# Patient Record
Sex: MALE | Race: BLACK OR AFRICAN AMERICAN | NOT HISPANIC OR LATINO | Employment: OTHER | URBAN - METROPOLITAN AREA
[De-identification: names, ages, dates, MRNs, and addresses within clinical notes are randomized per-mention and may not be internally consistent; named-entity substitution may affect disease eponyms.]

---

## 2017-01-11 ENCOUNTER — OFFICE VISIT (OUTPATIENT)
Dept: INTERNAL MEDICINE | Facility: CLINIC | Age: 62
End: 2017-01-11
Payer: MEDICARE

## 2017-01-11 VITALS
WEIGHT: 142.88 LBS | HEIGHT: 70 IN | BODY MASS INDEX: 20.46 KG/M2 | DIASTOLIC BLOOD PRESSURE: 62 MMHG | TEMPERATURE: 96 F | SYSTOLIC BLOOD PRESSURE: 104 MMHG

## 2017-01-11 DIAGNOSIS — R97.20 ELEVATED PSA, GREATER THAN OR EQUAL TO 20 NG/ML: ICD-10-CM

## 2017-01-11 DIAGNOSIS — I25.84 CORONARY ARTERY DISEASE DUE TO CALCIFIED CORONARY LESION: Chronic | ICD-10-CM

## 2017-01-11 DIAGNOSIS — R05.3 CHRONIC COUGH: Primary | ICD-10-CM

## 2017-01-11 DIAGNOSIS — K21.9 GASTROESOPHAGEAL REFLUX DISEASE, ESOPHAGITIS PRESENCE NOT SPECIFIED: Chronic | ICD-10-CM

## 2017-01-11 DIAGNOSIS — Z98.61 HISTORY OF PTCA: ICD-10-CM

## 2017-01-11 DIAGNOSIS — I25.10 CORONARY ARTERY DISEASE DUE TO CALCIFIED CORONARY LESION: Chronic | ICD-10-CM

## 2017-01-11 DIAGNOSIS — J44.9 CHRONIC OBSTRUCTIVE PULMONARY DISEASE, UNSPECIFIED COPD TYPE: Chronic | ICD-10-CM

## 2017-01-11 DIAGNOSIS — E78.2 MIXED HYPERLIPIDEMIA: Chronic | ICD-10-CM

## 2017-01-11 PROCEDURE — 99213 OFFICE O/P EST LOW 20 MIN: CPT | Mod: PBBFAC,PO | Performed by: FAMILY MEDICINE

## 2017-01-11 PROCEDURE — 99214 OFFICE O/P EST MOD 30 MIN: CPT | Mod: S$PBB,,, | Performed by: FAMILY MEDICINE

## 2017-01-11 PROCEDURE — 99999 PR PBB SHADOW E&M-EST. PATIENT-LVL III: CPT | Mod: PBBFAC,,, | Performed by: FAMILY MEDICINE

## 2017-01-11 RX ORDER — BENZONATATE 200 MG/1
200 CAPSULE ORAL 3 TIMES DAILY PRN
Qty: 30 CAPSULE | Refills: 0 | Status: SHIPPED | OUTPATIENT
Start: 2017-01-11 | End: 2017-01-21

## 2017-01-11 NOTE — PROGRESS NOTES
Subjective:       Patient ID: Joey Jacobo is a 61 y.o. male.    Chief Complaint: not sleeping and Cough    HPI Comments: 61-year-old -American male patient with Patient Active Problem List:     Coronary artery disease     Mitral regurgitation     COPD (chronic obstructive pulmonary disease)     Mixed hyperlipidemia     GERD (gastroesophageal reflux disease)     Glaucoma (increased eye pressure)     History of PTCA     Osteoarthritis of spine with radiculopathy, lumbar region     Non-productive cough  Here with complaint of chronic cough for the past 6 months, has tried over-the-counter Nexium and ALLERGY medication but continues to have productive cough was at nighttime.  Patient has stopped smoking several years ago, with history of COPD has been using his inhalers regularly, denies of any significant wheezing or recent hospitalizations, has not seen pulmonologist for more than a year.  Patient with history of CAD status post PTCA has been taking metoprolol regularly and aspirin,  does not have any follow-up appointments with cardiology.  Patient denies of any chest pain or shortness of breath, abdominal discomfort nausea vomiting          Cough   Pertinent negatives include no chest pain, headaches, myalgias, postnasal drip, rash or shortness of breath.     Review of Systems   Constitutional: Negative for appetite change, fatigue and unexpected weight change.   HENT: Negative for postnasal drip.    Eyes: Negative for visual disturbance.   Respiratory: Positive for cough. Negative for shortness of breath.    Cardiovascular: Negative for chest pain, palpitations and leg swelling.   Gastrointestinal: Negative for abdominal pain, nausea and vomiting.   Genitourinary: Negative for dysuria, frequency, hematuria and urgency.   Musculoskeletal: Negative for myalgias.   Skin: Negative for rash.   Neurological: Negative for headaches.   Psychiatric/Behavioral: Negative for sleep disturbance.         Visit  "Vitals    /62    Temp 96 °F (35.6 °C) (Tympanic)    Ht 5' 10" (1.778 m)    Wt 64.8 kg (142 lb 13.7 oz)    BMI 20.5 kg/m2     Objective:      Physical Exam   Constitutional: He is oriented to person, place, and time. He appears well-developed and well-nourished.   HENT:   Head: Normocephalic and atraumatic.   Mouth/Throat: Oropharynx is clear and moist.   Cardiovascular: Normal rate, regular rhythm and normal heart sounds.    No murmur heard.  Pulmonary/Chest: Effort normal and breath sounds normal. No respiratory distress. He has no wheezes.   Abdominal: Soft. Bowel sounds are normal. There is no tenderness.   Musculoskeletal: He exhibits no edema.   Neurological: He is alert and oriented to person, place, and time.   Skin: Skin is warm and dry.   Psychiatric: He has a normal mood and affect.         Assessment:       1. Chronic cough    2. Chronic obstructive pulmonary disease, unspecified COPD type    3. Coronary artery disease due to calcified coronary lesion    4. History of PTCA    5. Mixed hyperlipidemia    6. Gastroesophageal reflux disease, esophagitis presence not specified    7. Elevated PSA, greater than or equal to 20 ng/ml        Plan:   Chronic cough  -     Ambulatory referral to Pulmonology  -     CBC auto differential; Future; Expected date: 1/11/17  -     X-Ray Chest PA And Lateral; Future; Expected date: 1/11/17  -     benzonatate (TESSALON) 200 MG capsule; Take 1 capsule (200 mg total) by mouth 3 (three) times daily as needed.  Dispense: 30 capsule; Refill: 0  Will get chest x-ray and further labs, secondary to chronicity but will refer to pulmonology for further evaluation with history of COPD, Tessalon Perles prescribed today for symptomatic relief  Encouraged to drink adequate fluids    Chronic obstructive pulmonary disease, unspecified COPD type  -     Ambulatory referral to Pulmonology  -     CBC auto differential; Future; Expected date: 1/11/17  Stable currently on " Pulmicort    Coronary artery disease due to calcified coronary lesion  -     Ambulatory referral to Cardiology  -     Comprehensive metabolic panel; Future; Expected date: 1/11/17  -     Lipid panel; Future; Expected date: 1/11/17    History of PTCA  -     Ambulatory referral to Cardiology  Stable and asymptomatic currently on aspirin 81 mg and metoprolol 50 mg daily, will refer to cardiology for monitoring    Mixed hyperlipidemia  -     Lipid panel; Future; Expected date: 1/11/17  -     TSH; Future; Expected date: 1/11/17  Currently taking Lipitor 40 mg daily, will check fasting labs tomorrow    Gastroesophageal reflux disease, esophagitis presence not specified-stable on Nexium as needed  Encouraged to eat small frequent meals and avoid spicy diet    Elevated PSA, greater than or equal to 20 ng/ml  -     PSA, TOTAL AND FREE; Future; Expected date: 1/11/17  Previous labs showed elevated PSA above 20, will plan to repeat prostate levels    Patient would like to get flu shot tomorrow when he is here for workup

## 2017-01-11 NOTE — MR AVS SNAPSHOT
[facility]  [address] [place] [address]  Phone: [phone]  Fax: [phone]                  Joey Jacobo   2017 2:40 PM   Office Visit    Description:  Male : 1955   Provider:  Cassandra Brand MD   Department:  [facility] - Internal Medicine           Reason for Visit     not sleeping     Cough           Diagnoses this Visit        Comments    Chronic cough    -  Primary     Chronic obstructive pulmonary disease, unspecified COPD type         Coronary artery disease due to calcified coronary lesion         History of PTCA         Mixed hyperlipidemia         Gastroesophageal reflux disease, esophagitis presence not specified         Elevated PSA, greater than or equal to 20 ng/ml                To Do List           Future Appointments        Provider Department Dept Phone    2017 8:45 AM LABORATORY, [facility] [phone]    2017 9:00 AM [facility] XR2 [facility] [phone]      Goals (5 Years of Data)     None      Follow-Up and Disposition     Return in about 4 months (around 2017), or if symptoms worsen or fail to improve.       These Medications        Disp Refills Start End    benzonatate (TESSALON) 200 MG capsule 30 capsule 0 2017    Take 1 capsule (200 mg total) by mouth 3 (three) times daily as needed. - Oral    Pharmacy: [id number] [place] [address] Ph #: [phone]         [facility] [place] [facility]     [facility] Nurse Care Line -  Assistance  Registered nurses in the [facility] provide clinical advisement, health education, appointment booking, and other advisory services.  Call for this free service at [phone].             Medications           Message regarding Medications     Verify the changes and/or additions to your medication regime listed below are the same as discussed with your clinician today.  If any of these changes or  additions are incorrect, please notify your healthcare provider.        START taking these NEW medications        Refills    benzonatate (TESSALON) 200 MG capsule 0    Sig: Take 1 capsule (200 mg total) by mouth 3 (three) times daily as needed.    Class: Normal    Route: Oral      STOP taking these medications     sulfamethoxazole-trimethoprim 800-160mg (BACTRIM DS) 800-160 mg Tab Take 1 tablet by mouth 2 (two) times daily.    SUPREP BOWEL PREP KIT 17.5-3.13-1.6 gram SolR Use as directed    pyridoxine (VITAMIN B-6) 25 MG Tab Take by mouth. 1 Tablet Oral Every day           Verify that the below list of medications is an accurate representation of the medications you are currently taking.  If none reported, the list may be blank. If incorrect, please contact your healthcare provider. Carry this list with you in case of emergency.           Current Medications     aspirin (ECOTRIN) 81 MG EC tablet Take by mouth. 1 Tablet, Delayed Release (E.C.) Oral Every day    atorvastatin (LIPITOR) 40 MG tablet Take one tablet by mouth in the evening    budesonide-formoterol 160-4.5 mcg (SYMBICORT) 160-4.5 mcg/actuation HFAA Inhale 2 puffs into the lungs every 12 (twelve) hours.    calcium-vitamin D (CALCIUM 600 + D,3,) 600 mg(1,500mg) -400 unit Tab Take by mouth. 1 Tablet Oral Twice a day    gabapentin (NEURONTIN) 300 MG capsule Take 300 mg by mouth once daily.     metoprolol succinate (TOPROL-XL) 50 MG 24 hr tablet Take 1 tablet (50 mg total) by mouth once daily.    nitroGLYCERIN (NITROSTAT) 0.4 MG SL tablet Place 1 tablet (0.4 mg total) under the tongue every 5 (five) minutes as needed for Chest pain.    travoprost (TRAVATAN Z) 0.004 % Drop Place 1 drop into both eyes every evening. 1 Drops Ophthalmic At bedtime    benzonatate (TESSALON) 200 MG capsule Take 1 capsule (200 mg total) by mouth 3 (three) times daily as needed.    fluticasone (FLONASE) 50 mcg/actuation nasal spray 1 spray by Each Nare route once daily.          "  Clinical Reference Information           Vital Signs - Last Recorded  Most recent update: 1/11/2017  2:50 PM by Ana Barton LPN    BP Temp Ht Wt BMI    104/62 96 °F (35.6 °C) (Tympanic) 5' 10" (1.778 m) 64.8 kg (142 lb 13.7 oz) 20.5 kg/m2      Blood Pressure          Most Recent Value    BP  104/62      Allergies as of 1/11/2017     Avelox  [Moxifloxacin]      Immunizations Administered on Date of Encounter - 1/11/2017     None      Orders Placed During Today's Visit      Normal Orders This Visit    Ambulatory referral to Cardiology     Ambulatory referral to Pulmonology     Future Labs/Procedures Expected by Expires    CBC auto differential  1/11/2017 3/12/2018    Comprehensive metabolic panel  1/11/2017 3/12/2018    Lipid panel  1/11/2017 3/12/2018    PSA, TOTAL AND FREE  1/11/2017 3/12/2018    TSH  1/11/2017 3/12/2018    X-Ray Chest PA And Lateral  1/11/2017 1/11/2018      [facility] Sign-Up     Activating your [facility] account is as easy as 1-2-3!     1) Visit [email or web address], select Sign Up Now, enter this activation code and your date of birth, then select Next.  [email or web address]  Expires: 2/25/2017  3:07 PM      2) Create a username and password to use when you visit [facility] in the future and select a security question in case you lose your password and select Next.    3) Enter your e-mail address and click Sign Up!    Additional Information  If you have questions, please e-mail [email or web address] or call [phone] to talk to our [facility] staff. Remember, [facility] is NOT to be used for urgent needs. For medical emergencies, dial 911.         "

## 2017-01-20 ENCOUNTER — TELEPHONE (OUTPATIENT)
Dept: PULMONOLOGY | Facility: CLINIC | Age: 62
End: 2017-01-20

## 2017-02-22 ENCOUNTER — HOSPITAL ENCOUNTER (OUTPATIENT)
Dept: RADIOLOGY | Facility: HOSPITAL | Age: 62
Discharge: HOME | End: 2017-02-22
Attending: FAMILY MEDICINE
Payer: MEDICARE

## 2017-02-22 ENCOUNTER — TELEPHONE (OUTPATIENT)
Dept: INTERNAL MEDICINE | Facility: CLINIC | Age: 62
End: 2017-02-22

## 2017-02-22 ENCOUNTER — HOSPITAL ENCOUNTER (OUTPATIENT)
Dept: RADIOLOGY | Facility: HOSPITAL | Age: 62
Discharge: HOME | End: 2017-02-22
Attending: FAMILY MEDICINE | Admitting: FAMILY MEDICINE
Payer: MEDICARE

## 2017-02-22 ENCOUNTER — OFFICE VISIT (OUTPATIENT)
Dept: INTERNAL MEDICINE | Facility: CLINIC | Age: 62
End: 2017-02-22
Payer: MEDICARE

## 2017-02-22 VITALS
HEART RATE: 65 BPM | BODY MASS INDEX: 21.05 KG/M2 | HEIGHT: 70 IN | TEMPERATURE: 96 F | DIASTOLIC BLOOD PRESSURE: 70 MMHG | OXYGEN SATURATION: 99 % | WEIGHT: 147.06 LBS | SYSTOLIC BLOOD PRESSURE: 124 MMHG

## 2017-02-22 DIAGNOSIS — I25.84 CORONARY ARTERY DISEASE DUE TO CALCIFIED CORONARY LESION: Chronic | ICD-10-CM

## 2017-02-22 DIAGNOSIS — M47.26 OSTEOARTHRITIS OF SPINE WITH RADICULOPATHY, LUMBAR REGION: ICD-10-CM

## 2017-02-22 DIAGNOSIS — I25.10 CORONARY ARTERY DISEASE DUE TO CALCIFIED CORONARY LESION: Chronic | ICD-10-CM

## 2017-02-22 DIAGNOSIS — R05.3 CHRONIC COUGH: ICD-10-CM

## 2017-02-22 DIAGNOSIS — R97.20 ELEVATED PSA, GREATER THAN OR EQUAL TO 20 NG/ML: Primary | ICD-10-CM

## 2017-02-22 DIAGNOSIS — K21.9 GASTROESOPHAGEAL REFLUX DISEASE, ESOPHAGITIS PRESENCE NOT SPECIFIED: Chronic | ICD-10-CM

## 2017-02-22 DIAGNOSIS — J44.9 CHRONIC OBSTRUCTIVE PULMONARY DISEASE, UNSPECIFIED COPD TYPE: Chronic | ICD-10-CM

## 2017-02-22 DIAGNOSIS — R10.9 BILATERAL FLANK PAIN: Primary | ICD-10-CM

## 2017-02-22 DIAGNOSIS — R10.9 BILATERAL FLANK PAIN: ICD-10-CM

## 2017-02-22 DIAGNOSIS — R05.8 NON-PRODUCTIVE COUGH: ICD-10-CM

## 2017-02-22 PROCEDURE — 99999 PR PBB SHADOW E&M-EST. PATIENT-LVL III: CPT | Mod: PBBFAC,,, | Performed by: FAMILY MEDICINE

## 2017-02-22 PROCEDURE — 72110 X-RAY EXAM L-2 SPINE 4/>VWS: CPT | Mod: 26,,, | Performed by: RADIOLOGY

## 2017-02-22 PROCEDURE — 71020 XR CHEST PA AND LATERAL: CPT | Mod: 26,,, | Performed by: RADIOLOGY

## 2017-02-22 PROCEDURE — 99213 OFFICE O/P EST LOW 20 MIN: CPT | Mod: PBBFAC,PO | Performed by: FAMILY MEDICINE

## 2017-02-22 PROCEDURE — 74020 XR ABDOMEN FLAT AND ERECT: CPT | Mod: 26,,, | Performed by: RADIOLOGY

## 2017-02-22 PROCEDURE — 99214 OFFICE O/P EST MOD 30 MIN: CPT | Mod: S$PBB,,, | Performed by: FAMILY MEDICINE

## 2017-02-22 RX ORDER — ZOLPIDEM TARTRATE 5 MG/1
5 TABLET ORAL
COMMUNITY
End: 2017-05-24

## 2017-02-22 RX ORDER — TRAMADOL HYDROCHLORIDE 50 MG/1
50 TABLET ORAL EVERY 12 HOURS PRN
Qty: 20 TABLET | Refills: 0 | Status: SHIPPED | OUTPATIENT
Start: 2017-02-22 | End: 2017-03-04

## 2017-02-22 RX ORDER — OMEPRAZOLE 40 MG/1
40 CAPSULE, DELAYED RELEASE ORAL DAILY
Qty: 30 CAPSULE | Refills: 1 | Status: SHIPPED | OUTPATIENT
Start: 2017-02-22 | End: 2017-10-03 | Stop reason: ALTCHOICE

## 2017-02-22 NOTE — PROGRESS NOTES
"Subjective:       Patient ID: Joey Jacobo is a 61 y.o. male.    Chief Complaint: No chief complaint on file.    HPI Comments: 61-year-old -American male patient with Patient Active Problem List:     Coronary artery disease     Mitral regurgitation     COPD (chronic obstructive pulmonary disease)     Mixed hyperlipidemia     GERD (gastroesophageal reflux disease)     Glaucoma (increased eye pressure)     History of PTCA     Osteoarthritis of spine with radiculopathy, lumbar region     Non-productive cough  Here with complaint of bilateral flank pain, started yesterday, reports pain as 9/10.  Patient denies of lifting any heavy stuff  Or any kind of injury to cause this pain.  Continues to have chronic nonproductive cough , patient did not see pulmonology yet , reviewed chest x-ray which has been done today showing COPD changes .  Patient did labs this morning   Denies of any fever , trouble with bowel movements or urine .   Reports pain  in the abdomen and lower back , as a band  around bilateral flanks .  Reports having a bowel movement yesterday    denies of any chest pain or shortness of breath          Review of Systems   Constitutional: Negative for appetite change, fatigue and fever.   Eyes: Negative for visual disturbance.   Respiratory: Positive for cough. Negative for shortness of breath and wheezing.    Cardiovascular: Negative for chest pain.   Gastrointestinal: Positive for abdominal pain. Negative for constipation, nausea and vomiting.   Genitourinary: Positive for flank pain. Negative for frequency, hematuria and urgency.   Musculoskeletal: Negative for myalgias.   Skin: Negative for rash.   Neurological: Negative for weakness, numbness and headaches.   Psychiatric/Behavioral: Negative for sleep disturbance.         Visit Vitals    /70    Pulse 65    Temp 96.1 °F (35.6 °C) (Tympanic)    Ht 5' 10" (1.778 m)    Wt 66.7 kg (147 lb 0.8 oz)    SpO2 99%    BMI 21.1 kg/m2 "     Objective:      Physical Exam   Constitutional: He is oriented to person, place, and time. He appears well-developed and well-nourished.   HENT:   Head: Normocephalic and atraumatic.   Mouth/Throat: Oropharynx is clear and moist.   Cardiovascular: Normal rate, regular rhythm and normal heart sounds.    No murmur heard.  Pulmonary/Chest: Effort normal and breath sounds normal. No respiratory distress. He has no wheezes.   Abdominal: Soft. Bowel sounds are normal. There is tenderness.   Musculoskeletal: He exhibits tenderness. He exhibits no edema.   Positive for paraspinal lumbar muscle tenderness bilaterally and bilateral flank tenderness noted on exam today.   Minimal tenderness noted in the abdomen generalized superficially.    Neurological: He is alert and oriented to person, place, and time.   Skin: Skin is warm and dry. No rash noted. No erythema.   Psychiatric: He has a normal mood and affect.         Assessment:       1. Bilateral flank pain    2. Chronic obstructive pulmonary disease, unspecified COPD type    3. Gastroesophageal reflux disease, esophagitis presence not specified    4. Osteoarthritis of spine with radiculopathy, lumbar region    5. Non-productive cough    6. Coronary artery disease due to calcified coronary lesion        Plan:   Bilateral flank pain  -     X-Ray Abdomen Flat And Erect; Future; Expected date: 2/22/17  -     Urinalysis; Future; Expected date: 2/22/17  -     X-Ray Lumbar Spine Complete 5 View; Future; Expected date: 2/22/17  -     Amylase; Future; Expected date: 2/22/17  -     Lipase; Future; Expected date: 2/22/17  -     tramadol (ULTRAM) 50 mg tablet; Take 1 tablet (50 mg total) by mouth every 12 (twelve) hours as needed for Pain.  Dispense: 20 tablet; Refill: 0  Tramadol prescribed for bilateral flank pain, patient did labs this morning, will add amylase lipase and imaging including urine test to identify further etiology for  flank pain.    Encouraged to drink adequate  fluids and eat fiber rich diet  Warm compresses recommended for symptomatic relief to the lower back    Chronic obstructive pulmonary disease, unspecified COPD type- currently using Symbicort, clinically stable.  Reviewed chest x-ray showing COPD changes  Will make an appointment with pulmonology to discuss further       Gastroesophageal reflux disease, esophagitis presence not specified  -     omeprazole (PRILOSEC) 40 MG capsule; Take 1 capsule (40 mg total) by mouth once daily.  Dispense: 30 capsule; Refill: 1   secondary to chronic nonproductive cough, could be from acid reflex, will do a trial of omeprazole 40 mg daily until further evaluated by pulmonology  Encouraged to eat small frequent meals and avoid spicy diet    Osteoarthritis of spine with radiculopathy, lumbar region- reviewed previous x-rays showing mild degenerative changes , will plan to repeat x-rays secondary to bilateral flank pain which started yesterday     Non-productive cough  -     omeprazole (PRILOSEC) 40 MG capsule; Take 1 capsule (40 mg total) by mouth once daily.  Dispense: 30 capsule; Refill: 1   advised to try over-the-counter Mucinex as well    Coronary artery disease due to calcified coronary lesion-status post PTCA clinically stable and asymptomatic

## 2017-02-22 NOTE — MR AVS SNAPSHOT
[facility]  [address] [place] [address]  Phone: [phone]  Fax: [phone]                  Joey Jacobo   2017 10:40 AM   Office Visit    Description:  Male : 1955   Provider:  Cassandra Brand MD   Department:  [facility] - Internal Medicine           Diagnoses this Visit        Comments    Bilateral flank pain    -  Primary     Chronic obstructive pulmonary disease, unspecified COPD type         Gastroesophageal reflux disease, esophagitis presence not specified         Osteoarthritis of spine with radiculopathy, lumbar region         Non-productive cough         Coronary artery disease due to calcified coronary lesion                To Do List           Future Appointments        Provider Department Dept Phone    2017 9:00 AM [facility]2 [facility] [phone]    2017 9:40 AM SPECIMEN, [facility] [phone]    2017 10:40 AM Cassandra Brand MD [facility] Internal [facility] [phone]    2017 1:40 PM Cassandra Brand MD [facility] [phone]      Goals (5 Years of Data)     None       These Medications        Disp Refills Start End    omeprazole (PRILOSEC) 40 MG capsule 30 capsule 1 2017    Take 1 capsule (40 mg total) by mouth once daily. - Oral    Pharmacy: [id number] - [place] - [address] [place] [address] Ph #: [phone]       tramadol (ULTRAM) 50 mg tablet 20 tablet 0 2017 3/4/2017    Take 1 tablet (50 mg total) by mouth every 12 (twelve) hours as needed for Pain. - Oral    Pharmacy: [id number] - [place] - [address] [place] [address] Ph #: [phone]         [facility] On [facility]     [facility] On [facility] Nurse Care Line -  Assistance  Registered nurses in the [facility] provide clinical advisement, health education, appointment booking, and other advisory services.  Call for this free service at [phone].             Medications            Message regarding Medications     Verify the changes and/or additions to your medication regime listed below are the same as discussed with your clinician today.  If any of these changes or additions are incorrect, please notify your healthcare provider.        START taking these NEW medications        Refills    omeprazole (PRILOSEC) 40 MG capsule 1    Sig: Take 1 capsule (40 mg total) by mouth once daily.    Class: Normal    Route: Oral    tramadol (ULTRAM) 50 mg tablet 0    Sig: Take 1 tablet (50 mg total) by mouth every 12 (twelve) hours as needed for Pain.    Class: Normal    Route: Oral           Verify that the below list of medications is an accurate representation of the medications you are currently taking.  If none reported, the list may be blank. If incorrect, please contact your healthcare provider. Carry this list with you in case of emergency.           Current Medications     aspirin (ECOTRIN) 81 MG EC tablet Take by mouth. 1 Tablet, Delayed Release (E.C.) Oral Every day    atorvastatin (LIPITOR) 40 MG tablet Take one tablet by mouth in the evening    budesonide-formoterol 160-4.5 mcg (SYMBICORT) 160-4.5 mcg/actuation HFAA Inhale 2 puffs into the lungs every 12 (twelve) hours.    calcium-vitamin D (CALCIUM 600 + D,3,) 600 mg(1,500mg) -400 unit Tab Take by mouth. 1 Tablet Oral Twice a day    fluticasone (FLONASE) 50 mcg/actuation nasal spray 1 spray by Each Nare route once daily.    gabapentin (NEURONTIN) 300 MG capsule Take 300 mg by mouth once daily.     metoprolol succinate (TOPROL-XL) 50 MG 24 hr tablet Take 1 tablet (50 mg total) by mouth once daily.    nitroGLYCERIN (NITROSTAT) 0.4 MG SL tablet Place 1 tablet (0.4 mg total) under the tongue every 5 (five) minutes as needed for Chest pain.    omeprazole (PRILOSEC) 40 MG capsule Take 1 capsule (40 mg total) by mouth once daily.    tramadol (ULTRAM) 50 mg tablet Take 1 tablet (50 mg total) by mouth every 12 (twelve) hours as needed for Pain.     "travoprost (TRAVATAN Z) 0.004 % Drop Place 1 drop into both eyes every evening. 1 Drops Ophthalmic At bedtime    zolpidem (AMBIEN) 5 MG Tab Take 5 mg by mouth.           Clinical Reference Information           Your Vitals Were     BP Pulse Temp Height Weight SpO2    124/70 65 96.1 °F (35.6 °C) (Tympanic) 5' 10" (1.778 m) 66.7 kg (147 lb 0.8 oz) 99%    BMI                21.1 kg/m2          Blood Pressure          Most Recent Value    BP  124/70      Allergies as of 2/22/2017     Avelox  [Moxifloxacin]      Immunizations Administered on Date of Encounter - 2/22/2017     None      Orders Placed During Today's Visit     Future Labs/Procedures Expected by Expires    Amylase  2/22/2017 4/23/2018    Lipase  2/22/2017 4/23/2018    Urinalysis  2/22/2017 4/23/2018    X-Ray Abdomen Flat And Erect  2/22/2017 2/22/2018    X-Ray Lumbar Spine Complete 5 View  2/22/2017 2/22/2018      [facility] Sign-Up     Activating your [facility] account is as easy as 1-2-3!     1) Visit [email or web address], select Sign Up Now, enter this activation code and your date of birth, then select Next.  [email or web address]  Expires: 2/25/2017  3:07 PM      2) Create a username and password to use when you visit [facility] in the future and select a security question in case you lose your password and select Next.    3) Enter your e-mail address and click Sign Up!    Additional Information  If you have questions, please e-mail [email or web address] or call [phone] to talk to our [facility] staff. Remember, [facility] is NOT to be used for urgent needs. For medical emergencies, dial 911.         Language Assistance Services     ATTENTION: Language assistance services are available, free of charge. Please call [phone].      ATENCIÓN: Si habla español, tiene a garcia disposición servicios gratuitos de asistencia lingüística. Llame al [phone].     CHÚ Ý: N?u b?n nói Ti?ng Vi?t, có các d?ch v? h? tr? ngôn ng? mi?n phí dành cho b?n. G?i s? " [phone].         [facility] complies with applicable Federal civil rights laws and does not discriminate on the basis of race, color, national origin, age, disability, or sex.

## 2017-02-22 NOTE — TELEPHONE ENCOUNTER
Will refer to urology for elevated PSA.   Mild straining kidney functions, drink adequate fluids, otherwise stable labs

## 2017-02-27 ENCOUNTER — OFFICE VISIT (OUTPATIENT)
Dept: INTERNAL MEDICINE | Facility: CLINIC | Age: 62
End: 2017-02-27
Payer: MEDICARE

## 2017-02-27 VITALS
SYSTOLIC BLOOD PRESSURE: 106 MMHG | OXYGEN SATURATION: 99 % | HEIGHT: 70 IN | HEART RATE: 73 BPM | DIASTOLIC BLOOD PRESSURE: 78 MMHG | BODY MASS INDEX: 21.21 KG/M2 | WEIGHT: 148.13 LBS

## 2017-02-27 DIAGNOSIS — R97.20 ELEVATED PSA, GREATER THAN OR EQUAL TO 20 NG/ML: ICD-10-CM

## 2017-02-27 DIAGNOSIS — R10.9 ABDOMINAL PAIN, ACUTE: Primary | ICD-10-CM

## 2017-02-27 DIAGNOSIS — R74.8 ELEVATED LIPASE: ICD-10-CM

## 2017-02-27 PROCEDURE — 99213 OFFICE O/P EST LOW 20 MIN: CPT | Mod: S$PBB,,, | Performed by: FAMILY MEDICINE

## 2017-02-27 PROCEDURE — 99999 PR PBB SHADOW E&M-EST. PATIENT-LVL III: CPT | Mod: PBBFAC,,, | Performed by: FAMILY MEDICINE

## 2017-02-27 PROCEDURE — 99213 OFFICE O/P EST LOW 20 MIN: CPT | Mod: PBBFAC,PO | Performed by: FAMILY MEDICINE

## 2017-02-27 RX ORDER — HYDROCODONE BITARTRATE AND ACETAMINOPHEN 5; 325 MG/1; MG/1
1 TABLET ORAL EVERY 12 HOURS PRN
Qty: 15 TABLET | Refills: 0 | Status: SHIPPED | OUTPATIENT
Start: 2017-02-27 | End: 2017-04-04 | Stop reason: SDUPTHER

## 2017-02-27 NOTE — PROGRESS NOTES
"Subjective:       Patient ID: Joey Jacobo is a 61 y.o. male.    Chief Complaint: Abdominal Pain    HPI Comments: 61-year-old Afro-American female patient with Patient Active Problem List:     Coronary artery disease     Mitral regurgitation     COPD (chronic obstructive pulmonary disease)     Mixed hyperlipidemia     GERD (gastroesophageal reflux disease)     Glaucoma (increased eye pressure)     History of PTCA     Osteoarthritis of spine with radiculopathy, lumbar region     Non-productive cough  Here with complaint of abdominal pain which is been persistent for the past 1 month off and on, occasionally can get up to 6-7/10, reports pain in the mid umbilical area, and occasionally in the left upper quadrant radiating to the abdomen, denies of any nausea vomiting or changes in bowel movements.  Cough has subsided since started on omeprazole.   Patient has been taking tramadol but no significant relief noted    Abdominal Pain   Associated symptoms include myalgias. Pertinent negatives include no constipation, dysuria, headaches, nausea or vomiting.     Review of Systems   Constitutional: Negative for appetite change and fatigue.   Eyes: Negative for visual disturbance.   Respiratory: Negative for shortness of breath.    Cardiovascular: Negative for chest pain, palpitations and leg swelling.   Gastrointestinal: Positive for abdominal pain. Negative for constipation, nausea and vomiting.   Genitourinary: Positive for flank pain. Negative for dysuria.   Musculoskeletal: Positive for myalgias.   Skin: Negative for rash.   Neurological: Negative for headaches.   Psychiatric/Behavioral: Negative for sleep disturbance.         /78  Pulse 73  Ht 5' 10" (1.778 m)  Wt 67.2 kg (148 lb 2.4 oz)  SpO2 99%  BMI 21.26 kg/m2  Objective:      Physical Exam   Constitutional: He is oriented to person, place, and time. He appears well-developed and well-nourished.   HENT:   Head: Normocephalic and atraumatic. "   Mouth/Throat: Oropharynx is clear and moist.   Cardiovascular: Normal rate, regular rhythm and normal heart sounds.    Pulmonary/Chest: Effort normal and breath sounds normal.   Abdominal: Soft. Bowel sounds are normal. He exhibits no mass. There is tenderness. There is no rebound and no guarding.   Neurological: He is alert and oriented to person, place, and time.   Skin: Skin is warm and dry. No rash noted. No erythema.   Psychiatric: He has a normal mood and affect.       Lab Visit on 02/22/2017   Component Date Value Ref Range Status    Specimen UA 02/22/2017 Urine, Clean Catch   Final    Color, UA 02/22/2017 Yellow  Yellow, Straw, Yolande Final    Appearance, UA 02/22/2017 Clear  Clear Final    pH, UA 02/22/2017 7.0  5.0 - 8.0 Final    Specific Gravity, UA 02/22/2017 <=1.005* 1.005 - 1.030 Final    Protein, UA 02/22/2017 Negative  Negative Final    Comment: Recommend a 24 hour urine protein or a urine   protein/creatinine ratio if globulin induced proteinuria is  clinically suspected.      Glucose, UA 02/22/2017 Negative  Negative Final    Ketones, UA 02/22/2017 Negative  Negative Final    Bilirubin (UA) 02/22/2017 Negative  Negative Final    Occult Blood UA 02/22/2017 Negative  Negative Final    Nitrite, UA 02/22/2017 Negative  Negative Final    Leukocytes, UA 02/22/2017 Negative  Negative Final   Lab Visit on 02/22/2017   Component Date Value Ref Range Status    WBC 02/22/2017 7.05  3.90 - 12.70 K/uL Final    RBC 02/22/2017 4.87  4.60 - 6.20 M/uL Final    Hemoglobin 02/22/2017 14.7  14.0 - 18.0 g/dL Final    Hematocrit 02/22/2017 45.1  40.0 - 54.0 % Final    MCV 02/22/2017 93  82 - 98 fL Final    MCH 02/22/2017 30.2  27.0 - 31.0 pg Final    MCHC 02/22/2017 32.6  32.0 - 36.0 % Final    RDW 02/22/2017 14.2  11.5 - 14.5 % Final    Platelets 02/22/2017 249  150 - 350 K/uL Final    MPV 02/22/2017 11.2  9.2 - 12.9 fL Final    Gran # 02/22/2017 3.2  1.8 - 7.7 K/uL Final    Lymph # 02/22/2017  2.7  1.0 - 4.8 K/uL Final    Mono # 02/22/2017 0.8  0.3 - 1.0 K/uL Final    Eos # 02/22/2017 0.3  0.0 - 0.5 K/uL Final    Baso # 02/22/2017 0.04  0.00 - 0.20 K/uL Final    Gran% 02/22/2017 45.4  38.0 - 73.0 % Final    Lymph% 02/22/2017 38.0  18.0 - 48.0 % Final    Mono% 02/22/2017 11.6  4.0 - 15.0 % Final    Eosinophil% 02/22/2017 4.0  0.0 - 8.0 % Final    Basophil% 02/22/2017 0.6  0.0 - 1.9 % Final    Differential Method 02/22/2017 Automated   Final    Sodium 02/22/2017 140  136 - 145 mmol/L Final    Potassium 02/22/2017 4.4  3.5 - 5.1 mmol/L Final    Chloride 02/22/2017 105  95 - 110 mmol/L Final    CO2 02/22/2017 26  23 - 29 mmol/L Final    Glucose 02/22/2017 95  70 - 110 mg/dL Final    BUN, Bld 02/22/2017 13  6 - 20 mg/dL Final    Creatinine 02/22/2017 1.5* 0.5 - 1.4 mg/dL Final    Calcium 02/22/2017 9.5  8.7 - 10.5 mg/dL Final    Total Protein 02/22/2017 8.5* 6.0 - 8.4 g/dL Final    Albumin 02/22/2017 3.5  3.5 - 5.2 g/dL Final    Total Bilirubin 02/22/2017 0.5  0.1 - 1.0 mg/dL Final    Comment: For infants and newborns, interpretation of results should be based  on gestational age, weight and in agreement with clinical  observations.  Premature Infant recommended reference ranges:  Up to 24 hours.............<8.0 mg/dL  Up to 48 hours............<12.0 mg/dL  3-5 days..................<15.0 mg/dL  6-29 days.................<15.0 mg/dL      Alkaline Phosphatase 02/22/2017 72  55 - 135 U/L Final    AST 02/22/2017 21  10 - 40 U/L Final    ALT 02/22/2017 14  10 - 44 U/L Final    Anion Gap 02/22/2017 9  8 - 16 mmol/L Final    eGFR if  02/22/2017 58.1* >60 mL/min/1.73 m^2 Final    eGFR if non African American 02/22/2017 50.2* >60 mL/min/1.73 m^2 Final    Comment: Calculation used to obtain the estimated glomerular filtration  rate (eGFR) is the CKD-EPI equation. Since race is unknown   in our information system, the eGFR values for   -American and Non--American  patients are given   for each creatinine result.      Cholesterol 02/22/2017 178  120 - 199 mg/dL Final    Comment: The National Cholesterol Education Program (NCEP) has set the  following guidelines (reference ranges) for Cholesterol:  Optimal.....................<200 mg/dL  Borderline High.............200-239 mg/dL  High........................> or = 240 mg/dL      Triglycerides 02/22/2017 103  30 - 150 mg/dL Final    Comment: The National Cholesterol Education Program (NCEP) has set the  following guidelines (reference values) for triglycerides:  Normal......................<150 mg/dL  Borderline High.............150-199 mg/dL  High........................200-499 mg/dL      HDL 02/22/2017 49  40 - 75 mg/dL Final    Comment: The National Cholesterol Education Program (NCEP) has set the  following guidelines (reference values) for HDL Cholesterol:  Low...............<40 mg/dL  Optimal...........>60 mg/dL      LDL Cholesterol 02/22/2017 108.4  63.0 - 159.0 mg/dL Final    Comment: The National Cholesterol Education Program (NCEP) has set the  following guidelines (reference values) for LDL Cholesterol:  Optimal.......................<130 mg/dL  Borderline High...............130-159 mg/dL  High..........................160-189 mg/dL  Very High.....................>190 mg/dL      HDL/Chol Ratio 02/22/2017 27.5  20.0 - 50.0 % Final    Total Cholesterol/HDL Ratio 02/22/2017 3.6  2.0 - 5.0 Final    Non-HDL Cholesterol 02/22/2017 129  mg/dL Final    Comment: Risk category and Non-HDL cholesterol goals:  Coronary heart disease (CHD)or equivalent (10-year risk of CHD >20%):  Non-HDL cholesterol goal     <130 mg/dL  Two or more CHD risk factors and 10-year risk of CHD <= 20%:  Non-HDL cholesterol goal     <160 mg/dL  0 to 1 CHD risk factor:  Non-HDL cholesterol goal     <190 mg/dL      TSH 02/22/2017 2.366  0.400 - 4.000 uIU/mL Final    PSA Total 02/22/2017 22.6* 0.00 - 4.00 ng/mL Final    Comment: PSA Expected  levels:  Hormonal Therapy: <0.05 ng/ml  Prostatectomy: <0.01 ng/ml  Radiation Therapy: <1.00 ng/ml      PSA, Free 02/22/2017 1.19  0.01 - 1.50 ng/mL Final    PSA, Free Pct 02/22/2017 5.27  Not established % Final    Amylase 02/22/2017 107  20 - 110 U/L Final    Lipase 02/22/2017 81* 4 - 60 U/L Final       Assessment:       1. Abdominal pain, acute    2. Elevated lipase    3. Elevated PSA, greater than or equal to 20 ng/ml        Plan:   Abdominal pain, acute  -     CT Abdomen Pelvis With Contrast; Future; Expected date: 2/27/17  -     hydrocodone-acetaminophen 5-325mg (NORCO) 5-325 mg per tablet; Take 1 tablet by mouth every 12 (twelve) hours as needed for Pain.  Dispense: 15 tablet; Refill: 0    Elevated lipase  -     CT Abdomen Pelvis With Contrast; Future; Expected date: 2/27/17    Elevated PSA, greater than or equal to 20 ng/ml  -     CT Abdomen Pelvis With Contrast; Future; Expected date: 2/27/17    Reviewed labs available today showing elevated lipase and PSA levels.   Will get CT scan of the abdomen with pelvis with contrast for further evaluation, Norco prescribed today for severe pain and tramadol was advised to take for mild to moderate pain.  Drink adequate fluids  Advised to follow-up with urology for elevated PSA for which referral has already been placed and will make an appointment today with urology.   Will follow up in 1 month

## 2017-02-27 NOTE — MR AVS SNAPSHOT
[facility]  [address] [place] [address]  Phone: [phone]  Fax: [phone]                  Joey Jacobo   2017 1:40 PM   Office Visit    Description:  Male : 1955   Provider:  Cassandra Brand MD   Department:  [facility]           Reason for Visit     Abdominal Pain           Diagnoses this Visit        Comments    Abdominal pain, acute    -  Primary     Elevated lipase         Elevated PSA, greater than or equal to 20 ng/ml                To Do List           Future Appointments        Provider Department Dept Phone    2017 1:40 PM Cassandra Brand MD [facility] [phone]    3/1/2017 10:50 AM [facility] CT1 LIMIT 500 LBS [facility] [phone]    3/27/2017 10:40 AM Cassandra Brand MD [facility] [phone]    4/10/2017 9:20 AM Roland Dawn IV, MD [facility] - Urology [phone]      Goals (5 Years of Data)     None      Follow-Up and Disposition     Return in about 4 weeks (around 3/27/2017), or if symptoms worsen or fail to improve.       These Medications        Disp Refills Start End    hydrocodone-acetaminophen 5-325mg (NORCO) 5-325 mg per tablet 15 tablet 0 2017     Take 1 tablet by mouth every 12 (twelve) hours as needed for Pain. - Oral    Pharmacy: [id number] - [place] [address] #: [phone]         [facility]     [facility] Nurse Care Line -  Assistance  Registered nurses in the [facility] Center provide clinical advisement, health education, appointment booking, and other advisory services.  Call for this free service at [phone].             Medications           Message regarding Medications     Verify the changes and/or additions to your medication regime listed below are the same as discussed with your clinician today.  If any of these changes or additions are incorrect, please notify your healthcare provider.         START taking these NEW medications        Refills    hydrocodone-acetaminophen 5-325mg (NORCO) 5-325 mg per tablet 0    Sig: Take 1 tablet by mouth every 12 (twelve) hours as needed for Pain.    Class: Normal    Route: Oral           Verify that the below list of medications is an accurate representation of the medications you are currently taking.  If none reported, the list may be blank. If incorrect, please contact your healthcare provider. Carry this list with you in case of emergency.           Current Medications     aspirin (ECOTRIN) 81 MG EC tablet Take by mouth. 1 Tablet, Delayed Release (E.C.) Oral Every day    atorvastatin (LIPITOR) 40 MG tablet Take one tablet by mouth in the evening    calcium-vitamin D (CALCIUM 600 + D,3,) 600 mg(1,500mg) -400 unit Tab Take by mouth. 1 Tablet Oral Twice a day    fluticasone (FLONASE) 50 mcg/actuation nasal spray 1 spray by Each Nare route once daily.    gabapentin (NEURONTIN) 300 MG capsule Take 300 mg by mouth once daily.     metoprolol succinate (TOPROL-XL) 50 MG 24 hr tablet Take 1 tablet (50 mg total) by mouth once daily.    nitroGLYCERIN (NITROSTAT) 0.4 MG SL tablet Place 1 tablet (0.4 mg total) under the tongue every 5 (five) minutes as needed for Chest pain.    omeprazole (PRILOSEC) 40 MG capsule Take 1 capsule (40 mg total) by mouth once daily.    tramadol (ULTRAM) 50 mg tablet Take 1 tablet (50 mg total) by mouth every 12 (twelve) hours as needed for Pain.    travoprost (TRAVATAN Z) 0.004 % Drop Place 1 drop into both eyes every evening. 1 Drops Ophthalmic At bedtime    zolpidem (AMBIEN) 5 MG Tab Take 5 mg by mouth.    budesonide-formoterol 160-4.5 mcg (SYMBICORT) 160-4.5 mcg/actuation HFAA Inhale 2 puffs into the lungs every 12 (twelve) hours.    hydrocodone-acetaminophen 5-325mg (NORCO) 5-325 mg per tablet Take 1 tablet by mouth every 12 (twelve) hours as needed for Pain.           Clinical Reference Information           Your Vitals Were     BP                    106/78           Blood Pressure          Most Recent Value    BP  106/78      Allergies as of 2/27/2017     Avelox  [Moxifloxacin]      Immunizations Administered on Date of Encounter - 2/27/2017     None      Orders Placed During Today's Visit     Future Labs/Procedures Expected by Expires    CT Abdomen Pelvis With Contrast  2/27/2017 2/27/2018      [facility] Sign-Up     Activating your [facility] account is as easy as 1-2-3!     1) Visit [email or web address], select Sign Up Now, enter this activation code and your date of birth, then select Next.  [email or web address]  Expires: 4/13/2017  1:39 PM      2) Create a username and password to use when you visit [facility] in the future and select a security question in case you lose your password and select Next.    3) Enter your e-mail address and click Sign Up!    Additional Information  If you have questions, please e-mail [email or web address] or call [phone] to talk to our [facility] staff. Remember, [facility] is NOT to be used for urgent needs. For medical emergencies, dial 911.         Language Assistance Services     ATTENTION: Language assistance services are available, free of charge. Please call [phone].      ATENCIÓN: Si habla español, tiene a garcia disposición servicios gratuitos de asistencia lingüística. Llame al [phone].     [facility] Ý: N?u b?n nói Ti?ng Vi?t, có các d?ch v? h? tr? ngôn ng? mi?n phí dành cho b?n. G?i s? [phone].         [facility] complies with applicable Federal civil rights laws and does not discriminate on the basis of race, color, national origin, age, disability, or sex.

## 2017-02-28 ENCOUNTER — TELEPHONE (OUTPATIENT)
Dept: RADIOLOGY | Facility: HOSPITAL | Age: 62
End: 2017-02-28

## 2017-03-08 ENCOUNTER — TELEPHONE (OUTPATIENT)
Dept: RADIOLOGY | Facility: HOSPITAL | Age: 62
End: 2017-03-08

## 2017-03-10 ENCOUNTER — TELEPHONE (OUTPATIENT)
Dept: RADIOLOGY | Facility: HOSPITAL | Age: 62
End: 2017-03-10

## 2017-03-13 ENCOUNTER — HOSPITAL ENCOUNTER (OUTPATIENT)
Dept: RADIOLOGY | Facility: HOSPITAL | Age: 62
Discharge: HOME | End: 2017-03-13
Attending: FAMILY MEDICINE
Payer: MEDICARE

## 2017-03-13 DIAGNOSIS — R74.8 ELEVATED LIPASE: ICD-10-CM

## 2017-03-13 DIAGNOSIS — R10.9 ABDOMINAL PAIN, ACUTE: ICD-10-CM

## 2017-03-13 DIAGNOSIS — R97.20 ELEVATED PSA, GREATER THAN OR EQUAL TO 20 NG/ML: ICD-10-CM

## 2017-03-13 PROCEDURE — 74177 CT ABD & PELVIS W/CONTRAST: CPT | Mod: 26,,, | Performed by: RADIOLOGY

## 2017-03-13 PROCEDURE — 25500020 PHARM REV CODE 255: Mod: PO | Performed by: FAMILY MEDICINE

## 2017-03-13 PROCEDURE — 74177 CT ABD & PELVIS W/CONTRAST: CPT | Mod: TC,PO

## 2017-03-13 RX ADMIN — IOHEXOL 75 ML: 350 INJECTION, SOLUTION INTRAVENOUS at 12:03

## 2017-03-13 RX ADMIN — IOHEXOL 30 ML: 350 INJECTION, SOLUTION INTRAVENOUS at 11:03

## 2017-03-14 ENCOUNTER — TELEPHONE (OUTPATIENT)
Dept: INTERNAL MEDICINE | Facility: CLINIC | Age: 62
End: 2017-03-14

## 2017-03-14 NOTE — TELEPHONE ENCOUNTER
----- Message from Cassandra Brand MD sent at 3/13/2017  2:05 PM CDT -----  No acute findings noted on the CT scan of the abdomen and pelvis

## 2017-03-25 DIAGNOSIS — I25.10 CORONARY ARTERY DISEASE INVOLVING NATIVE CORONARY ARTERY WITHOUT ANGINA PECTORIS, UNSPECIFIED WHETHER NATIVE OR TRANSPLANTED HEART: ICD-10-CM

## 2017-03-25 RX ORDER — METOPROLOL SUCCINATE 50 MG/1
TABLET, EXTENDED RELEASE ORAL
Qty: 30 TABLET | Refills: 9 | Status: SHIPPED | OUTPATIENT
Start: 2017-03-25 | End: 2017-11-07 | Stop reason: SDUPTHER

## 2017-04-04 ENCOUNTER — OFFICE VISIT (OUTPATIENT)
Dept: INTERNAL MEDICINE | Facility: CLINIC | Age: 62
End: 2017-04-04
Payer: MEDICARE

## 2017-04-04 VITALS
BODY MASS INDEX: 20.27 KG/M2 | HEIGHT: 70 IN | WEIGHT: 141.56 LBS | SYSTOLIC BLOOD PRESSURE: 124 MMHG | HEART RATE: 101 BPM | DIASTOLIC BLOOD PRESSURE: 82 MMHG | TEMPERATURE: 96 F | OXYGEN SATURATION: 99 %

## 2017-04-04 DIAGNOSIS — R10.9 ABDOMINAL PAIN, ACUTE: ICD-10-CM

## 2017-04-04 DIAGNOSIS — M47.816 SPONDYLOSIS OF LUMBAR REGION WITHOUT MYELOPATHY OR RADICULOPATHY: Primary | ICD-10-CM

## 2017-04-04 DIAGNOSIS — R97.20 ELEVATED PSA, GREATER THAN OR EQUAL TO 20 NG/ML: ICD-10-CM

## 2017-04-04 DIAGNOSIS — M47.812 SPONDYLOSIS OF CERVICAL REGION WITHOUT MYELOPATHY OR RADICULOPATHY: ICD-10-CM

## 2017-04-04 DIAGNOSIS — J44.9 CHRONIC OBSTRUCTIVE PULMONARY DISEASE, UNSPECIFIED COPD TYPE: Chronic | ICD-10-CM

## 2017-04-04 PROCEDURE — 99999 PR PBB SHADOW E&M-EST. PATIENT-LVL III: CPT | Mod: PBBFAC,,, | Performed by: FAMILY MEDICINE

## 2017-04-04 PROCEDURE — 99214 OFFICE O/P EST MOD 30 MIN: CPT | Mod: S$PBB,,, | Performed by: FAMILY MEDICINE

## 2017-04-04 PROCEDURE — 99213 OFFICE O/P EST LOW 20 MIN: CPT | Mod: PBBFAC,PO | Performed by: FAMILY MEDICINE

## 2017-04-04 RX ORDER — HYDROCODONE BITARTRATE AND ACETAMINOPHEN 5; 325 MG/1; MG/1
1 TABLET ORAL EVERY 12 HOURS PRN
Qty: 25 TABLET | Refills: 0 | Status: SHIPPED | OUTPATIENT
Start: 2017-04-04 | End: 2017-05-24

## 2017-04-04 NOTE — PROGRESS NOTES
"Subjective:       Patient ID: Joey Jacobo is a 62 y.o. male.    Chief Complaint: Follow-up    HPI Comments: 62-year-old -American male patient with Patient Active Problem List:     Coronary artery disease     Mitral regurgitation     COPD (chronic obstructive pulmonary disease)     Mixed hyperlipidemia     GERD (gastroesophageal reflux disease)     Glaucoma (increased eye pressure)     History of PTCA     Osteoarthritis of spine with radiculopathy, lumbar region     Non-productive cough  Here with complaint of chronic low back pain, occasionally has been having neck pain and reported that he had neck   Surgery in the past.   Patient denies of any radiation of pain to bilateral lower extremities, occasionally has radiation of pain from the neck to bilateral shoulder muscles.   Patient denies of any urinary symptoms and abdominal pain has subsided.  Patient has appointment with urology for elevated PSA next month week  Reports low back pain as 9/10 and requesting refill on Norco today.   Denies of any chest pain or shortness of breath, has been having chronic dry productive cough, wheezing albuterol inhaler and nebulizer with history of COPD.  Denies of any weight loss wheezing chest pain or shortness of breath        Review of Systems   Constitutional: Negative for appetite change, fatigue and fever.   Eyes: Negative for visual disturbance.   Respiratory: Positive for cough. Negative for shortness of breath.    Cardiovascular: Negative for chest pain.   Gastrointestinal: Negative for abdominal pain, nausea and vomiting.   Musculoskeletal: Positive for back pain, myalgias and neck pain.   Skin: Negative for rash.   Neurological: Negative for weakness, light-headedness, numbness and headaches.   Psychiatric/Behavioral: Negative for sleep disturbance.         /82  Pulse 101  Temp 96 °F (35.6 °C) (Tympanic)   Ht 5' 10" (1.778 m)  Wt 64.2 kg (141 lb 8.6 oz)  SpO2 99%  BMI 20.31 kg/m2  Objective: "      Physical Exam   Constitutional: He is oriented to person, place, and time. He appears well-developed and well-nourished.   HENT:   Head: Normocephalic and atraumatic.   Mouth/Throat: Oropharynx is clear and moist.   Cardiovascular: Normal rate, regular rhythm and normal heart sounds.    No murmur heard.  Pulmonary/Chest: Effort normal and breath sounds normal. No respiratory distress. He has no wheezes.   Abdominal: Soft. Bowel sounds are normal. There is no tenderness.   Musculoskeletal: He exhibits tenderness. He exhibits no edema.   Positive for paraspinal lumbar muscle tenderness bilaterally and paraspinal cervical muscle tenderness bilaterally  No restricted range of motion with C-spine and lumbar spine.  Straight leg raise test negative bilaterally   Neurological: He is alert and oriented to person, place, and time.   Skin: Skin is warm and dry. No rash noted. No erythema.   Psychiatric: He has a normal mood and affect.         Assessment:       1. Spondylosis of lumbar region without myelopathy or radiculopathy    2. Spondylosis of cervical region without myelopathy or radiculopathy    3. Elevated PSA, greater than or equal to 20 ng/ml    4. Abdominal pain, acute    5. Chronic obstructive pulmonary disease, unspecified COPD type        Plan:   Spondylosis of lumbar region without myelopathy or radiculopathy  -     Ambulatory Referral to Physical/Occupational Therapy  -     Ambulatory referral to Pain Clinic  -     hydrocodone-acetaminophen 5-325mg (NORCO) 5-325 mg per tablet; Take 1 tablet by mouth every 12 (twelve) hours as needed for Pain.  Dispense: 25 tablet; Refill: 0    Spondylosis of cervical region without myelopathy or radiculopathy  -     Ambulatory Referral to Physical/Occupational Therapy  -     Ambulatory referral to Pain Clinic  -     hydrocodone-acetaminophen 5-325mg (NORCO) 5-325 mg per tablet; Take 1 tablet by mouth every 12 (twelve) hours as needed for Pain.  Dispense: 25 tablet;  Refill: 0  Reviewed previous cervical spine and lumbar spine showing arthritis changes status post cervical spine surgery.   Refill given on Norco today and will refer to physical therapy and Dr. Mcintosh for further evaluation    Elevated PSA, greater than or equal to 20 ng/ml- encouraged to follow-up with Dr. Dawn secondary to elevated PSA about 20    Abdominal pain, acute-resolved, normal CT scan noted    Chronic obstructive pulmonary disease, unspecified COPD type- reviewed chest x-ray showing COPD changes likely causing chronic cough, encouraged to use inhalers as needed

## 2017-04-04 NOTE — MR AVS SNAPSHOT
[facility]  [address] [facility] [place] [address]  Phone: [phone]  Fax: [phone]                  Joey Jacobo   2017 1:20 PM   Office Visit    Description:  Male : 1955   Provider:  Cassandra Brand MD   Department:  [facility] - Internal Medicine           Reason for Visit     Follow-up           Diagnoses this Visit        Comments    Spondylosis of lumbar region without myelopathy or radiculopathy    -  Primary     Spondylosis of cervical region without myelopathy or radiculopathy         Elevated PSA, greater than or equal to 20 ng/ml         Abdominal pain, acute         Chronic obstructive pulmonary disease, unspecified COPD type                To Do List           Future Appointments        Provider Department Dept Phone    4/10/2017 8:00 AM Juanjose Mcintosh MD [facility] [phone]    4/10/2017 9:20 AM Roland Dawn IV, MD [facility] [phone]    10/4/2017 10:40 AM Cassandra Brand MD [facility] [phone]      Goals (5 Years of Data)     None      Follow-Up and Disposition     Return in about 6 months (around 10/4/2017), or if symptoms worsen or fail to improve.       These Medications        Disp Refills Start End    hydrocodone-acetaminophen 5-325mg (NORCO) 5-325 mg per tablet 25 tablet 0 2017     Take 1 tablet by mouth every 12 (twelve) hours as needed for Pain. - Oral    Pharmacy: [id number]  [place] [address] #: [phone]         [facility]     [facility] Nurse Care Line -  Assistance  Unless otherwise directed by your provider, please contact [facility], our nurse care line that is available for  assistance.     Registered nurses in the [facility] provide: appointment scheduling, clinical advisement, health education, and other advisory services.  Call: [phone] (toll free)               Medications           Message regarding  "Medications     Verify the changes and/or additions to your medication regime listed below are the same as discussed with your clinician today.  If any of these changes or additions are incorrect, please notify your healthcare provider.             Verify that the below list of medications is an accurate representation of the medications you are currently taking.  If none reported, the list may be blank. If incorrect, please contact your healthcare provider. Carry this list with you in case of emergency.           Current Medications     aspirin (ECOTRIN) 81 MG EC tablet Take by mouth. 1 Tablet, Delayed Release (E.C.) Oral Every day    atorvastatin (LIPITOR) 40 MG tablet Take one tablet by mouth in the evening    calcium-vitamin D (CALCIUM 600 + D,3,) 600 mg(1,500mg) -400 unit Tab Take by mouth. 1 Tablet Oral Twice a day    fluticasone (FLONASE) 50 mcg/actuation nasal spray 1 spray by Each Nare route once daily.    gabapentin (NEURONTIN) 300 MG capsule Take 300 mg by mouth once daily.     hydrocodone-acetaminophen 5-325mg (NORCO) 5-325 mg per tablet Take 1 tablet by mouth every 12 (twelve) hours as needed for Pain.    metoprolol succinate (TOPROL-XL) 50 MG 24 hr tablet Take one tablet by mouth one time daily    nitroGLYCERIN (NITROSTAT) 0.4 MG SL tablet Place 1 tablet (0.4 mg total) under the tongue every 5 (five) minutes as needed for Chest pain.    omeprazole (PRILOSEC) 40 MG capsule Take 1 capsule (40 mg total) by mouth once daily.    travoprost (TRAVATAN Z) 0.004 % Drop Place 1 drop into both eyes every evening. 1 Drops Ophthalmic At bedtime    zolpidem (AMBIEN) 5 MG Tab Take 5 mg by mouth.    budesonide-formoterol 160-4.5 mcg (SYMBICORT) 160-4.5 mcg/actuation HFAA Inhale 2 puffs into the lungs every 12 (twelve) hours.           Clinical Reference Information           Your Vitals Were     BP Pulse Temp Height Weight SpO2    124/82 101 96 °F (35.6 °C) (Tympanic) 5' 10" (1.778 m) 64.2 kg (141 lb 8.6 oz) 99%    " BMI                20.31 kg/m2          Blood Pressure          Most Recent Value    BP  124/82      Allergies as of 4/4/2017     Avelox  [Moxifloxacin]      Immunizations Administered on Date of Encounter - 4/4/2017     None      Orders Placed During Today's Visit      Normal Orders This Visit    Ambulatory referral to Pain Clinic     Ambulatory Referral to Physical/Occupational Therapy       [facility] Sign-Up     Activating your [facility] account is as easy as 1-2-3!     1) Visit [email or web address], select Sign Up Now, enter this activation code and your date of birth, then select Next.  [email or web address]  Expires: 4/13/2017  2:39 PM      2) Create a username and password to use when you visit [facility] in the future and select a security question in case you lose your password and select Next.    3) Enter your e-mail address and click Sign Up!    Additional Information  If you have questions, please e-mail [email or web address] or call [phone] to talk to our [facility] staff. Remember, [facility] is NOT to be used for urgent needs. For medical emergencies, dial 911.         Language Assistance Services     ATTENTION: Language assistance services are available, free of charge. Please call [phone].      ATENCIÓN: Si habla willard, tiene a garcia disposición servicios gratuitos de asistencia lingüística. Llame al [phone].     [facility] Ý: N?u b?n nói Ti?ng Vi?t, có các d?ch v? h? tr? ngôn ng? mi?n phí dành cho b?n. G?i s? [phone].         [facility] - [facility] Medicine complies with applicable Federal civil rights laws and does not discriminate on the basis of race, color, national origin, age, disability, or sex.

## 2017-05-24 ENCOUNTER — OFFICE VISIT (OUTPATIENT)
Dept: UROLOGY | Facility: CLINIC | Age: 62
End: 2017-05-24
Payer: MEDICARE

## 2017-05-24 VITALS
BODY MASS INDEX: 21.11 KG/M2 | DIASTOLIC BLOOD PRESSURE: 74 MMHG | WEIGHT: 147.19 LBS | HEART RATE: 76 BPM | SYSTOLIC BLOOD PRESSURE: 131 MMHG

## 2017-05-24 DIAGNOSIS — R97.20 ELEVATED PSA: Primary | ICD-10-CM

## 2017-05-24 LAB
BILIRUB SERPL-MCNC: NORMAL MG/DL
BLOOD URINE, POC: NORMAL
COLOR, POC UA: NORMAL
GLUCOSE UR QL STRIP: NORMAL
KETONES UR QL STRIP: NORMAL
LEUKOCYTE ESTERASE URINE, POC: NORMAL
NITRITE, POC UA: NORMAL
PH, POC UA: 6
PROTEIN, POC: NORMAL
SPECIFIC GRAVITY, POC UA: 1.01
UROBILINOGEN, POC UA: NORMAL

## 2017-05-24 PROCEDURE — 99212 OFFICE O/P EST SF 10 MIN: CPT | Mod: PBBFAC | Performed by: UROLOGY

## 2017-05-24 PROCEDURE — 99204 OFFICE O/P NEW MOD 45 MIN: CPT | Mod: S$PBB,,, | Performed by: UROLOGY

## 2017-05-24 PROCEDURE — 99999 PR PBB SHADOW E&M-EST. PATIENT-LVL II: CPT | Mod: PBBFAC,,, | Performed by: UROLOGY

## 2017-05-24 PROCEDURE — 81002 URINALYSIS NONAUTO W/O SCOPE: CPT | Mod: PBBFAC | Performed by: UROLOGY

## 2017-05-24 RX ORDER — CIPROFLOXACIN 500 MG/1
500 TABLET ORAL EVERY 12 HOURS
Qty: 3 TABLET | Refills: 0 | Status: SHIPPED | OUTPATIENT
Start: 2017-05-24 | End: 2017-06-14

## 2017-05-24 NOTE — LETTER
May 24, 2017      Cassandra Brand MD  [address] [facility] [address]           [facility]  [address] [place] [address]  Phone: [phone]  Fax: [phone]          Patient: Joey Jacobo   MR Number: [id number]   YOB: 1955   Date of Visit: 5/24/2017       Dear Dr. Cassandra Brand:    Thank you for referring Joey Jacobo to me for evaluation. Attached you will find relevant portions of my assessment and plan of care.    If you have questions, please do not hesitate to call me. I look forward to following Joey Jacobo along with you.    Sincerely,    Roland Dawn IV, MD    Enclosure  CC:  No Recipients    If you would like to receive this communication electronically, please contact [email or web address] or [phone] to request more information on [facility] access.    For providers and/or their staff who would like to refer a patient to [facility], please contact us through our one-stop-shop provider referral line, [facility], at [phone].    If you feel you have received this communication in error or would no longer like to receive these types of communications, please e-mail [email or web address]

## 2017-05-24 NOTE — PROGRESS NOTES
Chief Complaint: Perineal pain    HPI:   5/24/17: 63 yo man has problems with perineal/scrotal pain once or twice a month; lasts for an hour or two, some nocturia.  PSA recently elevated.  No abd/pelvic pain and no exac/rel factors.  No hematuria.  No urolithiasis.  No urinary bother.  No  history. CT with contrast earlier this year essentially normal.    Allergies:  Avelox  [moxifloxacin]    Medications:  has a current medication list which includes the following prescription(s): aspirin, atorvastatin, calcium-vitamin d, fluticasone, gabapentin, metoprolol succinate, nitroglycerin, omeprazole, travoprost, and budesonide-formoterol 160-4.5 mcg.    Review of Systems:  General: No fever, chills, fatigability, or weight loss.  Skin: No rashes, itching, or changes in color or texture of skin.  Chest: Denies TORRES, cyanosis, wheezing, cough, and sputum production.  Abdomen: Appetite fine. No weight loss. Denies diarrhea, abdominal pain, hematemesis, or blood in stool.  Musculoskeletal: No joint stiffness or swelling. Denies back pain.  : As above.  All other review of systems negative.    PMH:   has a past medical history of COPD (chronic obstructive pulmonary disease) (7/30/2013); Coronary artery disease (7/30/2013); Emphysema of lung; Glaucoma (increased eye pressure) (8/27/2013); Headache; Mitral regurgitation (7/30/2013); Mixed hyperlipidemia (7/30/2013); Neuropathy; Osteoarthritis of spine with radiculopathy, lumbar region (7/21/2015); and Other and unspecified hyperlipidemia.    PSH:   has a past surgical history that includes Coronary stent placement; neck fusion; and Colonoscopy (N/A, 3/21/2016).    FamHx: family history includes Blindness in his maternal grandmother; Cancer in his father; Cataracts in his mother; Diabetes in his sister, sister, and sister; Hypertension in his sister, sister, sister, sister, and sister; Kidney disease in his mother.    SocHx:  reports that he has quit smoking. His smoking use  included Cigarettes. He has a 6.25 pack-year smoking history. He does not have any smokeless tobacco history on file. He reports that he drinks alcohol. He reports that he does not use drugs.      Physical Exam:  Vitals:    05/24/17 1427   BP: 131/74   Pulse: 76     General: A&Ox3, no apparent distress, no deformities  Neck: No masses, normal thyroid  Lungs: normal inspiration, no use of accessory muscles  Heart: normal pulse, no arrhythmias  Abdomen: Soft, NT, ND, no masses, no hernias, no hepatosplenomegaly  Lymphatic: Neck and groin nodes negative  Skin: The skin is warm and dry. No jaundice.  Ext: No c/c/e.  : Test desc lucas, no abnormalities of epididymus. Penis normal, with normal penile and scrotal skin. Meatus normal. Normal rectal tone, no hemorrhoids. Prost 50 gm no nodules or masses appreciated. SV not palpable. Perineum and anus normal.    Labs/Studies:   PSA    2/16: 22.1    2/17: 22.9    Impression/Plan:   1. [facility] prostate biopsy.  Risks/benefits reviewed.  Cipro rx provided.

## 2017-06-14 ENCOUNTER — TELEPHONE (OUTPATIENT)
Dept: INTERNAL MEDICINE | Facility: CLINIC | Age: 62
End: 2017-06-14

## 2017-06-14 ENCOUNTER — OFFICE VISIT (OUTPATIENT)
Dept: INTERNAL MEDICINE | Facility: CLINIC | Age: 62
End: 2017-06-14
Payer: MEDICARE

## 2017-06-14 VITALS
DIASTOLIC BLOOD PRESSURE: 76 MMHG | HEART RATE: 73 BPM | BODY MASS INDEX: 20.8 KG/M2 | SYSTOLIC BLOOD PRESSURE: 137 MMHG | WEIGHT: 145.31 LBS | HEIGHT: 70 IN | OXYGEN SATURATION: 98 %

## 2017-06-14 DIAGNOSIS — Z00.00 ENCOUNTER FOR PREVENTIVE HEALTH EXAMINATION: Primary | ICD-10-CM

## 2017-06-14 PROCEDURE — G0438 PPPS, INITIAL VISIT: HCPCS | Mod: ,,, | Performed by: PHYSICIAN ASSISTANT

## 2017-06-14 PROCEDURE — 99214 OFFICE O/P EST MOD 30 MIN: CPT | Mod: PBBFAC,PO | Performed by: PHYSICIAN ASSISTANT

## 2017-06-14 PROCEDURE — 99999 PR PBB SHADOW E&M-EST. PATIENT-LVL IV: CPT | Mod: PBBFAC,,, | Performed by: PHYSICIAN ASSISTANT

## 2017-06-14 NOTE — PATIENT INSTRUCTIONS
Counseling and Referral of Other Preventative  (Italic type indicates deductible and co-insurance are waived)    Patient Name: Joey Jacobo  Today's Date: 6/14/2017      SERVICE LIMITATIONS RECOMMENDATION    Vaccines    · Pneumococcal (once after 65)    · Influenza (annually)    · Hepatitis B (if medium/high risk)    · Prevnar 13      Hepatitis B medium/high risk factors:       - End-stage renal disease       - Hemophiliacs who received Factor VII or         IX concentrates       - Clients of institutions for the mentally             retarded       - Persons who live in the same house as          a HepB carrier       - Homosexual men       - Illicit injectable drug abusers     Pneumococcal:done 3/2001     Influenza: Pt will wait until Fall     Hepatitis B: N/A     Prevnar 13: N/A    Prostate cancer screening (annually to age 75)     Prostate specific antigen (PSA) Shared decision making with Provider. Sometimes a co-pay may be required if the patient decides to have this test. The USPSTF no longer recommends prostate cancer screening routinely in medicine: 2/22/17 Continue follow Dr. Dawn    Colorectal cancer screening (to age 75)    · Fecal occult blood test (annual)  · Flexible sigmoidoscopy (5y)  · Screening colonoscopy (10y)  · Barium enema   Last done 3/2016, recommend to repeat every 5  years    Diabetes self-management training (no USPSTF recommendations)  Requires referral by treating physician for patient with diabetes or renal disease. 10 hours of initial DSMT sessions of no less than 30 minutes each in a continuous 12-month period. 2 hours of follow-up DSMT in subsequent years.  N/A    Glaucoma screening (no USPSTF recommendation)  Diabetes mellitus, family history   , age 50 or over    American, age 65 or over  continue follow Dr. Jansen    Medical nutrition therapy for diabetes or renal disease (no recommended schedule)  Requires referral by treating physician for patient with  diabetes or renal disease or kidney transplant within the past 3 years.  Can be provided in same year as diabetes self-management training (DSMT), and CMS recommends medical nutrition therapy take place after DSMT. Up to 3 hours for initial year and 2 hours in subsequent years.  N/A    Cardiovascular screening blood tests (every 5 years)  · Fasting lipid panel  Order as a panel if possible  Done this year, repeat every year    Diabetes screening tests (at least every 3 years, Medicare covers annually or at 6-month intervals for prediabetic patients)  · Fasting blood sugar (FBS) or glucose tolerance test (GTT)  Patient must be diagnosed with one of the following:       - Hypertension       - Dyslipidemia       - Obesity (BMI 30kg/m2)       - Previous elevated impaired FBS or GTT       ... or any two of the following:       - Overweight (BMI 25 but <30)       - Family history of diabetes       - Age 65 or older       - History of gestational diabetes or birth of baby weighing more than 9 pounds  follow PCP    Abdominal aortic aneurysm screening (once)  · Sonogram   Limited to patients who meet one of the following criteria:       - Men who are 65-75 years old and have smoked more than 100 cigarette in their lifetime       - Anyone with a family history of abdominal aortic aneurysm       - Anyone recommended for screening by the USPSTF  Follow PCP    HIV screening (annually for increased risk patients)  · HIV-1 and HIV-2 by EIA, or MEHDI, rapid antibody test or oral mucosa transudate  Patients must be at increased risk for HIV infection per USPSTF guidelines or pregnant. Tests covered annually for patient at increased risk or as requested by the patient. Pregnant patients may receive up to 3 tests during pregnancy.  Risks discussed, screening is not recommended    Smoking cessation counseling (up to 8 sessions per year)  Patients must be asymptomatic of tobacco-related conditions to receive as a preventative service.   Non-smoker    Subsequent annual wellness visit  At least 12 months since last AWV  Return in one year     The following information is provided to all patients.  This information is to help you find resources for any of the problems found today that may be affecting your health:                Living healthy guide: www.[facility].[place].[facility]      Understanding Diabetes: www.diabetes.org      Eating healthy: www.cdc.gov/healthyweight      CDC home safety checklist: www.cdc.gov/steadi/patient.html      Agency on Aging: www.goea.[place].[facility]      Alcoholics anonymous (AA): www.aa.org      Physical Activity: www.lexi.nih.gov/ms4cbjm      Tobacco use: www.quitwithusla.org

## 2017-06-14 NOTE — PROGRESS NOTES
"Joey Jacobo presented for an initial Medicare AWV today. The following components were reviewed and updated:    · Medical history  · Family History  · Social history  · Allergies and Current Medications  · Health Risk Assessment  · Health Maintenance  · Care Team    **See Completed Assessments for Annual Wellness visit with in the encounter summary    The following assessments were completed:  · Depression Screening  · Cognitive function Screening  · Timed Get Up Test  · Whisper Test    Vitals:    06/14/17 1328   BP: 137/76   BP Location: Left arm   Patient Position: Sitting   BP Method: Manual   Pulse: 73   SpO2: 98%   Weight: 65.9 kg (145 lb 4.5 oz)   Height: 5' 10" (1.778 m)     Body mass index is 20.85 kg/m².  Waist Measurements: 33 in]        Diagnoses and health risks identified today and associated recommendations/orders:    1. Encounter for preventive health examination  Completed today    Provided Joey with a 5-10 year written screening schedule and personal prevention plan. Recommendations were developed using the USPSTF age appropriate recommendations.   Education, counseling, and referrals were provided as needed.  After Visit Summary printed and given to patient which includes a list of additional screenings\tests needed.  Continue to follow with your PCP as scheduled 10/4/17 or sooner if necessary.      Michael Riley PA-C  "

## 2017-06-14 NOTE — TELEPHONE ENCOUNTER
----- Message from Lucy Arteaga sent at 6/14/2017 10:02 AM CDT -----  pain meds for back needed..[phone] (home)   ..  [id number] - [place] - [address]  [address] [address]  [place] [address]  Phone: [phone] Fax: [phone]

## 2017-06-26 ENCOUNTER — TELEPHONE (OUTPATIENT)
Dept: UROLOGY | Facility: CLINIC | Age: 62
End: 2017-06-26

## 2017-06-26 NOTE — TELEPHONE ENCOUNTER
----- Message from Jordyn Swanson sent at 6/26/2017 11:03 AM CDT -----  Contact: Pt   Pt called and stated he needed to speak to the nurse. He stated he has questions about his biopsy procedure. He can be reached at [phone].    Thanks,  TF

## 2017-07-03 ENCOUNTER — OFFICE VISIT (OUTPATIENT)
Dept: UROLOGY | Facility: CLINIC | Age: 62
End: 2017-07-03
Payer: MEDICARE

## 2017-07-03 DIAGNOSIS — R97.20 ELEVATED PSA: Primary | ICD-10-CM

## 2017-07-03 LAB
BILIRUB SERPL-MCNC: NORMAL MG/DL
BLOOD URINE, POC: NORMAL
COLOR, POC UA: NORMAL
GLUCOSE UR QL STRIP: NORMAL
KETONES UR QL STRIP: NORMAL
LEUKOCYTE ESTERASE URINE, POC: NORMAL
NITRITE, POC UA: NORMAL
PH, POC UA: 7
PROTEIN, POC: NORMAL
SPECIFIC GRAVITY, POC UA: 1.01
UROBILINOGEN, POC UA: NORMAL

## 2017-07-03 PROCEDURE — 88305 TISSUE EXAM BY PATHOLOGIST: CPT | Performed by: PATHOLOGY

## 2017-07-03 PROCEDURE — 76872 US TRANSRECTAL: CPT | Mod: PBBFAC | Performed by: UROLOGY

## 2017-07-03 PROCEDURE — 55700 PR BIOPSY OF PROSTATE,NEEDLE/PUNCH: CPT | Mod: S$PBB,,, | Performed by: UROLOGY

## 2017-07-03 PROCEDURE — 76942 ECHO GUIDE FOR BIOPSY: CPT | Mod: 26,59,S$PBB, | Performed by: UROLOGY

## 2017-07-03 PROCEDURE — 81002 URINALYSIS NONAUTO W/O SCOPE: CPT | Mod: PBBFAC | Performed by: UROLOGY

## 2017-07-03 PROCEDURE — 76942 ECHO GUIDE FOR BIOPSY: CPT | Mod: PBBFAC | Performed by: UROLOGY

## 2017-07-03 PROCEDURE — 55700 PR BIOPSY OF PROSTATE,NEEDLE/PUNCH: CPT | Mod: PBBFAC | Performed by: UROLOGY

## 2017-07-03 PROCEDURE — 76872 US TRANSRECTAL: CPT | Mod: 26,S$PBB,, | Performed by: UROLOGY

## 2017-07-03 PROCEDURE — 88305 TISSUE EXAM BY PATHOLOGIST: CPT | Mod: 26,,, | Performed by: PATHOLOGY

## 2017-07-03 PROCEDURE — 99499 UNLISTED E&M SERVICE: CPT | Mod: S$PBB,,, | Performed by: UROLOGY

## 2017-07-03 NOTE — PROGRESS NOTES
Chief Complaint: Perineal pain    HPI:   7/3/17: TRUS/Bx today 27 gm.  5/24/17: 61 yo man has problems with perineal/scrotal pain once or twice a month; lasts for an hour or two, some nocturia.  PSA recently elevated.  No abd/pelvic pain and no exac/rel factors.  No hematuria.  No urolithiasis.  No urinary bother.  No  history. CT with contrast earlier this year essentially normal.    Allergies:  Avelox  [moxifloxacin]    Medications:  has a current medication list which includes the following prescription(s): aspirin, atorvastatin, budesonide-formoterol 160-4.5 mcg, calcium-vitamin d, fluticasone, gabapentin, metoprolol succinate, nitroglycerin, omeprazole, and travoprost.    Review of Systems:  General: No fever, chills, fatigability, or weight loss.  Skin: No rashes, itching, or changes in color or texture of skin.  Chest: Denies TORRES, cyanosis, wheezing, cough, and sputum production.  Abdomen: Appetite fine. No weight loss. Denies diarrhea, abdominal pain, hematemesis, or blood in stool.  Musculoskeletal: No joint stiffness or swelling. Denies back pain.  : As above.  All other review of systems negative.    PMH:   has a past medical history of COPD (chronic obstructive pulmonary disease) (7/30/2013); Coronary artery disease (7/30/2013); Emphysema of lung; Glaucoma (increased eye pressure) (8/27/2013); Headache; Mitral regurgitation (7/30/2013); Mixed hyperlipidemia (7/30/2013); Neuropathy; Osteoarthritis of spine with radiculopathy, lumbar region (7/21/2015); and Other and unspecified hyperlipidemia.    PSH:   has a past surgical history that includes Coronary stent placement; Colonoscopy (N/A, 3/21/2016); Shoulder arthroscopy (Left); and Spine surgery.    FamHx: family history includes Blindness in his maternal grandmother; Cancer in his father; Cataracts in his mother; Diabetes in his sister, sister, and sister; Hypertension in his sister, sister, sister, sister, and sister; Kidney disease in his  mother.    SocHx:  reports that he has quit smoking. His smoking use included Cigarettes. He has a 6.25 pack-year smoking history. He does not have any smokeless tobacco history on file. He reports that he drinks alcohol. He reports that he does not use drugs.      Physical Exam:  There were no vitals filed for this visit.  General: A&Ox3, no apparent distress, no deformities  Neck: No masses, normal thyroid  Lungs: normal inspiration, no use of accessory muscles  Heart: normal pulse, no arrhythmias  Abdomen: Soft, NT, ND  Skin: The skin is warm and dry. No jaundice.  Ext: No c/c/e.  :     7/3/17: Test desc lucas, no abnormalities of epididymus. Penis normal, with normal penile and scrotal skin. Meatus normal. Normal rectal tone, no hemorrhoids. Prost 40 gm no nodules or masses appreciated. SV not palpable. Perineum and anus normal.    Labs/Studies:   PSA    2/16: 22.1    2/17: 22.9    Procedure: (1) Transrectal Prostate Biopsy                      (2) Transrectal ultrasound of prostate                     (3) Ultrasound Guidance of Prostate Biopsy needles    Detail: After proper consents were obtained, the patient was prepped and draped in normal fashion in the left lateral decubitus position for TRUS/Bx.  5 ml of lidocaine jelly was instilled in the rectum.  The U/S with rectal probe was used to size the prostate.  A spinal needle was used and 5ml of 1% lidocaine was instilled on the side of the prostate at the base of the seminal vesicles.  Biopsy was then performed using an 18Ga biopsy needle directed at the base, mid and apex bilaterally for a total of 12 cores. Antibiotic prophylaxis was provided using oral ciprofloxacin.    Findings: The prostate is 44W, 31H, and 39L for volume of 27 grams, with no abnl apprec.    Impression/Plan:   1.Biopsy completed, RTC 7-10d.

## 2017-07-18 ENCOUNTER — OFFICE VISIT (OUTPATIENT)
Dept: UROLOGY | Facility: CLINIC | Age: 62
End: 2017-07-18
Payer: MEDICARE

## 2017-07-18 VITALS
BODY MASS INDEX: 20.61 KG/M2 | SYSTOLIC BLOOD PRESSURE: 112 MMHG | HEART RATE: 85 BPM | WEIGHT: 143.63 LBS | DIASTOLIC BLOOD PRESSURE: 70 MMHG

## 2017-07-18 DIAGNOSIS — C61 PROSTATE CANCER: Primary | ICD-10-CM

## 2017-07-18 PROCEDURE — 99999 PR PBB SHADOW E&M-EST. PATIENT-LVL III: CPT | Mod: PBBFAC,,, | Performed by: UROLOGY

## 2017-07-18 PROCEDURE — 99214 OFFICE O/P EST MOD 30 MIN: CPT | Mod: S$PBB,,, | Performed by: UROLOGY

## 2017-07-18 PROCEDURE — 99213 OFFICE O/P EST LOW 20 MIN: CPT | Mod: PBBFAC | Performed by: UROLOGY

## 2017-07-18 NOTE — PROGRESS NOTES
Chief Complaint: Perineal pain    HPI:   7/18/17:  No problems from biopsy.  Gl 4+5 in the left base (30%) and a sig amount of 4+4 in other parts of the gland.  Reviewed treatment options, and imaging needs.  7/3/17: TRUS/Bx today 27 gm.  5/24/17: 61 yo man has problems with perineal/scrotal pain once or twice a month; lasts for an hour or two, some nocturia.  PSA recently elevated.  No abd/pelvic pain and no exac/rel factors.  No hematuria.  No urolithiasis.  No urinary bother.  No  history. CT with contrast earlier this year essentially normal.    Allergies:  Avelox  [moxifloxacin]    Medications:  has a current medication list which includes the following prescription(s): aspirin, atorvastatin, calcium-vitamin d, fluticasone, gabapentin, metoprolol succinate, nitroglycerin, omeprazole, travoprost, and budesonide-formoterol 160-4.5 mcg.    Review of Systems:  General: No fever, chills, fatigability, or weight loss.  Skin: No rashes, itching, or changes in color or texture of skin.  Chest: Denies TORRES, cyanosis, wheezing, cough, and sputum production.  Abdomen: Appetite fine. No weight loss. Denies diarrhea, abdominal pain, hematemesis, or blood in stool.  Musculoskeletal: No joint stiffness or swelling. Denies back pain.  : As above.  All other review of systems negative.    PMH:   has a past medical history of COPD (chronic obstructive pulmonary disease) (7/30/2013); Coronary artery disease (7/30/2013); Emphysema of lung; Glaucoma (increased eye pressure) (8/27/2013); Headache(784.0); Mitral regurgitation (7/30/2013); Mixed hyperlipidemia (7/30/2013); Neuropathy; Osteoarthritis of spine with radiculopathy, lumbar region (7/21/2015); and Other and unspecified hyperlipidemia.    PSH:   has a past surgical history that includes Coronary stent placement; Colonoscopy (N/A, 3/21/2016); Shoulder arthroscopy (Left); and Spine surgery.    FamHx: family history includes Blindness in his maternal grandmother; Cancer in  his father; Cataracts in his mother; Diabetes in his sister, sister, and sister; Hypertension in his sister, sister, sister, sister, and sister; Kidney disease in his mother.    SocHx:  reports that he has quit smoking. His smoking use included Cigarettes. He has a 6.25 pack-year smoking history. He does not have any smokeless tobacco history on file. He reports that he drinks alcohol. He reports that he does not use drugs.      Physical Exam:  Vitals:    07/18/17 1633   BP: 112/70   Pulse: 85     General: A&Ox3, no apparent distress, no deformities  Neck: No masses, normal thyroid  Lungs: normal inspiration, no use of accessory muscles  Heart: normal pulse, no arrhythmias  Abdomen: Soft, NT, ND  Skin: The skin is warm and dry. No jaundice.  Ext: No c/c/e.  :     7/3/17: Test desc lucas, no abnormalities of epididymus. Penis normal, with normal penile and scrotal skin. Meatus normal. Normal rectal tone, no hemorrhoids. Prost 40 gm no nodules or masses appreciated. SV not palpable. Perineum and anus normal.    Labs/Studies:   PSA    2/16: 22.1    2/17: 22.9    Impression/Plan:   1.  CT/Bone scan soon, MRI/RTC 6 weeks.  Likely XRT referral unless mets found, in which case MRI canx and start casodex/lupron.

## 2017-07-21 ENCOUNTER — TELEPHONE (OUTPATIENT)
Dept: RADIOLOGY | Facility: HOSPITAL | Age: 62
End: 2017-07-21

## 2017-07-24 ENCOUNTER — HOSPITAL ENCOUNTER (OUTPATIENT)
Dept: RADIOLOGY | Facility: HOSPITAL | Age: 62
Discharge: HOME | End: 2017-07-24
Attending: UROLOGY
Payer: MEDICARE

## 2017-07-24 ENCOUNTER — TELEPHONE (OUTPATIENT)
Dept: UROLOGY | Facility: CLINIC | Age: 62
End: 2017-07-24

## 2017-07-24 DIAGNOSIS — C61 PROSTATE CANCER: ICD-10-CM

## 2017-07-24 DIAGNOSIS — C61 PROSTATE CANCER: Primary | ICD-10-CM

## 2017-07-24 PROCEDURE — 78306 BONE IMAGING WHOLE BODY: CPT | Mod: 26,,, | Performed by: RADIOLOGY

## 2017-07-24 PROCEDURE — 74178 CT ABD&PLV WO CNTR FLWD CNTR: CPT | Mod: 26,,, | Performed by: RADIOLOGY

## 2017-07-24 PROCEDURE — A9503 TC99M MEDRONATE: HCPCS | Mod: PO

## 2017-07-24 PROCEDURE — 74178 CT ABD&PLV WO CNTR FLWD CNTR: CPT | Mod: TC,PO

## 2017-07-24 PROCEDURE — 25500020 PHARM REV CODE 255: Mod: PO | Performed by: UROLOGY

## 2017-07-24 RX ADMIN — IOHEXOL 75 ML: 350 INJECTION, SOLUTION INTRAVENOUS at 02:07

## 2017-07-24 RX ADMIN — IOHEXOL 30 ML: 350 INJECTION, SOLUTION INTRAVENOUS at 11:07

## 2017-07-24 NOTE — TELEPHONE ENCOUNTER
----- Message from RT Cecelia sent at 7/21/2017  1:51 PM CDT -----  Regarding: pt needs STAT creatinine   Mr Jacobo needs STAT creatinine ordered before his CT Monday at 3:50pm. Patient has a creatinine order in but the priority is ROUTINE.  We will not get routine labs back in time for his CT appointment. Please order creatinine serum STAT. Please have patient arrive 1 hour prior to appointment to get labs done or if patient wants to come in today or the weekend.    Please contact patient to make them aware of the changes. We can not do patient until lab results are received.          Thanks Reyna Mckinney   Please call with any question [phone]

## 2017-08-14 DIAGNOSIS — I25.10 CORONARY ARTERY DISEASE, ANGINA PRESENCE UNSPECIFIED, UNSPECIFIED VESSEL OR LESION TYPE, UNSPECIFIED WHETHER NATIVE OR TRANSPLANTED HEART: Primary | ICD-10-CM

## 2017-08-16 ENCOUNTER — TELEPHONE (OUTPATIENT)
Dept: INTERNAL MEDICINE | Facility: CLINIC | Age: 62
End: 2017-08-16

## 2017-08-16 NOTE — TELEPHONE ENCOUNTER
----- Message from Cassandra Brand MD sent at 8/16/2017 11:26 AM CDT -----  Contact: patient  If patient is having any prostate pain advised to discuss further with urologist, with his recent diagnosis of prostate cancer.  But for non genital  pains, encourage to come into urgent care for further evaluation.   Patient can take over-the-counter Tylenol as needed for pain for some relief until further evaluation    Dr. Brand   ----- Message -----  From: Ana Barton LPN  Sent: 8/16/2017  10:26 AM  To: Cassandra Brand MD        ----- Message -----  From: Kianna Hammond  Sent: 8/16/2017  10:09 AM  To: [facility] Staff    Calling concerning recently diagnosed having prostate cancer and now experiencing pain and would like something called in for pain. Please call patient ASAP today @ [phone]. Thanks, richie.      [id number] - [place] - [address]  [address]  [place] [address]  Phone: [phone] Fax: [phone]

## 2017-08-22 ENCOUNTER — OFFICE VISIT (OUTPATIENT)
Dept: INTERNAL MEDICINE | Facility: CLINIC | Age: 62
End: 2017-08-22
Payer: MEDICARE

## 2017-08-22 VITALS
BODY MASS INDEX: 20.67 KG/M2 | OXYGEN SATURATION: 100 % | HEIGHT: 70 IN | HEART RATE: 56 BPM | WEIGHT: 144.38 LBS | TEMPERATURE: 96 F | DIASTOLIC BLOOD PRESSURE: 80 MMHG | SYSTOLIC BLOOD PRESSURE: 118 MMHG

## 2017-08-22 DIAGNOSIS — M62.838 MUSCLE SPASM: Primary | ICD-10-CM

## 2017-08-22 PROCEDURE — 3074F SYST BP LT 130 MM HG: CPT | Mod: ,,, | Performed by: PHYSICIAN ASSISTANT

## 2017-08-22 PROCEDURE — 99213 OFFICE O/P EST LOW 20 MIN: CPT | Mod: S$PBB,,, | Performed by: PHYSICIAN ASSISTANT

## 2017-08-22 PROCEDURE — 3079F DIAST BP 80-89 MM HG: CPT | Mod: ,,, | Performed by: PHYSICIAN ASSISTANT

## 2017-08-22 PROCEDURE — 99999 PR PBB SHADOW E&M-EST. PATIENT-LVL V: CPT | Mod: PBBFAC,,, | Performed by: PHYSICIAN ASSISTANT

## 2017-08-22 PROCEDURE — 99215 OFFICE O/P EST HI 40 MIN: CPT | Mod: PBBFAC,PO | Performed by: PHYSICIAN ASSISTANT

## 2017-08-22 RX ORDER — METHOCARBAMOL 750 MG/1
TABLET, FILM COATED ORAL
Qty: 32 TABLET | Refills: 0 | Status: SHIPPED | OUTPATIENT
Start: 2017-08-22 | End: 2017-11-27 | Stop reason: ALTCHOICE

## 2017-08-22 RX ORDER — TRAMADOL HYDROCHLORIDE 50 MG/1
50 TABLET ORAL EVERY 8 HOURS PRN
Qty: 21 TABLET | Refills: 0 | Status: SHIPPED | OUTPATIENT
Start: 2017-08-22 | End: 2017-08-29

## 2017-08-22 NOTE — PATIENT INSTRUCTIONS
-no more ibuprofen, motrin, or Aleve, bad for kidneys    Back Spasm (No Trauma)    Spasm of the back muscles can occur after a sudden forceful twisting or bending force (such as in a car accident), after a simple awkward movement, or after lifting something heavy with poor body positioning. In any case, muscle spasm adds to the pain. Sleeping in an awkward position or on a poor quality mattress can also cause this. Some people respond to emotional stress by tensing the muscles of their back.  Pain that continues may need further evaluation or other types of treatment such as physical therapy.  You don't always need X-rays for the initial evaluation of back pain, unless you had a physical injury such as from a car accident or fall. If your pain continues and doesn't respond to medical treatment, X-rays and other tests may then be done.   Home care  · As soon as possible, start sitting or walking again to avoid problems from prolonged bed rest (muscle weakness, worsening back stiffness and pain, blood clots in the legs).  · When in bed, try to find a position of comfort. A firm mattress is best. Try lying flat on your back with pillows under your knees. You can also try lying on your side with your knees bent up toward your chest and a pillow between your knees.  · Avoid prolonged sitting, long car rides, or travel. This puts more stress on the lower back than standing or walking.   · During the first 24 to 72 hours after an injury or flare-up, apply an ice pack to the painful area for 20 minutes, then remove it for 20 minutes. Do this over a period of 60 to 90 minutes or several times a day. This will reduce swelling and pain. Always wrap ice packs in a thin towel.  · You can start with ice, then switch to heat. Heat (hot shower, hot bath, or heating pad) reduces pain, and works well for muscle spasms. Apply heat to the painful area for 20 minutes, then remove it for 20 minutes. Do this over a period of 60 to 90  minutes or several times a day. Do not sleep on a heating pad as it can burn or damage skin.  · Alternate ice and heat therapies.  · Be aware of safe lifting methods and do not lift anything over 15 pounds until all the pain is gone.  Gentle stretching will help your back heal faster. Do this simple routine 2 to 3 times a day until your back is feeling better.  · Lie on your back with your knees bent and both feet on the ground  · Slowly raise your left knee to your chest as you flatten your lower back against the floor. Hold for 20 to 30 seconds.  · Relax and repeat the exercise with your right knee.  · Do 2 to 3 of these exercises for each leg.  · Repeat, hugging both knees to your chest at the same time.  · Do not bounce, but use a gentle pull.  Medicines  Talk to your doctor before using medicine, especially if you have other medical problems or are taking other medicines.  You may use acetaminophen or ibuprofen to control pain, unless your healthcare provider prescribed another pain medicine. If you have a chronic condition such as diabetes, liver or kidney disease, stomach ulcer, or gastrointestinal bleeding, or are taking blood thinners, talk with your healthcare provider before taking any medicines.  Be careful if you are given prescription pain medicine, narcotics, or medicine for muscle spasm. They can cause drowsiness, affect your coordination, reflexes, or judgment. Do not drive or operate heavy machinery when taking these medicines. Take pain medicine only as prescribed by your healthcare provider.  Follow-up care  Follow up with your doctor, or as advised. Physical therapy or further tests may be needed.  If X-rays were taken, they may be reviewed by a radiologist. You will be notified of any new findings that may affect your care.  Call 911  Seek emergency medical care if any of these occur:  · Trouble breathing  · Confusion  · Drowsiness or trouble awakening  · Fainting or loss of  consciousness  · Rapid or very slow heart rate  · Loss of bowel or bladder control  When to seek medical advice  Call your healthcare provider right away if any of these occur:  · Pain becomes worse or spreads to your legs  · Weakness or numbness in one or both legs  · Numbness in the groin or genital area  · Unexplained fever over 100.4ºF (38.0ºC)  · Burning or pain when passing urine  Date Last Reviewed: 6/1/2016  © 8021-9938 [email or web address]. [address] [place] [address]. All rights reserved. This information is not intended as a substitute for professional medical care. Always follow your healthcare professional's instructions.

## 2017-08-22 NOTE — PROGRESS NOTES
Subjective:      Patient ID: Joey Jacobo is a 62 y.o. male.    Chief Complaint: Chest Pain and Back Pain (upper back)    Pt states that helped someone lift a heavy object on Thursday. He has had this pain before from lifting things in the past. Pain is around left shoulder and left neck/upper back.       Back Pain   This is a new problem. Episode onset: 4 days. The problem occurs constantly. The problem has been gradually worsening since onset. Pain location: left upper back. The quality of the pain is described as aching and burning. Radiates to: back to shoulders. The pain is severe. The pain is worse during the night. The symptoms are aggravated by lying down. Pertinent negatives include no abdominal pain, bladder incontinence, bowel incontinence, chest pain, dysuria, fever, headaches, leg pain, numbness, paresis, paresthesias, pelvic pain, perianal numbness, tingling, weakness or weight loss. He has tried NSAIDs and muscle relaxant (tizanidine) for the symptoms.       Review of Systems   Constitutional: Negative for activity change, appetite change, chills, diaphoresis, fatigue, fever, unexpected weight change and weight loss.   HENT: Negative.  Negative for congestion, hearing loss, postnasal drip, rhinorrhea, sore throat, trouble swallowing and voice change.    Eyes: Negative.  Negative for visual disturbance.   Respiratory: Negative.  Negative for cough, choking, chest tightness and shortness of breath.    Cardiovascular: Negative for chest pain, palpitations and leg swelling.   Gastrointestinal: Negative for abdominal distention, abdominal pain, blood in stool, bowel incontinence, constipation, diarrhea, nausea and vomiting.   Endocrine: Negative for cold intolerance, heat intolerance, polydipsia and polyuria.   Genitourinary: Negative.  Negative for bladder incontinence, difficulty urinating, dysuria, frequency and pelvic pain.   Musculoskeletal: Positive for arthralgias, back pain and myalgias.  "Negative for gait problem and joint swelling.   Skin: Negative for color change, pallor, rash and wound.   Neurological: Negative for dizziness, tingling, tremors, weakness, light-headedness, numbness, headaches and paresthesias.   Hematological: Negative for adenopathy.   Psychiatric/Behavioral: Negative for behavioral problems, confusion, self-injury, sleep disturbance and suicidal ideas. The patient is not nervous/anxious.      Objective:   /80   Pulse (!) 56   Temp 96.1 °F (35.6 °C) (Tympanic)   Ht 5' 10" (1.778 m)   Wt 65.5 kg (144 lb 6.4 oz)   SpO2 100%   BMI 20.72 kg/m²     Physical Exam   Constitutional: He is oriented to person, place, and time. He appears well-developed and well-nourished. No distress.   HENT:   Head: Normocephalic and atraumatic.   Cardiovascular: Normal rate and regular rhythm.  Exam reveals no gallop and no friction rub.    No murmur heard.  Pulmonary/Chest: Effort normal and breath sounds normal. No respiratory distress. He has no wheezes. He has no rales. He exhibits no tenderness.   Abdominal: Soft. Bowel sounds are normal.   Musculoskeletal: Normal range of motion.        Left shoulder: He exhibits spasm. He exhibits normal range of motion, no tenderness, no bony tenderness, no swelling, no effusion, no crepitus, no deformity, no laceration, no pain, normal pulse and normal strength.        Cervical back: He exhibits tenderness, pain and spasm. He exhibits no bony tenderness, no swelling, no edema, no deformity, no laceration and normal pulse.        Arms:  Lymphadenopathy:     He has no cervical adenopathy.   Neurological: He is alert and oriented to person, place, and time.   Skin: Skin is warm. Capillary refill takes less than 2 seconds. No rash noted. He is not diaphoretic. No erythema.   Nursing note and vitals reviewed.      Assessment:     1. Muscle spasm      Plan:   Muscle spasm  -     methocarbamol (ROBAXIN) 750 MG Tab; Take 1,500 mg (two tablets) four times " daily for first 2 days followed by 750mg (one tablet) four times/day.  Dispense: 32 tablet; Refill: 0  -     tramadol (ULTRAM) 50 mg tablet; Take 1 tablet (50 mg total) by mouth every 8 (eight) hours as needed for Pain.  Dispense: 21 tablet; Refill: 0    -discontinue ibuprofen. Discussed danger of NSAIDS with renal insufficiency  -tylenol as needed  -Alternate ice and heat 20 minutes at a time.     Return if symptoms worsen or fail to improve.

## 2017-09-18 ENCOUNTER — OFFICE VISIT (OUTPATIENT)
Dept: UROLOGY | Facility: CLINIC | Age: 62
End: 2017-09-18
Payer: MEDICARE

## 2017-09-18 VITALS — SYSTOLIC BLOOD PRESSURE: 133 MMHG | WEIGHT: 144 LBS | BODY MASS INDEX: 20.66 KG/M2 | DIASTOLIC BLOOD PRESSURE: 78 MMHG

## 2017-09-18 DIAGNOSIS — C61 PROSTATE CANCER: Primary | ICD-10-CM

## 2017-09-18 PROCEDURE — 3078F DIAST BP <80 MM HG: CPT | Mod: ,,, | Performed by: UROLOGY

## 2017-09-18 PROCEDURE — 99999 PR PBB SHADOW E&M-EST. PATIENT-LVL II: CPT | Mod: PBBFAC,,, | Performed by: UROLOGY

## 2017-09-18 PROCEDURE — 99212 OFFICE O/P EST SF 10 MIN: CPT | Mod: PBBFAC | Performed by: UROLOGY

## 2017-09-18 PROCEDURE — 3075F SYST BP GE 130 - 139MM HG: CPT | Mod: ,,, | Performed by: UROLOGY

## 2017-09-18 PROCEDURE — 99214 OFFICE O/P EST MOD 30 MIN: CPT | Mod: S$PBB,,, | Performed by: UROLOGY

## 2017-09-18 RX ORDER — BICALUTAMIDE 50 MG/1
50 TABLET, FILM COATED ORAL DAILY
Qty: 30 TABLET | Refills: 0 | Status: SHIPPED | OUTPATIENT
Start: 2017-09-18 | End: 2017-10-18

## 2017-09-18 NOTE — PROGRESS NOTES
"Chief Complaint: Perineal pain    HPI:   9/18/17: Bone scan reports "RIGHT supraorbital location and a smaller similarly extremely intense focus of increased uptake posteriorly on the RIGHT at the T9 level.  Etiology is uncertain."  CT scan shows "A few scattered shotty mesenteric and retroperitoneal nodes identified.  Similar nodes identified within the pelvis bilaterally."  MRI shows left 2.8 cm prostate mass PIRADS 5, with metastatic pelvic lymphadenopathy (right pelvic sidewall node and left internal iliac chain LN).  No bony mets in pelvis.  7/18/17:  No problems from biopsy.  Gl 4+5 in the left base (30%) and a sig amount of 4+4 in other parts of the gland.  Reviewed treatment options, and imaging needs.  7/3/17: TRUS/Bx today 27 gm.  5/24/17: 61 yo man has problems with perineal/scrotal pain once or twice a month; lasts for an hour or two, some nocturia.  PSA recently elevated.  No abd/pelvic pain and no exac/rel factors.  No hematuria.  No urolithiasis.  No urinary bother.  No  history. CT with contrast earlier this year essentially normal.    Allergies:  Avelox  [moxifloxacin]    Medications:  has a current medication list which includes the following prescription(s): aspirin, atorvastatin, budesonide-formoterol 160-4.5 mcg, calcium-vitamin d, fluticasone, gabapentin, methocarbamol, metoprolol succinate, nitroglycerin, omeprazole, and travoprost.    Review of Systems:  General: No fever, chills, fatigability, or weight loss.  Skin: No rashes, itching, or changes in color or texture of skin.  Chest: Denies TORRES, cyanosis, wheezing, cough, and sputum production.  Abdomen: Appetite fine. No weight loss. Denies diarrhea, abdominal pain, hematemesis, or blood in stool.  Musculoskeletal: No joint stiffness or swelling. Denies back pain.  : As above.  All other review of systems negative.    PMH:   has a past medical history of COPD (chronic obstructive pulmonary disease) (7/30/2013); Coronary artery disease " (7/30/2013); Emphysema of lung; Glaucoma (increased eye pressure) (8/27/2013); Headache(784.0); Mitral regurgitation (7/30/2013); Mixed hyperlipidemia (7/30/2013); Neuropathy; Osteoarthritis of spine with radiculopathy, lumbar region (7/21/2015); and Other and unspecified hyperlipidemia.    PSH:   has a past surgical history that includes Coronary stent placement; Colonoscopy (N/A, 3/21/2016); Shoulder arthroscopy (Left); and Spine surgery.    FamHx: family history includes Blindness in his maternal grandmother; Cancer in his father; Cataracts in his mother; Diabetes in his sister, sister, and sister; Hypertension in his sister, sister, sister, sister, and sister; Kidney disease in his mother.    SocHx:  reports that he has quit smoking. His smoking use included Cigarettes. He has a 6.25 pack-year smoking history. He does not have any smokeless tobacco history on file. He reports that he drinks alcohol. He reports that he does not use drugs.      Physical Exam:  Vitals:    09/18/17 1530   BP: 133/78     General: A&Ox3, no apparent distress, no deformities  Neck: No masses, normal thyroid  Lungs: normal inspiration, no use of accessory muscles  Heart: normal pulse, no arrhythmias  Abdomen: Soft, NT, ND  Skin: The skin is warm and dry. No jaundice.  Ext: No c/c/e.  :     7/3/17: Test desc lucas, no abnormalities of epididymus. Penis normal, with normal penile and scrotal skin. Meatus normal. Normal rectal tone, no hemorrhoids. Prost 40 gm no nodules or masses appreciated. SV not palpable. Perineum and anus normal.    Labs/Studies:   PSA    2/16: 22.1    2/17: 22.9    Impression/Plan:   1.  XRT may be helpful as willem disease in pelvis may possibly be in zone of XRT therapy.  Will start casodex today and RTC 1 mo for Lupron.  Consultation with Dr. Moscoso in the meantime.

## 2017-09-22 ENCOUNTER — INITIAL CONSULT (OUTPATIENT)
Dept: RADIATION ONCOLOGY | Facility: CLINIC | Age: 62
End: 2017-09-22
Payer: MEDICARE

## 2017-09-22 ENCOUNTER — HOSPITAL ENCOUNTER (OUTPATIENT)
Dept: RADIOLOGY | Facility: HOSPITAL | Age: 62
Discharge: HOME | End: 2017-09-22
Attending: RADIOLOGY
Payer: MEDICARE

## 2017-09-22 VITALS
RESPIRATION RATE: 18 BRPM | BODY MASS INDEX: 20.29 KG/M2 | HEART RATE: 77 BPM | WEIGHT: 141.75 LBS | DIASTOLIC BLOOD PRESSURE: 76 MMHG | TEMPERATURE: 97 F | HEIGHT: 70 IN | SYSTOLIC BLOOD PRESSURE: 126 MMHG

## 2017-09-22 DIAGNOSIS — C61 CANCER OF PROSTATE WITH HIGH RECURRENCE RISK (STAGE T3A OR GLEASON 8-10 OR PSA > 20): ICD-10-CM

## 2017-09-22 DIAGNOSIS — C61 CANCER OF PROSTATE WITH HIGH RECURRENCE RISK (STAGE T3A OR GLEASON 8-10 OR PSA > 20): Primary | ICD-10-CM

## 2017-09-22 PROCEDURE — 3078F DIAST BP <80 MM HG: CPT | Mod: ,,, | Performed by: RADIOLOGY

## 2017-09-22 PROCEDURE — 99999 PR PBB SHADOW E&M-EST. PATIENT-LVL III: CPT | Mod: PBBFAC,,, | Performed by: RADIOLOGY

## 2017-09-22 PROCEDURE — 99213 OFFICE O/P EST LOW 20 MIN: CPT | Mod: PBBFAC,25 | Performed by: RADIOLOGY

## 2017-09-22 PROCEDURE — 70260 X-RAY EXAM OF SKULL: CPT | Mod: TC

## 2017-09-22 PROCEDURE — 3074F SYST BP LT 130 MM HG: CPT | Mod: ,,, | Performed by: RADIOLOGY

## 2017-09-22 PROCEDURE — 70260 X-RAY EXAM OF SKULL: CPT | Mod: 26,,, | Performed by: RADIOLOGY

## 2017-09-22 PROCEDURE — 99203 OFFICE O/P NEW LOW 30 MIN: CPT | Mod: S$PBB,,, | Performed by: RADIOLOGY

## 2017-09-22 NOTE — LETTER
September 22, 2017      Roland Dawn IV, MD  [address]  [facility] [address]           [place] [facility]  [address] [place] [address]  Phone: [phone]  Fax: [phone]          Patient: Joey Jacobo   MR Number: [id number]   YOB: 1955   Date of Visit: 9/22/2017       Dear Dr. Roland Dawn IV:    Thank you for referring Joey Jacobo to me for evaluation. Attached you will find relevant portions of my assessment and plan of care.    If you have questions, please do not hesitate to call me. I look forward to following Joey Jacobo along with you.    Sincerely,    Cecilio Moscoso Jr., MD    Enclosure  CC:  No Recipients    If you would like to receive this communication electronically, please contact [email or web address] or [phone] to request more information on [facility] Link access.    For providers and/or their staff who would like to refer a patient to [facility], please contact us through our one-stop-shop provider referral line, [facility] , at [phone].    If you feel you have received this communication in error or would no longer like to receive these types of communications, please e-mail [email or web address]

## 2017-09-29 ENCOUNTER — HOSPITAL ENCOUNTER (OUTPATIENT)
Dept: RADIOLOGY | Facility: HOSPITAL | Age: 62
Discharge: HOME | End: 2017-09-29
Attending: RADIOLOGY
Payer: MEDICARE

## 2017-09-29 DIAGNOSIS — C61 CANCER OF PROSTATE WITH HIGH RECURRENCE RISK (STAGE T3A OR GLEASON 8-10 OR PSA > 20): ICD-10-CM

## 2017-09-29 PROCEDURE — 72157 MRI CHEST SPINE W/O & W/DYE: CPT | Mod: TC

## 2017-09-29 PROCEDURE — 25500020 PHARM REV CODE 255: Performed by: RADIOLOGY

## 2017-09-29 PROCEDURE — A9585 GADOBUTROL INJECTION: HCPCS | Performed by: RADIOLOGY

## 2017-09-29 RX ORDER — GADOBUTROL 604.72 MG/ML
6 INJECTION INTRAVENOUS
Status: COMPLETED | OUTPATIENT
Start: 2017-09-29 | End: 2017-09-29

## 2017-09-29 RX ADMIN — GADOBUTROL 6 ML: 604.72 INJECTION INTRAVENOUS at 03:09

## 2017-10-02 ENCOUNTER — OFFICE VISIT (OUTPATIENT)
Dept: RADIATION ONCOLOGY | Facility: CLINIC | Age: 62
End: 2017-10-02
Payer: MEDICARE

## 2017-10-02 VITALS
SYSTOLIC BLOOD PRESSURE: 126 MMHG | HEIGHT: 70 IN | HEART RATE: 80 BPM | RESPIRATION RATE: 18 BRPM | DIASTOLIC BLOOD PRESSURE: 84 MMHG | WEIGHT: 141 LBS | TEMPERATURE: 97 F | BODY MASS INDEX: 20.19 KG/M2

## 2017-10-02 DIAGNOSIS — C79.51 SECONDARY ADENOCARCINOMA OF SKELETAL BONE: ICD-10-CM

## 2017-10-02 DIAGNOSIS — C61 CANCER OF PROSTATE WITH HIGH RECURRENCE RISK (STAGE T3A OR GLEASON 8-10 OR PSA > 20): Primary | ICD-10-CM

## 2017-10-02 PROCEDURE — 99499 UNLISTED E&M SERVICE: CPT | Mod: S$GLB,,, | Performed by: RADIOLOGY

## 2017-10-02 PROCEDURE — 99999 PR PBB SHADOW E&M-EST. PATIENT-LVL III: CPT | Mod: PBBFAC,,, | Performed by: RADIOLOGY

## 2017-10-02 NOTE — PROGRESS NOTES
Subjective:       Patient ID: Joey Jacobo is a 62 y.o. male.    Chief Complaint: Prostate Cancer (Review MRI/RT plan)    This patient presents for discussion of recent test results.     Mr. Jacboo was recently diagnosed with Hay 9 (4+5) prostate cancer.  He initially presented to Dr. Dawn in May of this year for evaluation of intermittent perineal and scrotal pain.  MARILYN revealed a non tender 50 gm prostate without palpable nodules.  PSA drawn in February was elevated at 22.6 ng/ml.  The patient subsequently underwent TRUS and biopsy of his prostate on 7/3/17.  The prostate measured 27 gm.  There were no hypoechoic areas noted.  Biopsies from the Lt. base revealed Hay 9 (4+5) adenocarcinoma involving 30% of the specimen.  Biopsies from the Rt. base, Lt. apex and Lt. mid gland revealed Dreic 8 (4+4) adenocarcinoma involving 50% of the specimens.  The Rt. apex revealed Hay 6 (3+3) disease involving < 5% of the specimen.  The patient underwent further work up with CT of the abdomen and pelvis and bone scan on 7/24/17.  CT was essentially negative.  Bone scan revealed focal intense uptake in the Rt. supraorbital region along with a smaller area in the Rt. posterior aspect of T9.  An MRI of the prostate was obtained on 9/8/17.  This revealed a large Lt. sided prostate cancer (PIRADS 5) lesion with extraprostatic extension along the posterior lateral margin and invasion of the Lt. neurovascular bundle.  The was an enlarged Rt. pelvic sidewall node at 1.3 cm and a Lt. internal iliac node at 1.2 x 1.1 cm.  The patient was referred to our department to discuss possible radiotherapy.  We elected to continue his work up with MRI of the thoracic spine.  He presents today for discussion of those results.  Today, the patient states he feels well.  Over the weekend he has noted intermittent pain in the suprapubic area.  Pain is unassociated with bowel or urinary functions.  No diarrhea or dysuria.  No fever  or chills.       Review of Systems   Constitutional: Negative for activity change, appetite change, chills, fatigue and fever.   Respiratory: Negative for cough and shortness of breath.    Cardiovascular: Negative for chest pain and palpitations.   Gastrointestinal: Negative for abdominal pain, constipation and diarrhea.   Genitourinary: Negative for difficulty urinating, dysuria, flank pain and frequency.       Objective:      Physical Exam   Constitutional: He appears well-developed and well-nourished. No distress.       MRI on 9/29/17 confirmed bony metastatic lesion in the right aspect of the T8 vertebral body without fracture.  2.  No evidence for intrathecal metastasis.  Assessment:       1. Cancer of prostate with high recurrence risk (stage T3a or Deric 8-10 or PSA > 20)    2. Secondary adenocarcinoma of skeletal bone        Plan:       Discussed the results with the patient and his wife.  Explained that based on the findings, he is considered to have Stage IV (T3a, N1, M1) prostate cancer.  Discussed the implications of his diagnosis.  Explained he is considered to have treatable but not curable disease.  Discussed his current hormonal therapy.  He is scheduled for follow up with Dr. Dawn to begin LHRH agonist.  Explained that given he has evidence of bone metastases, I would not recommend any local irradiation at this time.  I think the patient could be considered for possible chemotherapy given the recent data suggesting a benefit with early chemotherapy in metastatic patients and the fact that he has Deric 9 disease.  Plan follow up with us PRN.

## 2017-10-03 ENCOUNTER — OFFICE VISIT (OUTPATIENT)
Dept: INTERNAL MEDICINE | Facility: CLINIC | Age: 62
End: 2017-10-03
Payer: MEDICARE

## 2017-10-03 ENCOUNTER — CLINICAL SUPPORT (OUTPATIENT)
Dept: CARDIOLOGY | Facility: CLINIC | Age: 62
End: 2017-10-03
Payer: MEDICARE

## 2017-10-03 ENCOUNTER — OFFICE VISIT (OUTPATIENT)
Dept: CARDIOLOGY | Facility: CLINIC | Age: 62
End: 2017-10-03
Payer: MEDICARE

## 2017-10-03 VITALS
HEIGHT: 70 IN | HEART RATE: 81 BPM | WEIGHT: 142.19 LBS | TEMPERATURE: 97 F | DIASTOLIC BLOOD PRESSURE: 66 MMHG | SYSTOLIC BLOOD PRESSURE: 128 MMHG | BODY MASS INDEX: 20.36 KG/M2

## 2017-10-03 VITALS
DIASTOLIC BLOOD PRESSURE: 60 MMHG | HEIGHT: 70 IN | WEIGHT: 142.19 LBS | HEART RATE: 80 BPM | SYSTOLIC BLOOD PRESSURE: 132 MMHG | BODY MASS INDEX: 20.36 KG/M2 | RESPIRATION RATE: 16 BRPM

## 2017-10-03 DIAGNOSIS — E78.2 MIXED HYPERLIPIDEMIA: Chronic | ICD-10-CM

## 2017-10-03 DIAGNOSIS — J44.9 CHRONIC OBSTRUCTIVE PULMONARY DISEASE, UNSPECIFIED COPD TYPE: Primary | Chronic | ICD-10-CM

## 2017-10-03 DIAGNOSIS — I25.10 CORONARY ARTERY DISEASE, ANGINA PRESENCE UNSPECIFIED, UNSPECIFIED VESSEL OR LESION TYPE, UNSPECIFIED WHETHER NATIVE OR TRANSPLANTED HEART: ICD-10-CM

## 2017-10-03 DIAGNOSIS — C79.51 SECONDARY ADENOCARCINOMA OF SKELETAL BONE: ICD-10-CM

## 2017-10-03 DIAGNOSIS — M54.50 ACUTE MIDLINE LOW BACK PAIN WITHOUT SCIATICA: ICD-10-CM

## 2017-10-03 DIAGNOSIS — J84.10 PULMONARY FIBROSIS: ICD-10-CM

## 2017-10-03 DIAGNOSIS — Z98.61 HISTORY OF PTCA: ICD-10-CM

## 2017-10-03 DIAGNOSIS — K21.9 GASTROESOPHAGEAL REFLUX DISEASE WITHOUT ESOPHAGITIS: Chronic | ICD-10-CM

## 2017-10-03 DIAGNOSIS — I25.10 CORONARY ARTERY DISEASE DUE TO CALCIFIED CORONARY LESION: Primary | Chronic | ICD-10-CM

## 2017-10-03 DIAGNOSIS — I25.84 CORONARY ARTERY DISEASE DUE TO CALCIFIED CORONARY LESION: Primary | Chronic | ICD-10-CM

## 2017-10-03 DIAGNOSIS — I34.0 MITRAL VALVE INSUFFICIENCY, UNSPECIFIED ETIOLOGY: Chronic | ICD-10-CM

## 2017-10-03 PROBLEM — M46.1 SACROILIITIS: Status: ACTIVE | Noted: 2017-10-03

## 2017-10-03 PROBLEM — M46.1 SACROILIITIS: Status: RESOLVED | Noted: 2017-10-03 | Resolved: 2017-10-03

## 2017-10-03 PROCEDURE — 93000 ELECTROCARDIOGRAM COMPLETE: CPT | Mod: S$GLB,,, | Performed by: INTERNAL MEDICINE

## 2017-10-03 PROCEDURE — 99213 OFFICE O/P EST LOW 20 MIN: CPT | Mod: S$GLB,,, | Performed by: INTERNAL MEDICINE

## 2017-10-03 PROCEDURE — 99999 PR PBB SHADOW E&M-EST. PATIENT-LVL III: CPT | Mod: PBBFAC,,, | Performed by: FAMILY MEDICINE

## 2017-10-03 PROCEDURE — 99214 OFFICE O/P EST MOD 30 MIN: CPT | Mod: S$GLB,,, | Performed by: FAMILY MEDICINE

## 2017-10-03 PROCEDURE — 99999 PR PBB SHADOW E&M-EST. PATIENT-LVL III: CPT | Mod: PBBFAC,,, | Performed by: INTERNAL MEDICINE

## 2017-10-03 RX ORDER — CALCITONIN SALMON 200 [IU]/.09ML
1 SPRAY, METERED NASAL DAILY
Qty: 3.7 ML | Refills: 11 | Status: SHIPPED | OUTPATIENT
Start: 2017-10-03 | End: 2018-06-15

## 2017-10-03 RX ORDER — PANTOPRAZOLE SODIUM 40 MG/1
40 TABLET, DELAYED RELEASE ORAL DAILY
Qty: 90 TABLET | Refills: 3 | Status: SHIPPED | OUTPATIENT
Start: 2017-10-03 | End: 2017-11-07 | Stop reason: SDUPTHER

## 2017-10-03 RX ORDER — HYDROCODONE BITARTRATE AND ACETAMINOPHEN 5; 325 MG/1; MG/1
1 TABLET ORAL 3 TIMES DAILY PRN
Qty: 90 TABLET | Refills: 0 | Status: SHIPPED | OUTPATIENT
Start: 2017-10-03 | End: 2017-11-27

## 2017-10-03 NOTE — PROGRESS NOTES
CHIEF COMPLAINT  Back Pain      HISTORY OF PRESENT ILLNESS     PROBLEM/CONDITION: He comes in complaining of back pain. LOCATION is lower lumbar region midline. ONSET was subacute over the last couple of weeks. SEVERITY described as MODERATELY SEVERE. Symptoms occur in the CONTEXT of NEWLY diagnosed stage for prostate cancer with metastasis to lumbar spine. This appears to be the cause of his low back pain. He reports no urinary or fecal incontinence, no saddle anesthesia, and no loss of lower extremity strength. He is intolerant of NSAIDs due to his gastroesophageal reflux disease. We discussed risks and benefits of treatment options.     PROBLEM/CONDITION: He has gastroesophageal reflux disease for which he takes omeprazole 40 mg daily, with fair symptom relief, but occasional breakthrough dyspepsia, typically EXACERBATED by foods containing tomatoes. He ate barbecue over the weekend and the sauce was tomato-based, and he has had a MILD worsening of his symptoms after that, but it is getting better. He reports no emesis.     PROBLEM/CONDITION: He has pulmonary fibrosis and COPD, and these conditions appear to be well compensated, but he is overdue for surveillance by his pulmonologist.     PROBLEM/CONDITION: His coronary artery disease appears compensated without angina or angina equivalent. He says he has follow-up appointment with his cardiologist later this week.    No other complaints or concerns reported.    Problem List Items Addressed This Visit     Secondary adenocarcinoma of skeletal bone    Overview     MRI 10/02/2017: Bony metastatic lesion in the right aspect of the T8 vertebral body without fracture.         Relevant Medications    calcitonin, salmon, (FORTICAL) 200 unit/actuation nasal spray    hydrocodone-acetaminophen 5-325mg (NORCO) 5-325 mg per tablet    Other Relevant Orders    Ambulatory referral to Pain Clinic    Pulmonary fibrosis    Relevant Orders    Ambulatory referral to Pulmonology  "   Acute midline low back pain without sciatica    Overview     MRI 10/02/2017: Bony metastatic lesion in the right aspect of the T8 vertebral body without fracture.         Relevant Medications    calcitonin, salmon, (FORTICAL) 200 unit/actuation nasal spray    hydrocodone-acetaminophen 5-325mg (NORCO) 5-325 mg per tablet    Other Relevant Orders    Ambulatory referral to Pain Clinic    COPD (chronic obstructive pulmonary disease) - Primary (Chronic)    Relevant Orders    Ambulatory referral to Pulmonology    Gastroesophageal reflux disease without esophagitis (Chronic)    Relevant Medications    pantoprazole (PROTONIX) 40 MG tablet      Other Visit Diagnoses    None.         REVIEW OF SYSTEMS  CONSTITUTIONAL: No fever reported.  CARDIOVASCULAR: No chest pain reported.  PULMONARY: No trouble breathing reported.     PHYSICAL EXAM  Vitals:    10/03/17 1256   BP: 128/66   Pulse: 81   Temp: 96.7 °F (35.9 °C)   TempSrc: Tympanic   Weight: 64.5 kg (142 lb 3.2 oz)   Height: 5' 10" (1.778 m)     CONSTITUTIONAL: Vital signs (BP, P, T, RR, et al) noted. No apparent distress. Does not appear acutely ill or septic. Appears adequately hydrated.  HEENT: External ENT grossly unremarkable. Hearing grossly intact. Oropharynx moist.  PULM: Lungs clear. Breathing unlabored.  HEART: Auscultation reveals regular rate and rhythm without murmur, gallop or rub.  DERM: Skin warm and moist with normal turgor.  NEURO: There are no gross focal motor deficits or gross deficits of cranial nerves III-XII.  PSYCHIATRIC: Alert and oriented x 3. Mood is grossly neutral. Affect appropriate. Judgment and insight not grossly compromised.  MUSCULOSKELETAL: Grossly normal stance and gait. Spine palpates normally and reasonably symmetric. Moderate bilateral lumbar paraspinal muscle spasm noted. LE strength, gross sensation and DTRs symmetric and normal.       PAST MEDICAL HISTORY, FAMILY HISTORY, SOCIAL HISTORY, CURRENT MEDICATION LIST, and " ALLERGY LIST reviewed by me (HORACE Horton MD) and are updated consistent with the patient's report.    ASSESSMENT and PLAN  Chronic obstructive pulmonary disease, unspecified COPD type  -     Ambulatory referral to Pulmonology    Pulmonary fibrosis  -     Ambulatory referral to Pulmonology    Acute midline low back pain without sciatica  -     Ambulatory referral to Pain Clinic  -     calcitonin, salmon, (FORTICAL) 200 unit/actuation nasal spray; 1 spray by Nasal route once daily. - alternate nostrils every other day  Dispense: 3.7 mL; Refill: 11  -     hydrocodone-acetaminophen 5-325mg (NORCO) 5-325 mg per tablet; Take 1 tablet by mouth 3 (three) times daily as needed for Pain.  Dispense: 90 tablet; Refill: 0    Secondary adenocarcinoma of skeletal bone  -     Ambulatory referral to Pain Clinic  -     calcitonin, salmon, (FORTICAL) 200 unit/actuation nasal spray; 1 spray by Nasal route once daily. - alternate nostrils every other day  Dispense: 3.7 mL; Refill: 11  -     hydrocodone-acetaminophen 5-325mg (NORCO) 5-325 mg per tablet; Take 1 tablet by mouth 3 (three) times daily as needed for Pain.  Dispense: 90 tablet; Refill: 0    Gastroesophageal reflux disease without esophagitis  -     pantoprazole (PROTONIX) 40 MG tablet; Take 1 tablet (40 mg total) by mouth once daily.  Dispense: 90 tablet; Refill: 3        Medication List with Changes/Refills   New Medications    CALCITONIN, SALMON, (FORTICAL) 200 UNIT/ACTUATION NASAL SPRAY    1 spray by Nasal route once daily. - alternate nostrils every other day    HYDROCODONE-ACETAMINOPHEN 5-325MG (NORCO) 5-325 MG PER TABLET    Take 1 tablet by mouth 3 (three) times daily as needed for Pain.    PANTOPRAZOLE (PROTONIX) 40 MG TABLET    Take 1 tablet (40 mg total) by mouth once daily.   Current Medications    ASPIRIN (ECOTRIN) 81 MG EC TABLET    Take by mouth. 1 Tablet, Delayed Release (E.C.) Oral Every day    ATORVASTATIN (LIPITOR) 40 MG TABLET    Take one tablet by  mouth in the evening    BICALUTAMIDE (CASODEX) 50 MG TAB    Take 1 tablet (50 mg total) by mouth once daily.    BUDESONIDE-FORMOTEROL 160-4.5 MCG (SYMBICORT) 160-4.5 MCG/ACTUATION HFAA    Inhale 2 puffs into the lungs every 12 (twelve) hours.    CALCIUM-VITAMIN D (CALCIUM 600 + D,3,) 600 MG(1,500MG) -400 UNIT TAB    Take by mouth. 1 Tablet Oral Twice a day    FLUTICASONE (FLONASE) 50 MCG/ACTUATION NASAL SPRAY    1 spray by Each Nare route once daily.    GABAPENTIN (NEURONTIN) 300 MG CAPSULE    Take 300 mg by mouth once daily.     METHOCARBAMOL (ROBAXIN) 750 MG TAB    Take 1,500 mg (two tablets) four times daily for first 2 days followed by 750mg (one tablet) four times/day.    METOPROLOL SUCCINATE (TOPROL-XL) 50 MG 24 HR TABLET    Take one tablet by mouth one time daily    NITROGLYCERIN (NITROSTAT) 0.4 MG SL TABLET    Place 1 tablet (0.4 mg total) under the tongue every 5 (five) minutes as needed for Chest pain.    TRAVOPROST (TRAVATAN Z) 0.004 % DROP    Place 1 drop into both eyes every evening. 1 Drops Ophthalmic At bedtime   Discontinued Medications    OMEPRAZOLE (PRILOSEC) 40 MG CAPSULE    Take 1 capsule (40 mg total) by mouth once daily.       Return in about 2 months (around 12/3/2017) for periodic management of chronic medical conditions.    ABOUT THIS DOCUMENTATION:  · The order of the conditions listed in the HPI is one of convenience and does not necessarily reflect the chronology of the appointment, nor the relative importance of a condition. It is possible that additional description or status details about condition(s) may be found elsewhere in the documentation for today's encounter.  · Documentation entered by me for this encounter was done in part using speech-recognition technology. Although I have made an effort to ensure accuracy, malapropisms may exist and should be interpreted in context.                        -HORACE Horton MD    There are no Patient Instructions on file for this visit.

## 2017-10-03 NOTE — PROGRESS NOTES
"Subjective:    Patient ID:  Joey Jacobo is a 62 y.o. male who presents for evaluation of Coronary Artery Disease; Hypertension; and Valvular Heart Disease      HPI Mr. Jacobo returns for fu.  His current medical conditions include CAD (h/o LCX PCI 2007), MVP/MR, dyslipidemia, COPD.   S/p LHC 2010, nonobstructive LCX disease. JOSÉ 2010 showed MVP/severe MR. He had negative stress echo 10/11 and MR was moderate.   Echo 2/15 showed mod MR, normal LV function.    Pt here for f/u.  Last seen 2015.  Now dx with metastatic prostate cancer, incurable per chart.  Plans for hormonal tx and possible chemo.  He states he feels ok.  Denies cp sxs or dyspnea.  ecg today shows NSR, early repolarization, no acute changes.  Still smoking!     Review of Systems   Constitution: Negative.   HENT: Negative.    Eyes: Negative.    Cardiovascular: Negative.    Respiratory: Negative.    Endocrine: Negative.    Hematologic/Lymphatic: Negative.    Skin: Negative.    Musculoskeletal: Positive for back pain.   Gastrointestinal: Negative.    Genitourinary: Negative.    Neurological: Negative.    Psychiatric/Behavioral: Negative.    Allergic/Immunologic: Negative.        /60 (BP Location: Left arm, Patient Position: Sitting, BP Method: Medium (Manual))   Pulse 80   Resp 16   Ht 5' 10" (1.778 m)   Wt 64.5 kg (142 lb 3.2 oz)   BMI 20.40 kg/m²   Wt Readings from Last 3 Encounters:   10/03/17 64.5 kg (142 lb 3.2 oz)   10/03/17 64.5 kg (142 lb 3.2 oz)   10/02/17 64 kg (141 lb 0.3 oz)     Temp Readings from Last 3 Encounters:   10/03/17 96.7 °F (35.9 °C) (Tympanic)   10/02/17 97 °F (36.1 °C)   09/22/17 97 °F (36.1 °C)     BP Readings from Last 3 Encounters:   10/03/17 132/60   10/03/17 128/66   10/02/17 126/84     Pulse Readings from Last 3 Encounters:   10/03/17 80   10/03/17 81   10/02/17 80          Objective:    Physical Exam   Constitutional: He is oriented to person, place, and time. He appears well-developed and well-nourished. "   HENT:   Head: Normocephalic.   Neck: Normal range of motion. Neck supple. No JVD present. Carotid bruit is not present. No thyromegaly present.   Cardiovascular: Normal rate, regular rhythm, S1 normal and S2 normal.  PMI is not displaced.  Exam reveals no S3, no S4, no distant heart sounds, no friction rub, no midsystolic click and no opening snap.    No murmur heard.  Pulses:       Radial pulses are 2+ on the right side, and 2+ on the left side.   Pulmonary/Chest: Effort normal and breath sounds normal. He has no wheezes. He has no rales.   Abdominal: Soft. Bowel sounds are normal. He exhibits no distension and no ascites. There is no tenderness.   Musculoskeletal: He exhibits no edema.   Neurological: He is alert and oriented to person, place, and time.   Skin: Skin is warm.   Psychiatric: He has a normal mood and affect. His behavior is normal.   Nursing note and vitals reviewed.      I have reviewed all pertinent labs and cardiac studies.      Chemistry        Component Value Date/Time     02/22/2017 0808    K 4.4 02/22/2017 0808     02/22/2017 0808    CO2 26 02/22/2017 0808    BUN 13 02/22/2017 0808    CREATININE 1.2 09/22/2017 1131    GLU 95 02/22/2017 0808        Component Value Date/Time    CALCIUM 9.5 02/22/2017 0808    ALKPHOS 72 02/22/2017 0808    AST 21 02/22/2017 0808    ALT 14 02/22/2017 0808    BILITOT 0.5 02/22/2017 0808    ESTGFRAFRICA >60.0 09/22/2017 1131    EGFRNONAA >60.0 09/22/2017 1131        Lab Results   Component Value Date    WBC 7.05 02/22/2017    HGB 14.7 02/22/2017    HCT 45.1 02/22/2017    MCV 93 02/22/2017     02/22/2017       Lab Results   Component Value Date    CHOL 178 02/22/2017    CHOL 207 (H) 02/23/2016    CHOL 164 02/05/2015     Lab Results   Component Value Date    HDL 49 02/22/2017    HDL 51 02/23/2016    HDL 53 02/05/2015     Lab Results   Component Value Date    LDLCALC 108.4 02/22/2017    LDLCALC 134.4 02/23/2016    LDLCALC 96.4 02/05/2015     Lab  Results   Component Value Date    TRIG 103 02/22/2017    TRIG 108 02/23/2016    TRIG 73 02/05/2015     Lab Results   Component Value Date    CHOLHDL 27.5 02/22/2017    CHOLHDL 24.6 02/23/2016    CHOLHDL 32.3 02/05/2015           Assessment:       1. Coronary artery disease due to calcified coronary lesion    2. Mitral valve insufficiency, unspecified etiology    3. Mixed hyperlipidemia    4. History of PTCA         Plan:             - stable CV conditions.  - continue current medical tx.  - tobacco cessation advised.  - exercise.    F/u 6 months with lipids.

## 2017-10-11 ENCOUNTER — TELEPHONE (OUTPATIENT)
Dept: INTERNAL MEDICINE | Facility: CLINIC | Age: 62
End: 2017-10-11

## 2017-10-11 NOTE — TELEPHONE ENCOUNTER
----- Message from Anayeli Kern sent at 10/11/2017  2:22 PM CDT -----  Contact: pt  States he needs something called in for a cold and cough. Pt uses     [id number] - [place] - [address]  [address]  [place] [address]  Phone: [phone] Fax: [phone]    Please call pt at [phone]. Thank you

## 2017-10-13 ENCOUNTER — OFFICE VISIT (OUTPATIENT)
Dept: INTERNAL MEDICINE | Facility: CLINIC | Age: 62
End: 2017-10-13
Payer: MEDICARE

## 2017-10-13 VITALS
HEART RATE: 78 BPM | HEIGHT: 70 IN | WEIGHT: 136.69 LBS | DIASTOLIC BLOOD PRESSURE: 70 MMHG | SYSTOLIC BLOOD PRESSURE: 120 MMHG | BODY MASS INDEX: 19.57 KG/M2 | OXYGEN SATURATION: 99 %

## 2017-10-13 DIAGNOSIS — C79.51 SECONDARY ADENOCARCINOMA OF SKELETAL BONE: ICD-10-CM

## 2017-10-13 DIAGNOSIS — I34.0 MITRAL VALVE INSUFFICIENCY, UNSPECIFIED ETIOLOGY: Chronic | ICD-10-CM

## 2017-10-13 DIAGNOSIS — Z72.0 TOBACCO USE: ICD-10-CM

## 2017-10-13 DIAGNOSIS — C61 CANCER OF PROSTATE WITH HIGH RECURRENCE RISK (STAGE T3A OR GLEASON 8-10 OR PSA > 20): ICD-10-CM

## 2017-10-13 DIAGNOSIS — R63.4 UNINTENTIONAL WEIGHT LOSS: ICD-10-CM

## 2017-10-13 DIAGNOSIS — H40.9 GLAUCOMA, UNSPECIFIED GLAUCOMA TYPE, UNSPECIFIED LATERALITY: ICD-10-CM

## 2017-10-13 DIAGNOSIS — Z00.00 ENCOUNTER FOR PREVENTIVE HEALTH EXAMINATION: Primary | ICD-10-CM

## 2017-10-13 DIAGNOSIS — Z98.61 HISTORY OF PTCA: ICD-10-CM

## 2017-10-13 DIAGNOSIS — E78.2 MIXED HYPERLIPIDEMIA: Chronic | ICD-10-CM

## 2017-10-13 DIAGNOSIS — K21.9 GASTROESOPHAGEAL REFLUX DISEASE WITHOUT ESOPHAGITIS: Chronic | ICD-10-CM

## 2017-10-13 DIAGNOSIS — I25.84 CORONARY ARTERY DISEASE DUE TO CALCIFIED CORONARY LESION: Chronic | ICD-10-CM

## 2017-10-13 DIAGNOSIS — J44.9 CHRONIC OBSTRUCTIVE PULMONARY DISEASE, UNSPECIFIED COPD TYPE: Chronic | ICD-10-CM

## 2017-10-13 DIAGNOSIS — I25.10 CORONARY ARTERY DISEASE DUE TO CALCIFIED CORONARY LESION: Chronic | ICD-10-CM

## 2017-10-13 DIAGNOSIS — J84.10 PULMONARY FIBROSIS: ICD-10-CM

## 2017-10-13 PROCEDURE — 99999 PR PBB SHADOW E&M-EST. PATIENT-LVL IV: CPT | Mod: PBBFAC,,, | Performed by: NURSE PRACTITIONER

## 2017-10-13 PROCEDURE — G0439 PPPS, SUBSEQ VISIT: HCPCS | Mod: S$GLB,,, | Performed by: NURSE PRACTITIONER

## 2017-10-13 NOTE — Clinical Note
Your patient was seen today for a HRA visit. Abnormalities have been identified during this visit that may require additional testing and follow up. I have included a copy of my visit note, please review the note and feel free to contact me with any questions.  Thank you for allowing me to participate in the care of your patients.  Joy Shea NP

## 2017-10-13 NOTE — PROGRESS NOTES
"Joey Jacobo presented for a  Medicare AWV and comprehensive Health Risk Assessment today. The following components were reviewed and updated:    · Medical history  · Family History  · Social history  · Allergies and Current Medications  · Health Risk Assessment  · Health Maintenance  · Care Team     ** See Completed Assessments for Annual Wellness Visit within the encounter summary.**       The following assessments were completed:  · Living Situation  · CAGE  · Depression Screening  · Timed Get Up and Go  · Whisper Test  · Cognitive Function Screening  · Nutrition Screening  · ADL Screening  · PAQ Screening    Vitals:    10/13/17 1413   BP: 120/70   BP Location: Left arm   Patient Position: Sitting   Pulse: 78   SpO2: 99%   Weight: 62 kg (136 lb 11 oz)   Height: 5' 10" (1.778 m)     Body mass index is 19.61 kg/m².  Physical Exam   Constitutional: He appears well-developed. No distress.   HENT:   Head: Normocephalic and atraumatic.   Eyes: Pupils are equal, round, and reactive to light.   Neck: Carotid bruit is not present.   Cardiovascular: Normal rate, regular rhythm, normal heart sounds, intact distal pulses and normal pulses.  Exam reveals no gallop.    No murmur heard.  Pulmonary/Chest: Effort normal and breath sounds normal.   Abdominal: Soft. Bowel sounds are normal. He exhibits no distension. There is no tenderness.   Musculoskeletal: Normal range of motion. He exhibits no edema.   Neurological: He exhibits normal muscle tone.   Skin: Skin is warm, dry and intact.   Psychiatric: He has a normal mood and affect. His speech is normal and behavior is normal. Judgment and thought content normal. Cognition and memory are normal.   Nursing note and vitals reviewed.        Diagnoses and health risks identified today and associated recommendations/orders:    1. Encounter for preventive health examination    2. Unintentional weight loss  This is a new problem that has been identified during today's visit. Per chart 5 " lbs wt loss noted and overall 12 lbs in last year. Pt states decreased appetite over last week- recently diagnosis with prostate  Cancer. Please discuss with Dr. Dawn on next week visit    3. Cancer of prostate with high recurrence risk (stage T3a or Deric 8-10 or PSA > 20)   New and ongoing problem; 7/2017 Diagnosis TRUS and biopsy ; started on Casodex ; Referred for chemotherapy and radiation oncologist    4. Secondary adenocarcinoma of skeletal bone   New and ongoing problem     ; DX last month with  metastasis to lumbar spine.-    MRI 10/02/2017: Bony metastatic lesion in the right aspect of the T8 vertebral body without fracture.     calcitonin, salmon, (FORTICAL) 200 unit/actuation nasal spray      hydrocodone-acetaminophen 5-325mg (NORCO) 5-325 mg per tablet     Other Relevant Orders     Ambulatory referral to Pain Clinic   Followed by PCP and urologist. Referred to chemo radiation,    5. Coronary artery disease due to calcified coronary lesion  Stable and controlled.  S/P cardiac stent 2007  - pt unsure  #.  Continue current treatment plan as previously prescribed with your  Cardiologist     6. Chronic obstructive pulmonary disease, unspecified COPD type  2/22/2017 chest xray  showed Evidence of COPD with diffuse reticular interstitial opacities again noted in the lungs bilaterally.  Stable and controlled on Symbicort daily . Continue current treatment plan as previously prescribed with your pulmonologist - Due for appointment- referred back by PCP    7. Pulmonary fibrosis  Stable and controlled on Symbicort daily .Continue current treatment plan as previously prescribed with your pulmonologist . Due for appointment     8. Glaucoma, unspecified glaucoma type, unspecified laterality  Stable and controlled. Continue current treatment plan as previously prescribed with your opthololomogist    9. Gastroesophageal reflux disease without esophagitis  Stable and controlled on Protonix- recently started.  Continue current treatment plan as previously prescribed with your PCP.     10. Mitral valve insufficiency, unspecified etiology   2015 echo Normal left ventricular systolic function (EF 55-60%).     2 - Normal left ventricular diastolic function.     3 - Normal right ventricular systolic function .     4 - Moderate mitral regurgitation with sclerodegenerative change.    5 - Trivial tricuspid regurgitation.   Chronic and stable.  Denies chest  Pain or  Palpation. Advise to stop smoking per chart Followed by cardiologist     11. Mixed hyperlipidemia  Stable and controlled on Lipitor daily . Continue current treatment plan as previously prescribed with your PCP.      12 Tobacco use  This problem is currently not controlled.  . Pt states he still smokes but is down to  1- 3 cigarettes per day. Decline smoking cessation program. Discussed the long term effects of smoking   Please follow up with your PCP as planned to discuss    Provided Ray with a 5-10 year written screening schedule and personal prevention plan. Recommendations were developed using the USPSTF age appropriate recommendations. Education, counseling, and referrals were provided as needed. After Visit Summary printed and given to patient which includes a list of additional screenings\tests needed.    1 year follow up    Pt instructed to get Prevnar and Flu vaccine next - cold symptoms today   Joy Shea NP

## 2017-10-13 NOTE — PATIENT INSTRUCTIONS
Counseling and Referral of Other Preventative  (Italic type indicates deductible and co-insurance are waived)    Patient Name: Joey Jacobo  Today's Date: 10/13/2017      SERVICE LIMITATIONS RECOMMENDATION    Vaccines    · Pneumococcal (once after 65)    · Influenza (annually)    · Hepatitis B (if medium/high risk)    · Prevnar 13      Hepatitis B medium/high risk factors:       - End-stage renal disease       - Hemophiliacs who received Factor VII or         IX concentrates       - Clients of institutions for the mentally             retarded       - Persons who live in the same house as          a HepB carrier       - Homosexual men       - Illicit injectable drug abusers     Pneumococcal: f/u in  1 week      Influenza: f/u  1week     Hepatitis B: N/A     Prevnar 13: 1 week follow up     Prostate cancer screening (annually to age 75)     Prostate specific antigen (PSA) Shared decision making with Provider. Sometimes a co-pay may be required if the patient decides to have this test. The USPSTF no longer recommends prostate cancer screening routinely in medicine: every 1 year    Colorectal cancer screening (to age 75)    · Fecal occult blood test (annual)  · Flexible sigmoidoscopy (5y)  · Screening colonoscopy (10y)  · Barium enema  3/ 2016 due again 2021    Diabetes self-management training (no USPSTF recommendations)  Requires referral by treating physician for patient with diabetes or renal disease. 10 hours of initial DSMT sessions of no less than 30 minutes each in a continuous 12-month period. 2 hours of follow-up DSMT in subsequent years.  N/A    Glaucoma screening (no USPSTF recommendation)  Diabetes mellitus, family history   , age 50 or over    American, age 65 or over appt this month     Medical nutrition therapy for diabetes or renal disease (no recommended schedule)  Requires referral by treating physician for patient with diabetes or renal disease or kidney transplant within the  past 3 years.  Can be provided in same year as diabetes self-management training (DSMT), and CMS recommends medical nutrition therapy take place after DSMT. Up to 3 hours for initial year and 2 hours in subsequent years.  N/A    Cardiovascular screening blood tests (every 5 years)  · Fasting lipid panel  Order as a panel if possible  Done this year, repeat every year    Diabetes screening tests (at least every 3 years, Medicare covers annually or at 6-month intervals for prediabetic patients)  · Fasting blood sugar (FBS) or glucose tolerance test (GTT)  Patient must be diagnosed with one of the following:       - Hypertension       - Dyslipidemia       - Obesity (BMI 30kg/m2)       - Previous elevated impaired FBS or GTT       ... or any two of the following:       - Overweight (BMI 25 but <30)       - Family history of diabetes       - Age 65 or older       - History of gestational diabetes or birth of baby weighing more than 9 pounds  Done this year, repeat every year    Abdominal aortic aneurysm screening (once)  · Sonogram   Limited to patients who meet one of the following criteria:       - Men who are 65-75 years old and have smoked more than 100 cigarette in their lifetime       - Anyone with a family history of abdominal aortic aneurysm       - Anyone recommended for screening by the USPSTF  Scheduled, see appointments    HIV screening (annually for increased risk patients)  · HIV-1 and HIV-2 by EIA, or MEHDI, rapid antibody test or oral mucosa transudate  Patients must be at increased risk for HIV infection per USPSTF guidelines or pregnant. Tests covered annually for patient at increased risk or as requested by the patient. Pregnant patients may receive up to 3 tests during pregnancy.  Risks discussed, screening is not recommended    Smoking cessation counseling (up to 8 sessions per year)  Patients must be asymptomatic of tobacco-related conditions to receive as a preventative service.  Non-smoker     Subsequent annual wellness visit  At least 12 months since last AWV  Return in one year     The following information is provided to all patients.  This information is to help you find resources for any of the problems found today that may be affecting your health:                Living healthy guide: www.[facility].[place].[facility]      Understanding Diabetes: www.diabetes.org      Eating healthy: www.cdc.gov/healthyweight      CDC home safety checklist: www.cdc.gov/steadi/patient.html      Agency on Aging: www.goea.[place].[facility]      Alcoholics anonymous (AA): www.aa.org      Physical Activity: www.lexi.nih.gov/fg3meni      Tobacco use: www.quitwithusla.org

## 2017-10-18 ENCOUNTER — OFFICE VISIT (OUTPATIENT)
Dept: UROLOGY | Facility: CLINIC | Age: 62
End: 2017-10-18
Payer: MEDICARE

## 2017-10-18 VITALS
SYSTOLIC BLOOD PRESSURE: 122 MMHG | DIASTOLIC BLOOD PRESSURE: 71 MMHG | WEIGHT: 142.88 LBS | BODY MASS INDEX: 20.5 KG/M2 | HEART RATE: 70 BPM

## 2017-10-18 DIAGNOSIS — C61 PROSTATE CANCER: Primary | ICD-10-CM

## 2017-10-18 LAB
BILIRUB SERPL-MCNC: NORMAL MG/DL
BLOOD URINE, POC: NORMAL
COLOR, POC UA: YELLOW
GLUCOSE UR QL STRIP: NORMAL
KETONES UR QL STRIP: NORMAL
LEUKOCYTE ESTERASE URINE, POC: NORMAL
NITRITE, POC UA: NORMAL
PH, POC UA: 6
PROTEIN, POC: NORMAL
SPECIFIC GRAVITY, POC UA: 1.01
UROBILINOGEN, POC UA: NORMAL

## 2017-10-18 PROCEDURE — 99999 PR PBB SHADOW E&M-EST. PATIENT-LVL II: CPT | Mod: PBBFAC,,, | Performed by: UROLOGY

## 2017-10-18 PROCEDURE — 81002 URINALYSIS NONAUTO W/O SCOPE: CPT | Mod: S$GLB,,, | Performed by: UROLOGY

## 2017-10-18 PROCEDURE — 96402 CHEMO HORMON ANTINEOPL SQ/IM: CPT | Mod: S$GLB,,, | Performed by: UROLOGY

## 2017-10-18 PROCEDURE — 99214 OFFICE O/P EST MOD 30 MIN: CPT | Mod: 25,S$GLB,, | Performed by: UROLOGY

## 2017-10-18 NOTE — PROGRESS NOTES
"Chief Complaint: Perineal pain    HPI:   10/18/17: Been on casodex a month back to review and start Lupron.  Dr. Moscoso felt XRT was not an option but perhaps chemotherapy could be beneficial.  9/18/17: Bone scan reports "RIGHT supraorbital location and a smaller similarly extremely intense focus of increased uptake posteriorly on the RIGHT at the T9 level.  Etiology is uncertain."  CT scan shows "A few scattered shotty mesenteric and retroperitoneal nodes identified.  Similar nodes identified within the pelvis bilaterally."  MRI shows left 2.8 cm prostate mass PIRADS 5, with metastatic pelvic lymphadenopathy (right pelvic sidewall node and left internal iliac chain LN).  No bony mets in pelvis.  7/18/17:  No problems from biopsy.  Gl 4+5 in the left base (30%) and a sig amount of 4+4 in other parts of the gland.  Reviewed treatment options, and imaging needs.  7/3/17: TRUS/Bx today 27 gm.  5/24/17: 61 yo man has problems with perineal/scrotal pain once or twice a month; lasts for an hour or two, some nocturia.  PSA recently elevated.  No abd/pelvic pain and no exac/rel factors.  No hematuria.  No urolithiasis.  No urinary bother.  No  history. CT with contrast earlier this year essentially normal.    Allergies:  Nsaids (non-steroidal anti-inflammatory drug) and Avelox  [moxifloxacin]    Medications:  has a current medication list which includes the following prescription(s): aspirin, atorvastatin, bicalutamide, calcitonin (salmon), calcium-vitamin d, fluticasone, gabapentin, hydrocodone-acetaminophen 5-325mg, methocarbamol, metoprolol succinate, nitroglycerin, pantoprazole, travoprost, and budesonide-formoterol 160-4.5 mcg.    Review of Systems:  General: No fever, chills, fatigability, or weight loss.  Skin: No rashes, itching, or changes in color or texture of skin.  Chest: Denies TORRES, cyanosis, wheezing, cough, and sputum production.  Abdomen: Appetite fine. No weight loss. Denies diarrhea, abdominal pain, " hematemesis, or blood in stool.  Musculoskeletal: No joint stiffness or swelling. Denies back pain.  : As above.  All other review of systems negative.    PMH:   has a past medical history of COPD (chronic obstructive pulmonary disease) (7/30/2013); Coronary artery disease (7/30/2013); Emphysema of lung; Glaucoma (increased eye pressure) (8/27/2013); Headache(784.0); Mitral regurgitation (7/30/2013); Mixed hyperlipidemia (7/30/2013); Neuropathy; Osteoarthritis of spine with radiculopathy, lumbar region (7/21/2015); Other and unspecified hyperlipidemia; and Pulmonary fibrosis (10/3/2017).    PSH:   has a past surgical history that includes Coronary stent placement; Colonoscopy (N/A, 3/21/2016); Shoulder arthroscopy (Left); and Spine surgery.    FamHx: family history includes Blindness in his maternal grandmother; Cancer in his father; Cataracts in his mother; Diabetes in his sister, sister, and sister; Hypertension in his sister, sister, sister, sister, and sister; Kidney disease in his mother.    SocHx:  reports that he has quit smoking. His smoking use included Cigarettes. He has a 6.25 pack-year smoking history. He has never used smokeless tobacco. He reports that he drinks alcohol. He reports that he does not use drugs.      Physical Exam:  Vitals:    10/18/17 1055   BP: 122/71   Pulse: 70     General: A&Ox3, no apparent distress, no deformities  Neck: No masses, normal thyroid  Lungs: normal inspiration, no use of accessory muscles  Heart: normal pulse, no arrhythmias  Abdomen: Soft, NT, ND  Skin: The skin is warm and dry. No jaundice.  Ext: No c/c/e.  :     7/3/17: Test desc lucas, no abnormalities of epididymus. Penis normal, with normal penile and scrotal skin. Meatus normal. Normal rectal tone, no hemorrhoids. Prost 40 gm no nodules or masses appreciated. SV not palpable. Perineum and anus normal.    Labs/Studies:   PSA    2/16: 22.1    2/17: 22.9    Impression/Plan:   1. Lupron today and q3mo.  PSA/RTC 3  mo.  2. To Dr. Roman to followup on consideration of metastatic prostate cancer and possible chemotherapy as suggested by Dr. Moscoso.

## 2017-10-18 NOTE — PROGRESS NOTES
..Using aseptic technique, Lupron 22.5 mg adm IM to right ventrogluteal as per written order of Dr. Dawn. Patient was asked to remain in room for 20 minutes to observe for adverse reaction; none noted.  Pt tolerated procedure well and left clinic ambulatory per self without distress.

## 2017-10-19 ENCOUNTER — TELEPHONE (OUTPATIENT)
Dept: UROLOGY | Facility: CLINIC | Age: 62
End: 2017-10-19

## 2017-10-19 NOTE — TELEPHONE ENCOUNTER
----- Message from Liberty Mandel sent at 10/19/2017  7:40 AM CDT -----  Contact: pt   Call pt regarding the injection he got on yesterday. Pt states that his right cheek is swollen and he could even lay down on his back last night.   ..[phone] (home)

## 2017-10-19 NOTE — TELEPHONE ENCOUNTER
Patient received first injection of Lupron 22.5mg yesterday. Patient states last night the injection site began to swell and was very painful and warm to the touch. Patient could not sleep on his back. Patient denies itching, rash, redness, or bruising. Swelling has gone down but site still slightly painful. Please advise.

## 2017-10-20 NOTE — TELEPHONE ENCOUNTER
Called to check on patient regarding his injection site swelling. States he is much better and moving around today.

## 2017-11-07 DIAGNOSIS — K21.9 GASTROESOPHAGEAL REFLUX DISEASE WITHOUT ESOPHAGITIS: Chronic | ICD-10-CM

## 2017-11-07 DIAGNOSIS — I25.10 CORONARY ARTERY DISEASE INVOLVING NATIVE CORONARY ARTERY WITHOUT ANGINA PECTORIS, UNSPECIFIED WHETHER NATIVE OR TRANSPLANTED HEART: ICD-10-CM

## 2017-11-07 RX ORDER — PANTOPRAZOLE SODIUM 40 MG/1
40 TABLET, DELAYED RELEASE ORAL DAILY
Qty: 90 TABLET | Refills: 3 | Status: SHIPPED | OUTPATIENT
Start: 2017-11-07 | End: 2019-02-27 | Stop reason: SDUPTHER

## 2017-11-07 RX ORDER — METOPROLOL SUCCINATE 50 MG/1
50 TABLET, EXTENDED RELEASE ORAL DAILY
Qty: 90 TABLET | Refills: 2 | Status: SHIPPED | OUTPATIENT
Start: 2017-11-07 | End: 2018-04-03 | Stop reason: SDUPTHER

## 2017-11-27 ENCOUNTER — OFFICE VISIT (OUTPATIENT)
Dept: HEMATOLOGY/ONCOLOGY | Facility: CLINIC | Age: 62
End: 2017-11-27
Payer: MEDICARE

## 2017-11-27 ENCOUNTER — TELEPHONE (OUTPATIENT)
Dept: PHARMACY | Facility: CLINIC | Age: 62
End: 2017-11-27

## 2017-11-27 ENCOUNTER — SOCIAL WORK (OUTPATIENT)
Dept: HEMATOLOGY/ONCOLOGY | Facility: CLINIC | Age: 62
End: 2017-11-27

## 2017-11-27 VITALS
HEIGHT: 70 IN | HEART RATE: 76 BPM | TEMPERATURE: 98 F | SYSTOLIC BLOOD PRESSURE: 122 MMHG | DIASTOLIC BLOOD PRESSURE: 78 MMHG | BODY MASS INDEX: 20.33 KG/M2 | WEIGHT: 142 LBS

## 2017-11-27 DIAGNOSIS — R53.82 CHRONIC FATIGUE: ICD-10-CM

## 2017-11-27 DIAGNOSIS — C79.51 SECONDARY ADENOCARCINOMA OF SKELETAL BONE: ICD-10-CM

## 2017-11-27 DIAGNOSIS — G89.3 NEOPLASM RELATED PAIN: ICD-10-CM

## 2017-11-27 DIAGNOSIS — R53.0 NEOPLASTIC MALIGNANT RELATED FATIGUE: ICD-10-CM

## 2017-11-27 DIAGNOSIS — C61 CANCER OF PROSTATE WITH HIGH RECURRENCE RISK (STAGE T3A OR GLEASON 8-10 OR PSA > 20): Primary | ICD-10-CM

## 2017-11-27 PROCEDURE — 99999 PR PBB SHADOW E&M-EST. PATIENT-LVL III: CPT | Mod: PBBFAC,,, | Performed by: INTERNAL MEDICINE

## 2017-11-27 PROCEDURE — 99204 OFFICE O/P NEW MOD 45 MIN: CPT | Mod: S$GLB,,, | Performed by: INTERNAL MEDICINE

## 2017-11-27 RX ORDER — HYDROCODONE BITARTRATE AND ACETAMINOPHEN 10; 325 MG/1; MG/1
1 TABLET ORAL EVERY 6 HOURS PRN
Qty: 90 TABLET | Refills: 0 | Status: SHIPPED | OUTPATIENT
Start: 2017-11-27 | End: 2017-12-28 | Stop reason: SDUPTHER

## 2017-11-27 RX ORDER — ONDANSETRON 8 MG/1
8 TABLET, ORALLY DISINTEGRATING ORAL EVERY 12 HOURS PRN
Qty: 60 TABLET | Refills: 0 | Status: SHIPPED | OUTPATIENT
Start: 2017-11-27 | End: 2018-11-27

## 2017-11-27 RX ORDER — PREDNISONE 5 MG/1
5 TABLET ORAL 2 TIMES DAILY
Qty: 60 TABLET | Refills: 3 | Status: SHIPPED | OUTPATIENT
Start: 2017-11-27 | End: 2018-01-08 | Stop reason: SDUPTHER

## 2017-11-27 RX ORDER — ABIRATERONE ACETATE 250 MG/1
TABLET ORAL
Qty: 120 TABLET | Refills: 3 | Status: SHIPPED | OUTPATIENT
Start: 2017-11-27 | End: 2018-03-15 | Stop reason: SDUPTHER

## 2017-11-27 NOTE — PROGRESS NOTES
Met with pt and wife at request of Dr. Roman, for referral to [facility], notified of new Zytiga start, as well as assessment of needs as a new patient.    Pt and wife very pleasant and friendly. They live in [facility]. Immediate needs include a dental referral for evaluation and treatment prior to start of Xgeva therapy. They had questions about Zytiga; discussed that [facility] would oversee acquiring as well as delivery of the medication. According to pt's wife, the pt has the Medicare LIS program which should mean cost should not be a factor in pt obtaining medication.     Aside from above, pt and wife reviewed  services checklist together thoroughly. At this time the following needs were reviewed:    1. Transportation--pt and wife have one car and pt's wife works. There may be times when pt could use help getting to appts. Reviewed typical available assistance, including transportation through insurance if available, [facility], as well as [facility]. Pt's next appt with Dr. Roman will likely be in 2-3 weeks, depending on when pt receives and starts Zytiga. At this time, SW will determine if transportation assistance is needed and help triage what is available.     2. Smoking cessation--pt says he had stopped but restarted smoking. Expresses interest in the [facility] Smoking Cessation program. Provided pt with handout, briefly reviewed how it can help pt, and encouraged him to call to set up an appointment.    3. In-home sitter services/PCAs--not needed at this time but interested in future if the time ever comes when this would be needed. Will defer at present time.    4. Referrals--made referral to both [employer] as well as [facility] so that pt can utilize these resources.     Plan: will f/u with pt as outlined above, and ongoing, as needs arise. Pt provided with  contact info and encouraged to call as needed.

## 2017-11-27 NOTE — LETTER
November 27, 2017      Roland Dawn IV, MD  [address] [place] [address]           [facility]  [address] [place] [address]  Phone: [phone]  Fax: [phone]          Patient: Joey Jacobo   MR Number: [id number]   YOB: 1955   Date of Visit: 11/27/2017       Dear Dr. Roland Dawn IV:    Thank you for referring Joey Jacobo to me for evaluation. Attached you will find relevant portions of my assessment and plan of care.    If you have questions, please do not hesitate to call me. I look forward to following Joey Jacobo along with you.    Sincerely,    Geronimo Roman MD    Enclosure  CC:  No Recipients    If you would like to receive this communication electronically, please contact [email or web address] or [phone] to request more information on [employer] access.    For providers and/or their staff who would like to refer a patient to [facility], please contact us through our one-stop-shop provider referral line, [facility] , at [phone].    If you feel you have received this communication in error or would no longer like to receive these types of communications, please e-mail [email or web address]

## 2017-11-27 NOTE — TELEPHONE ENCOUNTER
NANVM- Hello [facility] received a prescription for Zytiga and we will contact their insurance company to find out if the medication is covered. We will update patient of status as more information is received. feel free to give us a call with  any questions at [phone].

## 2017-11-27 NOTE — PROGRESS NOTES
Provider requesting consultation: Dr. Roland Dawn with urology  Reason for Consult: Metastatic prostate adenocarcinoma  Date of Diagnosis: September 2017    HPI:   The patient is a 62-year-old -American male who presents to the hematology oncology clinic today in consultation to discuss further evaluation and management recommendations for metastatic prostate adenocarcinoma.  The patient has been referred to Dr. Roland Dawn with urology.  I have reviewed all of the patient's relevant clinical history available in the medical record and have utilized this in my evaluation and management recommendations today.  Today the patient reports that he has chronic pain in his lumbar region due to degenerative disc disease.  He also reports pain in his lower thoracic region.  He was taking Norco 5 mg when necessary the past with mild to no relief.  He denies any fevers, chills or night sweats.  He reports chronic fatigue.  He denies any melena, hematochezia, hematemesis, hemoptysis or hematuria.  He denies any chest pain or shortness of breath.  He denies any bowel or urinary complaints.    PAST MEDICAL HISTORY:   1.  Coronary artery disease  2.  Daily  3.  Pulmonary fibrosis  4.  Glaucoma  5.  Sleep anemia  6.  Osteoarthritis/degenerative disc disease  7.  GERD    SURGICAL HISTORY:   1.  PCI with stent placement for coronary artery disease  2.  Neck fusion ×2    FAMILY HISTORY: The patient's father had gastric cancer in his 70s.  He reports that 2 sisters had breast cancer in their 50s/60s.  He denies any other immediate family members with cancers or bleeding/clotting disorders.    SOCIAL HISTORY: He reports a 40+-pack-year smoking history and continues to smoke one fourth pack of cigarettes a day.  He drinks on average a 12 pack of beer every week.  He reports a history of recreational drug use and last used cocaine in June 2017.  He reports that he has now quit this completely and does not intend to use any  recreational drugs in the future.  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  He is  and does not have any children.  He lives in [place], [place].      ALLERGIES: Reviewed on medication card.    MEDICATIONS: [Medcard has been reviewed and/or reconciled.]    REVIEW OF SYSTEMS:   GENERAL: [No fevers, chills or sweats. Reports chronic fatigue. Denies weight loss or loss of appetite.]  HEENT: [No blurred vision, tinnitus, nasal discharge, sorethroat or dysphagia.]  HEART: [No chest pain, palpitations or shortness of breath.]    LUNGS: [No cough, hemoptysis or breathing problems.]  ABDOMEN: [No abdominal pain, nausea, vomiting, diarrhea, constipation or melena.]  GENITOURINARY: [No dysuria, bleeding or malodorous discharge.]  NEURO: [No headache, dizziness or vertigo.]  HEMATOLOGY: [No easy bruising, spontaneous bleeding or blood clots in the past].  MUSCULOSKELETAL: [Reports chronic arthralgias. Denies myalgias. Reports bone pains.]  SKIN: [No rashes or skin lesions.]  PSYCHIATRY: [No depression or anxiety.]    PHYSICAL EXAMINATION:   VS: Reviewed on nurse's notes.  APPEARANCE: The patient is a well-developed, well-nourished and well-groomed  male who appears in no acute distress.  He was accompanied to this clinic visit by his wife.  HEENT: No scleral icterus. Both external auditory canals clear. No oral ulcers, lesions. Throat clear.  Poor dentition noted.  HEAD: No sinus tenderness.  NECK: Supple. No palpable lymphadenopathy. Thyroid non-tender, no palpable masses  CHEST: Breath sounds clear bilaterally. No rales. No rhonchi. Unlabored respirations.  CARDIOVASCULAR: Normal S1, S2. Normal rate. Regular rhythm.  ABDOMEN: Bowel sounds normal. No tenderness. No abdominal distention. No hepatomegaly. No splenomegaly.  LYMPHATIC: No palpable supraclavicular, axillary nodes  EXTREMITIES: No clubbing, cyanosis, edema  SKIN: No lesions. No petechiae. No ecchymoses.  No induration or nodules  NEUROLOGIC: No focal findings. Alert & Oriented x 3. Mood appropriate to affect    LABS:   Reviewed    IMAGING:  Reviewed    IMPRESSION:  1.  Metastatic prostate adenocarcinoma with bone involvement    PLAN:  1.  I had a detailed discussion with the patient today with regard to the diagnosis, prognosis and treatment for his metastatic prostate adenocarcinoma with bone involvement.  2.  I have discussed that at this time his malignancy is potentially treatable but not curable.  The patient and his wife expressed understanding of this information.  3.  The patient is up-to-date with Lupron injection.  I will check labs for baseline today.  4.  I have recommended addition of Zytiga plus prednisone to his treatment regimen.  Risks/benefit him and common side effects of this medication were discussed in detail and he expressed understanding and agreement with this.  Printed information about Zytiga was given to the patient today.  Prescription was sent to [facility] today.  5.   consultation to facilitate [facility] dent[facility] referral for evaluation and treatment with regard to poor dentition.  Patient was informed that we will have to get this addressed first before we can discuss initiation of xgeva for treatment of bone involvement by malignancy.  He will continue calcium plus vitamin D supplementation daily as advised.  6.  Appointment with Ms. Patrick will be scheduled for Zytiga teaching.  7.  Prescriptions for antiemetic medication, prednisone and Norco 10 mg when necessary pain was sent to the patient's pharmacy today.  Sedation/constipation precautions with pain medication was extensively discussed and he expressed understanding.    Follow-up will be determined after above.  He knows to call for any additional questions or new problems.

## 2017-11-27 NOTE — TELEPHONE ENCOUNTER
DOCUMENTATION ONLY   PA was submitted to the insurance    11/28/2017  PA approved until 11/28/2018  Copay $8.25

## 2017-11-28 ENCOUNTER — LAB VISIT (OUTPATIENT)
Dept: LAB | Facility: HOSPITAL | Age: 62
End: 2017-11-28
Attending: INTERNAL MEDICINE
Payer: MEDICARE

## 2017-11-28 ENCOUNTER — OFFICE VISIT (OUTPATIENT)
Dept: HEMATOLOGY/ONCOLOGY | Facility: CLINIC | Age: 62
End: 2017-11-28
Payer: MEDICARE

## 2017-11-28 DIAGNOSIS — Z79.899 HIGH RISK MEDICATION USE: ICD-10-CM

## 2017-11-28 DIAGNOSIS — C61 CANCER OF PROSTATE WITH HIGH RECURRENCE RISK (STAGE T3A OR GLEASON 8-10 OR PSA > 20): ICD-10-CM

## 2017-11-28 DIAGNOSIS — R53.0 NEOPLASTIC MALIGNANT RELATED FATIGUE: ICD-10-CM

## 2017-11-28 DIAGNOSIS — C61 CANCER OF PROSTATE WITH HIGH RECURRENCE RISK (STAGE T3A OR GLEASON 8-10 OR PSA > 20): Primary | ICD-10-CM

## 2017-11-28 DIAGNOSIS — R53.82 CHRONIC FATIGUE: ICD-10-CM

## 2017-11-28 DIAGNOSIS — C79.51 SECONDARY ADENOCARCINOMA OF SKELETAL BONE: ICD-10-CM

## 2017-11-28 DIAGNOSIS — C79.51 SECONDARY ADENOCARCINOMA OF SKELETAL BONE: Primary | ICD-10-CM

## 2017-11-28 DIAGNOSIS — Z55.9 EDUCATIONAL CIRCUMSTANCE: ICD-10-CM

## 2017-11-28 DIAGNOSIS — Z71.89 ENCOUNTER FOR MEDICATION COUNSELING: ICD-10-CM

## 2017-11-28 LAB
ALBUMIN SERPL BCP-MCNC: 3.7 G/DL
ALP SERPL-CCNC: 84 U/L
ALT SERPL W/O P-5'-P-CCNC: 24 U/L
ANION GAP SERPL CALC-SCNC: 11 MMOL/L
AST SERPL-CCNC: 28 U/L
BASOPHILS # BLD AUTO: 0.02 K/UL
BASOPHILS NFR BLD: 0.3 %
BILIRUB SERPL-MCNC: 0.3 MG/DL
BUN SERPL-MCNC: 11 MG/DL
CALCIUM SERPL-MCNC: 10.2 MG/DL
CHLORIDE SERPL-SCNC: 103 MMOL/L
CO2 SERPL-SCNC: 26 MMOL/L
COMPLEXED PSA SERPL-MCNC: 1.4 NG/ML
CREAT SERPL-MCNC: 1.1 MG/DL
DIFFERENTIAL METHOD: ABNORMAL
EOSINOPHIL # BLD AUTO: 0.2 K/UL
EOSINOPHIL NFR BLD: 3.8 %
ERYTHROCYTE [DISTWIDTH] IN BLOOD BY AUTOMATED COUNT: 14.4 %
EST. GFR  (AFRICAN AMERICAN): >60 ML/MIN/1.73 M^2
EST. GFR  (NON AFRICAN AMERICAN): >60 ML/MIN/1.73 M^2
GLUCOSE SERPL-MCNC: 104 MG/DL
HCT VFR BLD AUTO: 41 %
HGB BLD-MCNC: 13.6 G/DL
HIV1+2 IGG SERPL QL IA.RAPID: NEGATIVE
LYMPHOCYTES # BLD AUTO: 1.9 K/UL
LYMPHOCYTES NFR BLD: 31.1 %
MCH RBC QN AUTO: 30.5 PG
MCHC RBC AUTO-ENTMCNC: 33.2 G/DL
MCV RBC AUTO: 92 FL
MONOCYTES # BLD AUTO: 0.6 K/UL
MONOCYTES NFR BLD: 9.4 %
NEUTROPHILS # BLD AUTO: 3.4 K/UL
NEUTROPHILS NFR BLD: 55.4 %
PHOSPHATE SERPL-MCNC: 3.9 MG/DL
PLATELET # BLD AUTO: 200 K/UL
PMV BLD AUTO: 10 FL
POTASSIUM SERPL-SCNC: 4.7 MMOL/L
PROT SERPL-MCNC: 9.3 G/DL
RBC # BLD AUTO: 4.46 M/UL
SODIUM SERPL-SCNC: 140 MMOL/L
TESTOST SERPL-MCNC: 26 NG/DL
WBC # BLD AUTO: 6.08 K/UL

## 2017-11-28 PROCEDURE — 99999 PR PBB SHADOW E&M-EST. PATIENT-LVL II: CPT | Mod: PBBFAC,,, | Performed by: NURSE PRACTITIONER

## 2017-11-28 PROCEDURE — 86703 HIV-1/HIV-2 1 RESULT ANTBDY: CPT

## 2017-11-28 PROCEDURE — 80074 ACUTE HEPATITIS PANEL: CPT

## 2017-11-28 PROCEDURE — 84100 ASSAY OF PHOSPHORUS: CPT | Mod: PO

## 2017-11-28 PROCEDURE — 84153 ASSAY OF PSA TOTAL: CPT

## 2017-11-28 PROCEDURE — 84403 ASSAY OF TOTAL TESTOSTERONE: CPT

## 2017-11-28 PROCEDURE — 85025 COMPLETE CBC W/AUTO DIFF WBC: CPT | Mod: PO

## 2017-11-28 PROCEDURE — 36415 COLL VENOUS BLD VENIPUNCTURE: CPT | Mod: PO

## 2017-11-28 PROCEDURE — 80053 COMPREHEN METABOLIC PANEL: CPT | Mod: PO

## 2017-11-28 PROCEDURE — 86704 HEP B CORE ANTIBODY TOTAL: CPT

## 2017-11-28 PROCEDURE — 99215 OFFICE O/P EST HI 40 MIN: CPT | Mod: S$GLB,,, | Performed by: NURSE PRACTITIONER

## 2017-11-28 SDOH — SOCIAL DETERMINANTS OF HEALTH (SDOH): PROBLEMS RELATED TO EDUCATION AND LITERACY, UNSPECIFIED: Z55.9

## 2017-11-28 NOTE — PROGRESS NOTES
HEMATOLOGY/ONCOLOGY    ONCOLOGIST: NAE Roman MD    CC: Chemotherapy Teaching    DIAGNOSIS: Metastatic prostate cancer    CHEMOTHERAPY:  Zytiga 250 mg - 4 tabs daily with prednisone 5 mg twice a day    63 y/o male presents for chemotherapy teaching. Pt given the Navigation Notebook. Explained how to use notebook. Discussed with pt and family rationale for chemotherapy, how works, the process of treatment, potential side effects and symptoms to report.     Stressed importance of taking zytiga daily on empty stomach and take prednisone twice a day with food. Medication kit given to pt and reviewed.    Labs today- added phosphorus to labs today    Reviewed specific medications and gave them a handout describing the side effects of each medication.     Discussed potential side effects such as:  Nausea and vomiting  Myelosuppression  Fatigue  Anorexia  Alopecia  Stomatitis  Diarrhea  Cystitis  Gastritis  Fever > 100.0  Antiemetics instructions  Skin care  Constipation  Rash  Hyperpigmentation  Rash  Photosensitivity  Sunscreen  Small freq meals  Increased protein  Increased calories  Vitamin support   Taste alterations  Neuropathys  Hydration  Renal toxicity  [facility]  Community resources  Thrombocytopenia precautions  Hand and foot syndrome- symptoms and self care tips  Importance of monitoring blood sugars if diabetic and reporting elevations or lows    Zytiga:  ZYTIGA® (abiraterone acetate)   What is ZYTIGA®? ZYTIGA® (Zye-noni-ga) is a prescription medicine that is used along with prednisone. ZYTIGA® is used to treat men with castration-resistant prostate cancer (prostate cancer that is resistant to medical or surgical treatments that lower testosterone) that has spread to other parts of the body and who have previously received treatment with docetaxel.     ZYTIGA® is supplied as 250-mg tablets and the usual dose of ZYTIGA® is four tablets taken once daily. Take ZYTIGA® on an empty stomach. Do not take  ZYTIGA® with food. Taking ZYTIGA® with food may cause more of the medicine to be absorbed by the body than is needed and this may cause side effects. No food should be eaten 2 hours before and 1 hour after taking ZYTIGA®. Your healthcare provider will do blood tests to check for side effects.    ZYTIGA® may cause serious side effects including: High blood pressure (hypertension), low blood potassium levels (hypokalemia), and fluid retention (edema). Tell your healthcare provider if you get any of the following symptoms:   - dizziness   - fast heartbeats   - feel faint or lightheaded   - headache   - confusion   - muscle weakness   - pain in your legs   - swelling in your legs or feet   Adrenal problems may happen if you stop taking prednisone, get an infection, or are under stress.   Liver problems Your healthcare provider will do blood tests to check your liver before treatment with ZYTIGA® and during treatment with ZYTIGA®.   The most common side effects of ZYTIGA® include:   - joint swelling or pain   - muscle aches   - hot flushes   - diarrhea   - urinary tract infection   - cough   - irregular heartbeats   - urinating more often than normal   - need to get up at night to urinate   - heartburn   - cold-like symptoms   - bone fractures   - Tell your healthcare provider if you have any side effect that bothers you or that does not go away     Pt verbalized understanding of the information given. Pt instructed to call office when receive medication so follow-up can be booked.     Instructed to monitor blood pressure and to call if has more than 3 readings greater than 150/90.     Verbalizes understanding of contact information during and after clinic hours. Pt listened and asked appropriate questions.     Community and social resources brought to his attention. Time spent with pt and family was 60 minutes face to face with 100% of time spent educating and counseling. Please see further documentation in the pt  education flow sheet.     Keisha Patrick RN, MSN, NP-C

## 2017-11-29 ENCOUNTER — TELEPHONE (OUTPATIENT)
Dept: PHARMACY | Facility: CLINIC | Age: 62
End: 2017-11-29

## 2017-11-29 LAB
HAV IGM SERPL QL IA: NEGATIVE
HBV CORE AB SERPL QL IA: NEGATIVE
HBV CORE IGM SERPL QL IA: NEGATIVE
HBV SURFACE AG SERPL QL IA: NEGATIVE
HCV AB SERPL QL IA: NEGATIVE

## 2017-11-29 NOTE — TELEPHONE ENCOUNTER
Patient reached for initial consult of Zytiga and shipment. Mr. Jacobo declined consult as he just completed chemo school.  He was comfortable with the medication and side effects.  Patient needs to have dental work completed prior to beginning therapy.  Medication will ship 12/6/17 for delivery on 12/7/17.  Address confirmed, CC on file, 2 patient identifiers confirmed.    Patient understands to report any medication changes to [facility] and provider. All questions answered and addressed to patients satisfaction. I will f/u with him in 1 week from start, [facility] to contact patient in 3 weeks for refills.

## 2017-11-30 ENCOUNTER — TELEPHONE (OUTPATIENT)
Dept: HEMATOLOGY/ONCOLOGY | Facility: CLINIC | Age: 62
End: 2017-11-30

## 2017-11-30 NOTE — TELEPHONE ENCOUNTER
----- Message from Geronimo Roman MD sent at 11/29/2017 11:22 AM CST -----  Please let him know that all lab results look ok. thanks

## 2017-12-06 ENCOUNTER — TELEPHONE (OUTPATIENT)
Dept: HEMATOLOGY/ONCOLOGY | Facility: CLINIC | Age: 62
End: 2017-12-06

## 2017-12-06 ENCOUNTER — TELEPHONE (OUTPATIENT)
Dept: CARDIOLOGY | Facility: CLINIC | Age: 62
End: 2017-12-06

## 2017-12-06 NOTE — TELEPHONE ENCOUNTER
Received request for clearance from [facility] and [facility] to hold Aspirin, does patient need SBE, can he use Epinephrine in block, does he have any contraindications ?    Dr. Islas -please advise

## 2017-12-06 NOTE — TELEPHONE ENCOUNTER
Left voicemail to inform patient that its ok to start Zytiga per MD request even though dental work has not been completed.

## 2017-12-06 NOTE — TELEPHONE ENCOUNTER
Rec'd call from [facility] confirming they met with/treated pt yesterday.     SW will plan to f/u with pt at next appt.

## 2017-12-06 NOTE — TELEPHONE ENCOUNTER
Patient called [facility] and requested we cancel shipment of medication as he did not get his dental work done. Medication was due to ship today 12/6/17 for delivery 12/7/17.

## 2017-12-07 ENCOUNTER — TELEPHONE (OUTPATIENT)
Dept: CARDIOLOGY | Facility: CLINIC | Age: 62
End: 2017-12-07

## 2017-12-07 NOTE — TELEPHONE ENCOUNTER
----- Message from Mis Jimenez MA sent at 12/7/2017 11:46 AM CST -----  Contact: [facility]      ----- Message -----  From: Lucy Arteaga  Sent: 12/7/2017  11:17 AM  To: [facility] ENGLISH Staff    called rg status of medical clearance for 5 dental extractions, faxed yest. has appt this afternoon...[phone]

## 2017-12-07 NOTE — TELEPHONE ENCOUNTER
called back to [facility] to inform that a note for clearance has been faxed--no answer--no voice mail

## 2017-12-07 NOTE — TELEPHONE ENCOUNTER
Pt may have dental work and hold ASA.  No other special precautions advised, does not need antibiotics    Dr Islas

## 2017-12-13 ENCOUNTER — TELEPHONE (OUTPATIENT)
Dept: PHARMACY | Facility: CLINIC | Age: 62
End: 2017-12-13

## 2017-12-13 NOTE — TELEPHONE ENCOUNTER
Patient confirmed he started Zytiga 12/7/2017 and reports he has been tolerating medication well with no missed doses.  He denies any side effects at this time.

## 2017-12-28 ENCOUNTER — TELEPHONE (OUTPATIENT)
Dept: PHARMACY | Facility: CLINIC | Age: 62
End: 2017-12-28

## 2017-12-28 DIAGNOSIS — C79.51 SECONDARY ADENOCARCINOMA OF SKELETAL BONE: ICD-10-CM

## 2017-12-28 DIAGNOSIS — G89.3 NEOPLASM RELATED PAIN: ICD-10-CM

## 2017-12-28 DIAGNOSIS — C61 CANCER OF PROSTATE WITH HIGH RECURRENCE RISK (STAGE T3A OR GLEASON 8-10 OR PSA > 20): Primary | ICD-10-CM

## 2017-12-28 RX ORDER — HYDROCODONE BITARTRATE AND ACETAMINOPHEN 10; 325 MG/1; MG/1
1 TABLET ORAL EVERY 6 HOURS PRN
Qty: 90 TABLET | Refills: 0 | Status: SHIPPED | OUTPATIENT
Start: 2017-12-28 | End: 2018-01-24 | Stop reason: SDUPTHER

## 2017-12-29 ENCOUNTER — OFFICE VISIT (OUTPATIENT)
Dept: HEMATOLOGY/ONCOLOGY | Facility: CLINIC | Age: 62
End: 2017-12-29
Payer: MEDICARE

## 2017-12-29 ENCOUNTER — LAB VISIT (OUTPATIENT)
Dept: LAB | Facility: HOSPITAL | Age: 62
End: 2017-12-29
Attending: INTERNAL MEDICINE
Payer: MEDICARE

## 2017-12-29 ENCOUNTER — TELEPHONE (OUTPATIENT)
Dept: PHARMACY | Facility: HOSPITAL | Age: 62
End: 2017-12-29

## 2017-12-29 VITALS
WEIGHT: 145.75 LBS | OXYGEN SATURATION: 99 % | HEART RATE: 93 BPM | TEMPERATURE: 97 F | BODY MASS INDEX: 20.87 KG/M2 | HEIGHT: 70 IN | SYSTOLIC BLOOD PRESSURE: 154 MMHG | DIASTOLIC BLOOD PRESSURE: 89 MMHG

## 2017-12-29 DIAGNOSIS — C61 CANCER OF PROSTATE WITH HIGH RECURRENCE RISK (STAGE T3A OR GLEASON 8-10 OR PSA > 20): ICD-10-CM

## 2017-12-29 DIAGNOSIS — G89.3 NEOPLASM RELATED PAIN: ICD-10-CM

## 2017-12-29 DIAGNOSIS — C79.51 SECONDARY ADENOCARCINOMA OF SKELETAL BONE: ICD-10-CM

## 2017-12-29 DIAGNOSIS — C61 CANCER OF PROSTATE WITH HIGH RECURRENCE RISK (STAGE T3A OR GLEASON 8-10 OR PSA > 20): Primary | ICD-10-CM

## 2017-12-29 LAB
ALBUMIN SERPL BCP-MCNC: 3.4 G/DL
ALP SERPL-CCNC: 71 U/L
ALT SERPL W/O P-5'-P-CCNC: 23 U/L
ANION GAP SERPL CALC-SCNC: 10 MMOL/L
AST SERPL-CCNC: 21 U/L
BASOPHILS # BLD AUTO: 0.02 K/UL
BASOPHILS NFR BLD: 0.2 %
BILIRUB SERPL-MCNC: 0.4 MG/DL
BUN SERPL-MCNC: 20 MG/DL
CALCIUM SERPL-MCNC: 9.7 MG/DL
CHLORIDE SERPL-SCNC: 102 MMOL/L
CO2 SERPL-SCNC: 27 MMOL/L
CREAT SERPL-MCNC: 1.5 MG/DL
DIFFERENTIAL METHOD: ABNORMAL
EOSINOPHIL # BLD AUTO: 0.1 K/UL
EOSINOPHIL NFR BLD: 1 %
ERYTHROCYTE [DISTWIDTH] IN BLOOD BY AUTOMATED COUNT: 14.9 %
EST. GFR  (AFRICAN AMERICAN): 58 ML/MIN/1.73 M^2
EST. GFR  (NON AFRICAN AMERICAN): 50 ML/MIN/1.73 M^2
GLUCOSE SERPL-MCNC: 106 MG/DL
HCT VFR BLD AUTO: 39.5 %
HGB BLD-MCNC: 13.1 G/DL
LYMPHOCYTES # BLD AUTO: 2.6 K/UL
LYMPHOCYTES NFR BLD: 26.1 %
MCH RBC QN AUTO: 30.2 PG
MCHC RBC AUTO-ENTMCNC: 33.2 G/DL
MCV RBC AUTO: 91 FL
MONOCYTES # BLD AUTO: 0.9 K/UL
MONOCYTES NFR BLD: 8.8 %
NEUTROPHILS # BLD AUTO: 6.4 K/UL
NEUTROPHILS NFR BLD: 63.9 %
PLATELET # BLD AUTO: 194 K/UL
PMV BLD AUTO: 9.6 FL
POTASSIUM SERPL-SCNC: 3.9 MMOL/L
PROT SERPL-MCNC: 8.4 G/DL
RBC # BLD AUTO: 4.34 M/UL
SODIUM SERPL-SCNC: 139 MMOL/L
WBC # BLD AUTO: 9.95 K/UL

## 2017-12-29 PROCEDURE — 99214 OFFICE O/P EST MOD 30 MIN: CPT | Mod: S$GLB,,, | Performed by: INTERNAL MEDICINE

## 2017-12-29 PROCEDURE — 99999 PR PBB SHADOW E&M-EST. PATIENT-LVL III: CPT | Mod: PBBFAC,,, | Performed by: INTERNAL MEDICINE

## 2017-12-29 PROCEDURE — 80053 COMPREHEN METABOLIC PANEL: CPT

## 2017-12-29 PROCEDURE — 85025 COMPLETE CBC W/AUTO DIFF WBC: CPT

## 2017-12-29 PROCEDURE — 36415 COLL VENOUS BLD VENIPUNCTURE: CPT

## 2017-12-29 NOTE — TELEPHONE ENCOUNTER
Patient called back in response to voicemail and setup shipment for Zytiga. Patient verified he has not started any new medications. Patient has not developed any new drug allergies or medical conditions. Patient scheduled his ship date for 01-02-18 for a 01-03 -18 delivery. Patient verified his address on file is correct and would like the medication to go there. Patient verified no doses were missed in the past 4 weeks and has (4) doses remaining on hand. Patient verified understanding of the refill process and has no additional questions at the time. 12-29 -17.

## 2017-12-30 NOTE — PROGRESS NOTES
Provider requesting consultation: Dr. Roland Dawn with urology  Reason for Consult: Metastatic prostate adenocarcinoma  Date of Diagnosis: September 2017    HPI:   The patient is a 62-year-old -American male who presents to the hematology oncology clinic today in consultation to discuss further evaluation and management recommendations for metastatic prostate adenocarcinoma.  The patient has been referred to Dr. Roland Dawn with urology.  I have reviewed all of the patient's relevant clinical history available in the medical record and have utilized this in my evaluation and management recommendations today.  Today the patient reports that he has chronic pain in his lumbar region due to degenerative disc disease.  He also reports pain in his lower thoracic region.  He has been taking Norco when necessary pain.  He denies any fevers, chills or night sweats.  He reports chronic fatigue.  He denies any melena, hematochezia, hematemesis, hemoptysis or hematuria.  He denies any chest pain or shortness of breath.  He denies any bowel or urinary complaints.  He reports that he started his Zytiga/prednisone on December 7, 2017 and has been tolerating this medication well without any significant side effects.    PAST MEDICAL HISTORY:   1.  Coronary artery disease  2.  Daily  3.  Pulmonary fibrosis  4.  Glaucoma  5.  Sleep anemia  6.  Osteoarthritis/degenerative disc disease  7.  GERD    SURGICAL HISTORY:   1.  PCI with stent placement for coronary artery disease  2.  Neck fusion ×2    FAMILY HISTORY: The patient's father had gastric cancer in his 70s.  He reports that 2 sisters had breast cancer in their 50s/60s.  He denies any other immediate family members with cancers or bleeding/clotting disorders.    SOCIAL HISTORY: He reports a 40+-pack-year smoking history and continues to smoke one fourth pack of cigarettes a day.  He drinks on average a 12 pack of beer every week.  He reports a history of recreational  drug use and last used cocaine in June 2017.  He reports that he has now quit this completely and does not intend to use any recreational drugs in the future.  He is to work on [employer] and as a  and retired in 2007.  He is  and does not have any children.  He lives in [place], [place].      ALLERGIES: Reviewed on medication card.    MEDICATIONS: [Medcard has been reviewed and/or reconciled.]    REVIEW OF SYSTEMS:   GENERAL: [No fevers, chills or sweats. Reports chronic fatigue. Denies weight loss or loss of appetite.]  HEENT: [No blurred vision, tinnitus, nasal discharge, sorethroat or dysphagia.]  HEART: [No chest pain, palpitations or shortness of breath.]    LUNGS: [No cough, hemoptysis or breathing problems.]  ABDOMEN: [No abdominal pain, nausea, vomiting, diarrhea, constipation or melena.]  GENITOURINARY: [No dysuria, bleeding or malodorous discharge.]  NEURO: [No headache, dizziness or vertigo.]  HEMATOLOGY: [No easy bruising, spontaneous bleeding or blood clots in the past].  MUSCULOSKELETAL: [Reports chronic arthralgias. Denies myalgias. Reports bone pains.]  SKIN: [No rashes or skin lesions.]  PSYCHIATRY: [No depression or anxiety.]    PHYSICAL EXAMINATION:   VS: Reviewed on nurse's notes.  APPEARANCE: The patient is a well-developed, well-nourished and well-groomed  male who appears in no acute distress.  He was accompanied to this clinic visit by his wife.  HEENT: No scleral icterus. Both external auditory canals clear. No oral ulcers, lesions. Throat clear.  Poor dentition noted.  HEAD: No sinus tenderness.  NECK: Supple. No palpable lymphadenopathy. Thyroid non-tender, no palpable masses  CHEST: Breath sounds clear bilaterally. No rales. No rhonchi. Unlabored respirations.  CARDIOVASCULAR: Normal S1, S2. Normal rate. Regular rhythm.  ABDOMEN: Bowel sounds normal. No tenderness. No abdominal distention. No hepatomegaly. No splenomegaly.  LYMPHATIC: No  palpable supraclavicular, axillary nodes  EXTREMITIES: No clubbing, cyanosis, edema  SKIN: No lesions. No petechiae. No ecchymoses. No induration or nodules  NEUROLOGIC: No focal findings. Alert & Oriented x 3. Mood appropriate to affect    LABS:   Reviewed    IMAGING:  Reviewed    IMPRESSION:  1.  Metastatic prostate adenocarcinoma with bone involvement    PLAN:  1.  I had a detailed discussion with the patient today with regard to the diagnosis, prognosis and treatment for his metastatic prostate adenocarcinoma with bone involvement.  2.  I have discussed that at this time his malignancy is potentially treatable but not curable.  The patient and his wife expressed understanding of this information.  3.  The patient is up-to-date with Lupron injection.  This is being given via his urologist Dr. Dawn's office.    4.  Continue Zytiga plus prednisone as recommended/prescribed.  I will check lab work today to monitor for any evidence of toxicity.  5.  Follow up with [facility] dentistry referral for evaluation and treatment with regard to poor dentition.  Patient was informed that we will have to get this addressed first before we can discuss initiation of xgeva for treatment of bone involvement by malignancy.  He will continue calcium plus vitamin D supplementation daily as advised.  6. Refill for Norco 10 mg when necessary pain was sent to the patient's pharmacy yesterday.  Sedation/constipation precautions with pain medication was extensively discussed and he expressed understanding.    I will recheck labs in 2 weeks for close follow-up.  Follow-up will be determined after above.  He knows to call for any additional questions or new problems.    Geronimo Roman MD

## 2018-01-08 ENCOUNTER — TELEPHONE (OUTPATIENT)
Dept: HEMATOLOGY/ONCOLOGY | Facility: CLINIC | Age: 63
End: 2018-01-08

## 2018-01-08 DIAGNOSIS — G89.3 NEOPLASM RELATED PAIN: ICD-10-CM

## 2018-01-08 DIAGNOSIS — C79.51 SECONDARY ADENOCARCINOMA OF SKELETAL BONE: ICD-10-CM

## 2018-01-08 RX ORDER — PREDNISONE 5 MG/1
5 TABLET ORAL 2 TIMES DAILY
Qty: 60 TABLET | Refills: 3 | Status: SHIPPED | OUTPATIENT
Start: 2018-01-08 | End: 2018-02-06 | Stop reason: SDUPTHER

## 2018-01-08 NOTE — TELEPHONE ENCOUNTER
----- Message from Geronimo Roman MD sent at 1/8/2018  2:55 PM CST -----  Contact: self   I refilled prednisone. thx  ----- Message -----  From: Joanne Falk LPN  Sent: 1/8/2018   1:31 PM  To: Geronimo Roman MD    Patient would like a refill on medication that he takes along with chemo pills.  Patient is unsure of name of medication.  I called out the medications in card and he says he is not sure of the name, he said Dr. Roman knows what it is.    Thanks,   Joanne   ----- Message -----  From: Beatriz Rashid MA  Sent: 1/8/2018  12:59 PM  To: Joanne Falk LPN     Call him for me  ----- Message -----  From: Grace Tyson  Sent: 1/8/2018  11:31 AM  To: Abel Melton Staff    Would like to consult with nurse regarding medication. Please call back at [phone].      Thanks,  Grace Tyson

## 2018-01-12 ENCOUNTER — TELEPHONE (OUTPATIENT)
Dept: HEMATOLOGY/ONCOLOGY | Facility: CLINIC | Age: 63
End: 2018-01-12

## 2018-01-12 ENCOUNTER — LAB VISIT (OUTPATIENT)
Dept: LAB | Facility: HOSPITAL | Age: 63
End: 2018-01-12
Attending: INTERNAL MEDICINE
Payer: MEDICARE

## 2018-01-12 DIAGNOSIS — C79.51 SECONDARY ADENOCARCINOMA OF SKELETAL BONE: ICD-10-CM

## 2018-01-12 DIAGNOSIS — C61 CANCER OF PROSTATE WITH HIGH RECURRENCE RISK (STAGE T3A OR GLEASON 8-10 OR PSA > 20): ICD-10-CM

## 2018-01-12 DIAGNOSIS — G89.3 NEOPLASM RELATED PAIN: ICD-10-CM

## 2018-01-12 LAB
ALBUMIN SERPL BCP-MCNC: 3.5 G/DL
ALP SERPL-CCNC: 81 U/L
ALT SERPL W/O P-5'-P-CCNC: 34 U/L
ANION GAP SERPL CALC-SCNC: 7 MMOL/L
AST SERPL-CCNC: 38 U/L
BILIRUB SERPL-MCNC: 0.4 MG/DL
BUN SERPL-MCNC: 17 MG/DL
CALCIUM SERPL-MCNC: 9.9 MG/DL
CHLORIDE SERPL-SCNC: 105 MMOL/L
CO2 SERPL-SCNC: 26 MMOL/L
CREAT SERPL-MCNC: 1.3 MG/DL
EST. GFR  (AFRICAN AMERICAN): >60 ML/MIN/1.73 M^2
EST. GFR  (NON AFRICAN AMERICAN): 59 ML/MIN/1.73 M^2
GLUCOSE SERPL-MCNC: 109 MG/DL
POTASSIUM SERPL-SCNC: 4.5 MMOL/L
PROT SERPL-MCNC: 8.3 G/DL
SODIUM SERPL-SCNC: 138 MMOL/L

## 2018-01-12 PROCEDURE — 36415 COLL VENOUS BLD VENIPUNCTURE: CPT | Mod: PO

## 2018-01-12 PROCEDURE — 80053 COMPREHEN METABOLIC PANEL: CPT | Mod: PO

## 2018-01-12 NOTE — TELEPHONE ENCOUNTER
----- Message from Geronimo Roman MD sent at 1/12/2018  1:50 PM CST -----  Please let him know that lab results from today looks good with normal liver function.  Thank you.

## 2018-01-19 ENCOUNTER — LAB VISIT (OUTPATIENT)
Dept: LAB | Facility: HOSPITAL | Age: 63
End: 2018-01-19
Attending: UROLOGY
Payer: MEDICARE

## 2018-01-19 DIAGNOSIS — C61 PROSTATE CANCER: ICD-10-CM

## 2018-01-19 LAB — COMPLEXED PSA SERPL-MCNC: 0.24 NG/ML

## 2018-01-19 PROCEDURE — 36415 COLL VENOUS BLD VENIPUNCTURE: CPT | Mod: PO

## 2018-01-19 PROCEDURE — 84153 ASSAY OF PSA TOTAL: CPT

## 2018-01-22 ENCOUNTER — OFFICE VISIT (OUTPATIENT)
Dept: UROLOGY | Facility: CLINIC | Age: 63
End: 2018-01-22
Payer: MEDICARE

## 2018-01-22 VITALS
BODY MASS INDEX: 20.99 KG/M2 | HEIGHT: 70 IN | SYSTOLIC BLOOD PRESSURE: 130 MMHG | WEIGHT: 146.63 LBS | DIASTOLIC BLOOD PRESSURE: 82 MMHG | HEART RATE: 75 BPM

## 2018-01-22 DIAGNOSIS — Z12.5 ENCOUNTER FOR SCREENING FOR MALIGNANT NEOPLASM OF PROSTATE: ICD-10-CM

## 2018-01-22 DIAGNOSIS — C61 PROSTATE CANCER: Primary | ICD-10-CM

## 2018-01-22 LAB
BILIRUB SERPL-MCNC: NORMAL MG/DL
BLOOD URINE, POC: NORMAL
COLOR, POC UA: YELLOW
GLUCOSE UR QL STRIP: NORMAL
KETONES UR QL STRIP: NORMAL
LEUKOCYTE ESTERASE URINE, POC: NORMAL
NITRITE, POC UA: NORMAL
PH, POC UA: 7
PROTEIN, POC: NORMAL
SPECIFIC GRAVITY, POC UA: 1.01
UROBILINOGEN, POC UA: NORMAL

## 2018-01-22 PROCEDURE — 81002 URINALYSIS NONAUTO W/O SCOPE: CPT | Mod: S$GLB,,, | Performed by: UROLOGY

## 2018-01-22 PROCEDURE — 99999 PR PBB SHADOW E&M-EST. PATIENT-LVL III: CPT | Mod: PBBFAC,,, | Performed by: UROLOGY

## 2018-01-22 PROCEDURE — 99213 OFFICE O/P EST LOW 20 MIN: CPT | Mod: 25,S$GLB,, | Performed by: UROLOGY

## 2018-01-22 NOTE — PROGRESS NOTES
"Chief Complaint: Perineal pain    HPI:   1/22/18: PSA well down.  No adverse effects from Lupron.  Reviewed history in detail.  10/18/17: Been on casodex a month back to review and start Lupron.  Dr. Moscoso felt XRT was not an option but perhaps chemotherapy could be beneficial.  9/18/17: Bone scan reports "RIGHT supraorbital location and a smaller similarly extremely intense focus of increased uptake posteriorly on the RIGHT at the T9 level.  Etiology is uncertain."  CT scan shows "A few scattered shotty mesenteric and retroperitoneal nodes identified.  Similar nodes identified within the pelvis bilaterally."  MRI shows left 2.8 cm prostate mass PIRADS 5, with metastatic pelvic lymphadenopathy (right pelvic sidewall node and left internal iliac chain LN).  No bony mets in pelvis.  7/18/17:  No problems from biopsy.  Gl 4+5 in the left base (30%) and a sig amount of 4+4 in other parts of the gland.  Reviewed treatment options, and imaging needs.  7/3/17: TRUS/Bx today 27 gm.  5/24/17: 63 yo man has problems with perineal/scrotal pain once or twice a month; lasts for an hour or two, some nocturia.  PSA recently elevated.  No abd/pelvic pain and no exac/rel factors.  No hematuria.  No urolithiasis.  No urinary bother.  No  history. CT with contrast earlier this year essentially normal.    Allergies:  Nsaids (non-steroidal anti-inflammatory drug) and Avelox  [moxifloxacin]    Medications:  has a current medication list which includes the following prescription(s): abiraterone, aspirin, atorvastatin, budesonide-formoterol 160-4.5 mcg, calcitonin (salmon), calcium-vitamin d, fluticasone, gabapentin, hydrocodone-acetaminophen 10-325mg, metoprolol succinate, nitroglycerin, ondansetron, pantoprazole, prednisone, and travoprost.    Review of Systems:  General: No fever, chills, fatigability, or weight loss.  Skin: No rashes, itching, or changes in color or texture of skin.  Chest: Denies TORRES, cyanosis, wheezing, cough, " and sputum production.  Abdomen: Appetite fine. No weight loss. Denies diarrhea, abdominal pain, hematemesis, or blood in stool.  Musculoskeletal: No joint stiffness or swelling. Denies back pain.  : As above.  All other review of systems negative.    PMH:   has a past medical history of COPD (chronic obstructive pulmonary disease) (7/30/2013); Coronary artery disease (7/30/2013); Emphysema of lung; Glaucoma (increased eye pressure) (8/27/2013); Headache(784.0); Mitral regurgitation (7/30/2013); Mixed hyperlipidemia (7/30/2013); Neuropathy; Osteoarthritis of spine with radiculopathy, lumbar region (7/21/2015); Other and unspecified hyperlipidemia; and Pulmonary fibrosis (10/3/2017).    PSH:   has a past surgical history that includes Coronary stent placement; Colonoscopy (N/A, 3/21/2016); Shoulder arthroscopy (Left); and Spine surgery.    FamHx: family history includes Blindness in his maternal grandmother; Cancer in his father; Cataracts in his mother; Diabetes in his sister, sister, and sister; Hypertension in his sister, sister, sister, sister, and sister; Kidney disease in his mother.    SocHx:  reports that he has quit smoking. His smoking use included Cigarettes. He has a 6.25 pack-year smoking history. He has never used smokeless tobacco. He reports that he drinks alcohol. He reports that he does not use drugs.      Physical Exam:  There were no vitals filed for this visit.  General: A&Ox3, no apparent distress, no deformities  Neck: No masses, normal thyroid  Lungs: normal inspiration, no use of accessory muscles  Heart: normal pulse, no arrhythmias  Abdomen: Soft, NT, ND  Skin: The skin is warm and dry. No jaundice.  Ext: No c/c/e.  :     7/3/17: Test desc lucas, no abnormalities of epididymus. Penis normal, with normal penile and scrotal skin. Meatus normal. Normal rectal tone, no hemorrhoids. Prost 40 gm no nodules or masses appreciated. SV not palpable. Perineum and anus normal.    Labs/Studies:    PSA    2/16: 22.1    2/17: 22.9    1/18: 0.24    Impression/Plan:   1. Lupron today and q3mo.  PSA/RTC 3 mo.

## 2018-01-23 ENCOUNTER — TELEPHONE (OUTPATIENT)
Dept: PHARMACY | Facility: CLINIC | Age: 63
End: 2018-01-23

## 2018-01-24 DIAGNOSIS — G89.3 NEOPLASM RELATED PAIN: ICD-10-CM

## 2018-01-24 DIAGNOSIS — C79.51 SECONDARY ADENOCARCINOMA OF SKELETAL BONE: ICD-10-CM

## 2018-01-24 RX ORDER — HYDROCODONE BITARTRATE AND ACETAMINOPHEN 10; 325 MG/1; MG/1
1 TABLET ORAL EVERY 6 HOURS PRN
Qty: 90 TABLET | Refills: 0 | Status: SHIPPED | OUTPATIENT
Start: 2018-01-24 | End: 2018-02-21 | Stop reason: SDUPTHER

## 2018-02-02 ENCOUNTER — LAB VISIT (OUTPATIENT)
Dept: LAB | Facility: HOSPITAL | Age: 63
End: 2018-02-02
Attending: INTERNAL MEDICINE
Payer: MEDICARE

## 2018-02-02 ENCOUNTER — OFFICE VISIT (OUTPATIENT)
Dept: HEMATOLOGY/ONCOLOGY | Facility: CLINIC | Age: 63
End: 2018-02-02
Payer: MEDICARE

## 2018-02-02 VITALS
HEART RATE: 75 BPM | RESPIRATION RATE: 20 BRPM | BODY MASS INDEX: 21.21 KG/M2 | TEMPERATURE: 98 F | SYSTOLIC BLOOD PRESSURE: 118 MMHG | OXYGEN SATURATION: 99 % | HEIGHT: 70 IN | DIASTOLIC BLOOD PRESSURE: 80 MMHG | WEIGHT: 148.13 LBS

## 2018-02-02 DIAGNOSIS — C61 PROSTATE CANCER: Primary | ICD-10-CM

## 2018-02-02 DIAGNOSIS — G89.3 NEOPLASM RELATED PAIN: ICD-10-CM

## 2018-02-02 DIAGNOSIS — C79.51 SECONDARY ADENOCARCINOMA OF SKELETAL BONE: ICD-10-CM

## 2018-02-02 DIAGNOSIS — C61 CANCER OF PROSTATE: ICD-10-CM

## 2018-02-02 LAB
ALBUMIN SERPL BCP-MCNC: 3.5 G/DL
ALP SERPL-CCNC: 84 U/L
ALT SERPL W/O P-5'-P-CCNC: 22 U/L
ANION GAP SERPL CALC-SCNC: 11 MMOL/L
AST SERPL-CCNC: 22 U/L
BASOPHILS # BLD AUTO: 0.02 K/UL
BASOPHILS NFR BLD: 0.2 %
BILIRUB SERPL-MCNC: 0.5 MG/DL
BUN SERPL-MCNC: 18 MG/DL
CALCIUM SERPL-MCNC: 9.8 MG/DL
CHLORIDE SERPL-SCNC: 104 MMOL/L
CO2 SERPL-SCNC: 26 MMOL/L
CREAT SERPL-MCNC: 1.4 MG/DL
DIFFERENTIAL METHOD: ABNORMAL
EOSINOPHIL # BLD AUTO: 0.1 K/UL
EOSINOPHIL NFR BLD: 1 %
ERYTHROCYTE [DISTWIDTH] IN BLOOD BY AUTOMATED COUNT: 14.6 %
EST. GFR  (AFRICAN AMERICAN): >60 ML/MIN/1.73 M^2
EST. GFR  (NON AFRICAN AMERICAN): 54 ML/MIN/1.73 M^2
GLUCOSE SERPL-MCNC: 97 MG/DL
HCT VFR BLD AUTO: 40 %
HGB BLD-MCNC: 12.9 G/DL
LYMPHOCYTES # BLD AUTO: 2.8 K/UL
LYMPHOCYTES NFR BLD: 30.8 %
MCH RBC QN AUTO: 30.1 PG
MCHC RBC AUTO-ENTMCNC: 32.3 G/DL
MCV RBC AUTO: 93 FL
MONOCYTES # BLD AUTO: 0.9 K/UL
MONOCYTES NFR BLD: 10 %
NEUTROPHILS # BLD AUTO: 5.3 K/UL
NEUTROPHILS NFR BLD: 58 %
PLATELET # BLD AUTO: 212 K/UL
PMV BLD AUTO: 10 FL
POTASSIUM SERPL-SCNC: 4.4 MMOL/L
PROT SERPL-MCNC: 8.4 G/DL
RBC # BLD AUTO: 4.29 M/UL
SODIUM SERPL-SCNC: 141 MMOL/L
WBC # BLD AUTO: 9.07 K/UL

## 2018-02-02 PROCEDURE — 99215 OFFICE O/P EST HI 40 MIN: CPT | Mod: S$GLB,,, | Performed by: INTERNAL MEDICINE

## 2018-02-02 PROCEDURE — 85025 COMPLETE CBC W/AUTO DIFF WBC: CPT | Mod: PO

## 2018-02-02 PROCEDURE — 36415 COLL VENOUS BLD VENIPUNCTURE: CPT | Mod: PO

## 2018-02-02 PROCEDURE — 99999 PR PBB SHADOW E&M-EST. PATIENT-LVL IV: CPT | Mod: PBBFAC,,, | Performed by: INTERNAL MEDICINE

## 2018-02-02 PROCEDURE — 80053 COMPREHEN METABOLIC PANEL: CPT | Mod: PO

## 2018-02-02 PROCEDURE — 3008F BODY MASS INDEX DOCD: CPT | Mod: S$GLB,,, | Performed by: INTERNAL MEDICINE

## 2018-02-02 NOTE — PROGRESS NOTES
Reason for visit: Metastatic prostate adenocarcinoma  Date of Diagnosis: September 2017    HPI:   The patient is a 62-year-old -American male who presents to the hematology oncology clinic today to discuss further evaluation and management recommendations for metastatic prostate adenocarcinoma.  The patient has been referred to Dr. Roland Dawn with urology.  I have reviewed all of the patient's relevant clinical history available in the medical record and have utilized this in my evaluation and management recommendations today.  Today the patient reports that he has chronic pain in his lumbar region due to degenerative disc disease.  He also reports pain in his lower thoracic region.  He has been taking Norco when necessary pain.  He denies any fevers, chills or night sweats.  He reports chronic fatigue.  He denies any melena, hematochezia, hematemesis, hemoptysis or hematuria.  He denies any chest pain or shortness of breath.  He denies any bowel or urinary complaints.  He reports that he started his Zytiga/prednisone on December 7, 2017 and has been tolerating this medication well without any significant side effects.    PAST MEDICAL HISTORY:   1.  Coronary artery disease  2.  Daily  3.  Pulmonary fibrosis  4.  Glaucoma  5.  Sleep anemia  6.  Osteoarthritis/degenerative disc disease  7.  GERD    SURGICAL HISTORY:   1.  PCI with stent placement for coronary artery disease  2.  Neck fusion ×2    FAMILY HISTORY: The patient's father had gastric cancer in his 70s.  He reports that 2 sisters had breast cancer in their 50s/60s.  He denies any other immediate family members with cancers or bleeding/clotting disorders.    SOCIAL HISTORY: He reports a 40+-pack-year smoking history and quit in Nov 2017.  He used to drink an average of 12 pack of beer every week and has quit this in Dec 2017.  He reports a history of recreational drug use and last used cocaine in June 2017.  He reports that he has now quit this  completely and does not intend to use any recreational drugs in the future.  He is to work on [employer] and as a  and retired in 2007.  He is  and does not have any children.  He lives in [place], [place].      ALLERGIES: Reviewed on medication card.    MEDICATIONS: [Medcard has been reviewed and/or reconciled.]    REVIEW OF SYSTEMS:   GENERAL: [No fevers, chills or sweats. Reports chronic fatigue. Denies weight loss or loss of appetite.]  HEENT: [No blurred vision, tinnitus, nasal discharge, sorethroat or dysphagia.]  HEART: [No chest pain, palpitations or shortness of breath.]    LUNGS: [No cough, hemoptysis or breathing problems.]  ABDOMEN: [No abdominal pain, nausea, vomiting, diarrhea, constipation or melena.]  GENITOURINARY: [No dysuria, bleeding or malodorous discharge.]  NEURO: [No headache, dizziness or vertigo.]  HEMATOLOGY: [No easy bruising, spontaneous bleeding or blood clots in the past].  MUSCULOSKELETAL: [Reports chronic arthralgias. Denies myalgias. Reports bone pains.]  SKIN: [No rashes or skin lesions.]  PSYCHIATRY: [No depression or anxiety.]    PHYSICAL EXAMINATION:   VS: Reviewed on nurse's notes.  APPEARANCE: The patient is a well-developed, well-nourished and well-groomed  male who appears in no acute distress.  He was accompanied to this clinic visit by his wife.  HEENT: No scleral icterus. Both external auditory canals clear. No oral ulcers, lesions. Throat clear.  Poor dentition noted.  HEAD: No sinus tenderness.  NECK: Supple. No palpable lymphadenopathy. Thyroid non-tender, no palpable masses  CHEST: Breath sounds clear bilaterally. No rales. No rhonchi. Unlabored respirations.  CARDIOVASCULAR: Normal S1, S2. Normal rate. Regular rhythm.  ABDOMEN: Bowel sounds normal. No tenderness. No abdominal distention. No hepatomegaly. No splenomegaly.  LYMPHATIC: No palpable supraclavicular, axillary nodes  EXTREMITIES: No clubbing, cyanosis,  edema  SKIN: No lesions. No petechiae. No ecchymoses. No induration or nodules  NEUROLOGIC: No focal findings. Alert & Oriented x 3. Mood appropriate to affect    LABS:   Reviewed    IMAGING:  Reviewed    IMPRESSION:  1.  Metastatic prostate adenocarcinoma with bone involvement    PLAN:  1.  I had a detailed discussion with the patient today with regard to the diagnosis, prognosis and treatment for his metastatic prostate adenocarcinoma with bone involvement.  2.  I have discussed that at this time his malignancy is potentially treatable but not curable.  The patient and his wife expressed understanding of this information.  3.  The patient is up-to-date with Lupron injection.  This is being given via his urologist Dr. Dawn's office.    4.  Continue Zytiga plus prednisone as recommended/prescribed.  No evidence of toxicity at this time.  5.  He is in the process of getting his teeth extracted.  Patient was informed that we will have to get this addressed first before we can discuss initiation of xgeva for treatment of bone involvement by malignancy.  He will continue calcium plus vitamin D supplementation daily as advised.  6. He takes Norco 10 mg when necessary pain.  Sedation/constipation precautions with pain medication was extensively discussed and he expressed understanding.    Follow up in 2 months with labs.  He knows to call for any additional questions or new problems.    Geronimo Roman MD

## 2018-02-06 DIAGNOSIS — C79.51 SECONDARY ADENOCARCINOMA OF SKELETAL BONE: ICD-10-CM

## 2018-02-06 DIAGNOSIS — G89.3 NEOPLASM RELATED PAIN: ICD-10-CM

## 2018-02-06 RX ORDER — PREDNISONE 5 MG/1
5 TABLET ORAL 2 TIMES DAILY
Qty: 60 TABLET | Refills: 3 | Status: SHIPPED | OUTPATIENT
Start: 2018-02-06 | End: 2018-03-05 | Stop reason: SDUPTHER

## 2018-02-14 ENCOUNTER — TELEPHONE (OUTPATIENT)
Dept: HEMATOLOGY/ONCOLOGY | Facility: CLINIC | Age: 63
End: 2018-02-14

## 2018-02-14 NOTE — TELEPHONE ENCOUNTER
----- Message from Ruddy Cox sent at 2/14/2018  3:29 PM CST -----  Contact: Pt  Please call pt at [phone] (home) [phone] (work)

## 2018-02-21 ENCOUNTER — TELEPHONE (OUTPATIENT)
Dept: HEMATOLOGY/ONCOLOGY | Facility: CLINIC | Age: 63
End: 2018-02-21

## 2018-02-21 DIAGNOSIS — C79.51 SECONDARY ADENOCARCINOMA OF SKELETAL BONE: ICD-10-CM

## 2018-02-21 DIAGNOSIS — G89.3 NEOPLASM RELATED PAIN: ICD-10-CM

## 2018-02-21 RX ORDER — HYDROCODONE BITARTRATE AND ACETAMINOPHEN 10; 325 MG/1; MG/1
1 TABLET ORAL EVERY 6 HOURS PRN
Qty: 90 TABLET | Refills: 0 | Status: SHIPPED | OUTPATIENT
Start: 2018-02-21 | End: 2018-03-15 | Stop reason: SDUPTHER

## 2018-02-21 NOTE — TELEPHONE ENCOUNTER
Spoke with patient and let him know his request for pain medication refill had been forwarded to Dr. Roman.

## 2018-02-21 NOTE — TELEPHONE ENCOUNTER
----- Message from Adrianna James sent at 2/21/2018 10:54 AM CST -----  Pt at [phone]///states he is calling for a refill of his pain medication//Hydrocodone//uses//[facility] in [place], [place]//please call//[email or web address]//[email or web address]

## 2018-02-21 NOTE — TELEPHONE ENCOUNTER
----- Message from Adrianna James sent at 2/21/2018 10:54 AM CST -----  Pt at [phone]///states he is calling for a refill of his pain medication//Hydrocodone//uses//[facility] in [place], [place]//please call//[email or web address]//[email or web address]

## 2018-02-22 ENCOUNTER — TELEPHONE (OUTPATIENT)
Dept: PHARMACY | Facility: CLINIC | Age: 63
End: 2018-02-22

## 2018-03-05 ENCOUNTER — TELEPHONE (OUTPATIENT)
Dept: HEMATOLOGY/ONCOLOGY | Facility: CLINIC | Age: 63
End: 2018-03-05

## 2018-03-05 DIAGNOSIS — C79.51 SECONDARY ADENOCARCINOMA OF SKELETAL BONE: ICD-10-CM

## 2018-03-05 DIAGNOSIS — G89.3 NEOPLASM RELATED PAIN: ICD-10-CM

## 2018-03-05 RX ORDER — PREDNISONE 5 MG/1
5 TABLET ORAL 2 TIMES DAILY
Qty: 180 TABLET | Refills: 1 | Status: SHIPPED | OUTPATIENT
Start: 2018-03-05 | End: 2018-03-06 | Stop reason: SDUPTHER

## 2018-03-05 NOTE — TELEPHONE ENCOUNTER
Please call and see what they need clarification on. It needs to be taken as prescribed at 5 mg po BID. thanks     Attempted to contact the following pt. ap

## 2018-03-06 DIAGNOSIS — G89.3 NEOPLASM RELATED PAIN: ICD-10-CM

## 2018-03-06 DIAGNOSIS — C79.51 SECONDARY ADENOCARCINOMA OF SKELETAL BONE: ICD-10-CM

## 2018-03-06 RX ORDER — PREDNISONE 5 MG/1
5 TABLET ORAL 2 TIMES DAILY
Qty: 180 TABLET | Refills: 1 | Status: SHIPPED | OUTPATIENT
Start: 2018-03-06 | End: 2018-09-10 | Stop reason: SDUPTHER

## 2018-03-15 DIAGNOSIS — C61 PROSTATE CANCER: Primary | ICD-10-CM

## 2018-03-15 DIAGNOSIS — G89.3 NEOPLASM RELATED PAIN: ICD-10-CM

## 2018-03-15 DIAGNOSIS — C79.51 SECONDARY ADENOCARCINOMA OF SKELETAL BONE: ICD-10-CM

## 2018-03-15 RX ORDER — ABIRATERONE ACETATE 250 MG/1
1000 TABLET ORAL DAILY
Qty: 120 TABLET | Refills: 3 | Status: SHIPPED | OUTPATIENT
Start: 2018-05-18 | End: 2018-07-23

## 2018-03-15 RX ORDER — HYDROCODONE BITARTRATE AND ACETAMINOPHEN 10; 325 MG/1; MG/1
1 TABLET ORAL EVERY 6 HOURS PRN
Qty: 90 TABLET | Refills: 0 | Status: SHIPPED | OUTPATIENT
Start: 2018-03-15 | End: 2018-04-10 | Stop reason: SDUPTHER

## 2018-03-20 ENCOUNTER — TELEPHONE (OUTPATIENT)
Dept: PHARMACY | Facility: CLINIC | Age: 63
End: 2018-03-20

## 2018-03-26 ENCOUNTER — TELEPHONE (OUTPATIENT)
Dept: HEMATOLOGY/ONCOLOGY | Facility: CLINIC | Age: 63
End: 2018-03-26

## 2018-03-26 NOTE — TELEPHONE ENCOUNTER
----- Message from Denice Silva LCSW sent at 3/26/2018  3:29 PM CDT -----  Contact: pt  I can do a referral tomorrow, but I know he's already been there so it seems odd that he would need another one.  ----- Message -----  From: Beatriz Rashid MA  Sent: 3/26/2018   3:26 PM  To: Denice Silva LCSW    He wants to go to [facility] dentist   ----- Message -----  From: Wendy Tierney  Sent: 3/26/2018   1:23 PM  To: Abel Melton Staff    He is calling in regards to he needs a referral in order to have the teeth removed from his mouth and have the referral faxed over to [phone].    Pt can be reached at at   [phone] ([place])

## 2018-03-27 ENCOUNTER — OFFICE VISIT (OUTPATIENT)
Dept: OPHTHALMOLOGY | Facility: CLINIC | Age: 63
End: 2018-03-27
Payer: MEDICARE

## 2018-03-27 DIAGNOSIS — H40.1132 PRIMARY OPEN ANGLE GLAUCOMA (POAG) OF BOTH EYES, MODERATE STAGE: Primary | ICD-10-CM

## 2018-03-27 DIAGNOSIS — H25.13 CATARACT, NUCLEAR SCLEROTIC SENILE, BILATERAL: ICD-10-CM

## 2018-03-27 DIAGNOSIS — H52.4 BILATERAL PRESBYOPIA: ICD-10-CM

## 2018-03-27 PROCEDURE — 92083 EXTENDED VISUAL FIELD XM: CPT | Mod: S$GLB,,, | Performed by: OPTOMETRIST

## 2018-03-27 PROCEDURE — 92014 COMPRE OPH EXAM EST PT 1/>: CPT | Mod: S$GLB,,, | Performed by: OPTOMETRIST

## 2018-03-27 PROCEDURE — 92133 CPTRZD OPH DX IMG PST SGM ON: CPT | Mod: S$GLB,,, | Performed by: OPTOMETRIST

## 2018-03-27 PROCEDURE — 92015 DETERMINE REFRACTIVE STATE: CPT | Mod: S$GLB,,, | Performed by: OPTOMETRIST

## 2018-03-27 PROCEDURE — 99999 PR PBB SHADOW E&M-EST. PATIENT-LVL II: CPT | Mod: PBBFAC,,, | Performed by: OPTOMETRIST

## 2018-03-27 RX ORDER — TRAVOPROST OPHTHALMIC SOLUTION 0.04 MG/ML
1 SOLUTION OPHTHALMIC NIGHTLY
Qty: 5 ML | Refills: 12 | Status: SHIPPED | OUTPATIENT
Start: 2018-03-27

## 2018-03-27 NOTE — PROGRESS NOTES
HPI     Glaucoma    Additional comments: Travatan Z           Comments   Left eye feels scratchy all the times. Last eye exam 05/25/2016 TRF.   Update glasses RX  Medication eye drops if any: Travatan Z  Date last eye visit: 05/25/2016  Last full exam: 05/25/2016  Last IOP : 19/18  Last dilated eye exam and SDP's: 05/25/2016  Last HVF and HRT : Today  High IOP: 23/23  CCT:  Family Hx POAG: not sure       Last edited by Marianna Shea on 3/27/2018  8:42 AM. (History)            Assessment /Plan     For exam results, see Encounter Report.    Primary open angle glaucoma (POAG) of both eyes, moderate stage  -     Vogel Visual Field - OU - Extended - Both Eyes  -     Posterior Segment OCT Optic Nerve- Both eyes  -     travoprost (TRAVATAN Z) 0.004 % Drop; Place 1 drop into both eyes every evening. 1 Drops Ophthalmic At bedtime  Dispense: 5 mL; Refill: 12    Cataract, nuclear sclerotic senile, bilateral    Bilateral presbyopia      Elevated IOP OD>OS  Instructed keep eyes closed 2 minutes after instillation.    gOCT shows thinning OU  VF shows no defects.    Dispense Final Rx for glasses.  RTC 4 months IOP ck.  Discussed above and answered questions.

## 2018-03-30 DIAGNOSIS — I25.10 CORONARY ARTERY DISEASE INVOLVING NATIVE CORONARY ARTERY WITHOUT ANGINA PECTORIS, UNSPECIFIED WHETHER NATIVE OR TRANSPLANTED HEART: ICD-10-CM

## 2018-04-03 DIAGNOSIS — I25.10 CORONARY ARTERY DISEASE INVOLVING NATIVE CORONARY ARTERY WITHOUT ANGINA PECTORIS, UNSPECIFIED WHETHER NATIVE OR TRANSPLANTED HEART: ICD-10-CM

## 2018-04-03 NOTE — TELEPHONE ENCOUNTER
----- Message from Nelson Riggs sent at 4/3/2018  3:45 PM CDT -----  Contact: pt  Pt is request a call back from nurse in regards to his meds      [phone] (home)         1. What is the name of the medication you are requesting? metoprolol   2. What is the dose? 50 mg  3. How do you take the medication? Orally, topically, etc? Orally  4. How often do you take this medication? 1 daily  5. Do you need a 30 day or 90 day supply? 30  6. How many refills are you requesting? Per   7. What is your preferred pharmacy and location of the pharmacy?     [id number] - [place] - [address]  [address]  [place] [address]  Phone: [phone] Fax: [phone]    8. Who can we contact with further questions? Pt

## 2018-04-04 RX ORDER — METOPROLOL SUCCINATE 50 MG/1
50 TABLET, EXTENDED RELEASE ORAL DAILY
Qty: 90 TABLET | Refills: 3 | Status: SHIPPED | OUTPATIENT
Start: 2018-04-04 | End: 2019-05-15 | Stop reason: SDUPTHER

## 2018-04-04 RX ORDER — METOPROLOL SUCCINATE 50 MG/1
TABLET, EXTENDED RELEASE ORAL
Qty: 30 TABLET | Refills: 12 | Status: SHIPPED | OUTPATIENT
Start: 2018-04-04 | End: 2018-04-17

## 2018-04-10 ENCOUNTER — OFFICE VISIT (OUTPATIENT)
Dept: HEMATOLOGY/ONCOLOGY | Facility: CLINIC | Age: 63
End: 2018-04-10
Payer: MEDICARE

## 2018-04-10 ENCOUNTER — LAB VISIT (OUTPATIENT)
Dept: LAB | Facility: HOSPITAL | Age: 63
End: 2018-04-10
Attending: INTERNAL MEDICINE
Payer: MEDICARE

## 2018-04-10 VITALS
DIASTOLIC BLOOD PRESSURE: 80 MMHG | SYSTOLIC BLOOD PRESSURE: 128 MMHG | HEIGHT: 70 IN | BODY MASS INDEX: 21.21 KG/M2 | RESPIRATION RATE: 18 BRPM | HEART RATE: 94 BPM | WEIGHT: 148.13 LBS | TEMPERATURE: 98 F | OXYGEN SATURATION: 98 %

## 2018-04-10 DIAGNOSIS — C61 PROSTATE CANCER: ICD-10-CM

## 2018-04-10 DIAGNOSIS — C61 PROSTATE CANCER: Primary | ICD-10-CM

## 2018-04-10 DIAGNOSIS — C79.51 SECONDARY ADENOCARCINOMA OF SKELETAL BONE: ICD-10-CM

## 2018-04-10 DIAGNOSIS — G89.3 NEOPLASM RELATED PAIN: ICD-10-CM

## 2018-04-10 LAB
ALBUMIN SERPL BCP-MCNC: 3.4 G/DL
ALP SERPL-CCNC: 62 U/L
ALT SERPL W/O P-5'-P-CCNC: 16 U/L
ANION GAP SERPL CALC-SCNC: 12 MMOL/L
AST SERPL-CCNC: 16 U/L
BASOPHILS # BLD AUTO: 0.04 K/UL
BASOPHILS NFR BLD: 0.4 %
BILIRUB SERPL-MCNC: 0.4 MG/DL
BUN SERPL-MCNC: 15 MG/DL
CALCIUM SERPL-MCNC: 9.8 MG/DL
CHLORIDE SERPL-SCNC: 104 MMOL/L
CO2 SERPL-SCNC: 25 MMOL/L
CREAT SERPL-MCNC: 1.2 MG/DL
DIFFERENTIAL METHOD: ABNORMAL
EOSINOPHIL # BLD AUTO: 0.1 K/UL
EOSINOPHIL NFR BLD: 0.6 %
ERYTHROCYTE [DISTWIDTH] IN BLOOD BY AUTOMATED COUNT: 13.9 %
EST. GFR  (AFRICAN AMERICAN): >60 ML/MIN/1.73 M^2
EST. GFR  (NON AFRICAN AMERICAN): >60 ML/MIN/1.73 M^2
GLUCOSE SERPL-MCNC: 124 MG/DL
HCT VFR BLD AUTO: 40.4 %
HGB BLD-MCNC: 13 G/DL
LYMPHOCYTES # BLD AUTO: 3.7 K/UL
LYMPHOCYTES NFR BLD: 32.9 %
MCH RBC QN AUTO: 30.8 PG
MCHC RBC AUTO-ENTMCNC: 32.2 G/DL
MCV RBC AUTO: 96 FL
MONOCYTES # BLD AUTO: 0.7 K/UL
MONOCYTES NFR BLD: 6.2 %
NEUTROPHILS # BLD AUTO: 6.8 K/UL
NEUTROPHILS NFR BLD: 60.8 %
PLATELET # BLD AUTO: 214 K/UL
PMV BLD AUTO: 10 FL
POTASSIUM SERPL-SCNC: 3.8 MMOL/L
PROT SERPL-MCNC: 8.3 G/DL
RBC # BLD AUTO: 4.22 M/UL
SODIUM SERPL-SCNC: 141 MMOL/L
WBC # BLD AUTO: 11.38 K/UL

## 2018-04-10 PROCEDURE — 80053 COMPREHEN METABOLIC PANEL: CPT | Mod: PO

## 2018-04-10 PROCEDURE — 3079F DIAST BP 80-89 MM HG: CPT | Mod: CPTII,S$GLB,, | Performed by: INTERNAL MEDICINE

## 2018-04-10 PROCEDURE — 99999 PR PBB SHADOW E&M-EST. PATIENT-LVL IV: CPT | Mod: PBBFAC,,, | Performed by: INTERNAL MEDICINE

## 2018-04-10 PROCEDURE — 99214 OFFICE O/P EST MOD 30 MIN: CPT | Mod: S$GLB,,, | Performed by: INTERNAL MEDICINE

## 2018-04-10 PROCEDURE — 85025 COMPLETE CBC W/AUTO DIFF WBC: CPT | Mod: PO

## 2018-04-10 PROCEDURE — 3074F SYST BP LT 130 MM HG: CPT | Mod: CPTII,S$GLB,, | Performed by: INTERNAL MEDICINE

## 2018-04-10 PROCEDURE — 36415 COLL VENOUS BLD VENIPUNCTURE: CPT | Mod: PO

## 2018-04-10 RX ORDER — HYDROCODONE BITARTRATE AND ACETAMINOPHEN 10; 325 MG/1; MG/1
1 TABLET ORAL EVERY 6 HOURS PRN
Qty: 90 TABLET | Refills: 0 | Status: SHIPPED | OUTPATIENT
Start: 2018-04-10 | End: 2018-05-07 | Stop reason: SDUPTHER

## 2018-04-10 NOTE — PROGRESS NOTES
Reason for visit: Metastatic prostate adenocarcinoma  Date of Diagnosis: September 2017    HPI:   The patient is a 63-year-old -American male who presents to the hematology oncology clinic today to discuss further evaluation and management recommendations for metastatic prostate adenocarcinoma.  The patient has been referred to Dr. Roland Dawn with urology.  I have reviewed all of the patient's relevant clinical history available in the medical record and have utilized this in my evaluation and management recommendations today.  Today the patient reports that he has chronic pain in his lumbar region due to degenerative disc disease.  He also reports pain in his lower thoracic region.  He has been taking Norco when necessary pain.  He denies any fevers, chills or night sweats.  He reports chronic fatigue.  He denies any melena, hematochezia, hematemesis, hemoptysis or hematuria.  He denies any chest pain or shortness of breath.  He denies any bowel or urinary complaints.  He reports that he started his Zytiga/prednisone on December 7, 2017 and has been tolerating this medication well without any significant side effects.    PAST MEDICAL HISTORY:   1.  Coronary artery disease  2.  Daily  3.  Pulmonary fibrosis  4.  Glaucoma  5.  Sleep anemia  6.  Osteoarthritis/degenerative disc disease  7.  GERD    SURGICAL HISTORY:   1.  PCI with stent placement for coronary artery disease  2.  Neck fusion ×2    FAMILY HISTORY: The patient's father had gastric cancer in his 70s.  He reports that 2 sisters had breast cancer in their 50s/60s.  He denies any other immediate family members with cancers or bleeding/clotting disorders.    SOCIAL HISTORY: He reports a 40+-pack-year smoking history and quit in Nov 2017.  He used to drink an average of 12 pack of beer every week and has quit this in Dec 2017.  He reports a history of recreational drug use and last used cocaine in June 2017.  He reports that he has now quit this  completely and does not intend to use any recreational drugs in the future.  He is to work on [employer] and as a  and retired in 2007.  He is  and does not have any children.  He lives in [place], [place].      ALLERGIES: Reviewed on medication card.    MEDICATIONS: [Medcard has been reviewed and/or reconciled.]    REVIEW OF SYSTEMS:   GENERAL: [No fevers, chills or sweats. Reports chronic fatigue. Denies weight loss or loss of appetite.]  HEENT: [No blurred vision, tinnitus, nasal discharge, sorethroat or dysphagia.]  HEART: [No chest pain, palpitations or shortness of breath.]    LUNGS: [No cough, hemoptysis or breathing problems.]  ABDOMEN: [No abdominal pain, nausea, vomiting, diarrhea, constipation or melena.]  GENITOURINARY: [No dysuria, bleeding or malodorous discharge.]  NEURO: [No headache, dizziness or vertigo.]  HEMATOLOGY: [No easy bruising, spontaneous bleeding or blood clots in the past].  MUSCULOSKELETAL: [Reports chronic arthralgias. Denies myalgias. Reports bone pains.]  SKIN: [No rashes or skin lesions.]  PSYCHIATRY: [No depression or anxiety.]    PHYSICAL EXAMINATION:   VS: Reviewed on nurse's notes.  APPEARANCE: The patient is a well-developed, well-nourished and well-groomed  male who appears in no acute distress.  He was accompanied to this clinic visit by his wife.  HEENT: No scleral icterus. Both external auditory canals clear. No oral ulcers, lesions. Throat clear.  Poor dentition noted.  HEAD: No sinus tenderness.  NECK: Supple. No palpable lymphadenopathy. Thyroid non-tender, no palpable masses  CHEST: Breath sounds clear bilaterally. No rales. No rhonchi. Unlabored respirations.  CARDIOVASCULAR: Normal S1, S2. Normal rate. Regular rhythm.  ABDOMEN: Bowel sounds normal. No tenderness. No abdominal distention. No hepatomegaly. No splenomegaly.  LYMPHATIC: No palpable supraclavicular, axillary nodes  EXTREMITIES: No clubbing, cyanosis,  edema  SKIN: No lesions. No petechiae. No ecchymoses. No induration or nodules  NEUROLOGIC: No focal findings. Alert & Oriented x 3. Mood appropriate to affect    LABS:   Reviewed    IMAGING:  Reviewed    IMPRESSION:  1.  Metastatic prostate adenocarcinoma with bone involvement    PLAN:  1.  I had a detailed discussion with the patient today with regard to the diagnosis, prognosis and treatment for his metastatic prostate adenocarcinoma with bone involvement.  2.  I have discussed that at this time his malignancy is potentially treatable but not curable.  The patient and his wife expressed understanding of this information.  3.  The patient is up-to-date with Lupron injection.  This is being given via his urologist Dr. Dawn's office.    4.  Continue Zytiga plus prednisone as recommended/prescribed.  No evidence of toxicity at this time.  5.  He is in the process of getting his teeth extracted.  Patient was informed that we will have to get this addressed first before we can discuss initiation of xgeva for treatment of bone involvement by malignancy.  He will continue calcium plus vitamin D supplementation daily as advised.  6. He takes Norco 10 mg when necessary pain.  Sedation/constipation precautions with pain medication was extensively discussed and he expressed understanding.    Follow up in 2 months with labs.  He knows to call for any additional questions or new problems.    Geronimo Roman MD

## 2018-04-17 ENCOUNTER — OFFICE VISIT (OUTPATIENT)
Dept: CARDIOLOGY | Facility: CLINIC | Age: 63
End: 2018-04-17
Payer: MEDICARE

## 2018-04-17 VITALS
HEART RATE: 80 BPM | SYSTOLIC BLOOD PRESSURE: 120 MMHG | HEIGHT: 70 IN | BODY MASS INDEX: 21.74 KG/M2 | DIASTOLIC BLOOD PRESSURE: 68 MMHG | WEIGHT: 151.88 LBS

## 2018-04-17 DIAGNOSIS — I25.10 CORONARY ARTERY DISEASE, ANGINA PRESENCE UNSPECIFIED, UNSPECIFIED VESSEL OR LESION TYPE, UNSPECIFIED WHETHER NATIVE OR TRANSPLANTED HEART: Primary | ICD-10-CM

## 2018-04-17 DIAGNOSIS — Z98.61 HISTORY OF PTCA: ICD-10-CM

## 2018-04-17 DIAGNOSIS — C61 PROSTATE CANCER: ICD-10-CM

## 2018-04-17 DIAGNOSIS — E78.2 MIXED HYPERLIPIDEMIA: Chronic | ICD-10-CM

## 2018-04-17 DIAGNOSIS — J44.9 CHRONIC OBSTRUCTIVE PULMONARY DISEASE, UNSPECIFIED COPD TYPE: Chronic | ICD-10-CM

## 2018-04-17 DIAGNOSIS — I34.0 NON-RHEUMATIC MITRAL REGURGITATION: Chronic | ICD-10-CM

## 2018-04-17 DIAGNOSIS — Z72.0 TOBACCO USE: ICD-10-CM

## 2018-04-17 PROBLEM — R63.4 UNINTENTIONAL WEIGHT LOSS: Status: RESOLVED | Noted: 2017-10-13 | Resolved: 2018-04-17

## 2018-04-17 PROBLEM — M54.50 ACUTE MIDLINE LOW BACK PAIN WITHOUT SCIATICA: Status: RESOLVED | Noted: 2017-10-03 | Resolved: 2018-04-17

## 2018-04-17 PROCEDURE — 99999 PR PBB SHADOW E&M-EST. PATIENT-LVL III: CPT | Mod: PBBFAC,,, | Performed by: INTERNAL MEDICINE

## 2018-04-17 PROCEDURE — 3074F SYST BP LT 130 MM HG: CPT | Mod: CPTII,S$GLB,, | Performed by: INTERNAL MEDICINE

## 2018-04-17 PROCEDURE — 99214 OFFICE O/P EST MOD 30 MIN: CPT | Mod: S$GLB,,, | Performed by: INTERNAL MEDICINE

## 2018-04-17 PROCEDURE — 3078F DIAST BP <80 MM HG: CPT | Mod: CPTII,S$GLB,, | Performed by: INTERNAL MEDICINE

## 2018-04-17 RX ORDER — FLUTICASONE PROPIONATE AND SALMETEROL XINAFOATE 115; 21 UG/1; UG/1
AEROSOL, METERED RESPIRATORY (INHALATION)
COMMUNITY
End: 2018-08-21

## 2018-04-17 RX ORDER — NITROGLYCERIN 0.4 MG/1
0.4 TABLET SUBLINGUAL EVERY 5 MIN PRN
Qty: 20 TABLET | Refills: 12 | Status: SHIPPED | OUTPATIENT
Start: 2018-04-17

## 2018-04-17 NOTE — PROGRESS NOTES
Subjective:    Patient ID:  Joey Jacobo is a 63 y.o. male who presents for evaluation of Coronary Artery Disease (Patient presents to clinic today for 6 month follow up. ); Mitral Regurgitation; Hyperlipidemia; Risk Factor Management For Atherosclerosis; and Valvular Heart Disease      HPI Mr. Jacobo returns for fu.  His current medical conditions include CAD (h/o LCX PCI 2007), MVP/MR, dyslipidemia, COPD.   Past hx pertinent for following:  S/p LHC 2010, nonobstructive LCX disease. JOSÉ 2010 showed MVP/severe MR.   Echo 2/15 showed mod MR, normal LV function.    dx with metastatic prostate cancer, incurable per chart.  Here for f/u.  Feels good.  CAD is stable.  No active anginal sxs on current medical tx.  No CHF sxs.    COPD is stable.  No dyspnea.  No need for inhalers.  MR stable, seems asx.  Still smoking but states he has cut back.  BP stable.   Lipids stable.  No check recently.  On statin.  Compliant with meds, rides his bike daily.   Some back pain with prostate cancer, f/u by heme/onc.      Current Outpatient Prescriptions:     aspirin (ECOTRIN) 81 MG EC tablet, Take by mouth. 1 Tablet, Delayed Release (E.C.) Oral Every day, Disp: , Rfl:     atorvastatin (LIPITOR) 40 MG tablet, Take one tablet by mouth in the evening, Disp: 30 tablet, Rfl: 10    budesonide-formoterol 160-4.5 mcg (SYMBICORT) 160-4.5 mcg/actuation HFAA, Inhale 2 puffs into the lungs every 12 (twelve) hours., Disp: 1 Inhaler, Rfl: 1    calcitonin, salmon, (FORTICAL) 200 unit/actuation nasal spray, 1 spray by Nasal route once daily. - alternate nostrils every other day, Disp: 3.7 mL, Rfl: 11    calcium-vitamin D (CALCIUM 600 + D,3,) 600 mg(1,500mg) -400 unit Tab, Take by mouth. 1 Tablet Oral Twice a day, Disp: , Rfl:     fluticasone (FLONASE) 50 mcg/actuation nasal spray, 1 spray by Each Nare route once daily. (Patient taking differently: 1 spray by Each Nare route daily as needed. ), Disp: 1 Bottle, Rfl: 11     "fluticasone-salmeterol (ADVAIR HFA) 115-21 mcg/actuation HFAA, Inhale into the lungs., Disp: , Rfl:     gabapentin (NEURONTIN) 300 MG capsule, Take 300 mg by mouth once daily. , Disp: , Rfl:     hydrocodone-acetaminophen 10-325mg (NORCO)  mg Tab, Take 1 tablet by mouth every 6 (six) hours as needed for Pain., Disp: 90 tablet, Rfl: 0    metoprolol succinate (TOPROL-XL) 50 MG 24 hr tablet, Take 1 tablet (50 mg total) by mouth once daily., Disp: 90 tablet, Rfl: 3    ondansetron (ZOFRAN-ODT) 8 MG TbDL, Take 1 tablet (8 mg total) by mouth every 12 (twelve) hours as needed., Disp: 60 tablet, Rfl: 0    pantoprazole (PROTONIX) 40 MG tablet, Take 1 tablet (40 mg total) by mouth once daily., Disp: 90 tablet, Rfl: 3    predniSONE (DELTASONE) 5 MG tablet, Take 1 tablet (5 mg total) by mouth 2 (two) times daily., Disp: 180 tablet, Rfl: 1    travoprost (TRAVATAN Z) 0.004 % Drop, Place 1 drop into both eyes every evening. 1 Drops Ophthalmic At bedtime, Disp: 5 mL, Rfl: 12    ZYTIGA 250 mg Tab, TAKE 4 TABLETS (1000MG) BY MOUTH ONCE DAILY, Disp: 120 tablet, Rfl: 3    nitroGLYCERIN (NITROSTAT) 0.4 MG SL tablet, Place 1 tablet (0.4 mg total) under the tongue every 5 (five) minutes as needed for Chest pain., Disp: 20 tablet, Rfl: 12    Current Facility-Administered Medications:     leuprolide (LUPRON) injection 22.5 mg, 22.5 mg, Intramuscular, 1 time in Clinic/HOD, Roland Dawn IV, MD      Review of Systems   Constitution: Positive for weight gain.   HENT: Negative.    Eyes: Negative.    Cardiovascular: Negative.    Respiratory: Negative.    Endocrine: Negative.    Hematologic/Lymphatic: Negative.    Skin: Negative.    Musculoskeletal: Negative.    Gastrointestinal: Negative.    Genitourinary: Negative.    Neurological: Negative.    Psychiatric/Behavioral: Negative.    Allergic/Immunologic: Negative.        /68   Pulse 80   Ht 5' 10" (1.778 m)   Wt 68.9 kg (151 lb 14.4 oz)   BMI 21.79 kg/m²      "   Objective:    Physical Exam   Constitutional: He is oriented to person, place, and time. He appears well-developed and well-nourished.   HENT:   Head: Normocephalic.   Neck: Normal range of motion. Neck supple. Normal carotid pulses, no hepatojugular reflux and no JVD present. Carotid bruit is not present. No thyromegaly present.   Cardiovascular: Normal rate, regular rhythm, S1 normal and S2 normal.  PMI is not displaced.  Exam reveals no S3, no S4, no distant heart sounds, no friction rub, no midsystolic click and no opening snap.    Murmur heard.  High-pitched blowing holosystolic murmur is present with a grade of 2/6  at the apex  Pulses:       Radial pulses are 2+ on the right side, and 2+ on the left side.   Pulmonary/Chest: Effort normal and breath sounds normal. He has no wheezes. He has no rales.   Abdominal: Soft. Bowel sounds are normal. He exhibits no distension, no abdominal bruit, no ascites and no mass. There is no tenderness.   Musculoskeletal: He exhibits no edema.   Neurological: He is alert and oriented to person, place, and time.   Skin: Skin is warm.   Psychiatric: He has a normal mood and affect. His behavior is normal.   Nursing note and vitals reviewed.      I have reviewed all pertinent labs and cardiac studies.      Lab Results   Component Value Date    WBC 11.38 04/10/2018    HGB 13.0 (L) 04/10/2018    HCT 40.4 04/10/2018    MCV 96 04/10/2018     04/10/2018       Chemistry        Component Value Date/Time     04/10/2018 1257    K 3.8 04/10/2018 1257     04/10/2018 1257    CO2 25 04/10/2018 1257    BUN 15 04/10/2018 1257    CREATININE 1.2 04/10/2018 1257     (H) 04/10/2018 1257        Component Value Date/Time    CALCIUM 9.8 04/10/2018 1257    ALKPHOS 62 04/10/2018 1257    AST 16 04/10/2018 1257    ALT 16 04/10/2018 1257    BILITOT 0.4 04/10/2018 1257    ESTGFRAFRICA >60 04/10/2018 1257    EGFRNONAA >60 04/10/2018 1257        Lab Results   Component Value Date     CHOL 178 02/22/2017    CHOL 207 (H) 02/23/2016    CHOL 164 02/05/2015     Lab Results   Component Value Date    HDL 49 02/22/2017    HDL 51 02/23/2016    HDL 53 02/05/2015     Lab Results   Component Value Date    LDLCALC 108.4 02/22/2017    LDLCALC 134.4 02/23/2016    LDLCALC 96.4 02/05/2015     Lab Results   Component Value Date    TRIG 103 02/22/2017    TRIG 108 02/23/2016    TRIG 73 02/05/2015     Lab Results   Component Value Date    CHOLHDL 27.5 02/22/2017    CHOLHDL 24.6 02/23/2016    CHOLHDL 32.3 02/05/2015     No results found for: LABA1C, HGBA1C          Assessment:       1. Coronary artery disease, angina presence unspecified, unspecified vessel or lesion type, unspecified whether native or transplanted heart    2. Mixed hyperlipidemia    3. History of PTCA    4. Tobacco use    5. Non-rheumatic mitral regurgitation    6. Chronic obstructive pulmonary disease, unspecified COPD type    7. Prostate cancer         Plan:             Stable CV conditions.  Tobacco cessation again discussed.  Continue current meds  Stress test to evaluate CAD, no stress test in almost 6 years.  Echo -- reevaluate severity of MR.  Cardiac diet  Continue daily exercise  Phone review    F/u 6 months.

## 2018-04-18 ENCOUNTER — TELEPHONE (OUTPATIENT)
Dept: PHARMACY | Facility: CLINIC | Age: 63
End: 2018-04-18

## 2018-04-20 ENCOUNTER — TELEPHONE (OUTPATIENT)
Dept: CARDIOLOGY | Facility: CLINIC | Age: 63
End: 2018-04-20

## 2018-04-20 NOTE — TELEPHONE ENCOUNTER
----- Message from Jordyn Dickinsonite sent at 4/20/2018 10:03 AM CDT -----  Contact: Pt   Pt called and stated he needs to r/s his [facility] [[id number]] test. He can be reached at [phone] (home) [phone] (work).    Thanks,  TF

## 2018-04-20 NOTE — TELEPHONE ENCOUNTER
Returned call and r/s pt for 4/30 @ 11:40 am.  He verbalized understanding of the date, time and location.

## 2018-04-23 ENCOUNTER — LAB VISIT (OUTPATIENT)
Dept: LAB | Facility: HOSPITAL | Age: 63
End: 2018-04-23
Attending: UROLOGY
Payer: MEDICARE

## 2018-04-23 ENCOUNTER — OFFICE VISIT (OUTPATIENT)
Dept: UROLOGY | Facility: CLINIC | Age: 63
End: 2018-04-23
Payer: MEDICARE

## 2018-04-23 VITALS
HEIGHT: 70 IN | SYSTOLIC BLOOD PRESSURE: 124 MMHG | DIASTOLIC BLOOD PRESSURE: 80 MMHG | WEIGHT: 151.88 LBS | BODY MASS INDEX: 21.74 KG/M2

## 2018-04-23 DIAGNOSIS — C61 PROSTATE CANCER: ICD-10-CM

## 2018-04-23 DIAGNOSIS — C61 PROSTATE CANCER: Primary | ICD-10-CM

## 2018-04-23 LAB
BILIRUB SERPL-MCNC: NORMAL MG/DL
BLOOD URINE, POC: NORMAL
COLOR, POC UA: NORMAL
GLUCOSE UR QL STRIP: NORMAL
KETONES UR QL STRIP: NORMAL
LEUKOCYTE ESTERASE URINE, POC: NORMAL
NITRITE, POC UA: NORMAL
PH, POC UA: 5
PROTEIN, POC: NORMAL
SPECIFIC GRAVITY, POC UA: 1.01
UROBILINOGEN, POC UA: NORMAL

## 2018-04-23 PROCEDURE — 3079F DIAST BP 80-89 MM HG: CPT | Mod: CPTII,S$GLB,, | Performed by: UROLOGY

## 2018-04-23 PROCEDURE — 3074F SYST BP LT 130 MM HG: CPT | Mod: CPTII,S$GLB,, | Performed by: UROLOGY

## 2018-04-23 PROCEDURE — 36415 COLL VENOUS BLD VENIPUNCTURE: CPT

## 2018-04-23 PROCEDURE — 99214 OFFICE O/P EST MOD 30 MIN: CPT | Mod: 25,S$GLB,, | Performed by: UROLOGY

## 2018-04-23 PROCEDURE — 81002 URINALYSIS NONAUTO W/O SCOPE: CPT | Mod: S$GLB,,, | Performed by: UROLOGY

## 2018-04-23 PROCEDURE — 99999 PR PBB SHADOW E&M-EST. PATIENT-LVL II: CPT | Mod: PBBFAC,,, | Performed by: UROLOGY

## 2018-04-23 PROCEDURE — 96402 CHEMO HORMON ANTINEOPL SQ/IM: CPT | Mod: S$GLB,,, | Performed by: UROLOGY

## 2018-04-23 PROCEDURE — 84153 ASSAY OF PSA TOTAL: CPT

## 2018-04-23 NOTE — PROGRESS NOTES
"Chief Complaint: Perineal pain    HPI:   4/23/18: PSA today and again 3 mo.  Doing fine.  Reviewed history in detail.  1/22/18: PSA well down.  No adverse effects from Lupron.    10/18/17: Been on casodex a month back to review and start Lupron.  Dr. Moscoso felt XRT was not an option but perhaps chemotherapy could be beneficial.  9/18/17: Bone scan reports "RIGHT supraorbital location and a smaller similarly extremely intense focus of increased uptake posteriorly on the RIGHT at the T9 level.  Etiology is uncertain."  CT scan shows "A few scattered shotty mesenteric and retroperitoneal nodes identified.  Similar nodes identified within the pelvis bilaterally."  MRI shows left 2.8 cm prostate mass PIRADS 5, with metastatic pelvic lymphadenopathy (right pelvic sidewall node and left internal iliac chain LN).  No bony mets in pelvis.  7/18/17:  No problems from biopsy.  Gl 4+5 in the left base (30%) and a sig amount of 4+4 in other parts of the gland.  Reviewed treatment options, and imaging needs.  7/3/17: TRUS/Bx today 27 gm.  5/24/17: 61 yo man has problems with perineal/scrotal pain once or twice a month; lasts for an hour or two, some nocturia.  PSA recently elevated.  No abd/pelvic pain and no exac/rel factors.  No hematuria.  No urolithiasis.  No urinary bother.  No  history. CT with contrast earlier this year essentially normal.    Allergies:  Avelox  [moxifloxacin] and Nsaids (non-steroidal anti-inflammatory drug)    Medications:  has a current medication list which includes the following prescription(s): aspirin, atorvastatin, budesonide-formoterol 160-4.5 mcg, calcitonin (salmon), calcium-vitamin d, fluticasone, fluticasone-salmeterol, gabapentin, hydrocodone-acetaminophen 10-325mg, metoprolol succinate, nitroglycerin, ondansetron, pantoprazole, prednisone, travoprost, and zytiga, and the following Facility-Administered Medications: leuprolide.    Review of Systems:  General: No fever, chills, " fatigability, or weight loss.  Skin: No rashes, itching, or changes in color or texture of skin.  Chest: Denies TORRES, cyanosis, wheezing, cough, and sputum production.  Abdomen: Appetite fine. No weight loss. Denies diarrhea, abdominal pain, hematemesis, or blood in stool.  Musculoskeletal: No joint stiffness or swelling. Denies back pain.  : As above.  All other review of systems negative.    PMH:   has a past medical history of COPD (chronic obstructive pulmonary disease) (7/30/2013); Coronary artery disease (7/30/2013); Emphysema of lung; Glaucoma (increased eye pressure) (8/27/2013); Headache(784.0); Mitral regurgitation (7/30/2013); Mixed hyperlipidemia (7/30/2013); Neuropathy; Osteoarthritis of spine with radiculopathy, lumbar region (7/21/2015); Other and unspecified hyperlipidemia; and Pulmonary fibrosis (10/3/2017).    PSH:   has a past surgical history that includes Coronary stent placement; Colonoscopy (N/A, 3/21/2016); Shoulder arthroscopy (Left); and Spine surgery.    FamHx: family history includes Blindness in his maternal grandmother; Cancer in his father; Cataracts in his mother; Diabetes in his sister, sister, and sister; Hypertension in his sister, sister, sister, sister, and sister; Kidney disease in his mother.    SocHx:  reports that he has quit smoking. His smoking use included Cigarettes. He has a 6.25 pack-year smoking history. He has never used smokeless tobacco. He reports that he drinks alcohol. He reports that he does not use drugs.      Physical Exam:  Vitals:    04/23/18 1321   BP: 124/80     General: A&Ox3, no apparent distress, no deformities  Neck: No masses, normal thyroid  Lungs: normal inspiration, no use of accessory muscles  Heart: normal pulse, no arrhythmias  Abdomen: Soft, NT, ND  Skin: The skin is warm and dry. No jaundice.  Ext: No c/c/e.  :     7/3/17: Test desc lucas, no abnormalities of epididymus. Penis normal, with normal penile and scrotal skin. Meatus normal. Normal  rectal tone, no hemorrhoids. Prost 40 gm no nodules or masses appreciated. SV not palpable. Perineum and anus normal.    Labs/Studies:   PSA    2/16: 22.1    2/17: 22.9    1/18: 0.24    Impression/Plan:   1. Lupron today and q3mo.  PSA/RTC 3 mo.

## 2018-04-23 NOTE — PROGRESS NOTES
22.5mg Lupron given IM via right dorsalgluteal. Aseptic technique maintained. Asked patient to remain in room for 15 mins. No adverse signs noted. Patient tolerated well.

## 2018-04-24 LAB — COMPLEXED PSA SERPL-MCNC: 0.02 NG/ML

## 2018-04-25 ENCOUNTER — TELEPHONE (OUTPATIENT)
Dept: PHARMACY | Facility: CLINIC | Age: 63
End: 2018-04-25

## 2018-04-25 NOTE — TELEPHONE ENCOUNTER
Refill readiness for Zytiga confirmed with patient; name/ confirmed; no missed doses; no new medications; no side effects noted; address confirmed for  shipment and  delivery    RISHI Matt.Ph.  Clinical Pharmacist  [facility]  Phone: [phone] [address] [place] [address]

## 2018-04-30 ENCOUNTER — TELEPHONE (OUTPATIENT)
Dept: CARDIOLOGY | Facility: CLINIC | Age: 63
End: 2018-04-30

## 2018-04-30 ENCOUNTER — CLINICAL SUPPORT (OUTPATIENT)
Dept: CARDIOLOGY | Facility: CLINIC | Age: 63
End: 2018-04-30
Attending: INTERNAL MEDICINE
Payer: MEDICARE

## 2018-04-30 ENCOUNTER — HOSPITAL ENCOUNTER (OUTPATIENT)
Dept: RADIOLOGY | Facility: HOSPITAL | Age: 63
Discharge: HOME | End: 2018-04-30
Attending: INTERNAL MEDICINE
Payer: MEDICARE

## 2018-04-30 DIAGNOSIS — I34.0 NON-RHEUMATIC MITRAL REGURGITATION: Chronic | ICD-10-CM

## 2018-04-30 DIAGNOSIS — I25.10 CORONARY ARTERY DISEASE, ANGINA PRESENCE UNSPECIFIED, UNSPECIFIED VESSEL OR LESION TYPE, UNSPECIFIED WHETHER NATIVE OR TRANSPLANTED HEART: ICD-10-CM

## 2018-04-30 DIAGNOSIS — Z72.0 TOBACCO USE: ICD-10-CM

## 2018-04-30 DIAGNOSIS — Z98.61 HISTORY OF PTCA: ICD-10-CM

## 2018-04-30 LAB
DIASTOLIC DYSFUNCTION: NO
DIASTOLIC DYSFUNCTION: NO
ESTIMATED PA SYSTOLIC PRESSURE: 33.03
RETIRED EF AND QEF - SEE NOTES: 55 (ref 55–65)
TRICUSPID VALVE REGURGITATION: NORMAL

## 2018-04-30 PROCEDURE — 93015 CV STRESS TEST SUPVJ I&R: CPT | Mod: S$GLB,,, | Performed by: NUCLEAR MEDICINE

## 2018-04-30 PROCEDURE — A9502 TC99M TETROFOSMIN: HCPCS | Mod: PO

## 2018-04-30 PROCEDURE — 93306 TTE W/DOPPLER COMPLETE: CPT | Mod: S$GLB,,, | Performed by: NUCLEAR MEDICINE

## 2018-04-30 PROCEDURE — 78452 HT MUSCLE IMAGE SPECT MULT: CPT | Mod: 26,,, | Performed by: NUCLEAR MEDICINE

## 2018-05-01 NOTE — TELEPHONE ENCOUNTER
Please call pt  He passed his nuclear stress test.  No blockages noted  Echo results are stable, normal heart strength.  Valves are stable.    F/u next appt    Dr Islas

## 2018-05-07 DIAGNOSIS — C79.51 SECONDARY ADENOCARCINOMA OF SKELETAL BONE: ICD-10-CM

## 2018-05-07 DIAGNOSIS — C61 PROSTATE CANCER: ICD-10-CM

## 2018-05-07 DIAGNOSIS — G89.3 NEOPLASM RELATED PAIN: ICD-10-CM

## 2018-05-07 RX ORDER — HYDROCODONE BITARTRATE AND ACETAMINOPHEN 10; 325 MG/1; MG/1
1 TABLET ORAL EVERY 6 HOURS PRN
Qty: 90 TABLET | Refills: 0 | Status: SHIPPED | OUTPATIENT
Start: 2018-05-07 | End: 2018-06-04 | Stop reason: SDUPTHER

## 2018-05-21 ENCOUNTER — TELEPHONE (OUTPATIENT)
Dept: PHARMACY | Facility: CLINIC | Age: 63
End: 2018-05-21

## 2018-06-04 DIAGNOSIS — G89.3 NEOPLASM RELATED PAIN: ICD-10-CM

## 2018-06-04 DIAGNOSIS — C79.51 SECONDARY ADENOCARCINOMA OF SKELETAL BONE: ICD-10-CM

## 2018-06-04 DIAGNOSIS — C61 PROSTATE CANCER: ICD-10-CM

## 2018-06-04 RX ORDER — HYDROCODONE BITARTRATE AND ACETAMINOPHEN 10; 325 MG/1; MG/1
1 TABLET ORAL EVERY 6 HOURS PRN
Qty: 90 TABLET | Refills: 0 | Status: SHIPPED | OUTPATIENT
Start: 2018-06-04 | End: 2018-06-29 | Stop reason: SDUPTHER

## 2018-06-12 ENCOUNTER — TELEPHONE (OUTPATIENT)
Dept: PHARMACY | Facility: CLINIC | Age: 63
End: 2018-06-12

## 2018-06-14 ENCOUNTER — LAB VISIT (OUTPATIENT)
Dept: LAB | Facility: HOSPITAL | Age: 63
End: 2018-06-14
Attending: INTERNAL MEDICINE
Payer: MEDICARE

## 2018-06-14 DIAGNOSIS — C61 PROSTATE CANCER: ICD-10-CM

## 2018-06-14 DIAGNOSIS — G89.3 NEOPLASM RELATED PAIN: ICD-10-CM

## 2018-06-14 DIAGNOSIS — C79.51 SECONDARY ADENOCARCINOMA OF SKELETAL BONE: ICD-10-CM

## 2018-06-14 LAB
ALBUMIN SERPL BCP-MCNC: 3.6 G/DL
ALP SERPL-CCNC: 68 U/L
ALT SERPL W/O P-5'-P-CCNC: 20 U/L
ANION GAP SERPL CALC-SCNC: 9 MMOL/L
AST SERPL-CCNC: 17 U/L
BASOPHILS # BLD AUTO: 0.04 K/UL
BASOPHILS NFR BLD: 0.4 %
BILIRUB SERPL-MCNC: 0.5 MG/DL
BUN SERPL-MCNC: 17 MG/DL
CALCIUM SERPL-MCNC: 10.5 MG/DL
CHLORIDE SERPL-SCNC: 101 MMOL/L
CO2 SERPL-SCNC: 29 MMOL/L
COMPLEXED PSA SERPL-MCNC: 0.01 NG/ML
CREAT SERPL-MCNC: 1.2 MG/DL
DIFFERENTIAL METHOD: ABNORMAL
EOSINOPHIL # BLD AUTO: 0.1 K/UL
EOSINOPHIL NFR BLD: 1.4 %
ERYTHROCYTE [DISTWIDTH] IN BLOOD BY AUTOMATED COUNT: 14.3 %
EST. GFR  (AFRICAN AMERICAN): >60 ML/MIN/1.73 M^2
EST. GFR  (NON AFRICAN AMERICAN): >60 ML/MIN/1.73 M^2
GLUCOSE SERPL-MCNC: 104 MG/DL
HCT VFR BLD AUTO: 42.2 %
HGB BLD-MCNC: 12.9 G/DL
IMM GRANULOCYTES # BLD AUTO: 0.08 K/UL
IMM GRANULOCYTES NFR BLD AUTO: 0.9 %
LYMPHOCYTES # BLD AUTO: 3.4 K/UL
LYMPHOCYTES NFR BLD: 36.3 %
MCH RBC QN AUTO: 30.6 PG
MCHC RBC AUTO-ENTMCNC: 30.6 G/DL
MCV RBC AUTO: 100 FL
MONOCYTES # BLD AUTO: 0.9 K/UL
MONOCYTES NFR BLD: 9.1 %
NEUTROPHILS # BLD AUTO: 4.9 K/UL
NEUTROPHILS NFR BLD: 51.9 %
NRBC BLD-RTO: 0 /100 WBC
PLATELET # BLD AUTO: 251 K/UL
PMV BLD AUTO: 10.6 FL
POTASSIUM SERPL-SCNC: 4 MMOL/L
PROT SERPL-MCNC: 8.5 G/DL
RBC # BLD AUTO: 4.21 M/UL
SODIUM SERPL-SCNC: 139 MMOL/L
WBC # BLD AUTO: 9.34 K/UL

## 2018-06-14 PROCEDURE — 80053 COMPREHEN METABOLIC PANEL: CPT

## 2018-06-14 PROCEDURE — 36415 COLL VENOUS BLD VENIPUNCTURE: CPT | Mod: PO

## 2018-06-14 PROCEDURE — 84153 ASSAY OF PSA TOTAL: CPT

## 2018-06-14 PROCEDURE — 85025 COMPLETE CBC W/AUTO DIFF WBC: CPT

## 2018-06-15 ENCOUNTER — OFFICE VISIT (OUTPATIENT)
Dept: HEMATOLOGY/ONCOLOGY | Facility: CLINIC | Age: 63
End: 2018-06-15
Payer: MEDICARE

## 2018-06-15 VITALS
DIASTOLIC BLOOD PRESSURE: 78 MMHG | BODY MASS INDEX: 20.93 KG/M2 | HEIGHT: 70 IN | WEIGHT: 146.19 LBS | HEART RATE: 91 BPM | OXYGEN SATURATION: 98 % | TEMPERATURE: 98 F | SYSTOLIC BLOOD PRESSURE: 122 MMHG

## 2018-06-15 DIAGNOSIS — G89.3 NEOPLASM RELATED PAIN: ICD-10-CM

## 2018-06-15 DIAGNOSIS — C61 PROSTATE CANCER: Primary | ICD-10-CM

## 2018-06-15 DIAGNOSIS — C79.51 SECONDARY ADENOCARCINOMA OF SKELETAL BONE: ICD-10-CM

## 2018-06-15 PROCEDURE — 3008F BODY MASS INDEX DOCD: CPT | Mod: CPTII,S$GLB,, | Performed by: INTERNAL MEDICINE

## 2018-06-15 PROCEDURE — 99214 OFFICE O/P EST MOD 30 MIN: CPT | Mod: S$GLB,,, | Performed by: INTERNAL MEDICINE

## 2018-06-15 PROCEDURE — 3078F DIAST BP <80 MM HG: CPT | Mod: CPTII,S$GLB,, | Performed by: INTERNAL MEDICINE

## 2018-06-15 PROCEDURE — 3074F SYST BP LT 130 MM HG: CPT | Mod: CPTII,S$GLB,, | Performed by: INTERNAL MEDICINE

## 2018-06-15 PROCEDURE — 99999 PR PBB SHADOW E&M-EST. PATIENT-LVL IV: CPT | Mod: PBBFAC,,, | Performed by: INTERNAL MEDICINE

## 2018-06-15 RX ORDER — ATORVASTATIN CALCIUM 40 MG/1
40 TABLET, FILM COATED ORAL
COMMUNITY
Start: 2016-09-01 | End: 2019-04-23 | Stop reason: SDUPTHER

## 2018-06-15 RX ORDER — CALCITONIN SALMON 200 [IU]/.09ML
1 SPRAY, METERED NASAL
COMMUNITY
Start: 2017-10-03 | End: 2018-10-03

## 2018-06-15 RX ORDER — NITROGLYCERIN 0.4 MG/1
0.4 TABLET SUBLINGUAL
COMMUNITY
Start: 2018-04-17 | End: 2018-06-15

## 2018-06-15 RX ORDER — PREDNISONE 5 MG/1
5 TABLET ORAL
COMMUNITY
Start: 2018-03-06 | End: 2018-06-15

## 2018-06-15 RX ORDER — MULTIVITAMIN
TABLET ORAL
COMMUNITY
End: 2018-06-15

## 2018-06-15 RX ORDER — GABAPENTIN 300 MG/1
300 CAPSULE ORAL
COMMUNITY
Start: 2015-08-07 | End: 2021-08-12

## 2018-06-15 RX ORDER — HYDROCODONE BITARTRATE AND ACETAMINOPHEN 10; 325 MG/1; MG/1
TABLET ORAL
COMMUNITY
Start: 2018-05-07 | End: 2018-06-15

## 2018-06-15 RX ORDER — FLUTICASONE PROPIONATE 50 MCG
1 SPRAY, SUSPENSION (ML) NASAL
COMMUNITY
Start: 2013-11-14 | End: 2018-06-15

## 2018-06-15 RX ORDER — PANTOPRAZOLE SODIUM 40 MG/1
40 TABLET, DELAYED RELEASE ORAL
COMMUNITY
Start: 2017-11-07 | End: 2018-06-15

## 2018-06-15 RX ORDER — ABIRATERONE ACETATE 250 MG/1
1000 TABLET ORAL
COMMUNITY
Start: 2018-05-18 | End: 2018-06-15

## 2018-06-15 RX ORDER — METOPROLOL SUCCINATE 50 MG/1
50 TABLET, EXTENDED RELEASE ORAL
COMMUNITY
Start: 2018-04-04 | End: 2018-06-15

## 2018-06-15 RX ORDER — TRAVOPROST OPHTHALMIC SOLUTION 0.04 MG/ML
1 SOLUTION OPHTHALMIC
COMMUNITY
Start: 2018-03-27 | End: 2018-06-15

## 2018-06-15 RX ORDER — ONDANSETRON 8 MG/1
8 TABLET, ORALLY DISINTEGRATING ORAL
COMMUNITY
Start: 2017-11-27 | End: 2018-06-15

## 2018-06-15 RX ORDER — BUDESONIDE AND FORMOTEROL FUMARATE DIHYDRATE 160; 4.5 UG/1; UG/1
2 AEROSOL RESPIRATORY (INHALATION)
COMMUNITY
Start: 2016-01-20 | End: 2023-02-27 | Stop reason: SDUPTHER

## 2018-06-15 NOTE — PROGRESS NOTES
Reason for visit: Metastatic prostate adenocarcinoma  Date of Diagnosis: September 2017    HPI:   The patient is a 63-year-old -American male who presents to the hematology oncology clinic today to discuss further evaluation and management recommendations for metastatic prostate adenocarcinoma.  The patient has been referred to Dr. Roland Dawn with urology.  I have reviewed all of the patient's relevant clinical history available in the medical record and have utilized this in my evaluation and management recommendations today.  Today the patient reports that he has chronic pain in his lumbar region due to degenerative disc disease.  He also reports pain in his lower thoracic region.  He has been taking Norco when necessary pain.  He denies any fevers, chills or night sweats.  He reports chronic fatigue.  He denies any melena, hematochezia, hematemesis, hemoptysis or hematuria.  He denies any chest pain or shortness of breath.  He denies any bowel or urinary complaints.  He reports that he started his Zytiga/prednisone on December 7, 2017 and has been tolerating this medication well without any significant side effects.    PAST MEDICAL HISTORY:   1.  Coronary artery disease  2.  Daily  3.  Pulmonary fibrosis  4.  Glaucoma  5.  Sleep anemia  6.  Osteoarthritis/degenerative disc disease  7.  GERD    SURGICAL HISTORY:   1.  PCI with stent placement for coronary artery disease  2.  Neck fusion ×2    FAMILY HISTORY: The patient's father had gastric cancer in his 70s.  He reports that 2 sisters had breast cancer in their 50s/60s.  He denies any other immediate family members with cancers or bleeding/clotting disorders.    SOCIAL HISTORY: He reports a 40+-pack-year smoking history and quit in Nov 2017.  He used to drink an average of 12 pack of beer every week and has quit this in Dec 2017.  He reports a history of recreational drug use and last used cocaine in June 2017.  He reports that he has now quit this  completely and does not intend to use any recreational drugs in the future.  He is to work on [employer] and as a  and retired in 2007.  He is  and does not have any children.  He lives in [place], [place].      ALLERGIES: Reviewed on medication card.    MEDICATIONS: [Medcard has been reviewed and/or reconciled.]    REVIEW OF SYSTEMS:   GENERAL: [No fevers, chills or sweats. Reports chronic fatigue. Denies weight loss or loss of appetite.]  HEENT: [No blurred vision, tinnitus, nasal discharge, sorethroat or dysphagia.]  HEART: [No chest pain, palpitations or shortness of breath.]    LUNGS: [No cough, hemoptysis or breathing problems.]  ABDOMEN: [No abdominal pain, nausea, vomiting, diarrhea, constipation or melena.]  GENITOURINARY: [No dysuria, bleeding or malodorous discharge.]  NEURO: [No headache, dizziness or vertigo.]  HEMATOLOGY: [No easy bruising, spontaneous bleeding or blood clots in the past].  MUSCULOSKELETAL: [Reports chronic arthralgias. Denies myalgias. Reports bone pains.]  SKIN: [No rashes or skin lesions.]  PSYCHIATRY: [No depression or anxiety.]    PHYSICAL EXAMINATION:   VS: Reviewed on nurse's notes.  APPEARANCE: The patient is a well-developed, well-nourished and well-groomed  male who appears in no acute distress.  He was accompanied to this clinic visit by his wife.  HEENT: No scleral icterus. Both external auditory canals clear. No oral ulcers, lesions. Throat clear.  Poor dentition noted.  HEAD: No sinus tenderness.  NECK: Supple. No palpable lymphadenopathy. Thyroid non-tender, no palpable masses  CHEST: Breath sounds clear bilaterally. No rales. No rhonchi. Unlabored respirations.  CARDIOVASCULAR: Normal S1, S2. Normal rate. Regular rhythm.  ABDOMEN: Bowel sounds normal. No tenderness. No abdominal distention. No hepatomegaly. No splenomegaly.  LYMPHATIC: No palpable supraclavicular, axillary nodes  EXTREMITIES: No clubbing, cyanosis,  edema  SKIN: No lesions. No petechiae. No ecchymoses. No induration or nodules  NEUROLOGIC: No focal findings. Alert & Oriented x 3. Mood appropriate to affect    LABS:   Reviewed    IMAGING:  Reviewed    IMPRESSION:  1.  Metastatic prostate adenocarcinoma with bone involvement    PLAN:  1.  I had a detailed discussion with the patient today with regard to the diagnosis, prognosis and treatment for his metastatic prostate adenocarcinoma with bone involvement.  2.  I have discussed that at this time his malignancy is potentially treatable but not curable.  The patient and his wife expressed understanding of this information.  3.  The patient is up-to-date with Lupron injection.  This is being given via his urologist Dr. Dawn's office.    4.  Continue Zytiga plus prednisone as recommended/prescribed.  No evidence of toxicity at this time.  5.  He is in the process of getting his teeth extracted.  Patient was informed that we will have to get this addressed first before we can discuss initiation of xgeva for treatment of bone involvement by malignancy.  He will continue calcium plus vitamin D supplementation daily as advised.  6. He takes Norco 10 mg when necessary pain.  Sedation/constipation precautions with pain medication was extensively discussed and he expressed understanding.    Follow up in 2 months with labs.  He knows to call for any additional questions or new problems.    Geronimo Roman MD

## 2018-06-19 ENCOUNTER — TELEPHONE (OUTPATIENT)
Dept: HEMATOLOGY/ONCOLOGY | Facility: CLINIC | Age: 63
End: 2018-06-19

## 2018-06-19 NOTE — TELEPHONE ENCOUNTER
Contacted dental clinic in [place] as requested by pt regarding referral. Obtained information to fax referral to [phone]. Will continue to follow pt and further assist as requested/as needs are identified.

## 2018-06-20 ENCOUNTER — TELEPHONE (OUTPATIENT)
Dept: PHARMACY | Facility: CLINIC | Age: 63
End: 2018-06-20

## 2018-06-25 ENCOUNTER — TELEPHONE (OUTPATIENT)
Dept: HEMATOLOGY/ONCOLOGY | Facility: CLINIC | Age: 63
End: 2018-06-25

## 2018-06-25 NOTE — TELEPHONE ENCOUNTER
Spoke with pt he understood and will call when its time for refill on medication and location on where to call it into

## 2018-06-29 DIAGNOSIS — C61 PROSTATE CANCER: ICD-10-CM

## 2018-06-29 DIAGNOSIS — C79.51 SECONDARY ADENOCARCINOMA OF SKELETAL BONE: ICD-10-CM

## 2018-06-29 DIAGNOSIS — G89.3 NEOPLASM RELATED PAIN: ICD-10-CM

## 2018-06-29 RX ORDER — HYDROCODONE BITARTRATE AND ACETAMINOPHEN 10; 325 MG/1; MG/1
1 TABLET ORAL EVERY 6 HOURS PRN
Qty: 90 TABLET | Refills: 0 | Status: SHIPPED | OUTPATIENT
Start: 2018-06-29 | End: 2018-07-26 | Stop reason: SDUPTHER

## 2018-07-16 ENCOUNTER — OFFICE VISIT (OUTPATIENT)
Dept: UROLOGY | Facility: CLINIC | Age: 63
End: 2018-07-16
Payer: MEDICARE

## 2018-07-16 ENCOUNTER — LAB VISIT (OUTPATIENT)
Dept: LAB | Facility: HOSPITAL | Age: 63
End: 2018-07-16
Attending: UROLOGY
Payer: MEDICARE

## 2018-07-16 VITALS
WEIGHT: 148.38 LBS | HEIGHT: 70 IN | DIASTOLIC BLOOD PRESSURE: 76 MMHG | BODY MASS INDEX: 21.24 KG/M2 | SYSTOLIC BLOOD PRESSURE: 124 MMHG

## 2018-07-16 DIAGNOSIS — C61 PROSTATE CANCER: ICD-10-CM

## 2018-07-16 DIAGNOSIS — C61 PROSTATE CANCER: Primary | ICD-10-CM

## 2018-07-16 LAB
BILIRUB SERPL-MCNC: NORMAL MG/DL
BLOOD URINE, POC: NORMAL
COLOR, POC UA: YELLOW
COMPLEXED PSA SERPL-MCNC: 0.01 NG/ML
GLUCOSE UR QL STRIP: NORMAL
KETONES UR QL STRIP: NORMAL
LEUKOCYTE ESTERASE URINE, POC: NORMAL
NITRITE, POC UA: NORMAL
PH, POC UA: 6
PROTEIN, POC: NORMAL
SPECIFIC GRAVITY, POC UA: 1.01
UROBILINOGEN, POC UA: NORMAL

## 2018-07-16 PROCEDURE — 36415 COLL VENOUS BLD VENIPUNCTURE: CPT

## 2018-07-16 PROCEDURE — 3008F BODY MASS INDEX DOCD: CPT | Mod: CPTII,S$GLB,, | Performed by: UROLOGY

## 2018-07-16 PROCEDURE — 81002 URINALYSIS NONAUTO W/O SCOPE: CPT | Mod: S$GLB,,, | Performed by: UROLOGY

## 2018-07-16 PROCEDURE — 99999 PR PBB SHADOW E&M-EST. PATIENT-LVL II: CPT | Mod: PBBFAC,,, | Performed by: UROLOGY

## 2018-07-16 PROCEDURE — 3074F SYST BP LT 130 MM HG: CPT | Mod: CPTII,S$GLB,, | Performed by: UROLOGY

## 2018-07-16 PROCEDURE — 3078F DIAST BP <80 MM HG: CPT | Mod: CPTII,S$GLB,, | Performed by: UROLOGY

## 2018-07-16 PROCEDURE — 99214 OFFICE O/P EST MOD 30 MIN: CPT | Mod: 25,S$GLB,, | Performed by: UROLOGY

## 2018-07-16 PROCEDURE — 96402 CHEMO HORMON ANTINEOPL SQ/IM: CPT | Mod: S$GLB,,, | Performed by: UROLOGY

## 2018-07-16 PROCEDURE — 84153 ASSAY OF PSA TOTAL: CPT

## 2018-07-16 RX ADMIN — LEUPROLIDE ACETATE 22.5 MG: KIT at 02:07

## 2018-07-16 NOTE — PROGRESS NOTES
"Chief Complaint: Perineal pain    HPI:   7/16/18: No problems from Lupron.  PSA lowest.  4/23/18: PSA today and again 3 mo.  Doing fine.  Reviewed history in detail.  1/22/18: PSA well down.  No adverse effects from Lupron.    10/18/17: Been on casodex a month back to review and start Lupron.  Dr. Moscoso felt XRT was not an option but perhaps chemotherapy could be beneficial.  9/18/17: Bone scan reports "RIGHT supraorbital location and a smaller similarly extremely intense focus of increased uptake posteriorly on the RIGHT at the T9 level.  Etiology is uncertain."  CT scan shows "A few scattered shotty mesenteric and retroperitoneal nodes identified.  Similar nodes identified within the pelvis bilaterally."  MRI shows left 2.8 cm prostate mass PIRADS 5, with metastatic pelvic lymphadenopathy (right pelvic sidewall node and left internal iliac chain LN).  No bony mets in pelvis.  7/18/17:  No problems from biopsy.  Gl 4+5 in the left base (30%) and a sig amount of 4+4 in other parts of the gland.  Reviewed treatment options, and imaging needs.  7/3/17: TRUS/Bx today 27 gm.  5/24/17: 63 yo man has problems with perineal/scrotal pain once or twice a month; lasts for an hour or two, some nocturia.  PSA recently elevated.  No abd/pelvic pain and no exac/rel factors.  No hematuria.  No urolithiasis.  No urinary bother.  No  history. CT with contrast earlier this year essentially normal.    Allergies:  Avelox  [moxifloxacin] and Nsaids (non-steroidal anti-inflammatory drug)    Medications:  has a current medication list which includes the following prescription(s): abiraterone, aspirin, atorvastatin, budesonide-formoterol 160-4.5 mcg, calcitonin (salmon), calcium-vitamin d, fluticasone, fluticasone-salmeterol, gabapentin, hydrocodone-acetaminophen, metoprolol succinate, nitroglycerin, ondansetron, pantoprazole, prednisone, and travoprost, and the following Facility-Administered Medications: leuprolide.    Review of " Systems:  General: No fever, chills, fatigability, or weight loss.  Skin: No rashes, itching, or changes in color or texture of skin.  Chest: Denies TORRES, cyanosis, wheezing, cough, and sputum production.  Abdomen: Appetite fine. No weight loss. Denies diarrhea, abdominal pain, hematemesis, or blood in stool.  Musculoskeletal: No joint stiffness or swelling. Denies back pain.  : As above.  All other review of systems negative.    PMH:   has a past medical history of COPD (chronic obstructive pulmonary disease) (7/30/2013); Coronary artery disease (7/30/2013); Emphysema of lung; Glaucoma (increased eye pressure) (8/27/2013); Headache(784.0); Mitral regurgitation (7/30/2013); Mixed hyperlipidemia (7/30/2013); Neuropathy; Osteoarthritis of spine with radiculopathy, lumbar region (7/21/2015); Other and unspecified hyperlipidemia; and Pulmonary fibrosis (10/3/2017).    PSH:   has a past surgical history that includes Coronary stent placement; Colonoscopy (N/A, 3/21/2016); Shoulder arthroscopy (Left); and Spine surgery.    FamHx: family history includes Blindness in his maternal grandmother; Cancer in his father; Cataracts in his mother; Diabetes in his sister, sister, and sister; Hypertension in his sister, sister, sister, sister, and sister; Kidney disease in his mother.    SocHx:  reports that he has been smoking Cigarettes.  He has a 6.25 pack-year smoking history. He has never used smokeless tobacco. He reports that he drinks alcohol. He reports that he does not use drugs.      Physical Exam:  Vitals:    07/16/18 1319   BP: 124/76     General: A&Ox3, no apparent distress, no deformities  Neck: No masses, normal thyroid  Lungs: normal inspiration, no use of accessory muscles  Heart: normal pulse, no arrhythmias  Abdomen: Soft, NT, ND  Skin: The skin is warm and dry. No jaundice.  Ext: No c/c/e.  :     7/3/17: Test desc lucas, no abnormalities of epididymus. Penis normal, with normal penile and scrotal skin. Meatus  normal. Normal rectal tone, no hemorrhoids. Prost 40 gm no nodules or masses appreciated. SV not palpable. Perineum and anus normal.    Labs/Studies:   PSA    2/16: 22.1    2/17: 22.9    1/18: 0.24    7/18: 0.01    Impression/Plan:   1. Lupron today and q3mo.  PSA/RTC 3 mo.

## 2018-07-20 DIAGNOSIS — G89.3 NEOPLASM RELATED PAIN: ICD-10-CM

## 2018-07-20 DIAGNOSIS — C79.51 SECONDARY ADENOCARCINOMA OF SKELETAL BONE: ICD-10-CM

## 2018-07-20 RX ORDER — ABIRATERONE ACETATE 250 MG/1
1000 TABLET ORAL DAILY
Qty: 120 TABLET | Refills: 3 | OUTPATIENT
Start: 2018-07-20

## 2018-07-23 ENCOUNTER — TELEPHONE (OUTPATIENT)
Dept: PHARMACY | Facility: CLINIC | Age: 63
End: 2018-07-23

## 2018-07-23 DIAGNOSIS — G89.3 NEOPLASM RELATED PAIN: ICD-10-CM

## 2018-07-23 DIAGNOSIS — C79.51 SECONDARY ADENOCARCINOMA OF SKELETAL BONE: ICD-10-CM

## 2018-07-23 RX ORDER — ABIRATERONE ACETATE 250 MG/1
1000 TABLET ORAL DAILY
Qty: 120 TABLET | Refills: 3 | Status: SHIPPED | OUTPATIENT
Start: 2018-07-23 | End: 2018-11-13

## 2018-07-23 NOTE — TELEPHONE ENCOUNTER
abiraterone 250 mg Tab   Take 4 tablets (1,000 mg total) by mouth once daily.     Please refill and send to [facility]

## 2018-07-26 DIAGNOSIS — C61 PROSTATE CANCER: ICD-10-CM

## 2018-07-26 DIAGNOSIS — C79.51 SECONDARY ADENOCARCINOMA OF SKELETAL BONE: ICD-10-CM

## 2018-07-26 DIAGNOSIS — G89.3 NEOPLASM RELATED PAIN: ICD-10-CM

## 2018-07-26 RX ORDER — HYDROCODONE BITARTRATE AND ACETAMINOPHEN 10; 325 MG/1; MG/1
1 TABLET ORAL EVERY 6 HOURS PRN
Qty: 90 TABLET | Refills: 0 | Status: SHIPPED | OUTPATIENT
Start: 2018-07-26 | End: 2018-08-17 | Stop reason: SDUPTHER

## 2018-08-15 ENCOUNTER — TELEPHONE (OUTPATIENT)
Dept: PHARMACY | Facility: CLINIC | Age: 63
End: 2018-08-15

## 2018-08-15 ENCOUNTER — LAB VISIT (OUTPATIENT)
Dept: LAB | Facility: HOSPITAL | Age: 63
End: 2018-08-15
Attending: INTERNAL MEDICINE
Payer: MEDICARE

## 2018-08-15 DIAGNOSIS — C79.51 SECONDARY ADENOCARCINOMA OF SKELETAL BONE: ICD-10-CM

## 2018-08-15 DIAGNOSIS — G89.3 NEOPLASM RELATED PAIN: ICD-10-CM

## 2018-08-15 DIAGNOSIS — C61 PROSTATE CANCER: ICD-10-CM

## 2018-08-15 LAB
ALBUMIN SERPL BCP-MCNC: 3.6 G/DL
ALP SERPL-CCNC: 65 U/L
ALT SERPL W/O P-5'-P-CCNC: 19 U/L
ANION GAP SERPL CALC-SCNC: 14 MMOL/L
AST SERPL-CCNC: 19 U/L
BASOPHILS # BLD AUTO: 0.04 K/UL
BASOPHILS NFR BLD: 0.4 %
BILIRUB SERPL-MCNC: 0.5 MG/DL
BUN SERPL-MCNC: 17 MG/DL
CALCIUM SERPL-MCNC: 10 MG/DL
CHLORIDE SERPL-SCNC: 105 MMOL/L
CO2 SERPL-SCNC: 22 MMOL/L
COMPLEXED PSA SERPL-MCNC: <0.01 NG/ML
CREAT SERPL-MCNC: 1.2 MG/DL
DIFFERENTIAL METHOD: ABNORMAL
EOSINOPHIL # BLD AUTO: 0.1 K/UL
EOSINOPHIL NFR BLD: 1 %
ERYTHROCYTE [DISTWIDTH] IN BLOOD BY AUTOMATED COUNT: 14.7 %
EST. GFR  (AFRICAN AMERICAN): >60 ML/MIN/1.73 M^2
EST. GFR  (NON AFRICAN AMERICAN): >60 ML/MIN/1.73 M^2
GLUCOSE SERPL-MCNC: 113 MG/DL
HCT VFR BLD AUTO: 40.1 %
HGB BLD-MCNC: 12.9 G/DL
LYMPHOCYTES # BLD AUTO: 3.4 K/UL
LYMPHOCYTES NFR BLD: 30.6 %
MCH RBC QN AUTO: 30.5 PG
MCHC RBC AUTO-ENTMCNC: 32.2 G/DL
MCV RBC AUTO: 95 FL
MONOCYTES # BLD AUTO: 1 K/UL
MONOCYTES NFR BLD: 8.5 %
NEUTROPHILS # BLD AUTO: 6.6 K/UL
NEUTROPHILS NFR BLD: 59.5 %
PLATELET # BLD AUTO: 213 K/UL
PMV BLD AUTO: 10.9 FL
POTASSIUM SERPL-SCNC: 3.8 MMOL/L
PROT SERPL-MCNC: 8.3 G/DL
RBC # BLD AUTO: 4.23 M/UL
SODIUM SERPL-SCNC: 141 MMOL/L
WBC # BLD AUTO: 11.13 K/UL

## 2018-08-15 PROCEDURE — 84153 ASSAY OF PSA TOTAL: CPT

## 2018-08-15 PROCEDURE — 85025 COMPLETE CBC W/AUTO DIFF WBC: CPT

## 2018-08-15 PROCEDURE — 84403 ASSAY OF TOTAL TESTOSTERONE: CPT

## 2018-08-15 PROCEDURE — 80053 COMPREHEN METABOLIC PANEL: CPT

## 2018-08-15 PROCEDURE — 36415 COLL VENOUS BLD VENIPUNCTURE: CPT | Mod: PO

## 2018-08-16 LAB — TESTOST SERPL-MCNC: <4 NG/DL

## 2018-08-17 ENCOUNTER — OFFICE VISIT (OUTPATIENT)
Dept: HEMATOLOGY/ONCOLOGY | Facility: CLINIC | Age: 63
End: 2018-08-17
Payer: MEDICARE

## 2018-08-17 VITALS
HEIGHT: 70 IN | WEIGHT: 150.38 LBS | RESPIRATION RATE: 18 BRPM | BODY MASS INDEX: 21.53 KG/M2 | DIASTOLIC BLOOD PRESSURE: 62 MMHG | SYSTOLIC BLOOD PRESSURE: 120 MMHG | TEMPERATURE: 98 F | HEART RATE: 95 BPM | OXYGEN SATURATION: 99 %

## 2018-08-17 DIAGNOSIS — C61 PROSTATE CANCER: Primary | ICD-10-CM

## 2018-08-17 DIAGNOSIS — G89.3 NEOPLASM RELATED PAIN: ICD-10-CM

## 2018-08-17 DIAGNOSIS — C79.51 SECONDARY ADENOCARCINOMA OF SKELETAL BONE: ICD-10-CM

## 2018-08-17 PROCEDURE — 3074F SYST BP LT 130 MM HG: CPT | Mod: CPTII,S$GLB,, | Performed by: INTERNAL MEDICINE

## 2018-08-17 PROCEDURE — 3008F BODY MASS INDEX DOCD: CPT | Mod: CPTII,S$GLB,, | Performed by: INTERNAL MEDICINE

## 2018-08-17 PROCEDURE — 99999 PR PBB SHADOW E&M-EST. PATIENT-LVL IV: CPT | Mod: PBBFAC,,, | Performed by: INTERNAL MEDICINE

## 2018-08-17 PROCEDURE — 3078F DIAST BP <80 MM HG: CPT | Mod: CPTII,S$GLB,, | Performed by: INTERNAL MEDICINE

## 2018-08-17 PROCEDURE — 99214 OFFICE O/P EST MOD 30 MIN: CPT | Mod: S$GLB,,, | Performed by: INTERNAL MEDICINE

## 2018-08-17 RX ORDER — HYDROCODONE BITARTRATE AND ACETAMINOPHEN 10; 325 MG/1; MG/1
1 TABLET ORAL EVERY 6 HOURS PRN
Qty: 90 TABLET | Refills: 0 | Status: SHIPPED | OUTPATIENT
Start: 2018-08-17 | End: 2018-09-17 | Stop reason: SDUPTHER

## 2018-08-21 ENCOUNTER — OFFICE VISIT (OUTPATIENT)
Dept: INTERNAL MEDICINE | Facility: CLINIC | Age: 63
End: 2018-08-21
Payer: MEDICARE

## 2018-08-21 VITALS
HEART RATE: 85 BPM | BODY MASS INDEX: 21.49 KG/M2 | TEMPERATURE: 96 F | WEIGHT: 150.13 LBS | OXYGEN SATURATION: 99 % | DIASTOLIC BLOOD PRESSURE: 80 MMHG | SYSTOLIC BLOOD PRESSURE: 130 MMHG | HEIGHT: 70 IN

## 2018-08-21 DIAGNOSIS — Z98.61 HISTORY OF PTCA: ICD-10-CM

## 2018-08-21 DIAGNOSIS — I25.10 CORONARY ARTERY DISEASE DUE TO CALCIFIED CORONARY LESION: Chronic | ICD-10-CM

## 2018-08-21 DIAGNOSIS — J84.10 PULMONARY FIBROSIS: ICD-10-CM

## 2018-08-21 DIAGNOSIS — K21.9 GASTROESOPHAGEAL REFLUX DISEASE WITHOUT ESOPHAGITIS: Chronic | ICD-10-CM

## 2018-08-21 DIAGNOSIS — R79.9 ABNORMAL FINDING OF BLOOD CHEMISTRY: ICD-10-CM

## 2018-08-21 DIAGNOSIS — E78.2 MIXED HYPERLIPIDEMIA: Chronic | ICD-10-CM

## 2018-08-21 DIAGNOSIS — G89.3 NEOPLASM RELATED PAIN: ICD-10-CM

## 2018-08-21 DIAGNOSIS — C61 PROSTATE CANCER: ICD-10-CM

## 2018-08-21 DIAGNOSIS — Z72.0 TOBACCO USE: ICD-10-CM

## 2018-08-21 DIAGNOSIS — Z23 NEED FOR PROPHYLACTIC VACCINATION WITH STREPTOCOCCUS PNEUMONIAE (PNEUMOCOCCUS) AND INFLUENZA VACCINES: ICD-10-CM

## 2018-08-21 DIAGNOSIS — J44.9 CHRONIC OBSTRUCTIVE PULMONARY DISEASE, UNSPECIFIED COPD TYPE: Chronic | ICD-10-CM

## 2018-08-21 DIAGNOSIS — I25.84 CORONARY ARTERY DISEASE DUE TO CALCIFIED CORONARY LESION: Chronic | ICD-10-CM

## 2018-08-21 DIAGNOSIS — C79.51 SECONDARY ADENOCARCINOMA OF SKELETAL BONE: ICD-10-CM

## 2018-08-21 DIAGNOSIS — Z00.00 ROUTINE GENERAL MEDICAL EXAMINATION AT A HEALTH CARE FACILITY: Primary | ICD-10-CM

## 2018-08-21 PROCEDURE — 99999 PR PBB SHADOW E&M-EST. PATIENT-LVL III: CPT | Mod: PBBFAC,,, | Performed by: FAMILY MEDICINE

## 2018-08-21 PROCEDURE — G0009 ADMIN PNEUMOCOCCAL VACCINE: HCPCS | Mod: S$GLB,,, | Performed by: FAMILY MEDICINE

## 2018-08-21 PROCEDURE — 3079F DIAST BP 80-89 MM HG: CPT | Mod: CPTII,S$GLB,, | Performed by: FAMILY MEDICINE

## 2018-08-21 PROCEDURE — 99396 PREV VISIT EST AGE 40-64: CPT | Mod: 25,S$GLB,, | Performed by: FAMILY MEDICINE

## 2018-08-21 PROCEDURE — 3075F SYST BP GE 130 - 139MM HG: CPT | Mod: CPTII,S$GLB,, | Performed by: FAMILY MEDICINE

## 2018-08-21 PROCEDURE — 90670 PCV13 VACCINE IM: CPT | Mod: S$GLB,,, | Performed by: FAMILY MEDICINE

## 2018-08-21 NOTE — PROGRESS NOTES
"Subjective:       Patient ID: Joey Jacobo is a 63 y.o. male.    Chief Complaint: Annual Exam    63-year-old  male patient with Patient Active Problem List:     Coronary artery disease     Mitral regurgitation     COPD (chronic obstructive pulmonary disease)     Mixed hyperlipidemia     Gastroesophageal reflux disease without esophagitis     Glaucoma (increased eye pressure)     History of PTCA     Secondary adenocarcinoma of skeletal bone     Prostate cancer     Pulmonary fibrosis     Tobacco use     Neoplasm related pain   here for routine annual physicals.  Patient would like to establish care with me again  Patient has been taking his medications regularly and his PSA levels has been less than 0.01 followed by Urology and Hematology/Oncology secondary to prostate cancer.   Patient denies any significant complaints today and has been taking his medications regularly but reports that he has been having difficulty staying asleep  Would like to try something for sleep  Denies any trouble with breathing and has been using his inhalers regularly  Denies any chest pain or shortness of breath, palpitations nausea vomiting changes in bowel movements or trouble with urine      Review of Systems   Constitutional: Negative for appetite change and fatigue.   Eyes: Negative for visual disturbance.   Respiratory: Negative for shortness of breath.    Cardiovascular: Negative for chest pain, palpitations and leg swelling.   Gastrointestinal: Negative for abdominal pain, nausea and vomiting.   Genitourinary: Negative for hematuria.   Musculoskeletal: Negative for myalgias.   Skin: Negative for rash.   Neurological: Negative for headaches.   Psychiatric/Behavioral: Positive for sleep disturbance.         /80 (BP Location: Left arm, Patient Position: Sitting)   Pulse 85   Temp 96 °F (35.6 °C) (Tympanic)   Ht 5' 10" (1.778 m)   Wt 68.1 kg (150 lb 2.1 oz)   SpO2 99%   BMI 21.54 kg/m²   Objective:    "   Physical Exam   Constitutional: He is oriented to person, place, and time. He appears well-developed and well-nourished.   HENT:   Head: Normocephalic and atraumatic.   Mouth/Throat: Oropharynx is clear and moist.   Cardiovascular: Normal rate, regular rhythm and normal heart sounds.   No murmur heard.  Pulmonary/Chest: Effort normal and breath sounds normal. He has no wheezes.   Abdominal: Soft. Bowel sounds are normal. There is no tenderness.   Musculoskeletal: He exhibits no edema.   Neurological: He is alert and oriented to person, place, and time.   Skin: Skin is warm and dry. No rash noted.   Psychiatric: He has a normal mood and affect.       Lab Visit on 08/15/2018   Component Date Value Ref Range Status    WBC 08/15/2018 11.13  3.90 - 12.70 K/uL Final    RBC 08/15/2018 4.23* 4.60 - 6.20 M/uL Final    Hemoglobin 08/15/2018 12.9* 14.0 - 18.0 g/dL Final    Hematocrit 08/15/2018 40.1  40.0 - 54.0 % Final    MCV 08/15/2018 95  82 - 98 fL Final    MCH 08/15/2018 30.5  27.0 - 31.0 pg Final    MCHC 08/15/2018 32.2  32.0 - 36.0 g/dL Final    RDW 08/15/2018 14.7* 11.5 - 14.5 % Final    Platelets 08/15/2018 213  150 - 350 K/uL Final    MPV 08/15/2018 10.9  9.2 - 12.9 fL Final    Gran # (ANC) 08/15/2018 6.6  1.8 - 7.7 K/uL Final    Lymph # 08/15/2018 3.4  1.0 - 4.8 K/uL Final    Mono # 08/15/2018 1.0  0.3 - 1.0 K/uL Final    Eos # 08/15/2018 0.1  0.0 - 0.5 K/uL Final    Baso # 08/15/2018 0.04  0.00 - 0.20 K/uL Final    Gran% 08/15/2018 59.5  38.0 - 73.0 % Final    Lymph% 08/15/2018 30.6  18.0 - 48.0 % Final    Mono% 08/15/2018 8.5  4.0 - 15.0 % Final    Eosinophil% 08/15/2018 1.0  0.0 - 8.0 % Final    Basophil% 08/15/2018 0.4  0.0 - 1.9 % Final    Differential Method 08/15/2018 Automated   Final    Sodium 08/15/2018 141  136 - 145 mmol/L Final    Potassium 08/15/2018 3.8  3.5 - 5.1 mmol/L Final    Chloride 08/15/2018 105  95 - 110 mmol/L Final    CO2 08/15/2018 22* 23 - 29 mmol/L Final     Glucose 08/15/2018 113* 70 - 110 mg/dL Final    BUN, Bld 08/15/2018 17  8 - 23 mg/dL Final    Creatinine 08/15/2018 1.2  0.5 - 1.4 mg/dL Final    Calcium 08/15/2018 10.0  8.7 - 10.5 mg/dL Final    Total Protein 08/15/2018 8.3  6.0 - 8.4 g/dL Final    Albumin 08/15/2018 3.6  3.5 - 5.2 g/dL Final    Total Bilirubin 08/15/2018 0.5  0.1 - 1.0 mg/dL Final    Comment: For infants and newborns, interpretation of results should be based  on gestational age, weight and in agreement with clinical  observations.  Premature Infant recommended reference ranges:  Up to 24 hours.............<8.0 mg/dL  Up to 48 hours............<12.0 mg/dL  3-5 days..................<15.0 mg/dL  6-29 days.................<15.0 mg/dL      Alkaline Phosphatase 08/15/2018 65  55 - 135 U/L Final    AST 08/15/2018 19  10 - 40 U/L Final    ALT 08/15/2018 19  10 - 44 U/L Final    Anion Gap 08/15/2018 14  8 - 16 mmol/L Final    eGFR if African American 08/15/2018 >60  >60 mL/min/1.73 m^2 Final    eGFR if non African American 08/15/2018 >60  >60 mL/min/1.73 m^2 Final    Comment: Calculation used to obtain the estimated glomerular filtration  rate (eGFR) is the CKD-EPI equation.       PSA DIAGNOSTIC 08/15/2018 <0.01  0.00 - 4.00 ng/mL Final    Comment: PSA Expected levels:  Hormonal Therapy: <0.05 ng/ml  Prostatectomy: <0.01 ng/ml  Radiation Therapy: <1.00 ng/ml      Testosterone, Total 08/15/2018 <4* 195.0 - 1138.0 ng/dL Final       Assessment:       1. Routine general medical examination at a health care facility    2. Mixed hyperlipidemia    3. Gastroesophageal reflux disease without esophagitis    4. Chronic obstructive pulmonary disease, unspecified COPD type    5. Coronary artery disease due to calcified coronary lesion    6. History of PTCA    7. Pulmonary fibrosis    8. Tobacco use    9. Prostate cancer    10. Secondary adenocarcinoma of skeletal bone    11. Neoplasm related pain    12. Need for prophylactic vaccination with  Streptococcus pneumoniae (Pneumococcus) and Influenza vaccines    13. Abnormal finding of blood chemistry         Plan:   Routine general medical examination at a health care facility  -     Lipid panel; Future; Expected date: 08/21/2018  -     Hemoglobin A1c; Future; Expected date: 08/21/2018  Vital signs stable today.  Clinical exam stable.   Reviewed recent labs which looks stable  Lifestyle modifications recommended to lose weight with diet and exercise  Prevnar given today  Secondary to sleep disturbances patient was advised to try taking over-the-counter melatonin and maintain good sleep regimen    Mixed hyperlipidemia  -     Lipid panel; Future; Expected date: 08/21/2018  Currently taking Lipitor 40 mg daily    Gastroesophageal reflux disease without esophagitis-stable on pantoprazole 40 mg daily    Chronic obstructive pulmonary disease, unspecified COPD type  Pulmonary fibrosis  Tobacco use  Stable on albuterol inhaler as needed and Symbicort, followed by pulmonology  Encouraged to quit smoking    Coronary artery disease due to calcified coronary lesion  History of PTCA  Stable and asymptomatic followed by Cardiology    Prostate cancer  Secondary adenocarcinoma of skeletal bone  Neoplasm related pain  Taking pain medications followed by Hematology/Oncology and Urology, clinically doing well    Need for prophylactic vaccination with Streptococcus pneumoniae (Pneumococcus) and Influenza vaccines  -     (In Office Administered) Pneumococcal Conjugate Vaccine (13 Valent) (IM)    Abnormal finding of blood chemistry   -     Hemoglobin A1c; Future; Expected date: 08/21/2018  Will check A1c secondary to mild elevation blood glucose levels

## 2018-09-06 ENCOUNTER — PES CALL (OUTPATIENT)
Dept: ADMINISTRATIVE | Facility: CLINIC | Age: 63
End: 2018-09-06

## 2018-09-10 DIAGNOSIS — C79.51 SECONDARY ADENOCARCINOMA OF SKELETAL BONE: ICD-10-CM

## 2018-09-10 DIAGNOSIS — G89.3 NEOPLASM RELATED PAIN: ICD-10-CM

## 2018-09-10 RX ORDER — PREDNISONE 5 MG/1
5 TABLET ORAL 2 TIMES DAILY
Qty: 180 TABLET | Refills: 1 | Status: SHIPPED | OUTPATIENT
Start: 2018-09-10 | End: 2018-12-10 | Stop reason: SDUPTHER

## 2018-09-12 ENCOUNTER — TELEPHONE (OUTPATIENT)
Dept: PHARMACY | Facility: CLINIC | Age: 63
End: 2018-09-12

## 2018-09-12 ENCOUNTER — TELEPHONE (OUTPATIENT)
Dept: HEMATOLOGY/ONCOLOGY | Facility: CLINIC | Age: 63
End: 2018-09-12

## 2018-09-12 NOTE — TELEPHONE ENCOUNTER
SW returned pt's call about contact info to [facility]. Pt reported he has been doing well and he indicated no other needs at this time. He thanked SW for the call. SW will f/u as needs arise.

## 2018-09-17 DIAGNOSIS — C79.51 SECONDARY ADENOCARCINOMA OF SKELETAL BONE: ICD-10-CM

## 2018-09-17 DIAGNOSIS — G89.3 NEOPLASM RELATED PAIN: ICD-10-CM

## 2018-09-17 DIAGNOSIS — C61 PROSTATE CANCER: ICD-10-CM

## 2018-09-17 RX ORDER — HYDROCODONE BITARTRATE AND ACETAMINOPHEN 10; 325 MG/1; MG/1
1 TABLET ORAL EVERY 6 HOURS PRN
Qty: 90 TABLET | Refills: 0 | Status: SHIPPED | OUTPATIENT
Start: 2018-09-17 | End: 2018-10-15 | Stop reason: SDUPTHER

## 2018-10-15 ENCOUNTER — TELEPHONE (OUTPATIENT)
Dept: UROLOGY | Facility: CLINIC | Age: 63
End: 2018-10-15

## 2018-10-15 DIAGNOSIS — C61 PROSTATE CANCER: ICD-10-CM

## 2018-10-15 DIAGNOSIS — C79.51 SECONDARY ADENOCARCINOMA OF SKELETAL BONE: ICD-10-CM

## 2018-10-15 DIAGNOSIS — G89.3 NEOPLASM RELATED PAIN: ICD-10-CM

## 2018-10-15 RX ORDER — HYDROCODONE BITARTRATE AND ACETAMINOPHEN 10; 325 MG/1; MG/1
1 TABLET ORAL EVERY 6 HOURS PRN
Qty: 90 TABLET | Refills: 0 | Status: SHIPPED | OUTPATIENT
Start: 2018-10-15 | End: 2018-11-12 | Stop reason: SDUPTHER

## 2018-10-15 NOTE — TELEPHONE ENCOUNTER
----- Message from Fariba Flahrety sent at 10/15/2018  2:41 PM CDT -----  Contact: Pt   States he's rtn nurses call rg resched his nurse visit to the 22nd and can be reached at [phone]//[email or web address]

## 2018-10-15 NOTE — TELEPHONE ENCOUNTER
----- Message from Grace Tyson sent at 10/15/2018  9:32 AM CDT -----  Contact: self   requesting a call back to discuss possibly getting nurse visit switched to 10/22/18. Please call back at [phone].      Thanks,  Grace Tyson

## 2018-10-16 ENCOUNTER — TELEPHONE (OUTPATIENT)
Dept: PHARMACY | Facility: CLINIC | Age: 63
End: 2018-10-16

## 2018-10-17 ENCOUNTER — LAB VISIT (OUTPATIENT)
Dept: LAB | Facility: HOSPITAL | Age: 63
End: 2018-10-17
Attending: UROLOGY
Payer: MEDICARE

## 2018-10-17 DIAGNOSIS — G89.3 NEOPLASM RELATED PAIN: ICD-10-CM

## 2018-10-17 DIAGNOSIS — C79.51 SECONDARY ADENOCARCINOMA OF SKELETAL BONE: ICD-10-CM

## 2018-10-17 DIAGNOSIS — C61 PROSTATE CANCER: ICD-10-CM

## 2018-10-17 LAB
ALBUMIN SERPL BCP-MCNC: 3.6 G/DL
ALP SERPL-CCNC: 71 U/L
ALT SERPL W/O P-5'-P-CCNC: 17 U/L
ANION GAP SERPL CALC-SCNC: 10 MMOL/L
AST SERPL-CCNC: 18 U/L
BASOPHILS # BLD AUTO: 0.06 K/UL
BASOPHILS NFR BLD: 0.6 %
BILIRUB SERPL-MCNC: 0.3 MG/DL
BUN SERPL-MCNC: 10 MG/DL
CALCIUM SERPL-MCNC: 10 MG/DL
CHLORIDE SERPL-SCNC: 101 MMOL/L
CO2 SERPL-SCNC: 27 MMOL/L
CREAT SERPL-MCNC: 1.2 MG/DL
DIFFERENTIAL METHOD: ABNORMAL
EOSINOPHIL # BLD AUTO: 0.1 K/UL
EOSINOPHIL NFR BLD: 1 %
ERYTHROCYTE [DISTWIDTH] IN BLOOD BY AUTOMATED COUNT: 14.2 %
EST. GFR  (AFRICAN AMERICAN): >60 ML/MIN/1.73 M^2
EST. GFR  (NON AFRICAN AMERICAN): >60 ML/MIN/1.73 M^2
GLUCOSE SERPL-MCNC: 106 MG/DL
HCT VFR BLD AUTO: 42.4 %
HGB BLD-MCNC: 13 G/DL
IMM GRANULOCYTES # BLD AUTO: 0.12 K/UL
IMM GRANULOCYTES NFR BLD AUTO: 1.3 %
LYMPHOCYTES # BLD AUTO: 3.4 K/UL
LYMPHOCYTES NFR BLD: 36 %
MCH RBC QN AUTO: 30 PG
MCHC RBC AUTO-ENTMCNC: 30.7 G/DL
MCV RBC AUTO: 98 FL
MONOCYTES # BLD AUTO: 1 K/UL
MONOCYTES NFR BLD: 10.3 %
NEUTROPHILS # BLD AUTO: 4.7 K/UL
NEUTROPHILS NFR BLD: 50.8 %
NRBC BLD-RTO: 0 /100 WBC
PLATELET # BLD AUTO: 266 K/UL
PMV BLD AUTO: 10.7 FL
POTASSIUM SERPL-SCNC: 3.9 MMOL/L
PROT SERPL-MCNC: 8.7 G/DL
RBC # BLD AUTO: 4.34 M/UL
SODIUM SERPL-SCNC: 138 MMOL/L
WBC # BLD AUTO: 9.33 K/UL

## 2018-10-17 PROCEDURE — 80053 COMPREHEN METABOLIC PANEL: CPT

## 2018-10-17 PROCEDURE — 36415 COLL VENOUS BLD VENIPUNCTURE: CPT | Mod: PO

## 2018-10-17 PROCEDURE — 84153 ASSAY OF PSA TOTAL: CPT

## 2018-10-17 PROCEDURE — 85025 COMPLETE CBC W/AUTO DIFF WBC: CPT

## 2018-10-18 LAB
COMPLEXED PSA SERPL-MCNC: <0.01 NG/ML
COMPLEXED PSA SERPL-MCNC: <0.01 NG/ML

## 2018-10-22 ENCOUNTER — CLINICAL SUPPORT (OUTPATIENT)
Dept: UROLOGY | Facility: CLINIC | Age: 63
End: 2018-10-22
Payer: MEDICARE

## 2018-10-22 ENCOUNTER — TELEPHONE (OUTPATIENT)
Dept: PHARMACY | Facility: CLINIC | Age: 63
End: 2018-10-22

## 2018-10-22 ENCOUNTER — OFFICE VISIT (OUTPATIENT)
Dept: HEMATOLOGY/ONCOLOGY | Facility: CLINIC | Age: 63
End: 2018-10-22
Payer: MEDICARE

## 2018-10-22 VITALS
BODY MASS INDEX: 22.03 KG/M2 | WEIGHT: 153.88 LBS | OXYGEN SATURATION: 98 % | HEART RATE: 81 BPM | DIASTOLIC BLOOD PRESSURE: 85 MMHG | SYSTOLIC BLOOD PRESSURE: 139 MMHG | HEIGHT: 70 IN | TEMPERATURE: 98 F

## 2018-10-22 DIAGNOSIS — C61 PROSTATE CANCER: Primary | ICD-10-CM

## 2018-10-22 DIAGNOSIS — C61 PROSTATE CA: ICD-10-CM

## 2018-10-22 DIAGNOSIS — G89.3 NEOPLASM RELATED PAIN: ICD-10-CM

## 2018-10-22 DIAGNOSIS — Z12.5 PROSTATE CANCER SCREENING: Primary | ICD-10-CM

## 2018-10-22 DIAGNOSIS — C79.51 SECONDARY ADENOCARCINOMA OF SKELETAL BONE: ICD-10-CM

## 2018-10-22 PROCEDURE — 99214 OFFICE O/P EST MOD 30 MIN: CPT | Mod: PBBFAC,PO,25 | Performed by: INTERNAL MEDICINE

## 2018-10-22 PROCEDURE — 96402 CHEMO HORMON ANTINEOPL SQ/IM: CPT | Mod: PBBFAC

## 2018-10-22 PROCEDURE — 99214 OFFICE O/P EST MOD 30 MIN: CPT | Mod: S$PBB,,, | Performed by: INTERNAL MEDICINE

## 2018-10-22 PROCEDURE — 99999 PR PBB SHADOW E&M-EST. PATIENT-LVL IV: CPT | Mod: PBBFAC,,, | Performed by: INTERNAL MEDICINE

## 2018-10-22 PROCEDURE — 3079F DIAST BP 80-89 MM HG: CPT | Mod: CPTII,,, | Performed by: INTERNAL MEDICINE

## 2018-10-22 PROCEDURE — 3008F BODY MASS INDEX DOCD: CPT | Mod: CPTII,,, | Performed by: INTERNAL MEDICINE

## 2018-10-22 PROCEDURE — 3075F SYST BP GE 130 - 139MM HG: CPT | Mod: CPTII,,, | Performed by: INTERNAL MEDICINE

## 2018-10-22 RX ORDER — FLUTICASONE PROPIONATE AND SALMETEROL XINAFOATE 115; 21 UG/1; UG/1
2 AEROSOL, METERED RESPIRATORY (INHALATION)
COMMUNITY
End: 2021-08-12

## 2018-10-22 RX ADMIN — LEUPROLIDE ACETATE 22.5 MG: KIT at 10:10

## 2018-10-22 NOTE — TELEPHONE ENCOUNTER
Refill call for Zytiga. Patient confirmed that they are in need of the refill. Will prepare to ship out 10/23/18 to arrive 10/24/18 with patient consent Copay $0 at 004. Address &  confirmed.Patient has 7 doses remaining. Patient has not started any new medications, no missed doses and no side effects. Patient taking the medication as directed by doctor. Patient does not have any questions or concerns at this time.-COOKIE

## 2018-10-22 NOTE — PROGRESS NOTES
Reason for visit: Metastatic prostate adenocarcinoma  Date of Diagnosis: September 2017    HPI:   The patient is a 63-year-old -American male who presents to the hematology oncology clinic today to discuss further evaluation and management recommendations for metastatic prostate adenocarcinoma.  The patient has been referred to Dr. Roland Dawn with urology.  I have reviewed all of the patient's relevant clinical history available in the medical record and have utilized this in my evaluation and management recommendations today.  Today the patient reports that he has chronic pain in his lumbar region due to degenerative disc disease.  He also reports pain in his lower thoracic region.  He has been taking Norco when necessary pain.  He denies any fevers, chills or night sweats.  He reports chronic fatigue.  He denies any melena, hematochezia, hematemesis, hemoptysis or hematuria.  He denies any chest pain or shortness of breath.  He denies any bowel or urinary complaints.  He reports that he started his Zytiga/prednisone on December 7, 2017 and has been tolerating this medication well without any significant side effects.    PAST MEDICAL HISTORY:   1.  Coronary artery disease  2.  Daily  3.  Pulmonary fibrosis  4.  Glaucoma  5.  Sleep anemia  6.  Osteoarthritis/degenerative disc disease  7.  GERD    SURGICAL HISTORY:   1.  PCI with stent placement for coronary artery disease  2.  Neck fusion ×2    FAMILY HISTORY: The patient's father had gastric cancer in his 70s.  He reports that 2 sisters had breast cancer in their 50s/60s.  He denies any other immediate family members with cancers or bleeding/clotting disorders.    SOCIAL HISTORY: He reports a 40+-pack-year smoking history and quit in Nov 2017.  He used to drink an average of 12 pack of beer every week and has quit this in Dec 2017.  He reports a history of recreational drug use and last used cocaine in June 2017.  He reports that he has now quit this  completely and does not intend to use any recreational drugs in the future.  He is to work on [employer] and as a  and retired in 2007.  He is  and does not have any children.  He lives in [place], [place].      ALLERGIES: Reviewed on medication card.    MEDICATIONS: [Medcard has been reviewed and/or reconciled.]    REVIEW OF SYSTEMS:   GENERAL: [No fevers, chills or sweats. Reports chronic fatigue. Denies weight loss or loss of appetite.]  HEENT: [No blurred vision, tinnitus, nasal discharge, sorethroat or dysphagia.]  HEART: [No chest pain, palpitations or shortness of breath.]    LUNGS: [No cough, hemoptysis or breathing problems.]  ABDOMEN: [No abdominal pain, nausea, vomiting, diarrhea, constipation or melena.]  GENITOURINARY: [No dysuria, bleeding or malodorous discharge.]  NEURO: [No headache, dizziness or vertigo.]  HEMATOLOGY: [No easy bruising, spontaneous bleeding or blood clots in the past].  MUSCULOSKELETAL: [Reports chronic arthralgias. Denies myalgias. Reports bone pains.]  SKIN: [No rashes or skin lesions.]  PSYCHIATRY: [No depression or anxiety.]    PHYSICAL EXAMINATION:   VS: Reviewed on nurse's notes.  APPEARANCE: The patient is a well-developed, well-nourished and well-groomed  male who appears in no acute distress.  He was accompanied to this clinic visit by his wife.  HEENT: No scleral icterus. Both external auditory canals clear. No oral ulcers, lesions. Throat clear.  Poor dentition noted.  HEAD: No sinus tenderness.  NECK: Supple. No palpable lymphadenopathy. Thyroid non-tender, no palpable masses  CHEST: Breath sounds clear bilaterally. No rales. No rhonchi. Unlabored respirations.  CARDIOVASCULAR: Normal S1, S2. Normal rate. Regular rhythm.  ABDOMEN: Bowel sounds normal. No tenderness. No abdominal distention. No hepatomegaly. No splenomegaly.  LYMPHATIC: No palpable supraclavicular, axillary nodes  EXTREMITIES: No clubbing, cyanosis,  edema  SKIN: No lesions. No petechiae. No ecchymoses. No induration or nodules  NEUROLOGIC: No focal findings. Alert & Oriented x 3. Mood appropriate to affect    LABS:   Reviewed    IMAGING:  Reviewed    IMPRESSION:  1.  Metastatic prostate adenocarcinoma with bone involvement    PLAN:  1.  I had a detailed discussion with the patient today with regard to the diagnosis, prognosis and treatment for his metastatic prostate adenocarcinoma with bone involvement.  2.  I have discussed that at this time his malignancy is potentially treatable but not curable.  The patient and his wife expressed understanding of this information.  3.  The patient is up-to-date with Lupron injection.  This is being given via his urologist Dr. Dawn's office.    4.  Continue Zytiga plus prednisone as recommended/prescribed.  No evidence of toxicity at this time.  5.  He is in the process of getting his teeth extracted.  Patient was informed that we will have to get this addressed first before we can discuss initiation of xgeva for treatment of bone involvement by malignancy.  He will continue calcium plus vitamin D supplementation daily as advised.  6. He takes Norco 10 mg when necessary pain.  Sedation/constipation precautions with pain medication was extensively discussed and he expressed understanding.    Follow up in 2 months with labs.  He knows to call for any additional questions or new problems.    Geronimo Roman MD

## 2018-10-22 NOTE — PROGRESS NOTES
Lupron given in via ventragluteal rt.  Aseptic technique maintained.  Asked pt to remain in room for 15 minutes.  No adverse signs noted.  Pt tolerated well

## 2018-10-24 DIAGNOSIS — I25.10 CAD (CORONARY ARTERY DISEASE): Primary | ICD-10-CM

## 2018-10-29 ENCOUNTER — CLINICAL SUPPORT (OUTPATIENT)
Dept: CARDIOLOGY | Facility: CLINIC | Age: 63
End: 2018-10-29
Payer: MEDICARE

## 2018-10-29 ENCOUNTER — OFFICE VISIT (OUTPATIENT)
Dept: CARDIOLOGY | Facility: CLINIC | Age: 63
End: 2018-10-29
Payer: MEDICARE

## 2018-10-29 VITALS
BODY MASS INDEX: 22.03 KG/M2 | HEIGHT: 70 IN | HEART RATE: 93 BPM | SYSTOLIC BLOOD PRESSURE: 126 MMHG | WEIGHT: 153.88 LBS | DIASTOLIC BLOOD PRESSURE: 70 MMHG

## 2018-10-29 DIAGNOSIS — E78.2 MIXED HYPERLIPIDEMIA: Chronic | ICD-10-CM

## 2018-10-29 DIAGNOSIS — I25.84 CORONARY ARTERY DISEASE DUE TO CALCIFIED CORONARY LESION: Chronic | ICD-10-CM

## 2018-10-29 DIAGNOSIS — Z98.61 HISTORY OF PTCA: ICD-10-CM

## 2018-10-29 DIAGNOSIS — I25.10 CORONARY ARTERY DISEASE DUE TO CALCIFIED CORONARY LESION: Chronic | ICD-10-CM

## 2018-10-29 DIAGNOSIS — R94.31 ABNORMAL ECG: ICD-10-CM

## 2018-10-29 DIAGNOSIS — I34.0 NON-RHEUMATIC MITRAL REGURGITATION: Primary | Chronic | ICD-10-CM

## 2018-10-29 DIAGNOSIS — I25.10 CAD (CORONARY ARTERY DISEASE): ICD-10-CM

## 2018-10-29 DIAGNOSIS — I10 ESSENTIAL HYPERTENSION: ICD-10-CM

## 2018-10-29 PROCEDURE — 3078F DIAST BP <80 MM HG: CPT | Mod: CPTII,,, | Performed by: INTERNAL MEDICINE

## 2018-10-29 PROCEDURE — 93005 ELECTROCARDIOGRAM TRACING: CPT | Mod: PBBFAC,PO | Performed by: NUCLEAR MEDICINE

## 2018-10-29 PROCEDURE — 3074F SYST BP LT 130 MM HG: CPT | Mod: CPTII,,, | Performed by: INTERNAL MEDICINE

## 2018-10-29 PROCEDURE — 93010 ELECTROCARDIOGRAM REPORT: CPT | Mod: S$PBB,,, | Performed by: NUCLEAR MEDICINE

## 2018-10-29 PROCEDURE — 3008F BODY MASS INDEX DOCD: CPT | Mod: CPTII,,, | Performed by: INTERNAL MEDICINE

## 2018-10-29 PROCEDURE — 99999 PR PBB SHADOW E&M-EST. PATIENT-LVL III: CPT | Mod: PBBFAC,,, | Performed by: INTERNAL MEDICINE

## 2018-10-29 PROCEDURE — 99213 OFFICE O/P EST LOW 20 MIN: CPT | Mod: PBBFAC,PO,25 | Performed by: INTERNAL MEDICINE

## 2018-10-29 PROCEDURE — 99214 OFFICE O/P EST MOD 30 MIN: CPT | Mod: S$PBB,,, | Performed by: INTERNAL MEDICINE

## 2018-10-29 NOTE — PROGRESS NOTES
Subjective:    Patient ID:  Joey Jacobo is a 63 y.o. male who presents for evaluation of Follow-up; Hypertension; Hyperlipidemia; Coronary Artery Disease; Valvular Heart Disease; and Risk Factor Management For Atherosclerosis      HPI Mr. Jacobo returns for fu.  His current medical conditions include CAD (h/o LCX PCI 2007), HTN, MVP/MR, dyslipidemia, COPD.   Past hx pertinent for following:  S/p LHC 2010, nonobstructive LCX disease. JOSÉ 2010 showed MVP/severe MR.   Echo 2/15 showed mod MR, normal LV function.    dx with metastatic prostate cancer, incurable per chart.  Here for f/u.  CAD is stable.  He has no active anginal sxs on current medical tx.  He is active.  States he is riding bike 8 miles/daily.  No dyspnea, or CHF sxs.   ecg today reviewed, NSR, possible LAE, LVH.  No acute changes.   He states since he started exercising he has quit smoking.  Echo 4/18 shows normal LVEF.  Stress MPI 4/18 reviewed, no ischemia, normal EF.   HTN controlled on current meds, no associated sxs.  Compliant with meds.   Lipids controlled on statin.  PCP rechecking soon.       Current Outpatient Medications:     abiraterone (ZYTIGA) 250 mg Tab, Take 4 tablets (1,000 mg total) by mouth once daily., Disp: 120 tablet, Rfl: 3    aspirin (ECOTRIN) 81 MG EC tablet, Take by mouth. 1 Tablet, Delayed Release (E.C.) Oral Every day, Disp: , Rfl:     atorvastatin (LIPITOR) 40 MG tablet, Take 40 mg by mouth., Disp: , Rfl:     budesonide-formoterol 160-4.5 mcg (SYMBICORT) 160-4.5 mcg/actuation HFAA, Inhale into the lungs., Disp: , Rfl:     calcium-vitamin D (CALCIUM 600 + D,3,) 600 mg(1,500mg) -400 unit Tab, Take by mouth. 1 Tablet Oral Twice a day, Disp: , Rfl:     fluticasone (FLONASE) 50 mcg/actuation nasal spray, 1 spray by Each Nare route once daily. (Patient taking differently: 1 spray by Each Nare route daily as needed. ), Disp: 1 Bottle, Rfl: 11    fluticasone-salmeterol (ADVAIR HFA) 115-21 mcg/actuation HFAA, Inhale 2  "puffs into the lungs., Disp: , Rfl:     gabapentin (NEURONTIN) 300 MG capsule, Take 300 mg by mouth., Disp: , Rfl:     HYDROcodone-acetaminophen (NORCO)  mg per tablet, Take 1 tablet by mouth every 6 (six) hours as needed for Pain., Disp: 90 tablet, Rfl: 0    metoprolol succinate (TOPROL-XL) 50 MG 24 hr tablet, Take 1 tablet (50 mg total) by mouth once daily., Disp: 90 tablet, Rfl: 3    nitroGLYCERIN (NITROSTAT) 0.4 MG SL tablet, Place 1 tablet (0.4 mg total) under the tongue every 5 (five) minutes as needed for Chest pain., Disp: 20 tablet, Rfl: 12    ondansetron (ZOFRAN-ODT) 8 MG TbDL, Take 1 tablet (8 mg total) by mouth every 12 (twelve) hours as needed., Disp: 60 tablet, Rfl: 0    pantoprazole (PROTONIX) 40 MG tablet, Take 1 tablet (40 mg total) by mouth once daily., Disp: 90 tablet, Rfl: 3    predniSONE (DELTASONE) 5 MG tablet, Take 1 tablet (5 mg total) by mouth 2 (two) times daily., Disp: 180 tablet, Rfl: 1    travoprost (TRAVATAN Z) 0.004 % Drop, Place 1 drop into both eyes every evening. 1 Drops Ophthalmic At bedtime, Disp: 5 mL, Rfl: 12    Current Facility-Administered Medications:     leuprolide (LUPRON) injection 22.5 mg, 22.5 mg, Intramuscular, Q90 Days, Roland Dawn IV, MD, 22.5 mg at 10/22/18 1051      Review of Systems   Constitution: Negative.   HENT: Negative.    Eyes: Negative.    Cardiovascular: Negative.    Respiratory: Negative.    Endocrine: Negative.    Hematologic/Lymphatic: Negative.    Skin: Negative.    Musculoskeletal: Negative.    Gastrointestinal: Negative.    Genitourinary: Negative.    Neurological: Negative.    Psychiatric/Behavioral: Negative.    Allergic/Immunologic: Negative.        /70 (BP Location: Right arm, Patient Position: Sitting, BP Method: Medium (Manual))   Pulse 93   Ht 5' 10" (1.778 m)   Wt 69.8 kg (153 lb 14.1 oz)   BMI 22.08 kg/m²     Wt Readings from Last 3 Encounters:   10/29/18 69.8 kg (153 lb 14.1 oz)   10/22/18 69.8 kg (153 lb 14.4 " oz)   08/21/18 68.1 kg (150 lb 2.1 oz)     Temp Readings from Last 3 Encounters:   10/22/18 97.9 °F (36.6 °C)   08/21/18 96 °F (35.6 °C) (Tympanic)   08/17/18 97.7 °F (36.5 °C) (Oral)     BP Readings from Last 3 Encounters:   10/29/18 126/70   10/22/18 139/85   08/21/18 130/80     Pulse Readings from Last 3 Encounters:   10/29/18 93   10/22/18 81   08/21/18 85          Objective:    Physical Exam   Constitutional: He is oriented to person, place, and time. He appears well-developed and well-nourished.   HENT:   Head: Normocephalic.   Neck: Normal range of motion. Neck supple. Normal carotid pulses, no hepatojugular reflux and no JVD present. Carotid bruit is not present. No thyromegaly present.   Cardiovascular: Normal rate, regular rhythm, S1 normal and S2 normal. PMI is not displaced. Exam reveals no S3, no S4, no distant heart sounds, no friction rub, no midsystolic click and no opening snap.   Murmur heard.  High-pitched blowing holosystolic murmur is present with a grade of 2/6 at the apex.  Pulses:       Radial pulses are 2+ on the right side, and 2+ on the left side.   Pulmonary/Chest: Effort normal and breath sounds normal. He has no wheezes. He has no rales.   Abdominal: Soft. Bowel sounds are normal. He exhibits no distension, no abdominal bruit, no ascites and no mass. There is no tenderness.   Musculoskeletal: He exhibits no edema.   Neurological: He is alert and oriented to person, place, and time.   Skin: Skin is warm.   Psychiatric: He has a normal mood and affect. His behavior is normal.   Nursing note and vitals reviewed.      I have reviewed all pertinent labs and cardiac studies.      Chemistry        Component Value Date/Time     10/17/2018 1318    K 3.9 10/17/2018 1318     10/17/2018 1318    CO2 27 10/17/2018 1318    BUN 10 10/17/2018 1318    CREATININE 1.2 10/17/2018 1318     10/17/2018 1318        Component Value Date/Time    CALCIUM 10.0 10/17/2018 1318    ALKPHOS 71  10/17/2018 1318    AST 18 10/17/2018 1318    ALT 17 10/17/2018 1318    BILITOT 0.3 10/17/2018 1318    ESTGFRAFRICA >60.0 10/17/2018 1318    EGFRNONAA >60.0 10/17/2018 1318        Lab Results   Component Value Date    WBC 9.33 10/17/2018    HGB 13.0 (L) 10/17/2018    HCT 42.4 10/17/2018    MCV 98 10/17/2018     10/17/2018     No results found for: LABA1C, HGBA1C  Lab Results   Component Value Date    CHOL 178 02/22/2017    CHOL 207 (H) 02/23/2016    CHOL 164 02/05/2015     Lab Results   Component Value Date    HDL 49 02/22/2017    HDL 51 02/23/2016    HDL 53 02/05/2015     Lab Results   Component Value Date    LDLCALC 108.4 02/22/2017    LDLCALC 134.4 02/23/2016    LDLCALC 96.4 02/05/2015     Lab Results   Component Value Date    TRIG 103 02/22/2017    TRIG 108 02/23/2016    TRIG 73 02/05/2015     Lab Results   Component Value Date    CHOLHDL 27.5 02/22/2017    CHOLHDL 24.6 02/23/2016    CHOLHDL 32.3 02/05/2015     Narrative     Date of Procedure: 04/30/2018        TEST DESCRIPTION       Aorta: The aortic root is normal in size, measuring 2.1 cm at sinotubular junction and 2.5 cm at Sinuses of Valsalva. The proximal ascending aorta is normal in size, measuring 2.9 cm across.     Left Atrium: The left atrial volume index is normal, measuring 21.10 cc/m2.     Left Ventricle: The left ventricle is normal in size, with an end-diastolic diameter of 4.1 cm, and an end-systolic diameter of 2.8 cm. LV wall thickness is normal, with the septum measuring 1.3 cm and the posterior wall measuring 1.0 cm across. Relative   wall thickness was increased at 0.49, and the LV mass index was 101.3 g/m2 consistent with concentric remodeling. There are no regional wall motion abnormalities. Left ventricular systolic function appears normal. Visually estimated ejection fraction is   55-60%. The LV Doppler derived stroke volume equals 75.0 ccs.     Diastolic indices: E wave velocity 1.0 m/s, E/A ratio 0.9,  msec., E/e'  ratio(avg) 11. Diastolic function is normal.     Right Atrium: The right atrium is normal in size, measuring 4.2 cm in length and 2.8 cm in width in the apical view.     Right Ventricle: The right ventricle is normal in size measuring 2.3 cm at the base in the apical right ventricle-focused view. Global right ventricular systolic function appears normal. Tricuspid annular plane systolic excursion (TAPSE) is 1.9 cm. The   estimated PA systolic pressure is 33 mmHg.     Aortic Valve:  Aortic valve is normal in structure with normal leaflet mobility.     Mitral Valve:  Mitral valve is normal in structure with normal leaflet mobility.     Tricuspid Valve:  Tricuspid valve is normal in structure with normal leaflet mobility. There is mild tricuspid regurgitation.     Pulmonary Valve:  Pulmonary valve is normal in structure with normal leaflet mobility.     IVC: IVC is normal in size and collapses > 50% with a sniff, suggesting normal right atrial pressure of 3 mmHg.     Intracavitary: There is no evidence of pericardial effusion, intracavity mass, thrombi, or vegetation.         CONCLUSIONS     1 - No wall motion abnormalities.     2 - Normal left ventricular systolic function (EF 55-60%).     3 - Normal left ventricular diastolic function.     4 - Normal right ventricular systolic function .     5 - The estimated PA systolic pressure is 33 mmHg.     6 - Mild tricuspid regurgitation.             This document has been electronically    SIGNED BY: Stone Lofton MD On: 04/30/2018 15:13         Nuclear Quantitative Functional Analysis:   LVEF: >= 70 %    Impression: NORMAL MYOCARDIAL PERFUSION  1. The perfusion scan is free of evidence for myocardial ischemia or injury.   2. Resting wall motion is physiologic.   3. Resting LV function is normal.   4. The ventricular volumes are normal at rest and stress.   5. The extracardiac distribution of radioactivity is normal.           This document has been electronically     SIGNED BY: Stoen Lofton MD On: 04/30/2018 17:04          Assessment:       1. Non-rheumatic mitral regurgitation    2. Coronary artery disease due to calcified coronary lesion    3. History of PTCA    4. Mixed hyperlipidemia    5. Abnormal ECG    6. Essential hypertension         Plan:             Stable CV conditions on current medical tx.  Continue to refrain from smoking.  Reviewed all test results reviewed with pt in detail.  Continue current meds.  Cardiac diet.  Continue exercise on daily basis.    F/u 6 months.

## 2018-11-12 ENCOUNTER — TELEPHONE (OUTPATIENT)
Dept: HEMATOLOGY/ONCOLOGY | Facility: CLINIC | Age: 63
End: 2018-11-12

## 2018-11-12 DIAGNOSIS — C61 PROSTATE CANCER: ICD-10-CM

## 2018-11-12 DIAGNOSIS — G89.3 NEOPLASM RELATED PAIN: ICD-10-CM

## 2018-11-12 DIAGNOSIS — C79.51 SECONDARY ADENOCARCINOMA OF SKELETAL BONE: ICD-10-CM

## 2018-11-12 RX ORDER — HYDROCODONE BITARTRATE AND ACETAMINOPHEN 10; 325 MG/1; MG/1
1 TABLET ORAL EVERY 6 HOURS PRN
Qty: 90 TABLET | Refills: 0 | Status: SHIPPED | OUTPATIENT
Start: 2018-11-12 | End: 2018-12-10 | Stop reason: SDUPTHER

## 2018-11-13 DIAGNOSIS — C79.51 SECONDARY ADENOCARCINOMA OF SKELETAL BONE: ICD-10-CM

## 2018-11-13 DIAGNOSIS — G89.3 NEOPLASM RELATED PAIN: ICD-10-CM

## 2018-11-13 RX ORDER — ABIRATERONE ACETATE 250 MG/1
1000 TABLET ORAL DAILY
Qty: 120 TABLET | Refills: 3 | Status: SHIPPED | OUTPATIENT
Start: 2018-11-13 | End: 2019-03-25

## 2018-11-14 ENCOUNTER — TELEPHONE (OUTPATIENT)
Dept: PHARMACY | Facility: CLINIC | Age: 63
End: 2018-11-14

## 2018-12-10 DIAGNOSIS — G89.3 NEOPLASM RELATED PAIN: ICD-10-CM

## 2018-12-10 DIAGNOSIS — C61 PROSTATE CANCER: ICD-10-CM

## 2018-12-10 DIAGNOSIS — C79.51 SECONDARY ADENOCARCINOMA OF SKELETAL BONE: ICD-10-CM

## 2018-12-10 RX ORDER — HYDROCODONE BITARTRATE AND ACETAMINOPHEN 10; 325 MG/1; MG/1
1 TABLET ORAL EVERY 6 HOURS PRN
Qty: 90 TABLET | Refills: 0 | Status: SHIPPED | OUTPATIENT
Start: 2018-12-10 | End: 2019-01-07 | Stop reason: SDUPTHER

## 2018-12-10 RX ORDER — PREDNISONE 5 MG/1
5 TABLET ORAL 2 TIMES DAILY
Qty: 180 TABLET | Refills: 1 | Status: SHIPPED | OUTPATIENT
Start: 2018-12-10 | End: 2019-06-10 | Stop reason: SDUPTHER

## 2018-12-11 ENCOUNTER — TELEPHONE (OUTPATIENT)
Dept: PHARMACY | Facility: CLINIC | Age: 63
End: 2018-12-11

## 2018-12-13 ENCOUNTER — TELEPHONE (OUTPATIENT)
Dept: PHARMACY | Facility: CLINIC | Age: 63
End: 2018-12-13

## 2018-12-13 NOTE — TELEPHONE ENCOUNTER
"Zytiga follow-up:  Patient reached for Zytiga follow up. Mr. Jacobo was in good spirits and confirmed taking the Zytiga daily in the morning right when he wakes up on an empty stomach.  Additionally, he noted taking Prednisone twice daily with morning and evening meal.  He denies any missed doses.  He confirmed that "everything is the same" and denies any issues with side effects at this time.  His appetite is good and he drinks a lot of water and Boost shakes.  His energy level is good and he remains very active.  His wife recently had knee surgery so he is doing a lot of the tasks specifically around the home that she was used to doing as she is unable to help during her recovery.  He denies any changes to his medications, allergies and health conditions at this time.  He confirms mild pain on occasion which requires taking a dose of Norco about twice weekly.  He denies any fevers, infections or signs of bleeding.  He has not required any unplanned office visits, trips to the urgent care or ER.  He denies the need for a refill at this time, as he just opened a new bottle on Sunday 12/9/18 and won't need any more medication until the end of the month.  We will reach out to him after the holidays for refill.    According to our records, Mr. Jacobo had 12 doses remaining on 11/16, which would have carried him until 11/29, but he just opened his new bottle on 12/9.  We will get an accurate pill count at next refill to assess/confirm appropriate adherence.    "

## 2018-12-21 ENCOUNTER — LAB VISIT (OUTPATIENT)
Dept: LAB | Facility: HOSPITAL | Age: 63
End: 2018-12-21
Attending: INTERNAL MEDICINE
Payer: MEDICARE

## 2018-12-21 DIAGNOSIS — G89.3 NEOPLASM RELATED PAIN: ICD-10-CM

## 2018-12-21 DIAGNOSIS — C79.51 SECONDARY ADENOCARCINOMA OF SKELETAL BONE: ICD-10-CM

## 2018-12-21 DIAGNOSIS — C61 PROSTATE CANCER: ICD-10-CM

## 2018-12-21 LAB
ALBUMIN SERPL BCP-MCNC: 3.3 G/DL
ALP SERPL-CCNC: 82 U/L
ALT SERPL W/O P-5'-P-CCNC: 12 U/L
ANION GAP SERPL CALC-SCNC: 10 MMOL/L
AST SERPL-CCNC: 15 U/L
BASOPHILS # BLD AUTO: 0.05 K/UL
BASOPHILS NFR BLD: 0.5 %
BILIRUB SERPL-MCNC: 0.4 MG/DL
BUN SERPL-MCNC: 17 MG/DL
CALCIUM SERPL-MCNC: 9.9 MG/DL
CHLORIDE SERPL-SCNC: 103 MMOL/L
CO2 SERPL-SCNC: 28 MMOL/L
COMPLEXED PSA SERPL-MCNC: <0.01 NG/ML
CREAT SERPL-MCNC: 1.3 MG/DL
DIFFERENTIAL METHOD: ABNORMAL
EOSINOPHIL # BLD AUTO: 0.1 K/UL
EOSINOPHIL NFR BLD: 0.9 %
ERYTHROCYTE [DISTWIDTH] IN BLOOD BY AUTOMATED COUNT: 14.5 %
EST. GFR  (AFRICAN AMERICAN): >60 ML/MIN/1.73 M^2
EST. GFR  (NON AFRICAN AMERICAN): 58.9 ML/MIN/1.73 M^2
GLUCOSE SERPL-MCNC: 102 MG/DL
HCT VFR BLD AUTO: 40.8 %
HGB BLD-MCNC: 12.8 G/DL
IMM GRANULOCYTES # BLD AUTO: 0.07 K/UL
IMM GRANULOCYTES NFR BLD AUTO: 0.7 %
LYMPHOCYTES # BLD AUTO: 2.9 K/UL
LYMPHOCYTES NFR BLD: 29.6 %
MCH RBC QN AUTO: 29.7 PG
MCHC RBC AUTO-ENTMCNC: 31.4 G/DL
MCV RBC AUTO: 95 FL
MONOCYTES # BLD AUTO: 0.9 K/UL
MONOCYTES NFR BLD: 9.4 %
NEUTROPHILS # BLD AUTO: 5.8 K/UL
NEUTROPHILS NFR BLD: 58.9 %
NRBC BLD-RTO: 0 /100 WBC
PLATELET # BLD AUTO: 215 K/UL
PMV BLD AUTO: 11.1 FL
POTASSIUM SERPL-SCNC: 3.8 MMOL/L
PROT SERPL-MCNC: 8.2 G/DL
RBC # BLD AUTO: 4.31 M/UL
SODIUM SERPL-SCNC: 141 MMOL/L
WBC # BLD AUTO: 9.83 K/UL

## 2018-12-21 PROCEDURE — 80053 COMPREHEN METABOLIC PANEL: CPT | Mod: HCNC

## 2018-12-21 PROCEDURE — 84153 ASSAY OF PSA TOTAL: CPT | Mod: HCNC

## 2018-12-21 PROCEDURE — 36415 COLL VENOUS BLD VENIPUNCTURE: CPT | Mod: HCNC,PO

## 2018-12-21 PROCEDURE — 85025 COMPLETE CBC W/AUTO DIFF WBC: CPT | Mod: HCNC

## 2018-12-27 ENCOUNTER — OFFICE VISIT (OUTPATIENT)
Dept: HEMATOLOGY/ONCOLOGY | Facility: CLINIC | Age: 63
End: 2018-12-27
Payer: MEDICARE

## 2018-12-27 VITALS
WEIGHT: 150.56 LBS | DIASTOLIC BLOOD PRESSURE: 80 MMHG | TEMPERATURE: 98 F | BODY MASS INDEX: 21.55 KG/M2 | SYSTOLIC BLOOD PRESSURE: 120 MMHG | RESPIRATION RATE: 18 BRPM | OXYGEN SATURATION: 98 % | HEIGHT: 70 IN | HEART RATE: 92 BPM

## 2018-12-27 DIAGNOSIS — C61 PROSTATE CANCER: Primary | ICD-10-CM

## 2018-12-27 DIAGNOSIS — C79.51 SECONDARY ADENOCARCINOMA OF SKELETAL BONE: ICD-10-CM

## 2018-12-27 PROCEDURE — 99999 PR PBB SHADOW E&M-EST. PATIENT-LVL IV: CPT | Mod: PBBFAC,HCNC,, | Performed by: INTERNAL MEDICINE

## 2018-12-27 PROCEDURE — 3079F DIAST BP 80-89 MM HG: CPT | Mod: CPTII,HCNC,S$GLB, | Performed by: INTERNAL MEDICINE

## 2018-12-27 PROCEDURE — 3008F BODY MASS INDEX DOCD: CPT | Mod: CPTII,HCNC,S$GLB, | Performed by: INTERNAL MEDICINE

## 2018-12-27 PROCEDURE — 99214 OFFICE O/P EST MOD 30 MIN: CPT | Mod: HCNC,S$GLB,, | Performed by: INTERNAL MEDICINE

## 2018-12-27 PROCEDURE — 3074F SYST BP LT 130 MM HG: CPT | Mod: CPTII,HCNC,S$GLB, | Performed by: INTERNAL MEDICINE

## 2018-12-27 NOTE — PROGRESS NOTES
Reason for visit: Metastatic prostate adenocarcinoma  Date of Diagnosis: September 2017    HPI:   The patient is a 63-year-old -American male who presents to the hematology oncology clinic today to discuss further evaluation and management recommendations for metastatic prostate adenocarcinoma.  The patient has been referred to Dr. Roland Dawn with urology.  I have reviewed all of the patient's relevant clinical history available in the medical record and have utilized this in my evaluation and management recommendations today.  Today the patient reports that he has chronic pain in his lumbar region due to degenerative disc disease.  He also reports pain in his lower thoracic region.  He has been taking Norco when necessary pain.  He denies any fevers, chills or night sweats.  He reports chronic fatigue.  He denies any melena, hematochezia, hematemesis, hemoptysis or hematuria.  He denies any chest pain or shortness of breath.  He denies any bowel or urinary complaints.  He reports that he started his Zytiga/prednisone on December 7, 2017 and has been tolerating this medication well without any significant side effects.    PAST MEDICAL HISTORY:   1.  Coronary artery disease  2.  Daily  3.  Pulmonary fibrosis  4.  Glaucoma  5.  Sleep anemia  6.  Osteoarthritis/degenerative disc disease  7.  GERD    SURGICAL HISTORY:   1.  PCI with stent placement for coronary artery disease  2.  Neck fusion ×2    FAMILY HISTORY: The patient's father had gastric cancer in his 70s.  He reports that 2 sisters had breast cancer in their 50s/60s.  He denies any other immediate family members with cancers or bleeding/clotting disorders.    SOCIAL HISTORY: He reports a 40+-pack-year smoking history and quit in Nov 2017.  He used to drink an average of 12 pack of beer every week and has quit this in Dec 2017.  He reports a history of recreational drug use and last used cocaine in June 2017.  He reports that he has now quit this  completely and does not intend to use any recreational drugs in the future.  He is to work on [employer] and as a  and retired in 2007.  He is  and does not have any children.  He lives in [place], [place].      ALLERGIES: Reviewed on medication card.    MEDICATIONS: [Medcard has been reviewed and/or reconciled.]    REVIEW OF SYSTEMS:   GENERAL: [No fevers, chills or sweats. Reports chronic fatigue. Denies weight loss or loss of appetite.]  HEENT: [No blurred vision, tinnitus, nasal discharge, sorethroat or dysphagia.]  HEART: [No chest pain, palpitations or shortness of breath.]    LUNGS: [No cough, hemoptysis or breathing problems.]  ABDOMEN: [No abdominal pain, nausea, vomiting, diarrhea, constipation or melena.]  GENITOURINARY: [No dysuria, bleeding or malodorous discharge.]  NEURO: [No headache, dizziness or vertigo.]  HEMATOLOGY: [No easy bruising, spontaneous bleeding or blood clots in the past].  MUSCULOSKELETAL: [Reports chronic arthralgias. Denies myalgias. Reports bone pains.]  SKIN: [No rashes or skin lesions.]  PSYCHIATRY: [No depression or anxiety.]    PHYSICAL EXAMINATION:   VS: Reviewed on nurse's notes.  APPEARANCE: The patient is a well-developed, well-nourished and well-groomed  male who appears in no acute distress.  He was accompanied to this clinic visit by his wife.  HEENT: No scleral icterus. Both external auditory canals clear. No oral ulcers, lesions. Throat clear.  Poor dentition noted.  HEAD: No sinus tenderness.  NECK: Supple. No palpable lymphadenopathy. Thyroid non-tender, no palpable masses  CHEST: Breath sounds clear bilaterally. No rales. No rhonchi. Unlabored respirations.  CARDIOVASCULAR: Normal S1, S2. Normal rate. Regular rhythm.  ABDOMEN: Bowel sounds normal. No tenderness. No abdominal distention. No hepatomegaly. No splenomegaly.  LYMPHATIC: No palpable supraclavicular, axillary nodes  EXTREMITIES: No clubbing, cyanosis,  edema  SKIN: No lesions. No petechiae. No ecchymoses. No induration or nodules  NEUROLOGIC: No focal findings. Alert & Oriented x 3. Mood appropriate to affect    LABS:   Reviewed    IMAGING:  Reviewed    IMPRESSION:  1.  Metastatic prostate adenocarcinoma with bone involvement    PLAN:  1.  I had a detailed discussion with the patient today with regard to the diagnosis, prognosis and treatment for his metastatic prostate adenocarcinoma with bone involvement.  2.  I have discussed that at this time his malignancy is potentially treatable but not curable.  The patient and his wife expressed understanding of this information.  3.  The patient is up-to-date with Lupron injection.  This is being given via his urologist Dr. Dawn's office.    4.  Continue Zytiga plus prednisone as recommended/prescribed.  No evidence of toxicity at this time.  5.  He is in the process of getting his teeth extracted.  Patient was informed that we will have to get this addressed first before we can discuss initiation of xgeva for treatment of bone involvement by malignancy.  He will continue calcium plus vitamin D supplementation daily as advised.  6. He takes Norco 10 mg when necessary pain.  Sedation/constipation precautions with pain medication was extensively discussed and he expressed understanding.    Follow up in 2 months with labs.  He knows to call for any additional questions or new problems.    Geronimo Roman MD

## 2018-12-31 ENCOUNTER — TELEPHONE (OUTPATIENT)
Dept: PHARMACY | Facility: CLINIC | Age: 63
End: 2018-12-31

## 2018-12-31 NOTE — TELEPHONE ENCOUNTER
Patient called and confirmed refill of Zytiga for 1/2 for patient to receive on 1/3. Address confirmed. $0.00. Patient states that he has 2 doses remaining and will not miss any doses if he receives it on 1/3. Discussed with him to go ahead and take the medication when he receives his Zytiga on 1/3. He can resume his normal time in the morning the next day. Medication list reviewed; no new meds, allergies or health conditions. Patient is tolerating medications well and denies any major side effects.

## 2019-01-07 DIAGNOSIS — C79.51 SECONDARY ADENOCARCINOMA OF SKELETAL BONE: ICD-10-CM

## 2019-01-07 DIAGNOSIS — G89.3 NEOPLASM RELATED PAIN: ICD-10-CM

## 2019-01-07 DIAGNOSIS — C61 PROSTATE CANCER: ICD-10-CM

## 2019-01-07 RX ORDER — HYDROCODONE BITARTRATE AND ACETAMINOPHEN 10; 325 MG/1; MG/1
1 TABLET ORAL EVERY 6 HOURS PRN
Qty: 90 TABLET | Refills: 0 | Status: SHIPPED | OUTPATIENT
Start: 2019-01-07 | End: 2019-01-08 | Stop reason: SDUPTHER

## 2019-01-08 ENCOUNTER — TELEPHONE (OUTPATIENT)
Dept: HEMATOLOGY/ONCOLOGY | Facility: CLINIC | Age: 64
End: 2019-01-08

## 2019-01-08 DIAGNOSIS — G89.3 NEOPLASM RELATED PAIN: ICD-10-CM

## 2019-01-08 DIAGNOSIS — C61 PROSTATE CANCER: ICD-10-CM

## 2019-01-08 DIAGNOSIS — C79.51 SECONDARY ADENOCARCINOMA OF SKELETAL BONE: ICD-10-CM

## 2019-01-08 RX ORDER — HYDROCODONE BITARTRATE AND ACETAMINOPHEN 10; 325 MG/1; MG/1
1 TABLET ORAL EVERY 6 HOURS PRN
Qty: 90 TABLET | Refills: 0 | Status: CANCELLED | OUTPATIENT
Start: 2019-01-08

## 2019-01-08 RX ORDER — HYDROCODONE BITARTRATE AND ACETAMINOPHEN 10; 325 MG/1; MG/1
1 TABLET ORAL EVERY 6 HOURS PRN
Qty: 90 TABLET | Refills: 0 | Status: SHIPPED | OUTPATIENT
Start: 2019-01-08 | End: 2019-02-04 | Stop reason: SDUPTHER

## 2019-01-08 NOTE — TELEPHONE ENCOUNTER
----- Message from Aakash Stanton sent at 1/8/2019  8:00 AM CST -----  Contact: self [phone]  .1. What is the name of the medication you are requesting? Norco  2. What is the dose? 10mg  3. How do you take the medication? Orally, topically, etc? Orally  4. How often do you take this medication? Daily  5. Do you need a 30 day or 90 day supply? 30  6. How many refills are you requesting? 1  7. What is your preferred pharmacy and location of the pharmacy? .        [facility] [place] - [address]  [address]  [place] [address]  Phone: [phone] Fax: [phone]      8. Who can we contact with further questions? Self [phone]

## 2019-01-16 ENCOUNTER — LAB VISIT (OUTPATIENT)
Dept: LAB | Facility: HOSPITAL | Age: 64
End: 2019-01-16
Attending: UROLOGY
Payer: MEDICARE

## 2019-01-16 DIAGNOSIS — Z12.5 ENCOUNTER FOR SCREENING FOR MALIGNANT NEOPLASM OF PROSTATE: ICD-10-CM

## 2019-01-16 DIAGNOSIS — C61 PROSTATE CANCER: ICD-10-CM

## 2019-01-16 LAB — COMPLEXED PSA SERPL-MCNC: <0.01 NG/ML

## 2019-01-16 PROCEDURE — 36415 COLL VENOUS BLD VENIPUNCTURE: CPT | Mod: HCNC,PO

## 2019-01-16 PROCEDURE — 84153 ASSAY OF PSA TOTAL: CPT | Mod: HCNC

## 2019-01-24 ENCOUNTER — TELEPHONE (OUTPATIENT)
Dept: PHARMACY | Facility: CLINIC | Age: 64
End: 2019-01-24

## 2019-01-28 ENCOUNTER — OFFICE VISIT (OUTPATIENT)
Dept: UROLOGY | Facility: CLINIC | Age: 64
End: 2019-01-28
Payer: MEDICARE

## 2019-01-28 VITALS — BODY MASS INDEX: 21.67 KG/M2 | WEIGHT: 151.38 LBS | HEIGHT: 70 IN

## 2019-01-28 DIAGNOSIS — C61 PROSTATE CANCER: Primary | ICD-10-CM

## 2019-01-28 PROCEDURE — 99999 PR PBB SHADOW E&M-EST. PATIENT-LVL III: CPT | Mod: PBBFAC,HCNC,, | Performed by: UROLOGY

## 2019-01-28 PROCEDURE — 99214 OFFICE O/P EST MOD 30 MIN: CPT | Mod: HCNC,25,S$GLB, | Performed by: UROLOGY

## 2019-01-28 PROCEDURE — 3008F BODY MASS INDEX DOCD: CPT | Mod: HCNC,CPTII,S$GLB, | Performed by: UROLOGY

## 2019-01-28 PROCEDURE — 96402 CHEMO HORMON ANTINEOPL SQ/IM: CPT | Mod: HCNC,S$GLB,, | Performed by: UROLOGY

## 2019-01-28 PROCEDURE — 81002 URINALYSIS NONAUTO W/O SCOPE: CPT | Mod: HCNC,S$GLB,, | Performed by: UROLOGY

## 2019-01-28 PROCEDURE — 81002 POCT URINE DIPSTICK WITHOUT MICROSCOPE: ICD-10-PCS | Mod: HCNC,S$GLB,, | Performed by: UROLOGY

## 2019-01-28 PROCEDURE — 3008F PR BODY MASS INDEX (BMI) DOCUMENTED: ICD-10-PCS | Mod: HCNC,CPTII,S$GLB, | Performed by: UROLOGY

## 2019-01-28 PROCEDURE — 99214 PR OFFICE/OUTPT VISIT, EST, LEVL IV, 30-39 MIN: ICD-10-PCS | Mod: HCNC,25,S$GLB, | Performed by: UROLOGY

## 2019-01-28 PROCEDURE — 99999 PR PBB SHADOW E&M-EST. PATIENT-LVL III: ICD-10-PCS | Mod: PBBFAC,HCNC,, | Performed by: UROLOGY

## 2019-01-28 PROCEDURE — 96402 PR CHEMOTHER HORMON ANTINEOPL SUB-Q/IM: ICD-10-PCS | Mod: HCNC,S$GLB,, | Performed by: UROLOGY

## 2019-01-28 NOTE — PROGRESS NOTES
lupron 22.5mg given vial lt ventrogluteal.  Aseptic technique maintained.  Asked pt to remain in room for 15 minutes.  No adverse signs noted.  Pt tolerated well

## 2019-01-28 NOTE — PROGRESS NOTES
"Chief Complaint: Perineal pain    HPI:   19: Son  in an MVA since last visit. Reviewed history in detail.  No problems from Lupron.  18: No problems from Lupron.  PSA lowest.  18: PSA today and again 3 mo.  Doing fine.  Reviewed history in detail.  18: PSA well down.  No adverse effects from Lupron.    10/18/17: Been on casodex a month back to review and start Lupron.  Dr. Moscoso felt XRT was not an option but perhaps chemotherapy could be beneficial.  17: Bone scan reports "RIGHT supraorbital location and a smaller similarly extremely intense focus of increased uptake posteriorly on the RIGHT at the T9 level.  Etiology is uncertain."  CT scan shows "A few scattered shotty mesenteric and retroperitoneal nodes identified.  Similar nodes identified within the pelvis bilaterally."  MRI shows left 2.8 cm prostate mass PIRADS 5, with metastatic pelvic lymphadenopathy (right pelvic sidewall node and left internal iliac chain LN).  No bony mets in pelvis.  17:  No problems from biopsy.  Gl 4+5 in the left base (30%) and a sig amount of 4+4 in other parts of the gland.  Reviewed treatment options, and imaging needs.  7/3/17: TRUS/Bx today 27 gm.  17: 63 yo man has problems with perineal/scrotal pain once or twice a month; lasts for an hour or two, some nocturia.  PSA recently elevated.  No abd/pelvic pain and no exac/rel factors.  No hematuria.  No urolithiasis.  No urinary bother.  No  history. CT with contrast earlier this year essentially normal.    Allergies:  Avelox  [moxifloxacin] and Nsaids (non-steroidal anti-inflammatory drug)    Medications:  has a current medication list which includes the following prescription(s): abiraterone, aspirin, atorvastatin, budesonide-formoterol 160-4.5 mcg, calcium-vitamin d, fluticasone, fluticasone-salmeterol, gabapentin, hydrocodone-acetaminophen, metoprolol succinate, nitroglycerin, pantoprazole, prednisone, and travoprost, and the " following Facility-Administered Medications: leuprolide.    Review of Systems:  General: No fever, chills, fatigability, or weight loss.  Skin: No rashes, itching, or changes in color or texture of skin.  Chest: Denies TORRES, cyanosis, wheezing, cough, and sputum production.  Abdomen: Appetite fine. No weight loss. Denies diarrhea, abdominal pain, hematemesis, or blood in stool.  Musculoskeletal: No joint stiffness or swelling. Denies back pain.  : As above.  All other review of systems negative.    PMH:   has a past medical history of COPD (chronic obstructive pulmonary disease) (7/30/2013), Coronary artery disease (7/30/2013), Emphysema of lung, Essential hypertension (10/29/2018), Glaucoma (increased eye pressure) (8/27/2013), Headache(784.0), Mitral regurgitation (7/30/2013), Mixed hyperlipidemia (7/30/2013), Neuropathy, Osteoarthritis of spine with radiculopathy, lumbar region (7/21/2015), Other and unspecified hyperlipidemia, and Pulmonary fibrosis (10/3/2017).    PSH:   has a past surgical history that includes Coronary stent placement; Colonoscopy (N/A, 3/21/2016); Shoulder arthroscopy (Left); and Spine surgery.    FamHx: family history includes Blindness in his maternal grandmother; Cancer in his father; Cataracts in his mother; Diabetes in his sister, sister, and sister; Hypertension in his sister, sister, sister, sister, and sister; Kidney disease in his mother.    SocHx:  reports that he has been smoking cigarettes.  He has a 6.25 pack-year smoking history. he has never used smokeless tobacco. He reports that he drinks alcohol. He reports that he does not use drugs.      Physical Exam:  There were no vitals filed for this visit.  General: A&Ox3, no apparent distress, no deformities  Neck: No masses, normal thyroid  Lungs: normal inspiration, no use of accessory muscles  Heart: normal pulse, no arrhythmias  Abdomen: Soft, NT, ND  Skin: The skin is warm and dry. No jaundice.  Ext: No c/c/e.  :      7/3/17: Test desc lucas, no abnormalities of epididymus. Penis normal, with normal penile and scrotal skin. Meatus normal. Normal rectal tone, no hemorrhoids. Prost 40 gm no nodules or masses appreciated. SV not palpable. Perineum and anus normal.    Labs/Studies:   PSA    2/16: 22.1    2/17: 22.9    1/18: 0.24    7/18: 0.01    1/19: undetect    Impression/Plan:   1. Lupron today and q3mo.  PSA/RTC 3 mo.

## 2019-02-04 DIAGNOSIS — G89.3 NEOPLASM RELATED PAIN: ICD-10-CM

## 2019-02-04 DIAGNOSIS — C79.51 SECONDARY ADENOCARCINOMA OF SKELETAL BONE: ICD-10-CM

## 2019-02-04 DIAGNOSIS — C61 PROSTATE CANCER: ICD-10-CM

## 2019-02-04 RX ORDER — HYDROCODONE BITARTRATE AND ACETAMINOPHEN 10; 325 MG/1; MG/1
1 TABLET ORAL EVERY 6 HOURS PRN
Qty: 90 TABLET | Refills: 0 | Status: SHIPPED | OUTPATIENT
Start: 2019-02-04 | End: 2019-03-04 | Stop reason: SDUPTHER

## 2019-02-18 ENCOUNTER — TELEPHONE (OUTPATIENT)
Dept: PHARMACY | Facility: CLINIC | Age: 64
End: 2019-02-18

## 2019-02-18 ENCOUNTER — LAB VISIT (OUTPATIENT)
Dept: LAB | Facility: HOSPITAL | Age: 64
End: 2019-02-18
Attending: INTERNAL MEDICINE
Payer: MEDICARE

## 2019-02-18 DIAGNOSIS — C61 PROSTATE CANCER: ICD-10-CM

## 2019-02-18 LAB
ALBUMIN SERPL BCP-MCNC: 3.4 G/DL
ALP SERPL-CCNC: 70 U/L
ALT SERPL W/O P-5'-P-CCNC: 17 U/L
ANION GAP SERPL CALC-SCNC: 10 MMOL/L
AST SERPL-CCNC: 20 U/L
BASOPHILS # BLD AUTO: 0.05 K/UL
BASOPHILS NFR BLD: 0.5 %
BILIRUB SERPL-MCNC: 0.5 MG/DL
BUN SERPL-MCNC: 13 MG/DL
CALCIUM SERPL-MCNC: 9.7 MG/DL
CHLORIDE SERPL-SCNC: 104 MMOL/L
CO2 SERPL-SCNC: 25 MMOL/L
COMPLEXED PSA SERPL-MCNC: <0.01 NG/ML
CREAT SERPL-MCNC: 1.2 MG/DL
DIFFERENTIAL METHOD: ABNORMAL
EOSINOPHIL # BLD AUTO: 0.1 K/UL
EOSINOPHIL NFR BLD: 1 %
ERYTHROCYTE [DISTWIDTH] IN BLOOD BY AUTOMATED COUNT: 14.7 %
EST. GFR  (AFRICAN AMERICAN): >60 ML/MIN/1.73 M^2
EST. GFR  (NON AFRICAN AMERICAN): >60 ML/MIN/1.73 M^2
GLUCOSE SERPL-MCNC: 81 MG/DL
HCT VFR BLD AUTO: 39.4 %
HGB BLD-MCNC: 13.2 G/DL
IMM GRANULOCYTES # BLD AUTO: 0.06 K/UL
IMM GRANULOCYTES NFR BLD AUTO: 0.6 %
LYMPHOCYTES # BLD AUTO: 3.1 K/UL
LYMPHOCYTES NFR BLD: 32.5 %
MCH RBC QN AUTO: 30.9 PG
MCHC RBC AUTO-ENTMCNC: 33.5 G/DL
MCV RBC AUTO: 92 FL
MONOCYTES # BLD AUTO: 1 K/UL
MONOCYTES NFR BLD: 10.3 %
NEUTROPHILS # BLD AUTO: 5.3 K/UL
NEUTROPHILS NFR BLD: 55.1 %
NRBC BLD-RTO: 0 /100 WBC
PLATELET # BLD AUTO: 206 K/UL
PMV BLD AUTO: 12.8 FL
POTASSIUM SERPL-SCNC: 3.6 MMOL/L
PROT SERPL-MCNC: 8 G/DL
RBC # BLD AUTO: 4.27 M/UL
SODIUM SERPL-SCNC: 139 MMOL/L
WBC # BLD AUTO: 9.54 K/UL

## 2019-02-18 PROCEDURE — 80053 COMPREHEN METABOLIC PANEL: CPT | Mod: HCNC

## 2019-02-18 PROCEDURE — 36415 COLL VENOUS BLD VENIPUNCTURE: CPT | Mod: HCNC,PO

## 2019-02-18 PROCEDURE — 85025 COMPLETE CBC W/AUTO DIFF WBC: CPT | Mod: HCNC

## 2019-02-18 PROCEDURE — 84153 ASSAY OF PSA TOTAL: CPT | Mod: HCNC

## 2019-02-27 ENCOUNTER — OFFICE VISIT (OUTPATIENT)
Dept: HEMATOLOGY/ONCOLOGY | Facility: CLINIC | Age: 64
End: 2019-02-27
Payer: MEDICARE

## 2019-02-27 ENCOUNTER — TELEPHONE (OUTPATIENT)
Dept: HEMATOLOGY/ONCOLOGY | Facility: CLINIC | Age: 64
End: 2019-02-27

## 2019-02-27 ENCOUNTER — OFFICE VISIT (OUTPATIENT)
Dept: INTERNAL MEDICINE | Facility: CLINIC | Age: 64
End: 2019-02-27
Payer: MEDICARE

## 2019-02-27 VITALS
TEMPERATURE: 98 F | HEIGHT: 70 IN | HEART RATE: 77 BPM | WEIGHT: 147.69 LBS | DIASTOLIC BLOOD PRESSURE: 86 MMHG | OXYGEN SATURATION: 98 % | BODY MASS INDEX: 21.14 KG/M2 | RESPIRATION RATE: 18 BRPM | SYSTOLIC BLOOD PRESSURE: 124 MMHG

## 2019-02-27 VITALS
WEIGHT: 146.81 LBS | DIASTOLIC BLOOD PRESSURE: 78 MMHG | OXYGEN SATURATION: 99 % | HEIGHT: 70 IN | HEART RATE: 80 BPM | BODY MASS INDEX: 21.02 KG/M2 | TEMPERATURE: 97 F | SYSTOLIC BLOOD PRESSURE: 120 MMHG

## 2019-02-27 DIAGNOSIS — F43.21 GRIEF: ICD-10-CM

## 2019-02-27 DIAGNOSIS — K21.9 GASTROESOPHAGEAL REFLUX DISEASE WITHOUT ESOPHAGITIS: Chronic | ICD-10-CM

## 2019-02-27 DIAGNOSIS — C79.51 SECONDARY ADENOCARCINOMA OF SKELETAL BONE: ICD-10-CM

## 2019-02-27 DIAGNOSIS — I25.84 CORONARY ARTERY DISEASE DUE TO CALCIFIED CORONARY LESION: Chronic | ICD-10-CM

## 2019-02-27 DIAGNOSIS — F43.21 GRIEF AT LOSS OF CHILD: ICD-10-CM

## 2019-02-27 DIAGNOSIS — J44.9 CHRONIC OBSTRUCTIVE PULMONARY DISEASE, UNSPECIFIED COPD TYPE: Chronic | ICD-10-CM

## 2019-02-27 DIAGNOSIS — E78.2 MIXED HYPERLIPIDEMIA: Chronic | ICD-10-CM

## 2019-02-27 DIAGNOSIS — J84.10 PULMONARY FIBROSIS: ICD-10-CM

## 2019-02-27 DIAGNOSIS — Z63.4 GRIEF AT LOSS OF CHILD: ICD-10-CM

## 2019-02-27 DIAGNOSIS — C61 PROSTATE CANCER: Primary | ICD-10-CM

## 2019-02-27 DIAGNOSIS — C61 PROSTATE CANCER: ICD-10-CM

## 2019-02-27 DIAGNOSIS — I25.10 CORONARY ARTERY DISEASE DUE TO CALCIFIED CORONARY LESION: Chronic | ICD-10-CM

## 2019-02-27 DIAGNOSIS — G89.3 NEOPLASM RELATED PAIN: ICD-10-CM

## 2019-02-27 DIAGNOSIS — I10 ESSENTIAL HYPERTENSION: Primary | ICD-10-CM

## 2019-02-27 DIAGNOSIS — Z98.61 HISTORY OF PTCA: ICD-10-CM

## 2019-02-27 PROBLEM — Z72.0 TOBACCO USE: Status: RESOLVED | Noted: 2017-10-13 | Resolved: 2019-02-27

## 2019-02-27 PROCEDURE — 3008F BODY MASS INDEX DOCD: CPT | Mod: HCNC,CPTII,S$GLB, | Performed by: FAMILY MEDICINE

## 2019-02-27 PROCEDURE — 99214 OFFICE O/P EST MOD 30 MIN: CPT | Mod: HCNC,S$GLB,, | Performed by: INTERNAL MEDICINE

## 2019-02-27 PROCEDURE — 99214 OFFICE O/P EST MOD 30 MIN: CPT | Mod: HCNC,S$GLB,, | Performed by: FAMILY MEDICINE

## 2019-02-27 PROCEDURE — 99999 PR PBB SHADOW E&M-EST. PATIENT-LVL III: ICD-10-PCS | Mod: PBBFAC,HCNC,, | Performed by: FAMILY MEDICINE

## 2019-02-27 PROCEDURE — 99999 PR PBB SHADOW E&M-EST. PATIENT-LVL IV: CPT | Mod: PBBFAC,HCNC,, | Performed by: INTERNAL MEDICINE

## 2019-02-27 PROCEDURE — 3074F SYST BP LT 130 MM HG: CPT | Mod: HCNC,CPTII,S$GLB, | Performed by: INTERNAL MEDICINE

## 2019-02-27 PROCEDURE — 3079F DIAST BP 80-89 MM HG: CPT | Mod: HCNC,CPTII,S$GLB, | Performed by: INTERNAL MEDICINE

## 2019-02-27 PROCEDURE — 3079F PR MOST RECENT DIASTOLIC BLOOD PRESSURE 80-89 MM HG: ICD-10-PCS | Mod: HCNC,CPTII,S$GLB, | Performed by: INTERNAL MEDICINE

## 2019-02-27 PROCEDURE — 3074F PR MOST RECENT SYSTOLIC BLOOD PRESSURE < 130 MM HG: ICD-10-PCS | Mod: HCNC,CPTII,S$GLB, | Performed by: FAMILY MEDICINE

## 2019-02-27 PROCEDURE — 3078F PR MOST RECENT DIASTOLIC BLOOD PRESSURE < 80 MM HG: ICD-10-PCS | Mod: HCNC,CPTII,S$GLB, | Performed by: FAMILY MEDICINE

## 2019-02-27 PROCEDURE — 99214 PR OFFICE/OUTPT VISIT, EST, LEVL IV, 30-39 MIN: ICD-10-PCS | Mod: HCNC,S$GLB,, | Performed by: INTERNAL MEDICINE

## 2019-02-27 PROCEDURE — 3074F SYST BP LT 130 MM HG: CPT | Mod: HCNC,CPTII,S$GLB, | Performed by: FAMILY MEDICINE

## 2019-02-27 PROCEDURE — 3078F DIAST BP <80 MM HG: CPT | Mod: HCNC,CPTII,S$GLB, | Performed by: FAMILY MEDICINE

## 2019-02-27 PROCEDURE — 99999 PR PBB SHADOW E&M-EST. PATIENT-LVL IV: ICD-10-PCS | Mod: PBBFAC,HCNC,, | Performed by: INTERNAL MEDICINE

## 2019-02-27 PROCEDURE — 99999 PR PBB SHADOW E&M-EST. PATIENT-LVL III: CPT | Mod: PBBFAC,HCNC,, | Performed by: FAMILY MEDICINE

## 2019-02-27 PROCEDURE — 3008F PR BODY MASS INDEX (BMI) DOCUMENTED: ICD-10-PCS | Mod: HCNC,CPTII,S$GLB, | Performed by: INTERNAL MEDICINE

## 2019-02-27 PROCEDURE — 3008F BODY MASS INDEX DOCD: CPT | Mod: HCNC,CPTII,S$GLB, | Performed by: INTERNAL MEDICINE

## 2019-02-27 PROCEDURE — 3008F PR BODY MASS INDEX (BMI) DOCUMENTED: ICD-10-PCS | Mod: HCNC,CPTII,S$GLB, | Performed by: FAMILY MEDICINE

## 2019-02-27 PROCEDURE — 3074F PR MOST RECENT SYSTOLIC BLOOD PRESSURE < 130 MM HG: ICD-10-PCS | Mod: HCNC,CPTII,S$GLB, | Performed by: INTERNAL MEDICINE

## 2019-02-27 PROCEDURE — 99214 PR OFFICE/OUTPT VISIT, EST, LEVL IV, 30-39 MIN: ICD-10-PCS | Mod: HCNC,S$GLB,, | Performed by: FAMILY MEDICINE

## 2019-02-27 RX ORDER — ESCITALOPRAM OXALATE 10 MG/1
10 TABLET ORAL DAILY
Qty: 30 TABLET | Refills: 3 | Status: SHIPPED | OUTPATIENT
Start: 2019-02-27 | End: 2021-08-12

## 2019-02-27 RX ORDER — PANTOPRAZOLE SODIUM 40 MG/1
40 TABLET, DELAYED RELEASE ORAL DAILY
Qty: 90 TABLET | Refills: 3 | Status: SHIPPED | OUTPATIENT
Start: 2019-02-27 | End: 2020-07-13

## 2019-02-27 SDOH — SOCIAL DETERMINANTS OF HEALTH (SDOH): DISSAPEARANCE AND DEATH OF FAMILY MEMBER: Z63.4

## 2019-02-27 NOTE — PROGRESS NOTES
Subjective:       Patient ID: Joey Jacobo is a 63 y.o. male.    Chief Complaint: Follow-up    63-year-old  male patient with Patient Active Problem List:     Coronary artery disease     Mitral regurgitation     COPD (chronic obstructive pulmonary disease)     Mixed hyperlipidemia     Gastroesophageal reflux disease without esophagitis     Glaucoma (increased eye pressure)     History of PTCA     Secondary adenocarcinoma of skeletal bone     Prostate cancer     Pulmonary fibrosis     Neoplasm related pain     Abnormal ECG     Essential hypertension  Here for follow-up on chronic medical conditions and reports that he has been taking his medications regularly.  Has been followed by oncologist secondary to metastatic prostate cancer.   Patient denies any abdominal discomfort nausea vomiting, hematuria or discomfort with urine, occasionally feels discomfort in the left side of the back.   Patient lost her younger son in November and has been depressed, currently grieving, will see  soon but reports that he has been depressed lately, his wife has been sick.   Patient would like to get some help, reports that he has been more irritable lately secondary to underlying depression.   Denies any suicidal homicidal thoughts  Denies any trouble with breathing secondary to COPD and heart conditions      Review of Systems   Constitutional: Negative for appetite change and fatigue.   Eyes: Negative for visual disturbance.   Respiratory: Negative for shortness of breath.    Cardiovascular: Negative for chest pain, palpitations and leg swelling.   Gastrointestinal: Negative for abdominal pain, nausea and vomiting.   Musculoskeletal: Positive for back pain and myalgias.   Skin: Negative for rash.   Neurological: Negative for headaches.   Psychiatric/Behavioral: Positive for dysphoric mood. Negative for self-injury, sleep disturbance and suicidal ideas. The patient is nervous/anxious.          BP  "120/78   Pulse 80   Temp 97.1 °F (36.2 °C) (Tympanic)   Ht 5' 10" (1.778 m)   Wt 66.6 kg (146 lb 13.2 oz)   SpO2 99%   BMI 21.07 kg/m²   Objective:      Physical Exam   Constitutional: He is oriented to person, place, and time. He appears well-developed and well-nourished.   HENT:   Head: Normocephalic and atraumatic.   Mouth/Throat: Oropharynx is clear and moist.   Cardiovascular: Normal rate, regular rhythm and normal heart sounds.   No murmur heard.  Pulmonary/Chest: Effort normal and breath sounds normal. He has no wheezes.   Abdominal: Soft. Bowel sounds are normal. There is no tenderness.   Musculoskeletal: He exhibits tenderness. He exhibits no edema.   Positive for minimal tenderness to the left side of the paraspinal lumbar muscles   Neurological: He is alert and oriented to person, place, and time.   Skin: Skin is warm and dry. No rash noted.   Psychiatric:   Patient tearful today         Assessment:       1. Essential hypertension    2. Mixed hyperlipidemia    3. Gastroesophageal reflux disease without esophagitis    4. Prostate cancer    5. Secondary adenocarcinoma of skeletal bone    6. Chronic obstructive pulmonary disease, unspecified COPD type    7. Coronary artery disease due to calcified coronary lesion    8. History of PTCA    9. Pulmonary fibrosis    10. Neoplasm related pain    11. Grief at loss of child        Plan:   Essential hypertension  -     Lipid panel; Future; Expected date: 02/27/2019  -     TSH; Future; Expected date: 02/27/2019  Blood pressure is stable today currently on metoprolol 50 mg daily    Mixed hyperlipidemia-currently on Lipitor 40 mg daily    Gastroesophageal reflux disease without esophagitis  -     pantoprazole (PROTONIX) 40 MG tablet; Take 1 tablet (40 mg total) by mouth once daily.  Dispense: 90 tablet; Refill: 3  Stable on pantoprazole 40 mg, requesting refill today    Prostate cancer  Secondary adenocarcinoma of skeletal bone  Neoplasm related pain  Followed by " oncologist    Chronic obstructive pulmonary disease, unspecified COPD type  Coronary artery disease due to calcified coronary lesion  History of PTCA  Pulmonary fibrosis  Continue current regimen and follow up with specialists    Grief at loss of child  -     escitalopram oxalate (LEXAPRO) 10 MG tablet; Take 1 tablet (10 mg total) by mouth once daily.  Dispense: 30 tablet; Refill: 3  Patient was advised to follow up with  as recommended by Oncology   Lexapro will be sent to pharmacy, take it as prescribed and follow up with me in 6 weeks

## 2019-02-27 NOTE — TELEPHONE ENCOUNTER
Pt referred to SW by Dr. Roman. Unfortunately SW unavailable at the time due to being with another pt so Dr. Roman gave pt SW contact info and told him SW would call.    Called pt to discuss reason for referral--his son was killed a few months ago in a MVA and he has been a hard time dealing with this sudden loss. We talked about the grief process; normalized the range of emotions he is experiencing. We talked about feelings of grief coming in waves and not putting a timeline on grief. He was interested in f/u with SW as recommended by Dr. Roman (who also recommended pt have an appt with Dr. Dunn).     Plans made with pt for SW colleague Estephanie to f/u with pt when he comes back in Monday for labs. He will come to see SW afterward.

## 2019-02-27 NOTE — PROGRESS NOTES
Reason for visit: Metastatic prostate adenocarcinoma  Date of Diagnosis: September 2017    HPI:   The patient is a 63-year-old -American male who presents to the hematology oncology clinic today to discuss further evaluation and management recommendations for metastatic prostate adenocarcinoma.  The patient has been referred to Dr. Roland Dawn with urology.  I have reviewed all of the patient's relevant clinical history available in the medical record and have utilized this in my evaluation and management recommendations today.  Today the patient reports that he has chronic pain in his lumbar region due to degenerative disc disease.  He also reports pain in his lower thoracic region.  He has been taking Norco when necessary pain.  He denies any fevers, chills or night sweats.  He reports chronic fatigue.  He denies any melena, hematochezia, hematemesis, hemoptysis or hematuria.  He denies any chest pain or shortness of breath.  He denies any bowel or urinary complaints.  He reports that he started his Zytiga/prednisone on December 7, 2017 and has been tolerating this medication well without any significant side effects.    PAST MEDICAL HISTORY:   1.  Coronary artery disease  2.  Daily  3.  Pulmonary fibrosis  4.  Glaucoma  5.  Sleep anemia  6.  Osteoarthritis/degenerative disc disease  7.  GERD    SURGICAL HISTORY:   1.  PCI with stent placement for coronary artery disease  2.  Neck fusion ×2    FAMILY HISTORY: The patient's father had gastric cancer in his 70s.  He reports that 2 sisters had breast cancer in their 50s/60s.  He denies any other immediate family members with cancers or bleeding/clotting disorders.    SOCIAL HISTORY: He reports a 40+-pack-year smoking history and quit in Nov 2017.  He used to drink an average of 12 pack of beer every week and has quit this in Dec 2017.  He reports a history of recreational drug use and last used cocaine in June 2017.  He reports that he has now quit this  completely and does not intend to use any recreational drugs in the future.  He is to work on [employer] and as a  and retired in 2007.  He is  and does not have any children.  He lives in [place], [place].      ALLERGIES: Reviewed on medication card.    MEDICATIONS: [Medcard has been reviewed and/or reconciled.]    REVIEW OF SYSTEMS:   GENERAL: [No fevers, chills or sweats. Reports chronic fatigue. Denies weight loss or loss of appetite.]  HEENT: [No blurred vision, tinnitus, nasal discharge, sorethroat or dysphagia.]  HEART: [No chest pain, palpitations or shortness of breath.]    LUNGS: [No cough, hemoptysis or breathing problems.]  ABDOMEN: [No abdominal pain, nausea, vomiting, diarrhea, constipation or melena.]  GENITOURINARY: [No dysuria, bleeding or malodorous discharge.]  NEURO: [No headache, dizziness or vertigo.]  HEMATOLOGY: [No easy bruising, spontaneous bleeding or blood clots in the past].  MUSCULOSKELETAL: [Reports chronic arthralgias. Denies myalgias. Reports bone pains.]  SKIN: [No rashes or skin lesions.]  PSYCHIATRY: [No depression or anxiety.]    PHYSICAL EXAMINATION:   VS: Reviewed on nurse's notes.  APPEARANCE: The patient is a well-developed, well-nourished and well-groomed  male who appears in no acute distress.  He was accompanied to this clinic visit by his wife.  HEENT: No scleral icterus. Both external auditory canals clear. No oral ulcers, lesions. Throat clear.  Poor dentition noted.  HEAD: No sinus tenderness.  NECK: Supple. No palpable lymphadenopathy. Thyroid non-tender, no palpable masses  CHEST: Breath sounds clear bilaterally. No rales. No rhonchi. Unlabored respirations.  CARDIOVASCULAR: Normal S1, S2. Normal rate. Regular rhythm.  ABDOMEN: Bowel sounds normal. No tenderness. No abdominal distention. No hepatomegaly. No splenomegaly.  LYMPHATIC: No palpable supraclavicular, axillary nodes  EXTREMITIES: No clubbing, cyanosis,  edema  SKIN: No lesions. No petechiae. No ecchymoses. No induration or nodules  NEUROLOGIC: No focal findings. Alert & Oriented x 3. Mood appropriate to affect    LABS:   Reviewed    IMAGING:  Reviewed    IMPRESSION:  1.  Metastatic prostate adenocarcinoma with bone involvement    PLAN:  1.  I had a detailed discussion with the patient today with regard to the diagnosis, prognosis and treatment for his metastatic prostate adenocarcinoma with bone involvement.  2.  I have discussed that at this time his malignancy is potentially treatable but not curable.  The patient and his wife expressed understanding of this information.  3.  The patient is up-to-date with Lupron injection.  This is being given via his urologist Dr. Dawn's office.    4.  Continue Zytiga plus prednisone as recommended/prescribed.  No evidence of toxicity at this time.  5.  He is in the process of getting his teeth extracted.  Patient was informed that we will have to get this addressed first before we can discuss initiation of xgeva for treatment of bone involvement by malignancy.  He will continue calcium plus vitamin D supplementation daily as advised.  6. He takes Norco 10 mg when necessary pain.  Sedation/constipation precautions with pain medication was extensively discussed and he expressed understanding.  7. Social work consult to discuss grief from recent death of his son.    Follow up in 2 months with labs.  He knows to call for any additional questions or new problems.    Geronimo Roman MD

## 2019-03-04 ENCOUNTER — LAB VISIT (OUTPATIENT)
Dept: LAB | Facility: HOSPITAL | Age: 64
End: 2019-03-04
Attending: FAMILY MEDICINE
Payer: MEDICARE

## 2019-03-04 ENCOUNTER — TELEPHONE (OUTPATIENT)
Dept: HEMATOLOGY/ONCOLOGY | Facility: CLINIC | Age: 64
End: 2019-03-04

## 2019-03-04 DIAGNOSIS — C79.51 SECONDARY ADENOCARCINOMA OF SKELETAL BONE: ICD-10-CM

## 2019-03-04 DIAGNOSIS — G89.3 NEOPLASM RELATED PAIN: ICD-10-CM

## 2019-03-04 DIAGNOSIS — C61 PROSTATE CANCER: ICD-10-CM

## 2019-03-04 DIAGNOSIS — I10 ESSENTIAL HYPERTENSION: ICD-10-CM

## 2019-03-04 LAB
CHOLEST SERPL-MCNC: 230 MG/DL
CHOLEST/HDLC SERPL: 4.2 {RATIO}
HDLC SERPL-MCNC: 55 MG/DL
HDLC SERPL: 23.9 %
LDLC SERPL CALC-MCNC: 153.6 MG/DL
NONHDLC SERPL-MCNC: 175 MG/DL
TRIGL SERPL-MCNC: 107 MG/DL
TSH SERPL DL<=0.005 MIU/L-ACNC: 1.06 UIU/ML

## 2019-03-04 PROCEDURE — 80061 LIPID PANEL: CPT | Mod: HCNC

## 2019-03-04 PROCEDURE — 84443 ASSAY THYROID STIM HORMONE: CPT | Mod: HCNC

## 2019-03-04 PROCEDURE — 36415 COLL VENOUS BLD VENIPUNCTURE: CPT | Mod: HCNC,PO

## 2019-03-04 RX ORDER — HYDROCODONE BITARTRATE AND ACETAMINOPHEN 10; 325 MG/1; MG/1
1 TABLET ORAL EVERY 6 HOURS PRN
Qty: 90 TABLET | Refills: 0 | Status: SHIPPED | OUTPATIENT
Start: 2019-03-04 | End: 2019-04-01 | Stop reason: SDUPTHER

## 2019-03-04 NOTE — TELEPHONE ENCOUNTER
SW attempted to contact pt to discuss him not coming to see SW today. SW left a voicemail for callback.    SW will f/u with pt by end of the week

## 2019-03-05 DIAGNOSIS — E78.2 MIXED HYPERLIPIDEMIA: Primary | Chronic | ICD-10-CM

## 2019-03-05 RX ORDER — EZETIMIBE 10 MG/1
10 TABLET ORAL DAILY
Qty: 90 TABLET | Refills: 3 | Status: SHIPPED | OUTPATIENT
Start: 2019-03-05 | End: 2020-02-19

## 2019-03-13 ENCOUNTER — TELEPHONE (OUTPATIENT)
Dept: HEMATOLOGY/ONCOLOGY | Facility: CLINIC | Age: 64
End: 2019-03-13

## 2019-03-13 NOTE — TELEPHONE ENCOUNTER
Pt left a brief voicemail today due to SW attempting to contact pt on 3/4/2019. SW attempted to call patient back; however, there was not any answer. SW left voicemail again.    F/u by end of next week.

## 2019-03-25 ENCOUNTER — TELEPHONE (OUTPATIENT)
Dept: HEMATOLOGY/ONCOLOGY | Facility: CLINIC | Age: 64
End: 2019-03-25

## 2019-03-25 ENCOUNTER — TELEPHONE (OUTPATIENT)
Dept: PHARMACY | Facility: CLINIC | Age: 64
End: 2019-03-25

## 2019-03-25 DIAGNOSIS — G89.3 NEOPLASM RELATED PAIN: ICD-10-CM

## 2019-03-25 DIAGNOSIS — C79.51 SECONDARY ADENOCARCINOMA OF SKELETAL BONE: ICD-10-CM

## 2019-03-25 RX ORDER — ABIRATERONE ACETATE 250 MG/1
1000 TABLET ORAL DAILY
Qty: 120 TABLET | Refills: 3 | Status: SHIPPED | OUTPATIENT
Start: 2019-03-25 | End: 2019-07-18

## 2019-03-25 NOTE — TELEPHONE ENCOUNTER
LVM for Zytiga/Prednisone follow up.  Patient needs new rx.  Sent refill request to MD office.  Obtain count when patient calls back.

## 2019-03-27 NOTE — TELEPHONE ENCOUNTER
Contacted patient in regards to Zytiga refill and clinical follow up. He stated he has about four days remaining. Will ship refill on 3/28 for the patient to receive 3/29. $0 copay, address confirmed. No changes in medications, allergies, health conditions or missed doses.    Administration: Patient confirms taking medication as prescribed: Zytiga 250mg tablets - take 4 tablets by mouth once daily. Patient takes medication without food every morning. Proper chemo handling procedures are followed (wash hands after administration and ensures no one else comes into contact). Medication is stored at room temp.  Adherence: Patient denies missed doses of his Zytiga in the past month.  Side effects/disease state management: Patient denies any side effects and has tolerated Zytiga well for several years.  Energy levels: The patient continues to grieve for son who was killed 11/30/18 in a MVA. His wife is currently in rehab for poor ambulation and he plans to pick her up this afternoon. He has been her primary caretaker and has helped with bathing, cleaning, pushing in wheelchair, etc. He still completes all daily activities and just got done cleaning his car and vacuuming.    A pharmacist will continue to reach out to the patient every three cycles or as clinically appropriate. Encouraged patient to contact [facility] with any questions/concerns.

## 2019-04-01 ENCOUNTER — TELEPHONE (OUTPATIENT)
Dept: INTERNAL MEDICINE | Facility: CLINIC | Age: 64
End: 2019-04-01

## 2019-04-01 DIAGNOSIS — C61 PROSTATE CANCER: ICD-10-CM

## 2019-04-01 DIAGNOSIS — C79.51 SECONDARY ADENOCARCINOMA OF SKELETAL BONE: ICD-10-CM

## 2019-04-01 DIAGNOSIS — G89.3 NEOPLASM RELATED PAIN: ICD-10-CM

## 2019-04-01 RX ORDER — HYDROCODONE BITARTRATE AND ACETAMINOPHEN 10; 325 MG/1; MG/1
1 TABLET ORAL EVERY 6 HOURS PRN
Qty: 90 TABLET | Refills: 0 | Status: SHIPPED | OUTPATIENT
Start: 2019-04-01 | End: 2019-04-25 | Stop reason: SDUPTHER

## 2019-04-01 NOTE — TELEPHONE ENCOUNTER
Pt is having sinus congestion and cold symptoms. He does not have transportation to come in to clinic. Please advise.

## 2019-04-01 NOTE — TELEPHONE ENCOUNTER
----- Message from Sarah Lara sent at 4/1/2019 12:25 PM CDT -----  Contact: pt  Pt has a head cold. Pt would like something called out. Pt doesn't have a car to come in .  Can something be called out for him?

## 2019-04-22 ENCOUNTER — LAB VISIT (OUTPATIENT)
Dept: LAB | Facility: HOSPITAL | Age: 64
End: 2019-04-22
Attending: INTERNAL MEDICINE
Payer: MEDICARE

## 2019-04-22 DIAGNOSIS — C61 PROSTATE CANCER: ICD-10-CM

## 2019-04-22 DIAGNOSIS — G89.3 NEOPLASM RELATED PAIN: ICD-10-CM

## 2019-04-22 DIAGNOSIS — C79.51 SECONDARY ADENOCARCINOMA OF SKELETAL BONE: ICD-10-CM

## 2019-04-22 LAB
ALBUMIN SERPL BCP-MCNC: 3.5 G/DL (ref 3.5–5.2)
ALP SERPL-CCNC: 73 U/L (ref 55–135)
ALT SERPL W/O P-5'-P-CCNC: 14 U/L (ref 10–44)
ANION GAP SERPL CALC-SCNC: 9 MMOL/L (ref 8–16)
AST SERPL-CCNC: 18 U/L (ref 10–40)
BASOPHILS # BLD AUTO: 0.04 K/UL (ref 0–0.2)
BASOPHILS NFR BLD: 0.4 % (ref 0–1.9)
BILIRUB SERPL-MCNC: 0.5 MG/DL (ref 0.1–1)
BUN SERPL-MCNC: 18 MG/DL (ref 8–23)
CALCIUM SERPL-MCNC: 9.9 MG/DL (ref 8.7–10.5)
CHLORIDE SERPL-SCNC: 103 MMOL/L (ref 95–110)
CO2 SERPL-SCNC: 26 MMOL/L (ref 23–29)
COMPLEXED PSA SERPL-MCNC: <0.01 NG/ML (ref 0–4)
CREAT SERPL-MCNC: 1.2 MG/DL (ref 0.5–1.4)
DIFFERENTIAL METHOD: ABNORMAL
EOSINOPHIL # BLD AUTO: 0.1 K/UL (ref 0–0.5)
EOSINOPHIL NFR BLD: 0.6 % (ref 0–8)
ERYTHROCYTE [DISTWIDTH] IN BLOOD BY AUTOMATED COUNT: 14.6 % (ref 11.5–14.5)
EST. GFR  (AFRICAN AMERICAN): >60 ML/MIN/1.73 M^2
EST. GFR  (NON AFRICAN AMERICAN): >60 ML/MIN/1.73 M^2
GLUCOSE SERPL-MCNC: 84 MG/DL (ref 70–110)
HCT VFR BLD AUTO: 42.8 % (ref 40–54)
HGB BLD-MCNC: 13.4 G/DL (ref 14–18)
IMM GRANULOCYTES # BLD AUTO: 0.09 K/UL (ref 0–0.04)
IMM GRANULOCYTES NFR BLD AUTO: 0.8 % (ref 0–0.5)
LYMPHOCYTES # BLD AUTO: 3 K/UL (ref 1–4.8)
LYMPHOCYTES NFR BLD: 28.1 % (ref 18–48)
MCH RBC QN AUTO: 30.4 PG (ref 27–31)
MCHC RBC AUTO-ENTMCNC: 31.3 G/DL (ref 32–36)
MCV RBC AUTO: 97 FL (ref 82–98)
MONOCYTES # BLD AUTO: 1 K/UL (ref 0.3–1)
MONOCYTES NFR BLD: 9.5 % (ref 4–15)
NEUTROPHILS # BLD AUTO: 6.5 K/UL (ref 1.8–7.7)
NEUTROPHILS NFR BLD: 60.6 % (ref 38–73)
NRBC BLD-RTO: 0 /100 WBC
PLATELET # BLD AUTO: 218 K/UL (ref 150–350)
PMV BLD AUTO: 11.8 FL (ref 9.2–12.9)
POTASSIUM SERPL-SCNC: 4 MMOL/L (ref 3.5–5.1)
PROT SERPL-MCNC: 8.4 G/DL (ref 6–8.4)
RBC # BLD AUTO: 4.41 M/UL (ref 4.6–6.2)
SODIUM SERPL-SCNC: 138 MMOL/L (ref 136–145)
WBC # BLD AUTO: 10.71 K/UL (ref 3.9–12.7)

## 2019-04-22 PROCEDURE — 85025 COMPLETE CBC W/AUTO DIFF WBC: CPT | Mod: HCNC

## 2019-04-22 PROCEDURE — 84153 ASSAY OF PSA TOTAL: CPT | Mod: 91,HCNC

## 2019-04-22 PROCEDURE — 80053 COMPREHEN METABOLIC PANEL: CPT | Mod: HCNC

## 2019-04-23 DIAGNOSIS — E78.2 MIXED HYPERLIPIDEMIA: Primary | ICD-10-CM

## 2019-04-23 RX ORDER — ATORVASTATIN CALCIUM 40 MG/1
40 TABLET, FILM COATED ORAL DAILY
Qty: 90 TABLET | Refills: 3 | Status: SHIPPED | OUTPATIENT
Start: 2019-04-23 | End: 2020-02-19

## 2019-04-24 ENCOUNTER — TELEPHONE (OUTPATIENT)
Dept: PHARMACY | Facility: CLINIC | Age: 64
End: 2019-04-24

## 2019-04-24 NOTE — TELEPHONE ENCOUNTER
Zytiga refill:  Patient reached for refill call.  He confirmed he is in need of the medication by early next week.  Currently has about 6 doses remaining.  There have been no changes to medication administration, no new allergies, medications or health conditions.  He feels well and did not have any questions or concerns.  He denies major side effects.  No trips to the urgent care or ER and denies missed doses.     Medication will ship  for delivery on .  Address confirmed.  Copay $0.00 (004).  Two patient identifiers confirmed - name and .  Therapy appropriate.

## 2019-04-25 ENCOUNTER — OFFICE VISIT (OUTPATIENT)
Dept: HEMATOLOGY/ONCOLOGY | Facility: CLINIC | Age: 64
End: 2019-04-25
Payer: MEDICARE

## 2019-04-25 VITALS
HEIGHT: 70 IN | DIASTOLIC BLOOD PRESSURE: 94 MMHG | HEART RATE: 71 BPM | SYSTOLIC BLOOD PRESSURE: 156 MMHG | TEMPERATURE: 98 F | WEIGHT: 151.88 LBS | RESPIRATION RATE: 18 BRPM | BODY MASS INDEX: 21.74 KG/M2 | OXYGEN SATURATION: 97 %

## 2019-04-25 DIAGNOSIS — C79.51 SECONDARY ADENOCARCINOMA OF SKELETAL BONE: ICD-10-CM

## 2019-04-25 DIAGNOSIS — G89.3 NEOPLASM RELATED PAIN: ICD-10-CM

## 2019-04-25 DIAGNOSIS — C61 PROSTATE CANCER: Primary | ICD-10-CM

## 2019-04-25 PROCEDURE — 99999 PR PBB SHADOW E&M-EST. PATIENT-LVL IV: ICD-10-PCS | Mod: PBBFAC,HCNC,, | Performed by: INTERNAL MEDICINE

## 2019-04-25 PROCEDURE — 3077F SYST BP >= 140 MM HG: CPT | Mod: HCNC,CPTII,S$GLB, | Performed by: INTERNAL MEDICINE

## 2019-04-25 PROCEDURE — 99999 PR PBB SHADOW E&M-EST. PATIENT-LVL IV: CPT | Mod: PBBFAC,HCNC,, | Performed by: INTERNAL MEDICINE

## 2019-04-25 PROCEDURE — 99214 PR OFFICE/OUTPT VISIT, EST, LEVL IV, 30-39 MIN: ICD-10-PCS | Mod: HCNC,S$GLB,, | Performed by: INTERNAL MEDICINE

## 2019-04-25 PROCEDURE — 3008F BODY MASS INDEX DOCD: CPT | Mod: HCNC,CPTII,S$GLB, | Performed by: INTERNAL MEDICINE

## 2019-04-25 PROCEDURE — 3077F PR MOST RECENT SYSTOLIC BLOOD PRESSURE >= 140 MM HG: ICD-10-PCS | Mod: HCNC,CPTII,S$GLB, | Performed by: INTERNAL MEDICINE

## 2019-04-25 PROCEDURE — 3080F PR MOST RECENT DIASTOLIC BLOOD PRESSURE >= 90 MM HG: ICD-10-PCS | Mod: HCNC,CPTII,S$GLB, | Performed by: INTERNAL MEDICINE

## 2019-04-25 PROCEDURE — 99214 OFFICE O/P EST MOD 30 MIN: CPT | Mod: HCNC,S$GLB,, | Performed by: INTERNAL MEDICINE

## 2019-04-25 PROCEDURE — 3080F DIAST BP >= 90 MM HG: CPT | Mod: HCNC,CPTII,S$GLB, | Performed by: INTERNAL MEDICINE

## 2019-04-25 PROCEDURE — 3008F PR BODY MASS INDEX (BMI) DOCUMENTED: ICD-10-PCS | Mod: HCNC,CPTII,S$GLB, | Performed by: INTERNAL MEDICINE

## 2019-04-25 RX ORDER — HYDROCODONE BITARTRATE AND ACETAMINOPHEN 10; 325 MG/1; MG/1
1 TABLET ORAL EVERY 6 HOURS PRN
Qty: 90 TABLET | Refills: 0 | Status: SHIPPED | OUTPATIENT
Start: 2019-04-25 | End: 2019-05-22 | Stop reason: SDUPTHER

## 2019-04-25 NOTE — PROGRESS NOTES
Reason for visit: Metastatic prostate adenocarcinoma  Date of Diagnosis: September 2017    HPI:   The patient is a 64-year-old -American male who presents to the hematology oncology clinic today to discuss further evaluation and management recommendations for metastatic prostate adenocarcinoma.  The patient has been referred to Dr. Roland Dawn with urology.  I have reviewed all of the patient's relevant clinical history available in the medical record and have utilized this in my evaluation and management recommendations today.  Today the patient reports that he has chronic pain in his lumbar region due to degenerative disc disease.  He also reports pain in his lower thoracic region.  He has been taking Norco when necessary pain.  He denies any fevers, chills or night sweats.  He reports chronic fatigue.  He denies any melena, hematochezia, hematemesis, hemoptysis or hematuria.  He denies any chest pain or shortness of breath.  He denies any bowel or urinary complaints.  He reports that he started his Zytiga/prednisone on December 7, 2017 and has been tolerating this medication well without any significant side effects.    PAST MEDICAL HISTORY:   1.  Coronary artery disease  2.  Daily  3.  Pulmonary fibrosis  4.  Glaucoma  5.  Sleep anemia  6.  Osteoarthritis/degenerative disc disease  7.  GERD    SURGICAL HISTORY:   1.  PCI with stent placement for coronary artery disease  2.  Neck fusion ×2    FAMILY HISTORY: The patient's father had gastric cancer in his 70s.  He reports that 2 sisters had breast cancer in their 50s/60s.  He denies any other immediate family members with cancers or bleeding/clotting disorders.    SOCIAL HISTORY: He reports a 40+-pack-year smoking history and quit in Nov 2017.  He used to drink an average of 12 pack of beer every week and has quit this in Dec 2017.  He reports a history of recreational drug use and last used cocaine in June 2017.  He reports that he has now quit this  completely and does not intend to use any recreational drugs in the future.  He is to work on [employer] and as a  and retired in 2007.  He is  and does not have any children.  He lives in [place], [place].      ALLERGIES: Reviewed on medication card.    MEDICATIONS: [Medcard has been reviewed and/or reconciled.]    REVIEW OF SYSTEMS:   GENERAL: [No fevers, chills or sweats. Reports chronic fatigue. Denies weight loss or loss of appetite.]  HEENT: [No blurred vision, tinnitus, nasal discharge, sorethroat or dysphagia.]  HEART: [No chest pain, palpitations or shortness of breath.]    LUNGS: [No cough, hemoptysis or breathing problems.]  ABDOMEN: [No abdominal pain, nausea, vomiting, diarrhea, constipation or melena.]  GENITOURINARY: [No dysuria, bleeding or malodorous discharge.]  NEURO: [No headache, dizziness or vertigo.]  HEMATOLOGY: [No easy bruising, spontaneous bleeding or blood clots in the past].  MUSCULOSKELETAL: [Reports chronic arthralgias. Denies myalgias. Reports bone pains.]  SKIN: [No rashes or skin lesions.]  PSYCHIATRY: [No depression or anxiety.]    PHYSICAL EXAMINATION:   VS: Reviewed on nurse's notes.  APPEARANCE: The patient is a well-developed, well-nourished and well-groomed  male who appears in no acute distress.  He was accompanied to this clinic visit by his wife.  HEENT: No scleral icterus. Both external auditory canals clear. No oral ulcers, lesions. Throat clear.  Poor dentition noted.  HEAD: No sinus tenderness.  NECK: Supple. No palpable lymphadenopathy. Thyroid non-tender, no palpable masses  CHEST: Breath sounds clear bilaterally. No rales. No rhonchi. Unlabored respirations.  CARDIOVASCULAR: Normal S1, S2. Normal rate. Regular rhythm.  ABDOMEN: Bowel sounds normal. No tenderness. No abdominal distention. No hepatomegaly. No splenomegaly.  LYMPHATIC: No palpable supraclavicular, axillary nodes  EXTREMITIES: No clubbing, cyanosis,  edema  SKIN: No lesions. No petechiae. No ecchymoses. No induration or nodules  NEUROLOGIC: No focal findings. Alert & Oriented x 3. Mood appropriate to affect    LABS:   Reviewed    IMAGING:  Reviewed    IMPRESSION:  1.  Metastatic prostate adenocarcinoma with bone involvement    PLAN:  1.  I had a detailed discussion with the patient today with regard to the diagnosis, prognosis and treatment for his metastatic prostate adenocarcinoma with bone involvement.  2.  I have discussed that at this time his malignancy is potentially treatable but not curable.  The patient and his wife expressed understanding of this information.  3.  The patient is up-to-date with Lupron injection.  This is being given via his urologist Dr. Dawn's office.    4.  Continue Zytiga plus prednisone as recommended/prescribed.  No evidence of toxicity at this time.  5.  He is in the process of getting his teeth extracted.  Patient was informed that we will have to get this addressed first before we can discuss initiation of xgeva for treatment of bone involvement by malignancy.  He will continue calcium plus vitamin D supplementation daily as advised.  6. He takes Norco 10 mg when necessary pain.  Sedation/constipation precautions with pain medication was extensively discussed and he expressed understanding.    Follow up in 2 months with labs.  He knows to call for any additional questions or new problems.    Geronimo Roman MD

## 2019-04-25 NOTE — PROGRESS NOTES
Distress Screening Results: Psychosocial Distress screening score of Distress Score: 6 noted and reviewed. No intervention indicated.

## 2019-05-06 ENCOUNTER — TELEPHONE (OUTPATIENT)
Dept: UROLOGY | Facility: CLINIC | Age: 64
End: 2019-05-06

## 2019-05-06 NOTE — TELEPHONE ENCOUNTER
----- Message from Kianna Hammond sent at 5/6/2019 10:25 AM CDT -----  Contact: PATIENT  CALLING CONCERNING GETTING HIS 3 SHOTS. PLEASE CALL PATIENT ASAP @ [phone]. THANKS CALEB

## 2019-05-15 DIAGNOSIS — I25.10 CORONARY ARTERY DISEASE INVOLVING NATIVE CORONARY ARTERY WITHOUT ANGINA PECTORIS, UNSPECIFIED WHETHER NATIVE OR TRANSPLANTED HEART: ICD-10-CM

## 2019-05-15 RX ORDER — METOPROLOL SUCCINATE 50 MG/1
TABLET, EXTENDED RELEASE ORAL
Qty: 30 TABLET | Refills: 3 | Status: SHIPPED | OUTPATIENT
Start: 2019-05-15 | End: 2019-10-04 | Stop reason: SDUPTHER

## 2019-05-16 ENCOUNTER — TELEPHONE (OUTPATIENT)
Dept: HEMATOLOGY/ONCOLOGY | Facility: CLINIC | Age: 64
End: 2019-05-16

## 2019-05-16 NOTE — TELEPHONE ENCOUNTER
SW attempted to reach pt to address high distress (DS 6) at his most recent visit with MD. Pt did not answer; ISAIAH was able to leave detailed messaged asking pt to call back at his earliest convenience.

## 2019-05-17 ENCOUNTER — TELEPHONE (OUTPATIENT)
Dept: HEMATOLOGY/ONCOLOGY | Facility: CLINIC | Age: 64
End: 2019-05-17

## 2019-05-17 NOTE — TELEPHONE ENCOUNTER
"Pt returned call from yesterday. SW explained purpose was to address his distress score from the end of last month (6/10). Pt explained he put that as he is still mourning the loss of his youngest son from last November. SW normalized the grieving process and how there is no time limit to grief. Pt talked about the accident that took his son's life, how difficult the holidays were for them (son's birthday was in December as well), and how his wife had a difficult Mother's Day. SW talked about the difficulty of the "firsts". Pt explained they try to remember good times and looking to each other (children and spouse) to help each other on the hard days. SW encouraged him to call if he ever needs to process thoughts, feelings with someone. Pt thankful for the phone call. SW will f/u when pt RTC to continue providing emotional support as needed.   "

## 2019-05-20 ENCOUNTER — OFFICE VISIT (OUTPATIENT)
Dept: UROLOGY | Facility: CLINIC | Age: 64
End: 2019-05-20
Payer: MEDICARE

## 2019-05-20 VITALS
HEIGHT: 70 IN | DIASTOLIC BLOOD PRESSURE: 82 MMHG | BODY MASS INDEX: 21.47 KG/M2 | WEIGHT: 149.94 LBS | SYSTOLIC BLOOD PRESSURE: 118 MMHG | HEART RATE: 73 BPM

## 2019-05-20 DIAGNOSIS — C61 PROSTATE CANCER: Primary | ICD-10-CM

## 2019-05-20 PROCEDURE — 96402 PR CHEMOTHER HORMON ANTINEOPL SUB-Q/IM: ICD-10-PCS | Mod: HCNC,S$GLB,, | Performed by: UROLOGY

## 2019-05-20 PROCEDURE — 3074F SYST BP LT 130 MM HG: CPT | Mod: HCNC,CPTII,S$GLB, | Performed by: UROLOGY

## 2019-05-20 PROCEDURE — 96402 CHEMO HORMON ANTINEOPL SQ/IM: CPT | Mod: HCNC,S$GLB,, | Performed by: UROLOGY

## 2019-05-20 PROCEDURE — 99214 OFFICE O/P EST MOD 30 MIN: CPT | Mod: HCNC,25,S$GLB, | Performed by: UROLOGY

## 2019-05-20 PROCEDURE — 99999 PR PBB SHADOW E&M-EST. PATIENT-LVL IV: ICD-10-PCS | Mod: PBBFAC,HCNC,, | Performed by: UROLOGY

## 2019-05-20 PROCEDURE — 3008F PR BODY MASS INDEX (BMI) DOCUMENTED: ICD-10-PCS | Mod: HCNC,CPTII,S$GLB, | Performed by: UROLOGY

## 2019-05-20 PROCEDURE — 99999 PR PBB SHADOW E&M-EST. PATIENT-LVL IV: CPT | Mod: PBBFAC,HCNC,, | Performed by: UROLOGY

## 2019-05-20 PROCEDURE — 3074F PR MOST RECENT SYSTOLIC BLOOD PRESSURE < 130 MM HG: ICD-10-PCS | Mod: HCNC,CPTII,S$GLB, | Performed by: UROLOGY

## 2019-05-20 PROCEDURE — 3079F PR MOST RECENT DIASTOLIC BLOOD PRESSURE 80-89 MM HG: ICD-10-PCS | Mod: HCNC,CPTII,S$GLB, | Performed by: UROLOGY

## 2019-05-20 PROCEDURE — 99214 PR OFFICE/OUTPT VISIT, EST, LEVL IV, 30-39 MIN: ICD-10-PCS | Mod: HCNC,25,S$GLB, | Performed by: UROLOGY

## 2019-05-20 PROCEDURE — 3008F BODY MASS INDEX DOCD: CPT | Mod: HCNC,CPTII,S$GLB, | Performed by: UROLOGY

## 2019-05-20 PROCEDURE — 3079F DIAST BP 80-89 MM HG: CPT | Mod: HCNC,CPTII,S$GLB, | Performed by: UROLOGY

## 2019-05-20 NOTE — PROGRESS NOTES
"Chief Complaint: Perineal pain    HPI:   19: Lupron shot today.  Reviewed history in detail. Currently on dual therapy with abariterone.  19: Son  in an MVA since last visit. Reviewed history in detail.  No problems from Lupron.  18: No problems from Lupron.  PSA lowest.  18: PSA today and again 3 mo.  Doing fine.  Reviewed history in detail.  18: PSA well down.  No adverse effects from Lupron.    10/18/17: Been on casodex a month back to review and start Lupron.  Dr. Moscoso felt XRT was not an option but perhaps chemotherapy could be beneficial.  17: Bone scan reports "RIGHT supraorbital location and a smaller similarly extremely intense focus of increased uptake posteriorly on the RIGHT at the T9 level.  Etiology is uncertain."  CT scan shows "A few scattered shotty mesenteric and retroperitoneal nodes identified.  Similar nodes identified within the pelvis bilaterally."  MRI shows left 2.8 cm prostate mass PIRADS 5, with metastatic pelvic lymphadenopathy (right pelvic sidewall node and left internal iliac chain LN).  No bony mets in pelvis.  17:  No problems from biopsy.  Gl 4+5 in the left base (30%) and a sig amount of 4+4 in other parts of the gland.  Reviewed treatment options, and imaging needs.  7/3/17: TRUS/Bx today 27 gm.  17: 61 yo man has problems with perineal/scrotal pain once or twice a month; lasts for an hour or two, some nocturia.  PSA recently elevated.  No abd/pelvic pain and no exac/rel factors.  No hematuria.  No urolithiasis.  No urinary bother.  No  history. CT with contrast earlier this year essentially normal.    Allergies:  Avelox  [moxifloxacin] and Nsaids (non-steroidal anti-inflammatory drug)    Medications:  has a current medication list which includes the following prescription(s): abiraterone, aspirin, atorvastatin, budesonide-formoterol 160-4.5 mcg, calcium-vitamin d, escitalopram oxalate, ezetimibe, fluticasone propionate, " fluticasone propion-salmeterol, gabapentin, hydrocodone-acetaminophen, metoprolol succinate, nitroglycerin, pantoprazole, prednisone, and travoprost, and the following Facility-Administered Medications: leuprolide.    Review of Systems:  General: No fever, chills, fatigability, or weight loss.  Skin: No rashes, itching, or changes in color or texture of skin.  Chest: Denies TORRES, cyanosis, wheezing, cough, and sputum production.  Abdomen: Appetite fine. No weight loss. Denies diarrhea, abdominal pain, hematemesis, or blood in stool.  Musculoskeletal: No joint stiffness or swelling. Denies back pain.  : As above.  All other review of systems negative.    PMH:   has a past medical history of COPD (chronic obstructive pulmonary disease) (7/30/2013), Coronary artery disease (7/30/2013), Emphysema of lung, Essential hypertension (10/29/2018), Glaucoma (increased eye pressure) (8/27/2013), Headache(784.0), Mitral regurgitation (7/30/2013), Mixed hyperlipidemia (7/30/2013), Neuropathy, Osteoarthritis of spine with radiculopathy, lumbar region (7/21/2015), Other and unspecified hyperlipidemia, and Pulmonary fibrosis (10/3/2017).    PSH:   has a past surgical history that includes Coronary stent placement; Colonoscopy (N/A, 3/21/2016); Shoulder arthroscopy (Left); and Spine surgery.    FamHx: family history includes Blindness in his maternal grandmother; Cancer in his father; Cataracts in his mother; Diabetes in his sister, sister, and sister; Hypertension in his sister, sister, sister, sister, and sister; Kidney disease in his mother.    SocHx:  reports that he has been smoking cigarettes.  He has a 6.25 pack-year smoking history. He has never used smokeless tobacco. He reports that he drinks alcohol. He reports that he does not use drugs.      Physical Exam:  There were no vitals filed for this visit.  General: A&Ox3, no apparent distress, no deformities  Neck: No masses, normal thyroid  Lungs: normal inspiration, no use  of accessory muscles  Heart: normal pulse, no arrhythmias  Abdomen: Soft, NT, ND  Skin: The skin is warm and dry. No jaundice.  Ext: No c/c/e.  :     7/3/17: Test desc lucas, no abnormalities of epididymus. Penis normal, with normal penile and scrotal skin. Meatus normal. Normal rectal tone, no hemorrhoids. Prost 40 gm no nodules or masses appreciated. SV not palpable. Perineum and anus normal.    Labs/Studies:   PSA    2/16: 22.1    2/17: 22.9    1/18: 0.24    7/18: 0.01    1/19, 4/19: undetect    Impression/Plan:   1. Lupron today and q3mo.  PSA/RTC 3 mo.

## 2019-05-20 NOTE — PROGRESS NOTES
....Using aseptic technique, Lupron 22.5 mg adm IM to right ventrogluteal as per written order of Dr. Dawn. Patient remained in the room for 20 minutes to observe for adverse reaction; none noted.  Pt tolerated procedure well and left clinic ambulatory per self without distress.

## 2019-05-21 ENCOUNTER — TELEPHONE (OUTPATIENT)
Dept: PHARMACY | Facility: CLINIC | Age: 64
End: 2019-05-21

## 2019-05-22 ENCOUNTER — TELEPHONE (OUTPATIENT)
Dept: HEMATOLOGY/ONCOLOGY | Facility: CLINIC | Age: 64
End: 2019-05-22

## 2019-05-22 DIAGNOSIS — C61 PROSTATE CANCER: ICD-10-CM

## 2019-05-22 DIAGNOSIS — G89.3 NEOPLASM RELATED PAIN: ICD-10-CM

## 2019-05-22 DIAGNOSIS — C79.51 SECONDARY ADENOCARCINOMA OF SKELETAL BONE: ICD-10-CM

## 2019-05-22 NOTE — TELEPHONE ENCOUNTER
----- Message from Brandy Benson sent at 5/22/2019  3:36 PM CDT -----  Contact: self  Type:  RX Refill Request    Who Called: Patient  Refill or New Rx: Refill  RX Name and Strength:Hydrocodone 10 mg  How is the patient currently taking it? (ex. 1XDay): 3xday/ as needed  Is this a 30 day or 90 day RX: 90  Preferred Pharmacy with phone number:  Pt uses:    [facility] [place] [address]  [address]  [place] [address]  Phone: [phone] Fax: [phone]    Local or Mail Order: Local  Ordering Provider: Dr. Roman  Would the patient rather a call back or a response via [facility]? Call back  Best Call Back Number: [phone]  Additional Information: Pt is leaving to go out of town in the morning and is requesting medication to be filled before he leaves.     Thanks,   Brandy Benson

## 2019-05-23 RX ORDER — HYDROCODONE BITARTRATE AND ACETAMINOPHEN 10; 325 MG/1; MG/1
1 TABLET ORAL EVERY 6 HOURS PRN
Qty: 90 TABLET | Refills: 0 | OUTPATIENT
Start: 2019-05-23

## 2019-05-23 RX ORDER — HYDROCODONE BITARTRATE AND ACETAMINOPHEN 10; 325 MG/1; MG/1
1 TABLET ORAL EVERY 6 HOURS PRN
Qty: 90 TABLET | Refills: 0 | Status: SHIPPED | OUTPATIENT
Start: 2019-05-23 | End: 2019-06-20 | Stop reason: SDUPTHER

## 2019-06-10 DIAGNOSIS — G89.3 NEOPLASM RELATED PAIN: ICD-10-CM

## 2019-06-10 DIAGNOSIS — C79.51 SECONDARY ADENOCARCINOMA OF SKELETAL BONE: ICD-10-CM

## 2019-06-10 RX ORDER — PREDNISONE 5 MG/1
5 TABLET ORAL 2 TIMES DAILY
Qty: 180 TABLET | Refills: 1 | Status: SHIPPED | OUTPATIENT
Start: 2019-06-10 | End: 2019-10-04 | Stop reason: SDUPTHER

## 2019-06-13 ENCOUNTER — TELEPHONE (OUTPATIENT)
Dept: PHARMACY | Facility: CLINIC | Age: 64
End: 2019-06-13

## 2019-06-17 ENCOUNTER — TELEPHONE (OUTPATIENT)
Dept: PHARMACY | Facility: CLINIC | Age: 64
End: 2019-06-17

## 2019-06-18 ENCOUNTER — LAB VISIT (OUTPATIENT)
Dept: LAB | Facility: HOSPITAL | Age: 64
End: 2019-06-18
Attending: INTERNAL MEDICINE
Payer: MEDICARE

## 2019-06-18 DIAGNOSIS — C61 PROSTATE CANCER: ICD-10-CM

## 2019-06-18 DIAGNOSIS — C79.51 SECONDARY ADENOCARCINOMA OF SKELETAL BONE: ICD-10-CM

## 2019-06-18 LAB
ALBUMIN SERPL BCP-MCNC: 3.3 G/DL (ref 3.5–5.2)
ALP SERPL-CCNC: 87 U/L (ref 55–135)
ALT SERPL W/O P-5'-P-CCNC: 23 U/L (ref 10–44)
ANION GAP SERPL CALC-SCNC: 10 MMOL/L (ref 8–16)
AST SERPL-CCNC: 25 U/L (ref 10–40)
BASOPHILS # BLD AUTO: 0.04 K/UL (ref 0–0.2)
BASOPHILS NFR BLD: 0.4 % (ref 0–1.9)
BILIRUB SERPL-MCNC: 0.4 MG/DL (ref 0.1–1)
BUN SERPL-MCNC: 13 MG/DL (ref 8–23)
CALCIUM SERPL-MCNC: 9.7 MG/DL (ref 8.7–10.5)
CHLORIDE SERPL-SCNC: 103 MMOL/L (ref 95–110)
CO2 SERPL-SCNC: 26 MMOL/L (ref 23–29)
COMPLEXED PSA SERPL-MCNC: <0.01 NG/ML (ref 0–4)
CREAT SERPL-MCNC: 1.2 MG/DL (ref 0.5–1.4)
DIFFERENTIAL METHOD: ABNORMAL
EOSINOPHIL # BLD AUTO: 0 K/UL (ref 0–0.5)
EOSINOPHIL NFR BLD: 0.4 % (ref 0–8)
ERYTHROCYTE [DISTWIDTH] IN BLOOD BY AUTOMATED COUNT: 14.9 % (ref 11.5–14.5)
EST. GFR  (AFRICAN AMERICAN): >60 ML/MIN/1.73 M^2
EST. GFR  (NON AFRICAN AMERICAN): >60 ML/MIN/1.73 M^2
GLUCOSE SERPL-MCNC: 91 MG/DL (ref 70–110)
HCT VFR BLD AUTO: 41.6 % (ref 40–54)
HGB BLD-MCNC: 12.6 G/DL (ref 14–18)
IMM GRANULOCYTES # BLD AUTO: 0.05 K/UL (ref 0–0.04)
IMM GRANULOCYTES NFR BLD AUTO: 0.5 % (ref 0–0.5)
LYMPHOCYTES # BLD AUTO: 2.7 K/UL (ref 1–4.8)
LYMPHOCYTES NFR BLD: 27.6 % (ref 18–48)
MCH RBC QN AUTO: 29.6 PG (ref 27–31)
MCHC RBC AUTO-ENTMCNC: 30.3 G/DL (ref 32–36)
MCV RBC AUTO: 98 FL (ref 82–98)
MONOCYTES # BLD AUTO: 0.8 K/UL (ref 0.3–1)
MONOCYTES NFR BLD: 8.4 % (ref 4–15)
NEUTROPHILS # BLD AUTO: 6.1 K/UL (ref 1.8–7.7)
NEUTROPHILS NFR BLD: 62.7 % (ref 38–73)
NRBC BLD-RTO: 0 /100 WBC
PLATELET # BLD AUTO: 225 K/UL (ref 150–350)
PMV BLD AUTO: 11.2 FL (ref 9.2–12.9)
POTASSIUM SERPL-SCNC: 3.9 MMOL/L (ref 3.5–5.1)
PROT SERPL-MCNC: 7.8 G/DL (ref 6–8.4)
RBC # BLD AUTO: 4.25 M/UL (ref 4.6–6.2)
SODIUM SERPL-SCNC: 139 MMOL/L (ref 136–145)
TESTOST SERPL-MCNC: 13 NG/DL (ref 304–1227)
WBC # BLD AUTO: 9.79 K/UL (ref 3.9–12.7)

## 2019-06-18 PROCEDURE — 36415 COLL VENOUS BLD VENIPUNCTURE: CPT | Mod: HCNC,PO

## 2019-06-18 PROCEDURE — 84153 ASSAY OF PSA TOTAL: CPT | Mod: HCNC

## 2019-06-18 PROCEDURE — 84403 ASSAY OF TOTAL TESTOSTERONE: CPT | Mod: HCNC

## 2019-06-18 PROCEDURE — 85025 COMPLETE CBC W/AUTO DIFF WBC: CPT | Mod: HCNC

## 2019-06-18 PROCEDURE — 80053 COMPREHEN METABOLIC PANEL: CPT | Mod: HCNC

## 2019-06-20 ENCOUNTER — OFFICE VISIT (OUTPATIENT)
Dept: HEMATOLOGY/ONCOLOGY | Facility: CLINIC | Age: 64
End: 2019-06-20
Payer: MEDICARE

## 2019-06-20 VITALS
BODY MASS INDEX: 21.21 KG/M2 | HEART RATE: 72 BPM | HEIGHT: 70 IN | TEMPERATURE: 98 F | WEIGHT: 148.13 LBS | DIASTOLIC BLOOD PRESSURE: 87 MMHG | SYSTOLIC BLOOD PRESSURE: 152 MMHG

## 2019-06-20 DIAGNOSIS — C79.51 SECONDARY ADENOCARCINOMA OF SKELETAL BONE: ICD-10-CM

## 2019-06-20 DIAGNOSIS — G89.3 NEOPLASM RELATED PAIN: ICD-10-CM

## 2019-06-20 DIAGNOSIS — C61 PROSTATE CANCER: Primary | ICD-10-CM

## 2019-06-20 PROCEDURE — 99214 PR OFFICE/OUTPT VISIT, EST, LEVL IV, 30-39 MIN: ICD-10-PCS | Mod: HCNC,S$GLB,, | Performed by: INTERNAL MEDICINE

## 2019-06-20 PROCEDURE — 3079F DIAST BP 80-89 MM HG: CPT | Mod: HCNC,CPTII,S$GLB, | Performed by: INTERNAL MEDICINE

## 2019-06-20 PROCEDURE — 3079F PR MOST RECENT DIASTOLIC BLOOD PRESSURE 80-89 MM HG: ICD-10-PCS | Mod: HCNC,CPTII,S$GLB, | Performed by: INTERNAL MEDICINE

## 2019-06-20 PROCEDURE — 99999 PR PBB SHADOW E&M-EST. PATIENT-LVL III: CPT | Mod: PBBFAC,HCNC,, | Performed by: INTERNAL MEDICINE

## 2019-06-20 PROCEDURE — 99999 PR PBB SHADOW E&M-EST. PATIENT-LVL III: ICD-10-PCS | Mod: PBBFAC,HCNC,, | Performed by: INTERNAL MEDICINE

## 2019-06-20 PROCEDURE — 3008F BODY MASS INDEX DOCD: CPT | Mod: HCNC,CPTII,S$GLB, | Performed by: INTERNAL MEDICINE

## 2019-06-20 PROCEDURE — 3077F PR MOST RECENT SYSTOLIC BLOOD PRESSURE >= 140 MM HG: ICD-10-PCS | Mod: HCNC,CPTII,S$GLB, | Performed by: INTERNAL MEDICINE

## 2019-06-20 PROCEDURE — 99214 OFFICE O/P EST MOD 30 MIN: CPT | Mod: HCNC,S$GLB,, | Performed by: INTERNAL MEDICINE

## 2019-06-20 PROCEDURE — 3008F PR BODY MASS INDEX (BMI) DOCUMENTED: ICD-10-PCS | Mod: HCNC,CPTII,S$GLB, | Performed by: INTERNAL MEDICINE

## 2019-06-20 PROCEDURE — 3077F SYST BP >= 140 MM HG: CPT | Mod: HCNC,CPTII,S$GLB, | Performed by: INTERNAL MEDICINE

## 2019-06-20 RX ORDER — HYDROCODONE BITARTRATE AND ACETAMINOPHEN 10; 325 MG/1; MG/1
1 TABLET ORAL EVERY 6 HOURS PRN
Qty: 90 TABLET | Refills: 0 | Status: SHIPPED | OUTPATIENT
Start: 2019-06-20 | End: 2019-07-15 | Stop reason: SDUPTHER

## 2019-06-20 NOTE — PROGRESS NOTES
Reason for visit: Metastatic prostate adenocarcinoma  Date of Diagnosis: September 2017    HPI:   The patient is a 64-year-old -American male who presents to the hematology oncology clinic today to discuss further evaluation and management recommendations for metastatic prostate adenocarcinoma.  The patient has been referred to Dr. Roland Dawn with urology.  I have reviewed all of the patient's relevant clinical history available in the medical record and have utilized this in my evaluation and management recommendations today.  Today the patient reports that he has chronic pain in his lumbar region due to degenerative disc disease.  He also reports pain in his lower thoracic region.  He has been taking Norco when necessary pain.  He denies any fevers, chills or night sweats.  He reports chronic fatigue.  He denies any melena, hematochezia, hematemesis, hemoptysis or hematuria.  He denies any chest pain or shortness of breath.  He denies any bowel or urinary complaints.  He reports that he started his Zytiga/prednisone on December 7, 2017 and has been tolerating this medication well without any significant side effects.    PAST MEDICAL HISTORY:   1.  Coronary artery disease  2.  Daily  3.  Pulmonary fibrosis  4.  Glaucoma  5.  Sleep anemia  6.  Osteoarthritis/degenerative disc disease  7.  GERD    SURGICAL HISTORY:   1.  PCI with stent placement for coronary artery disease  2.  Neck fusion ×2    FAMILY HISTORY: The patient's father had gastric cancer in his 70s.  He reports that 2 sisters had breast cancer in their 50s/60s.  He denies any other immediate family members with cancers or bleeding/clotting disorders.    SOCIAL HISTORY: He reports a 40+-pack-year smoking history and quit in Nov 2017.  He used to drink an average of 12 pack of beer every week and has quit this in Dec 2017.  He reports a history of recreational drug use and last used cocaine in June 2017.  He reports that he has now quit this  completely and does not intend to use any recreational drugs in the future.  He is to work on [employer] and as a  and retired in 2007.  He is  and does not have any children.  He lives in [place], [place].      ALLERGIES: Reviewed on medication card.    MEDICATIONS: [Medcard has been reviewed and/or reconciled.]    REVIEW OF SYSTEMS:   GENERAL: [No fevers, chills or sweats. Reports chronic fatigue. Denies weight loss or loss of appetite.]  HEENT: [No blurred vision, tinnitus, nasal discharge, sorethroat or dysphagia.]  HEART: [No chest pain, palpitations or shortness of breath.]    LUNGS: [No cough, hemoptysis or breathing problems.]  ABDOMEN: [No abdominal pain, nausea, vomiting, diarrhea, constipation or melena.]  GENITOURINARY: [No dysuria, bleeding or malodorous discharge.]  NEURO: [No headache, dizziness or vertigo.]  HEMATOLOGY: [No easy bruising, spontaneous bleeding or blood clots in the past].  MUSCULOSKELETAL: [Reports chronic arthralgias. Denies myalgias. Reports bone pains.]  SKIN: [No rashes or skin lesions.]  PSYCHIATRY: [No depression or anxiety.]    PHYSICAL EXAMINATION:   VS: Reviewed on nurse's notes.  APPEARANCE: The patient is a well-developed, well-nourished and well-groomed  male who appears in no acute distress.  He was accompanied to this clinic visit by his wife.  HEENT: No scleral icterus. Both external auditory canals clear. No oral ulcers, lesions. Throat clear.  Poor dentition noted.  HEAD: No sinus tenderness.  NECK: Supple. No palpable lymphadenopathy. Thyroid non-tender, no palpable masses  CHEST: Breath sounds clear bilaterally. No rales. No rhonchi. Unlabored respirations.  CARDIOVASCULAR: Normal S1, S2. Normal rate. Regular rhythm.  ABDOMEN: Bowel sounds normal. No tenderness. No abdominal distention. No hepatomegaly. No splenomegaly.  LYMPHATIC: No palpable supraclavicular, axillary nodes  EXTREMITIES: No clubbing, cyanosis,  edema  SKIN: No lesions. No petechiae. No ecchymoses. No induration or nodules  NEUROLOGIC: No focal findings. Alert & Oriented x 3. Mood appropriate to affect    LABS:   Reviewed    IMAGING:  Reviewed    IMPRESSION:  1.  Metastatic prostate adenocarcinoma with bone involvement    PLAN:  1.  I had a detailed discussion with the patient today with regard to the diagnosis, prognosis and treatment for his metastatic prostate adenocarcinoma with bone involvement.  2.  I have discussed that at this time his malignancy is potentially treatable but not curable.  The patient and his wife expressed understanding of this information.  3.  The patient is up-to-date with Lupron injection.  This is being given via his urologist Dr. Dawn's office.    4.  Continue Zytiga plus prednisone as recommended/prescribed.  No evidence of toxicity at this time.  5.  He is in the process of getting his teeth extracted.  Patient was informed that we will have to get this addressed first before we can discuss initiation of xgeva for treatment of bone involvement by malignancy.  He will continue calcium plus vitamin D supplementation daily as advised.  6. He takes Norco 10 mg when necessary pain.  Sedation/constipation precautions with pain medication was extensively discussed and he expressed understanding.    Follow up in 2 months with labs.  He knows to call for any additional questions or new problems.    Geronimo Roman MD

## 2019-07-15 DIAGNOSIS — G89.3 NEOPLASM RELATED PAIN: ICD-10-CM

## 2019-07-15 DIAGNOSIS — C79.51 SECONDARY ADENOCARCINOMA OF SKELETAL BONE: ICD-10-CM

## 2019-07-15 DIAGNOSIS — C61 PROSTATE CANCER: ICD-10-CM

## 2019-07-15 RX ORDER — HYDROCODONE BITARTRATE AND ACETAMINOPHEN 10; 325 MG/1; MG/1
1 TABLET ORAL EVERY 6 HOURS PRN
Qty: 90 TABLET | Refills: 0 | Status: SHIPPED | OUTPATIENT
Start: 2019-07-15 | End: 2019-08-13 | Stop reason: SDUPTHER

## 2019-07-18 ENCOUNTER — TELEPHONE (OUTPATIENT)
Dept: PHARMACY | Facility: CLINIC | Age: 64
End: 2019-07-18

## 2019-07-18 DIAGNOSIS — G89.3 NEOPLASM RELATED PAIN: ICD-10-CM

## 2019-07-18 DIAGNOSIS — C79.51 SECONDARY ADENOCARCINOMA OF SKELETAL BONE: ICD-10-CM

## 2019-07-18 RX ORDER — ABIRATERONE ACETATE 250 MG/1
1000 TABLET ORAL DAILY
Qty: 120 TABLET | Refills: 3 | Status: SHIPPED | OUTPATIENT
Start: 2019-07-18 | End: 2019-09-27 | Stop reason: ALTCHOICE

## 2019-07-18 NOTE — TELEPHONE ENCOUNTER
Contacted patient in regards to Zytiga refill and clinical follow up.  He confirmed he will not need a refill until the end of the month. Informed him we sent a refill request and will call him sometime next week to set up a refill. He verbalized understanding.

## 2019-07-24 ENCOUNTER — TELEPHONE (OUTPATIENT)
Dept: PHARMACY | Facility: CLINIC | Age: 64
End: 2019-07-24

## 2019-07-24 NOTE — TELEPHONE ENCOUNTER
"Follow up and refill for Zytiga:    Dosing, how taking: Zytiga 250mg - 4 tablets in the morning on empty stomach. Prednisone (gets from [facility]) is twice daily with food. Denies any missed doses.   Storage: keeps in bedroom  Handling: not able to discuss at this time. Last follow up confirmed that patient is aware and following proper handling precautions.  Side effects: hot flashes (also with Lupron). No other side effects.   Recent infections: none.   Pain: no pain, sometimes "achiness" in muscles. Takes Norco and this relives the pain.  Appetite: "eats too much" - gaining weight. Keeping his weight between 145-156 lb.   Energy, fatigue: good - taking care of wife (knee replacement for Thursday) and running all the errands.   Health, mood, QOL: sometimes a bit fay, but has a lot of support with his wife. They have been together for 38 years.  ED/UC visits: none  Next clinical follow up: 3 months  Notes: PSA <0.01  BP being monitored. Currently 152/87    Notes: Patient confirmed that he has about 5 days of medication on hand (not including today) - enough until 7/29. Medication will be shipped for patient to receive on 7/26. Address confirmed. $0.00. No changes to meds, allergies or health conditions.     "

## 2019-08-05 ENCOUNTER — TELEPHONE (OUTPATIENT)
Dept: UROLOGY | Facility: CLINIC | Age: 64
End: 2019-08-05

## 2019-08-13 DIAGNOSIS — C79.51 SECONDARY ADENOCARCINOMA OF SKELETAL BONE: ICD-10-CM

## 2019-08-13 DIAGNOSIS — G89.3 NEOPLASM RELATED PAIN: ICD-10-CM

## 2019-08-13 DIAGNOSIS — C61 PROSTATE CANCER: ICD-10-CM

## 2019-08-13 RX ORDER — HYDROCODONE BITARTRATE AND ACETAMINOPHEN 10; 325 MG/1; MG/1
1 TABLET ORAL EVERY 6 HOURS PRN
Qty: 90 TABLET | Refills: 0 | Status: SHIPPED | OUTPATIENT
Start: 2019-08-13 | End: 2019-09-12 | Stop reason: SDUPTHER

## 2019-08-16 ENCOUNTER — LAB VISIT (OUTPATIENT)
Dept: LAB | Facility: HOSPITAL | Age: 64
End: 2019-08-16
Attending: INTERNAL MEDICINE
Payer: MEDICARE

## 2019-08-16 DIAGNOSIS — G89.3 NEOPLASM RELATED PAIN: ICD-10-CM

## 2019-08-16 DIAGNOSIS — C79.51 SECONDARY ADENOCARCINOMA OF SKELETAL BONE: ICD-10-CM

## 2019-08-16 DIAGNOSIS — C61 PROSTATE CANCER: ICD-10-CM

## 2019-08-16 LAB
ALBUMIN SERPL BCP-MCNC: 3.4 G/DL (ref 3.5–5.2)
ALP SERPL-CCNC: 88 U/L (ref 55–135)
ALT SERPL W/O P-5'-P-CCNC: 16 U/L (ref 10–44)
ANION GAP SERPL CALC-SCNC: 12 MMOL/L (ref 8–16)
AST SERPL-CCNC: 19 U/L (ref 10–40)
BASOPHILS # BLD AUTO: 0.06 K/UL (ref 0–0.2)
BASOPHILS NFR BLD: 0.5 % (ref 0–1.9)
BILIRUB SERPL-MCNC: 0.4 MG/DL (ref 0.1–1)
BUN SERPL-MCNC: 14 MG/DL (ref 8–23)
CALCIUM SERPL-MCNC: 10.2 MG/DL (ref 8.7–10.5)
CHLORIDE SERPL-SCNC: 103 MMOL/L (ref 95–110)
CO2 SERPL-SCNC: 26 MMOL/L (ref 23–29)
COMPLEXED PSA SERPL-MCNC: <0.01 NG/ML (ref 0–4)
CREAT SERPL-MCNC: 1.2 MG/DL (ref 0.5–1.4)
DIFFERENTIAL METHOD: ABNORMAL
EOSINOPHIL # BLD AUTO: 0.1 K/UL (ref 0–0.5)
EOSINOPHIL NFR BLD: 0.7 % (ref 0–8)
ERYTHROCYTE [DISTWIDTH] IN BLOOD BY AUTOMATED COUNT: 14.8 % (ref 11.5–14.5)
EST. GFR  (AFRICAN AMERICAN): >60 ML/MIN/1.73 M^2
EST. GFR  (NON AFRICAN AMERICAN): >60 ML/MIN/1.73 M^2
GLUCOSE SERPL-MCNC: 116 MG/DL (ref 70–110)
HCT VFR BLD AUTO: 46.4 % (ref 40–54)
HGB BLD-MCNC: 13.9 G/DL (ref 14–18)
IMM GRANULOCYTES # BLD AUTO: 0.07 K/UL (ref 0–0.04)
IMM GRANULOCYTES NFR BLD AUTO: 0.6 % (ref 0–0.5)
LYMPHOCYTES # BLD AUTO: 3.4 K/UL (ref 1–4.8)
LYMPHOCYTES NFR BLD: 31.2 % (ref 18–48)
MCH RBC QN AUTO: 29.8 PG (ref 27–31)
MCHC RBC AUTO-ENTMCNC: 30 G/DL (ref 32–36)
MCV RBC AUTO: 100 FL (ref 82–98)
MONOCYTES # BLD AUTO: 0.9 K/UL (ref 0.3–1)
MONOCYTES NFR BLD: 8.3 % (ref 4–15)
NEUTROPHILS # BLD AUTO: 6.5 K/UL (ref 1.8–7.7)
NEUTROPHILS NFR BLD: 58.7 % (ref 38–73)
NRBC BLD-RTO: 0 /100 WBC
PLATELET # BLD AUTO: 201 K/UL (ref 150–350)
PMV BLD AUTO: 11.8 FL (ref 9.2–12.9)
POTASSIUM SERPL-SCNC: 3.9 MMOL/L (ref 3.5–5.1)
PROT SERPL-MCNC: 8 G/DL (ref 6–8.4)
RBC # BLD AUTO: 4.66 M/UL (ref 4.6–6.2)
SODIUM SERPL-SCNC: 141 MMOL/L (ref 136–145)
TESTOST SERPL-MCNC: <4 NG/DL (ref 304–1227)
WBC # BLD AUTO: 11.01 K/UL (ref 3.9–12.7)

## 2019-08-16 PROCEDURE — 84153 ASSAY OF PSA TOTAL: CPT | Mod: HCNC

## 2019-08-16 PROCEDURE — 80053 COMPREHEN METABOLIC PANEL: CPT | Mod: HCNC

## 2019-08-16 PROCEDURE — 36415 COLL VENOUS BLD VENIPUNCTURE: CPT | Mod: HCNC,PO

## 2019-08-16 PROCEDURE — 85025 COMPLETE CBC W/AUTO DIFF WBC: CPT | Mod: HCNC

## 2019-08-16 PROCEDURE — 84403 ASSAY OF TOTAL TESTOSTERONE: CPT | Mod: HCNC

## 2019-08-20 ENCOUNTER — TELEPHONE (OUTPATIENT)
Dept: PHARMACY | Facility: CLINIC | Age: 64
End: 2019-08-20

## 2019-08-26 ENCOUNTER — OFFICE VISIT (OUTPATIENT)
Dept: UROLOGY | Facility: CLINIC | Age: 64
End: 2019-08-26
Payer: MEDICARE

## 2019-08-26 ENCOUNTER — OFFICE VISIT (OUTPATIENT)
Dept: HEMATOLOGY/ONCOLOGY | Facility: CLINIC | Age: 64
End: 2019-08-26
Payer: MEDICARE

## 2019-08-26 VITALS
OXYGEN SATURATION: 99 % | WEIGHT: 148.81 LBS | RESPIRATION RATE: 18 BRPM | SYSTOLIC BLOOD PRESSURE: 120 MMHG | HEIGHT: 70 IN | DIASTOLIC BLOOD PRESSURE: 70 MMHG | TEMPERATURE: 98 F | BODY MASS INDEX: 21.3 KG/M2 | HEART RATE: 88 BPM

## 2019-08-26 VITALS
SYSTOLIC BLOOD PRESSURE: 120 MMHG | HEIGHT: 70 IN | WEIGHT: 148.81 LBS | BODY MASS INDEX: 21.3 KG/M2 | DIASTOLIC BLOOD PRESSURE: 70 MMHG

## 2019-08-26 DIAGNOSIS — C61 PROSTATE CANCER: ICD-10-CM

## 2019-08-26 DIAGNOSIS — T40.2X5A CONSTIPATION DUE TO OPIOID THERAPY: Primary | ICD-10-CM

## 2019-08-26 DIAGNOSIS — C61 PROSTATE CANCER: Primary | ICD-10-CM

## 2019-08-26 DIAGNOSIS — K59.03 CONSTIPATION DUE TO OPIOID THERAPY: Primary | ICD-10-CM

## 2019-08-26 DIAGNOSIS — G89.3 NEOPLASM RELATED PAIN: ICD-10-CM

## 2019-08-26 PROCEDURE — 3074F PR MOST RECENT SYSTOLIC BLOOD PRESSURE < 130 MM HG: ICD-10-PCS | Mod: HCNC,CPTII,S$GLB, | Performed by: UROLOGY

## 2019-08-26 PROCEDURE — 99215 PR OFFICE/OUTPT VISIT, EST, LEVL V, 40-54 MIN: ICD-10-PCS | Mod: HCNC,S$GLB,, | Performed by: NURSE PRACTITIONER

## 2019-08-26 PROCEDURE — 99999 PR PBB SHADOW E&M-EST. PATIENT-LVL III: ICD-10-PCS | Mod: PBBFAC,HCNC,, | Performed by: NURSE PRACTITIONER

## 2019-08-26 PROCEDURE — 3074F SYST BP LT 130 MM HG: CPT | Mod: HCNC,CPTII,S$GLB, | Performed by: UROLOGY

## 2019-08-26 PROCEDURE — 99215 OFFICE O/P EST HI 40 MIN: CPT | Mod: HCNC,S$GLB,, | Performed by: NURSE PRACTITIONER

## 2019-08-26 PROCEDURE — 3078F PR MOST RECENT DIASTOLIC BLOOD PRESSURE < 80 MM HG: ICD-10-PCS | Mod: HCNC,CPTII,S$GLB, | Performed by: NURSE PRACTITIONER

## 2019-08-26 PROCEDURE — 99999 PR PBB SHADOW E&M-EST. PATIENT-LVL III: CPT | Mod: PBBFAC,HCNC,, | Performed by: UROLOGY

## 2019-08-26 PROCEDURE — 99214 OFFICE O/P EST MOD 30 MIN: CPT | Mod: HCNC,25,S$GLB, | Performed by: UROLOGY

## 2019-08-26 PROCEDURE — 3008F PR BODY MASS INDEX (BMI) DOCUMENTED: ICD-10-PCS | Mod: HCNC,CPTII,S$GLB, | Performed by: UROLOGY

## 2019-08-26 PROCEDURE — 3078F DIAST BP <80 MM HG: CPT | Mod: HCNC,CPTII,S$GLB, | Performed by: NURSE PRACTITIONER

## 2019-08-26 PROCEDURE — 3008F BODY MASS INDEX DOCD: CPT | Mod: HCNC,CPTII,S$GLB, | Performed by: NURSE PRACTITIONER

## 2019-08-26 PROCEDURE — 96402 CHEMO HORMON ANTINEOPL SQ/IM: CPT | Mod: HCNC,S$GLB,, | Performed by: UROLOGY

## 2019-08-26 PROCEDURE — 96402 PR CHEMOTHER HORMON ANTINEOPL SUB-Q/IM: ICD-10-PCS | Mod: HCNC,S$GLB,, | Performed by: UROLOGY

## 2019-08-26 PROCEDURE — 81002 POCT URINE DIPSTICK WITHOUT MICROSCOPE: ICD-10-PCS | Mod: HCNC,S$GLB,, | Performed by: UROLOGY

## 2019-08-26 PROCEDURE — 3074F PR MOST RECENT SYSTOLIC BLOOD PRESSURE < 130 MM HG: ICD-10-PCS | Mod: HCNC,CPTII,S$GLB, | Performed by: NURSE PRACTITIONER

## 2019-08-26 PROCEDURE — 99214 PR OFFICE/OUTPT VISIT, EST, LEVL IV, 30-39 MIN: ICD-10-PCS | Mod: HCNC,25,S$GLB, | Performed by: UROLOGY

## 2019-08-26 PROCEDURE — 99999 PR PBB SHADOW E&M-EST. PATIENT-LVL III: ICD-10-PCS | Mod: PBBFAC,HCNC,, | Performed by: UROLOGY

## 2019-08-26 PROCEDURE — 99999 PR PBB SHADOW E&M-EST. PATIENT-LVL III: CPT | Mod: PBBFAC,HCNC,, | Performed by: NURSE PRACTITIONER

## 2019-08-26 PROCEDURE — 3078F PR MOST RECENT DIASTOLIC BLOOD PRESSURE < 80 MM HG: ICD-10-PCS | Mod: HCNC,CPTII,S$GLB, | Performed by: UROLOGY

## 2019-08-26 PROCEDURE — 81002 URINALYSIS NONAUTO W/O SCOPE: CPT | Mod: HCNC,S$GLB,, | Performed by: UROLOGY

## 2019-08-26 PROCEDURE — 3078F DIAST BP <80 MM HG: CPT | Mod: HCNC,CPTII,S$GLB, | Performed by: UROLOGY

## 2019-08-26 PROCEDURE — 3074F SYST BP LT 130 MM HG: CPT | Mod: HCNC,CPTII,S$GLB, | Performed by: NURSE PRACTITIONER

## 2019-08-26 PROCEDURE — 3008F BODY MASS INDEX DOCD: CPT | Mod: HCNC,CPTII,S$GLB, | Performed by: UROLOGY

## 2019-08-26 PROCEDURE — 3008F PR BODY MASS INDEX (BMI) DOCUMENTED: ICD-10-PCS | Mod: HCNC,CPTII,S$GLB, | Performed by: NURSE PRACTITIONER

## 2019-08-26 RX ORDER — DOCUSATE SODIUM 100 MG/1
100 CAPSULE, LIQUID FILLED ORAL 2 TIMES DAILY
Qty: 60 CAPSULE | Refills: 1 | Status: SHIPPED | OUTPATIENT
Start: 2019-08-26 | End: 2020-08-25

## 2019-08-26 RX ORDER — DOCUSATE SODIUM 100 MG/1
100 CAPSULE, LIQUID FILLED ORAL 2 TIMES DAILY
Qty: 60 CAPSULE | Refills: 1 | Status: SHIPPED | OUTPATIENT
Start: 2019-08-26 | End: 2019-08-26 | Stop reason: CLARIF

## 2019-08-26 NOTE — LETTER
[facility]  [id number] [place] [id number]  Phone: [phone]  Fax: [phone] August 26, 2019    Joey Jacobo  [address]  [facility] [address]      To Whom It May Concern:    Joey Jacobo is unable to participate in jury duty due to actively undergoing chemotherapy.    If you have any questions or concerns, please feel free to call my office.    Sincerely,        Jinny Moon NP

## 2019-08-26 NOTE — PROGRESS NOTES
"Chief Complaint: Perineal pain    HPI:   19: Lupron shot today.  Reviewed history in detail. Currently on dual therapy with abariterone.   19: Lupron shot today.  Reviewed history in detail. Currently on dual therapy with abariterone.  19: Son  in an MVA since last visit. Reviewed history in detail.  No problems from Lupron.  18: No problems from Lupron.  PSA lowest.  18: PSA today and again 3 mo.  Doing fine.  Reviewed history in detail.  18: PSA well down.  No adverse effects from Lupron.    10/18/17: Been on casodex a month back to review and start Lupron.  Dr. Moscoso felt XRT was not an option but perhaps chemotherapy could be beneficial.  17: Bone scan reports "RIGHT supraorbital location and a smaller similarly extremely intense focus of increased uptake posteriorly on the RIGHT at the T9 level.  Etiology is uncertain."  CT scan shows "A few scattered shotty mesenteric and retroperitoneal nodes identified.  Similar nodes identified within the pelvis bilaterally."  MRI shows left 2.8 cm prostate mass PIRADS 5, with metastatic pelvic lymphadenopathy (right pelvic sidewall node and left internal iliac chain LN).  No bony mets in pelvis.  17:  No problems from biopsy.  Gl 4+5 in the left base (30%) and a sig amount of 4+4 in other parts of the gland.  Reviewed treatment options, and imaging needs.  7/3/17: TRUS/Bx today 27 gm.  17: 61 yo man has problems with perineal/scrotal pain once or twice a month; lasts for an hour or two, some nocturia.  PSA recently elevated.  No abd/pelvic pain and no exac/rel factors.  No hematuria.  No urolithiasis.  No urinary bother.  No  history. CT with contrast earlier this year essentially normal.    Allergies:  Avelox  [moxifloxacin] and Nsaids (non-steroidal anti-inflammatory drug)    Medications:  has a current medication list which includes the following prescription(s): abiraterone, aspirin, atorvastatin, " budesonide-formoterol 160-4.5 mcg, calcium-vitamin d, escitalopram oxalate, ezetimibe, fluticasone propionate, fluticasone propion-salmeterol, gabapentin, hydrocodone-acetaminophen, metoprolol succinate, nitroglycerin, pantoprazole, prednisone, and travoprost, and the following Facility-Administered Medications: leuprolide.    Review of Systems:  General: No fever, chills, fatigability, or weight loss.  Skin: No rashes, itching, or changes in color or texture of skin.  Chest: Denies TORRES, cyanosis, wheezing, cough, and sputum production.  Abdomen: Appetite fine. No weight loss. Denies diarrhea, abdominal pain, hematemesis, or blood in stool.  Musculoskeletal: No joint stiffness or swelling. Denies back pain.  : As above.  All other review of systems negative.    PMH:   has a past medical history of COPD (chronic obstructive pulmonary disease) (7/30/2013), Coronary artery disease (7/30/2013), Emphysema of lung, Essential hypertension (10/29/2018), Glaucoma (increased eye pressure) (8/27/2013), Headache(784.0), Mitral regurgitation (7/30/2013), Mixed hyperlipidemia (7/30/2013), Neuropathy, Osteoarthritis of spine with radiculopathy, lumbar region (7/21/2015), Other and unspecified hyperlipidemia, and Pulmonary fibrosis (10/3/2017).    PSH:   has a past surgical history that includes Coronary stent placement; Colonoscopy (N/A, 3/21/2016); Shoulder arthroscopy (Left); and Spine surgery.    FamHx: family history includes Blindness in his maternal grandmother; Cancer in his father; Cataracts in his mother; Diabetes in his sister, sister, and sister; Hypertension in his sister, sister, sister, sister, and sister; Kidney disease in his mother.    SocHx:  reports that he quit smoking about a year ago. His smoking use included cigarettes. He has a 6.25 pack-year smoking history. He has never used smokeless tobacco. He reports that he drinks alcohol. He reports that he does not use drugs.      Physical Exam:  Vitals:     08/26/19 1514   BP: 120/70     General: A&Ox3, no apparent distress, no deformities  Neck: No masses, normal thyroid  Lungs: normal inspiration, no use of accessory muscles  Heart: normal pulse, no arrhythmias  Abdomen: Soft, NT, ND  Skin: The skin is warm and dry. No jaundice.  Ext: No c/c/e.  :     7/3/17: Test desc lucas, no abnormalities of epididymus. Penis normal, with normal penile and scrotal skin. Meatus normal. Normal rectal tone, no hemorrhoids. Prost 40 gm no nodules or masses appreciated. SV not palpable. Perineum and anus normal.    Labs/Studies:   PSA    2/16: 22.1    2/17: 22.9    1/18: 0.24    7/18: 0.01    1/19, 4/19, 8/19: undetect    Impression/Plan:   1. Lupron today and q3mo.  PSA/RTC 3 mo.

## 2019-08-27 NOTE — PROGRESS NOTES
Subjective:      Patient ID: Joey Jacobo is a 64 y.o. male.    Chief Complaint: Constipation    HPI:  The patient is a 64-year-old -American male who presents to the hematology oncology clinic today to discuss further evaluation and management recommendations for metastatic prostate adenocarcinoma.  Today he has complaints of constipation.      The patient is also being followed by Dr. Roland Dawn with urology for every 3 month Lupron injections.  I have reviewed all of the patient's relevant clinical history available in the medical record and have utilized this in my evaluation and management recommendations today.    Today the patient reports that he has chronic pain in his lumbar region due to degenerative disc disease.  He also reports pain in his lower thoracic region.  He has been taking Norco when necessary pain however states recently has developed constipation.  He states stopped his Norco for a couple of days and bowel movements returned to normal.  He denies any fevers, chills or night sweats.  He reports chronic fatigue.  He denies any melena, hematochezia, hematemesis, hemoptysis or hematuria.  He denies any chest pain or shortness of breath.  He denies any urinary complaints.    He reports compliance with Zytiga/prednisone which states started on December 7, 2017 and has been tolerating this medication well without any significant side effects.  The patient has been followed in the clinic by Dr. Roman.  Today is the first time I am evaluating/assessing the patient.      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   Social Needs    Financial resource strain: Not on file    Food insecurity:     Worry: Not on file     Inability: Not on file     Transportation needs:     Medical: Not on file     Non-medical: Not on file   Tobacco Use    Smoking status: Former Smoker     Packs/day: 0.25     Years: 25.00     Pack years: 6.25     Types: Cigarettes     Last attempt to quit: 2018     Years since quittin.0    Smokeless tobacco: Never Used   Substance and Sexual Activity    Alcohol use: Yes     Alcohol/week: 0.0 oz     Comment: occasionally    Drug use: No    Sexual activity: Yes   Lifestyle    Physical activity:     Days per week: Not on file     Minutes per session: Not on file    Stress: Not on file   Relationships    Social connections:     Talks on phone: Not on file     Gets together: Not on file     Attends Latter-day service: Not on file     Active member of club or organization: Not on file     Attends meetings of clubs or organizations: Not on file     Relationship status: Not on file   Other Topics Concern    Not on file   Social History Narrative    Wife and son       Family History   Problem Relation Age of Onset    Cataracts Mother     Kidney disease Mother     Cancer Father         Stomach    Blindness Maternal Grandmother     Diabetes Sister     Hypertension Sister     Diabetes Sister     Hypertension Sister     Diabetes Sister     Hypertension Sister     Hypertension Sister     Hypertension Sister        Past Surgical History:   Procedure Laterality Date    COLONOSCOPY N/A 3/21/2016    Performed [facility]    CORONARY STENT PLACEMENT      times 2    SHOULDER ARTHROSCOPY Left     SPINE SURGERY      neck fusion x2       Past Medical History:   Diagnosis Date    COPD (chronic obstructive pulmonary disease) 2013    Coronary artery disease 2013    Emphysema of lung     Essential hypertension 10/29/2018    Glaucoma (increased eye pressure) 2013    Headache(784.0)     Mitral regurgitation 2013    Mixed hyperlipidemia 2013    Neuropathy     Osteoarthritis of  spine with radiculopathy, lumbar region 7/21/2015    Other and unspecified hyperlipidemia     Pulmonary fibrosis 10/3/2017       Review of Systems   Constitutional: Positive for fatigue. Negative for fever.   Respiratory: Negative for chest tightness and shortness of breath.    Cardiovascular: Negative for chest pain and palpitations.   Gastrointestinal: Negative for abdominal pain, anal bleeding, blood in stool, constipation (resolved), diarrhea, nausea and vomiting.   Genitourinary: Negative for dysuria and hematuria.   Musculoskeletal: Positive for arthralgias and back pain.   Neurological: Negative for dizziness and light-headedness.   Hematological: Negative for adenopathy. Does not bruise/bleed easily.   Psychiatric/Behavioral: Negative for confusion and decreased concentration.          Medication List with Changes/Refills   New Medications    DOCUSATE SODIUM (COLACE) 100 MG CAPSULE    Take 1 capsule (100 mg total) by mouth 2 (two) times daily.   Current Medications    ABIRATERONE (ZYTIGA) 250 MG TAB    Take 4 tablets (1,000 mg total) by mouth once daily.    ASPIRIN (ECOTRIN) 81 MG EC TABLET    Take by mouth. 1 Tablet, Delayed Release (E.C.) Oral Every day    ATORVASTATIN (LIPITOR) 40 MG TABLET    Take 1 tablet (40 mg total) by mouth once daily.    BUDESONIDE-FORMOTEROL 160-4.5 MCG (SYMBICORT) 160-4.5 MCG/ACTUATION HFAA    Inhale into the lungs.    CALCIUM-VITAMIN D (CALCIUM 600 + D,3,) 600 MG(1,500MG) -400 UNIT TAB    Take by mouth. 1 Tablet Oral Twice a day    ESCITALOPRAM OXALATE (LEXAPRO) 10 MG TABLET    Take 1 tablet (10 mg total) by mouth once daily.    EZETIMIBE (ZETIA) 10 MG TABLET    Take 1 tablet (10 mg total) by mouth once daily.    FLUTICASONE (FLONASE) 50 MCG/ACTUATION NASAL SPRAY    1 spray by Each Nare route once daily.    FLUTICASONE-SALMETEROL (ADVAIR HFA) 115-21 MCG/ACTUATION HFAA    Inhale 2 puffs into the lungs.    GABAPENTIN (NEURONTIN) 300 MG CAPSULE    Take 300 mg by mouth.     HYDROCODONE-ACETAMINOPHEN (NORCO)  MG PER TABLET    Take 1 tablet by mouth every 6 (six) hours as needed for Pain.    METOPROLOL SUCCINATE (TOPROL-XL) 50 MG 24 HR TABLET    TAKE ONE TABLET DAILY    NITROGLYCERIN (NITROSTAT) 0.4 MG SL TABLET    Place 1 tablet (0.4 mg total) under the tongue every 5 (five) minutes as needed for Chest pain.    PANTOPRAZOLE (PROTONIX) 40 MG TABLET    Take 1 tablet (40 mg total) by mouth once daily.    PREDNISONE (DELTASONE) 5 MG TABLET    TAKE 1 TABLET (5 MG TOTAL) BY MOUTH 2 (TWO) TIMES DAILY.    TRAVOPROST (TRAVATAN Z) 0.004 % DROP    Place 1 drop into both eyes every evening. 1 Drops Ophthalmic At bedtime        Objective:     Vitals:    08/26/19 1605   BP: 120/70   Pulse: 88   Resp: 18   Temp: 97.6 °F (36.4 °C)       Physical Exam   Constitutional: He is oriented to person, place, and time. Vital signs are normal. He appears well-developed and well-nourished.  Non-toxic appearance. He does not have a sickly appearance. He does not appear ill. No distress.   HENT:   Head: Normocephalic and atraumatic.   Right Ear: External ear normal.   Left Ear: External ear normal.   Nose: Nose normal.   Eyes: Conjunctivae, EOM and lids are normal. Right eye exhibits no discharge. Left eye exhibits no discharge. No scleral icterus.   Cardiovascular: Normal rate, regular rhythm, S1 normal, S2 normal and normal heart sounds. Exam reveals no gallop and no friction rub.   No murmur heard.  Pulmonary/Chest: Effort normal and breath sounds normal. No accessory muscle usage or stridor. No apnea, no tachypnea and no bradypnea. No respiratory distress. He has no decreased breath sounds. He has no wheezes. He has no rales. He exhibits no tenderness.   Abdominal: Soft. Normal appearance and bowel sounds are normal. He exhibits no distension.   Musculoskeletal: Normal range of motion.   Neurological: He is alert and oriented to person, place, and time.   Skin: Skin is warm, dry and intact. No bruising,  no lesion and no rash noted. He is not diaphoretic. No pallor.   Psychiatric: He has a normal mood and affect. His speech is normal and behavior is normal. Judgment and thought content normal. He is not actively hallucinating. Cognition and memory are normal. He is attentive.   Nursing note and vitals reviewed.      Assessment:     Problem List Items Addressed This Visit        Oncology    Prostate cancer    Relevant Orders    CBC auto differential    Comprehensive metabolic panel    Neoplasm related pain      Other Visit Diagnoses     Constipation due to opioid therapy    -  Primary    Relevant Medications    docusate sodium (COLACE) 100 MG capsule        Lab Results   Component Value Date    WBC 11.01 08/16/2019    HGB 13.9 (L) 08/16/2019    HCT 46.4 08/16/2019     (H) 08/16/2019     08/16/2019     BMP  Lab Results   Component Value Date     08/16/2019    K 3.9 08/16/2019     08/16/2019    CO2 26 08/16/2019    BUN 14 08/16/2019    CREATININE 1.2 08/16/2019    CALCIUM 10.2 08/16/2019    ANIONGAP 12 08/16/2019    ESTGFRAFRICA >60.0 08/16/2019    EGFRNONAA >60.0 08/16/2019     Lab Results   Component Value Date    ALT 16 08/16/2019    AST 19 08/16/2019    ALKPHOS 88 08/16/2019    BILITOT 0.4 08/16/2019       Plan:   Constipation due to opioid therapy  -     Discontinue: docusate sodium (COLACE) 100 MG capsule; Take 1 capsule (100 mg total) by mouth 2 (two) times daily.  Dispense: 60 capsule; Refill: 1  -     docusate sodium (COLACE) 100 MG capsule; Take 1 capsule (100 mg total) by mouth 2 (two) times daily.  Dispense: 60 capsule; Refill: 1    Prostate cancer  -     CBC auto differential; Future; Expected date: 08/26/2019  -     Comprehensive metabolic panel; Future; Expected date: 08/26/2019    Neoplasm related pain    He will start stool softeners twice daily to help with constipation.  He was encouraged to drink 6-8 glasses of water daily.  He will start taking 1/2 tab of norco 10/325  every 4-6 hours to see if this helps relieve issues with constipation.  If pain is not controlled with this regimen he will notify us.  He will continue every 3 month lupron injections with Dr. Dawn (last 8/26/19).  He will continue daily calcium and vitamin D supplementation.  He will continue daily Zytiga and prednisone.  Follow up in 2 months with cbc and cmp.        I will review assessment/plan with collaborating physician, Dr. Geronimo Roman.    Thank You,  ALIREZA GageC

## 2019-09-06 DIAGNOSIS — G89.3 NEOPLASM RELATED PAIN: ICD-10-CM

## 2019-09-06 DIAGNOSIS — C61 PROSTATE CANCER: ICD-10-CM

## 2019-09-06 DIAGNOSIS — C79.51 SECONDARY ADENOCARCINOMA OF SKELETAL BONE: ICD-10-CM

## 2019-09-06 RX ORDER — HYDROCODONE BITARTRATE AND ACETAMINOPHEN 10; 325 MG/1; MG/1
1 TABLET ORAL EVERY 6 HOURS PRN
Qty: 90 TABLET | Refills: 0 | OUTPATIENT
Start: 2019-09-06

## 2019-09-07 RX ORDER — HYDROCODONE BITARTRATE AND ACETAMINOPHEN 10; 325 MG/1; MG/1
1 TABLET ORAL EVERY 6 HOURS PRN
Qty: 90 TABLET | Refills: 0 | OUTPATIENT
Start: 2019-09-07

## 2019-09-12 DIAGNOSIS — C61 PROSTATE CANCER: ICD-10-CM

## 2019-09-12 DIAGNOSIS — G89.3 NEOPLASM RELATED PAIN: ICD-10-CM

## 2019-09-12 DIAGNOSIS — C79.51 SECONDARY ADENOCARCINOMA OF SKELETAL BONE: ICD-10-CM

## 2019-09-12 RX ORDER — HYDROCODONE BITARTRATE AND ACETAMINOPHEN 10; 325 MG/1; MG/1
1 TABLET ORAL EVERY 6 HOURS PRN
Qty: 90 TABLET | Refills: 0 | Status: SHIPPED | OUTPATIENT
Start: 2019-09-12 | End: 2019-10-09 | Stop reason: SDUPTHER

## 2019-09-16 ENCOUNTER — PES CALL (OUTPATIENT)
Dept: ADMINISTRATIVE | Facility: CLINIC | Age: 64
End: 2019-09-16

## 2019-09-18 ENCOUNTER — TELEPHONE (OUTPATIENT)
Dept: PHARMACY | Facility: CLINIC | Age: 64
End: 2019-09-18

## 2019-09-27 DIAGNOSIS — C61 PROSTATE CANCER: Primary | ICD-10-CM

## 2019-09-27 RX ORDER — ABIRATERONE ACETATE 250 MG/1
250 TABLET ORAL DAILY
Qty: 30 TABLET | Refills: 5 | Status: SHIPPED | OUTPATIENT
Start: 2019-09-27 | End: 2020-02-12

## 2019-10-01 NOTE — TELEPHONE ENCOUNTER
LVM for follow up on Zytiga dose. Provider has sent over new script for Zytiga 250mg - take 1 tablet once daily with low fat breakfast. Need to discuss with patient how to take medication each day. Also would like to verify how much medication he has on hand so that we can pend refills out accordingly.

## 2019-10-04 ENCOUNTER — TELEPHONE (OUTPATIENT)
Dept: HEMATOLOGY/ONCOLOGY | Facility: CLINIC | Age: 64
End: 2019-10-04

## 2019-10-04 DIAGNOSIS — I25.10 CORONARY ARTERY DISEASE INVOLVING NATIVE CORONARY ARTERY WITHOUT ANGINA PECTORIS, UNSPECIFIED WHETHER NATIVE OR TRANSPLANTED HEART: ICD-10-CM

## 2019-10-04 DIAGNOSIS — G89.3 NEOPLASM RELATED PAIN: ICD-10-CM

## 2019-10-04 DIAGNOSIS — C79.51 SECONDARY ADENOCARCINOMA OF SKELETAL BONE: ICD-10-CM

## 2019-10-04 RX ORDER — METOPROLOL SUCCINATE 50 MG/1
TABLET, EXTENDED RELEASE ORAL
Qty: 30 TABLET | Refills: 4 | Status: SHIPPED | OUTPATIENT
Start: 2019-10-04 | End: 2020-02-19

## 2019-10-04 RX ORDER — PREDNISONE 5 MG/1
5 TABLET ORAL 2 TIMES DAILY
Qty: 180 TABLET | Refills: 1 | Status: SHIPPED | OUTPATIENT
Start: 2019-10-04 | End: 2020-06-04 | Stop reason: SDUPTHER

## 2019-10-04 NOTE — TELEPHONE ENCOUNTER
----- Message from Jinny Moon NP sent at 10/4/2019  2:18 PM CDT -----  Contact: Patient   I refilled prescription electronically.  Thank you!  ----- Message -----  From: Vanessa العراقي LPN  Sent: 10/4/2019   1:09 PM CDT  To: Jinny Moon NP        ----- Message -----  From: Wendy Mandel  Sent: 10/4/2019  12:15 PM CDT  To: [facility] Staff    Type:  RX Refill Request    Who Called: Ray  Refill or New Rx: Refill  RX Name and Strength: Prednisone  How is the patient currently taking it? (ex. 1XDay): 2x day  Is this a 30 day or 90 day RX: 90  Preferred Pharmacy with phone number:   [facility] [place] - [address]  [address]  [place] [address]  Phone: [phone] Fax: [phone]  Local or Mail Order: Local  Ordering Provider: N/a  Would the patient rather a call back or a response via [facility]? Call back   Best Call Back Number: Please call him at [phone]  Additional Information: He stated that he is completely out

## 2019-10-04 NOTE — TELEPHONE ENCOUNTER
Attempt 2: Attempted to contact patient to confirm 1) he is taking 1-250mg once daily with low-fat breakfast (new prescription received), and 2) determine how many tablets he has on hand (likely over 2-3 months).

## 2019-10-09 DIAGNOSIS — C79.51 SECONDARY ADENOCARCINOMA OF SKELETAL BONE: ICD-10-CM

## 2019-10-09 DIAGNOSIS — G89.3 NEOPLASM RELATED PAIN: ICD-10-CM

## 2019-10-09 DIAGNOSIS — C61 PROSTATE CANCER: ICD-10-CM

## 2019-10-09 RX ORDER — HYDROCODONE BITARTRATE AND ACETAMINOPHEN 10; 325 MG/1; MG/1
1 TABLET ORAL EVERY 6 HOURS PRN
Qty: 90 TABLET | Refills: 0 | Status: SHIPPED | OUTPATIENT
Start: 2019-10-09 | End: 2019-11-06 | Stop reason: SDUPTHER

## 2019-10-09 NOTE — TELEPHONE ENCOUNTER
Patient confirmed he is taking Zytiga 250m tablets (1000mg total) once daily on an empty stomach. Just started new pack last week. He would like to continue taking the zytiga the same way he has been taking it for this month, and then change to taking Zytiga 250m tablet once daily with a low-fat breakfast for next month. Will again go over this medication change at his next scheduled refill. Patient would also like examples of lo-fat breakfast items to help with how he will take the Zytiga.

## 2019-10-23 NOTE — TELEPHONE ENCOUNTER
Contacted the patient in regards to Zytiga refill. He confirmed he has been continuing to take 4 tablets (1000mg) once daily until he receives next shipment. He estimated he has 3-4 days on hand based on current dosing. Will ship 10/24 for patient to receive 10/25. He will start lower dose of 1 tablet (250mg) once daily with a light-breakfast upon completing previous bottle (around 10/27). No changes in other medications, allergies, health conditions or missed doses.    Reviewed the significance of low-fat breakfast for the medication to be fully effective and to avoid high-fat items such as jeffrey, sausage or deep-fried foods (per clinical trial dosing was based on). formerly [facility] will f/u with him at next refill to assess how he is tolerating new dose. Requested he reach out with any questions/concerns.

## 2019-10-24 ENCOUNTER — TELEPHONE (OUTPATIENT)
Dept: UROLOGY | Facility: CLINIC | Age: 64
End: 2019-10-24

## 2019-10-24 NOTE — TELEPHONE ENCOUNTER
Spoke to patient and informed him that I had called to let him know that his appt had to be moved to 12/11/19 at 1:00 pm, he verbally understood.

## 2019-10-24 NOTE — TELEPHONE ENCOUNTER
----- Message from Liberty Mandel sent at 10/24/2019 10:03 AM CDT -----  Contact: pt   Type:  Patient Returning Call    Who Called:.Joey Chaidez  Who Left Message for Patient: LAWRENCE MAURICE  Does the patient know what this is regarding?:  Would the patient rather a call back or a response via [facility]? Call back   Best Call Back Number: [phone] (home) [phone] (work)  Additional Information:

## 2019-10-28 ENCOUNTER — LAB VISIT (OUTPATIENT)
Dept: LAB | Facility: HOSPITAL | Age: 64
End: 2019-10-28
Attending: NURSE PRACTITIONER
Payer: MEDICARE

## 2019-10-28 DIAGNOSIS — C61 PROSTATE CANCER: ICD-10-CM

## 2019-10-28 LAB
ALBUMIN SERPL BCP-MCNC: 3.8 G/DL (ref 3.5–5.2)
ALP SERPL-CCNC: 71 U/L (ref 55–135)
ALT SERPL W/O P-5'-P-CCNC: 18 U/L (ref 10–44)
ANION GAP SERPL CALC-SCNC: 9 MMOL/L (ref 8–16)
AST SERPL-CCNC: 47 U/L (ref 10–40)
BASOPHILS # BLD AUTO: 0.04 K/UL (ref 0–0.2)
BASOPHILS NFR BLD: 0.3 % (ref 0–1.9)
BILIRUB SERPL-MCNC: 0.7 MG/DL (ref 0.1–1)
BUN SERPL-MCNC: 12 MG/DL (ref 8–23)
CALCIUM SERPL-MCNC: 10.1 MG/DL (ref 8.7–10.5)
CHLORIDE SERPL-SCNC: 104 MMOL/L (ref 95–110)
CO2 SERPL-SCNC: 26 MMOL/L (ref 23–29)
CREAT SERPL-MCNC: 1.2 MG/DL (ref 0.5–1.4)
DIFFERENTIAL METHOD: ABNORMAL
EOSINOPHIL # BLD AUTO: 0.1 K/UL (ref 0–0.5)
EOSINOPHIL NFR BLD: 0.6 % (ref 0–8)
ERYTHROCYTE [DISTWIDTH] IN BLOOD BY AUTOMATED COUNT: 14.3 % (ref 11.5–14.5)
EST. GFR  (AFRICAN AMERICAN): >60 ML/MIN/1.73 M^2
EST. GFR  (NON AFRICAN AMERICAN): >60 ML/MIN/1.73 M^2
GLUCOSE SERPL-MCNC: 122 MG/DL (ref 70–110)
HCT VFR BLD AUTO: 42.1 % (ref 40–54)
HGB BLD-MCNC: 13 G/DL (ref 14–18)
IMM GRANULOCYTES # BLD AUTO: 0.06 K/UL (ref 0–0.04)
IMM GRANULOCYTES NFR BLD AUTO: 0.4 % (ref 0–0.5)
LYMPHOCYTES # BLD AUTO: 3.7 K/UL (ref 1–4.8)
LYMPHOCYTES NFR BLD: 27.1 % (ref 18–48)
MCH RBC QN AUTO: 29.3 PG (ref 27–31)
MCHC RBC AUTO-ENTMCNC: 30.9 G/DL (ref 32–36)
MCV RBC AUTO: 95 FL (ref 82–98)
MONOCYTES # BLD AUTO: 1.1 K/UL (ref 0.3–1)
MONOCYTES NFR BLD: 8.4 % (ref 4–15)
NEUTROPHILS # BLD AUTO: 8.5 K/UL (ref 1.8–7.7)
NEUTROPHILS NFR BLD: 63.2 % (ref 38–73)
NRBC BLD-RTO: 0 /100 WBC
PLATELET # BLD AUTO: 196 K/UL (ref 150–350)
PMV BLD AUTO: 11.9 FL (ref 9.2–12.9)
POTASSIUM SERPL-SCNC: 3.6 MMOL/L (ref 3.5–5.1)
PROT SERPL-MCNC: 8.4 G/DL (ref 6–8.4)
RBC # BLD AUTO: 4.44 M/UL (ref 4.6–6.2)
SODIUM SERPL-SCNC: 139 MMOL/L (ref 136–145)
WBC # BLD AUTO: 13.53 K/UL (ref 3.9–12.7)

## 2019-10-28 PROCEDURE — 36415 COLL VENOUS BLD VENIPUNCTURE: CPT | Mod: HCNC,PO

## 2019-10-28 PROCEDURE — 80053 COMPREHEN METABOLIC PANEL: CPT | Mod: HCNC

## 2019-10-28 PROCEDURE — 85025 COMPLETE CBC W/AUTO DIFF WBC: CPT | Mod: HCNC

## 2019-10-29 ENCOUNTER — TELEPHONE (OUTPATIENT)
Dept: HEMATOLOGY/ONCOLOGY | Facility: CLINIC | Age: 64
End: 2019-10-29

## 2019-10-29 NOTE — TELEPHONE ENCOUNTER
"Patient called wanted to know what can he take for pain "stated he fell on Saturday."  Informed patient I will send a message to Jinny Moon NP for recommendation, verbalized understanding.   "

## 2019-10-29 NOTE — PROGRESS NOTES
Can you please call him and let him know that one of his liver enzymes is slightly elevated.  Please assess if taking tylenol or any other OTC medication that may have tylenol in them and let him know that he needs to stop taking at this time.  Thank you,  Lissy

## 2019-10-29 NOTE — TELEPHONE ENCOUNTER
Spoke to patient lab results given, instructed to discontinue taking his Norco for now and not to take Tylenol, verbalized understanding.

## 2019-10-29 NOTE — TELEPHONE ENCOUNTER
----- Message from Jinny Moon NP sent at 10/29/2019  3:22 PM CDT -----  Can you please call him and let him know that one of his liver enzymes is slightly elevated.  Please assess if taking tylenol or any other OTC medication that may have tylenol in them and let him know that he needs to stop taking at this time.  Thank   you,  Lissy

## 2019-10-30 ENCOUNTER — TELEPHONE (OUTPATIENT)
Dept: HEMATOLOGY/ONCOLOGY | Facility: CLINIC | Age: 64
End: 2019-10-30

## 2019-10-30 NOTE — TELEPHONE ENCOUNTER
----- Message from Jinny Moon NP sent at 10/30/2019  1:15 PM CDT -----  Contact: patient  OK.  Just sent a not on him.  He can take his norco as prescribed but no other OTCs which may contain tylenol  ----- Message -----  From: Vanessa العراقي LPN  Sent: 10/29/2019   4:23 PM CDT  To: Jinny Moon NP    He wants to know what can he take for pain he had a fall on Saturday.  I was going to suggest Ibuprofen but he's allergic to NSAIDS so I told him Aspirin and that I would ask you for recommendation(s).

## 2019-11-05 ENCOUNTER — OFFICE VISIT (OUTPATIENT)
Dept: INTERNAL MEDICINE | Facility: CLINIC | Age: 64
End: 2019-11-05
Payer: MEDICARE

## 2019-11-05 ENCOUNTER — TELEPHONE (OUTPATIENT)
Dept: HEMATOLOGY/ONCOLOGY | Facility: CLINIC | Age: 64
End: 2019-11-05

## 2019-11-05 VITALS
HEART RATE: 88 BPM | SYSTOLIC BLOOD PRESSURE: 120 MMHG | HEIGHT: 70 IN | DIASTOLIC BLOOD PRESSURE: 72 MMHG | OXYGEN SATURATION: 98 % | BODY MASS INDEX: 21.14 KG/M2 | TEMPERATURE: 97 F | WEIGHT: 147.69 LBS

## 2019-11-05 DIAGNOSIS — J84.10 PULMONARY FIBROSIS: ICD-10-CM

## 2019-11-05 DIAGNOSIS — E78.2 MIXED HYPERLIPIDEMIA: Chronic | ICD-10-CM

## 2019-11-05 DIAGNOSIS — C79.51 SECONDARY ADENOCARCINOMA OF SKELETAL BONE: ICD-10-CM

## 2019-11-05 DIAGNOSIS — Z98.61 HISTORY OF PTCA: ICD-10-CM

## 2019-11-05 DIAGNOSIS — I25.84 CORONARY ARTERY DISEASE DUE TO CALCIFIED CORONARY LESION: Chronic | ICD-10-CM

## 2019-11-05 DIAGNOSIS — C61 PROSTATE CANCER: ICD-10-CM

## 2019-11-05 DIAGNOSIS — I25.10 CORONARY ARTERY DISEASE DUE TO CALCIFIED CORONARY LESION: Chronic | ICD-10-CM

## 2019-11-05 DIAGNOSIS — J44.9 CHRONIC OBSTRUCTIVE PULMONARY DISEASE, UNSPECIFIED COPD TYPE: Chronic | ICD-10-CM

## 2019-11-05 DIAGNOSIS — G89.3 NEOPLASM RELATED PAIN: ICD-10-CM

## 2019-11-05 DIAGNOSIS — I10 ESSENTIAL HYPERTENSION: ICD-10-CM

## 2019-11-05 DIAGNOSIS — Z00.00 ENCOUNTER FOR PREVENTIVE HEALTH EXAMINATION: Primary | ICD-10-CM

## 2019-11-05 DIAGNOSIS — K21.9 GASTROESOPHAGEAL REFLUX DISEASE WITHOUT ESOPHAGITIS: Chronic | ICD-10-CM

## 2019-11-05 PROCEDURE — 3078F PR MOST RECENT DIASTOLIC BLOOD PRESSURE < 80 MM HG: ICD-10-PCS | Mod: HCNC,CPTII,S$GLB, | Performed by: NURSE PRACTITIONER

## 2019-11-05 PROCEDURE — G0439 PPPS, SUBSEQ VISIT: HCPCS | Mod: HCNC,S$GLB,, | Performed by: NURSE PRACTITIONER

## 2019-11-05 PROCEDURE — 99999 PR PBB SHADOW E&M-EST. PATIENT-LVL V: CPT | Mod: PBBFAC,HCNC,, | Performed by: NURSE PRACTITIONER

## 2019-11-05 PROCEDURE — G0439 PR MEDICARE ANNUAL WELLNESS SUBSEQUENT VISIT: ICD-10-PCS | Mod: HCNC,S$GLB,, | Performed by: NURSE PRACTITIONER

## 2019-11-05 PROCEDURE — 99999 PR PBB SHADOW E&M-EST. PATIENT-LVL V: ICD-10-PCS | Mod: PBBFAC,HCNC,, | Performed by: NURSE PRACTITIONER

## 2019-11-05 PROCEDURE — 3074F PR MOST RECENT SYSTOLIC BLOOD PRESSURE < 130 MM HG: ICD-10-PCS | Mod: HCNC,CPTII,S$GLB, | Performed by: NURSE PRACTITIONER

## 2019-11-05 PROCEDURE — 3078F DIAST BP <80 MM HG: CPT | Mod: HCNC,CPTII,S$GLB, | Performed by: NURSE PRACTITIONER

## 2019-11-05 PROCEDURE — 3074F SYST BP LT 130 MM HG: CPT | Mod: HCNC,CPTII,S$GLB, | Performed by: NURSE PRACTITIONER

## 2019-11-05 NOTE — TELEPHONE ENCOUNTER
----- Message from Chasity Jer sent at 11/5/2019  1:16 PM CST -----  Contact: self  Type:  RX Refill Request    Who Called: Ray  Refill or New Rx:refill  RX Name and Strength:HYDROcodone-acetaminophen (NORCO)  mg per tablet  How is the patient currently taking it? (ex. 1XDay):   Is this a 30 day or 90 day RX: 90  Preferred Pharmacy with phone number:    [facility] [place] - [address]  [address]  [place] [address]  Phone: [phone] Fax: [phone]    Local or Mail Order:local  Ordering Provider:Red  Would the patient rather a call back or a response via [facility]? call  Best Call Back Number:[phone]  Additional Information:

## 2019-11-05 NOTE — PATIENT INSTRUCTIONS
Counseling and Referral of Other Preventative  (Italic type indicates deductible and co-insurance are waived)    Patient Name: Joey Jacobo  Today's Date: 11/5/2019    Health Maintenance       Date Due Completion Date    Shingles Vaccine (1 of 2) 03/15/2007 ---    Pneumococcal Vaccine (Highest Risk) (3 of 3 - PPSV23) 10/16/2018 8/21/2018    Influenza Vaccine (1) 09/01/2019 10/20/2015    Lipid Panel 03/04/2020 3/4/2019    High Dose Statin 08/27/2020 8/27/2019    Aspirin/Antiplatelet Therapy 08/27/2020 8/27/2019    DEXA SCAN 11/17/2020 11/17/2015    Override on 8/18/2008: Done (REPEAT 1-2 YRS)    Override on 8/16/2007: Done    Colonoscopy 03/21/2021 3/21/2016    Override on 11/6/2006: Done ( health center- polyps)    TETANUS VACCINE 10/20/2025 10/20/2015        No orders of the defined types were placed in this encounter.    The following information is provided to all patients.  This information is to help you find resources for any of the problems found today that may be affecting your health:                Living healthy guide: www.[facility].[place].gov      Understanding Diabetes: www.diabetes.org      Eating healthy: www.cdc.gov/healthyweight      CDC home safety checklist: www.cdc.gov/steadi/patient.html      Agency on Aging: www.goea.[place].[facility]      Alcoholics anonymous (AA): www.aa.org      Physical Activity: www.lexi.nih.gov/tt3zmdf      Tobacco use: www.quitwithusla.org

## 2019-11-05 NOTE — TELEPHONE ENCOUNTER
----- Message from Beatriz Rashid MA sent at 11/5/2019  9:29 AM CST -----  Contact: [phone]      ----- Message -----  From: Kelsi Islas  Sent: 11/5/2019   9:15 AM CST  To: Abel Melton Staff    Would like to consult with the nurse, Patient needs to get a refill on his Rx medication   .Type:  RX Refill Request    Who Called:  Mr Jacobo  Refill or New Rx:Refill  RX Name and Strength:Pain Medication Hydrocodone  How is the patient currently taking it? (ex. 1XDay):as needed  Is this a 30 day or 90 day RX:30  Preferred Pharmacy with phone number:.  [facility] [place] - [address]  [address]  [place] [address]  Phone: [phone] Fax: [phone]      Local or Mail Order: Local  Ordering Provider:Dr Roman  Would the patient rather a call back or a response via [facility]? CallBack  Best Call Back Number:[phone]  Additional Information:

## 2019-11-05 NOTE — PROGRESS NOTES
"Joey Jacobo presented for a  Medicare AWV and comprehensive Health Risk Assessment today. The following components were reviewed and updated:    · Medical history  · Family History  · Social history  · Allergies and Current Medications  · Health Risk Assessment  · Health Maintenance  · Care Team     ** See Completed Assessments for Annual Wellness Visit within the encounter summary.**       The following assessments were completed:  · Living Situation  · CAGE  · Depression Screening  · Timed Get Up and Go  · Whisper Test  · Cognitive Function Screening  · Nutrition Screening  · ADL Screening  · PAQ Screening    Vitals:    11/05/19 0905   BP: 120/72   BP Location: Left arm   Patient Position: Sitting   BP Method: Medium (Manual)   Pulse: 88   Temp: 97.4 °F (36.3 °C)   TempSrc: Tympanic   SpO2: 98%   Weight: 67 kg (147 lb 11.3 oz)   Height: 5' 10" (1.778 m)     Body mass index is 21.19 kg/m².  Physical Exam   Constitutional: He is oriented to person, place, and time.   HENT:   Head: Normocephalic and atraumatic.   Right Ear: Tympanic membrane normal.   Left Ear: Tympanic membrane normal.   Eyes: Conjunctivae and EOM are normal.   Neck: Normal range of motion. Neck supple.   Cardiovascular: Normal rate, regular rhythm, normal heart sounds and intact distal pulses.   Pulmonary/Chest: Effort normal and breath sounds normal.   Abdominal: Soft. Bowel sounds are normal.   Musculoskeletal: Normal range of motion.   Neurological: He is alert and oriented to person, place, and time.   Skin: Skin is warm and dry.         Diagnoses and health risks identified today and associated recommendations/orders:    1. Encounter for preventive health examination  Completed today    2. Chronic obstructive pulmonary disease, unspecified COPD type/11. Pulmonary fibrosis   CT abd/pelvis 2017 Bibasilar chronic interstitial lung disease without acute infiltrate or effusion    3. Coronary artery disease due to calcified coronary lesion/ 5. History " of PTCA  Followed by cards. Lipid, BP control discussed. Follow w/ PCP/cards    4. Essential hypertension  Stable on current regime. Low NA diet. Follow up with PCP    6. Mixed hyperlipidemia  This problem is not controlled-zetia adde per PCP- follow up for further txment discussion     Component      Latest Ref Rng & Units 3/4/2019   Cholesterol      120 - 199 mg/dL 230 (H)   Triglycerides      30 - 150 mg/dL 107   HDL      40 - 75 mg/dL 55   LDL Cholesterol External      63.0 - 159.0 mg/dL 153.6   Hdl/Cholesterol Ratio      20.0 - 50.0 % 23.9   Total Cholesterol/HDL Ratio      2.0 - 5.0 4.2   Non-HDL Cholesterol      mg/dL 175     7. Neoplasm related pain/8. Prostate cancer/9. Secondary adenocarcinoma of skeletal bone  Followed by oncology/urology. On norco, prednisone, zytiga, and lupron inj. Follow up as scheduled.    10. Gastroesophageal reflux disease without esophagitis  stble on protonix        Provided Ray with a 5-10 year written screening schedule and personal prevention plan. Recommendations were developed using the USPSTF age appropriate recommendations. Education, counseling, and referrals were provided as needed. After Visit Summary printed and given to patient which includes a list of additional screenings\tests needed.    Follow up with PCP and specialists as scheduled  HRA follow up in 1 year    Katherine Villar NP

## 2019-11-05 NOTE — TELEPHONE ENCOUNTER
Notified patient Jinny has been notified of request and I will give a call back once authorized.  Patient verbalized understanding.

## 2019-11-06 ENCOUNTER — TELEPHONE (OUTPATIENT)
Dept: HEMATOLOGY/ONCOLOGY | Facility: CLINIC | Age: 64
End: 2019-11-06

## 2019-11-06 DIAGNOSIS — C79.51 SECONDARY ADENOCARCINOMA OF SKELETAL BONE: ICD-10-CM

## 2019-11-06 DIAGNOSIS — G89.3 NEOPLASM RELATED PAIN: ICD-10-CM

## 2019-11-06 DIAGNOSIS — C61 PROSTATE CANCER: ICD-10-CM

## 2019-11-06 RX ORDER — HYDROCODONE BITARTRATE AND ACETAMINOPHEN 10; 325 MG/1; MG/1
1 TABLET ORAL EVERY 6 HOURS PRN
Qty: 90 TABLET | Refills: 0 | Status: SHIPPED | OUTPATIENT
Start: 2019-11-06 | End: 2019-12-03 | Stop reason: SDUPTHER

## 2019-11-06 NOTE — TELEPHONE ENCOUNTER
----- Message from Chrissy Wilson sent at 11/6/2019  3:26 PM CST -----  Contact: self  Requesting call back regarding pt needs a authorization for pt to fill medication for pain. Pt states he has called two days and has not gotten no response and hes in severe pain. Please call back at  [phone]      Thanks,  Chrissy Wilson

## 2019-11-07 DIAGNOSIS — J44.9 CHRONIC OBSTRUCTIVE PULMONARY DISEASE, UNSPECIFIED COPD TYPE: Primary | Chronic | ICD-10-CM

## 2019-11-07 DIAGNOSIS — J84.10 PULMONARY FIBROSIS: ICD-10-CM

## 2019-11-11 ENCOUNTER — OFFICE VISIT (OUTPATIENT)
Dept: HEMATOLOGY/ONCOLOGY | Facility: CLINIC | Age: 64
End: 2019-11-11
Payer: MEDICARE

## 2019-11-11 ENCOUNTER — HOSPITAL ENCOUNTER (OUTPATIENT)
Dept: RADIOLOGY | Facility: HOSPITAL | Age: 64
Discharge: HOME | End: 2019-11-11
Attending: NURSE PRACTITIONER
Payer: MEDICARE

## 2019-11-11 VITALS
DIASTOLIC BLOOD PRESSURE: 78 MMHG | HEART RATE: 80 BPM | WEIGHT: 147.94 LBS | OXYGEN SATURATION: 94 % | RESPIRATION RATE: 18 BRPM | TEMPERATURE: 97 F | HEIGHT: 70 IN | BODY MASS INDEX: 21.18 KG/M2 | SYSTOLIC BLOOD PRESSURE: 118 MMHG

## 2019-11-11 DIAGNOSIS — D64.9 ANEMIA, UNSPECIFIED TYPE: ICD-10-CM

## 2019-11-11 DIAGNOSIS — R05.8 COUGH WITH SPUTUM: ICD-10-CM

## 2019-11-11 DIAGNOSIS — R05.9 COUGH: ICD-10-CM

## 2019-11-11 DIAGNOSIS — R74.01 ELEVATED AST (SGOT): Primary | ICD-10-CM

## 2019-11-11 DIAGNOSIS — G89.3 NEOPLASM RELATED PAIN: ICD-10-CM

## 2019-11-11 DIAGNOSIS — J84.10 PULMONARY FIBROSIS: ICD-10-CM

## 2019-11-11 DIAGNOSIS — C61 PROSTATE CANCER: ICD-10-CM

## 2019-11-11 DIAGNOSIS — D72.829 LEUKOCYTOSIS, UNSPECIFIED TYPE: ICD-10-CM

## 2019-11-11 DIAGNOSIS — J44.9 CHRONIC OBSTRUCTIVE PULMONARY DISEASE, UNSPECIFIED COPD TYPE: ICD-10-CM

## 2019-11-11 DIAGNOSIS — R09.89 CHEST CONGESTION: ICD-10-CM

## 2019-11-11 PROCEDURE — 71046 X-RAY EXAM CHEST 2 VIEWS: CPT | Mod: 26,HCNC,, | Performed by: RADIOLOGY

## 2019-11-11 PROCEDURE — 99999 PR PBB SHADOW E&M-EST. PATIENT-LVL III: ICD-10-PCS | Mod: PBBFAC,HCNC,, | Performed by: NURSE PRACTITIONER

## 2019-11-11 PROCEDURE — 99214 PR OFFICE/OUTPT VISIT, EST, LEVL IV, 30-39 MIN: ICD-10-PCS | Mod: HCNC,S$GLB,, | Performed by: NURSE PRACTITIONER

## 2019-11-11 PROCEDURE — 3078F DIAST BP <80 MM HG: CPT | Mod: HCNC,CPTII,S$GLB, | Performed by: NURSE PRACTITIONER

## 2019-11-11 PROCEDURE — 3078F PR MOST RECENT DIASTOLIC BLOOD PRESSURE < 80 MM HG: ICD-10-PCS | Mod: HCNC,CPTII,S$GLB, | Performed by: NURSE PRACTITIONER

## 2019-11-11 PROCEDURE — 99999 PR PBB SHADOW E&M-EST. PATIENT-LVL III: CPT | Mod: PBBFAC,HCNC,, | Performed by: NURSE PRACTITIONER

## 2019-11-11 PROCEDURE — 71046 X-RAY EXAM CHEST 2 VIEWS: CPT | Mod: TC,HCNC

## 2019-11-11 PROCEDURE — 3074F PR MOST RECENT SYSTOLIC BLOOD PRESSURE < 130 MM HG: ICD-10-PCS | Mod: HCNC,CPTII,S$GLB, | Performed by: NURSE PRACTITIONER

## 2019-11-11 PROCEDURE — 99214 OFFICE O/P EST MOD 30 MIN: CPT | Mod: HCNC,S$GLB,, | Performed by: NURSE PRACTITIONER

## 2019-11-11 PROCEDURE — 71046 XR CHEST PA AND LATERAL: ICD-10-PCS | Mod: 26,HCNC,, | Performed by: RADIOLOGY

## 2019-11-11 PROCEDURE — 3074F SYST BP LT 130 MM HG: CPT | Mod: HCNC,CPTII,S$GLB, | Performed by: NURSE PRACTITIONER

## 2019-11-11 PROCEDURE — 3008F BODY MASS INDEX DOCD: CPT | Mod: HCNC,CPTII,S$GLB, | Performed by: NURSE PRACTITIONER

## 2019-11-11 PROCEDURE — 3008F PR BODY MASS INDEX (BMI) DOCUMENTED: ICD-10-PCS | Mod: HCNC,CPTII,S$GLB, | Performed by: NURSE PRACTITIONER

## 2019-11-11 RX ORDER — GUAIFENESIN 1200 MG/1
1200 TABLET, EXTENDED RELEASE ORAL 2 TIMES DAILY
Qty: 60 TABLET | Refills: 1 | Status: SHIPPED | OUTPATIENT
Start: 2019-11-11 | End: 2019-11-21

## 2019-11-11 NOTE — PROGRESS NOTES
Subjective:      Patient ID: Joey Jacobo is a 64 y.o. male.    Chief Complaint: Constipation    HPI:  The patient is a 64-year-old -American male who presents to the hematology oncology clinic today to discuss further evaluation and management recommendations for metastatic prostate adenocarcinoma.  Today he has complaints of constipation.      The patient is also being followed by Dr. Roland Dawn with urology for every 3 month Lupron injections.  I have reviewed all of the patient's relevant clinical history available in the medical record and have utilized this in my evaluation and management recommendations today.    Today the patient reports that he has chronic pain in his lumbar region due to degenerative disc disease.  He also reports pain in his lower thoracic region.  He has been taking Norco when necessary pain however states recently has developed constipation.  He states stopped his Norco for a couple of days and bowel movements returned to normal.  He denies any fevers, chills or night sweats.  He reports chronic fatigue.  He denies any melena, hematochezia, hematemesis, hemoptysis or hematuria.  He denies any chest pain or shortness of breath.  He denies any urinary complaints.    He reports compliance with Zytiga/prednisone which states started on December 7, 2017 and has been tolerating this medication well without any significant side effects.  The patient has been followed in the clinic by Dr. Roman.  Today is the first time I am evaluating/assessing the patient.      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   Social Needs    Financial resource strain: Not on file    Food insecurity:     Worry: Not on file     Inability: Not on file     Transportation needs:     Medical: Not on file     Non-medical: Not on file   Tobacco Use    Smoking status: Former Smoker     Packs/day: 0.25     Years: 25.00     Pack years: 6.25     Types: Cigarettes     Last attempt to quit: 2018     Years since quittin.2    Smokeless tobacco: Never Used   Substance and Sexual Activity    Alcohol use: Yes     Alcohol/week: 0.0 standard drinks     Comment: occasionally    Drug use: No    Sexual activity: Yes   Lifestyle    Physical activity:     Days per week: Not on file     Minutes per session: Not on file    Stress: Not on file   Relationships    Social connections:     Talks on phone: Not on file     Gets together: Not on file     Attends [employer] service: Not on file     Active member of club or organization: Not on file     Attends meetings of clubs or organizations: Not on file     Relationship status: Not on file   Other Topics Concern    Not on file   Social History Narrative    Wife and son       Family History   Problem Relation Age of Onset    Cataracts Mother     Kidney disease Mother     Cancer Father         Stomach    Blindness Maternal Grandmother     Diabetes Sister     Hypertension Sister     Diabetes Sister     Hypertension Sister     Diabetes Sister     Hypertension Sister     Hypertension Sister     Hypertension Sister        Past Surgical History:   Procedure Laterality Date    COLONOSCOPY N/A 3/21/2016    Procedure: COLONOSCOPY;  Surgeon: Melvin Pearce MD;  Location: [facility] ENDO;  Service: Endoscopy;  Laterality: N/A;    CORONARY STENT PLACEMENT      times 2    SHOULDER ARTHROSCOPY Left     SPINE SURGERY      neck fusion x2       Past Medical History:   Diagnosis Date    COPD (chronic obstructive pulmonary disease) 2013    Coronary artery disease 2013    Emphysema of lung     Essential hypertension 10/29/2018    Glaucoma (increased eye pressure) 2013    Headache(784.0)     Mitral regurgitation  7/30/2013    Mixed hyperlipidemia 7/30/2013    Neuropathy     Osteoarthritis of spine with radiculopathy, lumbar region 7/21/2015    Other and unspecified hyperlipidemia     Pulmonary fibrosis 10/3/2017       Review of Systems   Constitutional: Positive for diaphoresis and fatigue. Negative for fever.   HENT: Positive for dental problem.    Respiratory: Positive for cough (x 2 months). Negative for chest tightness and shortness of breath.    Cardiovascular: Negative for chest pain and palpitations.   Gastrointestinal: Negative for abdominal pain, anal bleeding, blood in stool, constipation (resolved), diarrhea, nausea and vomiting.   Genitourinary: Negative for dysuria and hematuria.   Musculoskeletal: Positive for arthralgias and back pain.   Skin: Negative for rash and wound.   Neurological: Negative for dizziness and light-headedness.   Hematological: Negative for adenopathy. Does not bruise/bleed easily.   Psychiatric/Behavioral: Negative for confusion and decreased concentration.          Medication List with Changes/Refills   New Medications    GUAIFENESIN (MUCINEX) 1,200 MG TA12    Take 1,200 mg by mouth 2 (two) times daily. for 10 days   Current Medications    ABIRATERONE (ZYTIGA) 250 MG TAB    Take 1 tablet (250 mg total) by mouth once daily After low fat breakfast.    ASPIRIN (ECOTRIN) 81 MG EC TABLET    Take by mouth. 1 Tablet, Delayed Release (E.C.) Oral Every day    ATORVASTATIN (LIPITOR) 40 MG TABLET    Take 1 tablet (40 mg total) by mouth once daily.    BUDESONIDE-FORMOTEROL 160-4.5 MCG (SYMBICORT) 160-4.5 MCG/ACTUATION HFAA    Inhale into the lungs.    CALCIUM-VITAMIN D (CALCIUM 600 + D,3,) 600 MG(1,500MG) -400 UNIT TAB    Take by mouth. 1 Tablet Oral Twice a day    DOCUSATE SODIUM (COLACE) 100 MG CAPSULE    Take 1 capsule (100 mg total) by mouth 2 (two) times daily.    ESCITALOPRAM OXALATE (LEXAPRO) 10 MG TABLET    Take 1 tablet (10 mg total) by mouth once daily.    EZETIMIBE (ZETIA) 10 MG  TABLET    Take 1 tablet (10 mg total) by mouth once daily.    FLUTICASONE (FLONASE) 50 MCG/ACTUATION NASAL SPRAY    1 spray by Each Nare route once daily.    FLUTICASONE-SALMETEROL (ADVAIR HFA) 115-21 MCG/ACTUATION HFAA    Inhale 2 puffs into the lungs.    GABAPENTIN (NEURONTIN) 300 MG CAPSULE    Take 300 mg by mouth.    HYDROCODONE-ACETAMINOPHEN (NORCO)  MG PER TABLET    Take 1 tablet by mouth every 6 (six) hours as needed for Pain.    METOPROLOL SUCCINATE (TOPROL-XL) 50 MG 24 HR TABLET    TAKE ONE TABLET DAILY    NITROGLYCERIN (NITROSTAT) 0.4 MG SL TABLET    Place 1 tablet (0.4 mg total) under the tongue every 5 (five) minutes as needed for Chest pain.    PANTOPRAZOLE (PROTONIX) 40 MG TABLET    Take 1 tablet (40 mg total) by mouth once daily.    PREDNISONE (DELTASONE) 5 MG TABLET    Take 1 tablet (5 mg total) by mouth 2 (two) times daily.    TRAVOPROST (TRAVATAN Z) 0.004 % DROP    Place 1 drop into both eyes every evening. 1 Drops Ophthalmic At bedtime        Objective:     Vitals:    11/11/19 0839   BP: 118/78   Pulse: 80   Resp: 18   Temp: 97.1 °F (36.2 °C)       Physical Exam   Constitutional: He is oriented to person, place, and time. Vital signs are normal. He appears well-developed and well-nourished.  Non-toxic appearance. He does not have a sickly appearance. He does not appear ill. No distress.   HENT:   Head: Normocephalic and atraumatic.   Right Ear: External ear normal.   Left Ear: External ear normal.   Nose: Nose normal.   Eyes: Conjunctivae, EOM and lids are normal. Right eye exhibits no discharge. Left eye exhibits no discharge. No scleral icterus.   Cardiovascular: Normal rate, regular rhythm, S1 normal, S2 normal and normal heart sounds. Exam reveals no gallop and no friction rub.   No murmur heard.  Pulmonary/Chest: Effort normal and breath sounds normal. No accessory muscle usage or stridor. No apnea, no tachypnea and no bradypnea. No respiratory distress. He has no decreased breath  sounds. He has no wheezes. He has no rales. He exhibits no tenderness.   Abdominal: Soft. Normal appearance and bowel sounds are normal. He exhibits no distension.   Musculoskeletal: Normal range of motion. He exhibits tenderness.   Lymphadenopathy:     He has no cervical adenopathy.   Neurological: He is alert and oriented to person, place, and time.   Skin: Skin is warm, dry and intact. No bruising, no lesion and no rash noted. He is not diaphoretic. No pallor.   Psychiatric: He has a normal mood and affect. His speech is normal and behavior is normal. Judgment and thought content normal. He is not actively hallucinating. Cognition and memory are normal. He is attentive.   Nursing note and vitals reviewed.      Assessment:     Problem List Items Addressed This Visit        Oncology    Prostate cancer    Relevant Orders    CBC auto differential    Comprehensive metabolic panel    Neoplasm related pain    Relevant Orders    CBC auto differential    Comprehensive metabolic panel      Other Visit Diagnoses     Elevated AST (SGOT)    -  Primary    Relevant Orders    Hepatic function panel    Cough        Relevant Medications    guaiFENesin (MUCINEX) 1,200 mg Ta12    Cough with sputum        Relevant Medications    guaiFENesin (MUCINEX) 1,200 mg Ta12    Chest congestion        Relevant Orders    X-Ray Chest PA And Lateral    Leukocytosis, unspecified type        Relevant Orders    X-Ray Chest PA And Lateral    Anemia, unspecified type        Relevant Orders    Ferritin    Iron and TIBC        Lab Results   Component Value Date    WBC 13.53 (H) 10/28/2019    HGB 13.0 (L) 10/28/2019    HCT 42.1 10/28/2019    MCV 95 10/28/2019     10/28/2019     BMP  Lab Results   Component Value Date     10/28/2019    K 3.6 10/28/2019     10/28/2019    CO2 26 10/28/2019    BUN 12 10/28/2019    CREATININE 1.2 10/28/2019    CALCIUM 10.1 10/28/2019    ANIONGAP 9 10/28/2019    ESTGFRAFRICA >60.0 10/28/2019    EGFRNONAA  >60.0 10/28/2019     Lab Results   Component Value Date    ALT 18 10/28/2019    AST 47 (H) 10/28/2019    ALKPHOS 71 10/28/2019    BILITOT 0.7 10/28/2019       Plan:   Elevated AST (SGOT)  -     Hepatic function panel; Future; Expected date: 11/11/2019    Cough  -     guaiFENesin (MUCINEX) 1,200 mg Ta12; Take 1,200 mg by mouth 2 (two) times daily. for 10 days  Dispense: 60 tablet; Refill: 1    Cough with sputum  -     guaiFENesin (MUCINEX) 1,200 mg Ta12; Take 1,200 mg by mouth 2 (two) times daily. for 10 days  Dispense: 60 tablet; Refill: 1    Chest congestion  -     X-Ray Chest PA And Lateral; Future; Expected date: 11/11/2019    Leukocytosis, unspecified type  -     X-Ray Chest PA And Lateral; Future; Expected date: 11/11/2019    Prostate cancer  -     CBC auto differential; Future; Expected date: 11/11/2019  -     Comprehensive metabolic panel; Future; Expected date: 11/11/2019    Neoplasm related pain  -     CBC auto differential; Future; Expected date: 11/11/2019  -     Comprehensive metabolic panel; Future; Expected date: 11/11/2019    Anemia, unspecified type  -     Ferritin; Future; Expected date: 11/11/2019  -     Iron and TIBC; Future; Expected date: 11/11/2019    Continue stool softeners for constipation due to narcotic use.  Continue narcotics for symptomatic relieve due to metastatic disease to spine  He will continue every 3 month lupron injections with Dr. Dawn (last 8/26/19).  He will continue daily calcium and vitamin D supplementation.  He will continue daily Zytiga and prednisone.  Follow up in 2 months with cbc and cmp.  Will repeat LFT to evaluate increased AST.  He will notify us once he gets dental issues resolved so that we can start Xgeva for treatment of bone metastasis.  Will evaluate today with chest xray due to complaints of cough for the past 2 months with leukocytosis.   Will add iron studies to evaluate anemia.       I will review assessment/plan with collaborating physician,   Laurent Berrios..    Thank You,  Lissy Moon, SAMANTA-C

## 2019-11-12 ENCOUNTER — LAB VISIT (OUTPATIENT)
Dept: LAB | Facility: HOSPITAL | Age: 64
End: 2019-11-12
Attending: UROLOGY
Payer: MEDICARE

## 2019-11-12 DIAGNOSIS — R74.01 ELEVATED AST (SGOT): ICD-10-CM

## 2019-11-12 DIAGNOSIS — D64.9 ANEMIA, UNSPECIFIED TYPE: ICD-10-CM

## 2019-11-12 DIAGNOSIS — C61 PROSTATE CANCER: ICD-10-CM

## 2019-11-12 LAB
ALBUMIN SERPL BCP-MCNC: 3.6 G/DL (ref 3.5–5.2)
ALP SERPL-CCNC: 78 U/L (ref 55–135)
ALT SERPL W/O P-5'-P-CCNC: 21 U/L (ref 10–44)
AST SERPL-CCNC: 25 U/L (ref 10–40)
BILIRUB DIRECT SERPL-MCNC: 0.3 MG/DL (ref 0.1–0.3)
BILIRUB SERPL-MCNC: 0.5 MG/DL (ref 0.1–1)
COMPLEXED PSA SERPL-MCNC: <0.01 NG/ML (ref 0–4)
FERRITIN SERPL-MCNC: 123 NG/ML (ref 20–300)
IRON SERPL-MCNC: 78 UG/DL (ref 45–160)
PROT SERPL-MCNC: 8.1 G/DL (ref 6–8.4)
SATURATED IRON: 18 % (ref 20–50)
TOTAL IRON BINDING CAPACITY: 434 UG/DL (ref 250–450)
TRANSFERRIN SERPL-MCNC: 293 MG/DL (ref 200–375)

## 2019-11-12 PROCEDURE — 80076 HEPATIC FUNCTION PANEL: CPT | Mod: HCNC

## 2019-11-12 PROCEDURE — 82728 ASSAY OF FERRITIN: CPT | Mod: HCNC

## 2019-11-12 PROCEDURE — 36415 COLL VENOUS BLD VENIPUNCTURE: CPT | Mod: HCNC,PO

## 2019-11-12 PROCEDURE — 83540 ASSAY OF IRON: CPT | Mod: HCNC

## 2019-11-12 PROCEDURE — 84153 ASSAY OF PSA TOTAL: CPT | Mod: HCNC

## 2019-11-19 ENCOUNTER — TELEPHONE (OUTPATIENT)
Dept: PHARMACY | Facility: CLINIC | Age: 64
End: 2019-11-19

## 2019-11-19 NOTE — TELEPHONE ENCOUNTER
"Contacted patient for Zytiga refill and f/u.     Dosing, how taking: Zytiga 250 mg - takes 1 tablet once a day with low fat breakfast. Denies missed doses.   Storage: Stores medication in "cool room" - bedroom.  Handling: Does not handle medication directly, uses a dosing cap. Aware of handling precautions.   Side effects: Denies any side effects - has been on medication for some time.  Recent infections: No fever, achy chills, or wet persistent cough. Complains of a dry cough and is taking something for it (states medication is helping).  Pain: 10/10 lower back pain - comes and goes (uses Norco PRN - helps). No pain currently.  Appetite: Able to eat 3 meals/day - "I eat all through the night if I can". Snacks on a lot of fruits and vegetables.  Energy, fatigue: Able to complete social and daily activities.   Health, mood, QOL: Patient is in great spirits. Denies issues with anxiety, stress, and depression. Still a little sad about loss of son last November, but states he is doing much better. They plan to celebrate his son's birthday with a bonfire in honor of him. Reports having good family and friend support. States God has helped him through the difficult times.   ED/UC visits: No  Next clinical follow up: 3 months   Labs reviewed. 11/12 PSA <0.01    HTN: Does not check BP at home - checks at appointments. Reports blood pressure has been very good. BP at 11/12 office visit: 118/78    Refills: Patient unable to give exact count on hand - estimates about 5-6 days remaining. Denies missed doses. Will ship medication on 11/20 for the patient to receive on 11/21. $0.00 copay. Address confirmed.      No changes in medications, allergies, or health conditions.    Patient was an absolute pleasure to speak with. He is aware to reach out to [facility] with any further questions or concerns.   "

## 2019-11-21 DIAGNOSIS — I10 ESSENTIAL HYPERTENSION: Primary | ICD-10-CM

## 2019-12-03 ENCOUNTER — TELEPHONE (OUTPATIENT)
Dept: HEMATOLOGY/ONCOLOGY | Facility: CLINIC | Age: 64
End: 2019-12-03

## 2019-12-03 DIAGNOSIS — C79.51 SECONDARY ADENOCARCINOMA OF SKELETAL BONE: ICD-10-CM

## 2019-12-03 DIAGNOSIS — G89.3 NEOPLASM RELATED PAIN: ICD-10-CM

## 2019-12-03 DIAGNOSIS — C61 PROSTATE CANCER: ICD-10-CM

## 2019-12-03 DIAGNOSIS — D64.9 ANEMIA, UNSPECIFIED TYPE: ICD-10-CM

## 2019-12-03 DIAGNOSIS — C61 PROSTATE CANCER: Primary | ICD-10-CM

## 2019-12-03 RX ORDER — HYDROCODONE BITARTRATE AND ACETAMINOPHEN 10; 325 MG/1; MG/1
1 TABLET ORAL EVERY 6 HOURS PRN
Qty: 90 TABLET | Refills: 0 | Status: SHIPPED | OUTPATIENT
Start: 2019-12-03 | End: 2019-12-04 | Stop reason: SDUPTHER

## 2019-12-03 RX ORDER — HYDROCODONE BITARTRATE AND ACETAMINOPHEN 10; 325 MG/1; MG/1
1 TABLET ORAL EVERY 6 HOURS PRN
Qty: 90 TABLET | Refills: 0 | Status: CANCELLED | OUTPATIENT
Start: 2019-12-03

## 2019-12-03 NOTE — TELEPHONE ENCOUNTER
Spoke patient states need clearance for tooth extraction and Wheeler refill.  Instructed a message will be sent to provider, verbalized understanding.

## 2019-12-03 NOTE — TELEPHONE ENCOUNTER
----- Message from Anali Benitez sent at 12/3/2019 12:27 PM CST -----  Contact: pt  Pt was seeing Dr. Whitman however is now seeing you and has something that he would like to speak to you about. Pt can be reached at [phone]        Thanks,  Anali Benitez

## 2019-12-04 DIAGNOSIS — C61 PROSTATE CANCER: ICD-10-CM

## 2019-12-04 DIAGNOSIS — C79.51 SECONDARY ADENOCARCINOMA OF SKELETAL BONE: ICD-10-CM

## 2019-12-04 DIAGNOSIS — G89.3 NEOPLASM RELATED PAIN: ICD-10-CM

## 2019-12-04 RX ORDER — HYDROCODONE BITARTRATE AND ACETAMINOPHEN 10; 325 MG/1; MG/1
1 TABLET ORAL EVERY 6 HOURS PRN
Qty: 90 TABLET | Refills: 0 | Status: SHIPPED | OUTPATIENT
Start: 2019-12-04 | End: 2020-01-02

## 2019-12-04 NOTE — TELEPHONE ENCOUNTER
Can you please forward to one of the docs that see him regularly because this is chronic pain and I cannot prescribe pain medications for chronic pain?  Thank you

## 2019-12-10 ENCOUNTER — TELEPHONE (OUTPATIENT)
Dept: HEMATOLOGY/ONCOLOGY | Facility: CLINIC | Age: 64
End: 2019-12-10

## 2019-12-10 NOTE — TELEPHONE ENCOUNTER
Called and spoke with Wong and answered all or the questions listed and told her patient was not on any medications Per Jinny Moon that would cause a indicaltionwith general ansthesia on the Oncology standpoint. Patient has not started xygeva Ms. Moon is waiting for the dental work to be completed. The phone call ended with not further questions.

## 2019-12-10 NOTE — TELEPHONE ENCOUNTER
Mahesh was consulted to assist with dental clearance letter. Mahesh contacted pt's spouse to ask the name of dental clinic. Mrs. Jacobo reports its [facility] care on [address] in [place]. Mahesh reports she will draft letter and send to provider for signature on today. Mahesh called [facility] to obtain fax number. Staff reports fax number is [phone]. Mahesh will fax letter once signed by provider.

## 2019-12-10 NOTE — TELEPHONE ENCOUNTER
----- Message from Adrianna James sent at 12/10/2019 12:35 PM CST -----  Type:  Needs Medical Advice    Who Called:  Wong with [facility]  Symptoms (please be specific):     How long has patient had these symptoms:     Pharmacy name and phone #:     Would the patient rather a call back or a response via [facility]?   Call back  Best Call Back Number:   [phone]  Additional Information:   States they did receive the clearance for oral surgery but is needing additional information//list of pt medications//and has pt taken this medication during his treatment/med is Cisphosphonate//please call to discuss//thanks/ruby

## 2019-12-11 ENCOUNTER — OFFICE VISIT (OUTPATIENT)
Dept: UROLOGY | Facility: CLINIC | Age: 64
End: 2019-12-11
Payer: MEDICARE

## 2019-12-11 ENCOUNTER — DOCUMENTATION ONLY (OUTPATIENT)
Dept: HEMATOLOGY/ONCOLOGY | Facility: CLINIC | Age: 64
End: 2019-12-11

## 2019-12-11 ENCOUNTER — TELEPHONE (OUTPATIENT)
Dept: CARDIOLOGY | Facility: CLINIC | Age: 64
End: 2019-12-11

## 2019-12-11 VITALS
HEIGHT: 70 IN | SYSTOLIC BLOOD PRESSURE: 124 MMHG | BODY MASS INDEX: 21.3 KG/M2 | DIASTOLIC BLOOD PRESSURE: 78 MMHG | HEART RATE: 81 BPM | WEIGHT: 148.81 LBS

## 2019-12-11 DIAGNOSIS — C61 PROSTATE CANCER: Primary | ICD-10-CM

## 2019-12-11 LAB
BILIRUB SERPL-MCNC: NORMAL MG/DL
BLOOD URINE, POC: NORMAL
COLOR, POC UA: YELLOW
GLUCOSE UR QL STRIP: NORMAL
KETONES UR QL STRIP: NORMAL
LEUKOCYTE ESTERASE URINE, POC: NORMAL
NITRITE, POC UA: NORMAL
PH, POC UA: 5
PROTEIN, POC: NORMAL
SPECIFIC GRAVITY, POC UA: 1.02
UROBILINOGEN, POC UA: NORMAL

## 2019-12-11 PROCEDURE — 3078F PR MOST RECENT DIASTOLIC BLOOD PRESSURE < 80 MM HG: ICD-10-PCS | Mod: HCNC,CPTII,S$GLB, | Performed by: UROLOGY

## 2019-12-11 PROCEDURE — 81002 POCT URINE DIPSTICK WITHOUT MICROSCOPE: ICD-10-PCS | Mod: HCNC,S$GLB,, | Performed by: UROLOGY

## 2019-12-11 PROCEDURE — 3008F BODY MASS INDEX DOCD: CPT | Mod: HCNC,CPTII,S$GLB, | Performed by: UROLOGY

## 2019-12-11 PROCEDURE — 99213 PR OFFICE/OUTPT VISIT, EST, LEVL III, 20-29 MIN: ICD-10-PCS | Mod: HCNC,25,S$GLB, | Performed by: UROLOGY

## 2019-12-11 PROCEDURE — 96402 PR CHEMOTHER HORMON ANTINEOPL SUB-Q/IM: ICD-10-PCS | Mod: HCNC,S$GLB,, | Performed by: UROLOGY

## 2019-12-11 PROCEDURE — 81002 URINALYSIS NONAUTO W/O SCOPE: CPT | Mod: HCNC,S$GLB,, | Performed by: UROLOGY

## 2019-12-11 PROCEDURE — 99999 PR PBB SHADOW E&M-EST. PATIENT-LVL III: CPT | Mod: PBBFAC,HCNC,, | Performed by: UROLOGY

## 2019-12-11 PROCEDURE — 99999 PR PBB SHADOW E&M-EST. PATIENT-LVL III: ICD-10-PCS | Mod: PBBFAC,HCNC,, | Performed by: UROLOGY

## 2019-12-11 PROCEDURE — 3074F PR MOST RECENT SYSTOLIC BLOOD PRESSURE < 130 MM HG: ICD-10-PCS | Mod: HCNC,CPTII,S$GLB, | Performed by: UROLOGY

## 2019-12-11 PROCEDURE — 99213 OFFICE O/P EST LOW 20 MIN: CPT | Mod: HCNC,25,S$GLB, | Performed by: UROLOGY

## 2019-12-11 PROCEDURE — 3074F SYST BP LT 130 MM HG: CPT | Mod: HCNC,CPTII,S$GLB, | Performed by: UROLOGY

## 2019-12-11 PROCEDURE — 3008F PR BODY MASS INDEX (BMI) DOCUMENTED: ICD-10-PCS | Mod: HCNC,CPTII,S$GLB, | Performed by: UROLOGY

## 2019-12-11 PROCEDURE — 96402 CHEMO HORMON ANTINEOPL SQ/IM: CPT | Mod: HCNC,S$GLB,, | Performed by: UROLOGY

## 2019-12-11 PROCEDURE — 3078F DIAST BP <80 MM HG: CPT | Mod: HCNC,CPTII,S$GLB, | Performed by: UROLOGY

## 2019-12-11 NOTE — TELEPHONE ENCOUNTER
Received call from patient requesting Dental clearance for multiple extractions  [facility] [phone] stated they sent over clearance form  Dr. Islas - please advise if okay to proceed          ---- Message -----  From: Aubrie Tolbert  Sent: 12/11/2019  11:21 AM CST  To: Roshan AIKEN Staff    Stated he will like a call back from Ms Christy about medical clearence, he can be reached at [phone]

## 2019-12-11 NOTE — PROGRESS NOTES
Lupron 22.5mg given per Dr Dawn order.  Given in rt ventrogluteal  Using aseptic technique. Pt asked to remain in the room for 15 minutes.  No adverse reaction noted.  Pt dc'ed to home

## 2019-12-11 NOTE — PROGRESS NOTES
"Chief Complaint: Prostate Cancer    HPI:   19:  Lupron shot today.  Reviewed history in detail. Currently on dual therapy with abariterone.   19: Lupron shot today.  Reviewed history in detail. Currently on dual therapy with abariterone.   19: Lupron shot today.  Reviewed history in detail. Currently on dual therapy with abariterone.  19: Son  in an MVA since last visit. Reviewed history in detail.  No problems from Lupron.  18: No problems from Lupron.  PSA lowest.  18: PSA today and again 3 mo.  Doing fine.  Reviewed history in detail.  18: PSA well down.  No adverse effects from Lupron.    10/18/17: Been on casodex a month back to review and start Lupron.  Dr. Moscoso felt XRT was not an option but perhaps chemotherapy could be beneficial.  17: Bone scan reports "RIGHT supraorbital location and a smaller similarly extremely intense focus of increased uptake posteriorly on the RIGHT at the T9 level.  Etiology is uncertain."  CT scan shows "A few scattered shotty mesenteric and retroperitoneal nodes identified.  Similar nodes identified within the pelvis bilaterally."  MRI shows left 2.8 cm prostate mass PIRADS 5, with metastatic pelvic lymphadenopathy (right pelvic sidewall node and left internal iliac chain LN).  No bony mets in pelvis.  17:  No problems from biopsy.  Gl 4+5 in the left base (30%) and a sig amount of 4+4 in other parts of the gland.  Reviewed treatment options, and imaging needs.  7/3/17: TRUS/Bx today 27 gm.  17: 61 yo man has problems with perineal/scrotal pain once or twice a month; lasts for an hour or two, some nocturia.  PSA recently elevated.  No abd/pelvic pain and no exac/rel factors.  No hematuria.  No urolithiasis.  No urinary bother.  No  history. CT with contrast earlier this year essentially normal.    Allergies:  Avelox  [moxifloxacin] and Nsaids (non-steroidal anti-inflammatory drug)    Medications:  has a current " medication list which includes the following prescription(s): abiraterone, aspirin, atorvastatin, budesonide-formoterol 160-4.5 mcg, calcium-vitamin d, docusate sodium, escitalopram oxalate, ezetimibe, fluticasone propionate, fluticasone propion-salmeterol 115-21 mcg/dose, gabapentin, hydrocodone-acetaminophen, metoprolol succinate, nitroglycerin, pantoprazole, prednisone, and travoprost, and the following Facility-Administered Medications: leuprolide.    Review of Systems:  General: No fever, chills, fatigability, or weight loss.  Skin: No rashes, itching, or changes in color or texture of skin.  Chest: Denies TORRES, cyanosis, wheezing, cough, and sputum production.  Abdomen: Appetite fine. No weight loss. Denies diarrhea, abdominal pain, hematemesis, or blood in stool.  Musculoskeletal: No joint stiffness or swelling. Denies back pain.  : As above.  All other review of systems negative.    PMH:   has a past medical history of COPD (chronic obstructive pulmonary disease) (7/30/2013), Coronary artery disease (7/30/2013), Emphysema of lung, Essential hypertension (10/29/2018), Glaucoma (increased eye pressure) (8/27/2013), Headache(784.0), Mitral regurgitation (7/30/2013), Mixed hyperlipidemia (7/30/2013), Neuropathy, Osteoarthritis of spine with radiculopathy, lumbar region (7/21/2015), Other and unspecified hyperlipidemia, and Pulmonary fibrosis (10/3/2017).    PSH:   has a past surgical history that includes Coronary stent placement; Colonoscopy (N/A, 3/21/2016); Shoulder arthroscopy (Left); and Spine surgery.    FamHx: family history includes Blindness in his maternal grandmother; Cancer in his father; Cataracts in his mother; Diabetes in his sister, sister, and sister; Hypertension in his sister, sister, sister, sister, and sister; Kidney disease in his mother.    SocHx:  reports that he quit smoking about 15 months ago. His smoking use included cigarettes. He has a 6.25 pack-year smoking history. He has never  used smokeless tobacco. He reports that he drinks alcohol. He reports that he does not use drugs.      Physical Exam:  Vitals:    12/11/19 1308   BP: 124/78   Pulse: 81     General: A&Ox3, no apparent distress, no deformities  Neck: No masses, normal thyroid  Lungs: normal inspiration, no use of accessory muscles  Heart: normal pulse, no arrhythmias  Abdomen: Soft, NT, ND  Skin: The skin is warm and dry. No jaundice.  Ext: No c/c/e.  :     7/3/17: Test desc lucas, no abnormalities of epididymus. Penis normal, with normal penile and scrotal skin. Meatus normal. Normal rectal tone, no hemorrhoids. Prost 40 gm no nodules or masses appreciated. SV not palpable. Perineum and anus normal.    Labs/Studies:   PSA    2/16: 22.1    2/17: 22.9    1/18: 0.24    7/18: 0.01    1/19, 4/19, 8/19, 11/19: undetect    Impression/Plan:   1. Lupron today and q3mo.  PSA/RTC 3 mo.

## 2019-12-18 ENCOUNTER — TELEPHONE (OUTPATIENT)
Dept: PHARMACY | Facility: CLINIC | Age: 64
End: 2019-12-18

## 2020-01-02 DIAGNOSIS — G89.3 NEOPLASM RELATED PAIN: ICD-10-CM

## 2020-01-02 DIAGNOSIS — C61 PROSTATE CANCER: ICD-10-CM

## 2020-01-02 DIAGNOSIS — C79.51 SECONDARY ADENOCARCINOMA OF SKELETAL BONE: ICD-10-CM

## 2020-01-02 RX ORDER — HYDROCODONE BITARTRATE AND ACETAMINOPHEN 10; 325 MG/1; MG/1
1 TABLET ORAL EVERY 6 HOURS PRN
Qty: 90 TABLET | Refills: 0 | Status: SHIPPED | OUTPATIENT
Start: 2020-01-02 | End: 2020-01-03 | Stop reason: SDUPTHER

## 2020-01-02 NOTE — TELEPHONE ENCOUNTER
----- Message from Grace Tyson sent at 1/2/2020  7:45 AM CST -----  Contact: pt   Type:  RX Refill Request    Who Called: Joey Jacobo   Refill or New Rx:refill  RX Name and Strength:hydrocodone   How is the patient currently taking it? (ex. 1XDay):3xday as needed  Is this a 30 day or 90 day RX:  Preferred Pharmacy with phone number:  [facility] [place] - [address]  [address]  [place] [address]  Phone: [phone] Fax: [phone]  ocal or Mail Order:local  Ordering Provider:ms thomas  Would the patient rather a call back or a response via [facility]? call  Best Call Back Number:[phone]  Additional Information: please call in today pt does not have any medication remaining

## 2020-01-03 ENCOUNTER — LAB VISIT (OUTPATIENT)
Dept: LAB | Facility: HOSPITAL | Age: 65
End: 2020-01-03
Attending: NURSE PRACTITIONER
Payer: MEDICARE

## 2020-01-03 DIAGNOSIS — C61 PROSTATE CANCER: ICD-10-CM

## 2020-01-03 DIAGNOSIS — G89.3 NEOPLASM RELATED PAIN: ICD-10-CM

## 2020-01-03 DIAGNOSIS — C79.51 SECONDARY ADENOCARCINOMA OF SKELETAL BONE: ICD-10-CM

## 2020-01-03 DIAGNOSIS — D64.9 ANEMIA, UNSPECIFIED TYPE: ICD-10-CM

## 2020-01-03 LAB
BASOPHILS # BLD AUTO: 0.04 K/UL (ref 0–0.2)
BASOPHILS NFR BLD: 0.4 % (ref 0–1.9)
DIFFERENTIAL METHOD: ABNORMAL
EOSINOPHIL # BLD AUTO: 0.1 K/UL (ref 0–0.5)
EOSINOPHIL NFR BLD: 1 % (ref 0–8)
ERYTHROCYTE [DISTWIDTH] IN BLOOD BY AUTOMATED COUNT: 15.8 % (ref 11.5–14.5)
HCT VFR BLD AUTO: 43.8 % (ref 40–54)
HGB BLD-MCNC: 13.2 G/DL (ref 14–18)
IMM GRANULOCYTES # BLD AUTO: 0.08 K/UL (ref 0–0.04)
IMM GRANULOCYTES NFR BLD AUTO: 0.9 % (ref 0–0.5)
LYMPHOCYTES # BLD AUTO: 2.2 K/UL (ref 1–4.8)
LYMPHOCYTES NFR BLD: 23.5 % (ref 18–48)
MCH RBC QN AUTO: 29.1 PG (ref 27–31)
MCHC RBC AUTO-ENTMCNC: 30.1 G/DL (ref 32–36)
MCV RBC AUTO: 97 FL (ref 82–98)
MONOCYTES # BLD AUTO: 1 K/UL (ref 0.3–1)
MONOCYTES NFR BLD: 10.3 % (ref 4–15)
NEUTROPHILS # BLD AUTO: 5.9 K/UL (ref 1.8–7.7)
NEUTROPHILS NFR BLD: 63.9 % (ref 38–73)
NRBC BLD-RTO: 0 /100 WBC
PLATELET # BLD AUTO: 210 K/UL (ref 150–350)
PMV BLD AUTO: 10.7 FL (ref 9.2–12.9)
RBC # BLD AUTO: 4.53 M/UL (ref 4.6–6.2)
WBC # BLD AUTO: 9.23 K/UL (ref 3.9–12.7)

## 2020-01-03 PROCEDURE — 82728 ASSAY OF FERRITIN: CPT | Mod: HCNC

## 2020-01-03 PROCEDURE — 80053 COMPREHEN METABOLIC PANEL: CPT | Mod: HCNC

## 2020-01-03 PROCEDURE — 83540 ASSAY OF IRON: CPT | Mod: HCNC

## 2020-01-03 PROCEDURE — 36415 COLL VENOUS BLD VENIPUNCTURE: CPT | Mod: HCNC,PO

## 2020-01-03 PROCEDURE — 85025 COMPLETE CBC W/AUTO DIFF WBC: CPT | Mod: HCNC

## 2020-01-03 RX ORDER — HYDROCODONE BITARTRATE AND ACETAMINOPHEN 10; 325 MG/1; MG/1
1 TABLET ORAL EVERY 6 HOURS PRN
Qty: 90 TABLET | Refills: 0 | Status: CANCELLED | OUTPATIENT
Start: 2020-01-03

## 2020-01-03 RX ORDER — HYDROCODONE BITARTRATE AND ACETAMINOPHEN 10; 325 MG/1; MG/1
1 TABLET ORAL EVERY 6 HOURS PRN
Qty: 90 TABLET | Refills: 0 | Status: SHIPPED | OUTPATIENT
Start: 2020-01-03 | End: 2020-02-03 | Stop reason: SDUPTHER

## 2020-01-03 NOTE — TELEPHONE ENCOUNTER
----- Message from Aakash Stanton sent at 1/3/2020  8:39 AM CST -----  Contact: self [phone]  Pt would like return call from nurse regarding Hydrocodone medication; pt states only 7 day supply was sent to pharmacy, need to have 30 day supply. Please call back at [phone]. Md Sid

## 2020-01-03 NOTE — TELEPHONE ENCOUNTER
----- Message from Fariba Flaherty sent at 1/3/2020 12:25 PM CST -----  Contact: Pt   Type:  RX Refill Request    Who Called: pt   Refill or New Rx:refill   RX Name and Strength: hydrocodone 10 mg   How is the patient currently taking it? (ex. 1XDay):3 times daily as needed  Is this a 30 day or 90 day RX: 90 pills per month   Preferred Pharmacy with phone number:[facility]  Local or Mail Order: local   Ordering Provider:william   Would the patient rather a call back or a response via [facility]? Phone   Best Call Back Number:[phone]  Additional Information:       [facility] [place] - [address]  [address]  [place] [address]  Phone: [phone] Fax: [phone]

## 2020-01-03 NOTE — TELEPHONE ENCOUNTER
----- Message from Jennifer De La Garza sent at 1/3/2020  9:14 AM CST -----  Patient needs call back rg medication..[phone] (home) [phone] (work)

## 2020-01-04 LAB
ALBUMIN SERPL BCP-MCNC: 3.6 G/DL (ref 3.5–5.2)
ALP SERPL-CCNC: 86 U/L (ref 55–135)
ALT SERPL W/O P-5'-P-CCNC: 21 U/L (ref 10–44)
ANION GAP SERPL CALC-SCNC: 12 MMOL/L (ref 8–16)
AST SERPL-CCNC: 22 U/L (ref 10–40)
BILIRUB SERPL-MCNC: 0.4 MG/DL (ref 0.1–1)
BUN SERPL-MCNC: 18 MG/DL (ref 8–23)
CALCIUM SERPL-MCNC: 9.9 MG/DL (ref 8.7–10.5)
CHLORIDE SERPL-SCNC: 106 MMOL/L (ref 95–110)
CO2 SERPL-SCNC: 23 MMOL/L (ref 23–29)
CREAT SERPL-MCNC: 1.7 MG/DL (ref 0.5–1.4)
EST. GFR  (AFRICAN AMERICAN): 48.9 ML/MIN/1.73 M^2
EST. GFR  (NON AFRICAN AMERICAN): 42.3 ML/MIN/1.73 M^2
FERRITIN SERPL-MCNC: 96 NG/ML (ref 20–300)
GLUCOSE SERPL-MCNC: 97 MG/DL (ref 70–110)
IRON SERPL-MCNC: 94 UG/DL (ref 45–160)
POTASSIUM SERPL-SCNC: 3.9 MMOL/L (ref 3.5–5.1)
PROT SERPL-MCNC: 8.2 G/DL (ref 6–8.4)
SATURATED IRON: 20 % (ref 20–50)
SODIUM SERPL-SCNC: 141 MMOL/L (ref 136–145)
TOTAL IRON BINDING CAPACITY: 477 UG/DL (ref 250–450)
TRANSFERRIN SERPL-MCNC: 322 MG/DL (ref 200–375)

## 2020-01-06 ENCOUNTER — CLINICAL SUPPORT (OUTPATIENT)
Dept: CARDIOLOGY | Facility: CLINIC | Age: 65
End: 2020-01-06
Payer: MEDICARE

## 2020-01-06 ENCOUNTER — OFFICE VISIT (OUTPATIENT)
Dept: CARDIOLOGY | Facility: CLINIC | Age: 65
End: 2020-01-06
Payer: MEDICARE

## 2020-01-06 VITALS
DIASTOLIC BLOOD PRESSURE: 62 MMHG | OXYGEN SATURATION: 97 % | HEIGHT: 70 IN | WEIGHT: 143 LBS | SYSTOLIC BLOOD PRESSURE: 112 MMHG | HEART RATE: 86 BPM | BODY MASS INDEX: 20.47 KG/M2

## 2020-01-06 DIAGNOSIS — E78.2 MIXED HYPERLIPIDEMIA: Chronic | ICD-10-CM

## 2020-01-06 DIAGNOSIS — Z87.891 HISTORY OF SMOKING: ICD-10-CM

## 2020-01-06 DIAGNOSIS — I20.9 AP (ANGINA PECTORIS): ICD-10-CM

## 2020-01-06 DIAGNOSIS — Z98.61 HISTORY OF PTCA: ICD-10-CM

## 2020-01-06 DIAGNOSIS — I34.0 NONRHEUMATIC MITRAL VALVE REGURGITATION: Chronic | ICD-10-CM

## 2020-01-06 DIAGNOSIS — I10 ESSENTIAL HYPERTENSION: ICD-10-CM

## 2020-01-06 DIAGNOSIS — I25.118 CORONARY ARTERY DISEASE OF NATIVE ARTERY OF NATIVE HEART WITH STABLE ANGINA PECTORIS: Primary | Chronic | ICD-10-CM

## 2020-01-06 DIAGNOSIS — R94.31 ABNORMAL ECG: ICD-10-CM

## 2020-01-06 PROCEDURE — 3078F DIAST BP <80 MM HG: CPT | Mod: HCNC,CPTII,S$GLB, | Performed by: INTERNAL MEDICINE

## 2020-01-06 PROCEDURE — 3074F SYST BP LT 130 MM HG: CPT | Mod: HCNC,CPTII,S$GLB, | Performed by: INTERNAL MEDICINE

## 2020-01-06 PROCEDURE — 3078F PR MOST RECENT DIASTOLIC BLOOD PRESSURE < 80 MM HG: ICD-10-PCS | Mod: HCNC,CPTII,S$GLB, | Performed by: INTERNAL MEDICINE

## 2020-01-06 PROCEDURE — 99214 OFFICE O/P EST MOD 30 MIN: CPT | Mod: HCNC,S$GLB,, | Performed by: INTERNAL MEDICINE

## 2020-01-06 PROCEDURE — 3074F PR MOST RECENT SYSTOLIC BLOOD PRESSURE < 130 MM HG: ICD-10-PCS | Mod: HCNC,CPTII,S$GLB, | Performed by: INTERNAL MEDICINE

## 2020-01-06 PROCEDURE — 3008F BODY MASS INDEX DOCD: CPT | Mod: HCNC,CPTII,S$GLB, | Performed by: INTERNAL MEDICINE

## 2020-01-06 PROCEDURE — 3008F PR BODY MASS INDEX (BMI) DOCUMENTED: ICD-10-PCS | Mod: HCNC,CPTII,S$GLB, | Performed by: INTERNAL MEDICINE

## 2020-01-06 PROCEDURE — 99214 PR OFFICE/OUTPT VISIT, EST, LEVL IV, 30-39 MIN: ICD-10-PCS | Mod: HCNC,S$GLB,, | Performed by: INTERNAL MEDICINE

## 2020-01-06 PROCEDURE — 99499 UNLISTED E&M SERVICE: CPT | Mod: HCNC,S$GLB,, | Performed by: INTERNAL MEDICINE

## 2020-01-06 PROCEDURE — 99499 RISK ADDL DX/OHS AUDIT: ICD-10-PCS | Mod: HCNC,S$GLB,, | Performed by: INTERNAL MEDICINE

## 2020-01-06 PROCEDURE — 99999 PR PBB SHADOW E&M-EST. PATIENT-LVL III: ICD-10-PCS | Mod: PBBFAC,HCNC,, | Performed by: INTERNAL MEDICINE

## 2020-01-06 PROCEDURE — 99999 PR PBB SHADOW E&M-EST. PATIENT-LVL III: CPT | Mod: PBBFAC,HCNC,, | Performed by: INTERNAL MEDICINE

## 2020-01-06 RX ORDER — LATANOPROST 50 UG/ML
SOLUTION/ DROPS OPHTHALMIC
Refills: 3 | COMMUNITY
Start: 2019-12-06

## 2020-01-06 NOTE — PROGRESS NOTES
Subjective:    Patient ID:  Joey Jacobo is a 64 y.o. male who presents for evaluation of Follow-up; Hypertension; Hyperlipidemia; Valvular Heart Disease; Coronary Artery Disease; and Risk Factor Management For Atherosclerosis      HPI Pt presents for eval.   His current medical conditions include CAD (h/o LCX PCI 2007), HTN, MVP/MR, dyslipidemia, COPD. Former smoker (quit 2018).  Past hx pertinent for following:  S/p LHC 2010, nonobstructive LCX disease. JOSÉ 2010 showed MVP/severe MR.   Echo 2/15 showed mod MR, normal LV function.    dx with metastatic prostate cancer, incurable per chart.  Echo 4/18 normal LVEF.  Stress MPI 4/18 no ischemia, normal EF.   Now here.  Last seen 2018.  H/o abnl ecg.  ECG today shows NSR, early repolarization.  No acute changes.   CAD seems stable.  He is not having any active anginal sxs on current medical tx.  Some very infrequent TORRES.  No pnd/orthopnea.  Rides bike daily.  Not smoking.   Lipids last 3/19 jumped up.  States he is taking his lipid meds.  States his cancer is doing good.  BP well controlled on current meds.  Compliant with meds.  Lost 10 pounds since I last saw him.        Current Outpatient Medications:     abiraterone (ZYTIGA) 250 mg Tab, Take 1 tablet (250 mg total) by mouth once daily After low fat breakfast., Disp: 30 tablet, Rfl: 5    aspirin (ECOTRIN) 81 MG EC tablet, Take by mouth. 1 Tablet, Delayed Release (E.C.) Oral Every day, Disp: , Rfl:     atorvastatin (LIPITOR) 40 MG tablet, Take 1 tablet (40 mg total) by mouth once daily., Disp: 90 tablet, Rfl: 3    budesonide-formoterol 160-4.5 mcg (SYMBICORT) 160-4.5 mcg/actuation HFAA, Inhale into the lungs., Disp: , Rfl:     calcium-vitamin D (CALCIUM 600 + D,3,) 600 mg(1,500mg) -400 unit Tab, Take by mouth. 1 Tablet Oral Twice a day, Disp: , Rfl:     docusate sodium (COLACE) 100 MG capsule, Take 1 capsule (100 mg total) by mouth 2 (two) times daily., Disp: 60 capsule, Rfl: 1    escitalopram oxalate  (LEXAPRO) 10 MG tablet, Take 1 tablet (10 mg total) by mouth once daily., Disp: 30 tablet, Rfl: 3    ezetimibe (ZETIA) 10 mg tablet, Take 1 tablet (10 mg total) by mouth once daily., Disp: 90 tablet, Rfl: 3    fluticasone (FLONASE) 50 mcg/actuation nasal spray, 1 spray by Each Nare route once daily. (Patient taking differently: 1 spray by Each Nare route daily as needed. ), Disp: 1 Bottle, Rfl: 11    fluticasone-salmeterol (ADVAIR HFA) 115-21 mcg/actuation HFAA, Inhale 2 puffs into the lungs., Disp: , Rfl:     gabapentin (NEURONTIN) 300 MG capsule, Take 300 mg by mouth., Disp: , Rfl:     HYDROcodone-acetaminophen (NORCO)  mg per tablet, Take 1 tablet by mouth every 6 (six) hours as needed for Pain., Disp: 90 tablet, Rfl: 0    metoprolol succinate (TOPROL-XL) 50 MG 24 hr tablet, TAKE ONE TABLET DAILY, Disp: 30 tablet, Rfl: 4    nitroGLYCERIN (NITROSTAT) 0.4 MG SL tablet, Place 1 tablet (0.4 mg total) under the tongue every 5 (five) minutes as needed for Chest pain., Disp: 20 tablet, Rfl: 12    pantoprazole (PROTONIX) 40 MG tablet, Take 1 tablet (40 mg total) by mouth once daily., Disp: 90 tablet, Rfl: 3    predniSONE (DELTASONE) 5 MG tablet, Take 1 tablet (5 mg total) by mouth 2 (two) times daily., Disp: 180 tablet, Rfl: 1    travoprost (TRAVATAN Z) 0.004 % Drop, Place 1 drop into both eyes every evening. 1 Drops Ophthalmic At bedtime, Disp: 5 mL, Rfl: 12    latanoprost 0.005 % ophthalmic solution, , Disp: , Rfl: 3    Current Facility-Administered Medications:     leuprolide (LUPRON) injection 22.5 mg, 22.5 mg, Intramuscular, Q90 Days, Roland Dawn IV, MD, 22.5 mg at 12/11/19 1330      Review of Systems   Constitution: Positive for weight loss.   HENT: Negative.    Eyes: Negative.    Cardiovascular: Positive for dyspnea on exertion.   Respiratory: Positive for shortness of breath.    Endocrine: Negative.    Hematologic/Lymphatic: Negative.    Skin: Negative.    Musculoskeletal: Negative.   "  Gastrointestinal: Negative.    Genitourinary: Negative.    Neurological: Negative.    Psychiatric/Behavioral: Negative.    Allergic/Immunologic: Negative.        /62 (BP Location: Left arm, Patient Position: Sitting, BP Method: Large (Manual))   Pulse 86   Ht 5' 10" (1.778 m)   Wt 64.9 kg (143 lb)   SpO2 97%   BMI 20.52 kg/m²     Wt Readings from Last 3 Encounters:   01/06/20 64.9 kg (143 lb)   12/11/19 67.5 kg (148 lb 13 oz)   11/11/19 67.1 kg (147 lb 14.9 oz)     Temp Readings from Last 3 Encounters:   11/11/19 97.1 °F (36.2 °C) (Oral)   11/05/19 97.4 °F (36.3 °C) (Tympanic)   08/26/19 97.6 °F (36.4 °C) (Oral)     BP Readings from Last 3 Encounters:   01/06/20 112/62   12/11/19 124/78   11/11/19 118/78     Pulse Readings from Last 3 Encounters:   01/06/20 86   12/11/19 81   11/11/19 80          Objective:    Physical Exam   Constitutional: He is oriented to person, place, and time. He appears well-developed and well-nourished.   HENT:   Head: Normocephalic.   Neck: Normal range of motion. Neck supple. Normal carotid pulses, no hepatojugular reflux and no JVD present. Carotid bruit is not present. No thyromegaly present.   Cardiovascular: Normal rate, regular rhythm, S1 normal and S2 normal. PMI is not displaced. Exam reveals no S3, no S4, no distant heart sounds, no friction rub, no midsystolic click and no opening snap.   No murmur heard.  Pulses:       Radial pulses are 2+ on the right side, and 2+ on the left side.   Pulmonary/Chest: Effort normal and breath sounds normal. He has no wheezes. He has no rales.   Abdominal: Soft. Bowel sounds are normal. He exhibits no distension, no abdominal bruit, no ascites and no mass. There is no tenderness.   Musculoskeletal: He exhibits no edema.   Neurological: He is alert and oriented to person, place, and time.   Skin: Skin is warm.   Psychiatric: He has a normal mood and affect. His behavior is normal.   Nursing note and vitals reviewed.      I have " reviewed all pertinent labs and cardiac studies.      Chemistry        Component Value Date/Time     01/03/2020 1234    K 3.9 01/03/2020 1234     01/03/2020 1234    CO2 23 01/03/2020 1234    BUN 18 01/03/2020 1234    CREATININE 1.7 (H) 01/03/2020 1234    GLU 97 01/03/2020 1234        Component Value Date/Time    CALCIUM 9.9 01/03/2020 1234    ALKPHOS 86 01/03/2020 1234    AST 22 01/03/2020 1234    ALT 21 01/03/2020 1234    BILITOT 0.4 01/03/2020 1234    ESTGFRAFRICA 48.9 (A) 01/03/2020 1234    EGFRNONAA 42.3 (A) 01/03/2020 1234        Lab Results   Component Value Date    WBC 9.23 01/03/2020    HGB 13.2 (L) 01/03/2020    HCT 43.8 01/03/2020    MCV 97 01/03/2020     01/03/2020       No results found for: LABA1C, HGBA1C  Lab Results   Component Value Date    CHOL 230 (H) 03/04/2019    CHOL 178 02/22/2017    CHOL 207 (H) 02/23/2016     Lab Results   Component Value Date    HDL 55 03/04/2019    HDL 49 02/22/2017    HDL 51 02/23/2016     Lab Results   Component Value Date    LDLCALC 153.6 03/04/2019    LDLCALC 108.4 02/22/2017    LDLCALC 134.4 02/23/2016     Lab Results   Component Value Date    TRIG 107 03/04/2019    TRIG 103 02/22/2017    TRIG 108 02/23/2016     Lab Results   Component Value Date    CHOLHDL 23.9 03/04/2019    CHOLHDL 27.5 02/22/2017    CHOLHDL 24.6 02/23/2016           Assessment:       1. Coronary artery disease of native artery of native heart with stable angina pectoris    2. Abnormal ECG    3. Essential hypertension    4. History of PTCA    5. Nonrheumatic mitral valve regurgitation    6. Mixed hyperlipidemia    7. AP (angina pectoris)    8. History of smoking         Plan:             Stable cardiovascular conditions at present time on current medical treatment.  Reviewed all tests and above medical conditions with patient in detail and formulated treatment plan.  Continue optimal medical treatment for CAD/cardiovascular conditions.  Cardiac low salt diet discussed.  Daily  exercise encouraged, goal 30 +  minutes aerobic exercise as tolerated.  Maintaining healthy weight and weight loss goals (if needed) were discussed in clinic.  Pt counseled on need to lower lipids to goal.  Continue Atorvastatin, Zetia.  Recheck lipids -- phone review.  Consider Repatha if still elevated.  Continue to refrain from smoking.  No changes in meds today.  F/u in 6 months.

## 2020-01-15 ENCOUNTER — LAB VISIT (OUTPATIENT)
Dept: LAB | Facility: HOSPITAL | Age: 65
End: 2020-01-15
Attending: INTERNAL MEDICINE
Payer: MEDICARE

## 2020-01-15 DIAGNOSIS — E78.2 MIXED HYPERLIPIDEMIA: Chronic | ICD-10-CM

## 2020-01-15 PROCEDURE — 80061 LIPID PANEL: CPT | Mod: HCNC

## 2020-01-15 PROCEDURE — 80053 COMPREHEN METABOLIC PANEL: CPT | Mod: HCNC

## 2020-01-15 PROCEDURE — 36415 COLL VENOUS BLD VENIPUNCTURE: CPT | Mod: HCNC,PO

## 2020-01-16 LAB
ALBUMIN SERPL BCP-MCNC: 3.5 G/DL (ref 3.5–5.2)
ALP SERPL-CCNC: 75 U/L (ref 55–135)
ALT SERPL W/O P-5'-P-CCNC: 23 U/L (ref 10–44)
ANION GAP SERPL CALC-SCNC: 10 MMOL/L (ref 8–16)
AST SERPL-CCNC: 23 U/L (ref 10–40)
BILIRUB SERPL-MCNC: 0.3 MG/DL (ref 0.1–1)
BUN SERPL-MCNC: 15 MG/DL (ref 8–23)
CALCIUM SERPL-MCNC: 9.3 MG/DL (ref 8.7–10.5)
CHLORIDE SERPL-SCNC: 102 MMOL/L (ref 95–110)
CHOLEST SERPL-MCNC: 160 MG/DL (ref 120–199)
CHOLEST/HDLC SERPL: 2.5 {RATIO} (ref 2–5)
CO2 SERPL-SCNC: 27 MMOL/L (ref 23–29)
CREAT SERPL-MCNC: 1.1 MG/DL (ref 0.5–1.4)
EST. GFR  (AFRICAN AMERICAN): >60 ML/MIN/1.73 M^2
EST. GFR  (NON AFRICAN AMERICAN): >60 ML/MIN/1.73 M^2
GLUCOSE SERPL-MCNC: 91 MG/DL (ref 70–110)
HDLC SERPL-MCNC: 65 MG/DL (ref 40–75)
HDLC SERPL: 40.6 % (ref 20–50)
LDLC SERPL CALC-MCNC: 75.8 MG/DL (ref 63–159)
NONHDLC SERPL-MCNC: 95 MG/DL
POTASSIUM SERPL-SCNC: 3.7 MMOL/L (ref 3.5–5.1)
PROT SERPL-MCNC: 7.8 G/DL (ref 6–8.4)
SODIUM SERPL-SCNC: 139 MMOL/L (ref 136–145)
TRIGL SERPL-MCNC: 96 MG/DL (ref 30–150)

## 2020-01-17 ENCOUNTER — TELEPHONE (OUTPATIENT)
Dept: PHARMACY | Facility: CLINIC | Age: 65
End: 2020-01-17

## 2020-01-18 ENCOUNTER — TELEPHONE (OUTPATIENT)
Dept: CARDIOLOGY | Facility: CLINIC | Age: 65
End: 2020-01-18

## 2020-01-18 NOTE — TELEPHONE ENCOUNTER
Please call patient about test results.  All test results reviewed and show stable findings.  No new problems noted.  Continue current medical treatment and f/u next scheduled appointment.    Dr. Islas

## 2020-01-20 ENCOUNTER — TELEPHONE (OUTPATIENT)
Dept: CARDIOLOGY | Facility: CLINIC | Age: 65
End: 2020-01-20

## 2020-01-20 NOTE — TELEPHONE ENCOUNTER
Spoke with patient to advise him that All test results reviewed and show stable findings.  No new problems noted.  Continue current medical treatment and f/u next scheduled appointment.  Denies questions/concerns.

## 2020-02-03 DIAGNOSIS — C79.51 SECONDARY ADENOCARCINOMA OF SKELETAL BONE: ICD-10-CM

## 2020-02-03 DIAGNOSIS — G89.3 NEOPLASM RELATED PAIN: ICD-10-CM

## 2020-02-03 DIAGNOSIS — C61 PROSTATE CANCER: ICD-10-CM

## 2020-02-03 RX ORDER — HYDROCODONE BITARTRATE AND ACETAMINOPHEN 10; 325 MG/1; MG/1
1 TABLET ORAL EVERY 6 HOURS PRN
Qty: 90 TABLET | Refills: 0 | Status: SHIPPED | OUTPATIENT
Start: 2020-02-03 | End: 2020-03-02 | Stop reason: SDUPTHER

## 2020-02-10 ENCOUNTER — TELEPHONE (OUTPATIENT)
Dept: PHARMACY | Facility: CLINIC | Age: 65
End: 2020-02-10

## 2020-02-10 NOTE — TELEPHONE ENCOUNTER
"Dosing, how taking Zytiga: Takes 1 tablet (250mg) by mouth once daily with low fat diet (again discussed what "low fat" means and provided examples). Denies any missed doses. Taking prednisone 5mg BID.   Storage: room temperature.   Handling: aware of proper handling precautions and tries to not touch tablets directly. If he accidentally touches the pills, he is aware to wash hands immediately after.   Side effects: Overall has been tolerating Zytiga very well with minimal side effects. Notes some hot flashes - come every now and then, but it is tolerable at this time. BP: last time at cardiologist 112/62  Recent infections: sick with the weather. No fever, chills.   Pain: 0/10 - sometimes some aches and pains but not every day.   Appetite: eats too much - weight stable  Energy, fatigue:  trash in the yard and tries to stay active. No issues completing normal daily activities.   Health, mood, QOL: 10/10 - Son was killed recently and was not coping with it very well. Starting depression medication (lexapro). He has since stopped the lexapro since it was making him sick. Now he is seeing a counselor and this has helped. He feels much better.  ED/UC visits: no  Next clinical follow up: 3 months  Medication list reviewed. No new allergies or health conditions.  Notes: discussed with him that his insurance is requiring that he fill generic. We discussed this at follow up call today and he confirmed understanding. He is aware to reach out to [facility] if he has any questions or concerns.     Labs reviewed from:  11/12/19: PSA < 0.01 - effective  Labs reviewed from 1/3/20: CBC - stable (RBC slightly decreased - no intervention needed). CMP - no dosage adjustments needed for renal or hepatic function. Cr elevated at 1.7      Refill: Says that he has enough until the next 2 weeks. Will reach out later this week for refills for his Zytiga.   "

## 2020-02-12 DIAGNOSIS — G89.3 NEOPLASM RELATED PAIN: ICD-10-CM

## 2020-02-12 DIAGNOSIS — C79.51 SECONDARY ADENOCARCINOMA OF SKELETAL BONE: ICD-10-CM

## 2020-02-12 DIAGNOSIS — C61 PROSTATE CANCER: ICD-10-CM

## 2020-02-12 DIAGNOSIS — C61 PROSTATE CANCER: Primary | ICD-10-CM

## 2020-02-12 RX ORDER — ABIRATERONE 500 MG/1
1000 TABLET ORAL DAILY
Qty: 90 TABLET | Refills: 2 | Status: SHIPPED | OUTPATIENT
Start: 2020-02-12 | End: 2020-02-14 | Stop reason: CLARIF

## 2020-02-13 DIAGNOSIS — C61 PROSTATE CANCER: Primary | ICD-10-CM

## 2020-02-14 ENCOUNTER — TELEPHONE (OUTPATIENT)
Dept: PHARMACY | Facility: CLINIC | Age: 65
End: 2020-02-14

## 2020-02-14 NOTE — TELEPHONE ENCOUNTER
Call attempt #1: Received new script for generic Zytiga (abiraterone) 250mg: Take 4 tablets once daily on an empty stomach. LVM - Need to discuss that patient will now switch to generic, review over new directions, and see if he is in need of a refill at this time.

## 2020-02-18 NOTE — TELEPHONE ENCOUNTER
Contacted patient for Zytiga refill and to discuss new directions for Zytiga. Patient currently takes Zytiga 250 mg - 1 tablet daily with a low fat breakfast. He has about 4-5 days remaining. He will continue taking this way until he finishes the remaining tablets and then proceed with the new directions Zytiga 250 - 4 tablets QD on an empty stomach (1 hr before or 2 hrs after meals). Informed patient insurance is requiring we now fill generic Zytiga, which he states he was already aware. He denies missed doses. Will ship medication on 2/19 for him to receive on 2/20. $3.60 copay ([place]). Address confirmed. No changes in medications, allergies, or health conditions. He has no questions or concerns at this time. He is aware to contact [facility] if he needs anything.

## 2020-02-19 DIAGNOSIS — E78.2 MIXED HYPERLIPIDEMIA: ICD-10-CM

## 2020-02-19 DIAGNOSIS — E78.2 MIXED HYPERLIPIDEMIA: Chronic | ICD-10-CM

## 2020-02-19 DIAGNOSIS — I25.10 CORONARY ARTERY DISEASE INVOLVING NATIVE CORONARY ARTERY WITHOUT ANGINA PECTORIS, UNSPECIFIED WHETHER NATIVE OR TRANSPLANTED HEART: ICD-10-CM

## 2020-02-19 RX ORDER — ATORVASTATIN CALCIUM 40 MG/1
TABLET, FILM COATED ORAL
Qty: 90 TABLET | Refills: 3 | Status: SHIPPED | OUTPATIENT
Start: 2020-02-19 | End: 2021-03-08

## 2020-02-19 RX ORDER — EZETIMIBE 10 MG/1
TABLET ORAL
Qty: 30 TABLET | Refills: 0 | Status: SHIPPED | OUTPATIENT
Start: 2020-02-19 | End: 2020-03-31

## 2020-02-19 RX ORDER — METOPROLOL SUCCINATE 50 MG/1
TABLET, EXTENDED RELEASE ORAL
Qty: 30 TABLET | Refills: 4 | Status: SHIPPED | OUTPATIENT
Start: 2020-02-19 | End: 2020-04-27 | Stop reason: SDUPTHER

## 2020-02-28 DIAGNOSIS — G89.3 NEOPLASM RELATED PAIN: ICD-10-CM

## 2020-02-28 DIAGNOSIS — C79.51 SECONDARY ADENOCARCINOMA OF SKELETAL BONE: ICD-10-CM

## 2020-02-28 DIAGNOSIS — C61 PROSTATE CANCER: ICD-10-CM

## 2020-02-28 RX ORDER — HYDROCODONE BITARTRATE AND ACETAMINOPHEN 10; 325 MG/1; MG/1
1 TABLET ORAL EVERY 6 HOURS PRN
Qty: 90 TABLET | Refills: 0 | OUTPATIENT
Start: 2020-02-28

## 2020-03-02 DIAGNOSIS — G89.3 NEOPLASM RELATED PAIN: ICD-10-CM

## 2020-03-02 DIAGNOSIS — C61 PROSTATE CANCER: ICD-10-CM

## 2020-03-02 DIAGNOSIS — C79.51 SECONDARY ADENOCARCINOMA OF SKELETAL BONE: ICD-10-CM

## 2020-03-02 RX ORDER — HYDROCODONE BITARTRATE AND ACETAMINOPHEN 10; 325 MG/1; MG/1
1 TABLET ORAL EVERY 6 HOURS PRN
Qty: 90 TABLET | Refills: 0 | Status: SHIPPED | OUTPATIENT
Start: 2020-03-02 | End: 2020-03-24 | Stop reason: SDUPTHER

## 2020-03-02 NOTE — TELEPHONE ENCOUNTER
norco refill request sent to [facility]. Pt called and stated he's leaving for vacation today and request refill 1 day early. Dr. Mahoney notified.

## 2020-03-05 ENCOUNTER — LAB VISIT (OUTPATIENT)
Dept: LAB | Facility: HOSPITAL | Age: 65
End: 2020-03-05
Attending: UROLOGY
Payer: MEDICARE

## 2020-03-05 DIAGNOSIS — C61 PROSTATE CANCER: ICD-10-CM

## 2020-03-05 PROCEDURE — 84153 ASSAY OF PSA TOTAL: CPT | Mod: HCNC

## 2020-03-05 PROCEDURE — 36415 COLL VENOUS BLD VENIPUNCTURE: CPT | Mod: HCNC,PO

## 2020-03-06 LAB — COMPLEXED PSA SERPL-MCNC: <0.01 NG/ML (ref 0–4)

## 2020-03-10 ENCOUNTER — PATIENT OUTREACH (OUTPATIENT)
Dept: ADMINISTRATIVE | Facility: OTHER | Age: 65
End: 2020-03-10

## 2020-03-11 ENCOUNTER — OFFICE VISIT (OUTPATIENT)
Dept: UROLOGY | Facility: CLINIC | Age: 65
End: 2020-03-11
Payer: MEDICARE

## 2020-03-11 VITALS
WEIGHT: 154.31 LBS | TEMPERATURE: 98 F | BODY MASS INDEX: 22.14 KG/M2 | DIASTOLIC BLOOD PRESSURE: 78 MMHG | SYSTOLIC BLOOD PRESSURE: 108 MMHG

## 2020-03-11 DIAGNOSIS — C61 PROSTATE CANCER: Primary | ICD-10-CM

## 2020-03-11 PROCEDURE — 3074F PR MOST RECENT SYSTOLIC BLOOD PRESSURE < 130 MM HG: ICD-10-PCS | Mod: HCNC,CPTII,S$GLB, | Performed by: UROLOGY

## 2020-03-11 PROCEDURE — 3078F PR MOST RECENT DIASTOLIC BLOOD PRESSURE < 80 MM HG: ICD-10-PCS | Mod: HCNC,CPTII,S$GLB, | Performed by: UROLOGY

## 2020-03-11 PROCEDURE — 99999 PR PBB SHADOW E&M-EST. PATIENT-LVL III: ICD-10-PCS | Mod: PBBFAC,HCNC,, | Performed by: UROLOGY

## 2020-03-11 PROCEDURE — 3078F DIAST BP <80 MM HG: CPT | Mod: HCNC,CPTII,S$GLB, | Performed by: UROLOGY

## 2020-03-11 PROCEDURE — 99214 PR OFFICE/OUTPT VISIT, EST, LEVL IV, 30-39 MIN: ICD-10-PCS | Mod: HCNC,S$GLB,, | Performed by: UROLOGY

## 2020-03-11 PROCEDURE — 99214 OFFICE O/P EST MOD 30 MIN: CPT | Mod: HCNC,S$GLB,, | Performed by: UROLOGY

## 2020-03-11 PROCEDURE — 3008F BODY MASS INDEX DOCD: CPT | Mod: HCNC,CPTII,S$GLB, | Performed by: UROLOGY

## 2020-03-11 PROCEDURE — 96402 PR CHEMOTHER HORMON ANTINEOPL SUB-Q/IM: ICD-10-PCS | Mod: HCNC,S$GLB,, | Performed by: UROLOGY

## 2020-03-11 PROCEDURE — 3008F PR BODY MASS INDEX (BMI) DOCUMENTED: ICD-10-PCS | Mod: HCNC,CPTII,S$GLB, | Performed by: UROLOGY

## 2020-03-11 PROCEDURE — 3074F SYST BP LT 130 MM HG: CPT | Mod: HCNC,CPTII,S$GLB, | Performed by: UROLOGY

## 2020-03-11 PROCEDURE — 96402 CHEMO HORMON ANTINEOPL SQ/IM: CPT | Mod: HCNC,S$GLB,, | Performed by: UROLOGY

## 2020-03-11 PROCEDURE — 99999 PR PBB SHADOW E&M-EST. PATIENT-LVL III: CPT | Mod: PBBFAC,HCNC,, | Performed by: UROLOGY

## 2020-03-11 NOTE — PROGRESS NOTES
"Chief Complaint: Prostate Cancer    HPI:   3/11/20: Lupron due today; will switch to 6 mo prep. Reviewed history in detail. Very active with outside work.  19:  Lupron shot today.  Reviewed history in detail. Currently on dual therapy with abariterone.   19: Lupron shot today.  Reviewed history in detail. Currently on dual therapy with abariterone.   19: Lupron shot today.  Reviewed history in detail. Currently on dual therapy with abariterone.  19: Son  in an MVA since last visit. Reviewed history in detail.  No problems from Lupron.  18: No problems from Lupron.  PSA lowest.  18: PSA today and again 3 mo.  Doing fine.  Reviewed history in detail.  18: PSA well down.  No adverse effects from Lupron.    10/18/17: Been on casodex a month back to review and start Lupron.  Dr. Moscoso felt XRT was not an option but perhaps chemotherapy could be beneficial.  17: Bone scan reports "RIGHT supraorbital location and a smaller similarly extremely intense focus of increased uptake posteriorly on the RIGHT at the T9 level.  Etiology is uncertain."  CT scan shows "A few scattered shotty mesenteric and retroperitoneal nodes identified.  Similar nodes identified within the pelvis bilaterally."  MRI shows left 2.8 cm prostate mass PIRADS 5, with metastatic pelvic lymphadenopathy (right pelvic sidewall node and left internal iliac chain LN).  No bony mets in pelvis.  17:  No problems from biopsy.  Gl 4+5 in the left base (30%) and a sig amount of 4+4 in other parts of the gland.  Reviewed treatment options, and imaging needs.  7/3/17: TRUS/Bx today 27 gm.  17: 63 yo man has problems with perineal/scrotal pain once or twice a month; lasts for an hour or two, some nocturia.  PSA recently elevated.  No abd/pelvic pain and no exac/rel factors.  No hematuria.  No urolithiasis.  No urinary bother.  No  history. CT with contrast earlier this year essentially " normal.    Allergies:  Avelox  [moxifloxacin] and Nsaids (non-steroidal anti-inflammatory drug)    Medications:  has a current medication list which includes the following prescription(s): abiraterone, aspirin, atorvastatin, budesonide-formoterol 160-4.5 mcg, calcium-vitamin d, docusate sodium, ezetimibe, fluticasone propionate, fluticasone propion-salmeterol 115-21 mcg/dose, gabapentin, hydrocodone-acetaminophen, latanoprost, metoprolol succinate, prednisone, travoprost, escitalopram oxalate, nitroglycerin, and pantoprazole, and the following Facility-Administered Medications: leuprolide.    Review of Systems:  General: No fever, chills, fatigability, or weight loss.  Skin: No rashes, itching, or changes in color or texture of skin.  Chest: Denies TORRES, cyanosis, wheezing, cough, and sputum production.  Abdomen: Appetite fine. No weight loss. Denies diarrhea, abdominal pain, hematemesis, or blood in stool.  Musculoskeletal: No joint stiffness or swelling. Denies back pain.  : As above.  All other review of systems negative.    PMH:   has a past medical history of COPD (chronic obstructive pulmonary disease) (7/30/2013), Coronary artery disease (7/30/2013), Emphysema of lung, Essential hypertension (10/29/2018), Glaucoma (increased eye pressure) (8/27/2013), Headache(784.0), Mitral regurgitation (7/30/2013), Mixed hyperlipidemia (7/30/2013), Neuropathy, Osteoarthritis of spine with radiculopathy, lumbar region (7/21/2015), Other and unspecified hyperlipidemia, and Pulmonary fibrosis (10/3/2017).    PSH:   has a past surgical history that includes Coronary stent placement; Colonoscopy (N/A, 3/21/2016); Shoulder arthroscopy (Left); and Spine surgery.    FamHx: family history includes Blindness in his maternal grandmother; Cancer in his father; Cataracts in his mother; Diabetes in his sister, sister, and sister; Hypertension in his sister, sister, sister, sister, and sister; Kidney disease in his mother.    SocHx:   reports that he quit smoking about 18 months ago. His smoking use included cigarettes. He has a 6.25 pack-year smoking history. He has never used smokeless tobacco. He reports that he drinks alcohol. He reports that he does not use drugs.      Physical Exam:  Vitals:    03/11/20 1418   BP: 108/78   Temp: 98.4 °F (36.9 °C)     General: A&Ox3, no apparent distress, no deformities  Neck: No masses, normal thyroid  Lungs: normal inspiration, no use of accessory muscles  Heart: normal pulse, no arrhythmias  Abdomen: Soft, NT, ND  Skin: The skin is warm and dry. No jaundice.  Ext: No c/c/e.  :     7/3/17: Test desc lucas, no abnormalities of epididymus. Penis normal, with normal penile and scrotal skin. Meatus normal. Normal rectal tone, no hemorrhoids. Prost 40 gm no nodules or masses appreciated. SV not palpable. Perineum and anus normal.    Labs/Studies:   PSA    2/16: 22.1    2/17: 22.9    1/18: 0.24    7/18: 0.01    1/19, 4/19, 8/19, 11/19, 3/20: undetect    Impression/Plan:   1. Lupron today and q6mo.  PSA/RTC 6 mo.

## 2020-03-11 NOTE — PROGRESS NOTES
1) I will send NPH insulin to [facility] to buy one vial of insulin and you will take 10 units every morning. See if this dose keeps your sugars under 200 before breakfast.  If so, then this is the right dose. If after 4 days your sugars are staying over 200 in the morning, then go up by 2 units every 4 days as needed to get to the dose that keeps you under 200. 
 
2) Stop using the Novolin R on the scale for now. 3) Just take metformin 1 tab with breakfast and dinner. 4) Bring a list of her blood sugars readings in the morning and what she ate the night before. Patient in today for Lupron injection.  Donned PPE per protocol. Site cleaned with alcohol wipe, 45 mg Lupron adm IM to Right ventrogluteal.  Pt tolerated procedure well.  Pt was asked to remain in room for 15 minutes for observation. PPE removed prior to leaving room.

## 2020-03-17 ENCOUNTER — TELEPHONE (OUTPATIENT)
Dept: PHARMACY | Facility: CLINIC | Age: 65
End: 2020-03-17

## 2020-03-17 NOTE — TELEPHONE ENCOUNTER
Refill call regarding Zytiga at [facility]. Will prepare for shipment with consent of patient on 3/19 to arrive 3/20. Copay 3.60 [place]. Patient has not started any new medications including OTC drugs. Patient has not had any medication/ dose or instruction changes. No new allergies or side effects reported with this shipment. Medication is being taken as prescribed by physician and properly stored. Two patient identifiers:  and Address verified. Patient has a 7 day supply on hand.

## 2020-03-24 DIAGNOSIS — C61 PROSTATE CANCER: ICD-10-CM

## 2020-03-24 DIAGNOSIS — C79.51 SECONDARY ADENOCARCINOMA OF SKELETAL BONE: ICD-10-CM

## 2020-03-24 DIAGNOSIS — G89.3 NEOPLASM RELATED PAIN: ICD-10-CM

## 2020-03-25 ENCOUNTER — LAB VISIT (OUTPATIENT)
Dept: LAB | Facility: HOSPITAL | Age: 65
End: 2020-03-25
Attending: PHYSICIAN ASSISTANT
Payer: MEDICARE

## 2020-03-25 DIAGNOSIS — C61 PROSTATE CANCER: ICD-10-CM

## 2020-03-25 LAB
ALBUMIN SERPL BCP-MCNC: 3.4 G/DL (ref 3.5–5.2)
ALP SERPL-CCNC: 79 U/L (ref 55–135)
ALT SERPL W/O P-5'-P-CCNC: 23 U/L (ref 10–44)
ANION GAP SERPL CALC-SCNC: 10 MMOL/L (ref 8–16)
AST SERPL-CCNC: 25 U/L (ref 10–40)
BASOPHILS # BLD AUTO: 0.05 K/UL (ref 0–0.2)
BASOPHILS NFR BLD: 0.5 % (ref 0–1.9)
BILIRUB SERPL-MCNC: 0.4 MG/DL (ref 0.1–1)
BUN SERPL-MCNC: 11 MG/DL (ref 8–23)
CALCIUM SERPL-MCNC: 9.3 MG/DL (ref 8.7–10.5)
CHLORIDE SERPL-SCNC: 102 MMOL/L (ref 95–110)
CO2 SERPL-SCNC: 26 MMOL/L (ref 23–29)
CREAT SERPL-MCNC: 1.2 MG/DL (ref 0.5–1.4)
DIFFERENTIAL METHOD: ABNORMAL
EOSINOPHIL # BLD AUTO: 0.1 K/UL (ref 0–0.5)
EOSINOPHIL NFR BLD: 1.1 % (ref 0–8)
ERYTHROCYTE [DISTWIDTH] IN BLOOD BY AUTOMATED COUNT: 14.8 % (ref 11.5–14.5)
EST. GFR  (AFRICAN AMERICAN): >60 ML/MIN/1.73 M^2
EST. GFR  (NON AFRICAN AMERICAN): >60 ML/MIN/1.73 M^2
GLUCOSE SERPL-MCNC: 97 MG/DL (ref 70–110)
HCT VFR BLD AUTO: 41.4 % (ref 40–54)
HGB BLD-MCNC: 12.3 G/DL (ref 14–18)
IMM GRANULOCYTES # BLD AUTO: 0.1 K/UL (ref 0–0.04)
IMM GRANULOCYTES NFR BLD AUTO: 1 % (ref 0–0.5)
LYMPHOCYTES # BLD AUTO: 3.9 K/UL (ref 1–4.8)
LYMPHOCYTES NFR BLD: 36.7 % (ref 18–48)
MCH RBC QN AUTO: 29.8 PG (ref 27–31)
MCHC RBC AUTO-ENTMCNC: 29.7 G/DL (ref 32–36)
MCV RBC AUTO: 100 FL (ref 82–98)
MONOCYTES # BLD AUTO: 1 K/UL (ref 0.3–1)
MONOCYTES NFR BLD: 9.3 % (ref 4–15)
NEUTROPHILS # BLD AUTO: 5.4 K/UL (ref 1.8–7.7)
NEUTROPHILS NFR BLD: 51.4 % (ref 38–73)
NRBC BLD-RTO: 0 /100 WBC
PLATELET # BLD AUTO: 201 K/UL (ref 150–350)
PMV BLD AUTO: 11.3 FL (ref 9.2–12.9)
POTASSIUM SERPL-SCNC: 3.8 MMOL/L (ref 3.5–5.1)
PROT SERPL-MCNC: 8.1 G/DL (ref 6–8.4)
RBC # BLD AUTO: 4.13 M/UL (ref 4.6–6.2)
SODIUM SERPL-SCNC: 138 MMOL/L (ref 136–145)
WBC # BLD AUTO: 10.52 K/UL (ref 3.9–12.7)

## 2020-03-25 PROCEDURE — 85025 COMPLETE CBC W/AUTO DIFF WBC: CPT | Mod: HCNC

## 2020-03-25 PROCEDURE — 80053 COMPREHEN METABOLIC PANEL: CPT | Mod: HCNC

## 2020-03-25 PROCEDURE — 36415 COLL VENOUS BLD VENIPUNCTURE: CPT | Mod: HCNC,PO

## 2020-03-25 RX ORDER — HYDROCODONE BITARTRATE AND ACETAMINOPHEN 10; 325 MG/1; MG/1
1 TABLET ORAL EVERY 6 HOURS PRN
Qty: 90 TABLET | Refills: 0 | Status: SHIPPED | OUTPATIENT
Start: 2020-03-25 | End: 2020-04-15 | Stop reason: SDUPTHER

## 2020-03-31 DIAGNOSIS — E78.2 MIXED HYPERLIPIDEMIA: Chronic | ICD-10-CM

## 2020-03-31 RX ORDER — EZETIMIBE 10 MG/1
TABLET ORAL
Qty: 30 TABLET | Refills: 0 | Status: SHIPPED | OUTPATIENT
Start: 2020-03-31 | End: 2020-04-01

## 2020-04-01 DIAGNOSIS — E78.2 MIXED HYPERLIPIDEMIA: Chronic | ICD-10-CM

## 2020-04-01 RX ORDER — EZETIMIBE 10 MG/1
TABLET ORAL
Qty: 30 TABLET | Refills: 0 | Status: SHIPPED | OUTPATIENT
Start: 2020-04-01 | End: 2020-04-27 | Stop reason: SDUPTHER

## 2020-04-15 ENCOUNTER — TELEPHONE (OUTPATIENT)
Dept: PHARMACY | Facility: CLINIC | Age: 65
End: 2020-04-15

## 2020-04-15 DIAGNOSIS — G89.3 NEOPLASM RELATED PAIN: ICD-10-CM

## 2020-04-15 DIAGNOSIS — C79.51 SECONDARY ADENOCARCINOMA OF SKELETAL BONE: ICD-10-CM

## 2020-04-15 DIAGNOSIS — C61 PROSTATE CANCER: ICD-10-CM

## 2020-04-15 RX ORDER — HYDROCODONE BITARTRATE AND ACETAMINOPHEN 10; 325 MG/1; MG/1
1 TABLET ORAL EVERY 6 HOURS PRN
Qty: 90 TABLET | Refills: 0 | Status: SHIPPED | OUTPATIENT
Start: 2020-04-15 | End: 2020-05-13 | Stop reason: SDUPTHER

## 2020-04-15 NOTE — TELEPHONE ENCOUNTER
----- Message from Marco Andrews sent at 4/15/2020 10:58 AM CDT -----  Contact: pt   .Type:  RX Refill Request    Who Called pt   Refill or New Rx: refill   RX Name and Strength: HYDROcodone-acetaminophen (NORCO)  mg per tablet  How is the patient currently taking it? (ex. 1XDay): as needed   Is this a 30 day or 90 day RX not sure   Preferred Pharm[facility] with phone number:[email or web address]   Local or Mail Order: local   Ordering Provider: angie   Would the patient rather a call back or a response via [facility]? Callback   Best Call Back Number: .[phone] (home) [phone] (work)   Additional Information:           [facility] [place] - [address]  [address]  [place] [address]  Phone: [phone] Fax: [phone]

## 2020-04-23 ENCOUNTER — TELEPHONE (OUTPATIENT)
Dept: HEMATOLOGY/ONCOLOGY | Facility: CLINIC | Age: 65
End: 2020-04-23

## 2020-04-26 NOTE — PROGRESS NOTES
Subjective:       Patient ID: Joey Jacobo is a 65 y.o. male.    Chief Complaint: Prostate cancer [C61]  HPI: We have an opportunity to see Mr. Joey Jacobo in Hematology Oncology clinic at [facility] on 04/26/2020.  Mr. Joey Jacobo is a 65 y.o. gentleman metastatic prostate adenocarcinoma currently on ADT with Lupron under the care of Dr. Dawn and also on Zytiga/prednisone with us which was started on December 7, 2017 and has been tolerating this medication well without any significant side effects.      No history exists.     Past Medical History:   Diagnosis Date    COPD (chronic obstructive pulmonary disease) 7/30/2013    Coronary artery disease 7/30/2013    Emphysema of lung     Essential hypertension 10/29/2018    Glaucoma (increased eye pressure) 8/27/2013    Headache(784.0)     Mitral regurgitation 7/30/2013    Mixed hyperlipidemia 7/30/2013    Neuropathy     Osteoarthritis of spine with radiculopathy, lumbar region 7/21/2015    Other and unspecified hyperlipidemia     Pulmonary fibrosis 10/3/2017     Family History   Problem Relation Age of Onset    Cataracts Mother     Kidney disease Mother     Cancer Father         Stomach    Blindness Maternal Grandmother     Diabetes Sister     Hypertension Sister     Diabetes Sister     Hypertension Sister     Diabetes Sister     Hypertension Sister     Hypertension Sister     Hypertension Sister      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   Social Needs    Financial resource strain: Not on file    Food insecurity:     Worry: Not on file     Inability: Not on file    Transportation needs:     Medical: Not on file     Non-medical: Not on file   Tobacco Use    Smoking status:  Former Smoker     Packs/day: 0.25     Years: 25.00     Pack years: 6.25     Types: Cigarettes     Last attempt to quit: 2018     Years since quittin.6    Smokeless tobacco: Never Used   Substance and Sexual Activity    Alcohol use: Yes     Alcohol/week: 0.0 standard drinks     Comment: occasionally    Drug use: No    Sexual activity: Yes   Lifestyle    Physical activity:     Days per week: Not on file     Minutes per session: Not on file    Stress: Not on file   Relationships    Social connections:     Talks on phone: Not on file     Gets together: Not on file     Attends Caodaism service: Not on file     Active member of club or organization: Not on file     Attends meetings of clubs or organizations: Not on file     Relationship status: Not on file   Other Topics Concern    Not on file   Social History Narrative    Wife and son     Past Surgical History:   Procedure Laterality Date    COLONOSCOPY N/A 3/21/2016    Procedure: COLONOSCOPY;  Surgeon: Melvin Pearce MD;  Location: [facility];  Service: Endoscopy;  Laterality: N/A;    CORONARY STENT PLACEMENT      times 2    SHOULDER ARTHROSCOPY Left     SPINE SURGERY      neck fusion x2     Current Outpatient Medications   Medication Sig Dispense Refill    abiraterone (ZYTIGA) 250 mg Tab Take 4 tablets (1,000 mg total) by mouth once daily on an empty stomach. 120 tablet 2    aspirin (ECOTRIN) 81 MG EC tablet Take by mouth. 1 Tablet, Delayed Release (E.C.) Oral Every day      atorvastatin (LIPITOR) 40 MG tablet TAKE 1 TABLET BY MOUTH ONCE DAILY. 90 tablet 3    budesonide-formoterol 160-4.5 mcg (SYMBICORT) 160-4.5 mcg/actuation HFAA Inhale into the lungs.      calcium-vitamin D (CALCIUM 600 + D,3,) 600 mg(1,500mg) -400 unit Tab Take by mouth. 1 Tablet Oral Twice a day      docusate sodium (COLACE) 100 MG capsule Take 1 capsule (100 mg total) by mouth 2 (two) times daily. 60 capsule 1    escitalopram oxalate (LEXAPRO) 10 MG tablet Take 1  tablet (10 mg total) by mouth once daily. (Patient not taking: Reported on 2/10/2020) 30 tablet 3    ezetimibe (ZETIA) 10 mg tablet TAKE 1 TABLET BY MOUTH ONCE DAILY. 30 tablet 0    fluticasone (FLONASE) 50 mcg/actuation nasal spray 1 spray by Each Nare route once daily. (Patient taking differently: 1 spray by Each Nare route daily as needed. ) 1 Bottle 11    fluticasone-salmeterol (ADVAIR HFA) 115-21 mcg/actuation HFAA Inhale 2 puffs into the lungs.      gabapentin (NEURONTIN) 300 MG capsule Take 300 mg by mouth.      HYDROcodone-acetaminophen (NORCO)  mg per tablet Take 1 tablet by mouth every 6 (six) hours as needed for Pain. 90 tablet 0    latanoprost 0.005 % ophthalmic solution   3    metoprolol succinate (TOPROL-XL) 50 MG 24 hr tablet TAKE 1 TABLET BY MOUTH ONCE DAILY. 30 tablet 4    nitroGLYCERIN (NITROSTAT) 0.4 MG SL tablet Place 1 tablet (0.4 mg total) under the tongue every 5 (five) minutes as needed for Chest pain. (Patient not taking: Reported on 3/11/2020) 20 tablet 12    pantoprazole (PROTONIX) 40 MG tablet Take 1 tablet (40 mg total) by mouth once daily. 90 tablet 3    predniSONE (DELTASONE) 5 MG tablet Take 1 tablet (5 mg total) by mouth 2 (two) times daily. 180 tablet 1    travoprost (TRAVATAN Z) 0.004 % Drop Place 1 drop into both eyes every evening. 1 Drops Ophthalmic At bedtime 5 mL 12     Current Facility-Administered Medications   Medication Dose Route Frequency Provider Last Rate Last Dose    leuprolide (LUPRON) injection 22.5 mg  22.5 mg Intramuscular Q90 Days Roland Dawn IV, MD   22.5 mg at 12/11/19 1330       Labs:  Lab Results   Component Value Date    WBC 10.52 03/25/2020    HGB 12.3 (L) 03/25/2020    HCT 41.4 03/25/2020     (H) 03/25/2020     03/25/2020     BMP  Lab Results   Component Value Date     03/25/2020    K 3.8 03/25/2020     03/25/2020    CO2 26 03/25/2020    BUN 11 03/25/2020    CREATININE 1.2 03/25/2020    CALCIUM 9.3  03/25/2020    ANIONGAP 10 03/25/2020    ESTGFRAFRICA >60.0 03/25/2020    EGFRNONAA >60.0 03/25/2020     Lab Results   Component Value Date    ALT 23 03/25/2020    AST 25 03/25/2020    ALKPHOS 79 03/25/2020    BILITOT 0.4 03/25/2020       Lab Results   Component Value Date    IRON 94 01/03/2020    TIBC 477 (H) 01/03/2020    FERRITIN 96 01/03/2020     No results found for: [id number]  No results found for: FOLATE  Lab Results   Component Value Date    TSH 1.061 03/04/2019       I have reviewed the radiology reports and examined the scan/xray images.    Review of Systems   Constitutional: Negative.    HENT: Negative.    Eyes: Negative.    Respiratory: Negative.    Cardiovascular: Negative.    Gastrointestinal: Negative.    Endocrine: Negative.    Genitourinary: Negative.    Musculoskeletal: Negative.    Skin: Negative.    Allergic/Immunologic: Negative.    Neurological: Negative.    Hematological: Negative.    Psychiatric/Behavioral: Negative.      ECOG SCORE              Objective:   There were no vitals filed for this visit.There is no height or weight on file to calculate BMI.  Physical Exam      Assessment:      1. Prostate cancer    2. Secondary adenocarcinoma of skeletal bone    3. Mixed hyperlipidemia    4. Coronary artery disease involving native coronary artery without angina pectoris, unspecified whether native or transplanted heart           Plan:     Prostate cancer  -     oxyCODONE myristate (XTAMPZA ER) 9 mg CSpT; Take 9 mg by mouth once daily.  Dispense: 30 each; Refill: 0  -     NM Bone Scan Whole Body; Future; Expected date: 04/27/2020  -     CT Abdomen Pelvis  Without Contrast; Future; Expected date: 04/27/2020    Secondary adenocarcinoma of skeletal bone  Will get scans to restage.  PSA undetectable, but has more pain.  Continue on ADT with Lupron, zytiga/prednisone  -     oxyCODONE myristate (XTAMPZA ER) 9 mg CSpT; Take 9 mg by mouth once daily.  Dispense: 30 each; Refill: 0  -     NM Bone Scan  Whole Body; Future; Expected date: 04/27/2020  -     CT Abdomen Pelvis  Without Contrast; Future; Expected date: 04/27/2020    Mixed hyperlipidemia  -     ezetimibe (ZETIA) 10 mg tablet; Take 1 tablet (10 mg total) by mouth once daily.  Dispense: 90 tablet; Refill: 1    Coronary artery disease involving native coronary artery without angina pectoris, unspecified whether native or transplanted heart  -     metoprolol succinate (TOPROL-XL) 50 MG 24 hr tablet; Take 1 tablet (50 mg total) by mouth once daily.  Dispense: 90 tablet; Refill: 1        Established Patient - Audio Only Telehealth Visit     The patient location is: home  The chief complaint leading to consultation is: metastatic prostate cancer, pain  Visit type: Virtual visit with audio only (telephone)     The reason for the audio only service rather than synchronous audio and video virtual visit was related to technical difficulties or patient preference/necessity.     Each patient to whom I provide medical services by telemedicine is:  (1) informed of the relationship between the physician and patient and the respective role of any other health care provider with respect to management of the patient; and (2) notified that they may decline to receive medical services by telemedicine and may withdraw from such care at any time. Patient verbally consented to receive this service via voice-only telephone call.       HPI: as above     Assessment and plan:  As above       Consult Start Time: 04/27/2020 11:15  Consult End Time: 04/27/2020 11:50                       This service was not originating from a related E/M service provided within the previous 7 days nor will  to an E/M service or procedure within the next 24 hours or my soonest available appointment.  Prevailing standard of care was able to be met in this audio-only visit.

## 2020-04-27 ENCOUNTER — OFFICE VISIT (OUTPATIENT)
Dept: HEMATOLOGY/ONCOLOGY | Facility: CLINIC | Age: 65
End: 2020-04-27
Payer: MEDICARE

## 2020-04-27 DIAGNOSIS — C79.51 SECONDARY ADENOCARCINOMA OF SKELETAL BONE: ICD-10-CM

## 2020-04-27 DIAGNOSIS — C61 PROSTATE CANCER: Primary | ICD-10-CM

## 2020-04-27 DIAGNOSIS — I25.10 CORONARY ARTERY DISEASE INVOLVING NATIVE CORONARY ARTERY WITHOUT ANGINA PECTORIS, UNSPECIFIED WHETHER NATIVE OR TRANSPLANTED HEART: ICD-10-CM

## 2020-04-27 DIAGNOSIS — E78.2 MIXED HYPERLIPIDEMIA: Chronic | ICD-10-CM

## 2020-04-27 PROCEDURE — 99441 PR PHYSICIAN TELEPHONE EVALUATION 5-10 MIN: ICD-10-PCS | Mod: HCNC,95,, | Performed by: INTERNAL MEDICINE

## 2020-04-27 PROCEDURE — 99441 PR PHYSICIAN TELEPHONE EVALUATION 5-10 MIN: CPT | Mod: HCNC,95,, | Performed by: INTERNAL MEDICINE

## 2020-04-27 RX ORDER — METOPROLOL SUCCINATE 50 MG/1
50 TABLET, EXTENDED RELEASE ORAL DAILY
Qty: 90 TABLET | Refills: 1 | Status: SHIPPED | OUTPATIENT
Start: 2020-04-27 | End: 2020-12-16

## 2020-04-27 RX ORDER — EZETIMIBE 10 MG/1
10 TABLET ORAL DAILY
Qty: 90 TABLET | Refills: 1 | Status: SHIPPED | OUTPATIENT
Start: 2020-04-27 | End: 2020-11-10

## 2020-05-04 ENCOUNTER — TELEPHONE (OUTPATIENT)
Dept: HEMATOLOGY/ONCOLOGY | Facility: CLINIC | Age: 65
End: 2020-05-04
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
## 2020-05-04 NOTE — TELEPHONE ENCOUNTER
Returned pt's call and he stated that he would like to remain on his Norco instead of the Xtampza. MD notified

## 2020-05-08 ENCOUNTER — TELEPHONE (OUTPATIENT)
Dept: PHARMACY | Facility: CLINIC | Age: 65
End: 2020-05-08
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
## 2020-05-13 DIAGNOSIS — C79.51 SECONDARY ADENOCARCINOMA OF SKELETAL BONE: ICD-10-CM

## 2020-05-13 DIAGNOSIS — C61 PROSTATE CANCER: ICD-10-CM

## 2020-05-13 DIAGNOSIS — G89.3 NEOPLASM RELATED PAIN: ICD-10-CM

## 2020-05-13 RX ORDER — HYDROCODONE BITARTRATE AND ACETAMINOPHEN 10; 325 MG/1; MG/1
1 TABLET ORAL EVERY 6 HOURS PRN
Qty: 90 TABLET | Refills: 0 | Status: SHIPPED | OUTPATIENT
Start: 2020-05-13 | End: 2020-06-04 | Stop reason: SDUPTHER

## 2020-05-13 NOTE — TELEPHONE ENCOUNTER
----- Message from [facility] sent at 5/13/2020  9:40 AM CDT -----  Contact: pt   Pt called to get a refill on his medication HYDROcodone-acetaminophen (NORCO)  mg per tablet.     Pharmacy [phone] Fax [phone]     Pt can be reached at [phone]

## 2020-05-20 ENCOUNTER — TELEPHONE (OUTPATIENT)
Dept: PHARMACY | Facility: CLINIC | Age: 65
End: 2020-05-20

## 2020-05-20 RX ORDER — ABIRATERONE ACETATE 250 MG/1
1000 TABLET ORAL DAILY
Qty: 120 TABLET | Refills: 2 | Status: SHIPPED | OUTPATIENT
Start: 2020-05-20 | End: 2020-06-26 | Stop reason: SDUPTHER

## 2020-05-20 NOTE — TELEPHONE ENCOUNTER
RX call attempt 1 regarding ZYTIGA refill from [facility]. Patient was not reached, left voicemail.    Patient is out of refill, msg sent to [facility]

## 2020-05-25 ENCOUNTER — TELEPHONE (OUTPATIENT)
Dept: PHARMACY | Facility: CLINIC | Age: 65
End: 2020-05-25

## 2020-05-25 NOTE — TELEPHONE ENCOUNTER
Refill call regarding Zytiga at [facility]. Will prepare for shipment with consent of patient on  to arrive . Copay 0.00. Patient has not started any new medications including OTC drugs. Patient has not had any medication/ dose or instruction changes. No new allergies or side effects reported with this shipment. Medication is being taken as prescribed by physician and properly stored. Two patient identifiers:  and Address verified. Patient has no questions or concerns for [facility]. Patient has 5 days on hand.

## 2020-06-04 ENCOUNTER — TELEPHONE (OUTPATIENT)
Dept: HEMATOLOGY/ONCOLOGY | Facility: CLINIC | Age: 65
End: 2020-06-04

## 2020-06-04 DIAGNOSIS — G89.3 NEOPLASM RELATED PAIN: ICD-10-CM

## 2020-06-04 DIAGNOSIS — C79.51 SECONDARY ADENOCARCINOMA OF SKELETAL BONE: ICD-10-CM

## 2020-06-04 DIAGNOSIS — C61 PROSTATE CANCER: ICD-10-CM

## 2020-06-04 RX ORDER — HYDROCODONE BITARTRATE AND ACETAMINOPHEN 10; 325 MG/1; MG/1
1 TABLET ORAL EVERY 6 HOURS PRN
Qty: 90 TABLET | Refills: 0 | Status: SHIPPED | OUTPATIENT
Start: 2020-06-04 | End: 2020-07-07 | Stop reason: SDUPTHER

## 2020-06-04 RX ORDER — PREDNISONE 5 MG/1
5 TABLET ORAL 2 TIMES DAILY
Qty: 180 TABLET | Refills: 1 | Status: SHIPPED | OUTPATIENT
Start: 2020-06-04 | End: 2020-08-24 | Stop reason: SDUPTHER

## 2020-06-04 NOTE — TELEPHONE ENCOUNTER
----- Message from Nasrin Watkins sent at 6/4/2020  2:44 PM CDT -----  Contact: Self- [phone]  Pt would like a call from nurse in regards to medications , would like to know if he can get all medications filled. Please call back at [phone].       Thank You,   Nasrin Watkins

## 2020-06-04 NOTE — TELEPHONE ENCOUNTER
Spoke to Mr. Jacobo and informed him that someone would be available to fill his meds when it was time to refill no need to fill early.
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
## 2020-06-18 ENCOUNTER — TELEPHONE (OUTPATIENT)
Dept: PHARMACY | Facility: CLINIC | Age: 65
End: 2020-06-18

## 2020-06-25 NOTE — PROGRESS NOTES
Subjective:       Patient ID: Joey Jacobo is a 65 y.o. male.    Chief Complaint: Secondary adenocarcinoma of skeletal bone [C79.51]  HPI: We have an opportunity to see Mr. Joey Jacobo in Hematology Oncology clinic at [facility] on 06/25/2020.  Mr. Joey Jacobo is a 65 y.o.  gentleman metastatic prostate adenocarcinoma currently on ADT with Lupron under the care of Dr. Dawn and also on Zytiga/prednisone with us which was started on December 7, 2017 and has been tolerating this medication well without any significant side effects.     Oncology History    No history exists.     Past Medical History:   Diagnosis Date    COPD (chronic obstructive pulmonary disease) 7/30/2013    Coronary artery disease 7/30/2013    Emphysema of lung     Essential hypertension 10/29/2018    Glaucoma (increased eye pressure) 8/27/2013    Headache(784.0)     Mitral regurgitation 7/30/2013    Mixed hyperlipidemia 7/30/2013    Neuropathy     Osteoarthritis of spine with radiculopathy, lumbar region 7/21/2015    Other and unspecified hyperlipidemia     Pulmonary fibrosis 10/3/2017     Family History   Problem Relation Age of Onset    Cataracts Mother     Kidney disease Mother     Cancer Father         Stomach    Blindness Maternal Grandmother     Diabetes Sister     Hypertension Sister     Diabetes Sister     Hypertension Sister     Diabetes Sister     Hypertension Sister     Hypertension Sister     Hypertension Sister      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   Social Needs    Financial resource strain: Not on file    Food insecurity     Worry: Not on file     Inability: Not on file    Transportation needs     Medical: Not on file     Non-medical:  Not on file   Tobacco Use    Smoking status: Former Smoker     Packs/day: 0.25     Years: 25.00     Pack years: 6.25     Types: Cigarettes     Quit date: 2018     Years since quittin.8    Smokeless tobacco: Never Used   Substance and Sexual Activity    Alcohol use: Yes     Alcohol/week: 0.0 standard drinks     Comment: occasionally    Drug use: No    Sexual activity: Yes   Lifestyle    Physical activity     Days per week: Not on file     Minutes per session: Not on file    Stress: Not on file   Relationships    Social connections     Talks on phone: Not on file     Gets together: Not on file     Attends Buddhist service: Not on file     Active member of club or organization: Not on file     Attends meetings of clubs or organizations: Not on file     Relationship status: Not on file   Other Topics Concern    Not on file   Social History Narrative    Wife and son     Past Surgical History:   Procedure Laterality Date    COLONOSCOPY N/A 3/21/2016    Procedure: COLONOSCOPY;  Surgeon: Melvin Pearce MD;  Location: [facility];  Service: Endoscopy;  Laterality: N/A;    CORONARY STENT PLACEMENT      times 2    SHOULDER ARTHROSCOPY Left     SPINE SURGERY      neck fusion x2     Current Outpatient Medications   Medication Sig Dispense Refill    abiraterone (ZYTIGA) 250 mg Tab Take 4 tablets (1,000 mg total) by mouth once daily on an empty stomach. 120 tablet 2    aspirin (ECOTRIN) 81 MG EC tablet Take by mouth. 1 Tablet, Delayed Release (E.C.) Oral Every day      atorvastatin (LIPITOR) 40 MG tablet TAKE 1 TABLET BY MOUTH ONCE DAILY. 90 tablet 3    budesonide-formoterol 160-4.5 mcg (SYMBICORT) 160-4.5 mcg/actuation HFAA Inhale into the lungs.      calcium-vitamin D (CALCIUM 600 + D,3,) 600 mg(1,500mg) -400 unit Tab Take by mouth. 1 Tablet Oral Twice a day      docusate sodium (COLACE) 100 MG capsule Take 1 capsule (100 mg total) by mouth 2 (two) times daily. 60 capsule 1    escitalopram  oxalate (LEXAPRO) 10 MG tablet Take 1 tablet (10 mg total) by mouth once daily. (Patient not taking: Reported on 2/10/2020) 30 tablet 3    ezetimibe (ZETIA) 10 mg tablet Take 1 tablet (10 mg total) by mouth once daily. 90 tablet 1    fluticasone (FLONASE) 50 mcg/actuation nasal spray 1 spray by Each Nare route once daily. (Patient taking differently: 1 spray by Each Nare route daily as needed. ) 1 Bottle 11    fluticasone-salmeterol (ADVAIR HFA) 115-21 mcg/actuation HFAA Inhale 2 puffs into the lungs.      gabapentin (NEURONTIN) 300 MG capsule Take 300 mg by mouth.      HYDROcodone-acetaminophen (NORCO)  mg per tablet Take 1 tablet by mouth every 6 (six) hours as needed for Pain. 90 tablet 0    latanoprost 0.005 % ophthalmic solution   3    metoprolol succinate (TOPROL-XL) 50 MG 24 hr tablet Take 1 tablet (50 mg total) by mouth once daily. 90 tablet 1    nitroGLYCERIN (NITROSTAT) 0.4 MG SL tablet Place 1 tablet (0.4 mg total) under the tongue every 5 (five) minutes as needed for Chest pain. (Patient not taking: Reported on 3/11/2020) 20 tablet 12    oxyCODONE myristate (XTAMPZA ER) 9 mg CSpT Take 9 mg by mouth once daily. 30 each 0    pantoprazole (PROTONIX) 40 MG tablet Take 1 tablet (40 mg total) by mouth once daily. 90 tablet 3    predniSONE (DELTASONE) 5 MG tablet Take 1 tablet (5 mg total) by mouth 2 (two) times daily. 180 tablet 1    travoprost (TRAVATAN Z) 0.004 % Drop Place 1 drop into both eyes every evening. 1 Drops Ophthalmic At bedtime 5 mL 12     Current Facility-Administered Medications   Medication Dose Route Frequency Provider Last Rate Last Dose    leuprolide (LUPRON) injection 22.5 mg  22.5 mg Intramuscular Q90 Days Roland Dawn IV, MD   22.5 mg at 12/11/19 1330       Labs:  Lab Results   Component Value Date    WBC 10.52 03/25/2020    HGB 12.3 (L) 03/25/2020    HCT 41.4 03/25/2020     (H) 03/25/2020     03/25/2020     BMP  Lab Results   Component Value Date      03/25/2020    K 3.8 03/25/2020     03/25/2020    CO2 26 03/25/2020    BUN 11 03/25/2020    CREATININE 1.2 03/25/2020    CALCIUM 9.3 03/25/2020    ANIONGAP 10 03/25/2020    ESTGFRAFRICA >60.0 03/25/2020    EGFRNONAA >60.0 03/25/2020     Lab Results   Component Value Date    ALT 23 03/25/2020    AST 25 03/25/2020    ALKPHOS 79 03/25/2020    BILITOT 0.4 03/25/2020       Lab Results   Component Value Date    IRON 94 01/03/2020    TIBC 477 (H) 01/03/2020    FERRITIN 96 01/03/2020     No results found for: [id number]  No results found for: FOLATE  Lab Results   Component Value Date    TSH 1.061 03/04/2019       I have reviewed the radiology reports and examined the scan/xray images.    Review of Systems   Constitutional: Negative.    HENT: Negative.    Eyes: Negative.    Respiratory: Negative.    Cardiovascular: Negative.    Gastrointestinal: Negative.    Endocrine: Negative.    Genitourinary: Negative.    Musculoskeletal: Negative.    Skin: Negative.    Allergic/Immunologic: Negative.    Neurological: Negative.    Hematological: Negative.    Psychiatric/Behavioral: Negative.      ECOG SCORE    0 - Fully active-able to carry on all pre-disease performance without restriction            Objective:   There were no vitals filed for this visit.There is no height or weight on file to calculate BMI.  Physical Exam      Assessment:      1. Secondary adenocarcinoma of skeletal bone    2. Prostate cancer    3. Neoplasm related pain    4. Nutritional anemia           Plan:     Secondary adenocarcinoma of skeletal bone  Continue with ADT and zytiga/prednisone, next lupron in September  -     abiraterone (ZYTIGA) 500 mg Tab; Take 1,000 mg by mouth once daily.  Dispense: 60 tablet; Refill: 11  -     CBC auto differential; Future; Expected date: 06/26/2020  -     Comprehensive metabolic panel; Future; Expected date: 06/26/2020  -     Prostate Specific Antigen, Diagnostic; Future; Expected date: 06/26/2020  -      Testosterone; Future; Expected date: 06/26/2020    Prostate cancer  -     abiraterone (ZYTIGA) 500 mg Tab; Take 1,000 mg by mouth once daily.  Dispense: 60 tablet; Refill: 11    Neoplasm related pain    Nutritional anemia  -     Ferritin; Future; Expected date: 06/26/2020  -     Folate; Future; Expected date: 06/26/2020  -     Iron and TIBC; Future; Expected date: 06/26/2020  -     Immunofixation electrophoresis; Future; Expected date: 06/26/2020  -     Protein electrophoresis, serum; Future; Expected date: 06/26/2020  -     Vitamin B12; Future; Expected date: 06/26/2020  -     TSH; Future; Expected date: 06/26/2020        Established Patient - Audio Only Telehealth Visit     The patient location is: home  The chief complaint leading to consultation is: as above  Visit type: Virtual visit with audio only (telephone)  Total time spent with patient: 30 minutes       The reason for the audio only service rather than synchronous audio and video virtual visit was related to technical difficulties or patient preference/necessity.     Each patient to whom I provide medical services by telemedicine is:  (1) informed of the relationship between the physician and patient and the respective role of any other health care provider with respect to management of the patient; and (2) notified that they may decline to receive medical services by telemedicine and may withdraw from such care at any time. Patient verbally consented to receive this service via voice-only telephone call.                    This service was not originating from a related E/M service provided within the previous 7 days nor will  to an E/M service or procedure within the next 24 hours or my soonest available appointment.  Prevailing standard of care was able to be met in this audio-only visit.

## 2020-06-26 ENCOUNTER — OFFICE VISIT (OUTPATIENT)
Dept: HEMATOLOGY/ONCOLOGY | Facility: CLINIC | Age: 65
End: 2020-06-26
Payer: MEDICARE

## 2020-06-26 DIAGNOSIS — G89.3 NEOPLASM RELATED PAIN: ICD-10-CM

## 2020-06-26 DIAGNOSIS — C79.51 SECONDARY ADENOCARCINOMA OF SKELETAL BONE: Primary | ICD-10-CM

## 2020-06-26 DIAGNOSIS — I10 ESSENTIAL HYPERTENSION: Primary | ICD-10-CM

## 2020-06-26 DIAGNOSIS — D53.9 NUTRITIONAL ANEMIA: ICD-10-CM

## 2020-06-26 DIAGNOSIS — C61 PROSTATE CANCER: ICD-10-CM

## 2020-06-26 PROCEDURE — 99443 PR PHYSICIAN TELEPHONE EVALUATION 21-30 MIN: ICD-10-PCS | Mod: HCNC,95,, | Performed by: INTERNAL MEDICINE

## 2020-06-26 PROCEDURE — 99443 PR PHYSICIAN TELEPHONE EVALUATION 21-30 MIN: CPT | Mod: HCNC,95,, | Performed by: INTERNAL MEDICINE

## 2020-06-26 RX ORDER — ABIRATERONE 500 MG/1
1000 TABLET ORAL DAILY
Qty: 60 TABLET | Refills: 11 | Status: SHIPPED | OUTPATIENT
Start: 2020-06-26 | End: 2020-07-01

## 2020-06-30 DIAGNOSIS — C61 PROSTATE CANCER: Primary | ICD-10-CM

## 2020-06-30 RX ORDER — ABIRATERONE ACETATE 250 MG/1
1000 TABLET ORAL DAILY
Qty: 120 TABLET | Refills: 11 | Status: SHIPPED | OUTPATIENT
Start: 2020-06-30 | End: 2021-07-19 | Stop reason: SDUPTHER

## 2020-07-07 DIAGNOSIS — G89.3 NEOPLASM RELATED PAIN: ICD-10-CM

## 2020-07-07 DIAGNOSIS — C79.51 SECONDARY ADENOCARCINOMA OF SKELETAL BONE: ICD-10-CM

## 2020-07-07 DIAGNOSIS — C61 PROSTATE CANCER: ICD-10-CM

## 2020-07-07 RX ORDER — HYDROCODONE BITARTRATE AND ACETAMINOPHEN 10; 325 MG/1; MG/1
1 TABLET ORAL EVERY 6 HOURS PRN
Qty: 90 TABLET | Refills: 0 | Status: SHIPPED | OUTPATIENT
Start: 2020-07-07 | End: 2020-08-05 | Stop reason: SDUPTHER

## 2020-07-07 NOTE — TELEPHONE ENCOUNTER
----- Message from Anayeli Kern sent at 7/7/2020  2:32 PM CDT -----  Type:  RX Refill Request    Who Called: pt  Refill or New Rx:refill  RX Name and Strength:hydrocodone 10 mg   How is the patient currently taking it? (ex. 1XDay):3 or 4XDay  Is this a 30 day or 90 day RX:30  Preferred Pharmacy with phone number:[facility]  Ordering Provider:Ms ROCK Moon  Would the patient rather a call back or a response via [facility]? Call back  Best Call Back Number:[phone]  Additional Information: He would like a call when its called in.    Thank you

## 2020-07-20 ENCOUNTER — TELEPHONE (OUTPATIENT)
Dept: PHARMACY | Facility: CLINIC | Age: 65
End: 2020-07-20

## 2020-07-30 ENCOUNTER — TELEPHONE (OUTPATIENT)
Dept: PHARMACY | Facility: CLINIC | Age: 65
End: 2020-07-30

## 2020-07-30 NOTE — TELEPHONE ENCOUNTER
"Dosing, how taking Zytiga: Takes 4 tablets (1,000 mg total) by mouth once daily in the morning on an empty stomach  Prednisone: Take 1 tablet (5 mg total) by mouth 2 (two) times daily after he eats.   Denies any missed doses.   Storage: room temperature  Handling: Medication bottle cap as as dosing cup - does not touch tablets directly. Aware of handling precautions.   Side effects: Patient denies any significant side effects. Still having occasional hot flashes - this can also be from the Lupron injections as well. Does not have a BP machine but BP is good at last visit in March: 108/78. Denies any chest pain/pressure, HA, or swelling.   Recent infections: no fever, chills   Pain: 0/10, takes pain medication and manageable pain  Appetite: 3 meals/day - no change in appetite  Energy, fatigue: "moderate". Recently had surgery on his ankle and is now getting up and moving around again. Still able to complete normal daily activities.   Health, mood, QOL: 10/10  Next clinical follow up: 3 months  Medication list reviewed. No new allergies or health conditions.             "

## 2020-08-05 DIAGNOSIS — G89.3 NEOPLASM RELATED PAIN: ICD-10-CM

## 2020-08-05 DIAGNOSIS — C61 PROSTATE CANCER: ICD-10-CM

## 2020-08-05 DIAGNOSIS — C79.51 SECONDARY ADENOCARCINOMA OF SKELETAL BONE: ICD-10-CM

## 2020-08-05 RX ORDER — HYDROCODONE BITARTRATE AND ACETAMINOPHEN 10; 325 MG/1; MG/1
1 TABLET ORAL EVERY 6 HOURS PRN
Qty: 90 TABLET | Refills: 0 | Status: SHIPPED | OUTPATIENT
Start: 2020-08-05 | End: 2020-08-24 | Stop reason: SDUPTHER

## 2020-08-05 NOTE — TELEPHONE ENCOUNTER
----- Message from Genna Watkins sent at 8/5/2020  9:06 AM CDT -----  ..Type:  RX Refill Request    Who Called:  pt   Refill or New Rx: refill   RX Name and Strength: hydro   How is the patient currently taking it? (ex. 1XDay):  Is this a 30 day or 90 day RX: 30  Preferred Pharmacy with phone number .  [facility] [place] - [address]  [address]  [place] [address]  Phone: [phone] Fax: [phone]     Local or Mail Order: local   Ordering Provider:  Would the patient rather a call back or a response via [facility]? Call back   Best Call Back Number:[phone]  Additional Information:

## 2020-08-14 ENCOUNTER — TELEPHONE (OUTPATIENT)
Dept: PHARMACY | Facility: CLINIC | Age: 65
End: 2020-08-14

## 2020-08-24 DIAGNOSIS — C61 PROSTATE CANCER: ICD-10-CM

## 2020-08-24 DIAGNOSIS — C79.51 SECONDARY ADENOCARCINOMA OF SKELETAL BONE: ICD-10-CM

## 2020-08-24 DIAGNOSIS — G89.3 NEOPLASM RELATED PAIN: ICD-10-CM

## 2020-08-24 DIAGNOSIS — K21.9 GASTROESOPHAGEAL REFLUX DISEASE WITHOUT ESOPHAGITIS: Chronic | ICD-10-CM

## 2020-08-24 RX ORDER — HYDROCODONE BITARTRATE AND ACETAMINOPHEN 10; 325 MG/1; MG/1
1 TABLET ORAL EVERY 6 HOURS PRN
Qty: 90 TABLET | Refills: 0 | Status: SHIPPED | OUTPATIENT
Start: 2020-08-24 | End: 2020-09-23 | Stop reason: SDUPTHER

## 2020-08-24 RX ORDER — PREDNISONE 5 MG/1
5 TABLET ORAL 2 TIMES DAILY
Qty: 180 TABLET | Refills: 1 | Status: SHIPPED | OUTPATIENT
Start: 2020-08-24 | End: 2021-06-29

## 2020-08-24 RX ORDER — PANTOPRAZOLE SODIUM 40 MG/1
40 TABLET, DELAYED RELEASE ORAL DAILY
Qty: 30 TABLET | Refills: 0 | Status: SHIPPED | OUTPATIENT
Start: 2020-08-24 | End: 2020-09-23 | Stop reason: SDUPTHER

## 2020-08-24 NOTE — TELEPHONE ENCOUNTER
----- Message from Rashad Carrion sent at 8/24/2020 10:30 AM CDT -----  Contact: self  Type:  RX Refill Request    Who Called: Joey Jacobo   Refill or New Rx:refill  RX Name and Strength:predinisone  Hydrocodone  Acid reflux  How is the patient currently taking it? (ex. 1XDay):2XDay  As needed  1XDay  Is this a 30 day or 90 day RX:30  Preferred Pharmacy with phone number:  [facility] [place] - [address]  [address]  [place] [address]  Phone: [phone] Fax: [phone]  Local or Mail Order:;local  Ordering Provider:Maru  Would the patient rather a call back or a response via [facility]? Call back  Best Call Back Number:[phone]  Additional Information:

## 2020-09-04 ENCOUNTER — LAB VISIT (OUTPATIENT)
Dept: LAB | Facility: HOSPITAL | Age: 65
End: 2020-09-04
Attending: UROLOGY
Payer: MEDICARE

## 2020-09-04 DIAGNOSIS — C61 PROSTATE CANCER: ICD-10-CM

## 2020-09-04 LAB — COMPLEXED PSA SERPL-MCNC: <0.01 NG/ML (ref 0–4)

## 2020-09-04 PROCEDURE — 36415 COLL VENOUS BLD VENIPUNCTURE: CPT | Mod: HCNC,PO

## 2020-09-04 PROCEDURE — 84153 ASSAY OF PSA TOTAL: CPT | Mod: HCNC

## 2020-09-13 ENCOUNTER — PATIENT OUTREACH (OUTPATIENT)
Dept: ADMINISTRATIVE | Facility: OTHER | Age: 65
End: 2020-09-13

## 2020-09-14 ENCOUNTER — OFFICE VISIT (OUTPATIENT)
Dept: UROLOGY | Facility: CLINIC | Age: 65
End: 2020-09-14
Payer: MEDICARE

## 2020-09-14 VITALS
BODY MASS INDEX: 23.25 KG/M2 | WEIGHT: 162.06 LBS | TEMPERATURE: 98 F | SYSTOLIC BLOOD PRESSURE: 146 MMHG | DIASTOLIC BLOOD PRESSURE: 80 MMHG

## 2020-09-14 DIAGNOSIS — C61 PROSTATE CANCER: Primary | ICD-10-CM

## 2020-09-14 LAB
BILIRUB SERPL-MCNC: NORMAL MG/DL
BLOOD URINE, POC: NORMAL
CLARITY, POC UA: CLEAR
COLOR, POC UA: YELLOW
GLUCOSE UR QL STRIP: NORMAL
KETONES UR QL STRIP: NORMAL
LEUKOCYTE ESTERASE URINE, POC: NORMAL
NITRITE, POC UA: NORMAL
PH, POC UA: 5
PROTEIN, POC: NORMAL
SPECIFIC GRAVITY, POC UA: 1.01
UROBILINOGEN, POC UA: NORMAL

## 2020-09-14 PROCEDURE — 99499 UNLISTED E&M SERVICE: CPT | Mod: S$GLB,,, | Performed by: UROLOGY

## 2020-09-14 PROCEDURE — 3079F PR MOST RECENT DIASTOLIC BLOOD PRESSURE 80-89 MM HG: ICD-10-PCS | Mod: HCNC,CPTII,S$GLB, | Performed by: UROLOGY

## 2020-09-14 PROCEDURE — 3008F PR BODY MASS INDEX (BMI) DOCUMENTED: ICD-10-PCS | Mod: HCNC,CPTII,S$GLB, | Performed by: UROLOGY

## 2020-09-14 PROCEDURE — 99213 PR OFFICE/OUTPT VISIT, EST, LEVL III, 20-29 MIN: ICD-10-PCS | Mod: HCNC,25,S$GLB, | Performed by: UROLOGY

## 2020-09-14 PROCEDURE — 3077F PR MOST RECENT SYSTOLIC BLOOD PRESSURE >= 140 MM HG: ICD-10-PCS | Mod: HCNC,CPTII,S$GLB, | Performed by: UROLOGY

## 2020-09-14 PROCEDURE — 81002 POCT URINE DIPSTICK WITHOUT MICROSCOPE: ICD-10-PCS | Mod: HCNC,S$GLB,, | Performed by: UROLOGY

## 2020-09-14 PROCEDURE — 81002 URINALYSIS NONAUTO W/O SCOPE: CPT | Mod: HCNC,S$GLB,, | Performed by: UROLOGY

## 2020-09-14 PROCEDURE — 3008F BODY MASS INDEX DOCD: CPT | Mod: HCNC,CPTII,S$GLB, | Performed by: UROLOGY

## 2020-09-14 PROCEDURE — 96402 PR CHEMOTHER HORMON ANTINEOPL SUB-Q/IM: ICD-10-PCS | Mod: HCNC,59,S$GLB, | Performed by: UROLOGY

## 2020-09-14 PROCEDURE — 99499 RISK ADDL DX/OHS AUDIT: ICD-10-PCS | Mod: S$GLB,,, | Performed by: UROLOGY

## 2020-09-14 PROCEDURE — 3079F DIAST BP 80-89 MM HG: CPT | Mod: HCNC,CPTII,S$GLB, | Performed by: UROLOGY

## 2020-09-14 PROCEDURE — 99999 PR PBB SHADOW E&M-EST. PATIENT-LVL II: ICD-10-PCS | Mod: PBBFAC,HCNC,, | Performed by: UROLOGY

## 2020-09-14 PROCEDURE — 99213 OFFICE O/P EST LOW 20 MIN: CPT | Mod: HCNC,25,S$GLB, | Performed by: UROLOGY

## 2020-09-14 PROCEDURE — 3077F SYST BP >= 140 MM HG: CPT | Mod: HCNC,CPTII,S$GLB, | Performed by: UROLOGY

## 2020-09-14 PROCEDURE — 96402 CHEMO HORMON ANTINEOPL SQ/IM: CPT | Mod: HCNC,59,S$GLB, | Performed by: UROLOGY

## 2020-09-14 PROCEDURE — 99999 PR PBB SHADOW E&M-EST. PATIENT-LVL II: CPT | Mod: PBBFAC,HCNC,, | Performed by: UROLOGY

## 2020-09-14 RX ORDER — IBUPROFEN 800 MG/1
TABLET ORAL
COMMUNITY
Start: 2020-09-10 | End: 2021-08-12

## 2020-09-14 NOTE — PROGRESS NOTES
Health Maintenance Due   Topic Date Due    Shingles Vaccine (1 of 2) 03/15/2007    Pneumococcal Vaccine (Highest Risk) (3 of 3 - PPSV23) 10/16/2018    Influenza Vaccine (1) 08/01/2020     Updates were requested from care everywhere.  Chart was reviewed for overdue [facility]active [facility] Encounters (JULIO) topics (CRS, Breast Cancer Screening, Eye exam)  Health Maintenance has been updated.  LINKS immunization registry triggered.  Immunizations were reconciled.

## 2020-09-14 NOTE — PROGRESS NOTES
.. Pt. 22.5 mg of Lupron administered via right ventrogluteal. Pt tolerated well. Aseptic technique maintained. Pt instructed to remain in clinic for 15 mins after injection. Follow up given.

## 2020-09-14 NOTE — PROGRESS NOTES
"Chief Complaint: Prostate Cancer    HPI:   20: PSA very low.  Lupron today. Reviewed history in detail.   3/11/20: Lupron due today; will switch to 6 mo prep. Reviewed history in detail. Very active with outside work.  19:  Lupron shot today.  Reviewed history in detail. Currently on dual therapy with abariterone.   19: Lupron shot today.  Reviewed history in detail. Currently on dual therapy with abariterone.   19: Lupron shot today.  Reviewed history in detail. Currently on dual therapy with abariterone.  19: Son  in an MVA since last visit. Reviewed history in detail.  No problems from Lupron.  18: No problems from Lupron.  PSA lowest.  18: PSA today and again 3 mo.  Doing fine.  Reviewed history in detail.  18: PSA well down.  No adverse effects from Lupron.    10/18/17: Been on casodex a month back to review and start Lupron.  Dr. Moscoso felt XRT was not an option but perhaps chemotherapy could be beneficial.  17: Bone scan reports "RIGHT supraorbital location and a smaller similarly extremely intense focus of increased uptake posteriorly on the RIGHT at the T9 level.  Etiology is uncertain."  CT scan shows "A few scattered shotty mesenteric and retroperitoneal nodes identified.  Similar nodes identified within the pelvis bilaterally."  MRI shows left 2.8 cm prostate mass PIRADS 5, with metastatic pelvic lymphadenopathy (right pelvic sidewall node and left internal iliac chain LN).  No bony mets in pelvis.  17:  No problems from biopsy.  Gl 4+5 in the left base (30%) and a sig amount of 4+4 in other parts of the gland.  Reviewed treatment options, and imaging needs.  7/3/17: TRUS/Bx today 27 gm.  17: 61 yo man has problems with perineal/scrotal pain once or twice a month; lasts for an hour or two, some nocturia.  PSA recently elevated.  No abd/pelvic pain and no exac/rel factors.  No hematuria.  No urolithiasis.  No urinary bother.  No  history. " CT with contrast earlier this year essentially normal.    Allergies:  Avelox  [moxifloxacin] and Nsaids (non-steroidal anti-inflammatory drug)    Medications:  has a current medication list which includes the following prescription(s): abiraterone, aspirin, atorvastatin, budesonide-formoterol 160-4.5 mcg, calcium-vitamin d, escitalopram oxalate, ezetimibe, fluticasone propionate, fluticasone propion-salmeterol 115-21 mcg/dose, gabapentin, hydrocodone-acetaminophen, ibuprofen, latanoprost, metoprolol succinate, nitroglycerin, oxycodone myristate, pantoprazole, prednisone, and travoprost, and the following Facility-Administered Medications: leuprolide.    Review of Systems:  General: No fever, chills, fatigability, or weight loss.  Skin: No rashes, itching, or changes in color or texture of skin.  Chest: Denies TORRES, cyanosis, wheezing, cough, and sputum production.  Abdomen: Appetite fine. No weight loss. Denies diarrhea, abdominal pain, hematemesis, or blood in stool.  Musculoskeletal: No joint stiffness or swelling. Denies back pain.  : As above.  All other review of systems negative.    PMH:   has a past medical history of COPD (chronic obstructive pulmonary disease) (7/30/2013), Coronary artery disease (7/30/2013), Emphysema of lung, Essential hypertension (10/29/2018), Glaucoma (increased eye pressure) (8/27/2013), Headache(784.0), Mitral regurgitation (7/30/2013), Mixed hyperlipidemia (7/30/2013), Neuropathy, Osteoarthritis of spine with radiculopathy, lumbar region (7/21/2015), Other and unspecified hyperlipidemia, and Pulmonary fibrosis (10/3/2017).    PSH:   has a past surgical history that includes Coronary stent placement; Colonoscopy (N/A, 3/21/2016); Shoulder arthroscopy (Left); and Spine surgery.    FamHx: family history includes Blindness in his maternal grandmother; Cancer in his father; Cataracts in his mother; Diabetes in his sister, sister, and sister; Hypertension in his sister, sister, sister,  sister, and sister; Kidney disease in his mother.    SocHx:  reports that he quit smoking about 2 years ago. His smoking use included cigarettes. He has a 6.25 pack-year smoking history. He has never used smokeless tobacco. He reports current alcohol use. He reports that he does not use drugs.      Physical Exam:  Vitals:    09/14/20 1123   BP: (!) 146/80   Temp: 97.5 °F (36.4 °C)     General: A&Ox3, no apparent distress, no deformities  Neck: No masses, normal thyroid  Lungs: normal inspiration, no use of accessory muscles  Heart: normal pulse, no arrhythmias  Abdomen: Soft, NT, ND  Skin: The skin is warm and dry. No jaundice.  Ext: No c/c/e.  :     7/3/17: Test desc lucas, no abnormalities of epididymus. Penis normal, with normal penile and scrotal skin. Meatus normal. Normal rectal tone, no hemorrhoids. Prost 40 gm no nodules or masses appreciated. SV not palpable. Perineum and anus normal.    Labs/Studies:   PSA    2/16: 22.1    2/17: 22.9    1/18: 0.24    7/18: 0.01    1/19, 4/19, 8/19, 11/19, 3/20: undetect    Impression/Plan:   1. Lupron today and q6mo.  PSA/RTC 6 mo.

## 2020-09-15 ENCOUNTER — TELEPHONE (OUTPATIENT)
Dept: PHARMACY | Facility: CLINIC | Age: 65
End: 2020-09-15

## 2020-09-15 ENCOUNTER — TELEPHONE (OUTPATIENT)
Dept: HEMATOLOGY/ONCOLOGY | Facility: CLINIC | Age: 65
End: 2020-09-15

## 2020-09-15 NOTE — TELEPHONE ENCOUNTER
----- Message from Isabel Lloyd sent at 9/15/2020 12:55 PM CDT -----  Regarding: Missed call  Contact: Patient  .Type:  Patient Returning Call    Who Called:Patient  Who Left Message for Patient:KOJO  Does the patient know what this is regarding?:Missed call  Would the patient rather a call back or a response via [facility]? Call  Best Call Back Number: .[phone] (home) [phone] (work)    Additional Information:

## 2020-09-15 NOTE — TELEPHONE ENCOUNTER
----- Message from Nasrin Watkins sent at 9/15/2020 11:33 AM CDT -----  Regarding: pt  Pt would like to cancel his appt . Please call back at .[phone] (home)         Thank You,   Nasrin Watkins

## 2020-09-23 ENCOUNTER — OFFICE VISIT (OUTPATIENT)
Dept: HEMATOLOGY/ONCOLOGY | Facility: CLINIC | Age: 65
End: 2020-09-23
Payer: MEDICARE

## 2020-09-23 ENCOUNTER — LAB VISIT (OUTPATIENT)
Dept: LAB | Facility: HOSPITAL | Age: 65
End: 2020-09-23
Attending: INTERNAL MEDICINE
Payer: MEDICARE

## 2020-09-23 VITALS
HEIGHT: 70 IN | OXYGEN SATURATION: 99 % | WEIGHT: 160.94 LBS | DIASTOLIC BLOOD PRESSURE: 89 MMHG | HEART RATE: 101 BPM | BODY MASS INDEX: 23.04 KG/M2 | TEMPERATURE: 98 F | SYSTOLIC BLOOD PRESSURE: 155 MMHG

## 2020-09-23 DIAGNOSIS — G89.3 NEOPLASM RELATED PAIN: ICD-10-CM

## 2020-09-23 DIAGNOSIS — K21.9 GASTROESOPHAGEAL REFLUX DISEASE WITHOUT ESOPHAGITIS: Chronic | ICD-10-CM

## 2020-09-23 DIAGNOSIS — C79.51 SECONDARY ADENOCARCINOMA OF SKELETAL BONE: ICD-10-CM

## 2020-09-23 DIAGNOSIS — C61 PROSTATE CANCER: ICD-10-CM

## 2020-09-23 DIAGNOSIS — D53.9 NUTRITIONAL ANEMIA: ICD-10-CM

## 2020-09-23 LAB
ALBUMIN SERPL BCP-MCNC: 3.7 G/DL (ref 3.5–5.2)
ALP SERPL-CCNC: 93 U/L (ref 55–135)
ALT SERPL W/O P-5'-P-CCNC: 19 U/L (ref 10–44)
ANION GAP SERPL CALC-SCNC: 13 MMOL/L (ref 8–16)
AST SERPL-CCNC: 19 U/L (ref 10–40)
BASOPHILS # BLD AUTO: 0.05 K/UL (ref 0–0.2)
BASOPHILS NFR BLD: 0.5 % (ref 0–1.9)
BILIRUB SERPL-MCNC: 0.3 MG/DL (ref 0.1–1)
BUN SERPL-MCNC: 22 MG/DL (ref 8–23)
CALCIUM SERPL-MCNC: 9.8 MG/DL (ref 8.7–10.5)
CHLORIDE SERPL-SCNC: 105 MMOL/L (ref 95–110)
CO2 SERPL-SCNC: 24 MMOL/L (ref 23–29)
CREAT SERPL-MCNC: 1.3 MG/DL (ref 0.5–1.4)
DIFFERENTIAL METHOD: ABNORMAL
EOSINOPHIL # BLD AUTO: 0.1 K/UL (ref 0–0.5)
EOSINOPHIL NFR BLD: 0.5 % (ref 0–8)
ERYTHROCYTE [DISTWIDTH] IN BLOOD BY AUTOMATED COUNT: 15.4 % (ref 11.5–14.5)
EST. GFR  (AFRICAN AMERICAN): >60 ML/MIN/1.73 M^2
EST. GFR  (NON AFRICAN AMERICAN): 58 ML/MIN/1.73 M^2
GLUCOSE SERPL-MCNC: 125 MG/DL (ref 70–110)
HCT VFR BLD AUTO: 37.7 % (ref 40–54)
HGB BLD-MCNC: 11.9 G/DL (ref 14–18)
IMM GRANULOCYTES # BLD AUTO: 0.16 K/UL (ref 0–0.04)
IMM GRANULOCYTES NFR BLD AUTO: 1.7 % (ref 0–0.5)
LYMPHOCYTES # BLD AUTO: 2.5 K/UL (ref 1–4.8)
LYMPHOCYTES NFR BLD: 25.8 % (ref 18–48)
MCH RBC QN AUTO: 29.8 PG (ref 27–31)
MCHC RBC AUTO-ENTMCNC: 31.6 G/DL (ref 32–36)
MCV RBC AUTO: 94 FL (ref 82–98)
MONOCYTES # BLD AUTO: 0.9 K/UL (ref 0.3–1)
MONOCYTES NFR BLD: 9.2 % (ref 4–15)
NEUTROPHILS # BLD AUTO: 6.2 K/UL (ref 1.8–7.7)
NEUTROPHILS NFR BLD: 64 % (ref 38–73)
NRBC BLD-RTO: 0 /100 WBC
PLATELET # BLD AUTO: 229 K/UL (ref 150–350)
PMV BLD AUTO: 10.7 FL (ref 9.2–12.9)
POTASSIUM SERPL-SCNC: 3.4 MMOL/L (ref 3.5–5.1)
PROT SERPL-MCNC: 8.3 G/DL (ref 6–8.4)
RBC # BLD AUTO: 4 M/UL (ref 4.6–6.2)
SODIUM SERPL-SCNC: 142 MMOL/L (ref 136–145)
WBC # BLD AUTO: 9.66 K/UL (ref 3.9–12.7)

## 2020-09-23 PROCEDURE — 3008F BODY MASS INDEX DOCD: CPT | Mod: HCNC,CPTII,S$GLB, | Performed by: NURSE PRACTITIONER

## 2020-09-23 PROCEDURE — 3077F PR MOST RECENT SYSTOLIC BLOOD PRESSURE >= 140 MM HG: ICD-10-PCS | Mod: HCNC,CPTII,S$GLB, | Performed by: NURSE PRACTITIONER

## 2020-09-23 PROCEDURE — 84165 PATHOLOGIST INTERPRETATION SPE: ICD-10-PCS | Mod: 26,HCNC,, | Performed by: PATHOLOGY

## 2020-09-23 PROCEDURE — 84165 PROTEIN E-PHORESIS SERUM: CPT | Mod: HCNC

## 2020-09-23 PROCEDURE — 99214 OFFICE O/P EST MOD 30 MIN: CPT | Mod: HCNC,S$GLB,, | Performed by: NURSE PRACTITIONER

## 2020-09-23 PROCEDURE — 84165 PROTEIN E-PHORESIS SERUM: CPT | Mod: 26,HCNC,, | Performed by: PATHOLOGY

## 2020-09-23 PROCEDURE — 84443 ASSAY THYROID STIM HORMONE: CPT | Mod: HCNC

## 2020-09-23 PROCEDURE — 36415 COLL VENOUS BLD VENIPUNCTURE: CPT | Mod: HCNC

## 2020-09-23 PROCEDURE — 3079F DIAST BP 80-89 MM HG: CPT | Mod: HCNC,CPTII,S$GLB, | Performed by: NURSE PRACTITIONER

## 2020-09-23 PROCEDURE — 82607 VITAMIN B-12: CPT | Mod: HCNC

## 2020-09-23 PROCEDURE — 83540 ASSAY OF IRON: CPT | Mod: HCNC

## 2020-09-23 PROCEDURE — 85025 COMPLETE CBC W/AUTO DIFF WBC: CPT | Mod: HCNC

## 2020-09-23 PROCEDURE — 3008F PR BODY MASS INDEX (BMI) DOCUMENTED: ICD-10-PCS | Mod: HCNC,CPTII,S$GLB, | Performed by: NURSE PRACTITIONER

## 2020-09-23 PROCEDURE — 82728 ASSAY OF FERRITIN: CPT | Mod: HCNC

## 2020-09-23 PROCEDURE — 86334 IMMUNOFIX E-PHORESIS SERUM: CPT | Mod: 26,HCNC,, | Performed by: PATHOLOGY

## 2020-09-23 PROCEDURE — 99214 PR OFFICE/OUTPT VISIT, EST, LEVL IV, 30-39 MIN: ICD-10-PCS | Mod: HCNC,S$GLB,, | Performed by: NURSE PRACTITIONER

## 2020-09-23 PROCEDURE — 82746 ASSAY OF FOLIC ACID SERUM: CPT | Mod: HCNC

## 2020-09-23 PROCEDURE — 3077F SYST BP >= 140 MM HG: CPT | Mod: HCNC,CPTII,S$GLB, | Performed by: NURSE PRACTITIONER

## 2020-09-23 PROCEDURE — 84403 ASSAY OF TOTAL TESTOSTERONE: CPT | Mod: HCNC

## 2020-09-23 PROCEDURE — 99999 PR PBB SHADOW E&M-EST. PATIENT-LVL IV: ICD-10-PCS | Mod: PBBFAC,HCNC,, | Performed by: NURSE PRACTITIONER

## 2020-09-23 PROCEDURE — 86334 IMMUNOFIX E-PHORESIS SERUM: CPT | Mod: HCNC

## 2020-09-23 PROCEDURE — 86334 PATHOLOGIST INTERPRETATION IFE: ICD-10-PCS | Mod: 26,HCNC,, | Performed by: PATHOLOGY

## 2020-09-23 PROCEDURE — 99999 PR PBB SHADOW E&M-EST. PATIENT-LVL IV: CPT | Mod: PBBFAC,HCNC,, | Performed by: NURSE PRACTITIONER

## 2020-09-23 PROCEDURE — 84153 ASSAY OF PSA TOTAL: CPT | Mod: HCNC

## 2020-09-23 PROCEDURE — 3079F PR MOST RECENT DIASTOLIC BLOOD PRESSURE 80-89 MM HG: ICD-10-PCS | Mod: HCNC,CPTII,S$GLB, | Performed by: NURSE PRACTITIONER

## 2020-09-23 PROCEDURE — 80053 COMPREHEN METABOLIC PANEL: CPT | Mod: HCNC

## 2020-09-23 RX ORDER — PANTOPRAZOLE SODIUM 40 MG/1
40 TABLET, DELAYED RELEASE ORAL DAILY
Qty: 30 TABLET | Refills: 0 | Status: SHIPPED | OUTPATIENT
Start: 2020-09-23 | End: 2020-11-17

## 2020-09-23 RX ORDER — HYDROCODONE BITARTRATE AND ACETAMINOPHEN 10; 325 MG/1; MG/1
1 TABLET ORAL EVERY 6 HOURS PRN
Qty: 90 TABLET | Refills: 0 | Status: SHIPPED | OUTPATIENT
Start: 2020-09-23 | End: 2020-10-21 | Stop reason: SDUPTHER

## 2020-09-23 NOTE — PROGRESS NOTES
Subjective:      Patient ID: Joey Jacobo is a 65 y.o. male.    Chief Complaint: lab discussion/pain medication refill    HPI:  The patient is a 65-year-old -American male who presents to the hematology oncology clinic today to discuss further evaluation and management recommendations for metastatic prostate adenocarcinoma.        The patient is also being followed by Dr. Roland Dawn with urology for every 3 month Lupron injections - last received 9/14/2020.  I have reviewed all of the patient's relevant clinical history available in the medical record and have utilized this in my evaluation and management recommendations today.    Today the patient reports that he has chronic pain in his lumbar region.  He also reports pain in his lower thoracic region.  He has been taking Norco when necessary pain.  He denies any fevers, chills.  He states has sweating due to antihormonal therapy.  He denies any melena, hematochezia, hematemesis, hemoptysis or hematuria.  He denies any chest pain or shortness of breath.  He denies any urinary complaints.  He request refill of pain medication and acid reflux medication.      He reports compliance with Zytiga/prednisone which states started on December 7, 2017 and has been tolerating this medication well without any significant side effect.      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   Social Needs    Financial resource strain: Not on file    Food insecurity     Worry: Not on file     Inability: Not on file    Transportation needs     Medical: Not on file     Non-medical: Not on file   Tobacco Use    Smoking status: Former Smoker     Packs/day: 0.25     Years: 25.00     Pack years: 6.25     Types: Cigarettes     Quit date:  2018     Years since quittin.0    Smokeless tobacco: Never Used   Substance and Sexual Activity    Alcohol use: Yes     Alcohol/week: 0.0 standard drinks     Comment: occasionally    Drug use: No    Sexual activity: Yes   Lifestyle    Physical activity     Days per week: Not on file     Minutes per session: Not on file    Stress: Not on file   Relationships    Social connections     Talks on phone: Not on file     Gets together: Not on file     Attends Yazidism service: Not on file     Active member of club or organization: Not on file     Attends meetings of clubs or organizations: Not on file     Relationship status: Not on file   Other Topics Concern    Not on file   Social History Narrative    Wife and son       Family History   Problem Relation Age of Onset    Cataracts Mother     Kidney disease Mother     Cancer Father         Stomach    Blindness Maternal Grandmother     Diabetes Sister     Hypertension Sister     Diabetes Sister     Hypertension Sister     Diabetes Sister     Hypertension Sister     Hypertension Sister     Hypertension Sister        Past Surgical History:   Procedure Laterality Date    COLONOSCOPY N/A 3/21/2016    Procedure: COLONOSCOPY;  Surgeon: Melvin Pearce MD;  Location: [facility];  Service: Endoscopy;  Laterality: N/A;    CORONARY STENT PLACEMENT      times 2    SHOULDER ARTHROSCOPY Left     SPINE SURGERY      neck fusion x2       Past Medical History:   Diagnosis Date    COPD (chronic obstructive pulmonary disease) 2013    Coronary artery disease 2013    Emphysema of lung     Essential hypertension 10/29/2018    Glaucoma (increased eye pressure) 2013    Headache(784.0)     Mitral regurgitation 2013    Mixed hyperlipidemia 2013    Neuropathy     Osteoarthritis of spine with radiculopathy, lumbar region 2015    Other and unspecified hyperlipidemia     Pulmonary fibrosis 10/3/2017       Review of Systems    Constitutional: Positive for diaphoresis. Negative for fatigue and fever.   HENT: Negative.  Negative for dental problem.    Eyes: Negative.    Respiratory: Negative.  Negative for shortness of breath.    Cardiovascular: Negative.    Gastrointestinal: Negative.    Genitourinary: Negative.    Musculoskeletal: Positive for arthralgias and back pain.   Skin: Positive for wound (left lateral ankle on abx; followed by [facility] - accident at work 4/22/2020). Negative for rash.   Neurological: Positive for weakness (left wrist drop due to nerve damage from using crutches).   Hematological: Negative.  Negative for adenopathy. Does not bruise/bleed easily.   Psychiatric/Behavioral: Negative.           Medication List with Changes/Refills   Current Medications    ABIRATERONE (ZYTIGA) 250 MG TAB    Take 4 tablets (1,000 mg total) by mouth once daily.    ASPIRIN (ECOTRIN) 81 MG EC TABLET    Take by mouth. 1 Tablet, Delayed Release (E.C.) Oral Every day    ATORVASTATIN (LIPITOR) 40 MG TABLET    TAKE 1 TABLET BY MOUTH ONCE DAILY.    BUDESONIDE-FORMOTEROL 160-4.5 MCG (SYMBICORT) 160-4.5 MCG/ACTUATION HFAA    Inhale into the lungs.    CALCIUM-VITAMIN D (CALCIUM 600 + D,3,) 600 MG(1,500MG) -400 UNIT TAB    Take by mouth. 1 Tablet Oral Twice a day    ESCITALOPRAM OXALATE (LEXAPRO) 10 MG TABLET    Take 1 tablet (10 mg total) by mouth once daily.    EZETIMIBE (ZETIA) 10 MG TABLET    Take 1 tablet (10 mg total) by mouth once daily.    FLUTICASONE (FLONASE) 50 MCG/ACTUATION NASAL SPRAY    1 spray by Each Nare route once daily.    FLUTICASONE-SALMETEROL (ADVAIR HFA) 115-21 MCG/ACTUATION HFAA    Inhale 2 puffs into the lungs.    GABAPENTIN (NEURONTIN) 300 MG CAPSULE    Take 300 mg by mouth.    IBUPROFEN (ADVIL,MOTRIN) 800 MG TABLET        LATANOPROST 0.005 % OPHTHALMIC SOLUTION        METOPROLOL SUCCINATE (TOPROL-XL) 50 MG 24 HR TABLET    Take 1 tablet (50 mg total) by mouth once daily.    NITROGLYCERIN (NITROSTAT) 0.4 MG SL TABLET     Place 1 tablet (0.4 mg total) under the tongue every 5 (five) minutes as needed for Chest pain.    OXYCODONE MYRISTATE (XTAMPZA ER) 9 MG CSPT    Take 9 mg by mouth once daily.    PREDNISONE (DELTASONE) 5 MG TABLET    Take 1 tablet (5 mg total) by mouth 2 (two) times daily.    TRAVOPROST (TRAVATAN Z) 0.004 % DROP    Place 1 drop into both eyes every evening. 1 Drops Ophthalmic At bedtime   Changed and/or Refilled Medications    Modified Medication Previous Medication    HYDROCODONE-ACETAMINOPHEN (NORCO)  MG PER TABLET HYDROcodone-acetaminophen (NORCO)  mg per tablet       Take 1 tablet by mouth every 6 (six) hours as needed for Pain.    Take 1 tablet by mouth every 6 (six) hours as needed for Pain.    PANTOPRAZOLE (PROTONIX) 40 MG TABLET pantoprazole (PROTONIX) 40 MG tablet       Take 1 tablet (40 mg total) by mouth once daily.    Take 1 tablet (40 mg total) by mouth once daily.        Objective:     Vitals:    09/23/20 1302   BP: (!) 155/89   Pulse: 101   Temp: 97.9 °F (36.6 °C)       Physical Exam  Vitals signs and nursing note reviewed.   Constitutional:       General: He is not in acute distress.     Appearance: Normal appearance. He is well-developed. He is not ill-appearing, toxic-appearing or diaphoretic.   HENT:      Head: Normocephalic and atraumatic.      Right Ear: External ear normal.      Left Ear: External ear normal.      Nose: Nose normal.   Eyes:      General: Lids are normal. No scleral icterus.        Right eye: No discharge.         Left eye: No discharge.      Conjunctiva/sclera: Conjunctivae normal.   Cardiovascular:      Rate and Rhythm: Normal rate and regular rhythm.      Heart sounds: Normal heart sounds, S1 normal and S2 normal. No murmur. No friction rub. No gallop.    Pulmonary:      Effort: Pulmonary effort is normal. No tachypnea, bradypnea, accessory muscle usage or respiratory distress.      Breath sounds: Normal breath sounds. No stridor. No decreased breath sounds,  wheezing or rales.   Chest:      Chest wall: No tenderness.   Abdominal:      General: Bowel sounds are normal. There is no distension.      Palpations: Abdomen is soft.   Musculoskeletal: Normal range of motion.         General: Tenderness present.   Lymphadenopathy:      Cervical: No cervical adenopathy.   Skin:     General: Skin is warm and dry.      Findings: Signs of injury present.             Comments: Left lateral lower leg wound    Neurological:      Mental Status: He is alert and oriented to person, place, and time.   Psychiatric:         Attention and Perception: He is attentive.         Speech: Speech normal.         Behavior: Behavior normal.         Thought Content: Thought content normal.         Judgment: Judgment normal.         Assessment:     Problem List Items Addressed This Visit        Oncology    Secondary adenocarcinoma of skeletal bone    Relevant Medications    HYDROcodone-acetaminophen (NORCO)  mg per tablet    Other Relevant Orders    CBC auto differential    Comprehensive metabolic panel    Prostate cancer    Relevant Medications    HYDROcodone-acetaminophen (NORCO)  mg per tablet    Other Relevant Orders    CBC auto differential    Comprehensive metabolic panel    Neoplasm related pain    Relevant Medications    HYDROcodone-acetaminophen (NORCO)  mg per tablet       GI    Gastroesophageal reflux disease without esophagitis (Chronic)    Relevant Medications    pantoprazole (PROTONIX) 40 MG tablet        Lab Results   Component Value Date    WBC 9.66 09/23/2020    HGB 11.9 (L) 09/23/2020    HCT 37.7 (L) 09/23/2020    MCV 94 09/23/2020     09/23/2020     BMP  Lab Results   Component Value Date     09/23/2020    K 3.4 (L) 09/23/2020     09/23/2020    CO2 24 09/23/2020    BUN 22 09/23/2020    CREATININE 1.3 09/23/2020    CALCIUM 9.8 09/23/2020    ANIONGAP 13 09/23/2020    ESTGFRAFRICA >60 09/23/2020    EGFRNONAA 58 (A) 09/23/2020     Lab Results    Component Value Date    ALT 19 09/23/2020    AST 19 09/23/2020    ALKPHOS 93 09/23/2020    BILITOT 0.3 09/23/2020       Plan:   Secondary adenocarcinoma of skeletal bone  -     HYDROcodone-acetaminophen (NORCO)  mg per tablet; Take 1 tablet by mouth every 6 (six) hours as needed for Pain.  Dispense: 90 tablet; Refill: 0  -     CBC auto differential; Future; Expected date: 09/23/2020  -     Comprehensive metabolic panel; Future; Expected date: 09/23/2020    Neoplasm related pain  -     HYDROcodone-acetaminophen (NORCO)  mg per tablet; Take 1 tablet by mouth every 6 (six) hours as needed for Pain.  Dispense: 90 tablet; Refill: 0    Prostate cancer  -     HYDROcodone-acetaminophen (NORCO)  mg per tablet; Take 1 tablet by mouth every 6 (six) hours as needed for Pain.  Dispense: 90 tablet; Refill: 0  -     CBC auto differential; Future; Expected date: 09/23/2020  -     Comprehensive metabolic panel; Future; Expected date: 09/23/2020    Gastroesophageal reflux disease without esophagitis  -     pantoprazole (PROTONIX) 40 MG tablet; Take 1 tablet (40 mg total) by mouth once daily.  Dispense: 30 tablet; Refill: 0    Continue narcotics for symptomatic relieve due to metastatic disease to spine - refill sent -  checked.  He will continue every 3 month lupron injections with Dr. Dawn.  He will continue daily calcium and vitamin D supplementation.  He will continue daily Zytiga and prednisone.  Follow up in 3 months with cbc and cmp, see MD.      Collaborating Provider:  Dr. Frankie Mahoney    Thank You,  SAMANTA Gage-ROCK

## 2020-09-24 LAB
ALBUMIN SERPL ELPH-MCNC: 3.95 G/DL (ref 3.35–5.55)
ALPHA1 GLOB SERPL ELPH-MCNC: 0.37 G/DL (ref 0.17–0.41)
ALPHA2 GLOB SERPL ELPH-MCNC: 1 G/DL (ref 0.43–0.99)
B-GLOBULIN SERPL ELPH-MCNC: 1.05 G/DL (ref 0.5–1.1)
COMPLEXED PSA SERPL-MCNC: <0.01 NG/ML (ref 0–4)
FERRITIN SERPL-MCNC: 79 NG/ML (ref 20–300)
FOLATE SERPL-MCNC: 4.7 NG/ML (ref 4–24)
GAMMA GLOB SERPL ELPH-MCNC: 1.44 G/DL (ref 0.67–1.58)
INTERPRETATION SERPL IFE-IMP: NORMAL
IRON SERPL-MCNC: 71 UG/DL (ref 45–160)
PATHOLOGIST INTERPRETATION IFE: NORMAL
PATHOLOGIST INTERPRETATION SPE: NORMAL
PROT SERPL-MCNC: 7.8 G/DL (ref 6–8.4)
SATURATED IRON: 14 % (ref 20–50)
TESTOST SERPL-MCNC: <4 NG/DL (ref 304–1227)
TOTAL IRON BINDING CAPACITY: 493 UG/DL (ref 250–450)
TRANSFERRIN SERPL-MCNC: 333 MG/DL (ref 200–375)
TSH SERPL DL<=0.005 MIU/L-ACNC: 1.84 UIU/ML (ref 0.4–4)
VIT B12 SERPL-MCNC: 468 PG/ML (ref 210–950)

## 2020-10-20 ENCOUNTER — SPECIALTY PHARMACY (OUTPATIENT)
Dept: PHARMACY | Facility: CLINIC | Age: 65
End: 2020-10-20

## 2020-10-20 NOTE — TELEPHONE ENCOUNTER
Specialty Pharmacy - Refill Coordination    Specialty Medication Orders Linked to Encounter      Most Recent Value   Medication #1  abiraterone (ZYTIGA) 250 mg Tab (Order#[id number], Rx#[id number])          Refill Questions - Documented Responses      Most Recent Value   Relationship to patient of person spoken to?  Self   HIPAA/medical authority confirmed?  Yes   Any changes in contact preferences or allowed representatives?  No   Has the patient had any insurance changes?  No   Has the patient had any changes to specialty medication, dose, or instructions?  No   Has the patient started taking any new medications, herbals, or supplements?  No   Has the patient been diagnosed with any new medical conditions?  No   Does the patient have any new allergies to medications or foods?  No   Does the patient have any concerns about side effects?  No   Can the patient store medication/sharps container properly (at the correct temperature, away from children/pets, etc.)?  Yes   Can the patient call emergency services (911) in the event of an emergency?  Yes   Does the patient have any concerns or questions about taking or administering this medication as prescribed?  No   How many doses did the patient miss in the past 4 weeks or since the last fill?  0   How many doses does the patient have on hand?  3   How many days does the patient report on hand quantity will last?  3   Does the number of doses/days supply remaining match pharmacy expected amounts?  Yes   How will the patient receive the medication?  Mail   When does the patient need to receive the medication?  10/23/20   Shipping Address  Home   Address in [facility] confirmed and updated if neccessary?  Yes   Expected Copay ($)  0   Is the patient able to afford the medication copay?  Yes   Payment Method  zero copay   Days supply of Refill  30   Would patient like to speak to a pharmacist?  No   Do you want to trigger an intervention?  No   Do you want to  trigger an additional referral task?  No   Refill activity completed?  Yes   Refill activity plan  Refill scheduled   Shipment/Pickup Date:  10/22/20          Current Outpatient Medications   Medication Sig    abiraterone (ZYTIGA) 250 mg Tab Take 4 tablets (1,000 mg total) by mouth once daily.    aspirin (ECOTRIN) 81 MG EC tablet Take by mouth. 1 Tablet, Delayed Release (E.C.) Oral Every day    atorvastatin (LIPITOR) 40 MG tablet TAKE 1 TABLET BY MOUTH ONCE DAILY.    budesonide-formoterol 160-4.5 mcg (SYMBICORT) 160-4.5 mcg/actuation HFAA Inhale into the lungs.    calcium-vitamin D (CALCIUM 600 + D,3,) 600 mg(1,500mg) -400 unit Tab Take by mouth. 1 Tablet Oral Twice a day    escitalopram oxalate (LEXAPRO) 10 MG tablet Take 1 tablet (10 mg total) by mouth once daily. (Patient not taking: Reported on 2/10/2020)    ezetimibe (ZETIA) 10 mg tablet Take 1 tablet (10 mg total) by mouth once daily.    fluticasone (FLONASE) 50 mcg/actuation nasal spray 1 spray by Each Nare route once daily. (Patient taking differently: 1 spray by Each Nare route daily as needed. )    fluticasone-salmeterol (ADVAIR HFA) 115-21 mcg/actuation HFAA Inhale 2 puffs into the lungs.    gabapentin (NEURONTIN) 300 MG capsule Take 300 mg by mouth.    HYDROcodone-acetaminophen (NORCO)  mg per tablet Take 1 tablet by mouth every 6 (six) hours as needed for Pain.    ibuprofen (ADVIL,MOTRIN) 800 MG tablet     latanoprost 0.005 % ophthalmic solution     metoprolol succinate (TOPROL-XL) 50 MG 24 hr tablet Take 1 tablet (50 mg total) by mouth once daily.    nitroGLYCERIN (NITROSTAT) 0.4 MG SL tablet Place 1 tablet (0.4 mg total) under the tongue every 5 (five) minutes as needed for Chest pain.    oxyCODONE myristate (XTAMPZA ER) 9 mg CSpT Take 9 mg by mouth once daily.    pantoprazole (PROTONIX) 40 MG tablet Take 1 tablet (40 mg total) by mouth once daily.    predniSONE (DELTASONE) 5 MG tablet Take 1 tablet (5 mg total) by mouth 2  (two) times daily.    travoprost (TRAVATAN Z) 0.004 % Drop Place 1 drop into both eyes every evening. 1 Drops Ophthalmic At bedtime   Last reviewed on 9/23/2020  2:49 PM by Jinny Moon NP    Review of patient's allergies indicates:   Allergen Reactions    Avelox  [moxifloxacin] Rash    Nsaids (non-steroidal anti-inflammatory drug) Other (See Comments)     dyspepsia    Last reviewed on  9/23/2020 2:49 PM by Lissy Moon      Tasks added this encounter   No tasks added.   Tasks due within next 3 months   10/21/2020 - Clinical - Follow Up Assesement (90 day)  10/19/2020 - Refill Call     [facility]  [address] [place] [id number]  Phone: [phone]  Fax: [phone]

## 2020-10-21 ENCOUNTER — TELEPHONE (OUTPATIENT)
Dept: HEMATOLOGY/ONCOLOGY | Facility: CLINIC | Age: 65
End: 2020-10-21

## 2020-10-21 ENCOUNTER — SPECIALTY PHARMACY (OUTPATIENT)
Dept: PHARMACY | Facility: CLINIC | Age: 65
End: 2020-10-21

## 2020-10-21 ENCOUNTER — TELEPHONE (OUTPATIENT)
Dept: INTERNAL MEDICINE | Facility: CLINIC | Age: 65
End: 2020-10-21

## 2020-10-21 DIAGNOSIS — C61 PROSTATE CANCER: Primary | ICD-10-CM

## 2020-10-21 DIAGNOSIS — C79.51 SECONDARY ADENOCARCINOMA OF SKELETAL BONE: ICD-10-CM

## 2020-10-21 DIAGNOSIS — C61 PROSTATE CANCER: ICD-10-CM

## 2020-10-21 DIAGNOSIS — G89.3 NEOPLASM RELATED PAIN: ICD-10-CM

## 2020-10-21 RX ORDER — HYDROCODONE BITARTRATE AND ACETAMINOPHEN 10; 325 MG/1; MG/1
1 TABLET ORAL EVERY 6 HOURS PRN
Qty: 90 TABLET | Refills: 0 | Status: CANCELLED | OUTPATIENT
Start: 2020-10-21

## 2020-10-21 RX ORDER — HYDROCODONE BITARTRATE AND ACETAMINOPHEN 10; 325 MG/1; MG/1
1 TABLET ORAL EVERY 6 HOURS PRN
Qty: 90 TABLET | Refills: 0 | Status: SHIPPED | OUTPATIENT
Start: 2020-10-21 | End: 2020-11-16 | Stop reason: SDUPTHER

## 2020-10-21 NOTE — TELEPHONE ENCOUNTER
Nurse returned call to pt, she stated thank you for calling me back, but someone from dr delarosa office set my appointment up. thank you

## 2020-10-21 NOTE — TELEPHONE ENCOUNTER
----- Message from Oneida Ferreira sent at 10/21/2020  8:51 AM CDT -----  Regarding: pnemonia shot/flu shot  Type:  Sooner Apoointment Request    Caller is requesting a sooner appointment.  Caller declined first available appointment listed below.  Caller will not accept being placed on the waitlist and is requesting a message be sent to doctor.  Name of Caller:pt  When is the first available appointment?n/a  Symptoms:n/a  Would the patient rather a call back or a response via [facility]? Call back  Best Call Back Number:[phone]  Additional Information: Please call back.Thanks

## 2020-10-21 NOTE — TELEPHONE ENCOUNTER
Specialty Pharmacy - Clinical Reassessment    Specialty Medication Orders Linked to Encounter      Most Recent Value   Medication #1  abiraterone (ZYTIGA) 250 mg Tab (Order#[id number], Rx#[id number])        Subjective    Joey Jacobo is a 65 y.o. male, who is followed by the specialty pharmacy service for management and education.    Encounters since last clinical assessment   10/20/2020: Refill Coordination with Lianna Vincent   Clinical call attempts since last clinical assessment   No call attempts found.     Today he received follow up education for his specialty medication(s).    Current Outpatient Medications   Medication Sig    abiraterone (ZYTIGA) 250 mg Tab Take 4 tablets (1,000 mg total) by mouth once daily.    aspirin (ECOTRIN) 81 MG EC tablet Take by mouth. 1 Tablet, Delayed Release (E.C.) Oral Every day    atorvastatin (LIPITOR) 40 MG tablet TAKE 1 TABLET BY MOUTH ONCE DAILY.    budesonide-formoterol 160-4.5 mcg (SYMBICORT) 160-4.5 mcg/actuation HFAA Inhale into the lungs.    calcium-vitamin D (CALCIUM 600 + D,3,) 600 mg(1,500mg) -400 unit Tab Take by mouth. 1 Tablet Oral Twice a day    escitalopram oxalate (LEXAPRO) 10 MG tablet Take 1 tablet (10 mg total) by mouth once daily. (Patient not taking: Reported on 2/10/2020)    ezetimibe (ZETIA) 10 mg tablet Take 1 tablet (10 mg total) by mouth once daily.    fluticasone (FLONASE) 50 mcg/actuation nasal spray 1 spray by Each Nare route once daily. (Patient taking differently: 1 spray by Each Nare route daily as needed. )    fluticasone-salmeterol (ADVAIR HFA) 115-21 mcg/actuation HFAA Inhale 2 puffs into the lungs.    gabapentin (NEURONTIN) 300 MG capsule Take 300 mg by mouth.    HYDROcodone-acetaminophen (NORCO)  mg per tablet Take 1 tablet by mouth every 6 (six) hours as needed for Pain.    ibuprofen (ADVIL,MOTRIN) 800 MG tablet     latanoprost 0.005 % ophthalmic solution     metoprolol succinate (TOPROL-XL) 50 MG 24 hr tablet  Take 1 tablet (50 mg total) by mouth once daily.    nitroGLYCERIN (NITROSTAT) 0.4 MG SL tablet Place 1 tablet (0.4 mg total) under the tongue every 5 (five) minutes as needed for Chest pain.    oxyCODONE myristate (XTAMPZA ER) 9 mg CSpT Take 9 mg by mouth once daily.    pantoprazole (PROTONIX) 40 MG tablet Take 1 tablet (40 mg total) by mouth once daily.    predniSONE (DELTASONE) 5 MG tablet Take 1 tablet (5 mg total) by mouth 2 (two) times daily.    travoprost (TRAVATAN Z) 0.004 % Drop Place 1 drop into both eyes every evening. 1 Drops Ophthalmic At bedtime   Last reviewed on 10/21/2020 12:20 PM by Loreta Hamilton, PharmD    Review of patient's allergies indicates:   Allergen Reactions    Avelox  [moxifloxacin] Rash    Nsaids (non-steroidal anti-inflammatory drug) Other (See Comments)     dyspepsia   Last reviewed on  10/21/2020 12:19 PM by Loreta Hamilton    Drug Interactions    Drug interactions evaluated: yes  Clinically relevant drug interactions identified: no  Provided the patient with educational material regarding drug interactions: not applicable       Medication Adherence    Patient reported X missed doses in the last month: 0  Any gaps in refill history greater than 2 weeks in the last 3 months: no  Demonstrates understanding of importance of adherence: yes  Informant: patient  Reliability of informant: reliable  Provider-estimated medication adherence level: good  Reasons for non-adherence: no problems identified  Adherence tools used: patient uses a pill box to manage medications  Support network for adherence: family member  Confirmed plan for next specialty medication refill: delivery by pharmacy  Refills needed for supportive medications: not needed       Adverse Effects    Heat intolerance: Pos  Constitution: System reviewed and is negative  Skin: System reviewed and is negative  Eyes: System reviewed and is negative  Gastrointestinal: System reviewed and is negative  Genitourinary: System  "reviewed and is negative  Cardiovascular: System reviewed and is negative  Hematologic: System reviewed and is negative  Musculoskeletal: System reviewed and is negative  HENT: System reviewed and is negative  Neurological: System reviewed and is negative  Psychiatric: System reviewed and is negative  Respiratory: System reviewed and is negative       Assessment Questions - Documented Responses      Most Recent Value   Assessment   Medication Reconciliation completed for patient  Yes   During the past 4 weeks, has patient missed any activities due to condition or medication?  No   During the past 4 weeks, did patient have any of the following urgent care visits?  None   Goals of Therapy Status  Achieving   Welcome packet contents reviewed and discussed with patient?  Yes   Assesment completed?  Yes   Plan  Therapy continued   Do you need to open a clinical intervention (i-vent)?  No   Do you want to schedule first shipment?  No   Medication #1 Assessment Info   Patient status  Existing medication   Is this medication appropriate for the patient?  Yes   Is this medication effective?  Yes          Objective    He has a past medical history of COPD (chronic obstructive pulmonary disease) (7/30/2013), Coronary artery disease (7/30/2013), Emphysema of lung, Essential hypertension (10/29/2018), Glaucoma (increased eye pressure) (8/27/2013), Headache(784.0), Mitral regurgitation (7/30/2013), Mixed hyperlipidemia (7/30/2013), Neuropathy, Osteoarthritis of spine with radiculopathy, lumbar region (7/21/2015), Other and unspecified hyperlipidemia, and Pulmonary fibrosis (10/3/2017).    Tried/failed medications: none. Initial treatment is Lupron and Zytiga/prednisone    BP Readings from Last 4 Encounters:   09/23/20 (!) 155/89   09/14/20 (!) 146/80   03/11/20 108/78   01/06/20 112/62     Ht Readings from Last 4 Encounters:   09/23/20 5' 10" (1.778 m)   01/06/20 5' 10" (1.778 m)   12/11/19 5' 10" (1.778 m)   11/11/19 5' 10" " (1.778 m)     Wt Readings from Last 4 Encounters:   20 73 kg (160 lb 15 oz)   20 73.5 kg (162 lb 0.6 oz)   20 70 kg (154 lb 5.2 oz)   20 64.9 kg (143 lb)     Recent Labs   Lab Result Units 20  1244   RBC M/uL 4.00 L   Hemoglobin g/dL 11.9 L   Hematocrit % 37.7 L   WBC K/uL 9.66   Gran # (ANC) K/uL 6.2   Gran% % 64.0   Platelets K/uL 229   Sodium mmol/L 142   Potassium mmol/L 3.4 L   Chloride mmol/L 105   Glucose mg/dL 125 H   BUN, Bld mg/dL 22   Creatinine mg/dL 1.3   Calcium mg/dL 9.8   Total Protein g/dL 8.3   Albumin g/dL 3.7   Total Bilirubin mg/dL 0.3   Alkaline Phosphatase U/L 93   AST U/L 19   ALT U/L 19     The goals of cancer treatment include:  · Achieving remission of cancer, if possible  · Reducing tumor size and spread of cancer, if remission is not possible  · Minimizing pain and symptoms of the cancer  · Preventing infection and other complications of treatment  · Promoting adequate nutrition  · Encouraging proper hydration  · Improving or maintaining quality of life  · Maintaining optimal therapy adherence  · Minimizing and managing side effects    Goals of Therapy Status: Achieving    Assessment/Plan  Patient plans to continue therapy without changes      Indication, dosage, appropriateness, effectiveness, safety and convenience of his specialty medication(s) were reviewed today.     Patient Counseling    Counseled the patient on the following: doses and administration discussed, safe handling, storage, and disposal discussed, possible adverse effects and management discussed, possible drug and prescription drug interactions discussed, possible drug and OTC drug and food interactions discussed, lab monitoring and follow-up discussed, therapeutic rationale discussed, cost of medications and cost implications discussed, adherence and missed doses discussed, pharmacy contact information discussed       Dosing, how taking Zytiga and prednisone: Take Zytiga 250m tablets  (1,000 mg total) by mouth once daily on an empty stomach first thing in the morning. Takes prednisone BID with food. Denies any missed doses.  Storage: room temperature  Handling: pour into cup and aware of proper handling precautions. Does not touch pills directly.  Side effects: notes some hot flashes that are tolerable at this time. Denies any other significant side effects. Offered to review over common side effects and warnings/precautions related to Zytiga; however, patient declined.   Recent infections: no fever, chills, cough, SOB  Pain: 7/10 (pretty normal). Takes Norco as needed and this helps.   Appetite: good, eating 3 meals a day. Drinking plenty of water.   Energy, fatigue: energy good and patient still able to complete normal daily activities.   Health, mood, QOL: 9/10 (10 being the best)  Medication list reviewed. No new allergies or health conditions.   Notes: States that BP has been stable and denies any chest pain/pressure or irregular HR or swelling. Unable to report BP today.     Labs reviewed from: 9/23/20: No dosage adjustments needed for renal or hepatic function. PSA < 0.01 and patient responding well to therapy.       Tasks added this encounter   No tasks added.   Tasks due within next 3 months   10/21/2020 - Clinical - Follow Up Assesement (90 day)  11/12/2020 - Refill Call (Auto Added)     Loreta Hamilton, PharmD  [facility] - Specialty Pharmacy  [address] [place] [address]  Phone: [phone]  Fax: [phone]

## 2020-10-21 NOTE — TELEPHONE ENCOUNTER
----- Message from Beatriz Rashid MA sent at 10/21/2020  9:42 AM CDT -----  Regarding: FW: Please call back pt when refill is sent over    ----- Message -----  From: Oneida Ferreira  Sent: 10/21/2020   8:49 AM CDT  To: [facility] Staff  Subject: Please call back pt when refill is sent over     Type:  RX Refill Request    Who Called: pt  Refill or New Rx:refill  RX Name and Strength:HYDROcodone-acetaminophen (NORCO)  mg per tablet  How is the patient currently taking it? (ex. 1XDay):once a day every 6 hrs  Is this a 30 day or 90 day Rx:30 day  Preferred Pharmacy with phone number:  [facility] [place] - [address]  [address]  [place] [address]  Phone: [phone] Fax: [phone]  Local or Mail Order:local  Ordering Provider:Red  Would the patient rather a call back or a response via [facility]? Call back  Best Call Back Number:[phone]  Additional Information: Please call back.Thanks

## 2020-10-21 NOTE — TELEPHONE ENCOUNTER
----- Message from Kelsi Islas sent at 10/21/2020 12:56 PM CDT -----  Contact: [phone]  Would like to consult with the nurse patient is in a lot of pain and would like to know if his Rx medication will be call in today , patient would like a  call back as soon as possible, thanks sj

## 2020-10-27 ENCOUNTER — PES CALL (OUTPATIENT)
Dept: ADMINISTRATIVE | Facility: CLINIC | Age: 65
End: 2020-10-27

## 2020-11-13 ENCOUNTER — SPECIALTY PHARMACY (OUTPATIENT)
Dept: PHARMACY | Facility: CLINIC | Age: 65
End: 2020-11-13

## 2020-11-13 NOTE — TELEPHONE ENCOUNTER
Specialty Pharmacy - Refill Coordination    Specialty Medication Orders Linked to Encounter      Most Recent Value   Medication #1  abiraterone (ZYTIGA) 250 mg Tab (Order#[id number], Rx#[id number])          Refill Questions - Documented Responses      Most Recent Value   Relationship to patient of person spoken to?  Self   HIPAA/medical authority confirmed?  Yes   Any changes in contact preferences or allowed representatives?  No   Has the patient had any insurance changes?  No   Has the patient had any changes to specialty medication, dose, or instructions?  No   Has the patient started taking any new medications, herbals, or supplements?  No   Has the patient been diagnosed with any new medical conditions?  No   Does the patient have any new allergies to medications or foods?  No   Can the patient store medication/sharps container properly (at the correct temperature, away from children/pets, etc.)?  Yes   Can the patient call emergency services (911) in the event of an emergency?  Yes   Does the patient have any concerns or questions about taking or administering this medication as prescribed?  No   How many doses did the patient miss in the past 4 weeks or since the last fill?  0   How many doses does the patient have on hand?  22   How many days does the patient report on hand quantity will last?  7   Does the number of doses/days supply remaining match pharmacy expected amounts?  Yes   Does the patient feel that this medication is effective?  Yes   During the past 4 weeks, has patient missed any activities due to condition or medication?  No   During the past 4 weeks, did patient have any of the following urgent care visits?  None   How will the patient receive the medication?  Mail   When does the patient need to receive the medication?  11/17/20   Shipping Address  Home   Address in [facility] confirmed and updated if neccessary?  Yes   Expected Copay ($)  0   Is the patient able to afford the  medication copay?  Yes   Payment Method  zero copay   Days supply of Refill  30   Would patient like to speak to a pharmacist?  No   Do you want to trigger an intervention?  No   Do you want to trigger an additional referral task?  No   Refill activity completed?  Yes   Refill activity plan  Refill scheduled   Shipment/Pickup Date:  11/16/20          Current Outpatient Medications   Medication Sig    abiraterone (ZYTIGA) 250 mg Tab Take 4 tablets (1,000 mg total) by mouth once daily.    aspirin (ECOTRIN) 81 MG EC tablet Take by mouth. 1 Tablet, Delayed Release (E.C.) Oral Every day    atorvastatin (LIPITOR) 40 MG tablet TAKE 1 TABLET BY MOUTH ONCE DAILY.    budesonide-formoterol 160-4.5 mcg (SYMBICORT) 160-4.5 mcg/actuation HFAA Inhale into the lungs.    calcium-vitamin D (CALCIUM 600 + D,3,) 600 mg(1,500mg) -400 unit Tab Take by mouth. 1 Tablet Oral Twice a day    escitalopram oxalate (LEXAPRO) 10 MG tablet Take 1 tablet (10 mg total) by mouth once daily. (Patient not taking: Reported on 2/10/2020)    ezetimibe (ZETIA) 10 mg tablet TAKE 1 TABLET (10 MG TOTAL) BY MOUTH ONCE DAILY.    fluticasone (FLONASE) 50 mcg/actuation nasal spray 1 spray by Each Nare route once daily. (Patient taking differently: 1 spray by Each Nare route daily as needed. )    fluticasone-salmeterol (ADVAIR HFA) 115-21 mcg/actuation HFAA Inhale 2 puffs into the lungs.    gabapentin (NEURONTIN) 300 MG capsule Take 300 mg by mouth.    HYDROcodone-acetaminophen (NORCO)  mg per tablet Take 1 tablet by mouth every 6 (six) hours as needed for Pain.    ibuprofen (ADVIL,MOTRIN) 800 MG tablet     latanoprost 0.005 % ophthalmic solution     metoprolol succinate (TOPROL-XL) 50 MG 24 hr tablet Take 1 tablet (50 mg total) by mouth once daily.    nitroGLYCERIN (NITROSTAT) 0.4 MG SL tablet Place 1 tablet (0.4 mg total) under the tongue every 5 (five) minutes as needed for Chest pain.    oxyCODONE myristate (XTAMPZA ER) 9 mg CSpT Take 9  mg by mouth once daily.    pantoprazole (PROTONIX) 40 MG tablet Take 1 tablet (40 mg total) by mouth once daily.    predniSONE (DELTASONE) 5 MG tablet Take 1 tablet (5 mg total) by mouth 2 (two) times daily.    travoprost (TRAVATAN Z) 0.004 % Drop Place 1 drop into both eyes every evening. 1 Drops Ophthalmic At bedtime   Last reviewed on 10/21/2020 12:20 PM by Loreta Hamilton, PharmD    Review of patient's allergies indicates:   Allergen Reactions    Avelox  [moxifloxacin] Rash    Nsaids (non-steroidal anti-inflammatory drug) Other (See Comments)     dyspepsia    Last reviewed on  11/13/2020 4:12 PM by Alexandro Montague      Tasks added this encounter   12/6/2020 - Refill Call (Auto Added)   Tasks due within next 3 months   1/12/2021 - Clinical - Follow Up Assesement (90 day)     Alexandro Montague  [facility] - Specialty Pharmacy  [address] [place] [id number]  Phone: [phone]  Fax: [phone]

## 2020-11-16 ENCOUNTER — TELEPHONE (OUTPATIENT)
Dept: HEMATOLOGY/ONCOLOGY | Facility: CLINIC | Age: 65
End: 2020-11-16

## 2020-11-16 DIAGNOSIS — G89.3 NEOPLASM RELATED PAIN: ICD-10-CM

## 2020-11-16 DIAGNOSIS — C61 PROSTATE CANCER: ICD-10-CM

## 2020-11-16 DIAGNOSIS — C79.51 SECONDARY ADENOCARCINOMA OF SKELETAL BONE: ICD-10-CM

## 2020-11-16 RX ORDER — HYDROCODONE BITARTRATE AND ACETAMINOPHEN 10; 325 MG/1; MG/1
1 TABLET ORAL EVERY 6 HOURS PRN
Qty: 90 TABLET | Refills: 0 | Status: SHIPPED | OUTPATIENT
Start: 2020-11-16 | End: 2020-12-14 | Stop reason: SDUPTHER

## 2020-11-16 NOTE — TELEPHONE ENCOUNTER
Spoke to the patient about all medications he requested I informed him that his provider was out of the office until Thursday. Patient verbalized he would call to request all medications to be refilled on that day.

## 2020-11-17 ENCOUNTER — PATIENT OUTREACH (OUTPATIENT)
Dept: ADMINISTRATIVE | Facility: HOSPITAL | Age: 65
End: 2020-11-17

## 2020-11-19 DIAGNOSIS — C61 PROSTATE CANCER: ICD-10-CM

## 2020-11-19 DIAGNOSIS — C79.51 SECONDARY ADENOCARCINOMA OF SKELETAL BONE: ICD-10-CM

## 2020-11-19 DIAGNOSIS — G89.3 NEOPLASM RELATED PAIN: ICD-10-CM

## 2020-11-19 RX ORDER — HYDROCODONE BITARTRATE AND ACETAMINOPHEN 10; 325 MG/1; MG/1
1 TABLET ORAL EVERY 6 HOURS PRN
Qty: 90 TABLET | Refills: 0 | OUTPATIENT
Start: 2020-11-19

## 2020-11-20 ENCOUNTER — TELEPHONE (OUTPATIENT)
Dept: INTERNAL MEDICINE | Facility: CLINIC | Age: 65
End: 2020-11-20

## 2020-11-20 NOTE — TELEPHONE ENCOUNTER
----- Message from Va Shea sent at 11/20/2020 10:51 AM CST -----  Contact: pt  Type:  Same Day Appointment Request    Caller is requesting a same day appointment.  Caller declined first available appointment listed below.    Name of Caller:pt  When is the first available appointment?n/a  Symptoms: pneumonia shot   Best Call Back Number: [phone]  Additional Information: n/a

## 2020-11-23 ENCOUNTER — OFFICE VISIT (OUTPATIENT)
Dept: INTERNAL MEDICINE | Facility: CLINIC | Age: 65
End: 2020-11-23
Payer: MEDICARE

## 2020-11-23 VITALS
BODY MASS INDEX: 23.29 KG/M2 | HEART RATE: 88 BPM | HEIGHT: 70 IN | TEMPERATURE: 97 F | WEIGHT: 162.69 LBS | SYSTOLIC BLOOD PRESSURE: 120 MMHG | DIASTOLIC BLOOD PRESSURE: 64 MMHG

## 2020-11-23 DIAGNOSIS — I25.118 CORONARY ARTERY DISEASE OF NATIVE ARTERY OF NATIVE HEART WITH STABLE ANGINA PECTORIS: Chronic | ICD-10-CM

## 2020-11-23 DIAGNOSIS — H40.1132 PRIMARY OPEN ANGLE GLAUCOMA (POAG) OF BOTH EYES, MODERATE STAGE: ICD-10-CM

## 2020-11-23 DIAGNOSIS — Z00.00 ENCOUNTER FOR MEDICARE ANNUAL WELLNESS EXAM: Primary | ICD-10-CM

## 2020-11-23 DIAGNOSIS — I10 ESSENTIAL HYPERTENSION: ICD-10-CM

## 2020-11-23 DIAGNOSIS — I34.0 NONRHEUMATIC MITRAL VALVE REGURGITATION: Chronic | ICD-10-CM

## 2020-11-23 DIAGNOSIS — Z98.61 HISTORY OF PTCA: ICD-10-CM

## 2020-11-23 DIAGNOSIS — J44.9 CHRONIC OBSTRUCTIVE PULMONARY DISEASE, UNSPECIFIED COPD TYPE: Chronic | ICD-10-CM

## 2020-11-23 DIAGNOSIS — C79.51 SECONDARY ADENOCARCINOMA OF SKELETAL BONE: ICD-10-CM

## 2020-11-23 DIAGNOSIS — G89.3 NEOPLASM RELATED PAIN: ICD-10-CM

## 2020-11-23 DIAGNOSIS — I20.9 AP (ANGINA PECTORIS): ICD-10-CM

## 2020-11-23 DIAGNOSIS — K21.9 GASTROESOPHAGEAL REFLUX DISEASE WITHOUT ESOPHAGITIS: Chronic | ICD-10-CM

## 2020-11-23 DIAGNOSIS — Z87.891 HISTORY OF SMOKING: ICD-10-CM

## 2020-11-23 DIAGNOSIS — E78.2 MIXED HYPERLIPIDEMIA: Chronic | ICD-10-CM

## 2020-11-23 DIAGNOSIS — J84.10 PULMONARY FIBROSIS: ICD-10-CM

## 2020-11-23 DIAGNOSIS — C61 PROSTATE CANCER: ICD-10-CM

## 2020-11-23 PROCEDURE — 3008F BODY MASS INDEX DOCD: CPT | Mod: HCNC,CPTII,S$GLB, | Performed by: PHYSICIAN ASSISTANT

## 2020-11-23 PROCEDURE — 99999 PR PBB SHADOW E&M-EST. PATIENT-LVL IV: CPT | Mod: PBBFAC,HCNC,, | Performed by: PHYSICIAN ASSISTANT

## 2020-11-23 PROCEDURE — 90732 PPSV23 VACC 2 YRS+ SUBQ/IM: CPT | Mod: HCNC,S$GLB,, | Performed by: PHYSICIAN ASSISTANT

## 2020-11-23 PROCEDURE — 3008F PR BODY MASS INDEX (BMI) DOCUMENTED: ICD-10-PCS | Mod: HCNC,CPTII,S$GLB, | Performed by: PHYSICIAN ASSISTANT

## 2020-11-23 PROCEDURE — 90686 FLU VACCINE (QUAD) GREATER THAN OR EQUAL TO 3YO PRESERVATIVE FREE IM: ICD-10-PCS | Mod: HCNC,S$GLB,, | Performed by: PHYSICIAN ASSISTANT

## 2020-11-23 PROCEDURE — 90686 IIV4 VACC NO PRSV 0.5 ML IM: CPT | Mod: HCNC,S$GLB,, | Performed by: PHYSICIAN ASSISTANT

## 2020-11-23 PROCEDURE — G0008 ADMIN INFLUENZA VIRUS VAC: HCPCS | Mod: HCNC,S$GLB,, | Performed by: PHYSICIAN ASSISTANT

## 2020-11-23 PROCEDURE — 90732 PNEUMOCOCCAL POLYSACCHARIDE VACCINE 23-VALENT =>2YO SQ IM: ICD-10-PCS | Mod: HCNC,S$GLB,, | Performed by: PHYSICIAN ASSISTANT

## 2020-11-23 PROCEDURE — G0008 FLU VACCINE (QUAD) GREATER THAN OR EQUAL TO 3YO PRESERVATIVE FREE IM: ICD-10-PCS | Mod: HCNC,S$GLB,, | Performed by: PHYSICIAN ASSISTANT

## 2020-11-23 PROCEDURE — 99999 PR PBB SHADOW E&M-EST. PATIENT-LVL IV: ICD-10-PCS | Mod: PBBFAC,HCNC,, | Performed by: PHYSICIAN ASSISTANT

## 2020-11-23 PROCEDURE — G0439 PPPS, SUBSEQ VISIT: HCPCS | Mod: HCNC,S$GLB,, | Performed by: PHYSICIAN ASSISTANT

## 2020-11-23 PROCEDURE — G0009 ADMIN PNEUMOCOCCAL VACCINE: HCPCS | Mod: HCNC,S$GLB,, | Performed by: PHYSICIAN ASSISTANT

## 2020-11-23 PROCEDURE — 1126F AMNT PAIN NOTED NONE PRSNT: CPT | Mod: HCNC,S$GLB,, | Performed by: PHYSICIAN ASSISTANT

## 2020-11-23 PROCEDURE — G0439 PR MEDICARE ANNUAL WELLNESS SUBSEQUENT VISIT: ICD-10-PCS | Mod: HCNC,S$GLB,, | Performed by: PHYSICIAN ASSISTANT

## 2020-11-23 PROCEDURE — G0009 PNEUMOCOCCAL POLYSACCHARIDE VACCINE 23-VALENT =>2YO SQ IM: ICD-10-PCS | Mod: HCNC,S$GLB,, | Performed by: PHYSICIAN ASSISTANT

## 2020-11-23 PROCEDURE — 1126F PR PAIN SEVERITY QUANTIFIED, NO PAIN PRESENT: ICD-10-PCS | Mod: HCNC,S$GLB,, | Performed by: PHYSICIAN ASSISTANT

## 2020-11-23 NOTE — Clinical Note
Hi, can we get this patient in with Dr. Brand for his annual, he is much overdue.    Thanks   Karen

## 2020-12-08 ENCOUNTER — SPECIALTY PHARMACY (OUTPATIENT)
Dept: PHARMACY | Facility: CLINIC | Age: 65
End: 2020-12-08

## 2020-12-08 NOTE — TELEPHONE ENCOUNTER
Patient reports having some dry skin. Reports no open wounds or blisters at this time. Advised to keep skin well moisturized and used some HC1% cream for any itchy skin. Patient confirmed understanding.

## 2020-12-08 NOTE — TELEPHONE ENCOUNTER
Specialty Pharmacy - Refill Coordination    Specialty Medication Orders Linked to Encounter      Most Recent Value   Medication #1  abiraterone (ZYTIGA) 250 mg Tab (Order#[id number], Rx#[id number])          Refill Questions - Documented Responses      Most Recent Value   Relationship to patient of person spoken to?  Self   HIPAA/medical authority confirmed?  Yes   Any changes in contact preferences or allowed representatives?  No   Has the patient had any insurance changes?  No   Has the patient had any changes to specialty medication, dose, or instructions?  No   Has the patient started taking any new medications, herbals, or supplements?  No   Has the patient been diagnosed with any new medical conditions?  No   Does the patient have any new allergies to medications or foods?  No   Does the patient have any concerns about side effects?  No   Can the patient store medication/sharps container properly (at the correct temperature, away from children/pets, etc.)?  Yes   Can the patient call emergency services (911) in the event of an emergency?  Yes   Does the patient have any concerns or questions about taking or administering this medication as prescribed?  No   How many doses did the patient miss in the past 4 weeks or since the last fill?  0   How many doses does the patient have on hand?  36   How many days does the patient report on hand quantity will last?  9   Does the number of doses/days supply remaining match pharmacy expected amounts?  Yes   Does the patient feel that this medication is effective?  Yes   During the past 4 weeks, has patient missed any activities due to condition or medication?  No   During the past 4 weeks, did patient have any of the following urgent care visits?  None   How will the patient receive the medication?  Mail   When does the patient need to receive the medication?  12/17/20   Shipping Address  Home   Address in [facility] confirmed and updated if neccessary?  Yes    Expected Copay ($)  0   Is the patient able to afford the medication copay?  Yes   Payment Method  zero copay   Days supply of Refill  30   Would patient like to speak to a pharmacist?  No   Do you want to trigger an intervention?  No   Do you want to trigger an additional referral task?  No   Refill activity completed?  Yes   Refill activity plan  Refill scheduled   Shipment/Pickup Date:  12/14/20          Current Outpatient Medications   Medication Sig    abiraterone (ZYTIGA) 250 mg Tab Take 4 tablets (1,000 mg total) by mouth once daily.    aspirin (ECOTRIN) 81 MG EC tablet Take by mouth. 1 Tablet, Delayed Release (E.C.) Oral Every day    atorvastatin (LIPITOR) 40 MG tablet TAKE 1 TABLET BY MOUTH ONCE DAILY.    budesonide-formoterol 160-4.5 mcg (SYMBICORT) 160-4.5 mcg/actuation HFAA Inhale into the lungs.    calcium-vitamin D (CALCIUM 600 + D,3,) 600 mg(1,500mg) -400 unit Tab Take by mouth. 1 Tablet Oral Twice a day    escitalopram oxalate (LEXAPRO) 10 MG tablet Take 1 tablet (10 mg total) by mouth once daily. (Patient not taking: Reported on 2/10/2020)    ezetimibe (ZETIA) 10 mg tablet TAKE 1 TABLET (10 MG TOTAL) BY MOUTH ONCE DAILY.    fluticasone (FLONASE) 50 mcg/actuation nasal spray 1 spray by Each Nare route once daily. (Patient taking differently: 1 spray by Each Nare route daily as needed. )    fluticasone-salmeterol (ADVAIR HFA) 115-21 mcg/actuation HFAA Inhale 2 puffs into the lungs.    gabapentin (NEURONTIN) 300 MG capsule Take 300 mg by mouth.    HYDROcodone-acetaminophen (NORCO)  mg per tablet Take 1 tablet by mouth every 6 (six) hours as needed for Pain.    ibuprofen (ADVIL,MOTRIN) 800 MG tablet     latanoprost 0.005 % ophthalmic solution     metoprolol succinate (TOPROL-XL) 50 MG 24 hr tablet Take 1 tablet (50 mg total) by mouth once daily.    nitroGLYCERIN (NITROSTAT) 0.4 MG SL tablet Place 1 tablet (0.4 mg total) under the tongue every 5 (five) minutes as needed for Chest  pain.    oxyCODONE myristate (XTAMPZA ER) 9 mg CSpT Take 9 mg by mouth once daily.    pantoprazole (PROTONIX) 40 MG tablet TAKE 1 TABLET (40 MG TOTAL) BY MOUTH ONCE DAILY.    predniSONE (DELTASONE) 5 MG tablet Take 1 tablet (5 mg total) by mouth 2 (two) times daily.    travoprost (TRAVATAN Z) 0.004 % Drop Place 1 drop into both eyes every evening. 1 Drops Ophthalmic At bedtime   Last reviewed on 11/23/2020  1:18 PM by Radha Romano LPN    Review of patient's allergies indicates:   Allergen Reactions    Avelox  [moxifloxacin] Rash    Nsaids (non-steroidal anti-inflammatory drug) Other (See Comments)     dyspepsia    Last reviewed on  11/23/2020 1:18 PM by Radha Romano      Tasks added this encounter   1/5/2021 - Refill Call (Auto Added)   Tasks due within next 3 months   1/12/2021 - Clinical - Follow Up Assesement (90 day)     Cooper Noble  [facility]  [address] [place] [id number]  Phone: [phone]  Fax: [phone]

## 2020-12-14 DIAGNOSIS — E78.2 MIXED HYPERLIPIDEMIA: Chronic | ICD-10-CM

## 2020-12-14 DIAGNOSIS — C61 PROSTATE CANCER: ICD-10-CM

## 2020-12-14 DIAGNOSIS — G89.3 NEOPLASM RELATED PAIN: ICD-10-CM

## 2020-12-14 DIAGNOSIS — C79.51 SECONDARY ADENOCARCINOMA OF SKELETAL BONE: ICD-10-CM

## 2020-12-14 DIAGNOSIS — K21.9 GASTROESOPHAGEAL REFLUX DISEASE WITHOUT ESOPHAGITIS: Chronic | ICD-10-CM

## 2020-12-14 RX ORDER — HYDROCODONE BITARTRATE AND ACETAMINOPHEN 10; 325 MG/1; MG/1
1 TABLET ORAL EVERY 6 HOURS PRN
Qty: 90 TABLET | Refills: 0 | Status: SHIPPED | OUTPATIENT
Start: 2020-12-14 | End: 2021-01-12 | Stop reason: SDUPTHER

## 2020-12-14 RX ORDER — PANTOPRAZOLE SODIUM 40 MG/1
40 TABLET, DELAYED RELEASE ORAL DAILY
Qty: 30 TABLET | Refills: 0 | Status: SHIPPED | OUTPATIENT
Start: 2020-12-14 | End: 2021-01-25

## 2020-12-14 RX ORDER — EZETIMIBE 10 MG/1
10 TABLET ORAL DAILY
Qty: 30 TABLET | Refills: 1 | Status: SHIPPED | OUTPATIENT
Start: 2020-12-14 | End: 2021-06-01 | Stop reason: SDUPTHER

## 2020-12-15 NOTE — PROGRESS NOTES
Subjective:       Patient ID: Joey Jacobo is a 65 y.o. male.    Chief Complaint: Medicare AWV    HPI    Health Maintenance Due   Topic Date Due    Shingles Vaccine (1 of 2) 03/15/2007    DEXA SCAN  11/17/2020       Past Medical History:   Diagnosis Date    Angina pectoris     Back pain     COPD (chronic obstructive pulmonary disease) 7/30/2013    Coronary artery disease 7/30/2013    Emphysema of lung     Essential hypertension 10/29/2018    Glaucoma (increased eye pressure) 8/27/2013    Headache(784.0)     Mitral regurgitation 7/30/2013    Mixed hyperlipidemia 7/30/2013    Neuropathy     Osteoarthritis of spine with radiculopathy, lumbar region 7/21/2015    Other and unspecified hyperlipidemia     Prostate cancer     Pulmonary fibrosis 10/3/2017       Current Outpatient Medications   Medication Sig Dispense Refill    abiraterone (ZYTIGA) 250 mg Tab Take 4 tablets (1,000 mg total) by mouth once daily. 120 tablet 11    aspirin (ECOTRIN) 81 MG EC tablet Take by mouth. 1 Tablet, Delayed Release (E.C.) Oral Every day      atorvastatin (LIPITOR) 40 MG tablet TAKE 1 TABLET BY MOUTH ONCE DAILY. 90 tablet 3    budesonide-formoterol 160-4.5 mcg (SYMBICORT) 160-4.5 mcg/actuation HFAA Inhale into the lungs.      calcium-vitamin D (CALCIUM 600 + D,3,) 600 mg(1,500mg) -400 unit Tab Take by mouth. 1 Tablet Oral Twice a day      fluticasone (FLONASE) 50 mcg/actuation nasal spray 1 spray by Each Nare route once daily. (Patient taking differently: 1 spray by Each Nare route daily as needed. ) 1 Bottle 11    fluticasone-salmeterol (ADVAIR HFA) 115-21 mcg/actuation HFAA Inhale 2 puffs into the lungs.      gabapentin (NEURONTIN) 300 MG capsule Take 300 mg by mouth.      ibuprofen (ADVIL,MOTRIN) 800 MG tablet       latanoprost 0.005 % ophthalmic solution   3    metoprolol succinate (TOPROL-XL) 50 MG 24 hr tablet Take 1 tablet (50 mg total) by mouth once daily. 90 tablet 1    nitroGLYCERIN (NITROSTAT)  "0.4 MG SL tablet Place 1 tablet (0.4 mg total) under the tongue every 5 (five) minutes as needed for Chest pain. 20 tablet 12    oxyCODONE myristate (XTAMPZA ER) 9 mg CSpT Take 9 mg by mouth once daily. 30 each 0    predniSONE (DELTASONE) 5 MG tablet Take 1 tablet (5 mg total) by mouth 2 (two) times daily. 180 tablet 1    travoprost (TRAVATAN Z) 0.004 % Drop Place 1 drop into both eyes every evening. 1 Drops Ophthalmic At bedtime 5 mL 12    escitalopram oxalate (LEXAPRO) 10 MG tablet Take 1 tablet (10 mg total) by mouth once daily. (Patient not taking: Reported on 2/10/2020) 30 tablet 3    ezetimibe (ZETIA) 10 mg tablet Take 1 tablet (10 mg total) by mouth once daily. 30 tablet 1    HYDROcodone-acetaminophen (NORCO)  mg per tablet Take 1 tablet by mouth every 6 (six) hours as needed for Pain. 90 tablet 0    pantoprazole (PROTONIX) 40 MG tablet Take 1 tablet (40 mg total) by mouth once daily. 30 tablet 0     Current Facility-Administered Medications   Medication Dose Route Frequency Provider Last Rate Last Dose    leuprolide (LUPRON) injection 22.5 mg  22.5 mg Intramuscular Q90 Days Roland Dawn IV, MD   22.5 mg at 09/14/20 1157       Review of Systems    Objective:   /64   Pulse 88   Temp 97.2 °F (36.2 °C)   Ht 5' 10" (1.778 m)   Wt 73.8 kg (162 lb 11.2 oz)   BMI 23.34 kg/m²      Physical Exam      Lab Results   Component Value Date    WBC 9.66 09/23/2020    HGB 11.9 (L) 09/23/2020    HCT 37.7 (L) 09/23/2020     09/23/2020    CHOL 160 01/15/2020    TRIG 96 01/15/2020    HDL 65 01/15/2020    ALT 19 09/23/2020    AST 19 09/23/2020     09/23/2020    K 3.4 (L) 09/23/2020     09/23/2020    CREATININE 1.3 09/23/2020    BUN 22 09/23/2020    CO2 24 09/23/2020    TSH 1.843 09/23/2020    PSA <0.01 01/16/2019    INR 1.0 05/19/2011       Assessment:       1. Encounter for Medicare annual wellness exam    2. Secondary adenocarcinoma of skeletal bone    3. Coronary artery disease " of native artery of native heart with stable angina pectoris    4. Essential hypertension    5. Primary open angle glaucoma (POAG) of both eyes, moderate stage    6. Prostate cancer    7. Chronic obstructive pulmonary disease, unspecified COPD type    8. Mixed hyperlipidemia    9. Gastroesophageal reflux disease without esophagitis    10. Neoplasm related pain    11. History of PTCA    12. Nonrheumatic mitral valve regurgitation    13. History of smoking    14. AP (angina pectoris)        Plan:   Encounter for Medicare annual wellness exam    Secondary adenocarcinoma of skeletal bone  Comments:  followed by heme/onc, cont plan     Coronary artery disease of native artery of native heart with stable angina pectoris  Comments:  cont med, followed by cards     Essential hypertension  Comments:  stable, cont meds per PCP plan     Primary open angle glaucoma (POAG) of both eyes, moderate stage  Comments:  stable, followed by Dr. Garay     Prostate cancer  Comments:  being followed by Dr. Roland Dawn with urology for every 3 month Lupron injections - last received 9/14/2020.    Chronic obstructive pulmonary disease, unspecified COPD type  Comments:  stable, CXR recent showed no changes     Mixed hyperlipidemia  Comments:  stable, cont meds per PCP plan     Gastroesophageal reflux disease without esophagitis  Comments:  no current issues     Neoplasm related pain  Comments:  followed by onco    History of PTCA    Nonrheumatic mitral valve regurgitation  Comments:  no issues current, followed by cards     History of smoking    AP (angina pectoris)  Comments:  stable, no current issues, followd by cards     Other orders  -     (In Office Administered) Pneumococcal Polysaccharide Vaccine (23 Valent) (SQ/IM)  -     Influenza - Quadrivalent (PF)        No follow-ups on file.

## 2020-12-15 NOTE — PROGRESS NOTES
"  Joey Jacobo presented for a follow-up Medicare AWV today. The following components were reviewed and updated:    · Medical history  · Family History  · Social history  · Allergies and Current Medications  · Health Risk Assessment  · Health Maintenance  · Care Team    **See Completed Assessments for Annual Wellness visit with in the encounter summary    The following assessments were completed:  · Depression Screening  · Cognitive function Screening  · Timed Get Up Test  · Whisper Test    Vitals:    11/23/20 1250   BP: 120/64   Pulse: 88   Temp: 97.2 °F (36.2 °C)   Weight: 73.8 kg (162 lb 11.2 oz)   Height: 5' 10" (1.778 m)     Body mass index is 23.34 kg/m².   ]        Diagnoses and health risks identified today and associated recommendations/orders:    Encounter for Medicare annual wellness exam    Secondary adenocarcinoma of skeletal bone  Comments:  followed by heme/onc, cont plan     Coronary artery disease of native artery of native heart with stable angina pectoris  Comments:  cont med, followed by cards     Essential hypertension  Comments:  stable, cont meds per PCP plan     Primary open angle glaucoma (POAG) of both eyes, moderate stage  Comments:  stable, followed by Dr. Garay     Prostate cancer  Comments:  being followed by Dr. Roland Dawn with urology for every 3 month Lupron injections - last received 9/14/2020.    Chronic obstructive pulmonary disease, unspecified COPD type  Comments:  stable, CXR recent showed no changes     Mixed hyperlipidemia  Comments:  stable, cont meds per PCP plan     Gastroesophageal reflux disease without esophagitis  Comments:  no current issues     Neoplasm related pain  Comments:  followed by onco    History of PTCA    Nonrheumatic mitral valve regurgitation  Comments:  no issues current, followed by cards     History of smoking    AP (angina pectoris)  Comments:  stable, no current issues, followd by cards     Pulmonary fibrosis  Comments:  has not seen anyone in a " while for chronic lung issues, will create f/u wi PCP     Other orders  -     (In Office Administered) Pneumococcal Polysaccharide Vaccine (23 Valent) (SQ/IM)  -     Influenza - Quadrivalent (PF)      Provided Ray with a 5-10 year written screening schedule and personal prevention plan. Recommendations were developed using the USPSTF age appropriate recommendations. Education, counseling, and referrals were provided as needed.  After Visit Summary printed and given to patient which includes a list of additional screenings\tests needed.    Follow up PCP for regular annual   Overdue   MORGAN Quijano

## 2020-12-16 DIAGNOSIS — I25.10 CORONARY ARTERY DISEASE INVOLVING NATIVE CORONARY ARTERY WITHOUT ANGINA PECTORIS, UNSPECIFIED WHETHER NATIVE OR TRANSPLANTED HEART: ICD-10-CM

## 2020-12-16 RX ORDER — METOPROLOL SUCCINATE 50 MG/1
50 TABLET, EXTENDED RELEASE ORAL DAILY
Qty: 60 TABLET | Refills: 1 | Status: SHIPPED | OUTPATIENT
Start: 2020-12-16 | End: 2021-04-13

## 2020-12-16 NOTE — TELEPHONE ENCOUNTER
Call pt to advise that his medication was approved and sent to pharmacy to be filled. Also advise that he needed a clinic appt. Pt scheduled for 1/12/2021 at 3:20.

## 2020-12-16 NOTE — TELEPHONE ENCOUNTER
Please call pt.  Fernando for f/u appt.  Missed his last 6 month appt.  Please schedule appt next month.    Dr Islas

## 2021-01-06 ENCOUNTER — SPECIALTY PHARMACY (OUTPATIENT)
Dept: PHARMACY | Facility: CLINIC | Age: 66
End: 2021-01-06

## 2021-01-12 ENCOUNTER — TELEPHONE (OUTPATIENT)
Dept: CARDIOLOGY | Facility: CLINIC | Age: 66
End: 2021-01-12

## 2021-01-12 ENCOUNTER — SPECIALTY PHARMACY (OUTPATIENT)
Dept: PHARMACY | Facility: CLINIC | Age: 66
End: 2021-01-12

## 2021-01-12 DIAGNOSIS — C79.51 SECONDARY ADENOCARCINOMA OF SKELETAL BONE: ICD-10-CM

## 2021-01-12 DIAGNOSIS — G89.3 NEOPLASM RELATED PAIN: ICD-10-CM

## 2021-01-12 DIAGNOSIS — C61 PROSTATE CANCER: Primary | ICD-10-CM

## 2021-01-12 DIAGNOSIS — C61 PROSTATE CANCER: ICD-10-CM

## 2021-01-12 RX ORDER — HYDROCODONE BITARTRATE AND ACETAMINOPHEN 10; 325 MG/1; MG/1
1 TABLET ORAL EVERY 6 HOURS PRN
Qty: 90 TABLET | Refills: 0 | Status: SHIPPED | OUTPATIENT
Start: 2021-01-12 | End: 2021-02-09 | Stop reason: SDUPTHER

## 2021-01-14 ENCOUNTER — LAB VISIT (OUTPATIENT)
Dept: LAB | Facility: HOSPITAL | Age: 66
End: 2021-01-14
Attending: NURSE PRACTITIONER
Payer: MEDICARE

## 2021-01-14 DIAGNOSIS — C61 PROSTATE CANCER: ICD-10-CM

## 2021-01-14 DIAGNOSIS — C79.51 SECONDARY ADENOCARCINOMA OF SKELETAL BONE: ICD-10-CM

## 2021-01-14 LAB
ALBUMIN SERPL BCP-MCNC: 3.4 G/DL (ref 3.5–5.2)
ALP SERPL-CCNC: 86 U/L (ref 55–135)
ALT SERPL W/O P-5'-P-CCNC: 25 U/L (ref 10–44)
ANION GAP SERPL CALC-SCNC: 11 MMOL/L (ref 8–16)
AST SERPL-CCNC: 24 U/L (ref 10–40)
BASOPHILS # BLD AUTO: 0.06 K/UL (ref 0–0.2)
BASOPHILS NFR BLD: 0.5 % (ref 0–1.9)
BILIRUB SERPL-MCNC: 0.5 MG/DL (ref 0.1–1)
BUN SERPL-MCNC: 15 MG/DL (ref 8–23)
CALCIUM SERPL-MCNC: 9.4 MG/DL (ref 8.7–10.5)
CHLORIDE SERPL-SCNC: 104 MMOL/L (ref 95–110)
CO2 SERPL-SCNC: 24 MMOL/L (ref 23–29)
CREAT SERPL-MCNC: 1.2 MG/DL (ref 0.5–1.4)
DIFFERENTIAL METHOD: ABNORMAL
EOSINOPHIL # BLD AUTO: 0.1 K/UL (ref 0–0.5)
EOSINOPHIL NFR BLD: 0.8 % (ref 0–8)
ERYTHROCYTE [DISTWIDTH] IN BLOOD BY AUTOMATED COUNT: 14.6 % (ref 11.5–14.5)
EST. GFR  (AFRICAN AMERICAN): >60 ML/MIN/1.73 M^2
EST. GFR  (NON AFRICAN AMERICAN): >60 ML/MIN/1.73 M^2
GLUCOSE SERPL-MCNC: 101 MG/DL (ref 70–110)
HCT VFR BLD AUTO: 39.4 % (ref 40–54)
HGB BLD-MCNC: 11.8 G/DL (ref 14–18)
IMM GRANULOCYTES # BLD AUTO: 0.13 K/UL (ref 0–0.04)
IMM GRANULOCYTES NFR BLD AUTO: 1.2 % (ref 0–0.5)
LYMPHOCYTES # BLD AUTO: 3.4 K/UL (ref 1–4.8)
LYMPHOCYTES NFR BLD: 30.8 % (ref 18–48)
MCH RBC QN AUTO: 28.6 PG (ref 27–31)
MCHC RBC AUTO-ENTMCNC: 29.9 G/DL (ref 32–36)
MCV RBC AUTO: 95 FL (ref 82–98)
MONOCYTES # BLD AUTO: 0.9 K/UL (ref 0.3–1)
MONOCYTES NFR BLD: 8.2 % (ref 4–15)
NEUTROPHILS # BLD AUTO: 6.5 K/UL (ref 1.8–7.7)
NEUTROPHILS NFR BLD: 58.5 % (ref 38–73)
NRBC BLD-RTO: 0 /100 WBC
PLATELET # BLD AUTO: 203 K/UL (ref 150–350)
PMV BLD AUTO: 11.6 FL (ref 9.2–12.9)
POTASSIUM SERPL-SCNC: 3.7 MMOL/L (ref 3.5–5.1)
PROT SERPL-MCNC: 7.8 G/DL (ref 6–8.4)
RBC # BLD AUTO: 4.13 M/UL (ref 4.6–6.2)
SODIUM SERPL-SCNC: 139 MMOL/L (ref 136–145)
WBC # BLD AUTO: 11.13 K/UL (ref 3.9–12.7)

## 2021-01-14 PROCEDURE — 36415 COLL VENOUS BLD VENIPUNCTURE: CPT | Mod: HCNC,PO

## 2021-01-14 PROCEDURE — 80053 COMPREHEN METABOLIC PANEL: CPT | Mod: HCNC

## 2021-01-14 PROCEDURE — 85025 COMPLETE CBC W/AUTO DIFF WBC: CPT | Mod: HCNC

## 2021-01-15 ENCOUNTER — OFFICE VISIT (OUTPATIENT)
Dept: HEMATOLOGY/ONCOLOGY | Facility: CLINIC | Age: 66
End: 2021-01-15
Payer: MEDICARE

## 2021-01-15 VITALS
TEMPERATURE: 99 F | OXYGEN SATURATION: 96 % | HEIGHT: 70 IN | SYSTOLIC BLOOD PRESSURE: 138 MMHG | DIASTOLIC BLOOD PRESSURE: 88 MMHG | BODY MASS INDEX: 23.64 KG/M2 | WEIGHT: 165.13 LBS | HEART RATE: 83 BPM

## 2021-01-15 DIAGNOSIS — C61 PROSTATE CANCER: ICD-10-CM

## 2021-01-15 DIAGNOSIS — G89.3 NEOPLASM RELATED PAIN: ICD-10-CM

## 2021-01-15 PROCEDURE — 99999 PR PBB SHADOW E&M-EST. PATIENT-LVL IV: ICD-10-PCS | Mod: PBBFAC,HCNC,, | Performed by: INTERNAL MEDICINE

## 2021-01-15 PROCEDURE — 99999 PR PBB SHADOW E&M-EST. PATIENT-LVL IV: CPT | Mod: PBBFAC,HCNC,, | Performed by: INTERNAL MEDICINE

## 2021-01-15 PROCEDURE — 99499 UNLISTED E&M SERVICE: CPT | Mod: S$GLB,,, | Performed by: INTERNAL MEDICINE

## 2021-01-15 PROCEDURE — 99215 PR OFFICE/OUTPT VISIT, EST, LEVL V, 40-54 MIN: ICD-10-PCS | Mod: HCNC,S$GLB,, | Performed by: INTERNAL MEDICINE

## 2021-01-15 PROCEDURE — 99215 OFFICE O/P EST HI 40 MIN: CPT | Mod: HCNC,S$GLB,, | Performed by: INTERNAL MEDICINE

## 2021-01-15 PROCEDURE — 3008F BODY MASS INDEX DOCD: CPT | Mod: HCNC,CPTII,S$GLB, | Performed by: INTERNAL MEDICINE

## 2021-01-15 PROCEDURE — 3079F PR MOST RECENT DIASTOLIC BLOOD PRESSURE 80-89 MM HG: ICD-10-PCS | Mod: HCNC,CPTII,S$GLB, | Performed by: INTERNAL MEDICINE

## 2021-01-15 PROCEDURE — 3008F PR BODY MASS INDEX (BMI) DOCUMENTED: ICD-10-PCS | Mod: HCNC,CPTII,S$GLB, | Performed by: INTERNAL MEDICINE

## 2021-01-15 PROCEDURE — 99499 RISK ADDL DX/OHS AUDIT: ICD-10-PCS | Mod: S$GLB,,, | Performed by: INTERNAL MEDICINE

## 2021-01-15 PROCEDURE — 3079F DIAST BP 80-89 MM HG: CPT | Mod: HCNC,CPTII,S$GLB, | Performed by: INTERNAL MEDICINE

## 2021-01-15 PROCEDURE — 3075F PR MOST RECENT SYSTOLIC BLOOD PRESS GE 130-139MM HG: ICD-10-PCS | Mod: HCNC,CPTII,S$GLB, | Performed by: INTERNAL MEDICINE

## 2021-01-15 PROCEDURE — 1125F AMNT PAIN NOTED PAIN PRSNT: CPT | Mod: HCNC,S$GLB,, | Performed by: INTERNAL MEDICINE

## 2021-01-15 PROCEDURE — 3075F SYST BP GE 130 - 139MM HG: CPT | Mod: HCNC,CPTII,S$GLB, | Performed by: INTERNAL MEDICINE

## 2021-01-15 PROCEDURE — 1125F PR PAIN SEVERITY QUANTIFIED, PAIN PRESENT: ICD-10-PCS | Mod: HCNC,S$GLB,, | Performed by: INTERNAL MEDICINE

## 2021-02-09 DIAGNOSIS — C79.51 SECONDARY ADENOCARCINOMA OF SKELETAL BONE: ICD-10-CM

## 2021-02-09 DIAGNOSIS — C61 PROSTATE CANCER: ICD-10-CM

## 2021-02-09 DIAGNOSIS — G89.3 NEOPLASM RELATED PAIN: ICD-10-CM

## 2021-02-09 RX ORDER — HYDROCODONE BITARTRATE AND ACETAMINOPHEN 10; 325 MG/1; MG/1
1 TABLET ORAL EVERY 6 HOURS PRN
Qty: 90 TABLET | Refills: 0 | Status: SHIPPED | OUTPATIENT
Start: 2021-02-09 | End: 2021-03-08 | Stop reason: SDUPTHER

## 2021-02-09 RX ORDER — HYDROCODONE BITARTRATE AND ACETAMINOPHEN 10; 325 MG/1; MG/1
1 TABLET ORAL EVERY 6 HOURS PRN
Qty: 90 TABLET | Refills: 0 | Status: CANCELLED | OUTPATIENT
Start: 2021-02-09

## 2021-02-10 ENCOUNTER — TELEPHONE (OUTPATIENT)
Dept: PHARMACY | Facility: CLINIC | Age: 66
End: 2021-02-10

## 2021-02-17 ENCOUNTER — SPECIALTY PHARMACY (OUTPATIENT)
Dept: PHARMACY | Facility: CLINIC | Age: 66
End: 2021-02-17

## 2021-02-23 ENCOUNTER — OFFICE VISIT (OUTPATIENT)
Dept: URGENT CARE | Facility: CLINIC | Age: 66
End: 2021-02-23
Payer: MEDICARE

## 2021-02-23 VITALS
DIASTOLIC BLOOD PRESSURE: 72 MMHG | RESPIRATION RATE: 16 BRPM | TEMPERATURE: 98 F | OXYGEN SATURATION: 96 % | BODY MASS INDEX: 23.62 KG/M2 | HEART RATE: 93 BPM | SYSTOLIC BLOOD PRESSURE: 124 MMHG | HEIGHT: 70 IN | WEIGHT: 165 LBS

## 2021-02-23 DIAGNOSIS — B02.9 HERPES ZOSTER WITHOUT COMPLICATION: Primary | ICD-10-CM

## 2021-02-23 PROCEDURE — 99213 OFFICE O/P EST LOW 20 MIN: CPT | Mod: S$GLB,,, | Performed by: EMERGENCY MEDICINE

## 2021-02-23 PROCEDURE — 3008F BODY MASS INDEX DOCD: CPT | Mod: CPTII,S$GLB,, | Performed by: EMERGENCY MEDICINE

## 2021-02-23 PROCEDURE — 1125F AMNT PAIN NOTED PAIN PRSNT: CPT | Mod: S$GLB,,, | Performed by: EMERGENCY MEDICINE

## 2021-02-23 PROCEDURE — 99213 PR OFFICE/OUTPT VISIT, EST, LEVL III, 20-29 MIN: ICD-10-PCS | Mod: S$GLB,,, | Performed by: EMERGENCY MEDICINE

## 2021-02-23 PROCEDURE — 3008F PR BODY MASS INDEX (BMI) DOCUMENTED: ICD-10-PCS | Mod: CPTII,S$GLB,, | Performed by: EMERGENCY MEDICINE

## 2021-02-23 PROCEDURE — 1125F PR PAIN SEVERITY QUANTIFIED, PAIN PRESENT: ICD-10-PCS | Mod: S$GLB,,, | Performed by: EMERGENCY MEDICINE

## 2021-02-23 RX ORDER — VALACYCLOVIR HYDROCHLORIDE 1 G/1
1000 TABLET, FILM COATED ORAL 3 TIMES DAILY
Qty: 21 TABLET | Refills: 0 | Status: SHIPPED | OUTPATIENT
Start: 2021-02-23 | End: 2021-08-12

## 2021-02-26 ENCOUNTER — TELEPHONE (OUTPATIENT)
Dept: URGENT CARE | Facility: CLINIC | Age: 66
End: 2021-02-26

## 2021-03-08 DIAGNOSIS — E78.2 MIXED HYPERLIPIDEMIA: ICD-10-CM

## 2021-03-08 DIAGNOSIS — G89.3 NEOPLASM RELATED PAIN: ICD-10-CM

## 2021-03-08 DIAGNOSIS — C79.51 SECONDARY ADENOCARCINOMA OF SKELETAL BONE: ICD-10-CM

## 2021-03-08 DIAGNOSIS — C61 PROSTATE CANCER: ICD-10-CM

## 2021-03-08 RX ORDER — HYDROCODONE BITARTRATE AND ACETAMINOPHEN 10; 325 MG/1; MG/1
1 TABLET ORAL EVERY 6 HOURS PRN
Qty: 90 TABLET | Refills: 0 | Status: CANCELLED | OUTPATIENT
Start: 2021-03-08

## 2021-03-08 RX ORDER — ATORVASTATIN CALCIUM 40 MG/1
TABLET, FILM COATED ORAL
Qty: 90 TABLET | Refills: 0 | Status: SHIPPED | OUTPATIENT
Start: 2021-03-08 | End: 2021-06-01 | Stop reason: SDUPTHER

## 2021-03-08 RX ORDER — HYDROCODONE BITARTRATE AND ACETAMINOPHEN 10; 325 MG/1; MG/1
1 TABLET ORAL EVERY 6 HOURS PRN
Qty: 90 TABLET | Refills: 0 | Status: SHIPPED | OUTPATIENT
Start: 2021-03-08 | End: 2021-04-06 | Stop reason: SDUPTHER

## 2021-03-11 ENCOUNTER — LAB VISIT (OUTPATIENT)
Dept: LAB | Facility: HOSPITAL | Age: 66
End: 2021-03-11
Attending: UROLOGY
Payer: MEDICARE

## 2021-03-11 DIAGNOSIS — C61 PROSTATE CANCER: ICD-10-CM

## 2021-03-11 PROCEDURE — 84153 ASSAY OF PSA TOTAL: CPT | Performed by: UROLOGY

## 2021-03-11 PROCEDURE — 36415 COLL VENOUS BLD VENIPUNCTURE: CPT | Mod: PO | Performed by: UROLOGY

## 2021-03-12 LAB — COMPLEXED PSA SERPL-MCNC: <0.01 NG/ML (ref 0–4)

## 2021-03-15 ENCOUNTER — SPECIALTY PHARMACY (OUTPATIENT)
Dept: PHARMACY | Facility: CLINIC | Age: 66
End: 2021-03-15

## 2021-03-15 ENCOUNTER — OFFICE VISIT (OUTPATIENT)
Dept: UROLOGY | Facility: CLINIC | Age: 66
End: 2021-03-15
Payer: MEDICARE

## 2021-03-15 VITALS
DIASTOLIC BLOOD PRESSURE: 70 MMHG | WEIGHT: 164.56 LBS | HEIGHT: 70 IN | BODY MASS INDEX: 23.56 KG/M2 | SYSTOLIC BLOOD PRESSURE: 120 MMHG

## 2021-03-15 DIAGNOSIS — C61 PROSTATE CANCER: Primary | ICD-10-CM

## 2021-03-15 PROCEDURE — 99213 OFFICE O/P EST LOW 20 MIN: CPT | Mod: S$GLB,,, | Performed by: UROLOGY

## 2021-03-15 PROCEDURE — 3074F PR MOST RECENT SYSTOLIC BLOOD PRESSURE < 130 MM HG: ICD-10-PCS | Mod: CPTII,S$GLB,, | Performed by: UROLOGY

## 2021-03-15 PROCEDURE — 3078F PR MOST RECENT DIASTOLIC BLOOD PRESSURE < 80 MM HG: ICD-10-PCS | Mod: CPTII,S$GLB,, | Performed by: UROLOGY

## 2021-03-15 PROCEDURE — 1126F PR PAIN SEVERITY QUANTIFIED, NO PAIN PRESENT: ICD-10-PCS | Mod: S$GLB,,, | Performed by: UROLOGY

## 2021-03-15 PROCEDURE — 3008F BODY MASS INDEX DOCD: CPT | Mod: CPTII,S$GLB,, | Performed by: UROLOGY

## 2021-03-15 PROCEDURE — 3078F DIAST BP <80 MM HG: CPT | Mod: CPTII,S$GLB,, | Performed by: UROLOGY

## 2021-03-15 PROCEDURE — 99213 PR OFFICE/OUTPT VISIT, EST, LEVL III, 20-29 MIN: ICD-10-PCS | Mod: S$GLB,,, | Performed by: UROLOGY

## 2021-03-15 PROCEDURE — 3074F SYST BP LT 130 MM HG: CPT | Mod: CPTII,S$GLB,, | Performed by: UROLOGY

## 2021-03-15 PROCEDURE — 3008F PR BODY MASS INDEX (BMI) DOCUMENTED: ICD-10-PCS | Mod: CPTII,S$GLB,, | Performed by: UROLOGY

## 2021-03-15 PROCEDURE — 99999 PR PBB SHADOW E&M-EST. PATIENT-LVL III: ICD-10-PCS | Mod: PBBFAC,,, | Performed by: UROLOGY

## 2021-03-15 PROCEDURE — 1126F AMNT PAIN NOTED NONE PRSNT: CPT | Mod: S$GLB,,, | Performed by: UROLOGY

## 2021-03-15 PROCEDURE — 99999 PR PBB SHADOW E&M-EST. PATIENT-LVL III: CPT | Mod: PBBFAC,,, | Performed by: UROLOGY

## 2021-03-16 ENCOUNTER — TELEPHONE (OUTPATIENT)
Dept: UROLOGY | Facility: CLINIC | Age: 66
End: 2021-03-16

## 2021-03-17 ENCOUNTER — DOCUMENTATION ONLY (OUTPATIENT)
Dept: UROLOGY | Facility: CLINIC | Age: 66
End: 2021-03-17

## 2021-03-17 ENCOUNTER — TELEPHONE (OUTPATIENT)
Dept: UROLOGY | Facility: CLINIC | Age: 66
End: 2021-03-17

## 2021-03-18 ENCOUNTER — CLINICAL SUPPORT (OUTPATIENT)
Dept: UROLOGY | Facility: CLINIC | Age: 66
End: 2021-03-18
Payer: MEDICARE

## 2021-03-18 ENCOUNTER — TELEPHONE (OUTPATIENT)
Dept: UROLOGY | Facility: CLINIC | Age: 66
End: 2021-03-18

## 2021-03-18 DIAGNOSIS — C61 PROSTATE CANCER: Primary | ICD-10-CM

## 2021-03-18 PROCEDURE — 96402 CHEMO HORMON ANTINEOPL SQ/IM: CPT | Mod: S$GLB,,, | Performed by: UROLOGY

## 2021-03-18 PROCEDURE — 96402 PR CHEMOTHER HORMON ANTINEOPL SUB-Q/IM: ICD-10-PCS | Mod: S$GLB,,, | Performed by: UROLOGY

## 2021-03-18 PROCEDURE — 99999 PR PBB SHADOW E&M-EST. PATIENT-LVL III: CPT | Mod: PBBFAC,,,

## 2021-03-18 PROCEDURE — 99999 PR PBB SHADOW E&M-EST. PATIENT-LVL III: ICD-10-PCS | Mod: PBBFAC,,,

## 2021-04-05 DIAGNOSIS — K21.9 GASTROESOPHAGEAL REFLUX DISEASE WITHOUT ESOPHAGITIS: Chronic | ICD-10-CM

## 2021-04-05 RX ORDER — PANTOPRAZOLE SODIUM 40 MG/1
40 TABLET, DELAYED RELEASE ORAL DAILY
Qty: 90 TABLET | Refills: 2 | Status: SHIPPED | OUTPATIENT
Start: 2021-04-05 | End: 2022-08-10 | Stop reason: SDUPTHER

## 2021-04-06 DIAGNOSIS — G89.3 NEOPLASM RELATED PAIN: ICD-10-CM

## 2021-04-06 DIAGNOSIS — C61 PROSTATE CANCER: ICD-10-CM

## 2021-04-06 DIAGNOSIS — C79.51 SECONDARY ADENOCARCINOMA OF SKELETAL BONE: ICD-10-CM

## 2021-04-06 RX ORDER — HYDROCODONE BITARTRATE AND ACETAMINOPHEN 10; 325 MG/1; MG/1
1 TABLET ORAL EVERY 6 HOURS PRN
Qty: 90 TABLET | Refills: 0 | Status: SHIPPED | OUTPATIENT
Start: 2021-04-06 | End: 2021-05-03 | Stop reason: SDUPTHER

## 2021-04-12 ENCOUNTER — SPECIALTY PHARMACY (OUTPATIENT)
Dept: PHARMACY | Facility: CLINIC | Age: 66
End: 2021-04-12

## 2021-04-13 ENCOUNTER — TELEPHONE (OUTPATIENT)
Dept: HEMATOLOGY/ONCOLOGY | Facility: CLINIC | Age: 66
End: 2021-04-13

## 2021-04-13 DIAGNOSIS — I25.10 CORONARY ARTERY DISEASE INVOLVING NATIVE CORONARY ARTERY WITHOUT ANGINA PECTORIS, UNSPECIFIED WHETHER NATIVE OR TRANSPLANTED HEART: ICD-10-CM

## 2021-04-13 RX ORDER — METOPROLOL SUCCINATE 50 MG/1
TABLET, EXTENDED RELEASE ORAL
Qty: 30 TABLET | Refills: 1 | Status: SHIPPED | OUTPATIENT
Start: 2021-04-13 | End: 2021-06-01 | Stop reason: SDUPTHER

## 2021-05-03 DIAGNOSIS — G89.3 NEOPLASM RELATED PAIN: ICD-10-CM

## 2021-05-03 DIAGNOSIS — C79.51 SECONDARY ADENOCARCINOMA OF SKELETAL BONE: ICD-10-CM

## 2021-05-03 DIAGNOSIS — C61 PROSTATE CANCER: ICD-10-CM

## 2021-05-03 RX ORDER — HYDROCODONE BITARTRATE AND ACETAMINOPHEN 10; 325 MG/1; MG/1
1 TABLET ORAL EVERY 6 HOURS PRN
Qty: 90 TABLET | Refills: 0 | OUTPATIENT
Start: 2021-05-03

## 2021-05-03 RX ORDER — HYDROCODONE BITARTRATE AND ACETAMINOPHEN 10; 325 MG/1; MG/1
1 TABLET ORAL EVERY 6 HOURS PRN
Qty: 90 TABLET | Refills: 0 | Status: SHIPPED | OUTPATIENT
Start: 2021-05-03 | End: 2021-06-02 | Stop reason: SDUPTHER

## 2021-05-11 ENCOUNTER — SPECIALTY PHARMACY (OUTPATIENT)
Dept: PHARMACY | Facility: CLINIC | Age: 66
End: 2021-05-11

## 2021-05-13 ENCOUNTER — OFFICE VISIT (OUTPATIENT)
Dept: URGENT CARE | Facility: CLINIC | Age: 66
End: 2021-05-13
Payer: MEDICARE

## 2021-05-13 VITALS
HEART RATE: 75 BPM | BODY MASS INDEX: 23.48 KG/M2 | DIASTOLIC BLOOD PRESSURE: 65 MMHG | SYSTOLIC BLOOD PRESSURE: 134 MMHG | WEIGHT: 164 LBS | OXYGEN SATURATION: 97 % | RESPIRATION RATE: 24 BRPM | HEIGHT: 70 IN | TEMPERATURE: 98 F

## 2021-05-13 DIAGNOSIS — M77.8 RIGHT SHOULDER TENDONITIS: Primary | ICD-10-CM

## 2021-05-13 PROCEDURE — 3008F BODY MASS INDEX DOCD: CPT | Mod: CPTII,S$GLB,, | Performed by: PHYSICIAN ASSISTANT

## 2021-05-13 PROCEDURE — 96372 PR INJECTION,THERAP/PROPH/DIAG2ST, IM OR SUBCUT: ICD-10-PCS | Mod: S$GLB,,, | Performed by: FAMILY MEDICINE

## 2021-05-13 PROCEDURE — 3008F PR BODY MASS INDEX (BMI) DOCUMENTED: ICD-10-PCS | Mod: CPTII,S$GLB,, | Performed by: PHYSICIAN ASSISTANT

## 2021-05-13 PROCEDURE — 96372 THER/PROPH/DIAG INJ SC/IM: CPT | Mod: S$GLB,,, | Performed by: FAMILY MEDICINE

## 2021-05-13 PROCEDURE — 1125F PR PAIN SEVERITY QUANTIFIED, PAIN PRESENT: ICD-10-PCS | Mod: S$GLB,,, | Performed by: PHYSICIAN ASSISTANT

## 2021-05-13 PROCEDURE — 99214 OFFICE O/P EST MOD 30 MIN: CPT | Mod: 25,S$GLB,, | Performed by: PHYSICIAN ASSISTANT

## 2021-05-13 PROCEDURE — 1125F AMNT PAIN NOTED PAIN PRSNT: CPT | Mod: S$GLB,,, | Performed by: PHYSICIAN ASSISTANT

## 2021-05-13 PROCEDURE — 99214 PR OFFICE/OUTPT VISIT, EST, LEVL IV, 30-39 MIN: ICD-10-PCS | Mod: 25,S$GLB,, | Performed by: PHYSICIAN ASSISTANT

## 2021-05-13 RX ORDER — METHOCARBAMOL 500 MG/1
500 TABLET, FILM COATED ORAL 4 TIMES DAILY PRN
Qty: 20 TABLET | Refills: 0 | Status: SHIPPED | OUTPATIENT
Start: 2021-05-13 | End: 2021-05-18

## 2021-05-13 RX ORDER — KETOROLAC TROMETHAMINE 30 MG/ML
15 INJECTION, SOLUTION INTRAMUSCULAR; INTRAVENOUS
Status: COMPLETED | OUTPATIENT
Start: 2021-05-13 | End: 2021-05-13

## 2021-05-13 RX ADMIN — KETOROLAC TROMETHAMINE 15 MG: 30 INJECTION, SOLUTION INTRAMUSCULAR; INTRAVENOUS at 01:05

## 2021-05-16 ENCOUNTER — TELEPHONE (OUTPATIENT)
Dept: URGENT CARE | Facility: CLINIC | Age: 66
End: 2021-05-16

## 2021-05-28 ENCOUNTER — TELEPHONE (OUTPATIENT)
Dept: ADMINISTRATIVE | Facility: HOSPITAL | Age: 66
End: 2021-05-28

## 2021-05-30 ENCOUNTER — PATIENT OUTREACH (OUTPATIENT)
Dept: ADMINISTRATIVE | Facility: OTHER | Age: 66
End: 2021-05-30

## 2021-06-01 ENCOUNTER — HOSPITAL ENCOUNTER (OUTPATIENT)
Dept: CARDIOLOGY | Facility: HOSPITAL | Age: 66
Discharge: HOME | End: 2021-06-01
Attending: INTERNAL MEDICINE
Payer: MEDICARE

## 2021-06-01 ENCOUNTER — OFFICE VISIT (OUTPATIENT)
Dept: CARDIOLOGY | Facility: CLINIC | Age: 66
End: 2021-06-01
Payer: MEDICARE

## 2021-06-01 VITALS
BODY MASS INDEX: 22.47 KG/M2 | WEIGHT: 156.94 LBS | OXYGEN SATURATION: 99 % | HEART RATE: 84 BPM | DIASTOLIC BLOOD PRESSURE: 72 MMHG | HEIGHT: 70 IN | SYSTOLIC BLOOD PRESSURE: 130 MMHG

## 2021-06-01 DIAGNOSIS — I25.118 CORONARY ARTERY DISEASE OF NATIVE ARTERY OF NATIVE HEART WITH STABLE ANGINA PECTORIS: Primary | Chronic | ICD-10-CM

## 2021-06-01 DIAGNOSIS — I34.0 NONRHEUMATIC MITRAL VALVE REGURGITATION: Chronic | ICD-10-CM

## 2021-06-01 DIAGNOSIS — I20.9 AP (ANGINA PECTORIS): ICD-10-CM

## 2021-06-01 DIAGNOSIS — I25.10 CORONARY ARTERY DISEASE INVOLVING NATIVE CORONARY ARTERY WITHOUT ANGINA PECTORIS, UNSPECIFIED WHETHER NATIVE OR TRANSPLANTED HEART: ICD-10-CM

## 2021-06-01 DIAGNOSIS — J44.9 CHRONIC OBSTRUCTIVE PULMONARY DISEASE, UNSPECIFIED COPD TYPE: Chronic | ICD-10-CM

## 2021-06-01 DIAGNOSIS — E78.2 MIXED HYPERLIPIDEMIA: Chronic | ICD-10-CM

## 2021-06-01 DIAGNOSIS — Z98.61 HISTORY OF PTCA: ICD-10-CM

## 2021-06-01 DIAGNOSIS — I10 ESSENTIAL HYPERTENSION: ICD-10-CM

## 2021-06-01 DIAGNOSIS — R94.31 ABNORMAL ECG: ICD-10-CM

## 2021-06-01 DIAGNOSIS — Z87.891 HISTORY OF SMOKING: ICD-10-CM

## 2021-06-01 PROCEDURE — 99999 PR PBB SHADOW E&M-EST. PATIENT-LVL III: CPT | Mod: PBBFAC,,, | Performed by: INTERNAL MEDICINE

## 2021-06-01 PROCEDURE — 1126F PR PAIN SEVERITY QUANTIFIED, NO PAIN PRESENT: ICD-10-PCS | Mod: S$GLB,,, | Performed by: INTERNAL MEDICINE

## 2021-06-01 PROCEDURE — 99999 PR PBB SHADOW E&M-EST. PATIENT-LVL III: ICD-10-PCS | Mod: PBBFAC,,, | Performed by: INTERNAL MEDICINE

## 2021-06-01 PROCEDURE — 3008F PR BODY MASS INDEX (BMI) DOCUMENTED: ICD-10-PCS | Mod: CPTII,S$GLB,, | Performed by: INTERNAL MEDICINE

## 2021-06-01 PROCEDURE — 3008F BODY MASS INDEX DOCD: CPT | Mod: CPTII,S$GLB,, | Performed by: INTERNAL MEDICINE

## 2021-06-01 PROCEDURE — 93010 ELECTROCARDIOGRAM REPORT: CPT | Mod: ,,, | Performed by: INTERNAL MEDICINE

## 2021-06-01 PROCEDURE — 1126F AMNT PAIN NOTED NONE PRSNT: CPT | Mod: S$GLB,,, | Performed by: INTERNAL MEDICINE

## 2021-06-01 PROCEDURE — 99214 OFFICE O/P EST MOD 30 MIN: CPT | Mod: 25,S$GLB,, | Performed by: INTERNAL MEDICINE

## 2021-06-01 PROCEDURE — 93010 EKG 12-LEAD: ICD-10-PCS | Mod: ,,, | Performed by: INTERNAL MEDICINE

## 2021-06-01 PROCEDURE — 99214 PR OFFICE/OUTPT VISIT, EST, LEVL IV, 30-39 MIN: ICD-10-PCS | Mod: 25,S$GLB,, | Performed by: INTERNAL MEDICINE

## 2021-06-01 PROCEDURE — 93005 ELECTROCARDIOGRAM TRACING: CPT

## 2021-06-01 RX ORDER — ATORVASTATIN CALCIUM 40 MG/1
40 TABLET, FILM COATED ORAL DAILY
Qty: 90 TABLET | Refills: 4 | Status: SHIPPED | OUTPATIENT
Start: 2021-06-01 | End: 2022-08-10 | Stop reason: SDUPTHER

## 2021-06-01 RX ORDER — EZETIMIBE 10 MG/1
10 TABLET ORAL DAILY
Qty: 90 TABLET | Refills: 4 | Status: SHIPPED | OUTPATIENT
Start: 2021-06-01 | End: 2022-08-10 | Stop reason: SDUPTHER

## 2021-06-01 RX ORDER — METOPROLOL SUCCINATE 50 MG/1
TABLET, EXTENDED RELEASE ORAL
Qty: 90 TABLET | Refills: 4 | Status: SHIPPED | OUTPATIENT
Start: 2021-06-01 | End: 2022-07-18 | Stop reason: SDUPTHER

## 2021-06-02 DIAGNOSIS — G89.3 NEOPLASM RELATED PAIN: ICD-10-CM

## 2021-06-02 DIAGNOSIS — C61 PROSTATE CANCER: ICD-10-CM

## 2021-06-02 DIAGNOSIS — C79.51 SECONDARY ADENOCARCINOMA OF SKELETAL BONE: ICD-10-CM

## 2021-06-02 RX ORDER — HYDROCODONE BITARTRATE AND ACETAMINOPHEN 10; 325 MG/1; MG/1
1 TABLET ORAL EVERY 6 HOURS PRN
Qty: 90 TABLET | Refills: 0 | Status: SHIPPED | OUTPATIENT
Start: 2021-06-02 | End: 2021-06-29 | Stop reason: SDUPTHER

## 2021-06-03 ENCOUNTER — LAB VISIT (OUTPATIENT)
Dept: LAB | Facility: HOSPITAL | Age: 66
End: 2021-06-03
Attending: INTERNAL MEDICINE
Payer: MEDICARE

## 2021-06-03 DIAGNOSIS — C61 PROSTATE CANCER: ICD-10-CM

## 2021-06-03 DIAGNOSIS — G89.3 NEOPLASM RELATED PAIN: ICD-10-CM

## 2021-06-03 LAB
ALBUMIN SERPL BCP-MCNC: 3.4 G/DL (ref 3.5–5.2)
ALP SERPL-CCNC: 77 U/L (ref 55–135)
ALT SERPL W/O P-5'-P-CCNC: 18 U/L (ref 10–44)
ANION GAP SERPL CALC-SCNC: 9 MMOL/L (ref 8–16)
AST SERPL-CCNC: 18 U/L (ref 10–40)
BASOPHILS # BLD AUTO: 0.05 K/UL (ref 0–0.2)
BASOPHILS NFR BLD: 0.4 % (ref 0–1.9)
BILIRUB SERPL-MCNC: 0.4 MG/DL (ref 0.1–1)
BUN SERPL-MCNC: 12 MG/DL (ref 8–23)
CALCIUM SERPL-MCNC: 9.4 MG/DL (ref 8.7–10.5)
CHLORIDE SERPL-SCNC: 104 MMOL/L (ref 95–110)
CO2 SERPL-SCNC: 26 MMOL/L (ref 23–29)
CREAT SERPL-MCNC: 1 MG/DL (ref 0.5–1.4)
DIFFERENTIAL METHOD: ABNORMAL
EOSINOPHIL # BLD AUTO: 0.1 K/UL (ref 0–0.5)
EOSINOPHIL NFR BLD: 0.5 % (ref 0–8)
ERYTHROCYTE [DISTWIDTH] IN BLOOD BY AUTOMATED COUNT: 15.9 % (ref 11.5–14.5)
EST. GFR  (AFRICAN AMERICAN): >60 ML/MIN/1.73 M^2
EST. GFR  (NON AFRICAN AMERICAN): >60 ML/MIN/1.73 M^2
GLUCOSE SERPL-MCNC: 132 MG/DL (ref 70–110)
HCT VFR BLD AUTO: 39.6 % (ref 40–54)
HGB BLD-MCNC: 12.3 G/DL (ref 14–18)
IMM GRANULOCYTES # BLD AUTO: 0.1 K/UL (ref 0–0.04)
IMM GRANULOCYTES NFR BLD AUTO: 0.8 % (ref 0–0.5)
LYMPHOCYTES # BLD AUTO: 2.9 K/UL (ref 1–4.8)
LYMPHOCYTES NFR BLD: 24.2 % (ref 18–48)
MCH RBC QN AUTO: 29.6 PG (ref 27–31)
MCHC RBC AUTO-ENTMCNC: 31.1 G/DL (ref 32–36)
MCV RBC AUTO: 95 FL (ref 82–98)
MONOCYTES # BLD AUTO: 0.8 K/UL (ref 0.3–1)
MONOCYTES NFR BLD: 6.7 % (ref 4–15)
NEUTROPHILS # BLD AUTO: 8.1 K/UL (ref 1.8–7.7)
NEUTROPHILS NFR BLD: 67.4 % (ref 38–73)
NRBC BLD-RTO: 0 /100 WBC
PLATELET # BLD AUTO: 241 K/UL (ref 150–450)
PMV BLD AUTO: 11.5 FL (ref 9.2–12.9)
POTASSIUM SERPL-SCNC: 3.6 MMOL/L (ref 3.5–5.1)
PROT SERPL-MCNC: 7.6 G/DL (ref 6–8.4)
RBC # BLD AUTO: 4.16 M/UL (ref 4.6–6.2)
SODIUM SERPL-SCNC: 139 MMOL/L (ref 136–145)
WBC # BLD AUTO: 11.96 K/UL (ref 3.9–12.7)

## 2021-06-03 PROCEDURE — 36415 COLL VENOUS BLD VENIPUNCTURE: CPT | Mod: PO | Performed by: INTERNAL MEDICINE

## 2021-06-03 PROCEDURE — 84153 ASSAY OF PSA TOTAL: CPT | Performed by: INTERNAL MEDICINE

## 2021-06-03 PROCEDURE — 80053 COMPREHEN METABOLIC PANEL: CPT | Performed by: INTERNAL MEDICINE

## 2021-06-03 PROCEDURE — 85025 COMPLETE CBC W/AUTO DIFF WBC: CPT | Performed by: INTERNAL MEDICINE

## 2021-06-04 ENCOUNTER — OFFICE VISIT (OUTPATIENT)
Dept: HEMATOLOGY/ONCOLOGY | Facility: CLINIC | Age: 66
End: 2021-06-04
Payer: MEDICARE

## 2021-06-04 VITALS
OXYGEN SATURATION: 98 % | HEIGHT: 70 IN | WEIGHT: 156.5 LBS | BODY MASS INDEX: 22.41 KG/M2 | HEART RATE: 79 BPM | DIASTOLIC BLOOD PRESSURE: 85 MMHG | TEMPERATURE: 97 F | SYSTOLIC BLOOD PRESSURE: 141 MMHG

## 2021-06-04 DIAGNOSIS — G89.3 NEOPLASM RELATED PAIN: ICD-10-CM

## 2021-06-04 DIAGNOSIS — C61 PROSTATE CANCER: ICD-10-CM

## 2021-06-04 LAB — COMPLEXED PSA SERPL-MCNC: <0.01 NG/ML (ref 0–4)

## 2021-06-04 PROCEDURE — 1126F AMNT PAIN NOTED NONE PRSNT: CPT | Mod: S$GLB,,, | Performed by: INTERNAL MEDICINE

## 2021-06-04 PROCEDURE — 99999 PR PBB SHADOW E&M-EST. PATIENT-LVL IV: ICD-10-PCS | Mod: PBBFAC,,, | Performed by: INTERNAL MEDICINE

## 2021-06-04 PROCEDURE — 3008F PR BODY MASS INDEX (BMI) DOCUMENTED: ICD-10-PCS | Mod: CPTII,S$GLB,, | Performed by: INTERNAL MEDICINE

## 2021-06-04 PROCEDURE — 99999 PR PBB SHADOW E&M-EST. PATIENT-LVL IV: CPT | Mod: PBBFAC,,, | Performed by: INTERNAL MEDICINE

## 2021-06-04 PROCEDURE — 1126F PR PAIN SEVERITY QUANTIFIED, NO PAIN PRESENT: ICD-10-PCS | Mod: S$GLB,,, | Performed by: INTERNAL MEDICINE

## 2021-06-04 PROCEDURE — 3008F BODY MASS INDEX DOCD: CPT | Mod: CPTII,S$GLB,, | Performed by: INTERNAL MEDICINE

## 2021-06-04 PROCEDURE — 99215 OFFICE O/P EST HI 40 MIN: CPT | Mod: S$GLB,,, | Performed by: INTERNAL MEDICINE

## 2021-06-04 PROCEDURE — 99215 PR OFFICE/OUTPT VISIT, EST, LEVL V, 40-54 MIN: ICD-10-PCS | Mod: S$GLB,,, | Performed by: INTERNAL MEDICINE

## 2021-06-17 ENCOUNTER — HOSPITAL ENCOUNTER (OUTPATIENT)
Dept: RADIOLOGY | Facility: HOSPITAL | Age: 66
Discharge: HOME | End: 2021-06-17
Attending: INTERNAL MEDICINE
Payer: MEDICARE

## 2021-06-17 DIAGNOSIS — C61 PROSTATE CANCER: ICD-10-CM

## 2021-06-17 DIAGNOSIS — G89.3 NEOPLASM RELATED PAIN: ICD-10-CM

## 2021-06-19 DIAGNOSIS — C61 PROSTATE CANCER: Primary | ICD-10-CM

## 2021-06-19 RX ORDER — DIAZEPAM 2 MG/1
2 TABLET ORAL ONCE AS NEEDED
Qty: 1 TABLET | Refills: 0 | Status: SHIPPED | OUTPATIENT
Start: 2021-06-19 | End: 2021-08-12

## 2021-06-21 ENCOUNTER — TELEPHONE (OUTPATIENT)
Dept: HEMATOLOGY/ONCOLOGY | Facility: CLINIC | Age: 66
End: 2021-06-21

## 2021-06-21 ENCOUNTER — SPECIALTY PHARMACY (OUTPATIENT)
Dept: PHARMACY | Facility: CLINIC | Age: 66
End: 2021-06-21

## 2021-06-24 ENCOUNTER — TELEPHONE (OUTPATIENT)
Dept: HEMATOLOGY/ONCOLOGY | Facility: CLINIC | Age: 66
End: 2021-06-24

## 2021-06-24 ENCOUNTER — HOSPITAL ENCOUNTER (OUTPATIENT)
Dept: RADIOLOGY | Facility: HOSPITAL | Age: 66
Discharge: HOME | End: 2021-06-24
Attending: INTERNAL MEDICINE
Payer: MEDICARE

## 2021-06-24 PROCEDURE — 72158 MRI LUMBAR SPINE W/O & W/DYE: CPT | Mod: TC,PO

## 2021-06-24 PROCEDURE — A9585 GADOBUTROL INJECTION: HCPCS | Mod: PO | Performed by: INTERNAL MEDICINE

## 2021-06-24 PROCEDURE — 72158 MRI LUMBAR SPINE W/O & W/DYE: CPT | Mod: 26,,, | Performed by: RADIOLOGY

## 2021-06-24 PROCEDURE — 25500020 PHARM REV CODE 255: Mod: PO | Performed by: INTERNAL MEDICINE

## 2021-06-24 PROCEDURE — 72158 MRI LUMBAR SPINE W WO CONTRAST: ICD-10-PCS | Mod: 26,,, | Performed by: RADIOLOGY

## 2021-06-24 RX ORDER — GADOBUTROL 604.72 MG/ML
7 INJECTION INTRAVENOUS
Status: COMPLETED | OUTPATIENT
Start: 2021-06-24 | End: 2021-06-24

## 2021-06-24 RX ADMIN — GADOBUTROL 7 ML: 604.72 INJECTION INTRAVENOUS at 12:06

## 2021-06-25 ENCOUNTER — TELEPHONE (OUTPATIENT)
Dept: HEMATOLOGY/ONCOLOGY | Facility: CLINIC | Age: 66
End: 2021-06-25

## 2021-06-29 DIAGNOSIS — C61 PROSTATE CANCER: ICD-10-CM

## 2021-06-29 DIAGNOSIS — C79.51 SECONDARY ADENOCARCINOMA OF SKELETAL BONE: ICD-10-CM

## 2021-06-29 DIAGNOSIS — G89.3 NEOPLASM RELATED PAIN: ICD-10-CM

## 2021-06-29 RX ORDER — HYDROCODONE BITARTRATE AND ACETAMINOPHEN 10; 325 MG/1; MG/1
1 TABLET ORAL EVERY 6 HOURS PRN
Qty: 90 TABLET | Refills: 0 | Status: SHIPPED | OUTPATIENT
Start: 2021-06-29 | End: 2021-07-26 | Stop reason: SDUPTHER

## 2021-07-02 ENCOUNTER — OFFICE VISIT (OUTPATIENT)
Dept: HEMATOLOGY/ONCOLOGY | Facility: CLINIC | Age: 66
End: 2021-07-02
Payer: MEDICARE

## 2021-07-02 VITALS
RESPIRATION RATE: 18 BRPM | HEIGHT: 70 IN | BODY MASS INDEX: 22.22 KG/M2 | HEART RATE: 87 BPM | OXYGEN SATURATION: 98 % | SYSTOLIC BLOOD PRESSURE: 122 MMHG | DIASTOLIC BLOOD PRESSURE: 80 MMHG | TEMPERATURE: 98 F | WEIGHT: 155.19 LBS

## 2021-07-02 DIAGNOSIS — C61 PROSTATE CANCER: ICD-10-CM

## 2021-07-02 DIAGNOSIS — G89.3 NEOPLASM RELATED PAIN: ICD-10-CM

## 2021-07-02 PROCEDURE — 99999 PR PBB SHADOW E&M-EST. PATIENT-LVL IV: CPT | Mod: PBBFAC,,, | Performed by: INTERNAL MEDICINE

## 2021-07-02 PROCEDURE — 99999 PR PBB SHADOW E&M-EST. PATIENT-LVL IV: ICD-10-PCS | Mod: PBBFAC,,, | Performed by: INTERNAL MEDICINE

## 2021-07-02 PROCEDURE — 99215 OFFICE O/P EST HI 40 MIN: CPT | Mod: S$GLB,,, | Performed by: INTERNAL MEDICINE

## 2021-07-02 PROCEDURE — 3008F BODY MASS INDEX DOCD: CPT | Mod: CPTII,S$GLB,, | Performed by: INTERNAL MEDICINE

## 2021-07-02 PROCEDURE — 1125F PR PAIN SEVERITY QUANTIFIED, PAIN PRESENT: ICD-10-PCS | Mod: S$GLB,,, | Performed by: INTERNAL MEDICINE

## 2021-07-02 PROCEDURE — 99215 PR OFFICE/OUTPT VISIT, EST, LEVL V, 40-54 MIN: ICD-10-PCS | Mod: S$GLB,,, | Performed by: INTERNAL MEDICINE

## 2021-07-02 PROCEDURE — 1125F AMNT PAIN NOTED PAIN PRSNT: CPT | Mod: S$GLB,,, | Performed by: INTERNAL MEDICINE

## 2021-07-02 PROCEDURE — 3008F PR BODY MASS INDEX (BMI) DOCUMENTED: ICD-10-PCS | Mod: CPTII,S$GLB,, | Performed by: INTERNAL MEDICINE

## 2021-07-06 ENCOUNTER — SPECIALTY PHARMACY (OUTPATIENT)
Dept: PHARMACY | Facility: CLINIC | Age: 66
End: 2021-07-06

## 2021-07-06 DIAGNOSIS — C61 PROSTATE CANCER: Primary | ICD-10-CM

## 2021-07-19 DIAGNOSIS — C61 PROSTATE CANCER: ICD-10-CM

## 2021-07-19 RX ORDER — ABIRATERONE ACETATE 250 MG/1
1000 TABLET ORAL DAILY
Qty: 120 TABLET | Refills: 0 | Status: SHIPPED | OUTPATIENT
Start: 2021-07-19 | End: 2021-08-17 | Stop reason: SDUPTHER

## 2021-07-20 ENCOUNTER — SPECIALTY PHARMACY (OUTPATIENT)
Dept: PHARMACY | Facility: CLINIC | Age: 66
End: 2021-07-20

## 2021-07-26 DIAGNOSIS — C61 PROSTATE CANCER: ICD-10-CM

## 2021-07-26 DIAGNOSIS — C79.51 SECONDARY ADENOCARCINOMA OF SKELETAL BONE: ICD-10-CM

## 2021-07-26 DIAGNOSIS — G89.3 NEOPLASM RELATED PAIN: ICD-10-CM

## 2021-07-26 RX ORDER — HYDROCODONE BITARTRATE AND ACETAMINOPHEN 10; 325 MG/1; MG/1
1 TABLET ORAL EVERY 6 HOURS PRN
Qty: 90 TABLET | Refills: 0 | Status: SHIPPED | OUTPATIENT
Start: 2021-07-26 | End: 2021-08-23 | Stop reason: SDUPTHER

## 2021-08-12 ENCOUNTER — OFFICE VISIT (OUTPATIENT)
Dept: INTERNAL MEDICINE | Facility: CLINIC | Age: 66
End: 2021-08-12
Payer: MEDICARE

## 2021-08-12 ENCOUNTER — LAB VISIT (OUTPATIENT)
Dept: LAB | Facility: HOSPITAL | Age: 66
End: 2021-08-12
Attending: INTERNAL MEDICINE
Payer: MEDICARE

## 2021-08-12 VITALS
RESPIRATION RATE: 16 BRPM | BODY MASS INDEX: 22.34 KG/M2 | HEART RATE: 82 BPM | TEMPERATURE: 97 F | DIASTOLIC BLOOD PRESSURE: 78 MMHG | WEIGHT: 156.06 LBS | SYSTOLIC BLOOD PRESSURE: 122 MMHG | HEIGHT: 70 IN | OXYGEN SATURATION: 98 %

## 2021-08-12 DIAGNOSIS — C61 PROSTATE CANCER: ICD-10-CM

## 2021-08-12 DIAGNOSIS — G89.3 NEOPLASM RELATED PAIN: ICD-10-CM

## 2021-08-12 DIAGNOSIS — Z12.11 COLON CANCER SCREENING: ICD-10-CM

## 2021-08-12 DIAGNOSIS — M48.061 DEGENERATIVE LUMBAR SPINAL STENOSIS: ICD-10-CM

## 2021-08-12 DIAGNOSIS — J44.9 CHRONIC OBSTRUCTIVE PULMONARY DISEASE, UNSPECIFIED COPD TYPE: Chronic | ICD-10-CM

## 2021-08-12 DIAGNOSIS — Z98.61 HISTORY OF PTCA: ICD-10-CM

## 2021-08-12 DIAGNOSIS — I10 ESSENTIAL HYPERTENSION: ICD-10-CM

## 2021-08-12 DIAGNOSIS — K21.9 GASTROESOPHAGEAL REFLUX DISEASE WITHOUT ESOPHAGITIS: Chronic | ICD-10-CM

## 2021-08-12 DIAGNOSIS — Z79.52 LONG TERM (CURRENT) USE OF SYSTEMIC STEROIDS: ICD-10-CM

## 2021-08-12 DIAGNOSIS — E78.2 MIXED HYPERLIPIDEMIA: Chronic | ICD-10-CM

## 2021-08-12 DIAGNOSIS — Z00.00 ROUTINE GENERAL MEDICAL EXAMINATION AT A HEALTH CARE FACILITY: Primary | ICD-10-CM

## 2021-08-12 DIAGNOSIS — I25.118 CORONARY ARTERY DISEASE OF NATIVE ARTERY OF NATIVE HEART WITH STABLE ANGINA PECTORIS: Chronic | ICD-10-CM

## 2021-08-12 PROBLEM — Z87.891 HISTORY OF SMOKING: Status: RESOLVED | Noted: 2020-01-06 | Resolved: 2021-08-12

## 2021-08-12 LAB
ALBUMIN SERPL BCP-MCNC: 3.5 G/DL (ref 3.5–5.2)
ALP SERPL-CCNC: 86 U/L (ref 55–135)
ALT SERPL W/O P-5'-P-CCNC: 19 U/L (ref 10–44)
ANION GAP SERPL CALC-SCNC: 11 MMOL/L (ref 8–16)
AST SERPL-CCNC: 19 U/L (ref 10–40)
BASOPHILS # BLD AUTO: 0.04 K/UL (ref 0–0.2)
BASOPHILS NFR BLD: 0.4 % (ref 0–1.9)
BILIRUB SERPL-MCNC: 0.3 MG/DL (ref 0.1–1)
BUN SERPL-MCNC: 13 MG/DL (ref 8–23)
CALCIUM SERPL-MCNC: 9.7 MG/DL (ref 8.7–10.5)
CHLORIDE SERPL-SCNC: 105 MMOL/L (ref 95–110)
CO2 SERPL-SCNC: 25 MMOL/L (ref 23–29)
COMPLEXED PSA SERPL-MCNC: <0.01 NG/ML (ref 0–4)
CREAT SERPL-MCNC: 1 MG/DL (ref 0.5–1.4)
DIFFERENTIAL METHOD: ABNORMAL
EOSINOPHIL # BLD AUTO: 0 K/UL (ref 0–0.5)
EOSINOPHIL NFR BLD: 0.4 % (ref 0–8)
ERYTHROCYTE [DISTWIDTH] IN BLOOD BY AUTOMATED COUNT: 14.5 % (ref 11.5–14.5)
EST. GFR  (AFRICAN AMERICAN): >60 ML/MIN/1.73 M^2
EST. GFR  (NON AFRICAN AMERICAN): >60 ML/MIN/1.73 M^2
GLUCOSE SERPL-MCNC: 95 MG/DL (ref 70–110)
HCT VFR BLD AUTO: 41 % (ref 40–54)
HGB BLD-MCNC: 13 G/DL (ref 14–18)
IMM GRANULOCYTES # BLD AUTO: 0.09 K/UL (ref 0–0.04)
IMM GRANULOCYTES NFR BLD AUTO: 0.8 % (ref 0–0.5)
LYMPHOCYTES # BLD AUTO: 3 K/UL (ref 1–4.8)
LYMPHOCYTES NFR BLD: 27.8 % (ref 18–48)
MCH RBC QN AUTO: 30 PG (ref 27–31)
MCHC RBC AUTO-ENTMCNC: 31.7 G/DL (ref 32–36)
MCV RBC AUTO: 95 FL (ref 82–98)
MONOCYTES # BLD AUTO: 1 K/UL (ref 0.3–1)
MONOCYTES NFR BLD: 8.9 % (ref 4–15)
NEUTROPHILS # BLD AUTO: 6.7 K/UL (ref 1.8–7.7)
NEUTROPHILS NFR BLD: 61.7 % (ref 38–73)
NRBC BLD-RTO: 0 /100 WBC
PLATELET # BLD AUTO: 211 K/UL (ref 150–450)
PMV BLD AUTO: 10.3 FL (ref 9.2–12.9)
POTASSIUM SERPL-SCNC: 4 MMOL/L (ref 3.5–5.1)
PROT SERPL-MCNC: 8 G/DL (ref 6–8.4)
RBC # BLD AUTO: 4.34 M/UL (ref 4.6–6.2)
SODIUM SERPL-SCNC: 141 MMOL/L (ref 136–145)
WBC # BLD AUTO: 10.92 K/UL (ref 3.9–12.7)

## 2021-08-12 PROCEDURE — 36415 COLL VENOUS BLD VENIPUNCTURE: CPT | Performed by: INTERNAL MEDICINE

## 2021-08-12 PROCEDURE — 3008F PR BODY MASS INDEX (BMI) DOCUMENTED: ICD-10-PCS | Mod: CPTII,S$GLB,, | Performed by: FAMILY MEDICINE

## 2021-08-12 PROCEDURE — 1160F PR REVIEW ALL MEDS BY PRESCRIBER/CLIN PHARMACIST DOCUMENTED: ICD-10-PCS | Mod: CPTII,S$GLB,, | Performed by: FAMILY MEDICINE

## 2021-08-12 PROCEDURE — 99396 PREV VISIT EST AGE 40-64: CPT | Mod: S$GLB,,, | Performed by: FAMILY MEDICINE

## 2021-08-12 PROCEDURE — 84153 ASSAY OF PSA TOTAL: CPT | Performed by: INTERNAL MEDICINE

## 2021-08-12 PROCEDURE — 3074F SYST BP LT 130 MM HG: CPT | Mod: CPTII,S$GLB,, | Performed by: FAMILY MEDICINE

## 2021-08-12 PROCEDURE — 1159F PR MEDICATION LIST DOCUMENTED IN MEDICAL RECORD: ICD-10-PCS | Mod: CPTII,S$GLB,, | Performed by: FAMILY MEDICINE

## 2021-08-12 PROCEDURE — 3008F BODY MASS INDEX DOCD: CPT | Mod: CPTII,S$GLB,, | Performed by: FAMILY MEDICINE

## 2021-08-12 PROCEDURE — 80053 COMPREHEN METABOLIC PANEL: CPT | Performed by: INTERNAL MEDICINE

## 2021-08-12 PROCEDURE — 99999 PR PBB SHADOW E&M-EST. PATIENT-LVL III: CPT | Mod: PBBFAC,,, | Performed by: FAMILY MEDICINE

## 2021-08-12 PROCEDURE — 1160F RVW MEDS BY RX/DR IN RCRD: CPT | Mod: CPTII,S$GLB,, | Performed by: FAMILY MEDICINE

## 2021-08-12 PROCEDURE — 3078F PR MOST RECENT DIASTOLIC BLOOD PRESSURE < 80 MM HG: ICD-10-PCS | Mod: CPTII,S$GLB,, | Performed by: FAMILY MEDICINE

## 2021-08-12 PROCEDURE — 99999 PR PBB SHADOW E&M-EST. PATIENT-LVL III: ICD-10-PCS | Mod: PBBFAC,,, | Performed by: FAMILY MEDICINE

## 2021-08-12 PROCEDURE — 3074F PR MOST RECENT SYSTOLIC BLOOD PRESSURE < 130 MM HG: ICD-10-PCS | Mod: CPTII,S$GLB,, | Performed by: FAMILY MEDICINE

## 2021-08-12 PROCEDURE — 99396 PR PREVENTIVE VISIT,EST,40-64: ICD-10-PCS | Mod: S$GLB,,, | Performed by: FAMILY MEDICINE

## 2021-08-12 PROCEDURE — 3078F DIAST BP <80 MM HG: CPT | Mod: CPTII,S$GLB,, | Performed by: FAMILY MEDICINE

## 2021-08-12 PROCEDURE — 1159F MED LIST DOCD IN RCRD: CPT | Mod: CPTII,S$GLB,, | Performed by: FAMILY MEDICINE

## 2021-08-12 PROCEDURE — 85025 COMPLETE CBC W/AUTO DIFF WBC: CPT | Performed by: INTERNAL MEDICINE

## 2021-08-12 RX ORDER — KETOROLAC TROMETHAMINE 30 MG/ML
INJECTION, SOLUTION INTRAMUSCULAR; INTRAVENOUS
COMMUNITY
Start: 2021-05-13 | End: 2021-08-12

## 2021-08-12 RX ORDER — SODIUM, POTASSIUM,MAG SULFATES 17.5-3.13G
1 SOLUTION, RECONSTITUTED, ORAL ORAL DAILY
Qty: 1 KIT | Refills: 0 | Status: SHIPPED | OUTPATIENT
Start: 2021-08-12 | End: 2021-08-14

## 2021-08-13 ENCOUNTER — OFFICE VISIT (OUTPATIENT)
Dept: HEMATOLOGY/ONCOLOGY | Facility: CLINIC | Age: 66
End: 2021-08-13
Payer: MEDICARE

## 2021-08-13 VITALS
BODY MASS INDEX: 22.28 KG/M2 | HEIGHT: 70 IN | OXYGEN SATURATION: 99 % | TEMPERATURE: 98 F | HEART RATE: 82 BPM | DIASTOLIC BLOOD PRESSURE: 84 MMHG | WEIGHT: 155.63 LBS | SYSTOLIC BLOOD PRESSURE: 136 MMHG

## 2021-08-13 DIAGNOSIS — C61 PROSTATE CANCER: ICD-10-CM

## 2021-08-13 DIAGNOSIS — Z79.899 IMMUNOSUPPRESSED DUE TO CHEMOTHERAPY: Primary | ICD-10-CM

## 2021-08-13 DIAGNOSIS — G89.3 NEOPLASM RELATED PAIN: ICD-10-CM

## 2021-08-13 DIAGNOSIS — D84.821 IMMUNOSUPPRESSED DUE TO CHEMOTHERAPY: Primary | ICD-10-CM

## 2021-08-13 DIAGNOSIS — T45.1X5A IMMUNOSUPPRESSED DUE TO CHEMOTHERAPY: Primary | ICD-10-CM

## 2021-08-13 PROCEDURE — 1159F PR MEDICATION LIST DOCUMENTED IN MEDICAL RECORD: ICD-10-PCS | Mod: CPTII,S$GLB,, | Performed by: INTERNAL MEDICINE

## 2021-08-13 PROCEDURE — 1126F AMNT PAIN NOTED NONE PRSNT: CPT | Mod: CPTII,S$GLB,, | Performed by: INTERNAL MEDICINE

## 2021-08-13 PROCEDURE — 99215 OFFICE O/P EST HI 40 MIN: CPT | Mod: S$GLB,,, | Performed by: INTERNAL MEDICINE

## 2021-08-13 PROCEDURE — 3079F DIAST BP 80-89 MM HG: CPT | Mod: CPTII,S$GLB,, | Performed by: INTERNAL MEDICINE

## 2021-08-13 PROCEDURE — 3075F SYST BP GE 130 - 139MM HG: CPT | Mod: CPTII,S$GLB,, | Performed by: INTERNAL MEDICINE

## 2021-08-13 PROCEDURE — 3008F PR BODY MASS INDEX (BMI) DOCUMENTED: ICD-10-PCS | Mod: CPTII,S$GLB,, | Performed by: INTERNAL MEDICINE

## 2021-08-13 PROCEDURE — 1159F MED LIST DOCD IN RCRD: CPT | Mod: CPTII,S$GLB,, | Performed by: INTERNAL MEDICINE

## 2021-08-13 PROCEDURE — 3079F PR MOST RECENT DIASTOLIC BLOOD PRESSURE 80-89 MM HG: ICD-10-PCS | Mod: CPTII,S$GLB,, | Performed by: INTERNAL MEDICINE

## 2021-08-13 PROCEDURE — 99215 PR OFFICE/OUTPT VISIT, EST, LEVL V, 40-54 MIN: ICD-10-PCS | Mod: S$GLB,,, | Performed by: INTERNAL MEDICINE

## 2021-08-13 PROCEDURE — 3075F PR MOST RECENT SYSTOLIC BLOOD PRESS GE 130-139MM HG: ICD-10-PCS | Mod: CPTII,S$GLB,, | Performed by: INTERNAL MEDICINE

## 2021-08-13 PROCEDURE — 3008F BODY MASS INDEX DOCD: CPT | Mod: CPTII,S$GLB,, | Performed by: INTERNAL MEDICINE

## 2021-08-13 PROCEDURE — 99999 PR PBB SHADOW E&M-EST. PATIENT-LVL IV: ICD-10-PCS | Mod: PBBFAC,,, | Performed by: INTERNAL MEDICINE

## 2021-08-13 PROCEDURE — 1126F PR PAIN SEVERITY QUANTIFIED, NO PAIN PRESENT: ICD-10-PCS | Mod: CPTII,S$GLB,, | Performed by: INTERNAL MEDICINE

## 2021-08-13 PROCEDURE — 99999 PR PBB SHADOW E&M-EST. PATIENT-LVL IV: CPT | Mod: PBBFAC,,, | Performed by: INTERNAL MEDICINE

## 2021-08-17 ENCOUNTER — SPECIALTY PHARMACY (OUTPATIENT)
Dept: PHARMACY | Facility: CLINIC | Age: 66
End: 2021-08-17

## 2021-08-17 DIAGNOSIS — C61 PROSTATE CANCER: ICD-10-CM

## 2021-08-17 RX ORDER — ABIRATERONE ACETATE 250 MG/1
1000 TABLET ORAL DAILY
Qty: 120 TABLET | Refills: 0 | Status: SHIPPED | OUTPATIENT
Start: 2021-08-17 | End: 2021-09-20 | Stop reason: SDUPTHER

## 2021-08-23 ENCOUNTER — TELEPHONE (OUTPATIENT)
Dept: INTERNAL MEDICINE | Facility: CLINIC | Age: 66
End: 2021-08-23

## 2021-08-23 DIAGNOSIS — G89.3 NEOPLASM RELATED PAIN: ICD-10-CM

## 2021-08-23 DIAGNOSIS — C79.51 SECONDARY ADENOCARCINOMA OF SKELETAL BONE: ICD-10-CM

## 2021-08-23 DIAGNOSIS — C61 PROSTATE CANCER: ICD-10-CM

## 2021-08-23 RX ORDER — HYDROCODONE BITARTRATE AND ACETAMINOPHEN 10; 325 MG/1; MG/1
1 TABLET ORAL EVERY 6 HOURS PRN
Qty: 90 TABLET | Refills: 0 | Status: SHIPPED | OUTPATIENT
Start: 2021-08-23 | End: 2021-10-08 | Stop reason: SDUPTHER

## 2021-09-08 ENCOUNTER — TELEPHONE (OUTPATIENT)
Dept: UROLOGY | Facility: CLINIC | Age: 66
End: 2021-09-08

## 2021-09-13 ENCOUNTER — LAB VISIT (OUTPATIENT)
Dept: LAB | Facility: HOSPITAL | Age: 66
End: 2021-09-13
Attending: UROLOGY
Payer: MEDICARE

## 2021-09-13 DIAGNOSIS — C61 PROSTATE CANCER: ICD-10-CM

## 2021-09-13 LAB — COMPLEXED PSA SERPL-MCNC: <0.01 NG/ML (ref 0–4)

## 2021-09-13 PROCEDURE — 36415 COLL VENOUS BLD VENIPUNCTURE: CPT | Mod: HCNC,PO | Performed by: UROLOGY

## 2021-09-13 PROCEDURE — 84153 ASSAY OF PSA TOTAL: CPT | Mod: HCNC | Performed by: UROLOGY

## 2021-09-15 ENCOUNTER — TELEPHONE (OUTPATIENT)
Dept: HEMATOLOGY/ONCOLOGY | Facility: CLINIC | Age: 66
End: 2021-09-15

## 2021-09-17 ENCOUNTER — OFFICE VISIT (OUTPATIENT)
Dept: INTERNAL MEDICINE | Facility: CLINIC | Age: 66
End: 2021-09-17
Payer: MEDICARE

## 2021-09-17 VITALS
WEIGHT: 155.44 LBS | DIASTOLIC BLOOD PRESSURE: 68 MMHG | BODY MASS INDEX: 22.25 KG/M2 | SYSTOLIC BLOOD PRESSURE: 118 MMHG | HEIGHT: 70 IN

## 2021-09-17 DIAGNOSIS — J84.10 PULMONARY FIBROSIS: ICD-10-CM

## 2021-09-17 DIAGNOSIS — M85.80 OSTEOPENIA, UNSPECIFIED LOCATION: ICD-10-CM

## 2021-09-17 DIAGNOSIS — I10 ESSENTIAL HYPERTENSION: ICD-10-CM

## 2021-09-17 DIAGNOSIS — Z99.89 DEPENDENCE ON OTHER ENABLING MACHINES AND DEVICES: ICD-10-CM

## 2021-09-17 DIAGNOSIS — C79.51 SECONDARY ADENOCARCINOMA OF SKELETAL BONE: ICD-10-CM

## 2021-09-17 DIAGNOSIS — R26.9 ABNORMALITY OF GAIT AND MOBILITY: ICD-10-CM

## 2021-09-17 DIAGNOSIS — Z79.899 IMMUNOSUPPRESSED DUE TO CHEMOTHERAPY: ICD-10-CM

## 2021-09-17 DIAGNOSIS — I20.9 AP (ANGINA PECTORIS): ICD-10-CM

## 2021-09-17 DIAGNOSIS — J44.9 CHRONIC OBSTRUCTIVE PULMONARY DISEASE, UNSPECIFIED COPD TYPE: Chronic | ICD-10-CM

## 2021-09-17 DIAGNOSIS — H40.1132 PRIMARY OPEN ANGLE GLAUCOMA (POAG) OF BOTH EYES, MODERATE STAGE: ICD-10-CM

## 2021-09-17 DIAGNOSIS — E78.2 MIXED HYPERLIPIDEMIA: Chronic | ICD-10-CM

## 2021-09-17 DIAGNOSIS — Z00.00 ENCOUNTER FOR PREVENTIVE HEALTH EXAMINATION: Primary | ICD-10-CM

## 2021-09-17 DIAGNOSIS — D84.821 IMMUNOSUPPRESSED DUE TO CHEMOTHERAPY: ICD-10-CM

## 2021-09-17 DIAGNOSIS — I70.0 AORTIC ATHEROSCLEROSIS: ICD-10-CM

## 2021-09-17 DIAGNOSIS — G89.3 NEOPLASM RELATED PAIN: ICD-10-CM

## 2021-09-17 DIAGNOSIS — T45.1X5A IMMUNOSUPPRESSED DUE TO CHEMOTHERAPY: ICD-10-CM

## 2021-09-17 DIAGNOSIS — C61 PROSTATE CANCER: ICD-10-CM

## 2021-09-17 DIAGNOSIS — Z98.61 HISTORY OF PTCA: ICD-10-CM

## 2021-09-17 DIAGNOSIS — I25.118 CORONARY ARTERY DISEASE OF NATIVE ARTERY OF NATIVE HEART WITH STABLE ANGINA PECTORIS: Chronic | ICD-10-CM

## 2021-09-17 DIAGNOSIS — K21.9 GASTROESOPHAGEAL REFLUX DISEASE WITHOUT ESOPHAGITIS: Chronic | ICD-10-CM

## 2021-09-17 DIAGNOSIS — Z99.81 DEPENDENCE ON SUPPLEMENTAL OXYGEN: ICD-10-CM

## 2021-09-17 PROBLEM — Z79.69 IMMUNOSUPPRESSED DUE TO CHEMOTHERAPY: Status: ACTIVE | Noted: 2021-09-17

## 2021-09-17 PROCEDURE — 3008F BODY MASS INDEX DOCD: CPT | Mod: HCNC,CPTII,S$GLB, | Performed by: PHYSICIAN ASSISTANT

## 2021-09-17 PROCEDURE — 1159F PR MEDICATION LIST DOCUMENTED IN MEDICAL RECORD: ICD-10-PCS | Mod: HCNC,CPTII,S$GLB, | Performed by: PHYSICIAN ASSISTANT

## 2021-09-17 PROCEDURE — 99499 RISK ADDL DX/OHS AUDIT: ICD-10-PCS | Mod: HCNC,S$GLB,, | Performed by: PHYSICIAN ASSISTANT

## 2021-09-17 PROCEDURE — 3074F PR MOST RECENT SYSTOLIC BLOOD PRESSURE < 130 MM HG: ICD-10-PCS | Mod: HCNC,CPTII,S$GLB, | Performed by: PHYSICIAN ASSISTANT

## 2021-09-17 PROCEDURE — 3078F DIAST BP <80 MM HG: CPT | Mod: HCNC,CPTII,S$GLB, | Performed by: PHYSICIAN ASSISTANT

## 2021-09-17 PROCEDURE — 99999 PR PBB SHADOW E&M-EST. PATIENT-LVL III: CPT | Mod: PBBFAC,HCNC,, | Performed by: PHYSICIAN ASSISTANT

## 2021-09-17 PROCEDURE — G0439 PPPS, SUBSEQ VISIT: HCPCS | Mod: HCNC,S$GLB,, | Performed by: PHYSICIAN ASSISTANT

## 2021-09-17 PROCEDURE — 3008F PR BODY MASS INDEX (BMI) DOCUMENTED: ICD-10-PCS | Mod: HCNC,CPTII,S$GLB, | Performed by: PHYSICIAN ASSISTANT

## 2021-09-17 PROCEDURE — 3074F SYST BP LT 130 MM HG: CPT | Mod: HCNC,CPTII,S$GLB, | Performed by: PHYSICIAN ASSISTANT

## 2021-09-17 PROCEDURE — 1160F RVW MEDS BY RX/DR IN RCRD: CPT | Mod: HCNC,CPTII,S$GLB, | Performed by: PHYSICIAN ASSISTANT

## 2021-09-17 PROCEDURE — 99999 PR PBB SHADOW E&M-EST. PATIENT-LVL III: ICD-10-PCS | Mod: PBBFAC,HCNC,, | Performed by: PHYSICIAN ASSISTANT

## 2021-09-17 PROCEDURE — G0439 PR MEDICARE ANNUAL WELLNESS SUBSEQUENT VISIT: ICD-10-PCS | Mod: HCNC,S$GLB,, | Performed by: PHYSICIAN ASSISTANT

## 2021-09-17 PROCEDURE — 1160F PR REVIEW ALL MEDS BY PRESCRIBER/CLIN PHARMACIST DOCUMENTED: ICD-10-PCS | Mod: HCNC,CPTII,S$GLB, | Performed by: PHYSICIAN ASSISTANT

## 2021-09-17 PROCEDURE — 1159F MED LIST DOCD IN RCRD: CPT | Mod: HCNC,CPTII,S$GLB, | Performed by: PHYSICIAN ASSISTANT

## 2021-09-17 PROCEDURE — 3078F PR MOST RECENT DIASTOLIC BLOOD PRESSURE < 80 MM HG: ICD-10-PCS | Mod: HCNC,CPTII,S$GLB, | Performed by: PHYSICIAN ASSISTANT

## 2021-09-17 PROCEDURE — 99499 UNLISTED E&M SERVICE: CPT | Mod: HCNC,S$GLB,, | Performed by: PHYSICIAN ASSISTANT

## 2021-09-20 DIAGNOSIS — C61 PROSTATE CANCER: ICD-10-CM

## 2021-09-20 RX ORDER — ABIRATERONE ACETATE 250 MG/1
1000 TABLET ORAL DAILY
Qty: 120 TABLET | Refills: 0 | Status: SHIPPED | OUTPATIENT
Start: 2021-09-20 | End: 2021-09-21 | Stop reason: SDUPTHER

## 2021-09-21 ENCOUNTER — SPECIALTY PHARMACY (OUTPATIENT)
Dept: PHARMACY | Facility: CLINIC | Age: 66
End: 2021-09-21

## 2021-09-21 DIAGNOSIS — C61 PROSTATE CANCER: ICD-10-CM

## 2021-09-22 ENCOUNTER — SPECIALTY PHARMACY (OUTPATIENT)
Dept: PHARMACY | Facility: CLINIC | Age: 66
End: 2021-09-22

## 2021-09-22 RX ORDER — ABIRATERONE ACETATE 250 MG/1
1000 TABLET ORAL DAILY
Qty: 120 TABLET | Refills: 0 | Status: SHIPPED | OUTPATIENT
Start: 2021-09-22 | End: 2021-10-15 | Stop reason: SDUPTHER

## 2021-09-23 ENCOUNTER — OFFICE VISIT (OUTPATIENT)
Dept: UROLOGY | Facility: CLINIC | Age: 66
End: 2021-09-23
Payer: MEDICARE

## 2021-09-23 VITALS
DIASTOLIC BLOOD PRESSURE: 68 MMHG | BODY MASS INDEX: 21.32 KG/M2 | SYSTOLIC BLOOD PRESSURE: 120 MMHG | WEIGHT: 148.56 LBS

## 2021-09-23 DIAGNOSIS — C61 PROSTATE CANCER: Primary | ICD-10-CM

## 2021-09-23 PROCEDURE — 1159F PR MEDICATION LIST DOCUMENTED IN MEDICAL RECORD: ICD-10-PCS | Mod: HCNC,CPTII,S$GLB, | Performed by: UROLOGY

## 2021-09-23 PROCEDURE — 3008F PR BODY MASS INDEX (BMI) DOCUMENTED: ICD-10-PCS | Mod: HCNC,CPTII,S$GLB, | Performed by: UROLOGY

## 2021-09-23 PROCEDURE — 3078F PR MOST RECENT DIASTOLIC BLOOD PRESSURE < 80 MM HG: ICD-10-PCS | Mod: HCNC,CPTII,S$GLB, | Performed by: UROLOGY

## 2021-09-23 PROCEDURE — 1159F MED LIST DOCD IN RCRD: CPT | Mod: HCNC,CPTII,S$GLB, | Performed by: UROLOGY

## 2021-09-23 PROCEDURE — 99499 UNLISTED E&M SERVICE: CPT | Mod: HCNC,S$GLB,, | Performed by: UROLOGY

## 2021-09-23 PROCEDURE — 99999 PR PBB SHADOW E&M-EST. PATIENT-LVL III: CPT | Mod: PBBFAC,HCNC,, | Performed by: UROLOGY

## 2021-09-23 PROCEDURE — 99499 NO LOS: ICD-10-PCS | Mod: HCNC,S$GLB,, | Performed by: UROLOGY

## 2021-09-23 PROCEDURE — 99999 PR PBB SHADOW E&M-EST. PATIENT-LVL III: ICD-10-PCS | Mod: PBBFAC,HCNC,, | Performed by: UROLOGY

## 2021-09-23 PROCEDURE — 1160F RVW MEDS BY RX/DR IN RCRD: CPT | Mod: HCNC,CPTII,S$GLB, | Performed by: UROLOGY

## 2021-09-23 PROCEDURE — 3074F SYST BP LT 130 MM HG: CPT | Mod: HCNC,CPTII,S$GLB, | Performed by: UROLOGY

## 2021-09-23 PROCEDURE — 3078F DIAST BP <80 MM HG: CPT | Mod: HCNC,CPTII,S$GLB, | Performed by: UROLOGY

## 2021-09-23 PROCEDURE — 96402 CHEMO HORMON ANTINEOPL SQ/IM: CPT | Mod: HCNC,S$GLB,, | Performed by: UROLOGY

## 2021-09-23 PROCEDURE — 3008F BODY MASS INDEX DOCD: CPT | Mod: HCNC,CPTII,S$GLB, | Performed by: UROLOGY

## 2021-09-23 PROCEDURE — 1160F PR REVIEW ALL MEDS BY PRESCRIBER/CLIN PHARMACIST DOCUMENTED: ICD-10-PCS | Mod: HCNC,CPTII,S$GLB, | Performed by: UROLOGY

## 2021-09-23 PROCEDURE — 3074F PR MOST RECENT SYSTOLIC BLOOD PRESSURE < 130 MM HG: ICD-10-PCS | Mod: HCNC,CPTII,S$GLB, | Performed by: UROLOGY

## 2021-09-23 PROCEDURE — 96402 PR CHEMOTHER HORMON ANTINEOPL SUB-Q/IM: ICD-10-PCS | Mod: HCNC,S$GLB,, | Performed by: UROLOGY

## 2021-09-28 ENCOUNTER — SPECIALTY PHARMACY (OUTPATIENT)
Dept: PHARMACY | Facility: CLINIC | Age: 66
End: 2021-09-28

## 2021-09-28 DIAGNOSIS — C61 PROSTATE CANCER: Primary | ICD-10-CM

## 2021-10-07 ENCOUNTER — LAB VISIT (OUTPATIENT)
Dept: LAB | Facility: HOSPITAL | Age: 66
End: 2021-10-07
Attending: INTERNAL MEDICINE
Payer: MEDICARE

## 2021-10-07 DIAGNOSIS — T45.1X5A IMMUNOSUPPRESSED DUE TO CHEMOTHERAPY: ICD-10-CM

## 2021-10-07 DIAGNOSIS — C61 PROSTATE CANCER: ICD-10-CM

## 2021-10-07 DIAGNOSIS — D84.821 IMMUNOSUPPRESSED DUE TO CHEMOTHERAPY: ICD-10-CM

## 2021-10-07 DIAGNOSIS — G89.3 NEOPLASM RELATED PAIN: ICD-10-CM

## 2021-10-07 DIAGNOSIS — Z79.899 IMMUNOSUPPRESSED DUE TO CHEMOTHERAPY: ICD-10-CM

## 2021-10-07 LAB
ALBUMIN SERPL BCP-MCNC: 3.2 G/DL (ref 3.5–5.2)
ALP SERPL-CCNC: 89 U/L (ref 55–135)
ALT SERPL W/O P-5'-P-CCNC: 20 U/L (ref 10–44)
ANION GAP SERPL CALC-SCNC: 11 MMOL/L (ref 8–16)
AST SERPL-CCNC: 20 U/L (ref 10–40)
BASOPHILS # BLD AUTO: 0.04 K/UL (ref 0–0.2)
BASOPHILS NFR BLD: 0.4 % (ref 0–1.9)
BILIRUB SERPL-MCNC: 0.5 MG/DL (ref 0.1–1)
BUN SERPL-MCNC: 13 MG/DL (ref 8–23)
CALCIUM SERPL-MCNC: 9.7 MG/DL (ref 8.7–10.5)
CHLORIDE SERPL-SCNC: 107 MMOL/L (ref 95–110)
CO2 SERPL-SCNC: 24 MMOL/L (ref 23–29)
COMPLEXED PSA SERPL-MCNC: <0.01 NG/ML (ref 0–4)
CREAT SERPL-MCNC: 1.5 MG/DL (ref 0.5–1.4)
DIFFERENTIAL METHOD: ABNORMAL
EOSINOPHIL # BLD AUTO: 0.1 K/UL (ref 0–0.5)
EOSINOPHIL NFR BLD: 0.9 % (ref 0–8)
ERYTHROCYTE [DISTWIDTH] IN BLOOD BY AUTOMATED COUNT: 14.9 % (ref 11.5–14.5)
EST. GFR  (AFRICAN AMERICAN): 56 ML/MIN/1.73 M^2
EST. GFR  (NON AFRICAN AMERICAN): 49 ML/MIN/1.73 M^2
GLUCOSE SERPL-MCNC: 109 MG/DL (ref 70–110)
HCT VFR BLD AUTO: 38.8 % (ref 40–54)
HGB BLD-MCNC: 11.9 G/DL (ref 14–18)
IMM GRANULOCYTES # BLD AUTO: 0.07 K/UL (ref 0–0.04)
IMM GRANULOCYTES NFR BLD AUTO: 0.8 % (ref 0–0.5)
LYMPHOCYTES # BLD AUTO: 2.5 K/UL (ref 1–4.8)
LYMPHOCYTES NFR BLD: 27.2 % (ref 18–48)
MCH RBC QN AUTO: 29.3 PG (ref 27–31)
MCHC RBC AUTO-ENTMCNC: 30.7 G/DL (ref 32–36)
MCV RBC AUTO: 96 FL (ref 82–98)
MONOCYTES # BLD AUTO: 0.8 K/UL (ref 0.3–1)
MONOCYTES NFR BLD: 8.8 % (ref 4–15)
NEUTROPHILS # BLD AUTO: 5.7 K/UL (ref 1.8–7.7)
NEUTROPHILS NFR BLD: 61.9 % (ref 38–73)
NRBC BLD-RTO: 0 /100 WBC
PLATELET # BLD AUTO: 221 K/UL (ref 150–450)
PMV BLD AUTO: 10.7 FL (ref 9.2–12.9)
POTASSIUM SERPL-SCNC: 4.1 MMOL/L (ref 3.5–5.1)
PROT SERPL-MCNC: 7.5 G/DL (ref 6–8.4)
RBC # BLD AUTO: 4.06 M/UL (ref 4.6–6.2)
SODIUM SERPL-SCNC: 142 MMOL/L (ref 136–145)
WBC # BLD AUTO: 9.23 K/UL (ref 3.9–12.7)

## 2021-10-07 PROCEDURE — 84153 ASSAY OF PSA TOTAL: CPT | Mod: HCNC | Performed by: INTERNAL MEDICINE

## 2021-10-07 PROCEDURE — 80053 COMPREHEN METABOLIC PANEL: CPT | Mod: HCNC | Performed by: INTERNAL MEDICINE

## 2021-10-07 PROCEDURE — 36415 COLL VENOUS BLD VENIPUNCTURE: CPT | Mod: HCNC,PO | Performed by: INTERNAL MEDICINE

## 2021-10-07 PROCEDURE — 85025 COMPLETE CBC W/AUTO DIFF WBC: CPT | Mod: HCNC | Performed by: INTERNAL MEDICINE

## 2021-10-08 ENCOUNTER — OFFICE VISIT (OUTPATIENT)
Dept: HEMATOLOGY/ONCOLOGY | Facility: CLINIC | Age: 66
End: 2021-10-08
Payer: MEDICARE

## 2021-10-08 VITALS
DIASTOLIC BLOOD PRESSURE: 96 MMHG | BODY MASS INDEX: 21.74 KG/M2 | HEIGHT: 70 IN | TEMPERATURE: 98 F | SYSTOLIC BLOOD PRESSURE: 153 MMHG | OXYGEN SATURATION: 99 % | HEART RATE: 78 BPM | WEIGHT: 151.88 LBS

## 2021-10-08 DIAGNOSIS — G89.3 NEOPLASM RELATED PAIN: ICD-10-CM

## 2021-10-08 DIAGNOSIS — C79.51 SECONDARY ADENOCARCINOMA OF SKELETAL BONE: ICD-10-CM

## 2021-10-08 DIAGNOSIS — C61 PROSTATE CANCER: ICD-10-CM

## 2021-10-08 PROCEDURE — 99215 PR OFFICE/OUTPT VISIT, EST, LEVL V, 40-54 MIN: ICD-10-PCS | Mod: HCNC,S$GLB,, | Performed by: INTERNAL MEDICINE

## 2021-10-08 PROCEDURE — 99215 OFFICE O/P EST HI 40 MIN: CPT | Mod: HCNC,S$GLB,, | Performed by: INTERNAL MEDICINE

## 2021-10-08 PROCEDURE — 99499 RISK ADDL DX/OHS AUDIT: ICD-10-PCS | Mod: S$GLB,,, | Performed by: INTERNAL MEDICINE

## 2021-10-08 PROCEDURE — 3008F PR BODY MASS INDEX (BMI) DOCUMENTED: ICD-10-PCS | Mod: HCNC,CPTII,S$GLB, | Performed by: INTERNAL MEDICINE

## 2021-10-08 PROCEDURE — 3080F DIAST BP >= 90 MM HG: CPT | Mod: HCNC,CPTII,S$GLB, | Performed by: INTERNAL MEDICINE

## 2021-10-08 PROCEDURE — 3077F PR MOST RECENT SYSTOLIC BLOOD PRESSURE >= 140 MM HG: ICD-10-PCS | Mod: HCNC,CPTII,S$GLB, | Performed by: INTERNAL MEDICINE

## 2021-10-08 PROCEDURE — 3008F BODY MASS INDEX DOCD: CPT | Mod: HCNC,CPTII,S$GLB, | Performed by: INTERNAL MEDICINE

## 2021-10-08 PROCEDURE — 99999 PR PBB SHADOW E&M-EST. PATIENT-LVL II: ICD-10-PCS | Mod: PBBFAC,HCNC,, | Performed by: INTERNAL MEDICINE

## 2021-10-08 PROCEDURE — 3080F PR MOST RECENT DIASTOLIC BLOOD PRESSURE >= 90 MM HG: ICD-10-PCS | Mod: HCNC,CPTII,S$GLB, | Performed by: INTERNAL MEDICINE

## 2021-10-08 PROCEDURE — 99999 PR PBB SHADOW E&M-EST. PATIENT-LVL II: CPT | Mod: PBBFAC,HCNC,, | Performed by: INTERNAL MEDICINE

## 2021-10-08 PROCEDURE — 99499 UNLISTED E&M SERVICE: CPT | Mod: S$GLB,,, | Performed by: INTERNAL MEDICINE

## 2021-10-08 PROCEDURE — 3077F SYST BP >= 140 MM HG: CPT | Mod: HCNC,CPTII,S$GLB, | Performed by: INTERNAL MEDICINE

## 2021-10-08 RX ORDER — HYDROCODONE BITARTRATE AND ACETAMINOPHEN 10; 325 MG/1; MG/1
1 TABLET ORAL EVERY 6 HOURS PRN
Qty: 90 TABLET | Refills: 0 | Status: SHIPPED | OUTPATIENT
Start: 2021-10-08 | End: 2021-11-01 | Stop reason: SDUPTHER

## 2021-10-15 ENCOUNTER — SPECIALTY PHARMACY (OUTPATIENT)
Dept: PHARMACY | Facility: CLINIC | Age: 66
End: 2021-10-15

## 2021-10-15 DIAGNOSIS — C61 PROSTATE CANCER: ICD-10-CM

## 2021-10-15 RX ORDER — ABIRATERONE ACETATE 250 MG/1
1000 TABLET ORAL DAILY
Qty: 120 TABLET | Refills: 0 | Status: SHIPPED | OUTPATIENT
Start: 2021-10-15 | End: 2021-11-12 | Stop reason: SDUPTHER

## 2021-10-18 ENCOUNTER — TELEPHONE (OUTPATIENT)
Dept: INTERNAL MEDICINE | Facility: CLINIC | Age: 66
End: 2021-10-18
Payer: MEDICARE

## 2021-10-18 DIAGNOSIS — Z79.52 LONG TERM (CURRENT) USE OF SYSTEMIC STEROIDS: ICD-10-CM

## 2021-10-18 DIAGNOSIS — M85.80 OSTEOPENIA, UNSPECIFIED LOCATION: Primary | ICD-10-CM

## 2021-10-18 DIAGNOSIS — C61 PROSTATE CANCER: ICD-10-CM

## 2021-10-19 ENCOUNTER — SPECIALTY PHARMACY (OUTPATIENT)
Dept: PHARMACY | Facility: CLINIC | Age: 66
End: 2021-10-19

## 2021-10-19 DIAGNOSIS — C61 PROSTATE CANCER: Primary | ICD-10-CM

## 2021-10-21 ENCOUNTER — APPOINTMENT (OUTPATIENT)
Dept: RADIOLOGY | Facility: HOSPITAL | Age: 66
End: 2021-10-21
Attending: FAMILY MEDICINE
Payer: MEDICARE

## 2021-10-21 DIAGNOSIS — M85.80 OSTEOPENIA, UNSPECIFIED LOCATION: ICD-10-CM

## 2021-10-21 DIAGNOSIS — Z79.52 LONG TERM (CURRENT) USE OF SYSTEMIC STEROIDS: ICD-10-CM

## 2021-10-21 DIAGNOSIS — C61 PROSTATE CANCER: ICD-10-CM

## 2021-10-21 PROCEDURE — 77080 DEXA BONE DENSITY SPINE HIP: ICD-10-PCS | Mod: 26,HCNC,, | Performed by: RADIOLOGY

## 2021-10-21 PROCEDURE — 77080 DXA BONE DENSITY AXIAL: CPT | Mod: TC,HCNC

## 2021-10-21 PROCEDURE — 77080 DXA BONE DENSITY AXIAL: CPT | Mod: 26,HCNC,, | Performed by: RADIOLOGY

## 2021-10-29 ENCOUNTER — TELEPHONE (OUTPATIENT)
Dept: HEMATOLOGY/ONCOLOGY | Facility: CLINIC | Age: 66
End: 2021-10-29
Payer: MEDICARE

## 2021-11-01 DIAGNOSIS — C61 PROSTATE CANCER: ICD-10-CM

## 2021-11-01 DIAGNOSIS — G89.3 NEOPLASM RELATED PAIN: ICD-10-CM

## 2021-11-01 DIAGNOSIS — C79.51 SECONDARY ADENOCARCINOMA OF SKELETAL BONE: ICD-10-CM

## 2021-11-01 RX ORDER — HYDROCODONE BITARTRATE AND ACETAMINOPHEN 10; 325 MG/1; MG/1
1 TABLET ORAL EVERY 6 HOURS PRN
Qty: 90 TABLET | Refills: 0 | Status: SHIPPED | OUTPATIENT
Start: 2021-11-01 | End: 2021-11-29 | Stop reason: SDUPTHER

## 2021-11-02 ENCOUNTER — TELEPHONE (OUTPATIENT)
Dept: HEMATOLOGY/ONCOLOGY | Facility: CLINIC | Age: 66
End: 2021-11-02
Payer: MEDICARE

## 2021-11-02 RX ORDER — HYDROCODONE BITARTRATE AND ACETAMINOPHEN 10; 325 MG/1; MG/1
1 TABLET ORAL EVERY 6 HOURS PRN
Qty: 90 TABLET | Refills: 0 | OUTPATIENT
Start: 2021-11-02

## 2021-11-04 ENCOUNTER — LAB VISIT (OUTPATIENT)
Dept: LAB | Facility: HOSPITAL | Age: 66
End: 2021-11-04
Attending: FAMILY MEDICINE
Payer: MEDICARE

## 2021-11-04 DIAGNOSIS — I10 ESSENTIAL HYPERTENSION: ICD-10-CM

## 2021-11-04 DIAGNOSIS — E78.2 MIXED HYPERLIPIDEMIA: Chronic | ICD-10-CM

## 2021-11-04 DIAGNOSIS — Z00.00 ROUTINE GENERAL MEDICAL EXAMINATION AT A HEALTH CARE FACILITY: ICD-10-CM

## 2021-11-04 PROCEDURE — 36415 COLL VENOUS BLD VENIPUNCTURE: CPT | Mod: HCNC,PO | Performed by: FAMILY MEDICINE

## 2021-11-04 PROCEDURE — 80061 LIPID PANEL: CPT | Mod: HCNC | Performed by: FAMILY MEDICINE

## 2021-11-05 LAB
CHOLEST SERPL-MCNC: 135 MG/DL (ref 120–199)
CHOLEST/HDLC SERPL: 2.8 {RATIO} (ref 2–5)
HDLC SERPL-MCNC: 49 MG/DL (ref 40–75)
HDLC SERPL: 36.3 % (ref 20–50)
LDLC SERPL CALC-MCNC: 73 MG/DL (ref 63–159)
NONHDLC SERPL-MCNC: 86 MG/DL
TRIGL SERPL-MCNC: 65 MG/DL (ref 30–150)

## 2021-11-12 DIAGNOSIS — C61 PROSTATE CANCER: ICD-10-CM

## 2021-11-12 RX ORDER — ABIRATERONE ACETATE 250 MG/1
1000 TABLET ORAL DAILY
Qty: 120 TABLET | Refills: 11 | OUTPATIENT
Start: 2021-11-12 | End: 2022-11-12

## 2021-11-12 RX ORDER — ABIRATERONE ACETATE 250 MG/1
1000 TABLET ORAL DAILY
Qty: 120 TABLET | Refills: 0 | Status: SHIPPED | OUTPATIENT
Start: 2021-11-12 | End: 2021-12-13 | Stop reason: SDUPTHER

## 2021-11-12 RX ORDER — ABIRATERONE ACETATE 250 MG/1
1000 TABLET ORAL DAILY
Qty: 120 TABLET | Refills: 0 | Status: CANCELLED | OUTPATIENT
Start: 2021-11-12 | End: 2022-11-12

## 2021-11-15 ENCOUNTER — SPECIALTY PHARMACY (OUTPATIENT)
Dept: PHARMACY | Facility: CLINIC | Age: 66
End: 2021-11-15
Payer: MEDICARE

## 2021-11-18 ENCOUNTER — LAB VISIT (OUTPATIENT)
Dept: LAB | Facility: HOSPITAL | Age: 66
End: 2021-11-18
Attending: PSYCHIATRY & NEUROLOGY
Payer: MEDICARE

## 2021-11-18 DIAGNOSIS — Z00.00 ROUTINE GENERAL MEDICAL EXAMINATION AT A HEALTH CARE FACILITY: ICD-10-CM

## 2021-11-18 DIAGNOSIS — I10 ESSENTIAL HYPERTENSION: ICD-10-CM

## 2021-11-18 DIAGNOSIS — G89.3 NEOPLASM RELATED PAIN: ICD-10-CM

## 2021-11-18 DIAGNOSIS — C61 PROSTATE CANCER: ICD-10-CM

## 2021-11-18 LAB
ALBUMIN SERPL BCP-MCNC: 3.3 G/DL (ref 3.5–5.2)
ALP SERPL-CCNC: 81 U/L (ref 55–135)
ALT SERPL W/O P-5'-P-CCNC: 16 U/L (ref 10–44)
ANION GAP SERPL CALC-SCNC: 11 MMOL/L (ref 8–16)
AST SERPL-CCNC: 21 U/L (ref 10–40)
BASOPHILS # BLD AUTO: 0.05 K/UL (ref 0–0.2)
BASOPHILS NFR BLD: 0.5 % (ref 0–1.9)
BILIRUB SERPL-MCNC: 0.5 MG/DL (ref 0.1–1)
BUN SERPL-MCNC: 10 MG/DL (ref 8–23)
CALCIUM SERPL-MCNC: 9.3 MG/DL (ref 8.7–10.5)
CHLORIDE SERPL-SCNC: 104 MMOL/L (ref 95–110)
CO2 SERPL-SCNC: 26 MMOL/L (ref 23–29)
COMPLEXED PSA SERPL-MCNC: <0.01 NG/ML (ref 0–4)
CREAT SERPL-MCNC: 1 MG/DL (ref 0.5–1.4)
DIFFERENTIAL METHOD: ABNORMAL
EOSINOPHIL # BLD AUTO: 0.1 K/UL (ref 0–0.5)
EOSINOPHIL NFR BLD: 1 % (ref 0–8)
ERYTHROCYTE [DISTWIDTH] IN BLOOD BY AUTOMATED COUNT: 15.1 % (ref 11.5–14.5)
EST. GFR  (AFRICAN AMERICAN): >60 ML/MIN/1.73 M^2
EST. GFR  (NON AFRICAN AMERICAN): >60 ML/MIN/1.73 M^2
GLUCOSE SERPL-MCNC: 103 MG/DL (ref 70–110)
HCT VFR BLD AUTO: 39.6 % (ref 40–54)
HGB BLD-MCNC: 12.4 G/DL (ref 14–18)
IMM GRANULOCYTES # BLD AUTO: 0.06 K/UL (ref 0–0.04)
IMM GRANULOCYTES NFR BLD AUTO: 0.6 % (ref 0–0.5)
LYMPHOCYTES # BLD AUTO: 2.8 K/UL (ref 1–4.8)
LYMPHOCYTES NFR BLD: 27.2 % (ref 18–48)
MCH RBC QN AUTO: 29.6 PG (ref 27–31)
MCHC RBC AUTO-ENTMCNC: 31.3 G/DL (ref 32–36)
MCV RBC AUTO: 95 FL (ref 82–98)
MONOCYTES # BLD AUTO: 0.9 K/UL (ref 0.3–1)
MONOCYTES NFR BLD: 8.9 % (ref 4–15)
NEUTROPHILS # BLD AUTO: 6.4 K/UL (ref 1.8–7.7)
NEUTROPHILS NFR BLD: 61.8 % (ref 38–73)
NRBC BLD-RTO: 0 /100 WBC
PLATELET # BLD AUTO: 236 K/UL (ref 150–450)
PMV BLD AUTO: 10.9 FL (ref 9.2–12.9)
POTASSIUM SERPL-SCNC: 4 MMOL/L (ref 3.5–5.1)
PROT SERPL-MCNC: 7 G/DL (ref 6–8.4)
RBC # BLD AUTO: 4.19 M/UL (ref 4.6–6.2)
SODIUM SERPL-SCNC: 141 MMOL/L (ref 136–145)
TSH SERPL DL<=0.005 MIU/L-ACNC: 2.56 UIU/ML (ref 0.4–4)
WBC # BLD AUTO: 10.39 K/UL (ref 3.9–12.7)

## 2021-11-18 PROCEDURE — 36415 COLL VENOUS BLD VENIPUNCTURE: CPT | Mod: HCNC,PO | Performed by: INTERNAL MEDICINE

## 2021-11-18 PROCEDURE — 85025 COMPLETE CBC W/AUTO DIFF WBC: CPT | Mod: HCNC | Performed by: INTERNAL MEDICINE

## 2021-11-18 PROCEDURE — 84443 ASSAY THYROID STIM HORMONE: CPT | Mod: HCNC | Performed by: FAMILY MEDICINE

## 2021-11-18 PROCEDURE — 84153 ASSAY OF PSA TOTAL: CPT | Mod: HCNC | Performed by: INTERNAL MEDICINE

## 2021-11-18 PROCEDURE — 80053 COMPREHEN METABOLIC PANEL: CPT | Mod: HCNC | Performed by: INTERNAL MEDICINE

## 2021-11-19 ENCOUNTER — OFFICE VISIT (OUTPATIENT)
Dept: HEMATOLOGY/ONCOLOGY | Facility: CLINIC | Age: 66
End: 2021-11-19
Payer: MEDICARE

## 2021-11-19 VITALS
DIASTOLIC BLOOD PRESSURE: 92 MMHG | BODY MASS INDEX: 21.84 KG/M2 | TEMPERATURE: 97 F | HEART RATE: 80 BPM | WEIGHT: 152.56 LBS | OXYGEN SATURATION: 96 % | HEIGHT: 70 IN | SYSTOLIC BLOOD PRESSURE: 149 MMHG

## 2021-11-19 DIAGNOSIS — Z79.899 IMMUNOSUPPRESSED DUE TO CHEMOTHERAPY: ICD-10-CM

## 2021-11-19 DIAGNOSIS — C79.51 SECONDARY ADENOCARCINOMA OF SKELETAL BONE: ICD-10-CM

## 2021-11-19 DIAGNOSIS — T45.1X5A IMMUNOSUPPRESSED DUE TO CHEMOTHERAPY: ICD-10-CM

## 2021-11-19 DIAGNOSIS — C61 PROSTATE CANCER: Primary | ICD-10-CM

## 2021-11-19 DIAGNOSIS — D84.821 IMMUNOSUPPRESSED DUE TO CHEMOTHERAPY: ICD-10-CM

## 2021-11-19 PROCEDURE — 1159F PR MEDICATION LIST DOCUMENTED IN MEDICAL RECORD: ICD-10-PCS | Mod: HCNC,CPTII,S$GLB, | Performed by: INTERNAL MEDICINE

## 2021-11-19 PROCEDURE — 3077F SYST BP >= 140 MM HG: CPT | Mod: HCNC,CPTII,S$GLB, | Performed by: INTERNAL MEDICINE

## 2021-11-19 PROCEDURE — 3080F DIAST BP >= 90 MM HG: CPT | Mod: HCNC,CPTII,S$GLB, | Performed by: INTERNAL MEDICINE

## 2021-11-19 PROCEDURE — 3080F PR MOST RECENT DIASTOLIC BLOOD PRESSURE >= 90 MM HG: ICD-10-PCS | Mod: HCNC,CPTII,S$GLB, | Performed by: INTERNAL MEDICINE

## 2021-11-19 PROCEDURE — 3077F PR MOST RECENT SYSTOLIC BLOOD PRESSURE >= 140 MM HG: ICD-10-PCS | Mod: HCNC,CPTII,S$GLB, | Performed by: INTERNAL MEDICINE

## 2021-11-19 PROCEDURE — 1159F MED LIST DOCD IN RCRD: CPT | Mod: HCNC,CPTII,S$GLB, | Performed by: INTERNAL MEDICINE

## 2021-11-19 PROCEDURE — 3008F BODY MASS INDEX DOCD: CPT | Mod: HCNC,CPTII,S$GLB, | Performed by: INTERNAL MEDICINE

## 2021-11-19 PROCEDURE — 99999 PR PBB SHADOW E&M-EST. PATIENT-LVL IV: CPT | Mod: PBBFAC,HCNC,, | Performed by: INTERNAL MEDICINE

## 2021-11-19 PROCEDURE — 99999 PR PBB SHADOW E&M-EST. PATIENT-LVL IV: ICD-10-PCS | Mod: PBBFAC,HCNC,, | Performed by: INTERNAL MEDICINE

## 2021-11-19 PROCEDURE — 99215 PR OFFICE/OUTPT VISIT, EST, LEVL V, 40-54 MIN: ICD-10-PCS | Mod: HCNC,S$GLB,, | Performed by: INTERNAL MEDICINE

## 2021-11-19 PROCEDURE — 3008F PR BODY MASS INDEX (BMI) DOCUMENTED: ICD-10-PCS | Mod: HCNC,CPTII,S$GLB, | Performed by: INTERNAL MEDICINE

## 2021-11-19 PROCEDURE — 99215 OFFICE O/P EST HI 40 MIN: CPT | Mod: HCNC,S$GLB,, | Performed by: INTERNAL MEDICINE

## 2021-11-29 DIAGNOSIS — C79.51 SECONDARY ADENOCARCINOMA OF SKELETAL BONE: ICD-10-CM

## 2021-11-29 DIAGNOSIS — G89.3 NEOPLASM RELATED PAIN: ICD-10-CM

## 2021-11-29 DIAGNOSIS — C61 PROSTATE CANCER: ICD-10-CM

## 2021-11-29 RX ORDER — HYDROCODONE BITARTRATE AND ACETAMINOPHEN 10; 325 MG/1; MG/1
1 TABLET ORAL EVERY 6 HOURS PRN
Qty: 90 TABLET | Refills: 0 | Status: SHIPPED | OUTPATIENT
Start: 2021-11-29 | End: 2021-12-28 | Stop reason: SDUPTHER

## 2021-11-30 ENCOUNTER — TELEPHONE (OUTPATIENT)
Dept: HEMATOLOGY/ONCOLOGY | Facility: CLINIC | Age: 66
End: 2021-11-30
Payer: MEDICARE

## 2021-12-13 DIAGNOSIS — C61 PROSTATE CANCER: ICD-10-CM

## 2021-12-13 RX ORDER — ABIRATERONE ACETATE 250 MG/1
1000 TABLET ORAL DAILY
Qty: 120 TABLET | Refills: 11 | Status: SHIPPED | OUTPATIENT
Start: 2021-12-13 | End: 2022-12-14 | Stop reason: SDUPTHER

## 2021-12-14 ENCOUNTER — SPECIALTY PHARMACY (OUTPATIENT)
Dept: PHARMACY | Facility: CLINIC | Age: 66
End: 2021-12-14
Payer: MEDICARE

## 2021-12-15 ENCOUNTER — PES CALL (OUTPATIENT)
Dept: ADMINISTRATIVE | Facility: CLINIC | Age: 66
End: 2021-12-15
Payer: MEDICARE

## 2021-12-15 ENCOUNTER — SPECIALTY PHARMACY (OUTPATIENT)
Dept: PHARMACY | Facility: CLINIC | Age: 66
End: 2021-12-15
Payer: MEDICARE

## 2021-12-27 ENCOUNTER — TELEPHONE (OUTPATIENT)
Dept: HEMATOLOGY/ONCOLOGY | Facility: CLINIC | Age: 66
End: 2021-12-27
Payer: MEDICARE

## 2021-12-28 DIAGNOSIS — G89.3 NEOPLASM RELATED PAIN: ICD-10-CM

## 2021-12-28 DIAGNOSIS — C79.51 SECONDARY ADENOCARCINOMA OF SKELETAL BONE: ICD-10-CM

## 2021-12-28 DIAGNOSIS — C61 PROSTATE CANCER: ICD-10-CM

## 2021-12-28 RX ORDER — HYDROCODONE BITARTRATE AND ACETAMINOPHEN 10; 325 MG/1; MG/1
1 TABLET ORAL EVERY 6 HOURS PRN
Qty: 90 TABLET | Refills: 0 | Status: SHIPPED | OUTPATIENT
Start: 2021-12-28 | End: 2022-01-25 | Stop reason: SDUPTHER

## 2022-01-05 ENCOUNTER — OFFICE VISIT (OUTPATIENT)
Dept: INTERNAL MEDICINE | Facility: CLINIC | Age: 67
End: 2022-01-05
Payer: MEDICARE

## 2022-01-05 ENCOUNTER — HOSPITAL ENCOUNTER (OUTPATIENT)
Dept: RADIOLOGY | Facility: HOSPITAL | Age: 67
Discharge: HOME | End: 2022-01-05
Attending: NURSE PRACTITIONER
Payer: MEDICARE

## 2022-01-05 VITALS
HEART RATE: 88 BPM | SYSTOLIC BLOOD PRESSURE: 126 MMHG | WEIGHT: 155.63 LBS | BODY MASS INDEX: 22.28 KG/M2 | DIASTOLIC BLOOD PRESSURE: 68 MMHG | HEIGHT: 70 IN | OXYGEN SATURATION: 97 % | TEMPERATURE: 97 F

## 2022-01-05 DIAGNOSIS — Z79.899 IMMUNODEFICIENCY DUE TO CHEMOTHERAPY: ICD-10-CM

## 2022-01-05 DIAGNOSIS — M85.80 OSTEOPENIA, UNSPECIFIED LOCATION: ICD-10-CM

## 2022-01-05 DIAGNOSIS — J44.9 CHRONIC OBSTRUCTIVE PULMONARY DISEASE, UNSPECIFIED COPD TYPE: ICD-10-CM

## 2022-01-05 DIAGNOSIS — C61 PROSTATE CANCER: ICD-10-CM

## 2022-01-05 DIAGNOSIS — I10 ESSENTIAL HYPERTENSION: ICD-10-CM

## 2022-01-05 DIAGNOSIS — I70.0 AORTIC ATHEROSCLEROSIS: ICD-10-CM

## 2022-01-05 DIAGNOSIS — R26.9 ABNORMALITY OF GAIT AND MOBILITY: ICD-10-CM

## 2022-01-05 DIAGNOSIS — I34.0 NONRHEUMATIC MITRAL VALVE REGURGITATION: Chronic | ICD-10-CM

## 2022-01-05 DIAGNOSIS — Z00.00 ENCOUNTER FOR PREVENTIVE HEALTH EXAMINATION: Primary | ICD-10-CM

## 2022-01-05 DIAGNOSIS — I25.118 CORONARY ARTERY DISEASE OF NATIVE ARTERY OF NATIVE HEART WITH STABLE ANGINA PECTORIS: Chronic | ICD-10-CM

## 2022-01-05 DIAGNOSIS — J84.10 PULMONARY FIBROSIS: ICD-10-CM

## 2022-01-05 DIAGNOSIS — I20.9 ANGINA PECTORIS: ICD-10-CM

## 2022-01-05 DIAGNOSIS — E78.2 MIXED HYPERLIPIDEMIA: Chronic | ICD-10-CM

## 2022-01-05 DIAGNOSIS — H40.1132 PRIMARY OPEN ANGLE GLAUCOMA (POAG) OF BOTH EYES, MODERATE STAGE: ICD-10-CM

## 2022-01-05 DIAGNOSIS — J06.9 VIRAL URI WITH COUGH: ICD-10-CM

## 2022-01-05 DIAGNOSIS — K21.9 GASTROESOPHAGEAL REFLUX DISEASE WITHOUT ESOPHAGITIS: Chronic | ICD-10-CM

## 2022-01-05 DIAGNOSIS — D84.821 IMMUNODEFICIENCY DUE TO CHEMOTHERAPY: ICD-10-CM

## 2022-01-05 DIAGNOSIS — T45.1X5A IMMUNODEFICIENCY DUE TO CHEMOTHERAPY: ICD-10-CM

## 2022-01-05 DIAGNOSIS — C79.51 SECONDARY ADENOCARCINOMA OF SKELETAL BONE: ICD-10-CM

## 2022-01-05 DIAGNOSIS — Z98.61 HISTORY OF PTCA: ICD-10-CM

## 2022-01-05 PROCEDURE — G0439 PPPS, SUBSEQ VISIT: HCPCS | Mod: HCNC,S$GLB,, | Performed by: NURSE PRACTITIONER

## 2022-01-05 PROCEDURE — G0439 PR MEDICARE ANNUAL WELLNESS SUBSEQUENT VISIT: ICD-10-PCS | Mod: HCNC,S$GLB,, | Performed by: NURSE PRACTITIONER

## 2022-01-05 PROCEDURE — 71046 X-RAY EXAM CHEST 2 VIEWS: CPT | Mod: TC,HCNC,FY,PO

## 2022-01-05 PROCEDURE — 3078F PR MOST RECENT DIASTOLIC BLOOD PRESSURE < 80 MM HG: ICD-10-PCS | Mod: HCNC,CPTII,S$GLB, | Performed by: NURSE PRACTITIONER

## 2022-01-05 PROCEDURE — 3078F DIAST BP <80 MM HG: CPT | Mod: HCNC,CPTII,S$GLB, | Performed by: NURSE PRACTITIONER

## 2022-01-05 PROCEDURE — 99214 PR OFFICE/OUTPT VISIT, EST, LEVL IV, 30-39 MIN: ICD-10-PCS | Mod: 25,HCNC,S$GLB, | Performed by: NURSE PRACTITIONER

## 2022-01-05 PROCEDURE — 1160F PR REVIEW ALL MEDS BY PRESCRIBER/CLIN PHARMACIST DOCUMENTED: ICD-10-PCS | Mod: HCNC,CPTII,S$GLB, | Performed by: NURSE PRACTITIONER

## 2022-01-05 PROCEDURE — 3008F PR BODY MASS INDEX (BMI) DOCUMENTED: ICD-10-PCS | Mod: HCNC,CPTII,S$GLB, | Performed by: NURSE PRACTITIONER

## 2022-01-05 PROCEDURE — 1159F PR MEDICATION LIST DOCUMENTED IN MEDICAL RECORD: ICD-10-PCS | Mod: HCNC,CPTII,S$GLB, | Performed by: NURSE PRACTITIONER

## 2022-01-05 PROCEDURE — 1160F RVW MEDS BY RX/DR IN RCRD: CPT | Mod: HCNC,CPTII,S$GLB, | Performed by: NURSE PRACTITIONER

## 2022-01-05 PROCEDURE — 99999 PR PBB SHADOW E&M-EST. PATIENT-LVL V: ICD-10-PCS | Mod: PBBFAC,HCNC,, | Performed by: NURSE PRACTITIONER

## 2022-01-05 PROCEDURE — 3074F PR MOST RECENT SYSTOLIC BLOOD PRESSURE < 130 MM HG: ICD-10-PCS | Mod: HCNC,CPTII,S$GLB, | Performed by: NURSE PRACTITIONER

## 2022-01-05 PROCEDURE — 71046 X-RAY EXAM CHEST 2 VIEWS: CPT | Mod: 26,HCNC,, | Performed by: RADIOLOGY

## 2022-01-05 PROCEDURE — 99499 UNLISTED E&M SERVICE: CPT | Mod: S$GLB,,, | Performed by: NURSE PRACTITIONER

## 2022-01-05 PROCEDURE — 3074F SYST BP LT 130 MM HG: CPT | Mod: HCNC,CPTII,S$GLB, | Performed by: NURSE PRACTITIONER

## 2022-01-05 PROCEDURE — 99499 RISK ADDL DX/OHS AUDIT: ICD-10-PCS | Mod: S$GLB,,, | Performed by: NURSE PRACTITIONER

## 2022-01-05 PROCEDURE — 3008F BODY MASS INDEX DOCD: CPT | Mod: HCNC,CPTII,S$GLB, | Performed by: NURSE PRACTITIONER

## 2022-01-05 PROCEDURE — 1159F MED LIST DOCD IN RCRD: CPT | Mod: HCNC,CPTII,S$GLB, | Performed by: NURSE PRACTITIONER

## 2022-01-05 PROCEDURE — 99214 OFFICE O/P EST MOD 30 MIN: CPT | Mod: 25,HCNC,S$GLB, | Performed by: NURSE PRACTITIONER

## 2022-01-05 PROCEDURE — 71046 XR CHEST PA AND LATERAL: ICD-10-PCS | Mod: 26,HCNC,, | Performed by: RADIOLOGY

## 2022-01-05 PROCEDURE — 99999 PR PBB SHADOW E&M-EST. PATIENT-LVL V: CPT | Mod: PBBFAC,HCNC,, | Performed by: NURSE PRACTITIONER

## 2022-01-05 PROCEDURE — U0003 INFECTIOUS AGENT DETECTION BY NUCLEIC ACID (DNA OR RNA); SEVERE ACUTE RESPIRATORY SYNDROME CORONAVIRUS 2 (SARS-COV-2) (CORONAVIRUS DISEASE [COVID-19]), AMPLIFIED PROBE TECHNIQUE, MAKING USE OF HIGH THROUGHPUT TECHNOLOGIES AS DESCRIBED BY CMS-2020-01-R: HCPCS | Mod: HCNC | Performed by: NURSE PRACTITIONER

## 2022-01-05 RX ORDER — PROMETHAZINE HYDROCHLORIDE AND DEXTROMETHORPHAN HYDROBROMIDE 6.25; 15 MG/5ML; MG/5ML
5 SYRUP ORAL NIGHTLY PRN
Qty: 120 ML | Refills: 0 | Status: SHIPPED | OUTPATIENT
Start: 2022-01-05 | End: 2022-08-10 | Stop reason: SDUPTHER

## 2022-01-05 NOTE — PROGRESS NOTES
"  Joey Jacobo presented for a  Medicare AWV and comprehensive Health Risk Assessment today. The following components were reviewed and updated:    · Medical history  · Family History  · Social history  · Allergies and Current Medications  · Health Risk Assessment  · Health Maintenance  · Care Team     Patient Care Team:  Cassandra Brand MD as PCP - General (Family Medicine)  Jaswant Morrow OD as Consulting Physician (Optometry)  Brad Islas MD as Consulting Physician (INTERVENTIONAL CARDIOLOGY)  Mitch Cowan MD as Consulting Physician (Hematology and Oncology)  Lydia Paniagua MD (Urology)  John Chapin DO as Consulting Physician (Orthopedic Surgery)     ** See Completed Assessments for Annual Wellness Visit within the encounter summary.**         The following assessments were completed:  · Living Situation  · CAGE  · Depression Screening  · Timed Get Up and Go  · Whisper Test  · Cognitive Function Screening  · Nutrition Screening  · ADL Screening  · PAQ Screening        Vitals:    01/05/22 1058   BP: 126/68   Pulse: 88   Temp: 97.4 °F (36.3 °C)   TempSrc: Tympanic   SpO2: 97%   Weight: 70.6 kg (155 lb 10.3 oz)   Height: 5' 10" (1.778 m)     Body mass index is 22.33 kg/m².  Physical Exam  Vitals and nursing note reviewed.   Constitutional:       General: He is not in acute distress.     Appearance: He is well-developed and well-nourished. He is not diaphoretic.   HENT:      Head: Normocephalic and atraumatic.   Cardiovascular:      Rate and Rhythm: Normal rate and regular rhythm.      Heart sounds: Normal heart sounds.   Pulmonary:      Effort: Pulmonary effort is normal. No respiratory distress.      Breath sounds: Normal breath sounds.   Skin:     General: Skin is warm and dry.      Findings: No rash.   Neurological:      Mental Status: He is alert and oriented to person, place, and time.   Psychiatric:         Mood and Affect: Mood and affect normal.         Behavior: Behavior normal.         " Thought Content: Thought content normal.         Judgment: Judgment normal.                   Diagnoses and health risks identified today and associated recommendations/orders:    1. Encounter for preventive health examination  Gave number to schedule c scope  Encouraged shingrix  Needs flu shot but will hold off today due to Upper Respiratory Infection symptoms    2. Secondary adenocarcinoma of skeletal bone  Stable. Followed by hematology/oncology currently on ADT with Lupron under the care of Dr. Paniagua and also on Zytiga/prednisone with us which was started on December 7, 2017 and has been tolerating this medication well without any significant side effects    3. Prostate cancer  Stable. Followed by hematology/oncology currently on ADT with Lupron under the care of Dr. Paniagua and also on Zytiga/prednisone with us which was started on December 7, 2017 and has been tolerating this medication well without any significant side effects    4. Immunodeficiency due to chemotherapy  Stable. Followed by hematology/oncology currently on ADT with Lupron under the care of Dr. Paniagua and also on Zytiga/prednisone with us which was started on December 7, 2017 and has been tolerating this medication well without any significant side effects    5. Angina pectoris  Stable. Followed by cardiology. Continue treatment plan as prescribed by your Primary Care Physician and follow up with Primary Care Physician for further management.    6. Aortic atherosclerosis  Stable. On atorvastatin and baby asa. Followed by cardiology. Ct abd 7/24/17. Continue treatment plan as prescribed by your Primary Care Physician and Cardiologist and follow up with them for further management.    7. Chronic obstructive pulmonary disease, unspecified COPD type  8. Pulmonary fibrosis  Stable. On symbicort. Has rescue inhaler as well. Due to see pulm. Continue treatment plan as prescribed by your Primary Care Physician and Pulmonologist and follow up  with them for further management.    9. Primary open angle glaucoma (POAG) of both eyes, moderate stage  Stable. On rx drops. Continue treatment plan as prescribed by your Primary Care Physician and eye care team and follow up with them for further management.    10. Coronary artery disease of native artery of native heart with stable angina pectoris  Stable. On atorvastatin and baby asa. Followed by cardiology. Ct abd 7/24/17. Continue treatment plan as prescribed by your Primary Care Physician and Cardiologist and follow up with them for further management.    11. Essential hypertension  Stable on metoprolol. Continue treatment plan as prescribed by your Primary Care Physician and Cardiologist and follow up with them for further management.    13. History of PTCA  Stable. On baby asa, statin, metoprolol. Followed by cards. Continue treatment plan as prescribed by your Primary Care Physician and Cardiologist and follow up with them for further management.    14. Nonrheumatic mitral valve regurgitation  Stable. Echo 4/30/18. On metoprolol. Followed by cards. Continue treatment plan as prescribed by your Primary Care Physician and Cardiologist and follow up with them for further management.    15. Mixed hyperlipidemia  Stable. On statin. Continue treatment plan as prescribed by your Primary Care Physician and Cardiologist and follow up with them for further management.    16. Gastroesophageal reflux disease without esophagitis  Stable. On protonix. Continue treatment plan as prescribed by your Primary Care Physician and follow up with Primary Care Physician for further management.    18. Osteopenia, unspecified location  Stable. dexa 10/21/21. Takes calcium and vitamin d. Continue treatment plan as prescribed by your Primary Care Physician and follow up with Primary Care Physician for further management.    19. Abnormality of gait and mobility  Stable. States he would have issues going up stairs due to issues with  left leg. Fall precautions discussed    20. Viral URI with cough  cxr due to being immunocompromised. covid test ordered, add 12 hour mucinex with lots of fluids  - promethazine-dextromethorphan (PROMETHAZINE-DM) 6.25-15 mg/5 mL Syrp; Take 5 mLs by mouth nightly as needed (Cough).  Dispense: 120 mL; Refill: 0  - COVID-19 Routine Screening; Future  - X-Ray Chest PA And Lateral; Future      Provided Ray with a 5-10 year written screening schedule and personal prevention plan. Recommendations were developed using the USPSTF age appropriate recommendations. Education, counseling, and referrals were provided as needed. After Visit Summary printed and given to patient which includes a list of additional screenings\tests needed.    Follow up in about 1 year (around 1/5/2023) for hra and feb w Dr. Brand .    Shanita Sandoval, FNP-C  I offered to discuss advanced care planning, including how to pick a person who would make decisions for you if you were unable to make them for yourself, called a health care power of , and what kind of decisions you might make such as use of life sustaining treatments such as ventilators and tube feeding when faced with a life limiting illness recorded on a living will that they will need to know. (How you want to be cared for as you near the end of your natural life)     X Patient is interested in learning more about how to make advanced directives.  I provided them paperwork and offered to discuss this with them.      Sick visit  S: has a cough x 1 week, wife has a cold as well for a few days, no body aches, no chills, no fever, had headache one day. No sinus pain, does report post nasal drip and cough. Has been taking liquid mucinex but states it is too sweet, also has right ear pain, out of flonase for a few days, grandbaby has covid 19, was around them xmas bry- baby got sick this week  O: lungs clear, ears with moderate wax, post nasal drip noted to posterior pharynx. No sinus  pain  A: viral Upper Respiratory Infection vs covid 19, immunocompromised, copd  P: cxr today, covid 19 test, add 12 hour mucinex, promethazine syrup prn, lots of fluids, rescue inhaler as needed, symbicort as prescribed, infection precautions discussed due to immunocompromised status.

## 2022-01-05 NOTE — PATIENT INSTRUCTIONS
Dear Joey Marco Odalis    The scheduling number is [phone]. Please call so they can assist in getting your procedure scheduled.      Counseling and Referral of Other Preventative  (Italic type indicates deductible and co-insurance are waived)    Patient Name: Joey Jacobo  Today's Date: 1/5/2022    Health Maintenance       Date Due Completion Date    Shingles Vaccine (1 of 2) Never done ---    Colorectal Cancer Screening 03/21/2021 3/21/2016    Override on 11/6/2006: Done ([facility]ps)    Influenza Vaccine (1) 09/01/2021 11/23/2020    Override on 3/11/2020: Declined    COVID-19 Vaccine (3 - Booster for Moderna series) 10/16/2021 4/16/2021    Lipid Panel 11/04/2022 11/4/2021    High Dose Statin 11/19/2022 11/19/2021    Aspirin/Antiplatelet Therapy 11/19/2022 11/19/2021    DEXA SCAN 10/21/2026 10/21/2021    Override on 8/18/2008: Done (REPEAT 1-2 YRS)    Override on 8/16/2007: Done    TETANUS VACCINE 04/28/2030 4/28/2020        No orders of the defined types were placed in this encounter.    The following information is provided to all patients.  This information is to help you find resources for any of the problems found today that may be affecting your health:                Living healthy guide: www.[facility].[place].gov      Understanding Diabetes: www.diabetes.org      Eating healthy: www.cdc.gov/healthyweight      CDC home safety checklist: www.cdc.gov/steadi/patient.html      Agency on Aging: www.goea.[place].[facility]      Alcoholics anonymous (AA): www.aa.org      Physical Activity: www.lexi.nih.gov/yb7rfjd      Tobacco use: www.quitwithusla.org

## 2022-01-08 DIAGNOSIS — U07.1 COVID-19 VIRUS DETECTED: ICD-10-CM

## 2022-01-08 LAB
SARS-COV-2 RNA RESP QL NAA+PROBE: DETECTED
SARS-COV-2- CYCLE NUMBER: 19

## 2022-01-14 ENCOUNTER — SPECIALTY PHARMACY (OUTPATIENT)
Dept: PHARMACY | Facility: CLINIC | Age: 67
End: 2022-01-14
Payer: MEDICARE

## 2022-01-14 ENCOUNTER — TELEPHONE (OUTPATIENT)
Dept: INTERNAL MEDICINE | Facility: CLINIC | Age: 67
End: 2022-01-14
Payer: MEDICARE

## 2022-01-14 NOTE — TELEPHONE ENCOUNTER
----- Message from Ana Arita sent at 1/14/2022 11:23 AM CST -----  Contact: Marco  Patient is needing a call back in regards to testing negative then finding out that same day from a specialty pharmacy that he was positive. Please call him back at [phone]

## 2022-01-25 DIAGNOSIS — C79.51 SECONDARY ADENOCARCINOMA OF SKELETAL BONE: ICD-10-CM

## 2022-01-25 DIAGNOSIS — C61 PROSTATE CANCER: ICD-10-CM

## 2022-01-25 DIAGNOSIS — G89.3 NEOPLASM RELATED PAIN: ICD-10-CM

## 2022-01-25 RX ORDER — HYDROCODONE BITARTRATE AND ACETAMINOPHEN 10; 325 MG/1; MG/1
1 TABLET ORAL EVERY 6 HOURS PRN
Qty: 90 TABLET | Refills: 0 | Status: SHIPPED | OUTPATIENT
Start: 2022-01-25 | End: 2022-02-23 | Stop reason: SDUPTHER

## 2022-01-27 ENCOUNTER — TELEPHONE (OUTPATIENT)
Dept: HEMATOLOGY/ONCOLOGY | Facility: CLINIC | Age: 67
End: 2022-01-27
Payer: MEDICARE

## 2022-01-27 NOTE — TELEPHONE ENCOUNTER
Spoke to Ray informed him that the medication had been sent to the pharmacy on 1/25 he verbalized understanding.

## 2022-01-27 NOTE — TELEPHONE ENCOUNTER
----- Message from Wendy Mandel sent at 1/27/2022  8:17 AM CST -----  Contact: Patient  Type:  RX Refill Request    Who Called: Patient  Refill or New Rx: refill  RX Name and Strength: Hydrocodone  How is the patient currently taking it? (ex. 1XDay):   Is this a 30 day or 90 day RX:   Preferred Pharmacy with phone number:   [facility] - [place], [place] - [address]  [address]  [place] [address]  Phone: [phone] Fax: [phone]  Local or Mail Order: Local  Ordering Provider: Dr. Cowan  Would the patient rather a call back or a response via [employer]? Call back   Best Call Back Number: please call him at [phone]  Additional Information:     Type:  RX Refill Request    Who Called: Patient  Refill or New Rx: refill  RX Name and Strength: Prednisone (DELTASONE) 5 MG   How is the patient currently taking it? (ex. 1XDay):   Is this a 30 day or 90 day RX:   Preferred Pharmacy with phone number:   [facility] - [place], [place] - [address]  [address]  [place] [address]  Phone: [phone] Fax: [phone]  Local or Mail Order: Local  Ordering Provider: Dr. Cowan  Would the patient rather a call back or a response via [employer]? Call back   Best Call Back Number: please call him at [phone]  Additional Information:

## 2022-02-01 ENCOUNTER — TELEPHONE (OUTPATIENT)
Dept: INTERNAL MEDICINE | Facility: CLINIC | Age: 67
End: 2022-02-01
Payer: MEDICARE

## 2022-02-01 NOTE — TELEPHONE ENCOUNTER
----- Message from Ana Arita sent at 2/1/2022  2:00 PM CST -----  Contact: Joey Cook is needing a call back regarding the pain in his leg getting worse and pain medication is not helping. Please call him back at [phone]

## 2022-02-01 NOTE — TELEPHONE ENCOUNTER
Spoke with pt. Pt stated he got the problem straight and was thankful for the call. Pt confirmed appt on 2/14/22 at 9:40 am. Call ended well.

## 2022-02-14 ENCOUNTER — SPECIALTY PHARMACY (OUTPATIENT)
Dept: PHARMACY | Facility: CLINIC | Age: 67
End: 2022-02-14
Payer: MEDICARE

## 2022-02-14 DIAGNOSIS — C61 PROSTATE CANCER: Primary | ICD-10-CM

## 2022-02-14 NOTE — TELEPHONE ENCOUNTER
Specialty Pharmacy - Clinical Reassessment  Specialty Pharmacy - Refill Coordination    Specialty Medication Orders Linked to Encounter    Flowsheet Row Most Recent Value   Medication #1 abiraterone (ZYTIGA) 250 mg Tab (Order#[id number], Rx#[id number])          Refill Questions - Documented Responses    Flowsheet Row Most Recent Value   Patient Availability and HIPAA Verification    Does patient want to proceed with activity? Yes   HIPAA/medical authority confirmed? Yes   Relationship to patient of person spoken to? Self   Refill Screening Questions    Changes to allergies? No   Changes to medications? No   New conditions since last clinic visit? No   Unplanned office visit, urgent care, ED, or hospital admission in the last 4 weeks? No   How does patient/caregiver feel medication is working? Good   Financial problems or insurance changes? No   How many doses of your specialty medications were missed in the last 4 weeks? 0   Would patient like to speak to a pharmacist? Yes   When does the patient need to receive the medication? 02/21/22   Refill Delivery Questions    How will the patient receive the medication? Delivery Mamie   When does the patient need to receive the medication? 02/21/22   Shipping Address Home   Address in [facility] confirmed and updated if neccessary? Yes   Expected Copay ($) 3.95   Is the patient able to afford the medication copay? Yes   Payment Method invoice (approval required)   Days supply of Refill 30   Supplies needed? No supplies needed   Refill activity completed? Yes   Refill activity plan Refill scheduled   Shipment/Pickup Date: 02/18/22          Current Outpatient Medications   Medication Sig    abiraterone (ZYTIGA) 250 mg Tab Take 4 tablets (1,000 mg total) by mouth once daily.    aspirin (ECOTRIN) 81 MG EC tablet Take by mouth. 1 Tablet, Delayed Release (E.C.) Oral Every day    atorvastatin (LIPITOR) 40 MG tablet Take 1 tablet (40 mg total) by mouth once daily.     budesonide-formoterol 160-4.5 mcg (SYMBICORT) 160-4.5 mcg/actuation HFAA Inhale 2 puffs into the lungs.     calcium-vitamin D 600 mg(1,500mg) -400 unit Tab Take by mouth. 1 Tablet Oral Twice a day    ezetimibe (ZETIA) 10 mg tablet Take 1 tablet (10 mg total) by mouth once daily.    fluticasone (FLONASE) 50 mcg/actuation nasal spray 1 spray by Each Nare route once daily. (Patient taking differently: 1 spray by Each Nostril route daily as needed.)    HYDROcodone-acetaminophen (NORCO)  mg per tablet Take 1 tablet by mouth every 6 (six) hours as needed for Pain.    latanoprost 0.005 % ophthalmic solution     metoprolol succinate (TOPROL-XL) 50 MG 24 hr tablet TAKE 1 TABLET BY MOUTH ONCE DAILY FOR BLOOD PRESSURE/HEART    nitroGLYCERIN (NITROSTAT) 0.4 MG SL tablet Place 1 tablet (0.4 mg total) under the tongue every 5 (five) minutes as needed for Chest pain.    pantoprazole (PROTONIX) 40 MG tablet Take 1 tablet (40 mg total) by mouth once daily.    predniSONE (DELTASONE) 5 MG tablet TAKE 1 TABLET BY MOUTH 2 TIMES DAILY.    promethazine-dextromethorphan (PROMETHAZINE-DM) 6.25-15 mg/5 mL Syrp Take 5 mLs by mouth nightly as needed (Cough).    travoprost (TRAVATAN Z) 0.004 % Drop Place 1 drop into both eyes every evening. 1 Drops Ophthalmic At bedtime   Last reviewed on 2/14/2022  2:20 PM by Loreta Hamilton PharmD    Review of patient's allergies indicates:   Allergen Reactions    Avelox  [moxifloxacin] Rash    Nsaids (non-steroidal anti-inflammatory drug) Other (See Comments)     dyspepsia    Last reviewed on  2/14/2022 2:20 PM by Loreta Hamilton      Tasks added this encounter   3/16/2022 - Refill Call (Auto Added)   Tasks due within next 3 months   12/20/2021 - Clinical - Follow Up Assesement (90 day)     Loreta Hamilton, PharmD  [facility]  [address] [place] [id number]  Phone: [phone]  Fax: [phone]    Joey Lara Odalis is a 66 y.o. male, who is  followed by the specialty pharmacy service for management and education of his abiraterone.  He has been on therapy with abiraterone for over 4 years (12/7/2017).  I have reviewed his electronic medical record and current medication list and determined that specialty medication adjustment Is not needed at this time.    Patient has not experienced adverse events.  He Is adherent reporting 0 missed doses since last review.  Adherence has been encouraged with the following mechanism(s): normal routine. Confirmed that he takes his Zytiga on an empty stomach first thing in the morning and takes his prednisone twice daily. Aware of proper storage and handling. He is meeting goals of therapy and will continue treatment.    Follow Up Review 2/14/2022 1/14/2022 12/15/2021   # of missed doses 0 0 0   New Medications? No No No   New Conditions? No No No   New Allergies? No No No   Medication Effective? Good Good Good   Some recent data might be hidden       Therapy is appropriate to continue.    Therapy is effective: Yes  Recommendations: none at this time.  Review Method: Patient Contact    Loreta Hamilton, PharmD  [facility]  [address]  [facility] [address]  Phone: [phone]  Fax: [phone]    Labs reviewed from 11/18/2021  Cbc and CMP stable. PSA < 0.01

## 2022-02-16 ENCOUNTER — LAB VISIT (OUTPATIENT)
Dept: LAB | Facility: HOSPITAL | Age: 67
End: 2022-02-16
Attending: INTERNAL MEDICINE
Payer: MEDICARE

## 2022-02-16 DIAGNOSIS — C61 PROSTATE CANCER: ICD-10-CM

## 2022-02-16 DIAGNOSIS — D84.821 IMMUNOSUPPRESSED DUE TO CHEMOTHERAPY: ICD-10-CM

## 2022-02-16 DIAGNOSIS — Z79.899 IMMUNOSUPPRESSED DUE TO CHEMOTHERAPY: ICD-10-CM

## 2022-02-16 DIAGNOSIS — T45.1X5A IMMUNOSUPPRESSED DUE TO CHEMOTHERAPY: ICD-10-CM

## 2022-02-16 LAB
ALBUMIN SERPL BCP-MCNC: 3.1 G/DL (ref 3.5–5.2)
ALP SERPL-CCNC: 73 U/L (ref 55–135)
ALT SERPL W/O P-5'-P-CCNC: 18 U/L (ref 10–44)
ANION GAP SERPL CALC-SCNC: 12 MMOL/L (ref 8–16)
AST SERPL-CCNC: 20 U/L (ref 10–40)
BASOPHILS # BLD AUTO: 0.06 K/UL (ref 0–0.2)
BASOPHILS NFR BLD: 0.5 % (ref 0–1.9)
BILIRUB SERPL-MCNC: 0.7 MG/DL (ref 0.1–1)
BUN SERPL-MCNC: 14 MG/DL (ref 8–23)
CALCIUM SERPL-MCNC: 9.5 MG/DL (ref 8.7–10.5)
CHLORIDE SERPL-SCNC: 106 MMOL/L (ref 95–110)
CO2 SERPL-SCNC: 22 MMOL/L (ref 23–29)
COMPLEXED PSA SERPL-MCNC: <0.01 NG/ML (ref 0–4)
CREAT SERPL-MCNC: 1.1 MG/DL (ref 0.5–1.4)
DIFFERENTIAL METHOD: ABNORMAL
EOSINOPHIL # BLD AUTO: 0.1 K/UL (ref 0–0.5)
EOSINOPHIL NFR BLD: 0.9 % (ref 0–8)
ERYTHROCYTE [DISTWIDTH] IN BLOOD BY AUTOMATED COUNT: 14.6 % (ref 11.5–14.5)
EST. GFR  (AFRICAN AMERICAN): >60 ML/MIN/1.73 M^2
EST. GFR  (NON AFRICAN AMERICAN): >60 ML/MIN/1.73 M^2
GLUCOSE SERPL-MCNC: 123 MG/DL (ref 70–110)
HCT VFR BLD AUTO: 40.5 % (ref 40–54)
HGB BLD-MCNC: 12.4 G/DL (ref 14–18)
IMM GRANULOCYTES # BLD AUTO: 0.15 K/UL (ref 0–0.04)
IMM GRANULOCYTES NFR BLD AUTO: 1.3 % (ref 0–0.5)
LYMPHOCYTES # BLD AUTO: 3 K/UL (ref 1–4.8)
LYMPHOCYTES NFR BLD: 26.6 % (ref 18–48)
MCH RBC QN AUTO: 29.5 PG (ref 27–31)
MCHC RBC AUTO-ENTMCNC: 30.6 G/DL (ref 32–36)
MCV RBC AUTO: 96 FL (ref 82–98)
MONOCYTES # BLD AUTO: 0.7 K/UL (ref 0.3–1)
MONOCYTES NFR BLD: 6.2 % (ref 4–15)
NEUTROPHILS # BLD AUTO: 7.2 K/UL (ref 1.8–7.7)
NEUTROPHILS NFR BLD: 64.5 % (ref 38–73)
NRBC BLD-RTO: 0 /100 WBC
PLATELET # BLD AUTO: 197 K/UL (ref 150–450)
PMV BLD AUTO: 10.5 FL (ref 9.2–12.9)
POTASSIUM SERPL-SCNC: 3.8 MMOL/L (ref 3.5–5.1)
PROT SERPL-MCNC: 7.4 G/DL (ref 6–8.4)
RBC # BLD AUTO: 4.2 M/UL (ref 4.6–6.2)
SODIUM SERPL-SCNC: 140 MMOL/L (ref 136–145)
WBC # BLD AUTO: 11.12 K/UL (ref 3.9–12.7)

## 2022-02-16 PROCEDURE — 85025 COMPLETE CBC W/AUTO DIFF WBC: CPT | Mod: HCNC | Performed by: INTERNAL MEDICINE

## 2022-02-16 PROCEDURE — 80053 COMPREHEN METABOLIC PANEL: CPT | Mod: HCNC | Performed by: INTERNAL MEDICINE

## 2022-02-16 PROCEDURE — 84153 ASSAY OF PSA TOTAL: CPT | Mod: HCNC | Performed by: INTERNAL MEDICINE

## 2022-02-16 PROCEDURE — 36415 COLL VENOUS BLD VENIPUNCTURE: CPT | Mod: HCNC,PO | Performed by: INTERNAL MEDICINE

## 2022-02-18 ENCOUNTER — OFFICE VISIT (OUTPATIENT)
Dept: HEMATOLOGY/ONCOLOGY | Facility: CLINIC | Age: 67
End: 2022-02-18
Payer: MEDICARE

## 2022-02-18 VITALS
HEART RATE: 75 BPM | OXYGEN SATURATION: 99 % | BODY MASS INDEX: 22.57 KG/M2 | SYSTOLIC BLOOD PRESSURE: 155 MMHG | HEIGHT: 70 IN | TEMPERATURE: 97 F | WEIGHT: 157.63 LBS | DIASTOLIC BLOOD PRESSURE: 101 MMHG

## 2022-02-18 DIAGNOSIS — C79.51 SECONDARY ADENOCARCINOMA OF SKELETAL BONE: ICD-10-CM

## 2022-02-18 DIAGNOSIS — C61 PROSTATE CANCER: ICD-10-CM

## 2022-02-18 PROCEDURE — 3008F BODY MASS INDEX DOCD: CPT | Mod: HCNC,CPTII,S$GLB, | Performed by: INTERNAL MEDICINE

## 2022-02-18 PROCEDURE — 99999 PR PBB SHADOW E&M-EST. PATIENT-LVL III: ICD-10-PCS | Mod: PBBFAC,HCNC,, | Performed by: INTERNAL MEDICINE

## 2022-02-18 PROCEDURE — 3008F PR BODY MASS INDEX (BMI) DOCUMENTED: ICD-10-PCS | Mod: HCNC,CPTII,S$GLB, | Performed by: INTERNAL MEDICINE

## 2022-02-18 PROCEDURE — 3077F SYST BP >= 140 MM HG: CPT | Mod: HCNC,CPTII,S$GLB, | Performed by: INTERNAL MEDICINE

## 2022-02-18 PROCEDURE — 3080F DIAST BP >= 90 MM HG: CPT | Mod: HCNC,CPTII,S$GLB, | Performed by: INTERNAL MEDICINE

## 2022-02-18 PROCEDURE — 3077F PR MOST RECENT SYSTOLIC BLOOD PRESSURE >= 140 MM HG: ICD-10-PCS | Mod: HCNC,CPTII,S$GLB, | Performed by: INTERNAL MEDICINE

## 2022-02-18 PROCEDURE — 99215 OFFICE O/P EST HI 40 MIN: CPT | Mod: HCNC,S$GLB,, | Performed by: INTERNAL MEDICINE

## 2022-02-18 PROCEDURE — 99999 PR PBB SHADOW E&M-EST. PATIENT-LVL III: CPT | Mod: PBBFAC,HCNC,, | Performed by: INTERNAL MEDICINE

## 2022-02-18 PROCEDURE — 99215 PR OFFICE/OUTPT VISIT, EST, LEVL V, 40-54 MIN: ICD-10-PCS | Mod: HCNC,S$GLB,, | Performed by: INTERNAL MEDICINE

## 2022-02-18 PROCEDURE — 1159F MED LIST DOCD IN RCRD: CPT | Mod: HCNC,CPTII,S$GLB, | Performed by: INTERNAL MEDICINE

## 2022-02-18 PROCEDURE — 1159F PR MEDICATION LIST DOCUMENTED IN MEDICAL RECORD: ICD-10-PCS | Mod: HCNC,CPTII,S$GLB, | Performed by: INTERNAL MEDICINE

## 2022-02-18 PROCEDURE — 3080F PR MOST RECENT DIASTOLIC BLOOD PRESSURE >= 90 MM HG: ICD-10-PCS | Mod: HCNC,CPTII,S$GLB, | Performed by: INTERNAL MEDICINE

## 2022-02-18 NOTE — ASSESSMENT & PLAN NOTE
Chronic low back pain.  Repeated MRI of spine showed no changes compared to MRI from 2015/2017. Noted degenerative disc disease.  No evidence of metastasis. Followed by bone and spine specialist.

## 2022-02-18 NOTE — ASSESSMENT & PLAN NOTE
High risk prostate adenocarcinoma on long-term ADT with Zytiga and steroid. Had lupron injection last month. Given every 6 months. Tolerating well.  Most recent PSA noted at less than 0.01 nanograms/cc. Return back in 3 months with repeat labs or sooner if needed.

## 2022-02-18 NOTE — PROGRESS NOTES
Subjective:       Patient ID: Joey Jacobo is a 66 y.o. male.    Chief Complaint: No primary diagnosis found.    ONCOLOGICAL HISTORY:  07/03/2017  FINAL PATHOLOGIC DIAGNOSIS  1. PROSTATE, RIGHT APEX (NEEDLE BIOPSY):  Prostatic adenocarcinoma, Deric 3+3 = 6  Tumor involves one of 2 tissue cores  Tumor comprises less than 5% of submitted tissue  2. PROSTATE, RIGHT MID (NEEDLE BIOPSY):  Benign prostatic glands and stroma  3. PROSTATE, RIGHT BASE (NEEDLE BIOPSY):  Prostatic adenocarcinoma, Jackson 4+4 = 8  Tumor involves one of 2 tissue cores  Tumor comprises 50% of submitted tissue  4. PROSTATE, LEFT APEX (NEEDLE BIOPSY):  Prostatic adenocarcinoma, Jackson 4+4 = 8  Tumor involves 2 of 2 tissue cores  Tumor comprises 50% of submitted tissue  Perineural invasion identified  5. PROSTATE, LEFT MID (NEEDLE BIOPSY):  Prostatic adenocarcinoma, Deric 4+4 = 8  Tumor involves 2 of 2 tissue cores  Tumor comprises 50% of submitted tissue  6. PROSTATE, LEFT BASE (NEEDLE BIOPSY):  Prostatic adenocarcinoma, Deric 4+5 = 9  Tumor involves 2 of 2 tissue cores  Tumor comprises 30% of submitted tissue  Perineural invasion identified  Jackson pattern 4: Comprises 90% of tumor  Deric pattern 3: Comprises approximately 8% of tumor  Jackson pattern 5: Comprises less than 2% tumor, focal      HPI: We have an opportunity to see Mr. Joey Jacobo in Hematology Oncology clinic at [facility] on 02/18/2022.  Mr. Joey Jacobo is a 66 y.o.  gentleman metastatic prostate adenocarcinoma currently on ADT with Lupron under the care of Dr. Dawn and also on Zytiga/prednisone with us which was started on December 7, 2017 and has been tolerating this medication well without any significant side effects.  He has no new complaints today .  Chronic lower back pain.      Oncology History    No history exists.     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    Mitral regurgitation 7/30/2013    Mixed hyperlipidemia 7/30/2013    Neuropathy     Osteoarthritis of spine with radiculopathy, lumbar region 7/21/2015    Other and unspecified hyperlipidemia     Prostate cancer     Pulmonary fibrosis 10/3/2017     Family History   Problem Relation Age of Onset    Cataracts Mother     Kidney disease Mother     Cancer Father         Stomach    Blindness Maternal Grandmother     Diabetes Sister     Hypertension Sister     Diabetes Sister     Hypertension Sister     Diabetes Sister     Hypertension Sister     Hypertension Sister     Hypertension Sister     Colon cancer Brother     Colon cancer Brother      Social History     Socioeconomic History    Marital status:     Number of children: 3   Occupational History    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   Tobacco Use    Smoking status: Former Smoker     Packs/day: 0.25     Years: 25.00     Pack years: 6.25     Types: Cigarettes     Quit date: 8/26/2018     Years since quitting: 3.4    Smokeless tobacco: Never Used   Substance and Sexual Activity    Alcohol use: Not Currently     Alcohol/week: 0.0 standard drinks     Comment: occasionally    Drug use: No    Sexual activity: Yes   Social History Narrative    Wife and son     Past Surgical History:   Procedure Laterality Date    ANKLE FRACTURE SURGERY Left     COLONOSCOPY N/A 3/21/2016    Procedure: COLONOSCOPY;  Surgeon: Melvin Pearce MD;  Location: [facility];  Service: Endoscopy;  Laterality: N/A;    CORONARY STENT PLACEMENT      times 2    SHOULDER ARTHROSCOPY Left     SPINE SURGERY      neck fusion x2     Current Outpatient Medications    Medication Sig Dispense Refill    abiraterone (ZYTIGA) 250 mg Tab Take 4 tablets (1,000 mg total) by mouth once daily. 120 tablet 11    aspirin (ECOTRIN) 81 MG EC tablet Take by mouth. 1 Tablet, Delayed Release (E.C.) Oral Every day      atorvastatin (LIPITOR) 40 MG tablet Take 1 tablet (40 mg total) by mouth once daily. 90 tablet 4    budesonide-formoterol 160-4.5 mcg (SYMBICORT) 160-4.5 mcg/actuation HFAA Inhale 2 puffs into the lungs.       calcium-vitamin D 600 mg(1,500mg) -400 unit Tab Take by mouth. 1 Tablet Oral Twice a day      ezetimibe (ZETIA) 10 mg tablet Take 1 tablet (10 mg total) by mouth once daily. 90 tablet 4    fluticasone (FLONASE) 50 mcg/actuation nasal spray 1 spray by Each Nare route once daily. (Patient taking differently: 1 spray by Each Nostril route daily as needed.) 1 Bottle 11    HYDROcodone-acetaminophen (NORCO)  mg per tablet Take 1 tablet by mouth every 6 (six) hours as needed for Pain. 90 tablet 0    latanoprost 0.005 % ophthalmic solution   3    metoprolol succinate (TOPROL-XL) 50 MG 24 hr tablet TAKE 1 TABLET BY MOUTH ONCE DAILY FOR BLOOD PRESSURE/HEART 90 tablet 4    nitroGLYCERIN (NITROSTAT) 0.4 MG SL tablet Place 1 tablet (0.4 mg total) under the tongue every 5 (five) minutes as needed for Chest pain. 20 tablet 12    pantoprazole (PROTONIX) 40 MG tablet Take 1 tablet (40 mg total) by mouth once daily. 90 tablet 2    predniSONE (DELTASONE) 5 MG tablet TAKE 1 TABLET BY MOUTH 2 TIMES DAILY. 180 tablet 1    promethazine-dextromethorphan (PROMETHAZINE-DM) 6.25-15 mg/5 mL Syrp Take 5 mLs by mouth nightly as needed (Cough). 120 mL 0    travoprost (TRAVATAN Z) 0.004 % Drop Place 1 drop into both eyes every evening. 1 Drops Ophthalmic At bedtime 5 mL 12     Current Facility-Administered Medications   Medication Dose Route Frequency Provider Last Rate Last Admin    leuprolide (6 month) injection 45 mg  45 mg Intramuscular 1 time in Clinic/HOD Roland Dawn IV,  MD        leuprolide (LUPRON) injection 22.5 mg  22.5 mg Intramuscular Q90 Days Roland Dawn IV, MD   22.5 mg at 09/14/20 1157       Labs:  Lab Results   Component Value Date    WBC 11.12 02/16/2022    HGB 12.4 (L) 02/16/2022    HCT 40.5 02/16/2022    MCV 96 02/16/2022     02/16/2022     BMP  Lab Results   Component Value Date     02/16/2022    K 3.8 02/16/2022     02/16/2022    CO2 22 (L) 02/16/2022    BUN 14 02/16/2022    CREATININE 1.1 02/16/2022    CALCIUM 9.5 02/16/2022    ANIONGAP 12 02/16/2022    ESTGFRAFRICA >60.0 02/16/2022    EGFRNONAA >60.0 02/16/2022     Lab Results   Component Value Date    ALT 18 02/16/2022    AST 20 02/16/2022    ALKPHOS 73 02/16/2022    BILITOT 0.7 02/16/2022       Lab Results   Component Value Date    IRON 71 09/23/2020    TIBC 493 (H) 09/23/2020    FERRITIN 79 09/23/2020     Lab Results   Component Value Date    QVBOMLXL12 468 09/23/2020     Lab Results   Component Value Date    FOLATE 4.7 09/23/2020     Lab Results   Component Value Date    TSH 2.561 11/18/2021       I have reviewed the radiology reports and examined the scan/xray images.    Review of Systems   Constitutional: Negative.  Negative for activity change, appetite change, chills, fatigue, fever and unexpected weight change.   HENT: Negative.  Negative for hearing loss, mouth sores, nosebleeds, sore throat, tinnitus, trouble swallowing and voice change.    Eyes: Negative.  Negative for visual disturbance.   Respiratory: Negative.  Negative for cough, chest tightness, shortness of breath and wheezing.    Cardiovascular: Negative.  Negative for chest pain, palpitations and leg swelling.   Gastrointestinal: Negative.  Negative for abdominal pain, anal bleeding, blood in stool, constipation, diarrhea, nausea and vomiting.   Endocrine: Negative.    Genitourinary: Negative.  Negative for frequency, hematuria and testicular pain.   Musculoskeletal: Negative.  Negative for arthralgias, back pain, gait  problem and neck pain.   Skin: Negative.  Negative for color change, pallor, rash and wound.   Allergic/Immunologic: Negative.  Negative for immunocompromised state.   Neurological: Negative.  Negative for seizures, syncope, weakness, numbness and headaches.   Hematological: Negative.  Negative for adenopathy. Does not bruise/bleed easily.   Psychiatric/Behavioral: Negative.  Negative for agitation, confusion, decreased concentration, hallucinations and sleep disturbance. The patient is not nervous/anxious.      ECOG SCORE    1 - Restricted in strenuous activity-ambulatory and able to carry out work of a light nature            Objective:     Vitals:    02/18/22 0945   BP: (!) 155/101   Pulse: 75   Temp: 97.1 °F (36.2 °C)   Body mass index is 22.62 kg/m².  Physical Exam  Constitutional:       General: He is not in acute distress.     Appearance: He is well-developed.   HENT:      Head: Normocephalic and atraumatic.      Right Ear: External ear normal.      Left Ear: External ear normal.      Mouth/Throat:      Pharynx: No oropharyngeal exudate.   Eyes:      General: No scleral icterus.        Right eye: No discharge.         Left eye: No discharge.      Conjunctiva/sclera: Conjunctivae normal.      Pupils: Pupils are equal, round, and reactive to light.   Neck:      Thyroid: No thyromegaly.   Cardiovascular:      Rate and Rhythm: Normal rate and regular rhythm.      Heart sounds: Normal heart sounds. No murmur heard.      Pulmonary:      Effort: Pulmonary effort is normal. No respiratory distress.      Breath sounds: Normal breath sounds. No wheezing.   Chest:      Chest wall: No tenderness.   Breasts:      Right: No supraclavicular adenopathy.      Left: No supraclavicular adenopathy.       Abdominal:      General: Bowel sounds are normal. There is no distension.      Palpations: Abdomen is soft. There is no mass.      Tenderness: There is no abdominal tenderness. There is no rebound.   Musculoskeletal:          General: No tenderness. Normal range of motion.      Cervical back: Normal range of motion and neck supple.   Lymphadenopathy:      Cervical: No cervical adenopathy.      Right cervical: No superficial cervical adenopathy.     Left cervical: No superficial cervical adenopathy.      Upper Body:      Right upper body: No supraclavicular or pectoral adenopathy.      Left upper body: No supraclavicular or pectoral adenopathy.   Skin:     General: Skin is warm and dry.      Capillary Refill: Capillary refill takes 2 to 3 seconds.      Coloration: Skin is not pale.      Findings: No erythema or rash.   Neurological:      Mental Status: He is alert and oriented to person, place, and time.      Cranial Nerves: No cranial nerve deficit.      Sensory: No sensory deficit.   Psychiatric:         Behavior: Behavior normal.         Judgment: Judgment normal.           Assessment:      1. Prostate cancer    2. Secondary adenocarcinoma of skeletal bone           Plan:     Prostate cancer  High risk prostate adenocarcinoma on long-term ADT with Zytiga and steroid. Had lupron injection last month. Given every 6 months. Tolerating well.  Most recent PSA noted at less than 0.01 nanograms/cc. Return back in 3 months with repeat labs or sooner if needed.    Secondary adenocarcinoma of skeletal bone  Chronic low back pain.  Repeated MRI of spine showed no changes compared to MRI from 2015/2017. Noted degenerative disc disease.  No evidence of metastasis. Followed by bone and spine specialist.       Prostate cancer  -     CBC Auto Differential; Future; Expected date: 02/18/2022  -     Comprehensive Metabolic Panel; Future; Expected date: 02/18/2022  -     PSA; Future; Expected date: 02/18/2022    Secondary adenocarcinoma of skeletal bone  -     CBC Auto Differential; Future; Expected date: 02/18/2022  -     Comprehensive Metabolic Panel; Future; Expected date: 02/18/2022  -     PSA; Future; Expected date: 02/18/2022      Mitch JI  MD Camryn

## 2022-02-23 DIAGNOSIS — C61 PROSTATE CANCER: ICD-10-CM

## 2022-02-23 DIAGNOSIS — C79.51 SECONDARY ADENOCARCINOMA OF SKELETAL BONE: ICD-10-CM

## 2022-02-23 DIAGNOSIS — G89.3 NEOPLASM RELATED PAIN: ICD-10-CM

## 2022-02-23 RX ORDER — HYDROCODONE BITARTRATE AND ACETAMINOPHEN 10; 325 MG/1; MG/1
1 TABLET ORAL EVERY 6 HOURS PRN
Qty: 90 TABLET | Refills: 0 | Status: SHIPPED | OUTPATIENT
Start: 2022-02-23 | End: 2022-03-22 | Stop reason: SDUPTHER

## 2022-03-16 ENCOUNTER — SPECIALTY PHARMACY (OUTPATIENT)
Dept: PHARMACY | Facility: CLINIC | Age: 67
End: 2022-03-16
Payer: MEDICARE

## 2022-03-16 NOTE — TELEPHONE ENCOUNTER
Specialty Pharmacy - Refill Coordination    Specialty Medication Orders Linked to Encounter    Flowsheet Row Most Recent Value   Medication #1 abiraterone (ZYTIGA) 250 mg Tab (Order#[id number], Rx#[id number])          Refill Questions - Documented Responses    Flowsheet Row Most Recent Value   Patient Availability and HIPAA Verification    Does patient want to proceed with activity? Yes   HIPAA/medical authority confirmed? Yes   Relationship to patient of person spoken to? Self   Refill Screening Questions    Changes to allergies? No   Changes to medications? No   New conditions since last clinic visit? No   Unplanned office visit, urgent care, ED, or hospital admission in the last 4 weeks? No   How does patient/caregiver feel medication is working? Very good   Financial problems or insurance changes? No   How many doses of your specialty medications were missed in the last 4 weeks? 0   Would patient like to speak to a pharmacist? No   When does the patient need to receive the medication? 03/21/22   Refill Delivery Questions    How will the patient receive the medication? Delivery Mamie   When does the patient need to receive the medication? 03/21/22   Shipping Address Home   Address in [facility] confirmed and updated if neccessary? Yes   Expected Copay ($) 3.95   Is the patient able to afford the medication copay? Yes   Payment Method CC on file   Days supply of Refill 30   Supplies needed? No supplies needed   Refill activity completed? Yes   Refill activity plan Refill scheduled   Shipment/Pickup Date: 03/17/22          Current Outpatient Medications   Medication Sig    abiraterone (ZYTIGA) 250 mg Tab Take 4 tablets (1,000 mg total) by mouth once daily.    aspirin (ECOTRIN) 81 MG EC tablet Take by mouth. 1 Tablet, Delayed Release (E.C.) Oral Every day    atorvastatin (LIPITOR) 40 MG tablet Take 1 tablet (40 mg total) by mouth once daily.    budesonide-formoterol 160-4.5 mcg (SYMBICORT) 160-4.5  mcg/actuation HFAA Inhale 2 puffs into the lungs.     calcium-vitamin D 600 mg(1,500mg) -400 unit Tab Take by mouth. 1 Tablet Oral Twice a day    ezetimibe (ZETIA) 10 mg tablet Take 1 tablet (10 mg total) by mouth once daily.    fluticasone (FLONASE) 50 mcg/actuation nasal spray 1 spray by Each Nare route once daily. (Patient taking differently: 1 spray by Each Nostril route daily as needed.)    HYDROcodone-acetaminophen (NORCO)  mg per tablet Take 1 tablet by mouth every 6 (six) hours as needed for Pain.    latanoprost 0.005 % ophthalmic solution     metoprolol succinate (TOPROL-XL) 50 MG 24 hr tablet TAKE 1 TABLET BY MOUTH ONCE DAILY FOR BLOOD PRESSURE/HEART    nitroGLYCERIN (NITROSTAT) 0.4 MG SL tablet Place 1 tablet (0.4 mg total) under the tongue every 5 (five) minutes as needed for Chest pain.    pantoprazole (PROTONIX) 40 MG tablet Take 1 tablet (40 mg total) by mouth once daily.    predniSONE (DELTASONE) 5 MG tablet TAKE 1 TABLET BY MOUTH 2 TIMES DAILY.    promethazine-dextromethorphan (PROMETHAZINE-DM) 6.25-15 mg/5 mL Syrp Take 5 mLs by mouth nightly as needed (Cough).    travoprost (TRAVATAN Z) 0.004 % Drop Place 1 drop into both eyes every evening. 1 Drops Ophthalmic At bedtime   Last reviewed on 2/18/2022 10:47 AM by Patricia Jmienes MA    Review of patient's allergies indicates:   Allergen Reactions    Avelox  [moxifloxacin] Rash    Nsaids (non-steroidal anti-inflammatory drug) Other (See Comments)     dyspepsia    Last reviewed on  2/18/2022 10:47 AM by Particia Jimenes      Tasks added this encounter   4/13/2022 - Refill Call (Auto Added)   Tasks due within next 3 months   5/8/2022 - Clinical - Follow Up Assesement (90 day)     Poly Burton, PharmD  [facility] - Special[facility] Pharm[facility]  [address] [place] [id number]  Phone: [phone]  Fax: [phone]

## 2022-03-21 ENCOUNTER — TELEPHONE (OUTPATIENT)
Dept: HEMATOLOGY/ONCOLOGY | Facility: CLINIC | Age: 67
End: 2022-03-21
Payer: MEDICARE

## 2022-03-21 DIAGNOSIS — G89.3 NEOPLASM RELATED PAIN: ICD-10-CM

## 2022-03-21 DIAGNOSIS — I25.10 CORONARY ARTERY DISEASE INVOLVING NATIVE CORONARY ARTERY WITHOUT ANGINA PECTORIS, UNSPECIFIED WHETHER NATIVE OR TRANSPLANTED HEART: ICD-10-CM

## 2022-03-21 DIAGNOSIS — C61 PROSTATE CANCER: ICD-10-CM

## 2022-03-21 DIAGNOSIS — C79.51 SECONDARY ADENOCARCINOMA OF SKELETAL BONE: ICD-10-CM

## 2022-03-21 RX ORDER — HYDROCODONE BITARTRATE AND ACETAMINOPHEN 10; 325 MG/1; MG/1
1 TABLET ORAL EVERY 6 HOURS PRN
Qty: 90 TABLET | Refills: 0 | OUTPATIENT
Start: 2022-03-21

## 2022-03-21 RX ORDER — METOPROLOL SUCCINATE 50 MG/1
TABLET, EXTENDED RELEASE ORAL
Qty: 90 TABLET | Refills: 4 | OUTPATIENT
Start: 2022-03-21

## 2022-03-21 RX ORDER — PREDNISONE 5 MG/1
5 TABLET ORAL 2 TIMES DAILY
Qty: 180 TABLET | Refills: 1 | OUTPATIENT
Start: 2022-03-21

## 2022-03-21 NOTE — TELEPHONE ENCOUNTER
Spoke to pt he states that he said he don't understand why he need to be seen to get his medication he said that he saw Dr Cowan last and sees him every 3 mos informed him that I would send a message to the on call doctor and get his directives. Pt verbalized understanding.

## 2022-03-21 NOTE — TELEPHONE ENCOUNTER
Spoke to pt informed him that his medication refill was denied he need to see a provider first. Next available was Friday pt verbalized understanding.

## 2022-03-21 NOTE — TELEPHONE ENCOUNTER
----- Message from Nelson Riggs sent at 3/21/2022  9:30 AM CDT -----  Contact: BLAIRE FLORES [[id number]]  .Type:  Patient Returning Call    Who Called: BLAIRE FLORES [[id number]]  Who Left Message for Patient: nurse   Does the patient know what this is regarding?:  Would the patient rather a call back or a response via [facility]?  Call   Best Call Back Number:   [phone] (home)  Additional Information:

## 2022-03-21 NOTE — TELEPHONE ENCOUNTER
Spoke to pt informed him that I have sent a refill request for all 3 medications. Pt verbalized understanding.

## 2022-03-21 NOTE — TELEPHONE ENCOUNTER
----- Message from Anayeli Kern sent at 3/21/2022 11:49 AM CDT -----  States he saw Dr Cowan last month. States he needs a refill on his hydrocodone. States he is in a lot of pain. States he see's Dr Cowan in three months.  Pt uses     [facility]     Please call pt [phone]. Thank you

## 2022-03-21 NOTE — TELEPHONE ENCOUNTER
----- Message from Wendy Mandel sent at 3/21/2022  7:04 AM CDT -----  Contact: Joey Cook would like a call back at [phone], Regards to the pain he's been in this weekend.    Thanks  Td

## 2022-03-22 DIAGNOSIS — C61 PROSTATE CANCER: ICD-10-CM

## 2022-03-22 DIAGNOSIS — G89.3 NEOPLASM RELATED PAIN: ICD-10-CM

## 2022-03-22 DIAGNOSIS — C79.51 SECONDARY ADENOCARCINOMA OF SKELETAL BONE: ICD-10-CM

## 2022-03-22 RX ORDER — HYDROCODONE BITARTRATE AND ACETAMINOPHEN 10; 325 MG/1; MG/1
1 TABLET ORAL EVERY 6 HOURS PRN
Qty: 90 TABLET | Refills: 0 | Status: SHIPPED | OUTPATIENT
Start: 2022-03-22 | End: 2022-04-20 | Stop reason: SDUPTHER

## 2022-03-22 NOTE — TELEPHONE ENCOUNTER
----- Message from Nelson Riggs sent at 3/22/2022  7:10 AM CDT -----  Contact: BLAIRE FLORES [[id number]]  .Type:  RX Refill Request    Who Called:   BLAIRE FLORES [[id number]]  Refill or New Rx:  refill    RX Name and Strength: HYDROcodone  mg  How is the patient currently taking it? (ex. 1XDay): 3-4 times daily   Is this a 30 day or 90 day RX:  Preferred Pharmacy with phone number:     [facility]       Local or Mail Order:  local    Ordering Provider:  Would the patient rather a call back or a response via [facility]?   Call    Best Call Back Number:  [phone] (home)   Additional Information:

## 2022-04-13 ENCOUNTER — SPECIALTY PHARMACY (OUTPATIENT)
Dept: PHARMACY | Facility: CLINIC | Age: 67
End: 2022-04-13
Payer: MEDICARE

## 2022-04-13 NOTE — TELEPHONE ENCOUNTER
Specialty Pharmacy - Refill Coordination    Specialty Medication Orders Linked to Encounter    Flowsheet Row Most Recent Value   Medication #1 abiraterone (ZYTIGA) 250 mg Tab (Order#[id number], Rx#[id number])          Refill Questions - Documented Responses    Flowsheet Row Most Recent Value   Patient Availability and HIPAA Verification    Does patient want to proceed with activity? Yes   HIPAA/medical authority confirmed? Yes   Relationship to patient of person spoken to? Self   Refill Screening Questions    Changes to allergies? No   Changes to medications? No   New conditions since last clinic visit? No   Unplanned office visit, urgent care, ED, or hospital admission in the last 4 weeks? No   How does patient/caregiver feel medication is working? Good   Financial problems or insurance changes? No   How many doses of your specialty medications were missed in the last 4 weeks? 0   Would patient like to speak to a pharmacist? No   When does the patient need to receive the medication? 04/18/22   Refill Delivery Questions    How will the patient receive the medication? Delivery Mamie   When does the patient need to receive the medication? 04/18/22   Shipping Address Home   Address in [facility] confirmed and updated if neccessary? Yes   Expected Copay ($) 3.95   Payment Method CC on file   Days supply of Refill 30   Supplies needed? No supplies needed   Refill activity completed? Yes   Refill activity plan Refill scheduled   Shipment/Pickup Date: 04/18/22          Current Outpatient Medications   Medication Sig    abiraterone (ZYTIGA) 250 mg Tab Take 4 tablets (1,000 mg total) by mouth once daily.    aspirin (ECOTRIN) 81 MG EC tablet Take by mouth. 1 Tablet, Delayed Release (E.C.) Oral Every day    atorvastatin (LIPITOR) 40 MG tablet Take 1 tablet (40 mg total) by mouth once daily.    budesonide-formoterol 160-4.5 mcg (SYMBICORT) 160-4.5 mcg/actuation HFAA Inhale 2 puffs into the lungs.     calcium-vitamin  D 600 mg(1,500mg) -400 unit Tab Take by mouth. 1 Tablet Oral Twice a day    ezetimibe (ZETIA) 10 mg tablet Take 1 tablet (10 mg total) by mouth once daily.    fluticasone (FLONASE) 50 mcg/actuation nasal spray 1 spray by Each Nare route once daily. (Patient taking differently: 1 spray by Each Nostril route daily as needed.)    HYDROcodone-acetaminophen (NORCO)  mg per tablet Take 1 tablet by mouth every 6 (six) hours as needed for Pain.    latanoprost 0.005 % ophthalmic solution     metoprolol succinate (TOPROL-XL) 50 MG 24 hr tablet TAKE 1 TABLET BY MOUTH ONCE DAILY FOR BLOOD PRESSURE/HEART    nitroGLYCERIN (NITROSTAT) 0.4 MG SL tablet Place 1 tablet (0.4 mg total) under the tongue every 5 (five) minutes as needed for Chest pain.    pantoprazole (PROTONIX) 40 MG tablet Take 1 tablet (40 mg total) by mouth once daily.    predniSONE (DELTASONE) 5 MG tablet TAKE 1 TABLET BY MOUTH 2 TIMES DAILY.    promethazine-dextromethorphan (PROMETHAZINE-DM) 6.25-15 mg/5 mL Syrp Take 5 mLs by mouth nightly as needed (Cough).    travoprost (TRAVATAN Z) 0.004 % Drop Place 1 drop into both eyes every evening. 1 Drops Ophthalmic At bedtime   Last reviewed on 2/18/2022 10:47 AM by Patricia Jimenes MA    Review of patient's allergies indicates:   Allergen Reactions    Avelox  [moxifloxacin] Rash    Nsaids (non-steroidal anti-inflammatory drug) Other (See Comments)     dyspepsia    Last reviewed on  2/18/2022 10:47 AM by Patricia Jimenes      Tasks added this encounter   5/11/2022 - Refill Call (Auto Added)   Tasks due within next 3 months   5/8/2022 - Clinical - Follow Up Assesement (90 day)     [facility]  [address] [place] [id number]  Phone: [phone]  Fax: [phone]

## 2022-04-20 DIAGNOSIS — G89.3 NEOPLASM RELATED PAIN: ICD-10-CM

## 2022-04-20 DIAGNOSIS — C61 PROSTATE CANCER: ICD-10-CM

## 2022-04-20 DIAGNOSIS — C79.51 SECONDARY ADENOCARCINOMA OF SKELETAL BONE: ICD-10-CM

## 2022-04-20 RX ORDER — HYDROCODONE BITARTRATE AND ACETAMINOPHEN 10; 325 MG/1; MG/1
1 TABLET ORAL EVERY 6 HOURS PRN
Qty: 90 TABLET | Refills: 0 | Status: SHIPPED | OUTPATIENT
Start: 2022-04-20 | End: 2022-05-18 | Stop reason: SDUPTHER

## 2022-04-20 NOTE — TELEPHONE ENCOUNTER
----- Message from Aimee Jerome sent at 4/20/2022 10:22 AM CDT -----  Contact: Joey  .Type:  RX Refill Request    Who Called:  Joey  Refill or New Rx: refill  RX Name and Strength: HYDROcodone-acetaminophen (NORCO)  mg per tablet  How is the patient currently taking it? (ex. 1XDay): as prescribed  Is this a 30 day or 90 day RX: 30  Preferred Pharmacy with phone number:  [facility]   Local or Mail Order: local  Ordering Provider: Dr Cowan  Would the patient rather a call back or a response via [facility]? call  Best Call Back Number: .[phone]   Additional Information: reports much pain and request a high priority call back to notify.  Thanks,  RP

## 2022-05-03 ENCOUNTER — TELEPHONE (OUTPATIENT)
Dept: CARDIOLOGY | Facility: HOSPITAL | Age: 67
End: 2022-05-03
Payer: MEDICARE

## 2022-05-04 ENCOUNTER — TELEPHONE (OUTPATIENT)
Dept: CARDIOLOGY | Facility: CLINIC | Age: 67
End: 2022-05-04
Payer: MEDICARE

## 2022-05-04 ENCOUNTER — TELEPHONE (OUTPATIENT)
Dept: CARDIOLOGY | Facility: HOSPITAL | Age: 67
End: 2022-05-04
Payer: MEDICARE

## 2022-05-04 NOTE — TELEPHONE ENCOUNTER
Spoke with patient, he was returning a call regarding scheduling tests. I told him that I would send a message to the  in order to get him taken care of. He expressed understanding.     ----- Message from Va Shea sent at 5/4/2022 12:27 PM CDT -----  Contact: pt  The pt request a return call, no additional info given and can be reached at [phone]///thxMW

## 2022-05-10 ENCOUNTER — LAB VISIT (OUTPATIENT)
Dept: LAB | Facility: HOSPITAL | Age: 67
End: 2022-05-10
Attending: INTERNAL MEDICINE
Payer: MEDICARE

## 2022-05-10 DIAGNOSIS — C61 PROSTATE CANCER: ICD-10-CM

## 2022-05-10 DIAGNOSIS — C79.51 SECONDARY ADENOCARCINOMA OF SKELETAL BONE: ICD-10-CM

## 2022-05-10 LAB
ALBUMIN SERPL BCP-MCNC: 3.4 G/DL (ref 3.5–5.2)
ALP SERPL-CCNC: 92 U/L (ref 55–135)
ALT SERPL W/O P-5'-P-CCNC: 25 U/L (ref 10–44)
ANION GAP SERPL CALC-SCNC: 15 MMOL/L (ref 8–16)
AST SERPL-CCNC: 25 U/L (ref 10–40)
BASOPHILS # BLD AUTO: 0.04 K/UL (ref 0–0.2)
BASOPHILS NFR BLD: 0.4 % (ref 0–1.9)
BILIRUB SERPL-MCNC: 0.6 MG/DL (ref 0.1–1)
BUN SERPL-MCNC: 10 MG/DL (ref 8–23)
CALCIUM SERPL-MCNC: 9.4 MG/DL (ref 8.7–10.5)
CHLORIDE SERPL-SCNC: 105 MMOL/L (ref 95–110)
CO2 SERPL-SCNC: 21 MMOL/L (ref 23–29)
CREAT SERPL-MCNC: 1.2 MG/DL (ref 0.5–1.4)
DIFFERENTIAL METHOD: ABNORMAL
EOSINOPHIL # BLD AUTO: 0.1 K/UL (ref 0–0.5)
EOSINOPHIL NFR BLD: 0.7 % (ref 0–8)
ERYTHROCYTE [DISTWIDTH] IN BLOOD BY AUTOMATED COUNT: 15 % (ref 11.5–14.5)
EST. GFR  (AFRICAN AMERICAN): >60 ML/MIN/1.73 M^2
EST. GFR  (NON AFRICAN AMERICAN): >60 ML/MIN/1.73 M^2
GLUCOSE SERPL-MCNC: 131 MG/DL (ref 70–110)
HCT VFR BLD AUTO: 42.9 % (ref 40–54)
HGB BLD-MCNC: 13 G/DL (ref 14–18)
IMM GRANULOCYTES # BLD AUTO: 0.08 K/UL (ref 0–0.04)
IMM GRANULOCYTES NFR BLD AUTO: 0.8 % (ref 0–0.5)
LYMPHOCYTES # BLD AUTO: 2.6 K/UL (ref 1–4.8)
LYMPHOCYTES NFR BLD: 26.7 % (ref 18–48)
MCH RBC QN AUTO: 29.3 PG (ref 27–31)
MCHC RBC AUTO-ENTMCNC: 30.3 G/DL (ref 32–36)
MCV RBC AUTO: 97 FL (ref 82–98)
MONOCYTES # BLD AUTO: 0.9 K/UL (ref 0.3–1)
MONOCYTES NFR BLD: 8.8 % (ref 4–15)
NEUTROPHILS # BLD AUTO: 6.1 K/UL (ref 1.8–7.7)
NEUTROPHILS NFR BLD: 62.6 % (ref 38–73)
NRBC BLD-RTO: 0 /100 WBC
PLATELET # BLD AUTO: 203 K/UL (ref 150–450)
PMV BLD AUTO: 11.2 FL (ref 9.2–12.9)
POTASSIUM SERPL-SCNC: 4 MMOL/L (ref 3.5–5.1)
PROT SERPL-MCNC: 7.3 G/DL (ref 6–8.4)
RBC # BLD AUTO: 4.44 M/UL (ref 4.6–6.2)
SODIUM SERPL-SCNC: 141 MMOL/L (ref 136–145)
WBC # BLD AUTO: 9.78 K/UL (ref 3.9–12.7)

## 2022-05-10 PROCEDURE — 80053 COMPREHEN METABOLIC PANEL: CPT | Performed by: INTERNAL MEDICINE

## 2022-05-10 PROCEDURE — 36415 COLL VENOUS BLD VENIPUNCTURE: CPT | Mod: PO | Performed by: INTERNAL MEDICINE

## 2022-05-10 PROCEDURE — 84153 ASSAY OF PSA TOTAL: CPT | Performed by: INTERNAL MEDICINE

## 2022-05-10 PROCEDURE — 85025 COMPLETE CBC W/AUTO DIFF WBC: CPT | Performed by: INTERNAL MEDICINE

## 2022-05-11 ENCOUNTER — SPECIALTY PHARMACY (OUTPATIENT)
Dept: PHARMACY | Facility: CLINIC | Age: 67
End: 2022-05-11
Payer: MEDICARE

## 2022-05-11 LAB — COMPLEXED PSA SERPL-MCNC: <0.01 NG/ML (ref 0–4)

## 2022-05-11 NOTE — TELEPHONE ENCOUNTER
Specialty Pharmacy - Refill Coordination    Specialty Medication Orders Linked to Encounter    Flowsheet Row Most Recent Value   Medication #1 abiraterone (ZYTIGA) 250 mg Tab (Order#[id number], Rx#[id number])          Refill Questions - Documented Responses    Flowsheet Row Most Recent Value   Patient Availability and HIPAA Verification    Does patient want to proceed with activity? Yes   HIPAA/medical authority confirmed? Yes   Relationship to patient of person spoken to? Self   Refill Screening Questions    Changes to allergies? No   Changes to medications? No   New conditions since last clinic visit? No   Unplanned office visit, urgent care, ED, or hospital admission in the last 4 weeks? No   How does patient/caregiver feel medication is working? Good   Financial problems or insurance changes? No   How many doses of your specialty medications were missed in the last 4 weeks? 0   Would patient like to speak to a pharmacist? No   When does the patient need to receive the medication? 05/17/22   Refill Delivery Questions    How will the patient receive the medication? Delivery Mamie   When does the patient need to receive the medication? 05/17/22   Shipping Address Home   Address in [facility] confirmed and updated if neccessary? Yes   Expected Copay ($) 3.95   Is the patient able to afford the medication copay? Yes   Payment Method CC on file   Days supply of Refill 30   Supplies needed? No supplies needed   Refill activity completed? Yes   Refill activity plan Refill scheduled   Shipment/Pickup Date: 05/13/22          Current Outpatient Medications   Medication Sig    abiraterone (ZYTIGA) 250 mg Tab Take 4 tablets (1,000 mg total) by mouth once daily.    aspirin (ECOTRIN) 81 MG EC tablet Take by mouth. 1 Tablet, Delayed Release (E.C.) Oral Every day    atorvastatin (LIPITOR) 40 MG tablet Take 1 tablet (40 mg total) by mouth once daily.    budesonide-formoterol 160-4.5 mcg (SYMBICORT) 160-4.5 mcg/actuation  HFAA Inhale 2 puffs into the lungs.     calcium-vitamin D 600 mg(1,500mg) -400 unit Tab Take by mouth. 1 Tablet Oral Twice a day    ezetimibe (ZETIA) 10 mg tablet Take 1 tablet (10 mg total) by mouth once daily.    fluticasone (FLONASE) 50 mcg/actuation nasal spray 1 spray by Each Nare route once daily. (Patient taking differently: 1 spray by Each Nostril route daily as needed.)    HYDROcodone-acetaminophen (NORCO)  mg per tablet Take 1 tablet by mouth every 6 (six) hours as needed for Pain.    latanoprost 0.005 % ophthalmic solution     metoprolol succinate (TOPROL-XL) 50 MG 24 hr tablet TAKE 1 TABLET BY MOUTH ONCE DAILY FOR BLOOD PRESSURE/HEART    nitroGLYCERIN (NITROSTAT) 0.4 MG SL tablet Place 1 tablet (0.4 mg total) under the tongue every 5 (five) minutes as needed for Chest pain.    pantoprazole (PROTONIX) 40 MG tablet Take 1 tablet (40 mg total) by mouth once daily.    predniSONE (DELTASONE) 5 MG tablet TAKE 1 TABLET BY MOUTH 2 TIMES DAILY.    promethazine-dextromethorphan (PROMETHAZINE-DM) 6.25-15 mg/5 mL Syrp Take 5 mLs by mouth nightly as needed (Cough).    travoprost (TRAVATAN Z) 0.004 % Drop Place 1 drop into both eyes every evening. 1 Drops Ophthalmic At bedtime   Last reviewed on 2/18/2022 10:47 AM by Patricia Jimenes MA    Review of patient's allergies indicates:   Allergen Reactions    Avelox  [moxifloxacin] Rash    Nsaids (non-steroidal anti-inflammatory drug) Other (See Comments)     dyspepsia    Last reviewed on  2/18/2022 10:47 AM by Patricia Jimenes      Tasks added this encounter   6/9/2022 - Refill Call (Auto Added)   Tasks due within next 3 months   5/11/2022 - Clinical - Follow Up Assesement (90 day)     Cynthia Reyes, PharmD  [facility]  [address] [place] [address]  Phone: [phone]  Fax: [phone]

## 2022-05-13 ENCOUNTER — OFFICE VISIT (OUTPATIENT)
Dept: HEMATOLOGY/ONCOLOGY | Facility: CLINIC | Age: 67
End: 2022-05-13
Payer: MEDICARE

## 2022-05-13 VITALS
RESPIRATION RATE: 16 BRPM | DIASTOLIC BLOOD PRESSURE: 77 MMHG | HEART RATE: 84 BPM | BODY MASS INDEX: 22.25 KG/M2 | WEIGHT: 155.44 LBS | OXYGEN SATURATION: 98 % | HEIGHT: 70 IN | SYSTOLIC BLOOD PRESSURE: 144 MMHG

## 2022-05-13 DIAGNOSIS — C61 PROSTATE CANCER: ICD-10-CM

## 2022-05-13 DIAGNOSIS — C79.51 SECONDARY ADENOCARCINOMA OF SKELETAL BONE: ICD-10-CM

## 2022-05-13 PROCEDURE — 1159F MED LIST DOCD IN RCRD: CPT | Mod: CPTII,S$GLB,, | Performed by: INTERNAL MEDICINE

## 2022-05-13 PROCEDURE — 3077F SYST BP >= 140 MM HG: CPT | Mod: CPTII,S$GLB,, | Performed by: INTERNAL MEDICINE

## 2022-05-13 PROCEDURE — 3008F BODY MASS INDEX DOCD: CPT | Mod: CPTII,S$GLB,, | Performed by: INTERNAL MEDICINE

## 2022-05-13 PROCEDURE — 99999 PR PBB SHADOW E&M-EST. PATIENT-LVL III: CPT | Mod: PBBFAC,,, | Performed by: INTERNAL MEDICINE

## 2022-05-13 PROCEDURE — 1159F PR MEDICATION LIST DOCUMENTED IN MEDICAL RECORD: ICD-10-PCS | Mod: CPTII,S$GLB,, | Performed by: INTERNAL MEDICINE

## 2022-05-13 PROCEDURE — 3077F PR MOST RECENT SYSTOLIC BLOOD PRESSURE >= 140 MM HG: ICD-10-PCS | Mod: CPTII,S$GLB,, | Performed by: INTERNAL MEDICINE

## 2022-05-13 PROCEDURE — 3078F PR MOST RECENT DIASTOLIC BLOOD PRESSURE < 80 MM HG: ICD-10-PCS | Mod: CPTII,S$GLB,, | Performed by: INTERNAL MEDICINE

## 2022-05-13 PROCEDURE — 3288F FALL RISK ASSESSMENT DOCD: CPT | Mod: CPTII,S$GLB,, | Performed by: INTERNAL MEDICINE

## 2022-05-13 PROCEDURE — 1101F PT FALLS ASSESS-DOCD LE1/YR: CPT | Mod: CPTII,S$GLB,, | Performed by: INTERNAL MEDICINE

## 2022-05-13 PROCEDURE — 3008F PR BODY MASS INDEX (BMI) DOCUMENTED: ICD-10-PCS | Mod: CPTII,S$GLB,, | Performed by: INTERNAL MEDICINE

## 2022-05-13 PROCEDURE — 99215 OFFICE O/P EST HI 40 MIN: CPT | Mod: S$GLB,,, | Performed by: INTERNAL MEDICINE

## 2022-05-13 PROCEDURE — 99215 PR OFFICE/OUTPT VISIT, EST, LEVL V, 40-54 MIN: ICD-10-PCS | Mod: S$GLB,,, | Performed by: INTERNAL MEDICINE

## 2022-05-13 PROCEDURE — 3288F PR FALLS RISK ASSESSMENT DOCUMENTED: ICD-10-PCS | Mod: CPTII,S$GLB,, | Performed by: INTERNAL MEDICINE

## 2022-05-13 PROCEDURE — 99999 PR PBB SHADOW E&M-EST. PATIENT-LVL III: ICD-10-PCS | Mod: PBBFAC,,, | Performed by: INTERNAL MEDICINE

## 2022-05-13 PROCEDURE — 3078F DIAST BP <80 MM HG: CPT | Mod: CPTII,S$GLB,, | Performed by: INTERNAL MEDICINE

## 2022-05-13 PROCEDURE — 1101F PR PT FALLS ASSESS DOC 0-1 FALLS W/OUT INJ PAST YR: ICD-10-PCS | Mod: CPTII,S$GLB,, | Performed by: INTERNAL MEDICINE

## 2022-05-13 NOTE — PROGRESS NOTES
Subjective:       Patient ID: Joey Jacobo is a 67 y.o. male.    Chief Complaint: No primary diagnosis found.    ONCOLOGICAL HISTORY:  07/03/2017  FINAL PATHOLOGIC DIAGNOSIS  1. PROSTATE, RIGHT APEX (NEEDLE BIOPSY):  Prostatic adenocarcinoma, [place] 3+3 = 6  Tumor involves one of 2 tissue cores  Tumor comprises less than 5% of submitted tissue  2. PROSTATE, RIGHT MID (NEEDLE BIOPSY):  Benign prostatic glands and stroma  3. PROSTATE, RIGHT BASE (NEEDLE BIOPSY):  Prostatic adenocarcinoma, [place] 4+4 = 8  Tumor involves one of 2 tissue cores  Tumor comprises 50% of submitted tissue  4. PROSTATE, LEFT APEX (NEEDLE BIOPSY):  Prostatic adenocarcinoma, [place] 4+4 = 8  Tumor involves 2 of 2 tissue cores  Tumor comprises 50% of submitted tissue  Perineural invasion identified  5. PROSTATE, LEFT MID (NEEDLE BIOPSY):  Prostatic adenocarcinoma, [place] 4+4 = 8  Tumor involves 2 of 2 tissue cores  Tumor comprises 50% of submitted tissue  6. PROSTATE, LEFT BASE (NEEDLE BIOPSY):  Prostatic adenocarcinoma, [place] 4+5 = 9  Tumor involves 2 of 2 tissue cores  Tumor comprises 30% of submitted tissue  Perineural invasion identified  [place] pattern 4: Comprises 90% of tumor  [place] pattern 3: Comprises approximately 8% of tumor  [place] pattern 5: Comprises less than 2% tumor, focal      HPI: We have an opportunity to see Mr. Joey Jacobo in Hematology Oncology clinic at [facility] on 05/13/2022.  Mr. Joey Jacobo is a 67 y.o.  gentleman metastatic prostate adenocarcinoma currently on ADT with Lupron under the care of Dr. Dawn and also on Zytiga/prednisone with us which was started on December 7, 2017 and has been tolerating this medication well without any significant side effects.  He has no new complaints today.      Oncology History    No history exists.     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    Mitral regurgitation 7/30/2013    Mixed hyperlipidemia 7/30/2013    Neuropathy     Osteoarthritis of spine with radiculopathy, lumbar region 7/21/2015    Other and unspecified hyperlipidemia     Prostate cancer     Pulmonary fibrosis 10/3/2017     Family History   Problem Relation Age of Onset    Cataracts Mother     Kidney disease Mother     Cancer Father         Stomach    Blindness Maternal Grandmother     Diabetes Sister     Hypertension Sister     Diabetes Sister     Hypertension Sister     Diabetes Sister     Hypertension Sister     Hypertension Sister     Hypertension Sister     Colon cancer Brother     Colon cancer Brother      Social History     Socioeconomic History    Marital status:     Number of children: 3   Occupational History    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   Tobacco Use    Smoking status: Former Smoker     Packs/day: 0.25     Years: 25.00     Pack years: 6.25     Types: Cigarettes     Quit date: 8/26/2018     Years since quitting: 3.7    Smokeless tobacco: Never Used   Substance and Sexual Activity    Alcohol use: Not Currently     Alcohol/week: 0.0 standard drinks     Comment: occasionally    Drug use: No    Sexual activity: Yes   Social History Narrative    Wife and son     Past Surgical History:   Procedure Laterality Date    ANKLE FRACTURE SURGERY Left     COLONOSCOPY N/A 3/21/2016    Procedure: COLONOSCOPY;  Surgeon: Melvin Peacre MD;  Location: [facility];  Service: Endoscopy;  Laterality: N/A;    CORONARY STENT PLACEMENT      times 2    SHOULDER ARTHROSCOPY Left     SPINE SURGERY      neck fusion x2     Current Outpatient Medications   Medication Sig Dispense Refill     abiraterone (ZYTIGA) 250 mg Tab Take 4 tablets (1,000 mg total) by mouth once daily. 120 tablet 11    aspirin (ECOTRIN) 81 MG EC tablet Take by mouth. 1 Tablet, Delayed Release (E.C.) Oral Every day      atorvastatin (LIPITOR) 40 MG tablet Take 1 tablet (40 mg total) by mouth once daily. 90 tablet 4    budesonide-formoterol 160-4.5 mcg (SYMBICORT) 160-4.5 mcg/actuation HFAA Inhale 2 puffs into the lungs.       calcium-vitamin D 600 mg(1,500mg) -400 unit Tab Take by mouth. 1 Tablet Oral Twice a day      ezetimibe (ZETIA) 10 mg tablet Take 1 tablet (10 mg total) by mouth once daily. 90 tablet 4    fluticasone (FLONASE) 50 mcg/actuation nasal spray 1 spray by Each Nare route once daily. (Patient taking differently: 1 spray by Each Nostril route daily as needed.) 1 Bottle 11    HYDROcodone-acetaminophen (NORCO)  mg per tablet Take 1 tablet by mouth every 6 (six) hours as needed for Pain. 90 tablet 0    latanoprost 0.005 % ophthalmic solution   3    metoprolol succinate (TOPROL-XL) 50 MG 24 hr tablet TAKE 1 TABLET BY MOUTH ONCE DAILY FOR BLOOD PRESSURE/HEART 90 tablet 4    nitroGLYCERIN (NITROSTAT) 0.4 MG SL tablet Place 1 tablet (0.4 mg total) under the tongue every 5 (five) minutes as needed for Chest pain. 20 tablet 12    pantoprazole (PROTONIX) 40 MG tablet Take 1 tablet (40 mg total) by mouth once daily. 90 tablet 2    predniSONE (DELTASONE) 5 MG tablet TAKE 1 TABLET BY MOUTH 2 TIMES DAILY. 180 tablet 1    promethazine-dextromethorphan (PROMETHAZINE-DM) 6.25-15 mg/5 mL Syrp Take 5 mLs by mouth nightly as needed (Cough). 120 mL 0    travoprost (TRAVATAN Z) 0.004 % Drop Place 1 drop into both eyes every evening. 1 Drops Ophthalmic At bedtime 5 mL 12     Current Facility-Administered Medications   Medication Dose Route Frequency Provider Last Rate Last Admin    leuprolide (6 month) injection 45 mg  45 mg Intramuscular 1 time in Clinic/HOD Roland Dawn IV, MD        leuprolide (LUPRON)  injection 22.5 mg  22.5 mg Intramuscular Q90 Days Roland Dawn IV, MD   22.5 mg at 09/14/20 1157       Labs:  Lab Results   Component Value Date    WBC 9.78 05/10/2022    HGB 13.0 (L) 05/10/2022    HCT 42.9 05/10/2022    MCV 97 05/10/2022     05/10/2022     BMP  Lab Results   Component Value Date     05/10/2022    K 4.0 05/10/2022     05/10/2022    CO2 21 (L) 05/10/2022    BUN 10 05/10/2022    CREATININE 1.2 05/10/2022    CALCIUM 9.4 05/10/2022    ANIONGAP 15 05/10/2022    ESTGFRAFRICA >60 05/10/2022    EGFRNONAA >60 05/10/2022     Lab Results   Component Value Date    ALT 25 05/10/2022    AST 25 05/10/2022    ALKPHOS 92 05/10/2022    BILITOT 0.6 05/10/2022       Lab Results   Component Value Date    IRON 71 09/23/2020    TIBC 493 (H) 09/23/2020    FERRITIN 79 09/23/2020     Lab Results   Component Value Date    HGRFSTLS00 468 09/23/2020     Lab Results   Component Value Date    FOLATE 4.7 09/23/2020     Lab Results   Component Value Date    TSH 2.561 11/18/2021       I have reviewed the radiology reports and examined the scan/xray images.    Review of Systems   Constitutional: Negative.  Negative for activity change, appetite change, chills, fatigue, fever and unexpected weight change.   HENT: Negative.  Negative for hearing loss, mouth sores, nosebleeds, sore throat, tinnitus, trouble swallowing and voice change.    Eyes: Negative.  Negative for visual disturbance.   Respiratory: Negative.  Negative for cough, chest tightness, shortness of breath and wheezing.    Cardiovascular: Negative.  Negative for chest pain, palpitations and leg swelling.   Gastrointestinal: Negative.  Negative for abdominal pain, anal bleeding, blood in stool, constipation, diarrhea, nausea and vomiting.   Endocrine: Negative.    Genitourinary: Negative.  Negative for frequency, hematuria and testicular pain.   Musculoskeletal: Negative.  Negative for arthralgias, back pain, gait problem and neck pain.   Skin:  Negative.  Negative for color change, pallor, rash and wound.   Allergic/Immunologic: Negative.  Negative for immunocompromised state.   Neurological: Negative.  Negative for seizures, syncope, weakness, numbness and headaches.   Hematological: Negative.  Negative for adenopathy. Does not bruise/bleed easily.   Psychiatric/Behavioral: Negative.  Negative for agitation, confusion, decreased concentration, hallucinations and sleep disturbance. The patient is not nervous/anxious.      ECOG SCORE    2 - Capable of all selfcare but unable to carry out any work activities, active > 50% of hours            Objective:     Vitals:    05/13/22 1016   BP: (!) 144/77   Pulse: 84   Resp: 16   Body mass index is 22.3 kg/m².  Physical Exam  Constitutional:       General: He is not in acute distress.     Appearance: He is well-developed.   HENT:      Head: Normocephalic and atraumatic.      Right Ear: External ear normal.      Left Ear: External ear normal.      Mouth/Throat:      Pharynx: No oropharyngeal exudate.   Eyes:      General: No scleral icterus.        Right eye: No discharge.         Left eye: No discharge.      Conjunctiva/sclera: Conjunctivae normal.      Pupils: Pupils are equal, round, and reactive to light.   Neck:      Thyroid: No thyromegaly.   Cardiovascular:      Rate and Rhythm: Normal rate and regular rhythm.      Heart sounds: Normal heart sounds. No murmur heard.  Pulmonary:      Effort: Pulmonary effort is normal. No respiratory distress.      Breath sounds: Normal breath sounds. No wheezing.   Chest:      Chest wall: No tenderness.   Breasts:      Right: No supraclavicular adenopathy.      Left: No supraclavicular adenopathy.       Abdominal:      General: Bowel sounds are normal. There is no distension.      Palpations: Abdomen is soft. There is no mass.      Tenderness: There is no abdominal tenderness. There is no rebound.   Musculoskeletal:         General: No tenderness. Normal range of motion.       Cervical back: Normal range of motion and neck supple.   Lymphadenopathy:      Cervical: No cervical adenopathy.      Right cervical: No superficial cervical adenopathy.     Left cervical: No superficial cervical adenopathy.      Upper Body:      Right upper body: No supraclavicular or pectoral adenopathy.      Left upper body: No supraclavicular or pectoral adenopathy.   Skin:     General: Skin is warm and dry.      Capillary Refill: Capillary refill takes 2 to 3 seconds.      Coloration: Skin is not pale.      Findings: No erythema or rash.   Neurological:      Mental Status: He is alert and oriented to person, place, and time.      Cranial Nerves: No cranial nerve deficit.      Sensory: No sensory deficit.   Psychiatric:         Behavior: Behavior normal.         Judgment: Judgment normal.           Assessment:      1. Prostate cancer    2. Secondary adenocarcinoma of skeletal bone           Plan:     Prostate cancer  High risk prostate adenocarcinoma on long-term ADT with Zytiga and steroid. Has lupron injection every 6 months. Tolerating well.  Most recent PSA noted at less than 0.01 nanograms/cc. Return back in 3 months with repeat labs or sooner if needed.    Secondary adenocarcinoma of skeletal bone  Chronic low back pain.  Repeated MRI of spine showed no changes compared to MRI from 2015/2017. Noted degenerative disc disease.  No evidence of metastasis. Followed by bone and spine specialist.    Prostate cancer  -     CBC Auto Differential; Future; Expected date: 05/13/2022  -     Comprehensive Metabolic Panel; Future; Expected date: 05/13/2022  -     PSA; Future; Expected date: 05/13/2022    Secondary adenocarcinoma of skeletal bone  -     CBC Auto Differential; Future; Expected date: 05/13/2022  -     Comprehensive Metabolic Panel; Future; Expected date: 05/13/2022  -     PSA; Future; Expected date: 05/13/2022      Mitch Cowan MD

## 2022-05-13 NOTE — ASSESSMENT & PLAN NOTE
High risk prostate adenocarcinoma on long-term ADT with Zytiga and steroid. Has lupron injection every 6 months. Tolerating well.  Most recent PSA noted at less than 0.01 nanograms/cc. Return back in 3 months with repeat labs or sooner if needed.

## 2022-05-18 ENCOUNTER — TELEPHONE (OUTPATIENT)
Dept: HEMATOLOGY/ONCOLOGY | Facility: CLINIC | Age: 67
End: 2022-05-18
Payer: MEDICARE

## 2022-05-18 DIAGNOSIS — C61 PROSTATE CANCER: ICD-10-CM

## 2022-05-18 DIAGNOSIS — G89.3 NEOPLASM RELATED PAIN: ICD-10-CM

## 2022-05-18 DIAGNOSIS — C79.51 SECONDARY ADENOCARCINOMA OF SKELETAL BONE: ICD-10-CM

## 2022-05-18 RX ORDER — HYDROCODONE BITARTRATE AND ACETAMINOPHEN 10; 325 MG/1; MG/1
1 TABLET ORAL EVERY 6 HOURS PRN
Qty: 90 TABLET | Refills: 0 | Status: SHIPPED | OUTPATIENT
Start: 2022-05-18 | End: 2022-06-15 | Stop reason: SDUPTHER

## 2022-05-18 NOTE — TELEPHONE ENCOUNTER
----- Message from Isabel Lloyd sent at 5/18/2022  7:47 AM CDT -----  Regarding: Refill  Contact: Patient  .Type:  RX Refill Request    Who Called:  Patient  Refill or New Rx: refill  RX Name and Strength: HYDROcodone-acetaminophen (NORCO)  mg per tablet  How is the patient currently taking it? (ex. 1XDay):  Is this a 30 day or 90 day RX:   Preferred Pharmacy with phone number: .    [facility] [address] [address] [place] [address]  [address] [address] [address]  Phone: [phone] Fax: [phone]      Local or Mail Order: Local  Ordering Provider: Dr Cowan  Would the patient rather a call back or a response via [facility]? Call  Best Call Back Number: .[phone] (home) [phone] (work)    Additional Information:

## 2022-05-18 NOTE — TELEPHONE ENCOUNTER
----- Message from Zheng Saba sent at 5/18/2022  1:33 PM CDT -----  Contact: PT  Calling to see if he prescription was sent in. Call back at [phone]

## 2022-05-19 ENCOUNTER — TELEPHONE (OUTPATIENT)
Dept: CARDIOLOGY | Facility: HOSPITAL | Age: 67
End: 2022-05-19
Payer: MEDICARE

## 2022-05-19 NOTE — TELEPHONE ENCOUNTER
----- Message from Maria Guadalupe Gonzalez sent at 5/19/2022 11:52 AM CDT -----  Contact: [phone] Patient  Pt is requesting to r/s his 05/20 appts. Please call and advise.

## 2022-05-24 ENCOUNTER — TELEPHONE (OUTPATIENT)
Dept: CARDIOLOGY | Facility: HOSPITAL | Age: 67
End: 2022-05-24
Payer: MEDICARE

## 2022-06-09 ENCOUNTER — SPECIALTY PHARMACY (OUTPATIENT)
Dept: PHARMACY | Facility: CLINIC | Age: 67
End: 2022-06-09
Payer: MEDICARE

## 2022-06-09 NOTE — TELEPHONE ENCOUNTER
Joey Jacobo is a 67 y.o. male, who is followed by the specialty pharmacy service for management and education of his Zytiga.  He has been on therapy with Zytiga since 2017.  I have reviewed his electronic medical record and current medication list and determined that specialty medication adjustment Is not needed at this time.    Patient has not experienced adverse events.  He Is adherent reporting 1 missed doses since last review. He is on target to meet goals of therapy and will continue treatment.    Follow Up Review 6/9/2022 5/11/2022 4/13/2022 3/16/2022   # of missed doses 0 0 0 0   New Medications? No No No No   New Conditions? No No No No   New Allergies? No No No No   Medication Effective? Good Good Good Very good   Some recent data might be hidden       Therapy is appropriate to continue.    Therapy is effective: Yes  Recommendations: none at this time.  Review Method: Patient Contact     Dosing, how taking: four tablets by mouth once a day  Side effects: pt reports none   Recent infections: none reported   Pain: no changes   Appetite: no changes   Energy, fatigue: no changes   ED/UC visits: none   Medication list reviewed. No new allergies or health conditions. None     Labs reviewed from: 5/10 PSA is still undetectable, therapy is appropriate for pt to continue            Kathryn Moses, PharmD  [facility]  [address]  [facility] [address]  Phone: [phone]  Fax: [phone]

## 2022-06-15 DIAGNOSIS — C61 PROSTATE CANCER: ICD-10-CM

## 2022-06-15 DIAGNOSIS — G89.3 NEOPLASM RELATED PAIN: ICD-10-CM

## 2022-06-15 DIAGNOSIS — C79.51 SECONDARY ADENOCARCINOMA OF SKELETAL BONE: ICD-10-CM

## 2022-06-15 RX ORDER — HYDROCODONE BITARTRATE AND ACETAMINOPHEN 10; 325 MG/1; MG/1
1 TABLET ORAL EVERY 6 HOURS PRN
Qty: 90 TABLET | Refills: 0 | Status: SHIPPED | OUTPATIENT
Start: 2022-06-15 | End: 2022-07-13 | Stop reason: SDUPTHER

## 2022-07-05 ENCOUNTER — TELEPHONE (OUTPATIENT)
Dept: HEMATOLOGY/ONCOLOGY | Facility: CLINIC | Age: 67
End: 2022-07-05
Payer: MEDICARE

## 2022-07-05 NOTE — TELEPHONE ENCOUNTER
----- Message from Adrianna Vargas sent at 7/5/2022  1:41 PM CDT -----  Contact: Patient, [phone]  Calling to schedule his 3 month appointment. Please call him. Thanks.

## 2022-07-11 ENCOUNTER — SPECIALTY PHARMACY (OUTPATIENT)
Dept: PHARMACY | Facility: CLINIC | Age: 67
End: 2022-07-11
Payer: MEDICARE

## 2022-07-11 NOTE — TELEPHONE ENCOUNTER
Specialty Pharmacy - Refill Coordination    Specialty Medication Orders Linked to Encounter    Flowsheet Row Most Recent Value   Medication #1 abiraterone (ZYTIGA) 250 mg Tab (Order#[id number], Rx#[id number])          Refill Questions - Documented Responses    Flowsheet Row Most Recent Value   Patient Availability and HIPAA Verification    Does patient want to proceed with activity? Yes   HIPAA/medical authority confirmed? Yes   Relationship to patient of person spoken to? Self   Refill Screening Questions    Changes to allergies? No   Changes to medications? No   New conditions since last clinic visit? No   Unplanned office visit, urgent care, ED, or hospital admission in the last 4 weeks? No   How does patient/caregiver feel medication is working? Good   Financial problems or insurance changes? No   How many doses of your specialty medications were missed in the last 4 weeks? 0   Would patient like to speak to a pharmacist? No   When does the patient need to receive the medication? 07/18/22   Refill Delivery Questions    How will the patient receive the medication? Delivery Mamie   When does the patient need to receive the medication? 07/18/22   Shipping Address Home   Address in [facility] confirmed and updated if neccessary? Yes   Expected Copay ($) 0   Is the patient able to afford the medication copay? Yes   Payment Method zero copay   Days supply of Refill 30   Supplies needed? No supplies needed   Refill activity completed? Yes   Refill activity plan Refill scheduled   Shipment/Pickup Date: 07/14/22          Current Outpatient Medications   Medication Sig    abiraterone (ZYTIGA) 250 mg Tab Take 4 tablets (1,000 mg total) by mouth once daily.    aspirin (ECOTRIN) 81 MG EC tablet Take by mouth. 1 Tablet, Delayed Release (E.C.) Oral Every day    atorvastatin (LIPITOR) 40 MG tablet Take 1 tablet (40 mg total) by mouth once daily.    budesonide-formoterol 160-4.5 mcg (SYMBICORT) 160-4.5 mcg/actuation  HFAA Inhale 2 puffs into the lungs.     calcium-vitamin D 600 mg(1,500mg) -400 unit Tab Take by mouth. 1 Tablet Oral Twice a day    ezetimibe (ZETIA) 10 mg tablet Take 1 tablet (10 mg total) by mouth once daily.    fluticasone (FLONASE) 50 mcg/actuation nasal spray 1 spray by Each Nare route once daily. (Patient taking differently: 1 spray by Each Nostril route daily as needed.)    HYDROcodone-acetaminophen (NORCO)  mg per tablet Take 1 tablet by mouth every 6 (six) hours as needed for Pain.    latanoprost 0.005 % ophthalmic solution     metoprolol succinate (TOPROL-XL) 50 MG 24 hr tablet TAKE 1 TABLET BY MOUTH ONCE DAILY FOR BLOOD PRESSURE/HEART    nitroGLYCERIN (NITROSTAT) 0.4 MG SL tablet Place 1 tablet (0.4 mg total) under the tongue every 5 (five) minutes as needed for Chest pain.    pantoprazole (PROTONIX) 40 MG tablet Take 1 tablet (40 mg total) by mouth once daily.    predniSONE (DELTASONE) 5 MG tablet TAKE 1 TABLET BY MOUTH 2 TIMES DAILY.    promethazine-dextromethorphan (PROMETHAZINE-DM) 6.25-15 mg/5 mL Syrp Take 5 mLs by mouth nightly as needed (Cough).    travoprost (TRAVATAN Z) 0.004 % Drop Place 1 drop into both eyes every evening. 1 Drops Ophthalmic At bedtime   Last reviewed on 5/13/2022 10:15 AM by Lizandro Paiz LPN    Review of patient's allergies indicates:   Allergen Reactions    Avelox  [moxifloxacin] Rash    Nsaids (non-steroidal anti-inflammatory drug) Other (See Comments)     dyspepsia    Last reviewed on  5/13/2022 10:15 AM by Lizandro Paiz      Tasks added this encounter   8/10/2022 - Refill Call (Auto Added)   Tasks due within next 3 months   No tasks due.     Tamica Hardwick, Patient Care Assistant  [facility]  [address]  [facility] [address]  Phone: [phone]  Fax: [phone]

## 2022-07-13 ENCOUNTER — TELEPHONE (OUTPATIENT)
Dept: HEMATOLOGY/ONCOLOGY | Facility: CLINIC | Age: 67
End: 2022-07-13
Payer: MEDICARE

## 2022-07-13 DIAGNOSIS — C79.51 SECONDARY ADENOCARCINOMA OF SKELETAL BONE: ICD-10-CM

## 2022-07-13 DIAGNOSIS — G89.3 NEOPLASM RELATED PAIN: ICD-10-CM

## 2022-07-13 DIAGNOSIS — C61 PROSTATE CANCER: ICD-10-CM

## 2022-07-13 RX ORDER — HYDROCODONE BITARTRATE AND ACETAMINOPHEN 10; 325 MG/1; MG/1
1 TABLET ORAL EVERY 6 HOURS PRN
Qty: 90 TABLET | Refills: 0 | Status: SHIPPED | OUTPATIENT
Start: 2022-07-13 | End: 2022-08-12 | Stop reason: SDUPTHER

## 2022-07-13 NOTE — TELEPHONE ENCOUNTER
----- Message from Ana Arita sent at 7/13/2022  4:31 PM CDT -----  Contact: Ray  Patient is out of his HYDROcodone-acetaminophen (NORCO)  mg  and is leaving to go out of town tomorrow. Please call him back at [phone]

## 2022-07-13 NOTE — TELEPHONE ENCOUNTER
----- Message from Avani Alvarez sent at 7/13/2022  9:04 AM CDT -----  Type:  RX Refill Request    Who Called: patient  Refill or New Rx:refill  RX Name and Strength:Hydrocodone  How is the patient currently taking it? (ex. 1XDay):na  Is this a 30 day or 90 day RX:na  Preferred Pharmacy with phone number:[facility]  Local or Mail Order:local  Ordering Provider:Dr Cowan  Would the patient rather a call back or a response via [facility]? Call back  Best Call Back Number:[phone]  Additional Information: lee ann

## 2022-08-01 DIAGNOSIS — G89.3 NEOPLASM RELATED PAIN: ICD-10-CM

## 2022-08-01 DIAGNOSIS — C79.51 SECONDARY ADENOCARCINOMA OF SKELETAL BONE: ICD-10-CM

## 2022-08-01 RX ORDER — PREDNISONE 5 MG/1
5 TABLET ORAL 2 TIMES DAILY
Qty: 180 TABLET | Refills: 1 | Status: SHIPPED | OUTPATIENT
Start: 2022-08-01 | End: 2022-08-02 | Stop reason: SDUPTHER

## 2022-08-01 NOTE — TELEPHONE ENCOUNTER
----- Message from Cecilia Chowdhury sent at 8/1/2022 11:52 AM CDT -----  Contact: [phone]  Type:  RX Refill Request    Who Called:Ray  Refill or New Rx:refill  RX Name and Strength:predniSONE  How is the patient currently taking it? (ex. 1XDay): Twice a day   Is this a 30 day or 90 day RX:90  Preferred Pharmacy with phone number:  [facility] [address] [address]  [address] [address] [address]  Phone: [phone] Fax: [phone]  Local or Mail Order:local   Ordering Provider:Dr lopez  Would the patient rather a call back or a response via [facility]? Call back   Best Call Back Number:[phone]  Additional Information: n/a        Thanks   KB

## 2022-08-02 DIAGNOSIS — G89.3 NEOPLASM RELATED PAIN: ICD-10-CM

## 2022-08-02 DIAGNOSIS — C79.51 SECONDARY ADENOCARCINOMA OF SKELETAL BONE: ICD-10-CM

## 2022-08-02 RX ORDER — PREDNISONE 5 MG/1
5 TABLET ORAL 2 TIMES DAILY
Qty: 180 TABLET | Refills: 1 | Status: SHIPPED | OUTPATIENT
Start: 2022-08-02 | End: 2022-08-02

## 2022-08-10 ENCOUNTER — OFFICE VISIT (OUTPATIENT)
Dept: INTERNAL MEDICINE | Facility: CLINIC | Age: 67
End: 2022-08-10
Payer: MEDICARE

## 2022-08-10 ENCOUNTER — LAB VISIT (OUTPATIENT)
Dept: LAB | Facility: HOSPITAL | Age: 67
End: 2022-08-10
Attending: INTERNAL MEDICINE
Payer: MEDICARE

## 2022-08-10 ENCOUNTER — SPECIALTY PHARMACY (OUTPATIENT)
Dept: PHARMACY | Facility: CLINIC | Age: 67
End: 2022-08-10
Payer: MEDICARE

## 2022-08-10 VITALS
HEIGHT: 70 IN | HEART RATE: 76 BPM | WEIGHT: 155.44 LBS | DIASTOLIC BLOOD PRESSURE: 72 MMHG | SYSTOLIC BLOOD PRESSURE: 126 MMHG | OXYGEN SATURATION: 96 % | BODY MASS INDEX: 22.25 KG/M2 | RESPIRATION RATE: 18 BRPM

## 2022-08-10 DIAGNOSIS — J44.9 CHRONIC OBSTRUCTIVE PULMONARY DISEASE, UNSPECIFIED COPD TYPE: Chronic | ICD-10-CM

## 2022-08-10 DIAGNOSIS — E78.2 MIXED HYPERLIPIDEMIA: Chronic | ICD-10-CM

## 2022-08-10 DIAGNOSIS — Z98.61 HISTORY OF PTCA: ICD-10-CM

## 2022-08-10 DIAGNOSIS — C79.51 SECONDARY ADENOCARCINOMA OF SKELETAL BONE: ICD-10-CM

## 2022-08-10 DIAGNOSIS — K21.9 GASTROESOPHAGEAL REFLUX DISEASE WITHOUT ESOPHAGITIS: Chronic | ICD-10-CM

## 2022-08-10 DIAGNOSIS — Z00.00 ROUTINE GENERAL MEDICAL EXAMINATION AT A HEALTH CARE FACILITY: Primary | ICD-10-CM

## 2022-08-10 DIAGNOSIS — J84.10 PULMONARY FIBROSIS: ICD-10-CM

## 2022-08-10 DIAGNOSIS — J06.9 VIRAL URI WITH COUGH: ICD-10-CM

## 2022-08-10 DIAGNOSIS — M85.80 OSTEOPENIA, UNSPECIFIED LOCATION: ICD-10-CM

## 2022-08-10 DIAGNOSIS — C61 PROSTATE CANCER: ICD-10-CM

## 2022-08-10 DIAGNOSIS — Z13.6 SCREENING FOR AAA (ABDOMINAL AORTIC ANEURYSM): ICD-10-CM

## 2022-08-10 DIAGNOSIS — I25.118 CORONARY ARTERY DISEASE OF NATIVE ARTERY OF NATIVE HEART WITH STABLE ANGINA PECTORIS: Chronic | ICD-10-CM

## 2022-08-10 DIAGNOSIS — Z12.11 COLON CANCER SCREENING: ICD-10-CM

## 2022-08-10 DIAGNOSIS — I10 ESSENTIAL HYPERTENSION: ICD-10-CM

## 2022-08-10 DIAGNOSIS — I70.0 AORTIC ATHEROSCLEROSIS: ICD-10-CM

## 2022-08-10 LAB
ALBUMIN SERPL BCP-MCNC: 3.3 G/DL (ref 3.5–5.2)
ALP SERPL-CCNC: 84 U/L (ref 55–135)
ALT SERPL W/O P-5'-P-CCNC: 19 U/L (ref 10–44)
ANION GAP SERPL CALC-SCNC: 11 MMOL/L (ref 8–16)
AST SERPL-CCNC: 18 U/L (ref 10–40)
BASOPHILS # BLD AUTO: 0.06 K/UL (ref 0–0.2)
BASOPHILS NFR BLD: 0.6 % (ref 0–1.9)
BILIRUB SERPL-MCNC: 0.4 MG/DL (ref 0.1–1)
BUN SERPL-MCNC: 12 MG/DL (ref 8–23)
CALCIUM SERPL-MCNC: 9.8 MG/DL (ref 8.7–10.5)
CHLORIDE SERPL-SCNC: 105 MMOL/L (ref 95–110)
CO2 SERPL-SCNC: 26 MMOL/L (ref 23–29)
CREAT SERPL-MCNC: 1.1 MG/DL (ref 0.5–1.4)
DIFFERENTIAL METHOD: ABNORMAL
EOSINOPHIL # BLD AUTO: 0.1 K/UL (ref 0–0.5)
EOSINOPHIL NFR BLD: 0.7 % (ref 0–8)
ERYTHROCYTE [DISTWIDTH] IN BLOOD BY AUTOMATED COUNT: 14.1 % (ref 11.5–14.5)
EST. GFR  (NO RACE VARIABLE): >60 ML/MIN/1.73 M^2
GLUCOSE SERPL-MCNC: 107 MG/DL (ref 70–110)
HCT VFR BLD AUTO: 40.4 % (ref 40–54)
HGB BLD-MCNC: 12.9 G/DL (ref 14–18)
IMM GRANULOCYTES # BLD AUTO: 0.09 K/UL (ref 0–0.04)
IMM GRANULOCYTES NFR BLD AUTO: 0.9 % (ref 0–0.5)
LYMPHOCYTES # BLD AUTO: 3.2 K/UL (ref 1–4.8)
LYMPHOCYTES NFR BLD: 30.4 % (ref 18–48)
MCH RBC QN AUTO: 29.9 PG (ref 27–31)
MCHC RBC AUTO-ENTMCNC: 31.9 G/DL (ref 32–36)
MCV RBC AUTO: 94 FL (ref 82–98)
MONOCYTES # BLD AUTO: 0.9 K/UL (ref 0.3–1)
MONOCYTES NFR BLD: 8.3 % (ref 4–15)
NEUTROPHILS # BLD AUTO: 6.2 K/UL (ref 1.8–7.7)
NEUTROPHILS NFR BLD: 59.1 % (ref 38–73)
NRBC BLD-RTO: 0 /100 WBC
PLATELET # BLD AUTO: 209 K/UL (ref 150–450)
PMV BLD AUTO: 10.8 FL (ref 9.2–12.9)
POTASSIUM SERPL-SCNC: 3.7 MMOL/L (ref 3.5–5.1)
PROT SERPL-MCNC: 7.8 G/DL (ref 6–8.4)
RBC # BLD AUTO: 4.32 M/UL (ref 4.6–6.2)
SODIUM SERPL-SCNC: 142 MMOL/L (ref 136–145)
WBC # BLD AUTO: 10.52 K/UL (ref 3.9–12.7)

## 2022-08-10 PROCEDURE — 80053 COMPREHEN METABOLIC PANEL: CPT | Performed by: INTERNAL MEDICINE

## 2022-08-10 PROCEDURE — 3074F SYST BP LT 130 MM HG: CPT | Mod: CPTII,S$GLB,, | Performed by: FAMILY MEDICINE

## 2022-08-10 PROCEDURE — 85025 COMPLETE CBC W/AUTO DIFF WBC: CPT | Performed by: INTERNAL MEDICINE

## 2022-08-10 PROCEDURE — 1159F MED LIST DOCD IN RCRD: CPT | Mod: CPTII,S$GLB,, | Performed by: FAMILY MEDICINE

## 2022-08-10 PROCEDURE — 1159F PR MEDICATION LIST DOCUMENTED IN MEDICAL RECORD: ICD-10-PCS | Mod: CPTII,S$GLB,, | Performed by: FAMILY MEDICINE

## 2022-08-10 PROCEDURE — 1160F PR REVIEW ALL MEDS BY PRESCRIBER/CLIN PHARMACIST DOCUMENTED: ICD-10-PCS | Mod: CPTII,S$GLB,, | Performed by: FAMILY MEDICINE

## 2022-08-10 PROCEDURE — 1101F PT FALLS ASSESS-DOCD LE1/YR: CPT | Mod: CPTII,S$GLB,, | Performed by: FAMILY MEDICINE

## 2022-08-10 PROCEDURE — 99397 PR PREVENTIVE VISIT,EST,65 & OVER: ICD-10-PCS | Mod: S$GLB,,, | Performed by: FAMILY MEDICINE

## 2022-08-10 PROCEDURE — 3008F BODY MASS INDEX DOCD: CPT | Mod: CPTII,S$GLB,, | Performed by: FAMILY MEDICINE

## 2022-08-10 PROCEDURE — 1160F RVW MEDS BY RX/DR IN RCRD: CPT | Mod: CPTII,S$GLB,, | Performed by: FAMILY MEDICINE

## 2022-08-10 PROCEDURE — 99397 PER PM REEVAL EST PAT 65+ YR: CPT | Mod: S$GLB,,, | Performed by: FAMILY MEDICINE

## 2022-08-10 PROCEDURE — 84153 ASSAY OF PSA TOTAL: CPT | Performed by: INTERNAL MEDICINE

## 2022-08-10 PROCEDURE — 3074F PR MOST RECENT SYSTOLIC BLOOD PRESSURE < 130 MM HG: ICD-10-PCS | Mod: CPTII,S$GLB,, | Performed by: FAMILY MEDICINE

## 2022-08-10 PROCEDURE — 3288F PR FALLS RISK ASSESSMENT DOCUMENTED: ICD-10-PCS | Mod: CPTII,S$GLB,, | Performed by: FAMILY MEDICINE

## 2022-08-10 PROCEDURE — 3078F DIAST BP <80 MM HG: CPT | Mod: CPTII,S$GLB,, | Performed by: FAMILY MEDICINE

## 2022-08-10 PROCEDURE — 99999 PR PBB SHADOW E&M-EST. PATIENT-LVL V: CPT | Mod: PBBFAC,,, | Performed by: FAMILY MEDICINE

## 2022-08-10 PROCEDURE — 3008F PR BODY MASS INDEX (BMI) DOCUMENTED: ICD-10-PCS | Mod: CPTII,S$GLB,, | Performed by: FAMILY MEDICINE

## 2022-08-10 PROCEDURE — 1101F PR PT FALLS ASSESS DOC 0-1 FALLS W/OUT INJ PAST YR: ICD-10-PCS | Mod: CPTII,S$GLB,, | Performed by: FAMILY MEDICINE

## 2022-08-10 PROCEDURE — 99999 PR PBB SHADOW E&M-EST. PATIENT-LVL V: ICD-10-PCS | Mod: PBBFAC,,, | Performed by: FAMILY MEDICINE

## 2022-08-10 PROCEDURE — 3288F FALL RISK ASSESSMENT DOCD: CPT | Mod: CPTII,S$GLB,, | Performed by: FAMILY MEDICINE

## 2022-08-10 PROCEDURE — 3078F PR MOST RECENT DIASTOLIC BLOOD PRESSURE < 80 MM HG: ICD-10-PCS | Mod: CPTII,S$GLB,, | Performed by: FAMILY MEDICINE

## 2022-08-10 PROCEDURE — 36415 COLL VENOUS BLD VENIPUNCTURE: CPT | Performed by: INTERNAL MEDICINE

## 2022-08-10 RX ORDER — ATORVASTATIN CALCIUM 40 MG/1
40 TABLET, FILM COATED ORAL DAILY
Qty: 90 TABLET | Refills: 4 | Status: SHIPPED | OUTPATIENT
Start: 2022-08-10 | End: 2022-08-24

## 2022-08-10 RX ORDER — PROMETHAZINE HYDROCHLORIDE AND DEXTROMETHORPHAN HYDROBROMIDE 6.25; 15 MG/5ML; MG/5ML
5 SYRUP ORAL NIGHTLY PRN
Qty: 120 ML | Refills: 0 | Status: SHIPPED | OUTPATIENT
Start: 2022-08-10 | End: 2023-02-27

## 2022-08-10 RX ORDER — EZETIMIBE 10 MG/1
10 TABLET ORAL DAILY
Qty: 90 TABLET | Refills: 4 | Status: SHIPPED | OUTPATIENT
Start: 2022-08-10 | End: 2022-08-24

## 2022-08-10 RX ORDER — PANTOPRAZOLE SODIUM 40 MG/1
40 TABLET, DELAYED RELEASE ORAL DAILY
Qty: 90 TABLET | Refills: 2 | Status: SHIPPED | OUTPATIENT
Start: 2022-08-10 | End: 2024-02-20 | Stop reason: SDUPTHER

## 2022-08-10 NOTE — PROGRESS NOTES
"Subjective:       Patient ID: Joey Jacobo is a 67 y.o. male.    Chief Complaint: Follow-up    67-year-old  male patient with Patient Active Problem List:     Coronary artery disease     Mitral regurgitation     COPD (chronic obstructive pulmonary disease)     Mixed hyperlipidemia     Gastroesophageal reflux disease without esophagitis     Glaucoma (increased eye pressure)     History of PTCA     Secondary adenocarcinoma of skeletal bone     Prostate cancer     Pulmonary fibrosis     Neoplasm related pain     Abnormal ECG     Essential hypertension     AP (angina pectoris)     Immunosuppressed due to chemotherapy     Aortic atherosclerosis     Osteopenia  Here for routine annual physicals.  Patient has been taking his medications regularly and reports that he has been followed by specialist.  Under secondary to underlying COPD denies any wheezing but has been having minimal cough with congestion and would like to have cough medication on board.  Denies any fever with chills or chest tightness    Review of Systems   Constitutional: Negative for appetite change, chills, fatigue and fever.   HENT: Positive for congestion.    Eyes: Negative for visual disturbance.   Respiratory: Positive for cough. Negative for shortness of breath and wheezing.    Cardiovascular: Negative for chest pain, palpitations and leg swelling.   Gastrointestinal: Negative for abdominal pain, nausea and vomiting.   Musculoskeletal: Negative for myalgias.   Skin: Negative for rash.   Neurological: Negative for headaches.   Psychiatric/Behavioral: Negative for sleep disturbance.         /72 (BP Location: Right arm, Patient Position: Sitting, BP Method: Medium (Manual))   Pulse 76   Resp 18   Ht 5' 10" (1.778 m)   Wt 70.5 kg (155 lb 6.8 oz)   SpO2 96%   BMI 22.30 kg/m²   Objective:      Physical Exam  Constitutional:       Appearance: He is well-developed.   HENT:      Head: Normocephalic and atraumatic. "   Cardiovascular:      Rate and Rhythm: Normal rate and regular rhythm.      Heart sounds: Normal heart sounds. No murmur heard.  Pulmonary:      Effort: Pulmonary effort is normal. No respiratory distress.      Breath sounds: Normal breath sounds. No wheezing.   Abdominal:      General: Bowel sounds are normal.      Palpations: Abdomen is soft.      Tenderness: There is no abdominal tenderness.   Skin:     General: Skin is warm and dry.      Findings: No rash.   Neurological:      Mental Status: He is alert and oriented to person, place, and time.   Psychiatric:         Mood and Affect: Mood normal.         Lab Visit on 08/10/2022   Component Date Value Ref Range Status    WBC 08/10/2022 10.52  3.90 - 12.70 K/uL Final    RBC 08/10/2022 4.32 (A) 4.60 - 6.20 M/uL Final    Hemoglobin 08/10/2022 12.9 (A) 14.0 - 18.0 g/dL Final    Hematocrit 08/10/2022 40.4  40.0 - 54.0 % Final    MCV 08/10/2022 94  82 - 98 fL Final    MCH 08/10/2022 29.9  27.0 - 31.0 pg Final    MCHC 08/10/2022 31.9 (A) 32.0 - 36.0 g/dL Final    RDW 08/10/2022 14.1  11.5 - 14.5 % Final    Platelets 08/10/2022 209  150 - 450 K/uL Final    MPV 08/10/2022 10.8  9.2 - 12.9 fL Final    Immature Granulocytes 08/10/2022 0.9 (A) 0.0 - 0.5 % Final    Gran # (ANC) 08/10/2022 6.2  1.8 - 7.7 K/uL Final    Immature Grans (Abs) 08/10/2022 0.09 (A) 0.00 - 0.04 K/uL Final    Comment: Mild elevation in immature granulocytes is non specific and   can be seen in a variety of conditions including stress response,   acute inflammation, trauma and pregnancy. Correlation with other   laboratory and clinical findings is essential.      Lymph # 08/10/2022 3.2  1.0 - 4.8 K/uL Final    Mono # 08/10/2022 0.9  0.3 - 1.0 K/uL Final    Eos # 08/10/2022 0.1  0.0 - 0.5 K/uL Final    Baso # 08/10/2022 0.06  0.00 - 0.20 K/uL Final    nRBC 08/10/2022 0  0 /100 WBC Final    Gran % 08/10/2022 59.1  38.0 - 73.0 % Final    Lymph % 08/10/2022 30.4  18.0 - 48.0 % Final     Mono % 08/10/2022 8.3  4.0 - 15.0 % Final    Eosinophil % 08/10/2022 0.7  0.0 - 8.0 % Final    Basophil % 08/10/2022 0.6  0.0 - 1.9 % Final    Differential Method 08/10/2022 Automated   Final    Sodium 08/10/2022 142  136 - 145 mmol/L Final    Potassium 08/10/2022 3.7  3.5 - 5.1 mmol/L Final    Chloride 08/10/2022 105  95 - 110 mmol/L Final    CO2 08/10/2022 26  23 - 29 mmol/L Final    Glucose 08/10/2022 107  70 - 110 mg/dL Final    BUN 08/10/2022 12  8 - 23 mg/dL Final    Creatinine 08/10/2022 1.1  0.5 - 1.4 mg/dL Final    Calcium 08/10/2022 9.8  8.7 - 10.5 mg/dL Final    Total Protein 08/10/2022 7.8  6.0 - 8.4 g/dL Final    Albumin 08/10/2022 3.3 (A) 3.5 - 5.2 g/dL Final    Total Bilirubin 08/10/2022 0.4  0.1 - 1.0 mg/dL Final    Comment: For infants and newborns, interpretation of results should be based  on gestational age, weight and in agreement with clinical  observations.    Premature Infant recommended reference ranges:  Up to 24 hours.............<8.0 mg/dL  Up to 48 hours............<12.0 mg/dL  3-5 days..................<15.0 mg/dL  6-29 days.................<15.0 mg/dL      Alkaline Phosphatase 08/10/2022 84  55 - 135 U/L Final    AST 08/10/2022 18  10 - 40 U/L Final    ALT 08/10/2022 19  10 - 44 U/L Final    Anion Gap 08/10/2022 11  8 - 16 mmol/L Final    eGFR 08/10/2022 >60  >60 mL/min/1.73 m^2 Final       Assessment/Plan:   1. Routine general medical examination at a health care facility  Vital signs stable today.  Clinical exam stable  Continue lifestyle modifications with low-fat and low-cholesterol diet and exercise 30 minutes daily  Reviewed recent labs which looks stable    2. Essential hypertension   Blood pressure is stable currently on metoprolol 50 mg daily    3. Mixed hyperlipidemia  - atorvastatin (LIPITOR) 40 MG tablet; Take 1 tablet (40 mg total) by mouth once daily.  Dispense: 90 tablet; Refill: 4  - ezetimibe (ZETIA) 10 mg tablet; Take 1 tablet (10 mg total) by mouth  once daily.  Dispense: 90 tablet; Refill: 4  Currently taking Lipitor 40 mg and Zetia 10 mg  Refills given today    4. Chronic obstructive pulmonary disease, unspecified COPD type  Stable on Symbicort and albuterol inhaler as needed    5. Gastroesophageal reflux disease without esophagitis  - pantoprazole (PROTONIX) 40 MG tablet; Take 1 tablet (40 mg total) by mouth once daily.  Dispense: 90 tablet; Refill: 2  Clinically doing well on pantoprazole 40 mg daily    6. Coronary artery disease of native artery of native heart with stable angina pectoris  7. History of PTCA  8. Aortic atherosclerosis  Followed by Cardiology clinically doing well    9. Osteopenia, unspecified location  Stable on calcium with vitamin-D supplements    10. Prostate cancer  11. Secondary adenocarcinoma of skeletal bone  Followed by oncology    12. Pulmonary fibrosis  13. Viral URI with cough  - promethazine-dextromethorphan (PROMETHAZINE-DM) 6.25-15 mg/5 mL Syrp; Take 5 mLs by mouth nightly as needed (Cough).  Dispense: 120 mL; Refill: 0   promethazine DM to be taken as needed for cough and drink adequate fluids    14. Screening for AAA (abdominal aortic aneurysm)  - US AAA Screening; Future  Patient former smoker    15. Colon cancer screening  - Ambulatory referral/consult to Endo Procedure ; Future   Due for colonoscopy

## 2022-08-10 NOTE — TELEPHONE ENCOUNTER
Specialty Pharmacy - Refill Coordination    Specialty Medication Orders Linked to Encounter    Flowsheet Row Most Recent Value   Medication #1 abiraterone (ZYTIGA) 250 mg Tab (Order#[id number], Rx#[id number])          Refill Questions - Documented Responses    Flowsheet Row Most Recent Value   Patient Availability and HIPAA Verification    Does patient want to proceed with activity? Yes   HIPAA/medical authority confirmed? Yes   Relationship to patient of person spoken to? Self   Refill Screening Questions    Changes to allergies? No   Changes to medications? No   New conditions since last clinic visit? No   Unplanned office visit, urgent care, ED, or hospital admission in the last 4 weeks? No   How does patient/caregiver feel medication is working? Good   Financial problems or insurance changes? No   How many doses of your specialty medications were missed in the last 4 weeks? 0   Would patient like to speak to a pharmacist? No   When does the patient need to receive the medication? 08/17/22   Refill Delivery Questions    How will the patient receive the medication? Delivery Mamie   When does the patient need to receive the medication? 08/17/22   Shipping Address Prescription   Address in [facility] confirmed and updated if neccessary? Yes   Expected Copay ($) 0   Is the patient able to afford the medication copay? Yes   Payment Method zero copay   Days supply of Refill 30   Supplies needed? No supplies needed   Refill activity completed? Yes   Refill activity plan Refill scheduled   Shipment/Pickup Date: 08/15/22          Current Outpatient Medications   Medication Sig    abiraterone (ZYTIGA) 250 mg Tab Take 4 tablets (1,000 mg total) by mouth once daily.    aspirin (ECOTRIN) 81 MG EC tablet Take by mouth. 1 Tablet, Delayed Release (E.C.) Oral Every day    atorvastatin (LIPITOR) 40 MG tablet Take 1 tablet (40 mg total) by mouth once daily.    budesonide-formoterol 160-4.5 mcg (SYMBICORT) 160-4.5  mcg/actuation HFAA Inhale 2 puffs into the lungs.     calcium-vitamin D 600 mg(1,500mg) -400 unit Tab Take by mouth. 1 Tablet Oral Twice a day    ezetimibe (ZETIA) 10 mg tablet Take 1 tablet (10 mg total) by mouth once daily.    fluticasone (FLONASE) 50 mcg/actuation nasal spray 1 spray by Each Nare route once daily. (Patient taking differently: 1 spray by Each Nostril route daily as needed.)    HYDROcodone-acetaminophen (NORCO)  mg per tablet Take 1 tablet by mouth every 6 (six) hours as needed for Pain.    latanoprost 0.005 % ophthalmic solution     metoprolol succinate (TOPROL-XL) 50 MG 24 hr tablet TAKE 1 TABLET BY MOUTH ONCE DAILY FOR BLOOD PRESSURE/HEART    nitroGLYCERIN (NITROSTAT) 0.4 MG SL tablet Place 1 tablet (0.4 mg total) under the tongue every 5 (five) minutes as needed for Chest pain.    pantoprazole (PROTONIX) 40 MG tablet Take 1 tablet (40 mg total) by mouth once daily.    predniSONE (DELTASONE) 5 MG tablet TAKE 1 TABLET BY MOUTH 2 TIMES DAILY.    promethazine-dextromethorphan (PROMETHAZINE-DM) 6.25-15 mg/5 mL Syrp Take 5 mLs by mouth nightly as needed (Cough).    travoprost (TRAVATAN Z) 0.004 % Drop Place 1 drop into both eyes every evening. 1 Drops Ophthalmic At bedtime   Last reviewed on 5/13/2022 10:15 AM by Lizandro Paiz LPN    Review of patient's allergies indicates:   Allergen Reactions    Avelox  [moxifloxacin] Rash    Nsaids (non-steroidal anti-inflammatory drug) Other (See Comments)     dyspepsia    Last reviewed on  5/13/2022 10:15 AM by Lizandro Paiz      Tasks added this encounter   9/9/2022 - Refill Call (Auto Added)   Tasks due within next 3 months   No tasks due.     [facility]  [facility] [address]  Phone: [phone]  Fax: [phone]

## 2022-08-11 LAB — COMPLEXED PSA SERPL-MCNC: <0.01 NG/ML (ref 0–4)

## 2022-08-12 ENCOUNTER — TELEPHONE (OUTPATIENT)
Dept: UROLOGY | Facility: CLINIC | Age: 67
End: 2022-08-12
Payer: MEDICARE

## 2022-08-12 ENCOUNTER — OFFICE VISIT (OUTPATIENT)
Dept: HEMATOLOGY/ONCOLOGY | Facility: CLINIC | Age: 67
End: 2022-08-12
Payer: MEDICARE

## 2022-08-12 VITALS
WEIGHT: 157.19 LBS | HEIGHT: 70 IN | OXYGEN SATURATION: 98 % | SYSTOLIC BLOOD PRESSURE: 175 MMHG | BODY MASS INDEX: 22.5 KG/M2 | HEART RATE: 79 BPM | DIASTOLIC BLOOD PRESSURE: 78 MMHG

## 2022-08-12 DIAGNOSIS — C79.51 SECONDARY ADENOCARCINOMA OF SKELETAL BONE: ICD-10-CM

## 2022-08-12 DIAGNOSIS — G89.3 NEOPLASM RELATED PAIN: ICD-10-CM

## 2022-08-12 DIAGNOSIS — C61 PROSTATE CANCER: ICD-10-CM

## 2022-08-12 PROCEDURE — 3078F DIAST BP <80 MM HG: CPT | Mod: CPTII,S$GLB,, | Performed by: INTERNAL MEDICINE

## 2022-08-12 PROCEDURE — 3077F SYST BP >= 140 MM HG: CPT | Mod: CPTII,S$GLB,, | Performed by: INTERNAL MEDICINE

## 2022-08-12 PROCEDURE — 99215 OFFICE O/P EST HI 40 MIN: CPT | Mod: S$GLB,,, | Performed by: INTERNAL MEDICINE

## 2022-08-12 PROCEDURE — 99999 PR PBB SHADOW E&M-EST. PATIENT-LVL III: CPT | Mod: PBBFAC,,, | Performed by: INTERNAL MEDICINE

## 2022-08-12 PROCEDURE — 3288F PR FALLS RISK ASSESSMENT DOCUMENTED: ICD-10-PCS | Mod: CPTII,S$GLB,, | Performed by: INTERNAL MEDICINE

## 2022-08-12 PROCEDURE — 1101F PT FALLS ASSESS-DOCD LE1/YR: CPT | Mod: CPTII,S$GLB,, | Performed by: INTERNAL MEDICINE

## 2022-08-12 PROCEDURE — 3008F BODY MASS INDEX DOCD: CPT | Mod: CPTII,S$GLB,, | Performed by: INTERNAL MEDICINE

## 2022-08-12 PROCEDURE — 1159F PR MEDICATION LIST DOCUMENTED IN MEDICAL RECORD: ICD-10-PCS | Mod: CPTII,S$GLB,, | Performed by: INTERNAL MEDICINE

## 2022-08-12 PROCEDURE — 3077F PR MOST RECENT SYSTOLIC BLOOD PRESSURE >= 140 MM HG: ICD-10-PCS | Mod: CPTII,S$GLB,, | Performed by: INTERNAL MEDICINE

## 2022-08-12 PROCEDURE — 99999 PR PBB SHADOW E&M-EST. PATIENT-LVL III: ICD-10-PCS | Mod: PBBFAC,,, | Performed by: INTERNAL MEDICINE

## 2022-08-12 PROCEDURE — 3078F PR MOST RECENT DIASTOLIC BLOOD PRESSURE < 80 MM HG: ICD-10-PCS | Mod: CPTII,S$GLB,, | Performed by: INTERNAL MEDICINE

## 2022-08-12 PROCEDURE — 1101F PR PT FALLS ASSESS DOC 0-1 FALLS W/OUT INJ PAST YR: ICD-10-PCS | Mod: CPTII,S$GLB,, | Performed by: INTERNAL MEDICINE

## 2022-08-12 PROCEDURE — 1159F MED LIST DOCD IN RCRD: CPT | Mod: CPTII,S$GLB,, | Performed by: INTERNAL MEDICINE

## 2022-08-12 PROCEDURE — 3288F FALL RISK ASSESSMENT DOCD: CPT | Mod: CPTII,S$GLB,, | Performed by: INTERNAL MEDICINE

## 2022-08-12 PROCEDURE — 99215 PR OFFICE/OUTPT VISIT, EST, LEVL V, 40-54 MIN: ICD-10-PCS | Mod: S$GLB,,, | Performed by: INTERNAL MEDICINE

## 2022-08-12 PROCEDURE — 3008F PR BODY MASS INDEX (BMI) DOCUMENTED: ICD-10-PCS | Mod: CPTII,S$GLB,, | Performed by: INTERNAL MEDICINE

## 2022-08-12 RX ORDER — HYDROCODONE BITARTRATE AND ACETAMINOPHEN 10; 325 MG/1; MG/1
1 TABLET ORAL EVERY 6 HOURS PRN
Qty: 90 TABLET | Refills: 0 | Status: SHIPPED | OUTPATIENT
Start: 2022-08-12 | End: 2022-09-06 | Stop reason: SDUPTHER

## 2022-08-12 RX ORDER — HYDROCODONE BITARTRATE AND ACETAMINOPHEN 10; 325 MG/1; MG/1
1 TABLET ORAL EVERY 6 HOURS PRN
Qty: 90 TABLET | Refills: 0 | OUTPATIENT
Start: 2022-08-12

## 2022-08-12 RX ORDER — PREDNISONE 5 MG/1
5 TABLET ORAL 2 TIMES DAILY
Qty: 60 TABLET | Refills: 1 | Status: SHIPPED | OUTPATIENT
Start: 2022-08-12 | End: 2022-11-08 | Stop reason: SDUPTHER

## 2022-08-12 NOTE — TELEPHONE ENCOUNTER
----- Message from Patricia Jimenes MA sent at 8/12/2022  1:25 PM CDT -----  Pt came in today states that he has not been able to reach someone in the office so that he can get his lupron.

## 2022-08-12 NOTE — TELEPHONE ENCOUNTER
Care Due:                  Date            Visit Type   Department     Provider  --------------------------------------------------------------------------------                                EP -                              PRIMARY      [facility]  Cassandra Mainampati  Last Visit: 08-      CARE ([facility])   MEDICINE       Brand                              EP -                              PRIMARY      [facility]  Cassandra Mainampati  Next Visit: 02-      CARE ([facility])   MEDICINE       Brand                                                            Last  Test          Frequency    Reason                     Performed    Due Date  --------------------------------------------------------------------------------    Lipid Panel.  12 months..  atorvastatin, ezetimibe..  11-   10-    [facility] Embedded Care Gaps. Reference number: [id number]. 8/12/2022   9:38:42 AM CDT

## 2022-08-12 NOTE — PROGRESS NOTES
Subjective:       Patient ID: Joey Jacobo is a 67 y.o. male.    Chief Complaint: No primary diagnosis found.    ONCOLOGICAL HISTORY:  07/03/2017  FINAL PATHOLOGIC DIAGNOSIS  1. PROSTATE, RIGHT APEX (NEEDLE BIOPSY):  Prostatic adenocarcinoma, [place] 3+3 = 6  Tumor involves one of 2 tissue cores  Tumor comprises less than 5% of submitted tissue  2. PROSTATE, RIGHT MID (NEEDLE BIOPSY):  Benign prostatic glands and stroma  3. PROSTATE, RIGHT BASE (NEEDLE BIOPSY):  Prostatic adenocarcinoma, [place] 4+4 = 8  Tumor involves one of 2 tissue cores  Tumor comprises 50% of submitted tissue  4. PROSTATE, LEFT APEX (NEEDLE BIOPSY):  Prostatic adenocarcinoma, [place] 4+4 = 8  Tumor involves 2 of 2 tissue cores  Tumor comprises 50% of submitted tissue  Perineural invasion identified  5. PROSTATE, LEFT MID (NEEDLE BIOPSY):  Prostatic adenocarcinoma, [place] 4+4 = 8  Tumor involves 2 of 2 tissue cores  Tumor comprises 50% of submitted tissue  6. PROSTATE, LEFT BASE (NEEDLE BIOPSY):  Prostatic adenocarcinoma, [place] 4+5 = 9  Tumor involves 2 of 2 tissue cores  Tumor comprises 30% of submitted tissue  Perineural invasion identified  [place] pattern 4: Comprises 90% of tumor  [place] pattern 3: Comprises approximately 8% of tumor  [place] pattern 5: Comprises less than 2% tumor, focal      HPI: We have an opportunity to see Mr. Joey Jacobo in Hematology Oncology clinic at [facility] on 08/12/2022.  Mr. Joey Jacobo is a 67 y.o.  gentleman metastatic prostate adenocarcinoma currently on ADT with Lupron under the care of Dr. Dawn and also on Zytiga/prednisone with us which was started on December 7, 2017 and has been tolerating this medication well without any significant side effects.  He has no new complaints today.      Oncology History    No history exists.     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    Mitral regurgitation 7/30/2013    Mixed hyperlipidemia 7/30/2013    Neuropathy     Osteoarthritis of spine with radiculopathy, lumbar region 7/21/2015    Other and unspecified hyperlipidemia     Prostate cancer     Pulmonary fibrosis 10/3/2017     Family History   Problem Relation Age of Onset    Cataracts Mother     Kidney disease Mother     Cancer Father         Stomach    Blindness Maternal Grandmother     Diabetes Sister     Hypertension Sister     Diabetes Sister     Hypertension Sister     Diabetes Sister     Hypertension Sister     Hypertension Sister     Hypertension Sister     Colon cancer Brother     Colon cancer Brother      Social History     Socioeconomic History    Marital status:     Number of children: 3   Occupational History    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   Tobacco Use    Smoking status: Former Smoker     Packs/day: 0.25     Years: 25.00     Pack years: 6.25     Types: Cigarettes     Quit date: 8/26/2018     Years since quitting: 3.9    Smokeless tobacco: Never Used   Substance and Sexual Activity    Alcohol use: Not Currently     Alcohol/week: 0.0 standard drinks     Comment: occasionally    Drug use: No    Sexual activity: Yes   Social History Narrative    Wife and son     Past Surgical History:   Procedure Laterality Date    ANKLE FRACTURE SURGERY Left     COLONOSCOPY N/A 3/21/2016    Procedure: COLONOSCOPY;  Surgeon: Melvin Pearce MD;  Location: [facility];  Service: Endoscopy;  Laterality: N/A;    CORONARY STENT PLACEMENT      times 2    SHOULDER ARTHROSCOPY Left     SPINE SURGERY      neck fusion x2     Current Outpatient Medications   Medication Sig Dispense Refill     abiraterone (ZYTIGA) 250 mg Tab Take 4 tablets (1,000 mg total) by mouth once daily. 120 tablet 11    aspirin (ECOTRIN) 81 MG EC tablet Take by mouth. 1 Tablet, Delayed Release (E.C.) Oral Every day      atorvastatin (LIPITOR) 40 MG tablet Take 1 tablet (40 mg total) by mouth once daily. 90 tablet 4    budesonide-formoterol 160-4.5 mcg (SYMBICORT) 160-4.5 mcg/actuation HFAA Inhale 2 puffs into the lungs.       calcium-vitamin D 600 mg(1,500mg) -400 unit Tab Take by mouth. 1 Tablet Oral Twice a day      ezetimibe (ZETIA) 10 mg tablet Take 1 tablet (10 mg total) by mouth once daily. 90 tablet 4    fluticasone (FLONASE) 50 mcg/actuation nasal spray 1 spray by Each Nare route once daily. (Patient taking differently: 1 spray by Each Nostril route daily as needed.) 1 Bottle 11    HYDROcodone-acetaminophen (NORCO)  mg per tablet Take 1 tablet by mouth every 6 (six) hours as needed for Pain. 90 tablet 0    latanoprost 0.005 % ophthalmic solution   3    metoprolol succinate (TOPROL-XL) 50 MG 24 hr tablet TAKE 1 TABLET BY MOUTH ONCE DAILY FOR BLOOD PRESSURE/HEART 90 tablet 4    nitroGLYCERIN (NITROSTAT) 0.4 MG SL tablet Place 1 tablet (0.4 mg total) under the tongue every 5 (five) minutes as needed for Chest pain. 20 tablet 12    pantoprazole (PROTONIX) 40 MG tablet Take 1 tablet (40 mg total) by mouth once daily. 90 tablet 2    predniSONE (DELTASONE) 5 MG tablet TAKE 1 TABLET BY MOUTH 2 TIMES DAILY. 60 tablet 1    promethazine-dextromethorphan (PROMETHAZINE-DM) 6.25-15 mg/5 mL Syrp Take 5 mLs by mouth nightly as needed (Cough). 120 mL 0    travoprost (TRAVATAN Z) 0.004 % Drop Place 1 drop into both eyes every evening. 1 Drops Ophthalmic At bedtime 5 mL 12     Current Facility-Administered Medications   Medication Dose Route Frequency Provider Last Rate Last Admin    leuprolide (6 month) injection 45 mg  45 mg Intramuscular 1 time in Clinic/HOD Roland Dawn IV, MD        leuprolide (LUPRON)  injection 22.5 mg  22.5 mg Intramuscular Q90 Days Roland Dawn IV, MD   22.5 mg at 09/14/20 1157       Labs:  Lab Results   Component Value Date    WBC 10.52 08/10/2022    HGB 12.9 (L) 08/10/2022    HCT 40.4 08/10/2022    MCV 94 08/10/2022     08/10/2022     BMP  Lab Results   Component Value Date     08/10/2022    K 3.7 08/10/2022     08/10/2022    CO2 26 08/10/2022    BUN 12 08/10/2022    CREATININE 1.1 08/10/2022    CALCIUM 9.8 08/10/2022    ANIONGAP 11 08/10/2022    ESTGFRAFRICA >60 05/10/2022    EGFRNONAA >60 05/10/2022     Lab Results   Component Value Date    ALT 19 08/10/2022    AST 18 08/10/2022    ALKPHOS 84 08/10/2022    BILITOT 0.4 08/10/2022       Lab Results   Component Value Date    IRON 71 09/23/2020    TIBC 493 (H) 09/23/2020    FERRITIN 79 09/23/2020     Lab Results   Component Value Date    WQWNXNAY92 468 09/23/2020     Lab Results   Component Value Date    FOLATE 4.7 09/23/2020     Lab Results   Component Value Date    TSH 2.561 11/18/2021       I have reviewed the radiology reports and examined the scan/xray images.    Review of Systems   Constitutional: Negative.  Negative for activity change, appetite change, chills, fatigue, fever and unexpected weight change.   HENT: Negative.  Negative for hearing loss, mouth sores, nosebleeds, sore throat, tinnitus, trouble swallowing and voice change.    Eyes: Negative.  Negative for visual disturbance.   Respiratory: Negative.  Negative for cough, chest tightness, shortness of breath and wheezing.    Cardiovascular: Negative.  Negative for chest pain, palpitations and leg swelling.   Gastrointestinal: Negative.  Negative for abdominal pain, anal bleeding, blood in stool, constipation, diarrhea, nausea and vomiting.   Endocrine: Negative.    Genitourinary: Negative.  Negative for frequency, hematuria and testicular pain.   Musculoskeletal: Negative.  Negative for arthralgias, back pain, gait problem and neck pain.   Skin: Negative.   Negative for color change, pallor, rash and wound.   Allergic/Immunologic: Negative.  Negative for immunocompromised state.   Neurological: Negative.  Negative for seizures, syncope, weakness, numbness and headaches.   Hematological: Negative.  Negative for adenopathy. Does not bruise/bleed easily.   Psychiatric/Behavioral: Negative.  Negative for agitation, confusion, decreased concentration, hallucinations and sleep disturbance. The patient is not nervous/anxious.      ECOG SCORE    1 - Restricted in strenuous activity-ambulatory and able to carry out work of a light nature            Objective:     There were no vitals filed for this visit.There is no height or weight on file to calculate BMI.  Physical Exam  Constitutional:       General: He is not in acute distress.     Appearance: He is well-developed.   HENT:      Head: Normocephalic and atraumatic.      Right Ear: External ear normal.      Left Ear: External ear normal.      Mouth/Throat:      Pharynx: No oropharyngeal exudate.   Eyes:      General: No scleral icterus.        Right eye: No discharge.         Left eye: No discharge.      Conjunctiva/sclera: Conjunctivae normal.      Pupils: Pupils are equal, round, and reactive to light.   Neck:      Thyroid: No thyromegaly.   Cardiovascular:      Rate and Rhythm: Normal rate and regular rhythm.      Heart sounds: Normal heart sounds. No murmur heard.  Pulmonary:      Effort: Pulmonary effort is normal. No respiratory distress.      Breath sounds: Normal breath sounds. No wheezing.   Chest:      Chest wall: No tenderness.   Breasts:      Right: No supraclavicular adenopathy.      Left: No supraclavicular adenopathy.       Abdominal:      General: Bowel sounds are normal. There is no distension.      Palpations: Abdomen is soft. There is no mass.      Tenderness: There is no abdominal tenderness. There is no rebound.   Musculoskeletal:         General: No tenderness. Normal range of motion.      Cervical  back: Normal range of motion and neck supple.   Lymphadenopathy:      Cervical: No cervical adenopathy.      Right cervical: No superficial cervical adenopathy.     Left cervical: No superficial cervical adenopathy.      Upper Body:      Right upper body: No supraclavicular or pectoral adenopathy.      Left upper body: No supraclavicular or pectoral adenopathy.   Skin:     General: Skin is warm and dry.      Capillary Refill: Capillary refill takes 2 to 3 seconds.      Coloration: Skin is not pale.      Findings: No erythema or rash.   Neurological:      Mental Status: He is alert and oriented to person, place, and time.      Cranial Nerves: No cranial nerve deficit.      Sensory: No sensory deficit.   Psychiatric:         Behavior: Behavior normal.         Judgment: Judgment normal.           Assessment:      1. Prostate cancer    2. Secondary adenocarcinoma of skeletal bone           Plan:     Prostate cancer  High risk prostate adenocarcinoma on long-term ADT with Zytiga and steroid. Has lupron injection every 6 months. Tolerating well.  Most recent PSA noted at less than 0.01 nanograms/cc. Return back in 3 months with repeat labs or sooner if needed.    Secondary adenocarcinoma of skeletal bone  Chronic low back pain.  Repeated MRI of spine showed no changes compared to MRI from 2015/2017. Noted degenerative disc disease.  No evidence of metastasis. Followed by bone and spine specialist.    Prostate cancer  -     CBC Auto Differential; Future; Expected date: 08/12/2022  -     Comprehensive Metabolic Panel; Future; Expected date: 08/12/2022  -     PSA; Future; Expected date: 08/12/2022    Secondary adenocarcinoma of skeletal bone  -     CBC Auto Differential; Future; Expected date: 08/12/2022  -     Comprehensive Metabolic Panel; Future; Expected date: 08/12/2022  -     PSA; Future; Expected date: 08/12/2022      Mitch Cowan MD

## 2022-08-12 NOTE — TELEPHONE ENCOUNTER
----- Message from Fariba Flaherty sent at 8/12/2022  7:34 AM CDT -----  Contact: Pt  Type:  RX Refill Request    Who Called:  pt   Refill or New Rx:refill   RX Name and Strength:hydrocodone 10 mg   How is the patient currently taking it? (ex. 1XDay):3 times daily   Is this a 30 day or 90 day RX:30 days  Preferred Pharmacy with phone number: [facility]   Local or Mail Order: local   Ordering Provider: [place]   Would the patient rather a call back or a response via [facility]? Phone   Best Call Back Number: [phone]  Additional Information:      [facility], [facility] [address] [address]  [address] [address] [address]  Phone: [phone] Fax: [phone]

## 2022-08-12 NOTE — TELEPHONE ENCOUNTER
Called pt, no answer left message on voicemail to contact our office in reference his Lupron injection.

## 2022-08-15 ENCOUNTER — TELEPHONE (OUTPATIENT)
Dept: UROLOGY | Facility: CLINIC | Age: 67
End: 2022-08-15
Payer: MEDICARE

## 2022-08-15 NOTE — TELEPHONE ENCOUNTER
Called pt for the second, no answer, left message on voicemail to contact our office in reference to his Lupron injection.

## 2022-08-15 NOTE — TELEPHONE ENCOUNTER
----- Message from Aimee Jerome sent at 8/15/2022 11:44 AM CDT -----  Contact: Ray  Type:  Sooner Apoointment Request    Caller is requesting a sooner appointment. Caller will not accept being placed on the waitlist and is requesting a message be sent to doctor.  Name of Caller:Ray  When is the first available appointment?unknown  Symptoms:Lupon injection  Would the patient rather a call back or a response via [facility]? call  Best Call Back Number:[phone]   Additional Information: Patient request to schedule visit for soonest availability. Please give patient a call back when possible.  Thank you,  GH

## 2022-08-17 RX ORDER — SODIUM, POTASSIUM,MAG SULFATES 17.5-3.13G
1 SOLUTION, RECONSTITUTED, ORAL ORAL DAILY
Qty: 1 KIT | Refills: 0 | Status: SHIPPED | OUTPATIENT
Start: 2022-08-17 | End: 2022-08-19

## 2022-08-23 ENCOUNTER — TELEPHONE (OUTPATIENT)
Dept: INTERNAL MEDICINE | Facility: CLINIC | Age: 67
End: 2022-08-23
Payer: MEDICARE

## 2022-08-23 NOTE — TELEPHONE ENCOUNTER
----- Message from Oliver Sevilla sent at 8/23/2022 10:31 AM CDT -----  Contact: [facility] [employer] office/ [place] [phone]  Consult    She is requesting an MRI of the patient's left leg. She The patient said you declined his request but, the  thinks he needs it because, he is still in a lot of pain.    Thank you

## 2022-08-24 ENCOUNTER — TELEPHONE (OUTPATIENT)
Dept: UROLOGY | Facility: CLINIC | Age: 67
End: 2022-08-24

## 2022-08-24 ENCOUNTER — OFFICE VISIT (OUTPATIENT)
Dept: UROLOGY | Facility: CLINIC | Age: 67
End: 2022-08-24
Payer: MEDICARE

## 2022-08-24 VITALS
DIASTOLIC BLOOD PRESSURE: 80 MMHG | SYSTOLIC BLOOD PRESSURE: 130 MMHG | BODY MASS INDEX: 22.62 KG/M2 | WEIGHT: 157.63 LBS

## 2022-08-24 DIAGNOSIS — C79.51 PROSTATE CANCER METASTATIC TO BONE: Primary | ICD-10-CM

## 2022-08-24 DIAGNOSIS — C61 PROSTATE CANCER METASTATIC TO BONE: Primary | ICD-10-CM

## 2022-08-24 PROCEDURE — 3075F SYST BP GE 130 - 139MM HG: CPT | Mod: CPTII,S$GLB,, | Performed by: UROLOGY

## 2022-08-24 PROCEDURE — 3288F FALL RISK ASSESSMENT DOCD: CPT | Mod: CPTII,S$GLB,, | Performed by: UROLOGY

## 2022-08-24 PROCEDURE — 3008F PR BODY MASS INDEX (BMI) DOCUMENTED: ICD-10-PCS | Mod: CPTII,S$GLB,, | Performed by: UROLOGY

## 2022-08-24 PROCEDURE — 1101F PT FALLS ASSESS-DOCD LE1/YR: CPT | Mod: CPTII,S$GLB,, | Performed by: UROLOGY

## 2022-08-24 PROCEDURE — 99999 PR PBB SHADOW E&M-EST. PATIENT-LVL III: ICD-10-PCS | Mod: PBBFAC,,, | Performed by: UROLOGY

## 2022-08-24 PROCEDURE — 3075F PR MOST RECENT SYSTOLIC BLOOD PRESS GE 130-139MM HG: ICD-10-PCS | Mod: CPTII,S$GLB,, | Performed by: UROLOGY

## 2022-08-24 PROCEDURE — 99213 OFFICE O/P EST LOW 20 MIN: CPT | Mod: S$GLB,,, | Performed by: UROLOGY

## 2022-08-24 PROCEDURE — 3079F DIAST BP 80-89 MM HG: CPT | Mod: CPTII,S$GLB,, | Performed by: UROLOGY

## 2022-08-24 PROCEDURE — 99213 PR OFFICE/OUTPT VISIT, EST, LEVL III, 20-29 MIN: ICD-10-PCS | Mod: S$GLB,,, | Performed by: UROLOGY

## 2022-08-24 PROCEDURE — 3008F BODY MASS INDEX DOCD: CPT | Mod: CPTII,S$GLB,, | Performed by: UROLOGY

## 2022-08-24 PROCEDURE — 1101F PR PT FALLS ASSESS DOC 0-1 FALLS W/OUT INJ PAST YR: ICD-10-PCS | Mod: CPTII,S$GLB,, | Performed by: UROLOGY

## 2022-08-24 PROCEDURE — 1159F MED LIST DOCD IN RCRD: CPT | Mod: CPTII,S$GLB,, | Performed by: UROLOGY

## 2022-08-24 PROCEDURE — 99999 PR PBB SHADOW E&M-EST. PATIENT-LVL III: CPT | Mod: PBBFAC,,, | Performed by: UROLOGY

## 2022-08-24 PROCEDURE — 1159F PR MEDICATION LIST DOCUMENTED IN MEDICAL RECORD: ICD-10-PCS | Mod: CPTII,S$GLB,, | Performed by: UROLOGY

## 2022-08-24 PROCEDURE — 3288F PR FALLS RISK ASSESSMENT DOCUMENTED: ICD-10-PCS | Mod: CPTII,S$GLB,, | Performed by: UROLOGY

## 2022-08-24 PROCEDURE — 3079F PR MOST RECENT DIASTOLIC BLOOD PRESSURE 80-89 MM HG: ICD-10-PCS | Mod: CPTII,S$GLB,, | Performed by: UROLOGY

## 2022-08-24 NOTE — TELEPHONE ENCOUNTER
Called and spoke with pt, informed pt that his Lupron injections has been approved, offered him to come back today, states his wife doesn't like to drive in the rain, will come by on Friday for a nurse visit for his Lupron.

## 2022-08-24 NOTE — PROGRESS NOTES
"CC: follow up prostate cancer    History of Present Illness:     22-has been 11 months since his last visit. States that his sister  when he was supposed to follow up. He continues to take Zytiga/prednisone and PSA remains undetectable. Good stream. No stranguria. No incontinence. Sometimes has urgency.   21- Currently managed on Zytiga/prednisone and Lupron. Reports hot flashes. Sometimes he has urinary urgency. No incontinence. No gross hematuria. No stranguria. Having bone density testing done.   3/15/21: Lupron today, PSA low.  Reviewed history in detail.   20: PSA very low.  Lupron today. Reviewed history in detail.   3/11/20: Lupron due today; will switch to 6 mo prep. Reviewed history in detail. Very active with outside work.  19:  Lupron shot today.  Reviewed history in detail. Currently on dual therapy with abariterone.   19: Lupron shot today.  Reviewed history in detail. Currently on dual therapy with abariterone.   19: Lupron shot today.  Reviewed history in detail. Currently on dual therapy with abariterone.  19: Son  in an MVA since last visit. Reviewed history in detail.  No problems from Lupron.  18: No problems from Lupron.  PSA lowest.  18: PSA today and again 3 mo.  Doing fine.  Reviewed history in detail.  18: PSA well down.  No adverse effects from Lupron.    10/18/17: Been on casodex a month back to review and start Lupron.  Dr. Moscoso felt XRT was not an option but perhaps chemotherapy could be beneficial.  17: Bone scan reports "RIGHT supraorbital location and a smaller similarly extremely intense focus of increased uptake posteriorly on the RIGHT at the T9 level.  Etiology is uncertain."  CT scan shows "A few scattered shotty mesenteric and retroperitoneal nodes identified.  Similar nodes identified within the pelvis bilaterally."  MRI shows left 2.8 cm prostate mass PIRADS 5, with metastatic pelvic lymphadenopathy (right " pelvic sidewall node and left internal iliac chain LN).  No bony mets in pelvis.  7/18/17:  No problems from biopsy.  Gl 4+5 in the left base (30%) and a sig amount of 4+4 in other parts of the gland.  Reviewed treatment options, and imaging needs.  7/3/17: TRUS/Bx today 27 gm.  5/24/17: 61 yo man has problems with perineal/scrotal pain once or twice a month; lasts for an hour or two, some nocturia.  PSA recently elevated.  No abd/pelvic pain and no exac/rel factors.  No hematuria.  No urolithiasis.  No urinary bother.  No  history. CT with contrast earlier this year essentially normal.      Review of Systems   Constitutional: Negative for appetite change and unexpected weight change.   Respiratory: Negative for shortness of breath.    Cardiovascular: Negative for chest pain.   Gastrointestinal: Negative for abdominal pain.   Musculoskeletal: Positive for arthralgias and back pain.   All other systems reviewed and are negative.      Current Outpatient Medications   Medication Sig Dispense Refill    abiraterone (ZYTIGA) 250 mg Tab Take 4 tablets (1,000 mg total) by mouth once daily. 120 tablet 11    aspirin (ECOTRIN) 81 MG EC tablet Take by mouth. 1 Tablet, Delayed Release (E.C.) Oral Every day      atorvastatin (LIPITOR) 40 MG tablet Take 1 tablet (40 mg total) by mouth once daily. 90 tablet 4    budesonide-formoterol 160-4.5 mcg (SYMBICORT) 160-4.5 mcg/actuation HFAA Inhale 2 puffs into the lungs.       calcium-vitamin D 600 mg(1,500mg) -400 unit Tab Take by mouth. 1 Tablet Oral Twice a day      ezetimibe (ZETIA) 10 mg tablet Take 1 tablet (10 mg total) by mouth once daily. 90 tablet 4    fluticasone (FLONASE) 50 mcg/actuation nasal spray 1 spray by Each Nare route once daily. (Patient taking differently: 1 spray by Each Nostril route daily as needed.) 1 Bottle 11    HYDROcodone-acetaminophen (NORCO)  mg per tablet Take 1 tablet by mouth every 6 (six) hours as needed for Pain. 90 tablet 0     latanoprost 0.005 % ophthalmic solution   3    metoprolol succinate (TOPROL-XL) 50 MG 24 hr tablet TAKE 1 TABLET BY MOUTH ONCE DAILY FOR BLOOD PRESSURE/HEART 90 tablet 4    nitroGLYCERIN (NITROSTAT) 0.4 MG SL tablet Place 1 tablet (0.4 mg total) under the tongue every 5 (five) minutes as needed for Chest pain. 20 tablet 12    pantoprazole (PROTONIX) 40 MG tablet Take 1 tablet (40 mg total) by mouth once daily. 90 tablet 2    predniSONE (DELTASONE) 5 MG tablet Take 1 tablet (5 mg total) by mouth 2 (two) times daily. 60 tablet 1    promethazine-dextromethorphan (PROMETHAZINE-DM) 6.25-15 mg/5 mL Syrp Take 5 mLs by mouth nightly as needed (Cough). 120 mL 0    travoprost (TRAVATAN Z) 0.004 % Drop Place 1 drop into both eyes every evening. 1 Drops Ophthalmic At bedtime 5 mL 12     Current Facility-Administered Medications   Medication Dose Route Frequency Provider Last Rate Last Admin    leuprolide (6 month) injection 45 mg  45 mg Intramuscular 1 time in Clinic/HOD Roland Dawn IV, MD        leuprolide (LUPRON) injection 22.5 mg  22.5 mg Intramuscular Q90 Days Roland Dawn IV, MD   22.5 mg at 09/14/20 1157    leuprolide acetate (6 month) injection 45 mg  45 mg Intramuscular 1 time in Clinic/HOD Lydia Paniagua MD           Review of patient's allergies indicates:   Allergen Reactions    Avelox  [moxifloxacin] Rash    Nsaids (non-steroidal anti-inflammatory drug) Other (See Comments)     dyspepsia       Past medical, family, and social history reviewed as documented in chart with pertinent positive medical, family, and social history detailed in HPI.    Physical Exam  Vitals:    08/24/22 0920   BP: 130/80       General: Well-developed, well-nourished, in no acute distress  HEENT: Normocephalic, atraumatic, extraocular eye movements in tact  Neck: supple, trachea midline, no cervical or supraclavicular adenopathy  Respirations: Even and unlabored  Back: Midline spine, no CVA tenderness  Abdomen:  soft, Non-tender, Non-distended, no palpable masses, no rebound or guarding  Extremities: Moves all extremities equally, atraumatic, no clubbing, cyanosis, edema  Skin: warm and dry, no lesions  Psych: normal affect  Neuro: Alert and oriented x 3. Cranial nerves II-XII grossly intact      PSA    2/16: 22.1    2/17: 22.9    1/18: 0.24    7/18: 0.01    1/19, 4/19, 8/19, 11/19, 3/20, 3/21, 9/21, 8/22: undetect    Assessment:   1. Prostate cancer metastatic to bone  Prior authorization Order         Plan:  Prostate cancer metastatic to bone  -     Prior authorization Order    Other orders  -     leuprolide acetate (6 month) injection 45 mg    continue Zytiga/prednisone and Heme/onc follow ups.   Pt to f/u for nurse visit Lupron injection once approved.   Follow up in about 6 months (around 2/24/2023) for Lupron.

## 2022-08-24 NOTE — TELEPHONE ENCOUNTER
----- Message from Aimee Jerome sent at 8/24/2022 11:04 AM CDT -----  Contact: Ray  Type:  Patient Returning Call    Who Called:Ray  Who Left Message for Patient:unknown  Does the patient know what this is regarding?:Lupron injection  Would the patient rather a call back or a response via [facility]? call  Best Call Back Number:[phone]  Additional Information: Patient reports missing a call and request another call back.  Thank you,  GH

## 2022-08-24 NOTE — TELEPHONE ENCOUNTER
Called pt, no answer, left message for pt to contact office in reference to his Lupron injection.

## 2022-08-26 ENCOUNTER — CLINICAL SUPPORT (OUTPATIENT)
Dept: UROLOGY | Facility: CLINIC | Age: 67
End: 2022-08-26
Payer: MEDICARE

## 2022-08-26 DIAGNOSIS — C61 PROSTATE CANCER: Primary | ICD-10-CM

## 2022-08-26 PROCEDURE — 96402 PR CHEMOTHER HORMON ANTINEOPL SUB-Q/IM: ICD-10-PCS | Mod: S$GLB,,, | Performed by: UROLOGY

## 2022-08-26 PROCEDURE — 99999 PR PBB SHADOW E&M-EST. PATIENT-LVL II: CPT | Mod: PBBFAC,,,

## 2022-08-26 PROCEDURE — 99999 PR PBB SHADOW E&M-EST. PATIENT-LVL II: ICD-10-PCS | Mod: PBBFAC,,,

## 2022-08-26 PROCEDURE — 96402 CHEMO HORMON ANTINEOPL SQ/IM: CPT | Mod: S$GLB,,, | Performed by: UROLOGY

## 2022-08-26 NOTE — PROGRESS NOTES
..45 mg (6 month) Lupron [place] administered via right ventrogluteal. Pt tolerated well.Pt instructed to wait 15 minutes and notify us of any adverse reactions.  Aseptic technique maintained. Pt already has an appointment scheduled to see Dr Dawn in 3 months.

## 2022-08-29 ENCOUNTER — TELEPHONE (OUTPATIENT)
Dept: INTERNAL MEDICINE | Facility: CLINIC | Age: 67
End: 2022-08-29
Payer: MEDICARE

## 2022-08-29 NOTE — TELEPHONE ENCOUNTER
----- Message from [place] sent at 8/29/2022 12:24 PM CDT -----  Contact: [place] on behalf patitient  [employer] from [employer] is calling in regards to getting  the patients MRI scheduled . Please give her a call back at [phone]   Thank you   Tom

## 2022-08-30 ENCOUNTER — TELEPHONE (OUTPATIENT)
Dept: INTERNAL MEDICINE | Facility: CLINIC | Age: 67
End: 2022-08-30
Payer: MEDICARE

## 2022-08-30 NOTE — TELEPHONE ENCOUNTER
Spoke with pt. Scheduled an appointment on 0\9/06/22 to discuss leg pain and imaging. Pt was thankful. Call ended well.

## 2022-08-30 NOTE — TELEPHONE ENCOUNTER
----- Message from Elina Varela sent at 8/30/2022 12:42 PM CDT -----  Contact: Patient  Patient called to consult with nurse or staff regarding a missed call. He states it in regards to scheduling an MRI. He would like a call back and can be reached at [phone]. Thanks/MR

## 2022-09-06 DIAGNOSIS — G89.3 NEOPLASM RELATED PAIN: ICD-10-CM

## 2022-09-06 DIAGNOSIS — C79.51 SECONDARY ADENOCARCINOMA OF SKELETAL BONE: ICD-10-CM

## 2022-09-06 DIAGNOSIS — C61 PROSTATE CANCER: ICD-10-CM

## 2022-09-06 RX ORDER — HYDROCODONE BITARTRATE AND ACETAMINOPHEN 10; 325 MG/1; MG/1
1 TABLET ORAL EVERY 6 HOURS PRN
Qty: 90 TABLET | Refills: 0 | Status: SHIPPED | OUTPATIENT
Start: 2022-09-06 | End: 2022-10-03 | Stop reason: SDUPTHER

## 2022-09-08 ENCOUNTER — SPECIALTY PHARMACY (OUTPATIENT)
Dept: PHARMACY | Facility: CLINIC | Age: 67
End: 2022-09-08
Payer: MEDICARE

## 2022-09-08 NOTE — TELEPHONE ENCOUNTER
Specialty Pharmacy - Refill Coordination    Specialty Medication Orders Linked to Encounter      Flowsheet Row Most Recent Value   Medication #1 abiraterone (ZYTIGA) 250 mg Tab (Order#[id number], Rx#[id number])            Refill Questions - Documented Responses      Flowsheet Row Most Recent Value   Patient Availability and HIPAA Verification    Does patient want to proceed with activity? Yes   HIPAA/medical authority confirmed? Yes   Relationship to patient of person spoken to? Self   Refill Screening Questions    Changes to allergies? No   Changes to medications? No   New conditions since last clinic visit? No   Unplanned office visit, urgent care, ED, or hospital admission in the last 4 weeks? No   How does patient/caregiver feel medication is working? Good   Financial problems or insurance changes? No   How many doses of your specialty medications were missed in the last 4 weeks? 0   Would patient like to speak to a pharmacist? No   When does the patient need to receive the medication? 09/15/22   Refill Delivery Questions    How will the patient receive the medication? Delivery Mamie   When does the patient need to receive the medication? 09/15/22   Shipping Address Home   Address in [facility] confirmed and updated if neccessary? Yes   Expected Copay ($) 0   Is the patient able to afford the medication copay? Yes   Payment Method zero copay   Days supply of Refill 30   Supplies needed? No supplies needed   Refill activity completed? Yes   Refill activity plan Refill scheduled   Shipment/Pickup Date: 09/12/22            Current Outpatient Medications   Medication Sig    abiraterone (ZYTIGA) 250 mg Tab Take 4 tablets (1,000 mg total) by mouth once daily.    aspirin (ECOTRIN) 81 MG EC tablet Take by mouth. 1 Tablet, Delayed Release (E.C.) Oral Every day    atorvastatin (LIPITOR) 40 MG tablet TAKE 1 TABLET BY MOUTH ONCE DAILY FOR CHOLESTEROL    budesonide-formoterol 160-4.5 mcg (SYMBICORT) 160-4.5  mcg/actuation HFAA Inhale 2 puffs into the lungs.     calcium-vitamin D 600 mg(1,500mg) -400 unit Tab Take by mouth. 1 Tablet Oral Twice a day    ezetimibe (ZETIA) 10 mg tablet TAKE 1 TABLET (10 MG TOTAL) BY MOUTH ONCE DAILY.    fluticasone (FLONASE) 50 mcg/actuation nasal spray 1 spray by Each Nare route once daily. (Patient taking differently: 1 spray by Each Nostril route daily as needed.)    HYDROcodone-acetaminophen (NORCO)  mg per tablet Take 1 tablet by mouth every 6 (six) hours as needed for Pain.    latanoprost 0.005 % ophthalmic solution     metoprolol succinate (TOPROL-XL) 50 MG 24 hr tablet TAKE 1 TABLET BY MOUTH ONCE DAILY FOR BLOOD PRESSURE/HEART    nitroGLYCERIN (NITROSTAT) 0.4 MG SL tablet Place 1 tablet (0.4 mg total) under the tongue every 5 (five) minutes as needed for Chest pain.    pantoprazole (PROTONIX) 40 MG tablet Take 1 tablet (40 mg total) by mouth once daily.    predniSONE (DELTASONE) 5 MG tablet Take 1 tablet (5 mg total) by mouth 2 (two) times daily.    promethazine-dextromethorphan (PROMETHAZINE-DM) 6.25-15 mg/5 mL Syrp Take 5 mLs by mouth nightly as needed (Cough).    travoprost (TRAVATAN Z) 0.004 % Drop Place 1 drop into both eyes every evening. 1 Drops Ophthalmic At bedtime   Last reviewed on 8/26/2022  9:36 AM by Kenisha Collins LPN    Review of patient's allergies indicates:   Allergen Reactions    Avelox  [moxifloxacin] Rash    Nsaids (non-steroidal anti-inflammatory drug) Other (See Comments)     dyspepsia    Last reviewed on  8/26/2022 9:36 AM by Kenisha Collins      Tasks added this encounter   10/8/2022 - Refill Call (Auto Added)   Tasks due within next 3 months   11/29/2022 - Clinical - Follow Up Assesement (180 day)     Ernestine Major [facility] - [facility]  [address] [place] [id number]  Phone: [phone]  Fax: [phone]

## 2022-09-23 ENCOUNTER — TELEPHONE (OUTPATIENT)
Dept: INTERNAL MEDICINE | Facility: CLINIC | Age: 67
End: 2022-09-23
Payer: MEDICARE

## 2022-09-23 NOTE — TELEPHONE ENCOUNTER
----- Message from Lucy Arteaga sent at 9/23/2022 10:51 AM CDT -----  .Type:  Patient Returning Call    Who Called:self  Who Left Message for Patient: emery  Does the patient know what this is regarding?: yes  Would the patient rather a call back or a response via [facility]?   Best Call Back Number:.[phone]

## 2022-09-27 ENCOUNTER — TELEPHONE (OUTPATIENT)
Dept: PREADMISSION TESTING | Facility: HOSPITAL | Age: 67
End: 2022-09-27
Payer: MEDICARE

## 2022-10-03 DIAGNOSIS — G89.3 NEOPLASM RELATED PAIN: ICD-10-CM

## 2022-10-03 DIAGNOSIS — C79.51 SECONDARY ADENOCARCINOMA OF SKELETAL BONE: ICD-10-CM

## 2022-10-03 DIAGNOSIS — C61 PROSTATE CANCER: ICD-10-CM

## 2022-10-03 RX ORDER — HYDROCODONE BITARTRATE AND ACETAMINOPHEN 10; 325 MG/1; MG/1
1 TABLET ORAL EVERY 6 HOURS PRN
Qty: 90 TABLET | Refills: 0 | Status: SHIPPED | OUTPATIENT
Start: 2022-10-03 | End: 2022-10-20 | Stop reason: SDUPTHER

## 2022-10-12 ENCOUNTER — OFFICE VISIT (OUTPATIENT)
Dept: URGENT CARE | Facility: CLINIC | Age: 67
End: 2022-10-12
Payer: MEDICARE

## 2022-10-12 ENCOUNTER — HOSPITAL ENCOUNTER (OUTPATIENT)
Dept: RADIOLOGY | Facility: CLINIC | Age: 67
Discharge: HOME | End: 2022-10-12
Attending: PHYSICIAN ASSISTANT
Payer: MEDICARE

## 2022-10-12 VITALS
DIASTOLIC BLOOD PRESSURE: 78 MMHG | SYSTOLIC BLOOD PRESSURE: 143 MMHG | WEIGHT: 156 LBS | HEIGHT: 70 IN | TEMPERATURE: 97 F | RESPIRATION RATE: 16 BRPM | HEART RATE: 77 BPM | BODY MASS INDEX: 22.33 KG/M2 | OXYGEN SATURATION: 97 %

## 2022-10-12 DIAGNOSIS — R03.0 ELEVATED BLOOD PRESSURE READING: ICD-10-CM

## 2022-10-12 DIAGNOSIS — H61.23 BILATERAL IMPACTED CERUMEN: ICD-10-CM

## 2022-10-12 DIAGNOSIS — Z85.46 HISTORY OF PROSTATE CANCER: ICD-10-CM

## 2022-10-12 DIAGNOSIS — H04.203 WATERY EYES: ICD-10-CM

## 2022-10-12 DIAGNOSIS — R09.81 COUGH WITH CONGESTION OF PARANASAL SINUS: Primary | ICD-10-CM

## 2022-10-12 DIAGNOSIS — R05.8 COUGH WITH CONGESTION OF PARANASAL SINUS: Primary | ICD-10-CM

## 2022-10-12 DIAGNOSIS — H92.03 EARACHE SYMPTOMS IN BOTH EARS: ICD-10-CM

## 2022-10-12 DIAGNOSIS — R51.9 SINUS HEADACHE: ICD-10-CM

## 2022-10-12 PROCEDURE — 1159F PR MEDICATION LIST DOCUMENTED IN MEDICAL RECORD: ICD-10-PCS | Mod: CPTII,S$GLB,, | Performed by: PHYSICIAN ASSISTANT

## 2022-10-12 PROCEDURE — 1160F PR REVIEW ALL MEDS BY PRESCRIBER/CLIN PHARMACIST DOCUMENTED: ICD-10-PCS | Mod: CPTII,S$GLB,, | Performed by: PHYSICIAN ASSISTANT

## 2022-10-12 PROCEDURE — 99214 PR OFFICE/OUTPT VISIT, EST, LEVL IV, 30-39 MIN: ICD-10-PCS | Mod: S$GLB,,, | Performed by: PHYSICIAN ASSISTANT

## 2022-10-12 PROCEDURE — 3077F SYST BP >= 140 MM HG: CPT | Mod: CPTII,S$GLB,, | Performed by: PHYSICIAN ASSISTANT

## 2022-10-12 PROCEDURE — 99214 OFFICE O/P EST MOD 30 MIN: CPT | Mod: S$GLB,,, | Performed by: PHYSICIAN ASSISTANT

## 2022-10-12 PROCEDURE — 3078F DIAST BP <80 MM HG: CPT | Mod: CPTII,S$GLB,, | Performed by: PHYSICIAN ASSISTANT

## 2022-10-12 PROCEDURE — 71046 XR CHEST PA AND LATERAL: ICD-10-PCS | Mod: S$GLB,,, | Performed by: RADIOLOGY

## 2022-10-12 PROCEDURE — 3008F PR BODY MASS INDEX (BMI) DOCUMENTED: ICD-10-PCS | Mod: CPTII,S$GLB,, | Performed by: PHYSICIAN ASSISTANT

## 2022-10-12 PROCEDURE — 1160F RVW MEDS BY RX/DR IN RCRD: CPT | Mod: CPTII,S$GLB,, | Performed by: PHYSICIAN ASSISTANT

## 2022-10-12 PROCEDURE — 3008F BODY MASS INDEX DOCD: CPT | Mod: CPTII,S$GLB,, | Performed by: PHYSICIAN ASSISTANT

## 2022-10-12 PROCEDURE — 3078F PR MOST RECENT DIASTOLIC BLOOD PRESSURE < 80 MM HG: ICD-10-PCS | Mod: CPTII,S$GLB,, | Performed by: PHYSICIAN ASSISTANT

## 2022-10-12 PROCEDURE — 71046 X-RAY EXAM CHEST 2 VIEWS: CPT | Mod: S$GLB,,, | Performed by: RADIOLOGY

## 2022-10-12 PROCEDURE — 1159F MED LIST DOCD IN RCRD: CPT | Mod: CPTII,S$GLB,, | Performed by: PHYSICIAN ASSISTANT

## 2022-10-12 PROCEDURE — 3077F PR MOST RECENT SYSTOLIC BLOOD PRESSURE >= 140 MM HG: ICD-10-PCS | Mod: CPTII,S$GLB,, | Performed by: PHYSICIAN ASSISTANT

## 2022-10-12 RX ORDER — BENZONATATE 100 MG/1
200 CAPSULE ORAL 3 TIMES DAILY PRN
Qty: 20 CAPSULE | Refills: 0 | Status: SHIPPED | OUTPATIENT
Start: 2022-10-12 | End: 2022-10-19

## 2022-10-12 RX ORDER — MINERAL OIL
180 ENEMA (ML) RECTAL DAILY
Qty: 30 TABLET | Refills: 0 | Status: SHIPPED | OUTPATIENT
Start: 2022-10-12 | End: 2023-02-27 | Stop reason: SDUPTHER

## 2022-10-12 NOTE — PATIENT INSTRUCTIONS
Normal chest x-ray    COUGH:   Allegra as directed for watery eyes and postnasal drip with cough.       Make sure you are getting rest and drinking lots of fluids.    You have been prescribed Tessalon perles     Over-the-counter Delsym cough syrup    You can use cough drops (recommend ricola lemon mint honey) or Cepacol to soothe your sore throat.     You can also take Elderberry and/or Emergen-C (vitamin C) to help boost your immune system.    EXCESSIVE EAR WAX:  --declined ear cleaning in clinic today.  I recommend the use of a wax softening drop, either over the counter Debrox, baby oil, or mineral oil, on a weekly basis.  PLEASE avoid Qtips.    ELEVATED BLOOD PRESSURE READING:    - Your blood pressure was noted to be elevated in clinic today.-- please keep an eye on blood pressures.  - Record blood pressures and follow up with primary care doctor if blood pressures continue to be elevated.  - Here are a few generalized recommended lifestyle modifications proven to lower BPs--DASH diet, exercise, weight loss, reducing stress.  *Of note: above recommendations are generalized conservative lifestyle modifications that include but are not limited to above list.   Please do not attempt any of the above recommendations if they do not pertain to you or should cause overall detriment to your health.   Please call the clinic or your PCP with any questions you may have in regards to BP and lifestyle modifications.        - You must understand that you have received an Urgent Care treatment only and that you may be released before all of your medical problems are known or treated.   - You, the patient, will arrange for follow up care as instructed with your primary care provider or recommended specialist.   - If your condition worsens or fails to improve we recommend that you receive another evaluation at the ER immediately or contact your PCP to discuss your concerns, or return here.   - Please do not drive or make any  important decisions for 24 hours if you have received any pain medications, sedatives or mood altering drugs during your visit.    Disclaimer: This document was drafted with the use of a voice recognition device and is likely to have sound alike errors.

## 2022-10-12 NOTE — PROGRESS NOTES
"Subjective:       Patient ID: Joey Jacobo is a 67 y.o. male.    Vitals:  height is 5' 10" (1.778 m) and weight is 70.8 kg (156 lb). His tympanic temperature is 97.1 °F (36.2 °C). His blood pressure is 143/78 (abnormal) and his pulse is 77. His respiration is 16 and oxygen saturation is 97%.     Chief Complaint: Cough    67-year-old male presents urgent care complaining of cough for COVID-chin x2 weeks.  Reports white sputum.  There is associated headache, watery eyes, and earache.  Over-the-counter Mucinex with no relief.  Requesting chest x-ray    Cough  This is a new problem. The current episode started in the past 7 days. The problem has been gradually worsening. The problem occurs constantly. The cough is Non-productive. Associated symptoms include chills, ear pain, a fever, headaches, heartburn, nasal congestion, postnasal drip and rhinorrhea. Pertinent negatives include no chest pain, ear congestion, hemoptysis, myalgias, rash, sore throat, shortness of breath, sweats, weight loss or wheezing. The symptoms are aggravated by lying down. Risk factors for lung disease include smoking/tobacco exposure. He has tried OTC cough suppressant for the symptoms. The treatment provided mild relief. His past medical history is significant for emphysema. There is no history of asthma, bronchiectasis, bronchitis, COPD, environmental allergies or pneumonia.     Constitution: Positive for chills and fever.   HENT:  Positive for ear pain, congestion and postnasal drip. Negative for sore throat.    Cardiovascular:  Negative for chest pain and leg swelling.   Respiratory:  Positive for cough and sputum production (white). Negative for bloody sputum, shortness of breath and wheezing.    Gastrointestinal:  Positive for heartburn.   Musculoskeletal:  Negative for muscle ache.   Skin:  Negative for rash.   Allergic/Immunologic: Negative for environmental allergies.   Neurological:  Positive for headaches. Negative for dizziness. " "    Objective:      Vitals:    10/12/22 0954   BP: (!) 143/78   Pulse: 77   Resp: 16   Temp: 97.1 °F (36.2 °C)   TempSrc: Tympanic   SpO2: 97%   Weight: 70.8 kg (156 lb)   Height: 5' 10" (1.778 m)       Physical Exam   Constitutional: He is oriented to person, place, and time. He appears well-developed. He is cooperative.  Non-toxic appearance. He does not appear ill. No distress. awake  HENT:   Head: Normocephalic and atraumatic.   Ears:   Right Ear: External ear normal. There is cerumen present.   Left Ear: External ear normal. There is cerumen present.   Nose: Rhinorrhea and congestion present.   Mouth/Throat: Mucous membranes are moist. Posterior oropharyngeal erythema present. No oropharyngeal exudate. Oropharynx is clear.   Eyes: Right eye visual fields normal and left eye visual fields normal. Conjunctivae and EOM are normal. Pupils are equal, round, and reactive to light. Right eye exhibits discharge. Left eye exhibits discharge. No scleral icterus. Right eye exhibits no nystagmus. Left eye exhibits no nystagmus. Extraocular movement intact vision grossly intact gaze aligned appropriately periorbital hyperpigmentation      Comments: Watery eyes bilateral   Neck: Trachea normal. Neck supple. No Brudzinski's sign and no Kernig's sign noted.      Comments: Moderate PND No neck rigidity present.   Cardiovascular: Normal rate, regular rhythm, normal heart sounds and normal pulses.   Pulmonary/Chest: Effort normal and breath sounds normal. No accessory muscle usage or stridor. No respiratory distress. He has no wheezes. He has no rhonchi. He has no rales. He exhibits no tenderness.   Transmitted upper airways (sounds congested, nasally)         Comments: Transmitted upper airways (sounds congested, nasally)    Abdominal: Bowel sounds are normal. He exhibits no distension. Soft. There is no guarding.   Musculoskeletal:      Right lower leg: No edema.      Left lower leg: No edema.   Neurological: He is alert, " oriented to person, place, and time and at baseline.   Skin: Skin is warm, dry, not diaphoretic and no rash. Capillary refill takes less than 2 seconds.   Psychiatric: He experiences Normal attention and Normal perception. His speech is normal and behavior is normal. Mood, memory, affect, judgment and thought content normal. Cognition normal  Nursing note and vitals reviewed.      Assessment:       1. Cough with congestion of paranasal sinus    2. Bilateral impacted cerumen    3. Earache symptoms in both ears    4. Sinus headache    5. Watery eyes    6. History of prostate cancer        XR CHEST PA AND LATERAL    Result Date: 10/12/2022  EXAMINATION: XR CHEST PA AND LATERAL CLINICAL HISTORY: cough; Other specified cough TECHNIQUE: PA and lateral views of the chest were performed. COMPARISON: 01/05/2022. FINDINGS: The lungs are well expanded.  There are continued reticular opacities in bilateral lungs, unchanged from prior.  No new focal consolidation.  The pleural spaces are clear.  The cardiac silhouette is unremarkable.  There are calcifications of the thoracic aorta.  The visualized osseous structures are intact.  Partially imaged cervical spine hardware noted.     No acute abnormality. Electronically signed by: Mendez Garcia Date:    10/12/2022 Time:    10:30     Plan:         Cough with congestion of paranasal sinus  -     XR CHEST PA AND LATERAL; Future; Expected date: 10/12/2022    Bilateral impacted cerumen    Earache symptoms in both ears    Sinus headache    Watery eyes    History of prostate cancer         Medical Decision Making:   Initial Assessment:   Declined covid and flu tests  Clinical Tests:   Radiological Study: Ordered and Reviewed       Patient Instructions   Normal chest x-ray    COUGH:      Make sure you are getting rest and drinking lots of fluids.    You have been prescribed Tessalon perles     Over-the-counter Delsym cough syrup    You can use cough drops (recommend ricola lemon mint  honey) or Cepacol to soothe your sore throat.     You can also take Elderberry and/or Emergen-C (vitamin C) to help boost your immune system.      - You must understand that you have received an Urgent Care treatment only and that you may be released before all of your medical problems are known or treated.   - You, the patient, will arrange for follow up care as instructed with your primary care provider or recommended specialist.   - If your condition worsens or fails to improve we recommend that you receive another evaluation at the ER immediately or contact your PCP to discuss your concerns, or return here.   - Please do not drive or make any important decisions for 24 hours if you have received any pain medications, sedatives or mood altering drugs during your visit.    Disclaimer: This document was drafted with the use of a voice recognition device and is likely to have sound alike errors.

## 2022-10-13 ENCOUNTER — SPECIALTY PHARMACY (OUTPATIENT)
Dept: PHARMACY | Facility: CLINIC | Age: 67
End: 2022-10-13
Payer: MEDICARE

## 2022-10-13 NOTE — TELEPHONE ENCOUNTER
Specialty Pharmacy - Refill Coordination    Specialty Medication Orders Linked to Encounter      Flowsheet Row Most Recent Value   Medication #1 abiraterone (ZYTIGA) 250 mg Tab (Order#[id number], Rx#[id number])            Refill Questions - Documented Responses      Flowsheet Row Most Recent Value   Patient Availability and HIPAA Verification    Does patient want to proceed with activity? Yes   HIPAA/medical authority confirmed? Yes   Relationship to patient of person spoken to? Self   Refill Screening Questions    Changes to allergies? No   Changes to medications? No   New conditions since last clinic visit? No   Unplanned office visit, urgent care, ED, or hospital admission in the last 4 weeks? No   How does patient/caregiver feel medication is working? Excellent   Financial problems or insurance changes? No   How many doses of your specialty medications were missed in the last 4 weeks? 0   Would patient like to speak to a pharmacist? No   When does the patient need to receive the medication? 10/21/22   Refill Delivery Questions    How will the patient receive the medication? MEDRx   When does the patient need to receive the medication? 10/21/22   Shipping Address Home   Address in [facility] confirmed and updated if neccessary? Yes   Expected Copay ($) 0   Is the patient able to afford the medication copay? Yes   Payment Method zero copay   Days supply of Refill 30   Supplies needed? No supplies needed   Refill activity completed? Yes   Refill activity plan Refill scheduled   Shipment/Pickup Date: 10/20/22            Current Outpatient Medications   Medication Sig    abiraterone (ZYTIGA) 250 mg Tab Take 4 tablets (1,000 mg total) by mouth once daily.    aspirin (ECOTRIN) 81 MG EC tablet Take by mouth. 1 Tablet, Delayed Release (E.C.) Oral Every day    atorvastatin (LIPITOR) 40 MG tablet TAKE 1 TABLET BY MOUTH ONCE DAILY FOR CHOLESTEROL    benzonatate (TESSALON) 100 MG capsule Take 2 capsules (200 mg total)  by mouth 3 (three) times daily as needed for Cough.    budesonide-formoterol 160-4.5 mcg (SYMBICORT) 160-4.5 mcg/actuation HFAA Inhale 2 puffs into the lungs.     calcium-vitamin D 600 mg(1,500mg) -400 unit Tab Take by mouth. 1 Tablet Oral Twice a day    ezetimibe (ZETIA) 10 mg tablet TAKE 1 TABLET (10 MG TOTAL) BY MOUTH ONCE DAILY.    fexofenadine (ALLEGRA) 180 MG tablet Take 1 tablet (180 mg total) by mouth once daily.    fluticasone (FLONASE) 50 mcg/actuation nasal spray 1 spray by Each Nare route once daily. (Patient taking differently: 1 spray by Each Nostril route daily as needed.)    HYDROcodone-acetaminophen (NORCO)  mg per tablet Take 1 tablet by mouth every 6 (six) hours as needed for Pain.    latanoprost 0.005 % ophthalmic solution     metoprolol succinate (TOPROL-XL) 50 MG 24 hr tablet TAKE 1 TABLET BY MOUTH ONCE DAILY FOR BLOOD PRESSURE/HEART    nitroGLYCERIN (NITROSTAT) 0.4 MG SL tablet Place 1 tablet (0.4 mg total) under the tongue every 5 (five) minutes as needed for Chest pain.    pantoprazole (PROTONIX) 40 MG tablet Take 1 tablet (40 mg total) by mouth once daily.    predniSONE (DELTASONE) 5 MG tablet Take 1 tablet (5 mg total) by mouth 2 (two) times daily.    promethazine-dextromethorphan (PROMETHAZINE-DM) 6.25-15 mg/5 mL Syrp Take 5 mLs by mouth nightly as needed (Cough).    travoprost (TRAVATAN Z) 0.004 % Drop Place 1 drop into both eyes every evening. 1 Drops Ophthalmic At bedtime   Last reviewed on 10/12/2022 10:10 AM by Radha Hurd PA-C    Review of patient's allergies indicates:   Allergen Reactions    Avelox  [moxifloxacin] Rash    Nsaids (non-steroidal anti-inflammatory drug) Other (See Comments)     dyspepsia    Last reviewed on  10/12/2022 10:10 AM by Radha Hurd      Tasks added this encounter   11/13/2022 - Refill Call (Auto Added)   Tasks due within next 3 months   11/29/2022 - Clinical - Follow Up Assesement (180 day)     Rufino Michaud, PharmD  [facility]  [address] [facility] [address]  Phone: [phone]  Fax: [phone]

## 2022-10-20 ENCOUNTER — TELEPHONE (OUTPATIENT)
Dept: HEMATOLOGY/ONCOLOGY | Facility: CLINIC | Age: 67
End: 2022-10-20
Payer: MEDICARE

## 2022-10-20 DIAGNOSIS — C61 PROSTATE CANCER: Primary | ICD-10-CM

## 2022-10-20 NOTE — TELEPHONE ENCOUNTER
N/a left detailed vm  informing pt of the rescheduling of his appt due to his previous appt being scheduled incorrectly. Nurse advised pt to call back with any questions or concerns.

## 2022-10-21 NOTE — TELEPHONE ENCOUNTER
Incoming call from pt inquiring if his medication was being delivered today. Confirmed shipment is being delivered today and has up until 8pm to make delivery.  Pt verbalized understanding.

## 2022-10-31 DIAGNOSIS — E78.2 MIXED HYPERLIPIDEMIA: Chronic | ICD-10-CM

## 2022-10-31 DIAGNOSIS — I25.10 CORONARY ARTERY DISEASE INVOLVING NATIVE CORONARY ARTERY WITHOUT ANGINA PECTORIS, UNSPECIFIED WHETHER NATIVE OR TRANSPLANTED HEART: ICD-10-CM

## 2022-10-31 RX ORDER — METOPROLOL SUCCINATE 50 MG/1
50 TABLET, EXTENDED RELEASE ORAL DAILY
Qty: 90 TABLET | Refills: 4 | Status: SHIPPED | OUTPATIENT
Start: 2022-10-31 | End: 2022-11-01 | Stop reason: SDUPTHER

## 2022-11-01 RX ORDER — ATORVASTATIN CALCIUM 40 MG/1
40 TABLET, FILM COATED ORAL DAILY
Qty: 90 TABLET | Refills: 4 | Status: SHIPPED | OUTPATIENT
Start: 2022-11-01

## 2022-11-01 RX ORDER — METOPROLOL SUCCINATE 50 MG/1
50 TABLET, EXTENDED RELEASE ORAL DAILY
Qty: 90 TABLET | Refills: 4 | Status: SHIPPED | OUTPATIENT
Start: 2022-11-01 | End: 2022-11-08 | Stop reason: SDUPTHER

## 2022-11-01 NOTE — TELEPHONE ENCOUNTER
Patient called to request a different pharmacy. Change has been made and a request has been sent to the provider for updates. Patient stated no questions or concern.      ----- Message from Patricia Spaulding sent at 11/1/2022 12:44 PM CDT -----    Patient would like a call regards to medication.  Please call him back at [phone].  Thanks/ju

## 2022-11-02 ENCOUNTER — LAB VISIT (OUTPATIENT)
Dept: LAB | Facility: HOSPITAL | Age: 67
End: 2022-11-02
Attending: STUDENT IN AN ORGANIZED HEALTH CARE EDUCATION/TRAINING PROGRAM
Payer: MEDICARE

## 2022-11-02 ENCOUNTER — TELEPHONE (OUTPATIENT)
Dept: CARDIOLOGY | Facility: CLINIC | Age: 67
End: 2022-11-02
Payer: MEDICARE

## 2022-11-02 DIAGNOSIS — C61 PROSTATE CANCER: ICD-10-CM

## 2022-11-02 LAB
ALBUMIN SERPL BCP-MCNC: 3.4 G/DL (ref 3.5–5.2)
ALP SERPL-CCNC: 92 U/L (ref 55–135)
ALT SERPL W/O P-5'-P-CCNC: 14 U/L (ref 10–44)
ANION GAP SERPL CALC-SCNC: 11 MMOL/L (ref 8–16)
AST SERPL-CCNC: 20 U/L (ref 10–40)
BASOPHILS # BLD AUTO: 0.05 K/UL (ref 0–0.2)
BASOPHILS NFR BLD: 0.5 % (ref 0–1.9)
BILIRUB SERPL-MCNC: 0.4 MG/DL (ref 0.1–1)
BUN SERPL-MCNC: 14 MG/DL (ref 8–23)
CALCIUM SERPL-MCNC: 9.5 MG/DL (ref 8.7–10.5)
CHLORIDE SERPL-SCNC: 105 MMOL/L (ref 95–110)
CO2 SERPL-SCNC: 24 MMOL/L (ref 23–29)
CREAT SERPL-MCNC: 1 MG/DL (ref 0.5–1.4)
DIFFERENTIAL METHOD: ABNORMAL
EOSINOPHIL # BLD AUTO: 0 K/UL (ref 0–0.5)
EOSINOPHIL NFR BLD: 0.4 % (ref 0–8)
ERYTHROCYTE [DISTWIDTH] IN BLOOD BY AUTOMATED COUNT: 14.6 % (ref 11.5–14.5)
EST. GFR  (NO RACE VARIABLE): >60 ML/MIN/1.73 M^2
GLUCOSE SERPL-MCNC: 94 MG/DL (ref 70–110)
HCT VFR BLD AUTO: 41.4 % (ref 40–54)
HGB BLD-MCNC: 13.1 G/DL (ref 14–18)
IMM GRANULOCYTES # BLD AUTO: 0.11 K/UL (ref 0–0.04)
IMM GRANULOCYTES NFR BLD AUTO: 1 % (ref 0–0.5)
LYMPHOCYTES # BLD AUTO: 2.6 K/UL (ref 1–4.8)
LYMPHOCYTES NFR BLD: 23.9 % (ref 18–48)
MCH RBC QN AUTO: 29.9 PG (ref 27–31)
MCHC RBC AUTO-ENTMCNC: 31.6 G/DL (ref 32–36)
MCV RBC AUTO: 95 FL (ref 82–98)
MONOCYTES # BLD AUTO: 1 K/UL (ref 0.3–1)
MONOCYTES NFR BLD: 9 % (ref 4–15)
NEUTROPHILS # BLD AUTO: 7 K/UL (ref 1.8–7.7)
NEUTROPHILS NFR BLD: 65.2 % (ref 38–73)
NRBC BLD-RTO: 0 /100 WBC
PLATELET # BLD AUTO: 230 K/UL (ref 150–450)
PMV BLD AUTO: 11.4 FL (ref 9.2–12.9)
POTASSIUM SERPL-SCNC: 4 MMOL/L (ref 3.5–5.1)
PROT SERPL-MCNC: 7.9 G/DL (ref 6–8.4)
RBC # BLD AUTO: 4.38 M/UL (ref 4.6–6.2)
SODIUM SERPL-SCNC: 140 MMOL/L (ref 136–145)
WBC # BLD AUTO: 10.69 K/UL (ref 3.9–12.7)

## 2022-11-02 PROCEDURE — 36415 COLL VENOUS BLD VENIPUNCTURE: CPT | Mod: PO | Performed by: STUDENT IN AN ORGANIZED HEALTH CARE EDUCATION/TRAINING PROGRAM

## 2022-11-02 PROCEDURE — 84153 ASSAY OF PSA TOTAL: CPT | Performed by: STUDENT IN AN ORGANIZED HEALTH CARE EDUCATION/TRAINING PROGRAM

## 2022-11-02 PROCEDURE — 85025 COMPLETE CBC W/AUTO DIFF WBC: CPT | Performed by: STUDENT IN AN ORGANIZED HEALTH CARE EDUCATION/TRAINING PROGRAM

## 2022-11-02 PROCEDURE — 80053 COMPREHEN METABOLIC PANEL: CPT | Performed by: STUDENT IN AN ORGANIZED HEALTH CARE EDUCATION/TRAINING PROGRAM

## 2022-11-03 LAB — COMPLEXED PSA SERPL-MCNC: <0.01 NG/ML (ref 0–4)

## 2022-11-08 DIAGNOSIS — G89.3 NEOPLASM RELATED PAIN: ICD-10-CM

## 2022-11-08 DIAGNOSIS — I25.10 CORONARY ARTERY DISEASE INVOLVING NATIVE CORONARY ARTERY WITHOUT ANGINA PECTORIS, UNSPECIFIED WHETHER NATIVE OR TRANSPLANTED HEART: ICD-10-CM

## 2022-11-08 DIAGNOSIS — C79.51 SECONDARY ADENOCARCINOMA OF SKELETAL BONE: ICD-10-CM

## 2022-11-08 RX ORDER — PREDNISONE 5 MG/1
5 TABLET ORAL 2 TIMES DAILY
Qty: 60 TABLET | Refills: 1 | Status: SHIPPED | OUTPATIENT
Start: 2022-11-08 | End: 2023-01-18 | Stop reason: SDUPTHER

## 2022-11-08 RX ORDER — METOPROLOL SUCCINATE 50 MG/1
50 TABLET, EXTENDED RELEASE ORAL DAILY
Qty: 90 TABLET | Refills: 4 | Status: SHIPPED | OUTPATIENT
Start: 2022-11-08 | End: 2023-11-15

## 2022-11-08 NOTE — TELEPHONE ENCOUNTER
----- Message from Shena Joshua sent at 11/8/2022  2:04 PM CST -----  Contact: Joey  .Type:  RX Refill Request    Who Called: Joey  Refill or New Rx:Refill   RX Name and Strength: predniSONE (DELTASONE) 5 MG tablet  How is the patient currently taking it? (ex. 1XDay):as prescribed   Is this a 30 day or 90 day RX:30  Preferred Pharmacy with phone number:.  [facility] [employer] [facility] [address] [address] [place] [address]  [address] [address] [address]  Phone: [phone] Fax: [phone]  Local or Mail Order:Local   Ordering Provider:Dr. Perez  Would the patient rather a call back or a response via [facility]? Call   Best Call Back Number: .[phone]   Additional Information:

## 2022-11-14 ENCOUNTER — SPECIALTY PHARMACY (OUTPATIENT)
Dept: PHARMACY | Facility: CLINIC | Age: 67
End: 2022-11-14
Payer: MEDICARE

## 2022-11-14 NOTE — TELEPHONE ENCOUNTER
Specialty Pharmacy - Refill Coordination    Specialty Medication Orders Linked to Encounter      Flowsheet Row Most Recent Value   Medication #1 abiraterone (ZYTIGA) 250 mg Tab (Order#[id number], Rx#[id number])            Refill Questions - Documented Responses      Flowsheet Row Most Recent Value   Patient Availability and HIPAA Verification    Does patient want to proceed with activity? Yes   HIPAA/medical authority confirmed? Yes   Relationship to patient of person spoken to? Self   Refill Screening Questions    Changes to allergies? No   Changes to medications? No   New conditions since last clinic visit? No   Unplanned office visit, urgent care, ED, or hospital admission in the last 4 weeks? No   How does patient/caregiver feel medication is working? Good   Financial problems or insurance changes? No   How many doses of your specialty medications were missed in the last 4 weeks? 0   Would patient like to speak to a pharmacist? No   When does the patient need to receive the medication? 11/17/22   Refill Delivery Questions    How will the patient receive the medication? MEDRx   When does the patient need to receive the medication? 11/17/22   Shipping Address Home   Address in [facility] confirmed and updated if neccessary? Yes   Expected Copay ($) 0   Is the patient able to afford the medication copay? Yes   Payment Method zero copay   Days supply of Refill 30   Supplies needed? No supplies needed   Refill activity completed? Yes   Refill activity plan Refill scheduled   Shipment/Pickup Date: 11/15/22            Current Outpatient Medications   Medication Sig    abiraterone (ZYTIGA) 250 mg Tab Take 4 tablets (1,000 mg total) by mouth once daily.    aspirin (ECOTRIN) 81 MG EC tablet Take by mouth. 1 Tablet, Delayed Release (E.C.) Oral Every day    atorvastatin (LIPITOR) 40 MG tablet Take 1 tablet (40 mg total) by mouth once daily.    budesonide-formoterol 160-4.5 mcg (SYMBICORT) 160-4.5 mcg/actuation HFAA  Inhale 2 puffs into the lungs.     calcium-vitamin D 600 mg(1,500mg) -400 unit Tab Take by mouth. 1 Tablet Oral Twice a day    ezetimibe (ZETIA) 10 mg tablet TAKE 1 TABLET (10 MG TOTAL) BY MOUTH ONCE DAILY.    fexofenadine (ALLEGRA) 180 MG tablet Take 1 tablet (180 mg total) by mouth once daily.    fluticasone (FLONASE) 50 mcg/actuation nasal spray 1 spray by Each Nare route once daily. (Patient taking differently: 1 spray by Each Nostril route daily as needed.)    HYDROcodone-acetaminophen (NORCO)  mg per tablet Take 1 tablet by mouth every 6 (six) hours as needed for Pain.    latanoprost 0.005 % ophthalmic solution     metoprolol succinate (TOPROL-XL) 50 MG 24 hr tablet Take 1 tablet (50 mg total) by mouth once daily.    nitroGLYCERIN (NITROSTAT) 0.4 MG SL tablet Place 1 tablet (0.4 mg total) under the tongue every 5 (five) minutes as needed for Chest pain.    pantoprazole (PROTONIX) 40 MG tablet Take 1 tablet (40 mg total) by mouth once daily.    predniSONE (DELTASONE) 5 MG tablet Take 1 tablet (5 mg total) by mouth 2 (two) times daily.    promethazine-dextromethorphan (PROMETHAZINE-DM) 6.25-15 mg/5 mL Syrp Take 5 mLs by mouth nightly as needed (Cough).    travoprost (TRAVATAN Z) 0.004 % Drop Place 1 drop into both eyes every evening. 1 Drops Ophthalmic At bedtime   Last reviewed on 10/12/2022 10:10 AM by Radha Hurd PA-C    Review of patient's allergies indicates:   Allergen Reactions    Avelox  [moxifloxacin] Rash    Nsaids (non-steroidal anti-inflammatory drug) Other (See Comments)     dyspepsia    Last reviewed on  10/12/2022 10:10 AM by Radha Hurd      Tasks added this encounter   12/10/2022 - Refill Call (Auto Added)   Tasks due within next 3 months   11/29/2022 - Clinical - Follow Up Assesement (180 day)     Ernestine Major [facility] - Specialty Pharmacy  [address] [place] [id number]  Phone: [phone]  Fax: [phone]

## 2022-11-17 ENCOUNTER — OFFICE VISIT (OUTPATIENT)
Dept: HEMATOLOGY/ONCOLOGY | Facility: CLINIC | Age: 67
End: 2022-11-17
Payer: MEDICARE

## 2022-11-17 VITALS — WEIGHT: 159.38 LBS | HEIGHT: 70 IN | RESPIRATION RATE: 16 BRPM | TEMPERATURE: 97 F | BODY MASS INDEX: 22.82 KG/M2

## 2022-11-17 DIAGNOSIS — T45.1X5A IMMUNOSUPPRESSED DUE TO CHEMOTHERAPY: ICD-10-CM

## 2022-11-17 DIAGNOSIS — C79.51 SECONDARY ADENOCARCINOMA OF SKELETAL BONE: ICD-10-CM

## 2022-11-17 DIAGNOSIS — Z79.899 IMMUNOSUPPRESSED DUE TO CHEMOTHERAPY: ICD-10-CM

## 2022-11-17 DIAGNOSIS — G89.3 NEOPLASM RELATED PAIN: ICD-10-CM

## 2022-11-17 DIAGNOSIS — J43.8 OTHER EMPHYSEMA: ICD-10-CM

## 2022-11-17 DIAGNOSIS — C61 PROSTATE CANCER: Primary | ICD-10-CM

## 2022-11-17 DIAGNOSIS — D84.821 IMMUNOSUPPRESSED DUE TO CHEMOTHERAPY: ICD-10-CM

## 2022-11-17 PROCEDURE — 3288F FALL RISK ASSESSMENT DOCD: CPT | Mod: CPTII,S$GLB,, | Performed by: STUDENT IN AN ORGANIZED HEALTH CARE EDUCATION/TRAINING PROGRAM

## 2022-11-17 PROCEDURE — 1159F PR MEDICATION LIST DOCUMENTED IN MEDICAL RECORD: ICD-10-PCS | Mod: CPTII,S$GLB,, | Performed by: STUDENT IN AN ORGANIZED HEALTH CARE EDUCATION/TRAINING PROGRAM

## 2022-11-17 PROCEDURE — 1160F PR REVIEW ALL MEDS BY PRESCRIBER/CLIN PHARMACIST DOCUMENTED: ICD-10-PCS | Mod: CPTII,S$GLB,, | Performed by: STUDENT IN AN ORGANIZED HEALTH CARE EDUCATION/TRAINING PROGRAM

## 2022-11-17 PROCEDURE — 99999 PR PBB SHADOW E&M-EST. PATIENT-LVL IV: ICD-10-PCS | Mod: PBBFAC,,, | Performed by: STUDENT IN AN ORGANIZED HEALTH CARE EDUCATION/TRAINING PROGRAM

## 2022-11-17 PROCEDURE — 99215 PR OFFICE/OUTPT VISIT, EST, LEVL V, 40-54 MIN: ICD-10-PCS | Mod: S$GLB,,, | Performed by: STUDENT IN AN ORGANIZED HEALTH CARE EDUCATION/TRAINING PROGRAM

## 2022-11-17 PROCEDURE — 1101F PR PT FALLS ASSESS DOC 0-1 FALLS W/OUT INJ PAST YR: ICD-10-PCS | Mod: CPTII,S$GLB,, | Performed by: STUDENT IN AN ORGANIZED HEALTH CARE EDUCATION/TRAINING PROGRAM

## 2022-11-17 PROCEDURE — 1159F MED LIST DOCD IN RCRD: CPT | Mod: CPTII,S$GLB,, | Performed by: STUDENT IN AN ORGANIZED HEALTH CARE EDUCATION/TRAINING PROGRAM

## 2022-11-17 PROCEDURE — 99499 UNLISTED E&M SERVICE: CPT | Mod: S$GLB,,, | Performed by: STUDENT IN AN ORGANIZED HEALTH CARE EDUCATION/TRAINING PROGRAM

## 2022-11-17 PROCEDURE — 3288F PR FALLS RISK ASSESSMENT DOCUMENTED: ICD-10-PCS | Mod: CPTII,S$GLB,, | Performed by: STUDENT IN AN ORGANIZED HEALTH CARE EDUCATION/TRAINING PROGRAM

## 2022-11-17 PROCEDURE — 3008F PR BODY MASS INDEX (BMI) DOCUMENTED: ICD-10-PCS | Mod: CPTII,S$GLB,, | Performed by: STUDENT IN AN ORGANIZED HEALTH CARE EDUCATION/TRAINING PROGRAM

## 2022-11-17 PROCEDURE — 1160F RVW MEDS BY RX/DR IN RCRD: CPT | Mod: CPTII,S$GLB,, | Performed by: STUDENT IN AN ORGANIZED HEALTH CARE EDUCATION/TRAINING PROGRAM

## 2022-11-17 PROCEDURE — 99215 OFFICE O/P EST HI 40 MIN: CPT | Mod: S$GLB,,, | Performed by: STUDENT IN AN ORGANIZED HEALTH CARE EDUCATION/TRAINING PROGRAM

## 2022-11-17 PROCEDURE — 3008F BODY MASS INDEX DOCD: CPT | Mod: CPTII,S$GLB,, | Performed by: STUDENT IN AN ORGANIZED HEALTH CARE EDUCATION/TRAINING PROGRAM

## 2022-11-17 PROCEDURE — 1101F PT FALLS ASSESS-DOCD LE1/YR: CPT | Mod: CPTII,S$GLB,, | Performed by: STUDENT IN AN ORGANIZED HEALTH CARE EDUCATION/TRAINING PROGRAM

## 2022-11-17 PROCEDURE — 99999 PR PBB SHADOW E&M-EST. PATIENT-LVL IV: CPT | Mod: PBBFAC,,, | Performed by: STUDENT IN AN ORGANIZED HEALTH CARE EDUCATION/TRAINING PROGRAM

## 2022-11-17 RX ORDER — HYDROCODONE BITARTRATE AND ACETAMINOPHEN 10; 325 MG/1; MG/1
1 TABLET ORAL EVERY 6 HOURS PRN
Qty: 90 TABLET | Refills: 0 | Status: SHIPPED | OUTPATIENT
Start: 2022-11-21 | End: 2022-12-19 | Stop reason: SDUPTHER

## 2022-11-17 NOTE — PROGRESS NOTES
Subjective:       Patient ID: Joey Jacobo is a 67 y.o. male.    Chief Complaint: Prostate cancer [C61]    ONCOLOGICAL HISTORY:  07/03/2017  FINAL PATHOLOGIC DIAGNOSIS  1. PROSTATE, RIGHT APEX (NEEDLE BIOPSY):  Prostatic adenocarcinoma, Linden 3+3 = 6  Tumor involves one of 2 tissue cores  Tumor comprises less than 5% of submitted tissue  2. PROSTATE, RIGHT MID (NEEDLE BIOPSY):  Benign prostatic glands and stroma  3. PROSTATE, RIGHT BASE (NEEDLE BIOPSY):  Prostatic adenocarcinoma, Deric 4+4 = 8  Tumor involves one of 2 tissue cores  Tumor comprises 50% of submitted tissue  4. PROSTATE, LEFT APEX (NEEDLE BIOPSY):  Prostatic adenocarcinoma, Linden 4+4 = 8  Tumor involves 2 of 2 tissue cores  Tumor comprises 50% of submitted tissue  Perineural invasion identified  5. PROSTATE, LEFT MID (NEEDLE BIOPSY):  Prostatic adenocarcinoma, Linden 4+4 = 8  Tumor involves 2 of 2 tissue cores  Tumor comprises 50% of submitted tissue  6. PROSTATE, LEFT BASE (NEEDLE BIOPSY):  Prostatic adenocarcinoma, Deric 4+5 = 9  Tumor involves 2 of 2 tissue cores  Tumor comprises 30% of submitted tissue  Perineural invasion identified  Linden pattern 4: Comprises 90% of tumor  Deric pattern 3: Comprises approximately 8% of tumor  Deric pattern 5: Comprises less than 2% tumor, focal      HPI: We have an opportunity to see Mr. Joey Jacobo in Hematology Oncology clinic at [facility] on 11/17/2022.  Mr. Joey Jacobo is a 67 y.o.  gentleman metastatic prostate adenocarcinoma currently on ADT with Lupron and Zytiga/prednisone, which was started on December 7, 2017.    He is doing well on his medication. Denies any new side effects or symptoms.  Chronic back pain unchanged/stable and controlled with hydrocodone.  Back pain exacerbated when lifting heavy things or when he has to stand for prolonged periods of time.  He does most things because wife is limited due to her arthritis.  Son was killed and it has taken a  toll on his wife; he plans to take her on vacation next week to help get her mind off of it.  He ambulates without any assistive devices.  He is alone at today's visit.      Oncology History   Prostate cancer   2017 Cancer Staged    Staging form: Prostate, AJCC 8th Edition  - Clinical stage from 2017: Stage IVB (cT3a, cN1, cM1b, PSA: 22.6, Grade Group: 5)     10/2/2017 Initial Diagnosis    Prostate cancer       Past Medical History:   Diagnosis Date    Angina pectoris     Back pain     Chronic bronchitis     Colon polyp     COPD (chronic obstructive pulmonary disease) 2013    Coronary artery disease 2013    Emphysema of lung     Essential hypertension 10/29/2018    Glaucoma (increased eye pressure) 2013    Headache(784.0)     Immunosuppressed due to chemotherapy 2021    Mitral regurgitation 2013    Mixed hyperlipidemia 2013    Neuropathy     Osteoarthritis of spine with radiculopathy, lumbar region 2015    Other and unspecified hyperlipidemia     Prostate cancer     Pulmonary fibrosis 10/3/2017     Family History   Problem Relation Age of Onset    Cataracts Mother     Kidney disease Mother     Cancer Father         Stomach    Blindness Maternal Grandmother     Diabetes Sister     Hypertension Sister     Diabetes Sister     Hypertension Sister     Diabetes Sister     Hypertension Sister     Hypertension Sister     Hypertension Sister     Colon cancer Brother     Colon cancer Brother      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   Tobacco Use    Smoking status: Former     Packs/day: 0.25     Years: 25.00     Pack years: 6.25     Types: Cigarettes     Quit date: 2018     Years since quittin.2    Smokeless tobacco: Never   Substance and Sexual Activity    Alcohol use: Not Currently     Alcohol/week: 0.0 standard drinks     Comment:  occasionally    Drug use: No    Sexual activity: Yes   Social History Narrative    Wife and son     Past Surgical History:   Procedure Laterality Date    ANKLE FRACTURE SURGERY Left     COLONOSCOPY N/A 3/21/2016    Procedure: COLONOSCOPY;  Surgeon: Melvin Pearce MD;  Location: [facility];  Service: Endoscopy;  Laterality: N/A;    CORONARY STENT PLACEMENT      times 2    SHOULDER ARTHROSCOPY Left     SPINE SURGERY      neck fusion x2     Current Outpatient Medications   Medication Sig Dispense Refill    abiraterone (ZYTIGA) 250 mg Tab Take 4 tablets (1,000 mg total) by mouth once daily. 120 tablet 11    aspirin (ECOTRIN) 81 MG EC tablet Take by mouth. 1 Tablet, Delayed Release (E.C.) Oral Every day      atorvastatin (LIPITOR) 40 MG tablet Take 1 tablet (40 mg total) by mouth once daily. 90 tablet 4    budesonide-formoterol 160-4.5 mcg (SYMBICORT) 160-4.5 mcg/actuation HFAA Inhale 2 puffs into the lungs.       calcium-vitamin D 600 mg(1,500mg) -400 unit Tab Take by mouth. 1 Tablet Oral Twice a day      ezetimibe (ZETIA) 10 mg tablet TAKE 1 TABLET (10 MG TOTAL) BY MOUTH ONCE DAILY. 90 tablet 4    fluticasone (FLONASE) 50 mcg/actuation nasal spray 1 spray by Each Nare route once daily. (Patient taking differently: 1 spray by Each Nostril route daily as needed.) 1 Bottle 11    latanoprost 0.005 % ophthalmic solution   3    metoprolol succinate (TOPROL-XL) 50 MG 24 hr tablet Take 1 tablet (50 mg total) by mouth once daily. 90 tablet 4    nitroGLYCERIN (NITROSTAT) 0.4 MG SL tablet Place 1 tablet (0.4 mg total) under the tongue every 5 (five) minutes as needed for Chest pain. 20 tablet 12    pantoprazole (PROTONIX) 40 MG tablet Take 1 tablet (40 mg total) by mouth once daily. 90 tablet 2    predniSONE (DELTASONE) 5 MG tablet Take 1 tablet (5 mg total) by mouth 2 (two) times daily. 60 tablet 1    promethazine-dextromethorphan (PROMETHAZINE-DM) 6.25-15 mg/5 mL Syrp Take 5 mLs by mouth nightly as needed (Cough). 120 mL 0     travoprost (TRAVATAN Z) 0.004 % Drop Place 1 drop into both eyes every evening. 1 Drops Ophthalmic At bedtime 5 mL 12    fexofenadine (ALLEGRA) 180 MG tablet Take 1 tablet (180 mg total) by mouth once daily. 30 tablet 0    [START ON 11/21/2022] HYDROcodone-acetaminophen (NORCO)  mg per tablet Take 1 tablet by mouth every 6 (six) hours as needed for Pain. 90 tablet 0     Current Facility-Administered Medications   Medication Dose Route Frequency Provider Last Rate Last Admin    leuprolide (LUPRON) injection 22.5 mg  22.5 mg Intramuscular Q90 Days Roland Dawn IV, MD   22.5 mg at 09/14/20 1157       Labs:  Lab Results   Component Value Date    WBC 10.69 11/02/2022    HGB 13.1 (L) 11/02/2022    HCT 41.4 11/02/2022    MCV 95 11/02/2022     11/02/2022     BMP  Lab Results   Component Value Date     11/02/2022    K 4.0 11/02/2022     11/02/2022    CO2 24 11/02/2022    BUN 14 11/02/2022    CREATININE 1.0 11/02/2022    CALCIUM 9.5 11/02/2022    ANIONGAP 11 11/02/2022    ESTGFRAFRICA >60 05/10/2022    EGFRNONAA >60 05/10/2022     Lab Results   Component Value Date    ALT 14 11/02/2022    AST 20 11/02/2022    ALKPHOS 92 11/02/2022    BILITOT 0.4 11/02/2022       Lab Results   Component Value Date    IRON 71 09/23/2020    TIBC 493 (H) 09/23/2020    FERRITIN 79 09/23/2020     Lab Results   Component Value Date    TKYJUFYU60 468 09/23/2020     Lab Results   Component Value Date    FOLATE 4.7 09/23/2020     Lab Results   Component Value Date    TSH 2.561 11/18/2021         Review of Systems   Constitutional:  Positive for fatigue. Negative for activity change, appetite change, chills, fever and unexpected weight change.   HENT: Negative.  Negative for hearing loss, mouth sores, nosebleeds, sore throat, tinnitus, trouble swallowing and voice change.    Eyes: Negative.  Negative for visual disturbance.   Respiratory:  Positive for shortness of breath. Negative for cough, chest tightness and  wheezing.    Cardiovascular: Negative.  Negative for chest pain, palpitations and leg swelling.   Gastrointestinal: Negative.  Negative for abdominal pain, anal bleeding, blood in stool, constipation, diarrhea, nausea and vomiting.   Endocrine: Negative.    Genitourinary: Negative.  Negative for frequency, hematuria and testicular pain.   Musculoskeletal:  Positive for arthralgias and back pain. Negative for gait problem and neck pain.   Skin: Negative.  Negative for color change, pallor, rash and wound.   Allergic/Immunologic: Negative.  Negative for immunocompromised state.   Neurological: Negative.  Negative for seizures, syncope, numbness and headaches.   Hematological: Negative.  Negative for adenopathy. Does not bruise/bleed easily.   Psychiatric/Behavioral: Negative.  Negative for agitation, confusion, decreased concentration, hallucinations and sleep disturbance. The patient is not nervous/anxious.    ECOG SCORE    1 - Restricted in strenuous activity-ambulatory and able to carry out work of a light nature            Objective:     Vitals:    11/17/22 0908   Resp: 16   Temp: 97.4 °F (36.3 °C)   Body mass index is 22.87 kg/m².  Wt Readings from Last 5 Encounters:   11/17/22 72.3 kg (159 lb 6.3 oz)   10/12/22 70.8 kg (156 lb)   08/24/22 71.5 kg (157 lb 10.1 oz)   08/12/22 71.3 kg (157 lb 3 oz)   08/10/22 70.5 kg (155 lb 6.8 oz)       Physical Exam  Constitutional:       General: He is not in acute distress.     Appearance: Normal appearance. He is not ill-appearing.   HENT:      Head: Normocephalic and atraumatic.      Mouth/Throat:      Pharynx: No oropharyngeal exudate or posterior oropharyngeal erythema.   Eyes:      General: No scleral icterus.     Extraocular Movements: Extraocular movements intact.      Conjunctiva/sclera: Conjunctivae normal.      Pupils: Pupils are equal, round, and reactive to light.   Cardiovascular:      Rate and Rhythm: Normal rate and regular rhythm.      Heart sounds: No murmur  "heard.    No friction rub. No gallop.   Pulmonary:      Effort: Pulmonary effort is normal. No respiratory distress.      Breath sounds: No stridor. No wheezing, rhonchi or rales.   Abdominal:      General: Bowel sounds are normal. There is no distension.      Palpations: Abdomen is soft. There is no mass.      Tenderness: There is no abdominal tenderness. There is no guarding or rebound.   Musculoskeletal:         General: Normal range of motion.      Cervical back: Normal range of motion and neck supple.      Right lower leg: No edema.      Left lower leg: No edema.   Skin:     General: Skin is warm and dry.   Neurological:      General: No focal deficit present.      Mental Status: He is alert.         Assessment:      1. Prostate cancer    2. Secondary adenocarcinoma of skeletal bone    3. Neoplasm related pain    4. Immunosuppressed due to chemotherapy    5. COPD Other emphysema           Plan:     Problem List Items Addressed This Visit          Pulmonary    COPD Other emphysema    Current Assessment & Plan     No wheezing on exam  -continue inhalers            Immunology/Multi System    Immunosuppressed due to chemotherapy    Current Assessment & Plan     Afebrile, ANC sufficient for treatment  -monitor cbc            Oncology    Secondary adenocarcinoma of skeletal bone    Overview     MRI 10/02/2017: Bony metastatic lesion in the right aspect of the T8 vertebral body without fracture.         Relevant Medications    HYDROcodone-acetaminophen (NORCO)  mg per tablet (Start on 11/21/2022)    Other Relevant Orders    CBC Auto Differential    Comprehensive Metabolic Panel    PROSTATE SPECIFIC ANTIGEN, DIAGNOSTIC    Prostate cancer - Primary    Overview     2/22/17 PSA 22.6  7/3/17 prostate biopsy: adenocarcinoma, tana 4+5  9/29/17 MRI spine : "Bony metastatic lesion in the right aspect of the T8 vertebral body without fracture."  11/27/17 abiraterone+prednisone         Current Assessment & Plan     " Metastatic, hormone sensitive prostate cancer on abiraterone+prednisone and q6 month lupron.  No new symptoms.  Chronic back pain and fatigue unchanged. Denies hot flashes.  -labs reviewed; ok to proceed with treatment  -labs q3 months and follow up q4 months.  -refill pain medication (due next week)  -next lupron dose 2/24/23 with urology         Relevant Medications    HYDROcodone-acetaminophen (NORCO)  mg per tablet (Start on 11/21/2022)    Other Relevant Orders    CBC Auto Differential    Comprehensive Metabolic Panel    PROSTATE SPECIFIC ANTIGEN, DIAGNOSTIC    Neoplasm related pain    Overview     Dr barbosa         Relevant Medications    HYDROcodone-acetaminophen (NORCO)  mg per tablet (Start on 11/21/2022)           Ned Perez MD  Hematology Oncology    This is my first time meeting the patient.  I, Dr. Perez, personally spent 45 minutes during this encounter.  Time includes face-to-face time and direct counseling and/or coordination of care, and non-face-to-face time including review of results including labs and imaging, documentation, orders, and scheduling.

## 2022-11-17 NOTE — ASSESSMENT & PLAN NOTE
Metastatic, hormone sensitive prostate cancer on abiraterone+prednisone and q6 month lupron.  No new symptoms.  Chronic back pain and fatigue unchanged. Denies hot flashes.  -labs reviewed; ok to proceed with treatment  -labs q3 months and follow up q4 months.  -refill pain medication (due next week)  -next lupron dose 2/24/23 with urology   Jaundice,

## 2022-11-21 ENCOUNTER — TELEPHONE (OUTPATIENT)
Dept: HEMATOLOGY/ONCOLOGY | Facility: CLINIC | Age: 67
End: 2022-11-21
Payer: MEDICARE

## 2022-11-21 DIAGNOSIS — C79.51 SECONDARY ADENOCARCINOMA OF SKELETAL BONE: ICD-10-CM

## 2022-11-21 DIAGNOSIS — C61 PROSTATE CANCER: ICD-10-CM

## 2022-11-21 DIAGNOSIS — G89.3 NEOPLASM RELATED PAIN: ICD-10-CM

## 2022-11-21 RX ORDER — HYDROCODONE BITARTRATE AND ACETAMINOPHEN 10; 325 MG/1; MG/1
1 TABLET ORAL EVERY 6 HOURS PRN
Qty: 90 TABLET | Refills: 0 | OUTPATIENT
Start: 2022-11-21

## 2022-11-21 NOTE — TELEPHONE ENCOUNTER
----- Message from Oralia Powell sent at 11/21/2022  8:06 AM CST -----  Regarding: refill  Contact: patient  [phone]     Type:  RX Refill Request    Who Called: patient  Refill or New Rx:refill  RX Name and Strength:Hydrocodone-10mg  How is the patient currently taking it? (ex. 1XDay):  Is this a 30 day or 90 day RX:  Preferred Pharmacy with phone number:[facility]  Local or Mail Order:local  Ordering Provider:Dr Perez  Would the patient rather a call back or a response via [facility]? call  Best Call Back Number:[phone]   Additional Information:

## 2022-11-29 ENCOUNTER — SPECIALTY PHARMACY (OUTPATIENT)
Dept: PHARMACY | Facility: CLINIC | Age: 67
End: 2022-11-29
Payer: MEDICARE

## 2022-11-29 NOTE — TELEPHONE ENCOUNTER
Patient Diagnosis   C61 - Prostate cancer    Joey Jacobo is a 67 y.o. male, who is followed by the specialty pharmacy service for management and education of his Zytiga.  He has been on therapy with Zytiga for 5 years.  I have reviewed his electronic medical record and current medication list and determined that specialty medication adjustment Is not needed at this time.    Patient has not experienced adverse events.  He Is adherent reporting 0 missed doses since last review.  Adherence has been encouraged with the following mechanism(s): 1.  He is meeting goals of therapy and will continue treatment.        11/14/2022 10/13/2022 9/8/2022 8/10/2022 7/11/2022 6/9/2022 5/11/2022   Follow Up Review   # of missed doses 0 0 0 0 0 0 0   New Medications? No No No No No No No   New Conditions? No No No No No No No   New Allergies? No No No No No No No   Med Effective? Good Excellent Good Good Good Good Good   Urgent Care? No No No No No No No   Requested Pharmacist? No No No No No No No       Multiple values from one day are sorted in reverse-chronological order         Therapy is appropriate to continue.    Therapy is effective: Yes  On scale of 1 to 10, how does patient rank quality of life? (10 - Best): 10  Recommendations: none at this time.  Review Method: Patient Contact    Tasks added this encounter   No tasks added.   Tasks due within next 3 months   11/29/2022 - Clinical - Follow Up Assesement (180 day)  12/10/2022 - Refill Call (Auto Added)     Watson Monk, PharmD  [facility]  [address] [place] [id number]  Phone: [phone]  Fax: [phone]

## 2022-12-05 DIAGNOSIS — Z12.11 COLON CANCER SCREENING: Primary | ICD-10-CM

## 2022-12-12 ENCOUNTER — SPECIALTY PHARMACY (OUTPATIENT)
Dept: PHARMACY | Facility: CLINIC | Age: 67
End: 2022-12-12
Payer: MEDICARE

## 2022-12-12 DIAGNOSIS — C61 PROSTATE CANCER: ICD-10-CM

## 2022-12-12 RX ORDER — ABIRATERONE ACETATE 250 MG/1
1000 TABLET ORAL DAILY
Qty: 120 TABLET | Refills: 11 | Status: CANCELLED | OUTPATIENT
Start: 2022-12-12 | End: 2023-12-12

## 2022-12-12 NOTE — TELEPHONE ENCOUNTER
Incoming call from patient, patient states he has enough tablets until next week (did not have exact count). Informed patient we are awaiting refill approval from [facility], and will follow up with patient once we have an approval.

## 2022-12-14 DIAGNOSIS — C61 PROSTATE CANCER: ICD-10-CM

## 2022-12-14 RX ORDER — ABIRATERONE ACETATE 250 MG/1
1000 TABLET ORAL DAILY
Qty: 120 TABLET | Refills: 11 | Status: SHIPPED | OUTPATIENT
Start: 2022-12-14 | End: 2022-12-14

## 2022-12-14 RX ORDER — ABIRATERONE ACETATE 250 MG/1
1000 TABLET ORAL DAILY
Qty: 120 TABLET | Refills: 11 | Status: SHIPPED | OUTPATIENT
Start: 2022-12-14 | End: 2023-12-18 | Stop reason: SDUPTHER

## 2022-12-14 NOTE — TELEPHONE ENCOUNTER
Specialty Pharmacy - Refill Coordination    Specialty Medication Orders Linked to Encounter      Flowsheet Row Most Recent Value   Medication #1 abiraterone (ZYTIGA) 250 mg Tab (Order#[id number], Rx#[id number])            Refill Questions - Documented Responses      Flowsheet Row Most Recent Value   Patient Availability and HIPAA Verification    Does patient want to proceed with activity? Yes   HIPAA/medical authority confirmed? Yes   Relationship to patient of person spoken to? Self   Refill Screening Questions    Changes to allergies? No   Changes to medications? No   New conditions since last clinic visit? No   Unplanned office visit, urgent care, ED, or hospital admission in the last 4 weeks? No   How does patient/caregiver feel medication is working? Good   Financial problems or insurance changes? No   How many doses of your specialty medications were missed in the last 4 weeks? 0   Would patient like to speak to a pharmacist? No   When does the patient need to receive the medication? 12/17/22   Refill Delivery Questions    How will the patient receive the medication? MEDRx   When does the patient need to receive the medication? 12/17/22   Shipping Address Home   Address in [facility] confirmed and updated if neccessary? Yes   Expected Copay ($) 0   Is the patient able to afford the medication copay? Yes   Payment Method zero copay   Days supply of Refill 30   Supplies needed? No supplies needed   Refill activity completed? Yes   Refill activity plan Refill scheduled   Shipment/Pickup Date: 12/15/22            Current Outpatient Medications   Medication Sig    abiraterone (ZYTIGA) 250 mg Tab Take 4 tablets (1,000 mg total) by mouth once daily.    aspirin (ECOTRIN) 81 MG EC tablet Take by mouth. 1 Tablet, Delayed Release (E.C.) Oral Every day    atorvastatin (LIPITOR) 40 MG tablet Take 1 tablet (40 mg total) by mouth once daily.    budesonide-formoterol 160-4.5 mcg (SYMBICORT) 160-4.5 mcg/actuation HFAA  Inhale 2 puffs into the lungs.     calcium-vitamin D 600 mg(1,500mg) -400 unit Tab Take by mouth. 1 Tablet Oral Twice a day    ezetimibe (ZETIA) 10 mg tablet TAKE 1 TABLET (10 MG TOTAL) BY MOUTH ONCE DAILY.    fexofenadine (ALLEGRA) 180 MG tablet Take 1 tablet (180 mg total) by mouth once daily.    fluticasone (FLONASE) 50 mcg/actuation nasal spray 1 spray by Each Nare route once daily. (Patient taking differently: 1 spray by Each Nostril route daily as needed.)    HYDROcodone-acetaminophen (NORCO)  mg per tablet Take 1 tablet by mouth every 6 (six) hours as needed for Pain.    latanoprost 0.005 % ophthalmic solution     metoprolol succinate (TOPROL-XL) 50 MG 24 hr tablet Take 1 tablet (50 mg total) by mouth once daily.    nitroGLYCERIN (NITROSTAT) 0.4 MG SL tablet Place 1 tablet (0.4 mg total) under the tongue every 5 (five) minutes as needed for Chest pain.    pantoprazole (PROTONIX) 40 MG tablet Take 1 tablet (40 mg total) by mouth once daily.    predniSONE (DELTASONE) 5 MG tablet Take 1 tablet (5 mg total) by mouth 2 (two) times daily.    promethazine-dextromethorphan (PROMETHAZINE-DM) 6.25-15 mg/5 mL Syrp Take 5 mLs by mouth nightly as needed (Cough).    travoprost (TRAVATAN Z) 0.004 % Drop Place 1 drop into both eyes every evening. 1 Drops Ophthalmic At bedtime   Last reviewed on 11/17/2022  9:46 AM by Ned Perez MD    Review of patient's allergies indicates:   Allergen Reactions    Avelox  [moxifloxacin] Rash    Nsaids (non-steroidal anti-inflammatory drug) Other (See Comments)     dyspepsia    Last reviewed on  11/17/2022 9:46 AM by Ned Perez      Tasks added this encounter   1/9/2023 - Refill Call (Auto Added)   Tasks due within next 3 months   No tasks due.     Kathryn Leavitt, PharmD  [facility]  [address] [place] [id number]  Phone: [phone]  Fax: [phone]

## 2022-12-19 DIAGNOSIS — G89.3 NEOPLASM RELATED PAIN: ICD-10-CM

## 2022-12-19 DIAGNOSIS — C61 PROSTATE CANCER: ICD-10-CM

## 2022-12-19 DIAGNOSIS — C79.51 SECONDARY ADENOCARCINOMA OF SKELETAL BONE: ICD-10-CM

## 2022-12-19 RX ORDER — HYDROCODONE BITARTRATE AND ACETAMINOPHEN 10; 325 MG/1; MG/1
1 TABLET ORAL EVERY 6 HOURS PRN
Qty: 90 TABLET | Refills: 0 | Status: SHIPPED | OUTPATIENT
Start: 2022-12-19 | End: 2023-01-18 | Stop reason: SDUPTHER

## 2022-12-19 NOTE — TELEPHONE ENCOUNTER
----- Message from Liberty Mandel sent at 12/19/2022 10:28 AM CST -----  Pt need a refill on his HYDROcodone-acetaminophen (NORCO)  mg per tablet        [facility]  [address] [facility] [address] [address] [place] [address]  [place] [address]  Phone: [phone] Fax: [phone]        Pt can be reached at [phone] (home)

## 2022-12-27 ENCOUNTER — PES CALL (OUTPATIENT)
Dept: ADMINISTRATIVE | Facility: CLINIC | Age: 67
End: 2022-12-27
Payer: MEDICARE

## 2023-01-03 ENCOUNTER — TELEPHONE (OUTPATIENT)
Dept: ADMINISTRATIVE | Facility: CLINIC | Age: 68
End: 2023-01-03
Payer: MEDICARE

## 2023-01-06 ENCOUNTER — OFFICE VISIT (OUTPATIENT)
Dept: INTERNAL MEDICINE | Facility: CLINIC | Age: 68
End: 2023-01-06
Payer: MEDICARE

## 2023-01-06 VITALS
DIASTOLIC BLOOD PRESSURE: 68 MMHG | HEART RATE: 82 BPM | WEIGHT: 157.63 LBS | HEIGHT: 70 IN | TEMPERATURE: 99 F | BODY MASS INDEX: 22.57 KG/M2 | SYSTOLIC BLOOD PRESSURE: 124 MMHG

## 2023-01-06 DIAGNOSIS — C79.51 SECONDARY ADENOCARCINOMA OF SKELETAL BONE: ICD-10-CM

## 2023-01-06 DIAGNOSIS — M85.80 OSTEOPENIA, UNSPECIFIED LOCATION: ICD-10-CM

## 2023-01-06 DIAGNOSIS — I34.0 NONRHEUMATIC MITRAL VALVE REGURGITATION: Chronic | ICD-10-CM

## 2023-01-06 DIAGNOSIS — I20.9 AP (ANGINA PECTORIS): ICD-10-CM

## 2023-01-06 DIAGNOSIS — Z00.00 ENCOUNTER FOR PREVENTIVE HEALTH EXAMINATION: Primary | ICD-10-CM

## 2023-01-06 DIAGNOSIS — E78.2 MIXED HYPERLIPIDEMIA: Chronic | ICD-10-CM

## 2023-01-06 DIAGNOSIS — I10 ESSENTIAL HYPERTENSION: ICD-10-CM

## 2023-01-06 DIAGNOSIS — K21.9 GASTROESOPHAGEAL REFLUX DISEASE WITHOUT ESOPHAGITIS: Chronic | ICD-10-CM

## 2023-01-06 DIAGNOSIS — T45.1X5A IMMUNOSUPPRESSED DUE TO CHEMOTHERAPY: ICD-10-CM

## 2023-01-06 DIAGNOSIS — J84.10 PULMONARY FIBROSIS: ICD-10-CM

## 2023-01-06 DIAGNOSIS — I25.118 CORONARY ARTERY DISEASE OF NATIVE ARTERY OF NATIVE HEART WITH STABLE ANGINA PECTORIS: Chronic | ICD-10-CM

## 2023-01-06 DIAGNOSIS — D84.821 IMMUNOSUPPRESSED DUE TO CHEMOTHERAPY: ICD-10-CM

## 2023-01-06 DIAGNOSIS — Z79.899 IMMUNOSUPPRESSED DUE TO CHEMOTHERAPY: ICD-10-CM

## 2023-01-06 DIAGNOSIS — C61 PROSTATE CANCER: ICD-10-CM

## 2023-01-06 DIAGNOSIS — Z98.61 HISTORY OF PTCA: ICD-10-CM

## 2023-01-06 DIAGNOSIS — J43.8 OTHER EMPHYSEMA: ICD-10-CM

## 2023-01-06 DIAGNOSIS — I70.0 AORTIC ATHEROSCLEROSIS: ICD-10-CM

## 2023-01-06 DIAGNOSIS — H40.1132 PRIMARY OPEN ANGLE GLAUCOMA (POAG) OF BOTH EYES, MODERATE STAGE: ICD-10-CM

## 2023-01-06 PROCEDURE — 1100F PTFALLS ASSESS-DOCD GE2>/YR: CPT | Mod: HCNC,CPTII,S$GLB, | Performed by: NURSE PRACTITIONER

## 2023-01-06 PROCEDURE — 99999 PR PBB SHADOW E&M-EST. PATIENT-LVL IV: CPT | Mod: PBBFAC,HCNC,, | Performed by: NURSE PRACTITIONER

## 2023-01-06 PROCEDURE — 3288F FALL RISK ASSESSMENT DOCD: CPT | Mod: HCNC,CPTII,S$GLB, | Performed by: NURSE PRACTITIONER

## 2023-01-06 PROCEDURE — G0439 PPPS, SUBSEQ VISIT: HCPCS | Mod: HCNC,S$GLB,, | Performed by: NURSE PRACTITIONER

## 2023-01-06 PROCEDURE — 3008F BODY MASS INDEX DOCD: CPT | Mod: HCNC,CPTII,S$GLB, | Performed by: NURSE PRACTITIONER

## 2023-01-06 PROCEDURE — 3288F PR FALLS RISK ASSESSMENT DOCUMENTED: ICD-10-PCS | Mod: HCNC,CPTII,S$GLB, | Performed by: NURSE PRACTITIONER

## 2023-01-06 PROCEDURE — 90694 FLU VACCINE - QUADRIVALENT - ADJUVANTED: ICD-10-PCS | Mod: HCNC,S$GLB,, | Performed by: NURSE PRACTITIONER

## 2023-01-06 PROCEDURE — 1160F RVW MEDS BY RX/DR IN RCRD: CPT | Mod: HCNC,CPTII,S$GLB, | Performed by: NURSE PRACTITIONER

## 2023-01-06 PROCEDURE — G0008 FLU VACCINE - QUADRIVALENT - ADJUVANTED: ICD-10-PCS | Mod: HCNC,S$GLB,, | Performed by: NURSE PRACTITIONER

## 2023-01-06 PROCEDURE — G9919 PR SCREENING AND POSITIVE: ICD-10-PCS | Mod: HCNC,CPTII,S$GLB, | Performed by: NURSE PRACTITIONER

## 2023-01-06 PROCEDURE — 3078F DIAST BP <80 MM HG: CPT | Mod: HCNC,CPTII,S$GLB, | Performed by: NURSE PRACTITIONER

## 2023-01-06 PROCEDURE — 1159F MED LIST DOCD IN RCRD: CPT | Mod: HCNC,CPTII,S$GLB, | Performed by: NURSE PRACTITIONER

## 2023-01-06 PROCEDURE — 3074F SYST BP LT 130 MM HG: CPT | Mod: HCNC,CPTII,S$GLB, | Performed by: NURSE PRACTITIONER

## 2023-01-06 PROCEDURE — 1170F PR FUNCTIONAL STATUS ASSESSED: ICD-10-PCS | Mod: HCNC,CPTII,S$GLB, | Performed by: NURSE PRACTITIONER

## 2023-01-06 PROCEDURE — 1160F PR REVIEW ALL MEDS BY PRESCRIBER/CLIN PHARMACIST DOCUMENTED: ICD-10-PCS | Mod: HCNC,CPTII,S$GLB, | Performed by: NURSE PRACTITIONER

## 2023-01-06 PROCEDURE — G0008 ADMIN INFLUENZA VIRUS VAC: HCPCS | Mod: HCNC,S$GLB,, | Performed by: NURSE PRACTITIONER

## 2023-01-06 PROCEDURE — 90694 VACC AIIV4 NO PRSRV 0.5ML IM: CPT | Mod: HCNC,S$GLB,, | Performed by: NURSE PRACTITIONER

## 2023-01-06 PROCEDURE — 99999 PR PBB SHADOW E&M-EST. PATIENT-LVL IV: ICD-10-PCS | Mod: PBBFAC,HCNC,, | Performed by: NURSE PRACTITIONER

## 2023-01-06 PROCEDURE — 1159F PR MEDICATION LIST DOCUMENTED IN MEDICAL RECORD: ICD-10-PCS | Mod: HCNC,CPTII,S$GLB, | Performed by: NURSE PRACTITIONER

## 2023-01-06 PROCEDURE — G9919 SCRN ND POS ND PROV OF REC: HCPCS | Mod: HCNC,CPTII,S$GLB, | Performed by: NURSE PRACTITIONER

## 2023-01-06 PROCEDURE — G0439 PR MEDICARE ANNUAL WELLNESS SUBSEQUENT VISIT: ICD-10-PCS | Mod: HCNC,S$GLB,, | Performed by: NURSE PRACTITIONER

## 2023-01-06 PROCEDURE — 3074F PR MOST RECENT SYSTOLIC BLOOD PRESSURE < 130 MM HG: ICD-10-PCS | Mod: HCNC,CPTII,S$GLB, | Performed by: NURSE PRACTITIONER

## 2023-01-06 PROCEDURE — 3008F PR BODY MASS INDEX (BMI) DOCUMENTED: ICD-10-PCS | Mod: HCNC,CPTII,S$GLB, | Performed by: NURSE PRACTITIONER

## 2023-01-06 PROCEDURE — 1170F FXNL STATUS ASSESSED: CPT | Mod: HCNC,CPTII,S$GLB, | Performed by: NURSE PRACTITIONER

## 2023-01-06 PROCEDURE — 3078F PR MOST RECENT DIASTOLIC BLOOD PRESSURE < 80 MM HG: ICD-10-PCS | Mod: HCNC,CPTII,S$GLB, | Performed by: NURSE PRACTITIONER

## 2023-01-06 PROCEDURE — 1100F PR PT FALLS ASSESS DOC 2+ FALLS/FALL W/INJURY/YR: ICD-10-PCS | Mod: HCNC,CPTII,S$GLB, | Performed by: NURSE PRACTITIONER

## 2023-01-06 NOTE — PATIENT INSTRUCTIONS
You are a candidate to receive the new shingles vaccination.  It is called Shingrix. Due to medicare rules we can not give it to you in the clinic.  You will need to get it at the pharmacy.  You can check with your local pharmacy to arrange to get the vaccination.  It is a 2 shot series.  You will get the first shot and then a second shot between 2 and 6 months later.    The colonoscopy scheduling number is [phone]. Please call so they can assist in getting your procedure scheduled.Your colonoscopy order has been placed.      Counseling and Referral of Other Preventative  (Italic type indicates deductible and co-insurance are waived)    Patient Name: Joey Jacobo  Today's Date: 1/6/2023    Health Maintenance       Date Due Completion Date    Shingles Vaccine (1 of 2) Never done ---    Colorectal Cancer Screening 03/21/2021 3/21/2016    Override on 11/6/2006: Done ([facility]- polyps)    COVID-19 Vaccine (4 - Booster for Moderna series) 02/02/2022 12/8/2021    Abdominal Aortic Aneurysm Screening Never done ---    Influenza Vaccine (1) 09/01/2022 11/23/2020    Override on 3/11/2020: Declined    Lipid Panel 11/04/2022 11/4/2021    High Dose Statin 11/17/2023 11/17/2022    Aspirin/Antiplatelet Therapy 11/17/2023 11/17/2022    Pneumococcal Vaccines (Age 65+) (4 - PPSV23 if available, else PCV20) 11/23/2025 11/23/2020    DEXA Scan 10/21/2026 10/21/2021    Override on 8/18/2008: Done (REPEAT 1-2 YRS)    Override on 8/16/2007: Done    TETANUS VACCINE 04/28/2030 4/28/2020        No orders of the defined types were placed in this encounter.      The following information is provided to all patients.  This information is to help you find resources for any of the problems found today that may be affecting your health:                Living healthy guide: www.[facility].[place].gov      Understanding Diabetes: www.diabetes.org      Eating healthy: www.cdc.gov/healthyweight      [facility] home safety checklist:  www.cdc.gov/steadi/patient.html      Agency on Aging: www.goea.[place].[facility]      Alcoholics anonymous (AA): www.aa.org      Physical Activity: www.lexi.nih.gov/mx2ciay      Tobacco use: www.quitwithusla.org

## 2023-01-06 NOTE — PROGRESS NOTES
"  Joey Jacobo presented for a  Medicare AWV and comprehensive Health Risk Assessment today. The following components were reviewed and updated:    Medical history  Family History  Social history  Allergies and Current Medications  Health Risk Assessment  Health Maintenance  Care Team     Patient Care Team:  Cassandra Brand MD as PCP - General (Family Medicine)  Jaswant Morrow OD as Consulting Physician (Optometry)  Brad Islas MD as Consulting Physician (Interventional Cardiology)  Lydia Paniagua MD (Urology)  John Chapin DO as Consulting Physician (Orthopedic Surgery)  Ned Perez MD as Consulting Physician (Hematology and Oncology)  Elizabeth Lejeune, NP as Nurse Practitioner (Pulmonary Disease)     ** See Completed Assessments for Annual Wellness Visit within the encounter summary.**         The following assessments were completed:  Living Situation  CAGE  Depression Screening  Timed Get Up and Go  Whisper Test  Cognitive Function Screening  Nutrition Screening  ADL Screening  PAQ Screening        Vitals:    01/06/23 1111   BP: 124/68   Pulse: 82   Temp: 98.6 °F (37 °C)   TempSrc: Temporal   Weight: 71.5 kg (157 lb 10.1 oz)   Height: 5' 10" (1.778 m)     Body mass index is 22.62 kg/m².  Physical Exam  Vitals and nursing note reviewed.   Constitutional:       General: He is not in acute distress.     Appearance: He is well-developed. He is not diaphoretic.   HENT:      Head: Normocephalic and atraumatic.   Cardiovascular:      Rate and Rhythm: Normal rate and regular rhythm.      Heart sounds: Normal heart sounds.   Pulmonary:      Effort: Pulmonary effort is normal. No respiratory distress.      Breath sounds: Normal breath sounds.   Skin:     General: Skin is warm and dry.      Findings: No rash.   Neurological:      Mental Status: He is alert and oriented to person, place, and time.   Psychiatric:         Behavior: Behavior normal.         Thought Content: Thought content normal.         Judgment: " Judgment normal.               Diagnoses and health risks identified today and associated recommendations/orders:    1. Encounter for preventive health examination  You are a candidate to receive the new shingles vaccination.  It is called Shingrix. Due to medicare rules we can not give it to you in the clinic.  You will need to get it at the pharmacy.  You can check with your local pharmacy to arrange to get the vaccination.  It is a 2 shot series.  You will get the first shot and then a second shot between 2 and 6 months later.    2. Prostate cancer  Stable. Followed by hematology and urology; on lupron, zytiga and prednisone. Continue treatment plan as prescribed by your Primary Care Physician and urologist and follow up with them for further management.    3. Secondary adenocarcinoma of skeletal bone  Stable. On hydrocodone. Continue treatment plan as prescribed by your Primary Care Physician and hematologist/oncologist and follow up with them for further management.    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 Followed by hematology and urology; on lupron, zytiga and prednisone. Continue treatment plan as prescribed by your Primary Care Physician and urologist and follow up with them for further management.    5. Coronary artery disease of native artery of native heart with stable angina pectoris  Stable. On asa and statin. Continue treatment plan as prescribed by your Primary Care Physician and Cardiologist and follow up with them for further management.    6. AP (angina pectoris)  Stable. Has nitroglycerin. Continue treatment plan as prescribed by your Primary Care Physician and Cardiologist and follow up with them for further management.    7. Aortic atherosclerosis  Stable. Ct 7/24/17. Continue treatment plan as prescribed by your Primary Care Physician and Cardiologist and follow up with them for further management.    8. COPD Other emphysema  Stable. On symbicort. Due for pulm follow up. Continue  treatment plan as prescribed by your Primary Care Physician and Pulmonologist and follow up with them for further management.    9. Pulmonary fibrosis  Stable. On symbicort. Due for pulm follow up. Continue treatment plan as prescribed by your Primary Care Physician and Pulmonologist and follow up with them for further management.    10. Primary open angle glaucoma (POAG) of both eyes, moderate stage  Stable. On latanoprost. Continue treatment plan as prescribed by your Primary Care Physician and eye care team and follow up with them for further management.    11. Nonrheumatic mitral valve regurgitation  Stable. Echo 4/30/18. Continue treatment plan as prescribed by your Primary Care Physician and Cardiologist and follow up with them for further management.    12. Mixed hyperlipidemia  Stable. On statin. Continue treatment plan as prescribed by your Primary Care Physician and Cardiologist and follow up with them for further management.    13. History of PTCA  Stable. Continue treatment plan as prescribed by your Primary Care Physician and Cardiologist and follow up with them for further management.    14. Essential hypertension  Stable. On toprol. Continue treatment plan as prescribed by your Primary Care Physician and follow up with Primary Care Physician for further management.    15. Gastroesophageal reflux disease without esophagitis  Stable. On protonix. Continue treatment plan as prescribed by your Primary Care Physician and follow up with Primary Care Physician for further management.    16. Osteopenia, unspecified location  Stable. On calcium and vitamin d. Dexa 10/21/21. Continue treatment plan as prescribed by your Primary Care Physician and follow up with Primary Care Physician for further management.    Review for Opioid Screening: Patient has chronic pain secondary to bone metastasis. He is prescribed hydrocodone to use prn.  Review for Substance Use Disorders: Patient does not use  substance.      Provided Ray with a 5-10 year written screening schedule and personal prevention plan. Recommendations were developed using the USPSTF age appropriate recommendations. Education, counseling, and referrals were provided as needed. After Visit Summary printed and given to patient which includes a list of additional screenings\tests needed.    Follow up in about 1 year (around 1/6/2024) for HRA and February with Dr. Brand.    Shanita Sandoval, FNP-C    I offered to discuss advanced care planning, including how to pick a person who would make decisions for you if you were unable to make them for yourself, called a health care power of , and what kind of decisions you might make such as use of life sustaining treatments such as ventilators and tube feeding when faced with a life limiting illness recorded on a living will that they will need to know. (How you want to be cared for as you near the end of your natural life)     X Patient is interested in learning more about how to make advanced directives.  I provided them paperwork and offered to discuss this with them.

## 2023-01-09 ENCOUNTER — HOSPITAL ENCOUNTER (OUTPATIENT)
Dept: PREADMISSION TESTING | Facility: HOSPITAL | Age: 68
Discharge: HOME | End: 2023-01-09
Payer: MEDICARE

## 2023-01-09 DIAGNOSIS — Z12.11 COLON CANCER SCREENING: ICD-10-CM

## 2023-01-10 ENCOUNTER — SPECIALTY PHARMACY (OUTPATIENT)
Dept: PHARMACY | Facility: CLINIC | Age: 68
End: 2023-01-10
Payer: MEDICARE

## 2023-01-10 NOTE — TELEPHONE ENCOUNTER
Specialty Pharmacy - Refill Coordination    Specialty Medication Orders Linked to Encounter      Flowsheet Row Most Recent Value   Medication #1 abiraterone (ZYTIGA) 250 mg Tab (Order#[id number], Rx#[id number])            Refill Questions - Documented Responses      Flowsheet Row Most Recent Value   Patient Availability and HIPAA Verification    Does patient want to proceed with activity? Yes   HIPAA/medical authority confirmed? Yes   Relationship to patient of person spoken to? Self   Refill Screening Questions    Changes to allergies? No   Changes to medications? No   New conditions since last clinic visit? No   Unplanned office visit, urgent care, ED, or hospital admission in the last 4 weeks? No   How does patient/caregiver feel medication is working? Good   Financial problems or insurance changes? No   How many doses of your specialty medications were missed in the last 4 weeks? 0   Would patient like to speak to a pharmacist? No   When does the patient need to receive the medication? 01/14/23   Refill Delivery Questions    How will the patient receive the medication? [facility]   When does the patient need to receive the medication? 01/14/23   Shipping Address Home   Address in [facility] confirmed and updated if neccessary? Yes   Expected Copay ($) 0   Is the patient able to afford the medication copay? Yes   Payment Method zero copay   Days supply of Refill 30   Supplies needed? No supplies needed   Refill activity completed? Yes   Refill activity plan Refill scheduled   Shipment/Pickup Date: 01/11/23            Current Outpatient Medications   Medication Sig    abiraterone (ZYTIGA) 250 mg Tab Take 4 tablets (1,000 mg total) by mouth once daily.    aspirin (ECOTRIN) 81 MG EC tablet Take by mouth. 1 Tablet, Delayed Release (E.C.) Oral Every day    atorvastatin (LIPITOR) 40 MG tablet Take 1 tablet (40 mg total) by mouth once daily.    budesonide-formoterol 160-4.5 mcg (SYMBICORT) 160-4.5 mcg/actuation HFAA  Inhale 2 puffs into the lungs.     calcium-vitamin D 600 mg(1,500mg) -400 unit Tab Take by mouth. 1 Tablet Oral Twice a day    ezetimibe (ZETIA) 10 mg tablet TAKE 1 TABLET (10 MG TOTAL) BY MOUTH ONCE DAILY.    fexofenadine (ALLEGRA) 180 MG tablet Take 1 tablet (180 mg total) by mouth once daily.    fluticasone (FLONASE) 50 mcg/actuation nasal spray 1 spray by Each Nare route once daily. (Patient taking differently: 1 spray by Each Nostril route daily as needed.)    HYDROcodone-acetaminophen (NORCO)  mg per tablet Take 1 tablet by mouth every 6 (six) hours as needed for Pain.    latanoprost 0.005 % ophthalmic solution     metoprolol succinate (TOPROL-XL) 50 MG 24 hr tablet Take 1 tablet (50 mg total) by mouth once daily.    nitroGLYCERIN (NITROSTAT) 0.4 MG SL tablet Place 1 tablet (0.4 mg total) under the tongue every 5 (five) minutes as needed for Chest pain.    pantoprazole (PROTONIX) 40 MG tablet Take 1 tablet (40 mg total) by mouth once daily.    predniSONE (DELTASONE) 5 MG tablet Take 1 tablet (5 mg total) by mouth 2 (two) times daily.    promethazine-dextromethorphan (PROMETHAZINE-DM) 6.25-15 mg/5 mL Syrp Take 5 mLs by mouth nightly as needed (Cough).    travoprost (TRAVATAN Z) 0.004 % Drop Place 1 drop into both eyes every evening. 1 Drops Ophthalmic At bedtime   Last reviewed on 1/6/2023 12:04 PM by SAMANTA Murphy-C    Review of patient's allergies indicates:   Allergen Reactions    Avelox  [moxifloxacin] Rash    Nsaids (non-steroidal anti-inflammatory drug) Other (See Comments)     dyspepsia    Last reviewed on  1/6/2023 12:04 PM by Shanita Sandoval      Tasks added this encounter   2/6/2023 - Refill Call (Auto Added)   Tasks due within next 3 months   No tasks due.     Ernestine Major [facility]  [address] [place] [id number]  Phone: [phone]  Fax: [phone]

## 2023-01-12 NOTE — TELEPHONE ENCOUNTER
Incoming call from pt regarding Zytiga delivery.  Pt stated he has not received his delivery yet and is not home.  Confirmed with pt that delivery is set up for today and [facility] has until 8 pm to deliver.  Pt voiced understanding.  Pt stated to let drive know to leave his delivery on ramp at the back door if he is not home.  Informing fullfillment team to inform [facility] .

## 2023-01-18 DIAGNOSIS — C61 PROSTATE CANCER: ICD-10-CM

## 2023-01-18 DIAGNOSIS — C79.51 SECONDARY ADENOCARCINOMA OF SKELETAL BONE: ICD-10-CM

## 2023-01-18 DIAGNOSIS — G89.3 NEOPLASM RELATED PAIN: ICD-10-CM

## 2023-01-18 RX ORDER — PREDNISONE 5 MG/1
5 TABLET ORAL 2 TIMES DAILY
Qty: 60 TABLET | Refills: 1 | Status: SHIPPED | OUTPATIENT
Start: 2023-01-18 | End: 2023-04-20 | Stop reason: SDUPTHER

## 2023-01-18 RX ORDER — HYDROCODONE BITARTRATE AND ACETAMINOPHEN 10; 325 MG/1; MG/1
1 TABLET ORAL EVERY 6 HOURS PRN
Qty: 90 TABLET | Refills: 0 | Status: SHIPPED | OUTPATIENT
Start: 2023-01-18 | End: 2023-02-15

## 2023-01-18 NOTE — TELEPHONE ENCOUNTER
----- Message from Avani Alvarez sent at 1/18/2023  6:57 AM CST -----  Type:  RX Refill Request    Who Called: patient  Refill or New Rx:refill  RX Name and Strength:Hydrocodone and Prednisone     How is the patient currently taking it? (ex. 1XDay):na  Is this a 30 day or 90 day RX:na  Preferred Pharmacy with phone number:[facility]  Local or Mail Order:local  Ordering Provider:Dr Samano  Would the patient rather a call back or a response via [facility]? Call back  Best Call Back Number:[phone]  Additional Information: lee ann

## 2023-02-09 ENCOUNTER — SPECIALTY PHARMACY (OUTPATIENT)
Dept: PHARMACY | Facility: CLINIC | Age: 68
End: 2023-02-09
Payer: MEDICARE

## 2023-02-09 NOTE — TELEPHONE ENCOUNTER
Specialty Pharmacy - Refill Coordination    Specialty Medication Orders Linked to Encounter      Flowsheet Row Most Recent Value   Medication #1 abiraterone (ZYTIGA) 250 mg Tab (Order#[id number], Rx#[id number])            Refill Questions - Documented Responses      Flowsheet Row Most Recent Value   Patient Availability and HIPAA Verification    Does patient want to proceed with activity? Yes   HIPAA/medical authority confirmed? Yes   Relationship to patient of person spoken to? Self   Refill Screening Questions    Changes to allergies? No   Changes to medications? No   New conditions since last clinic visit? No   Unplanned office visit, urgent care, ED, or hospital admission in the last 4 weeks? No   How does patient/caregiver feel medication is working? Good   Financial problems or insurance changes? No   How many doses of your specialty medications were missed in the last 4 weeks? 0   Would patient like to speak to a pharmacist? No   When does the patient need to receive the medication? 02/15/23   Refill Delivery Questions    How will the patient receive the medication? [facility]   When does the patient need to receive the medication? 02/15/23   Shipping Address Prescription   Address in [facility] confirmed and updated if neccessary? Yes   Expected Copay ($) 0   Is the patient able to afford the medication copay? Yes   Payment Method zero copay   Days supply of Refill 30   Supplies needed? No supplies needed   Refill activity completed? Yes   Refill activity plan Refill scheduled   Shipment/Pickup Date: 02/13/23            Current Outpatient Medications   Medication Sig    abiraterone (ZYTIGA) 250 mg Tab Take 4 tablets (1,000 mg total) by mouth once daily.    aspirin (ECOTRIN) 81 MG EC tablet Take by mouth. 1 Tablet, Delayed Release (E.C.) Oral Every day    atorvastatin (LIPITOR) 40 MG tablet Take 1 tablet (40 mg total) by mouth once daily.    budesonide-formoterol 160-4.5 mcg (SYMBICORT) 160-4.5  mcg/actuation HFAA Inhale 2 puffs into the lungs.     calcium-vitamin D 600 mg(1,500mg) -400 unit Tab Take by mouth. 1 Tablet Oral Twice a day    ezetimibe (ZETIA) 10 mg tablet TAKE 1 TABLET (10 MG TOTAL) BY MOUTH ONCE DAILY.    fexofenadine (ALLEGRA) 180 MG tablet Take 1 tablet (180 mg total) by mouth once daily.    fluticasone (FLONASE) 50 mcg/actuation nasal spray 1 spray by Each Nare route once daily. (Patient taking differently: 1 spray by Each Nostril route daily as needed.)    HYDROcodone-acetaminophen (NORCO)  mg per tablet Take 1 tablet by mouth every 6 (six) hours as needed for Pain.    latanoprost 0.005 % ophthalmic solution     metoprolol succinate (TOPROL-XL) 50 MG 24 hr tablet Take 1 tablet (50 mg total) by mouth once daily.    nitroGLYCERIN (NITROSTAT) 0.4 MG SL tablet Place 1 tablet (0.4 mg total) under the tongue every 5 (five) minutes as needed for Chest pain.    pantoprazole (PROTONIX) 40 MG tablet Take 1 tablet (40 mg total) by mouth once daily.    predniSONE (DELTASONE) 5 MG tablet Take 1 tablet (5 mg total) by mouth 2 (two) times daily.    promethazine-dextromethorphan (PROMETHAZINE-DM) 6.25-15 mg/5 mL Syrp Take 5 mLs by mouth nightly as needed (Cough).    travoprost (TRAVATAN Z) 0.004 % Drop Place 1 drop into both eyes every evening. 1 Drops Ophthalmic At bedtime   Last reviewed on 1/6/2023 12:04 PM by ZANE MurphyP-C    Review of patient's allergies indicates:   Allergen Reactions    Avelox  [moxifloxacin] Rash    Nsaids (non-steroidal anti-inflammatory drug) Other (See Comments)     dyspepsia    Last reviewed on  1/6/2023 12:04 PM by Shanita Sandoval      Tasks added this encounter   No tasks added.   Tasks due within next 3 months   2/6/2023 - Refill Call (Auto Added)     Nicole Paez [facility]  [address] [place] [id number]  Phone: [phone]  Fax: [phone]

## 2023-02-14 ENCOUNTER — TELEPHONE (OUTPATIENT)
Dept: HEMATOLOGY/ONCOLOGY | Facility: CLINIC | Age: 68
End: 2023-02-14
Payer: MEDICARE

## 2023-02-14 DIAGNOSIS — C61 PROSTATE CANCER: ICD-10-CM

## 2023-02-14 DIAGNOSIS — C79.51 SECONDARY ADENOCARCINOMA OF SKELETAL BONE: ICD-10-CM

## 2023-02-14 DIAGNOSIS — G89.3 NEOPLASM RELATED PAIN: ICD-10-CM

## 2023-02-14 RX ORDER — HYDROCODONE BITARTRATE AND ACETAMINOPHEN 10; 325 MG/1; MG/1
1 TABLET ORAL EVERY 6 HOURS PRN
Qty: 90 TABLET | Refills: 0 | Status: CANCELLED | OUTPATIENT
Start: 2023-02-14

## 2023-02-14 NOTE — TELEPHONE ENCOUNTER
----- Message from Shena Joshua sent at 2/14/2023 10:42 AM CST -----  Contact: Joey  .Type:  RX Refill Request    Who Called: Joey  Refill or New Rx:  Refill   RX Name and Strength: HYDROcodone-acetaminophen (NORCO)  mg per tablet  How is the patient currently taking it? (ex. 1XDay):as prescribed   Is this a 30 day or 90 day RX:30  Preferred Pharmacy with phone number:.  [facility] [address] [address]  [address] [address] [address]  Phone: [phone] Fax: [phone]  Local or Mail Order:Local   Ordering Provider:Dr. Perez  Would the patient rather a call back or a response via [facility]? Call   Best Call Back Number:  [phone]

## 2023-02-15 DIAGNOSIS — C61 PROSTATE CANCER: ICD-10-CM

## 2023-02-15 DIAGNOSIS — C79.51 SECONDARY ADENOCARCINOMA OF SKELETAL BONE: ICD-10-CM

## 2023-02-15 DIAGNOSIS — G89.3 NEOPLASM RELATED PAIN: ICD-10-CM

## 2023-02-15 RX ORDER — HYDROCODONE BITARTRATE AND ACETAMINOPHEN 10; 325 MG/1; MG/1
1 TABLET ORAL EVERY 6 HOURS PRN
Qty: 90 TABLET | Refills: 0 | Status: SHIPPED | OUTPATIENT
Start: 2023-02-15 | End: 2023-03-17 | Stop reason: SDUPTHER

## 2023-02-15 NOTE — TELEPHONE ENCOUNTER
----- Message from [facility] sent at 2/15/2023  1:43 PM CST -----  Contact: Joey  Type:  RX Refill Request    Who Called: Ray   Refill or New Rx:refill   RX Name and Strength:HYDROcodone-acetaminophen (NORCO)  mg per tablet  How is the patient currently taking it? (ex. 1XDay):everyday   Is this a 30 day or 90 day RX:90 day   Preferred Pharmacy with phone number:  [facility] [address] [address]  [address] [address] [address]  Phone: [phone] Fax: [phone]  Local or Mail Order:Local  Ordering Provider:   Would the patient rather a call back or a response via [facility]? Call back   Best Call Back Number:[phone]  Additional Information:        Thanks  CF

## 2023-02-27 ENCOUNTER — OFFICE VISIT (OUTPATIENT)
Dept: INTERNAL MEDICINE | Facility: CLINIC | Age: 68
End: 2023-02-27
Payer: MEDICARE

## 2023-02-27 ENCOUNTER — LAB VISIT (OUTPATIENT)
Dept: LAB | Facility: HOSPITAL | Age: 68
End: 2023-02-27
Attending: FAMILY MEDICINE
Payer: MEDICARE

## 2023-02-27 VITALS
RESPIRATION RATE: 20 BRPM | HEART RATE: 76 BPM | WEIGHT: 151.88 LBS | OXYGEN SATURATION: 99 % | DIASTOLIC BLOOD PRESSURE: 66 MMHG | SYSTOLIC BLOOD PRESSURE: 128 MMHG | HEIGHT: 70 IN | BODY MASS INDEX: 21.74 KG/M2

## 2023-02-27 DIAGNOSIS — Z98.61 HISTORY OF PTCA: ICD-10-CM

## 2023-02-27 DIAGNOSIS — J43.8 OTHER EMPHYSEMA: ICD-10-CM

## 2023-02-27 DIAGNOSIS — R05.3 CHRONIC COUGH: ICD-10-CM

## 2023-02-27 DIAGNOSIS — I10 ESSENTIAL HYPERTENSION: Primary | ICD-10-CM

## 2023-02-27 DIAGNOSIS — C61 PROSTATE CANCER: ICD-10-CM

## 2023-02-27 DIAGNOSIS — R05.8 COUGH WITH CONGESTION OF PARANASAL SINUS: ICD-10-CM

## 2023-02-27 DIAGNOSIS — I70.0 AORTIC ATHEROSCLEROSIS: ICD-10-CM

## 2023-02-27 DIAGNOSIS — G89.3 NEOPLASM RELATED PAIN: ICD-10-CM

## 2023-02-27 DIAGNOSIS — K21.9 GASTROESOPHAGEAL REFLUX DISEASE WITHOUT ESOPHAGITIS: Chronic | ICD-10-CM

## 2023-02-27 DIAGNOSIS — J84.10 PULMONARY FIBROSIS: ICD-10-CM

## 2023-02-27 DIAGNOSIS — I10 ESSENTIAL HYPERTENSION: ICD-10-CM

## 2023-02-27 DIAGNOSIS — E78.2 MIXED HYPERLIPIDEMIA: Chronic | ICD-10-CM

## 2023-02-27 DIAGNOSIS — D84.821 IMMUNOSUPPRESSED DUE TO CHEMOTHERAPY: ICD-10-CM

## 2023-02-27 DIAGNOSIS — M85.80 OSTEOPENIA, UNSPECIFIED LOCATION: ICD-10-CM

## 2023-02-27 DIAGNOSIS — T45.1X5A IMMUNOSUPPRESSED DUE TO CHEMOTHERAPY: ICD-10-CM

## 2023-02-27 DIAGNOSIS — I25.118 CORONARY ARTERY DISEASE OF NATIVE ARTERY OF NATIVE HEART WITH STABLE ANGINA PECTORIS: Chronic | ICD-10-CM

## 2023-02-27 DIAGNOSIS — C79.51 SECONDARY ADENOCARCINOMA OF SKELETAL BONE: ICD-10-CM

## 2023-02-27 DIAGNOSIS — Z79.899 IMMUNOSUPPRESSED DUE TO CHEMOTHERAPY: ICD-10-CM

## 2023-02-27 DIAGNOSIS — Z12.11 COLON CANCER SCREENING: ICD-10-CM

## 2023-02-27 DIAGNOSIS — R09.81 COUGH WITH CONGESTION OF PARANASAL SINUS: ICD-10-CM

## 2023-02-27 PROCEDURE — 99214 PR OFFICE/OUTPT VISIT, EST, LEVL IV, 30-39 MIN: ICD-10-PCS | Mod: HCNC,S$GLB,, | Performed by: FAMILY MEDICINE

## 2023-02-27 PROCEDURE — 1159F PR MEDICATION LIST DOCUMENTED IN MEDICAL RECORD: ICD-10-PCS | Mod: HCNC,CPTII,S$GLB, | Performed by: FAMILY MEDICINE

## 2023-02-27 PROCEDURE — 1126F PR PAIN SEVERITY QUANTIFIED, NO PAIN PRESENT: ICD-10-PCS | Mod: HCNC,CPTII,S$GLB, | Performed by: FAMILY MEDICINE

## 2023-02-27 PROCEDURE — 1160F RVW MEDS BY RX/DR IN RCRD: CPT | Mod: HCNC,CPTII,S$GLB, | Performed by: FAMILY MEDICINE

## 2023-02-27 PROCEDURE — 3008F PR BODY MASS INDEX (BMI) DOCUMENTED: ICD-10-PCS | Mod: HCNC,CPTII,S$GLB, | Performed by: FAMILY MEDICINE

## 2023-02-27 PROCEDURE — 3074F PR MOST RECENT SYSTOLIC BLOOD PRESSURE < 130 MM HG: ICD-10-PCS | Mod: HCNC,CPTII,S$GLB, | Performed by: FAMILY MEDICINE

## 2023-02-27 PROCEDURE — 1159F MED LIST DOCD IN RCRD: CPT | Mod: HCNC,CPTII,S$GLB, | Performed by: FAMILY MEDICINE

## 2023-02-27 PROCEDURE — 80061 LIPID PANEL: CPT | Mod: HCNC | Performed by: FAMILY MEDICINE

## 2023-02-27 PROCEDURE — 3078F PR MOST RECENT DIASTOLIC BLOOD PRESSURE < 80 MM HG: ICD-10-PCS | Mod: HCNC,CPTII,S$GLB, | Performed by: FAMILY MEDICINE

## 2023-02-27 PROCEDURE — 3008F BODY MASS INDEX DOCD: CPT | Mod: HCNC,CPTII,S$GLB, | Performed by: FAMILY MEDICINE

## 2023-02-27 PROCEDURE — 99214 OFFICE O/P EST MOD 30 MIN: CPT | Mod: HCNC,S$GLB,, | Performed by: FAMILY MEDICINE

## 2023-02-27 PROCEDURE — 99999 PR PBB SHADOW E&M-EST. PATIENT-LVL V: CPT | Mod: PBBFAC,HCNC,, | Performed by: FAMILY MEDICINE

## 2023-02-27 PROCEDURE — 3288F FALL RISK ASSESSMENT DOCD: CPT | Mod: HCNC,CPTII,S$GLB, | Performed by: FAMILY MEDICINE

## 2023-02-27 PROCEDURE — 3074F SYST BP LT 130 MM HG: CPT | Mod: HCNC,CPTII,S$GLB, | Performed by: FAMILY MEDICINE

## 2023-02-27 PROCEDURE — 85025 COMPLETE CBC W/AUTO DIFF WBC: CPT | Mod: HCNC | Performed by: FAMILY MEDICINE

## 2023-02-27 PROCEDURE — 99999 PR PBB SHADOW E&M-EST. PATIENT-LVL V: ICD-10-PCS | Mod: PBBFAC,HCNC,, | Performed by: FAMILY MEDICINE

## 2023-02-27 PROCEDURE — 80053 COMPREHEN METABOLIC PANEL: CPT | Mod: HCNC | Performed by: FAMILY MEDICINE

## 2023-02-27 PROCEDURE — 3288F PR FALLS RISK ASSESSMENT DOCUMENTED: ICD-10-PCS | Mod: HCNC,CPTII,S$GLB, | Performed by: FAMILY MEDICINE

## 2023-02-27 PROCEDURE — 1160F PR REVIEW ALL MEDS BY PRESCRIBER/CLIN PHARMACIST DOCUMENTED: ICD-10-PCS | Mod: HCNC,CPTII,S$GLB, | Performed by: FAMILY MEDICINE

## 2023-02-27 PROCEDURE — 1101F PR PT FALLS ASSESS DOC 0-1 FALLS W/OUT INJ PAST YR: ICD-10-PCS | Mod: HCNC,CPTII,S$GLB, | Performed by: FAMILY MEDICINE

## 2023-02-27 PROCEDURE — 36415 COLL VENOUS BLD VENIPUNCTURE: CPT | Mod: HCNC | Performed by: FAMILY MEDICINE

## 2023-02-27 PROCEDURE — 3078F DIAST BP <80 MM HG: CPT | Mod: HCNC,CPTII,S$GLB, | Performed by: FAMILY MEDICINE

## 2023-02-27 PROCEDURE — 1126F AMNT PAIN NOTED NONE PRSNT: CPT | Mod: HCNC,CPTII,S$GLB, | Performed by: FAMILY MEDICINE

## 2023-02-27 PROCEDURE — 1101F PT FALLS ASSESS-DOCD LE1/YR: CPT | Mod: HCNC,CPTII,S$GLB, | Performed by: FAMILY MEDICINE

## 2023-02-27 PROCEDURE — 84443 ASSAY THYROID STIM HORMONE: CPT | Mod: HCNC | Performed by: FAMILY MEDICINE

## 2023-02-27 RX ORDER — MINERAL OIL
180 ENEMA (ML) RECTAL DAILY
Qty: 30 TABLET | Refills: 3 | Status: SHIPPED | OUTPATIENT
Start: 2023-02-27 | End: 2024-02-20

## 2023-02-27 RX ORDER — BUDESONIDE AND FORMOTEROL FUMARATE DIHYDRATE 160; 4.5 UG/1; UG/1
2 AEROSOL RESPIRATORY (INHALATION) EVERY 12 HOURS
Qty: 10.2 G | Refills: 3 | Status: SHIPPED | OUTPATIENT
Start: 2023-02-27

## 2023-02-27 NOTE — PROGRESS NOTES
"Subjective:       Patient ID: Joey Jacobo is a 67 y.o. male.    Chief Complaint: Follow-up    67-year-old  male patient with Patient Active Problem List:     Coronary artery disease     Mitral regurgitation     COPD Other emphysema     Mixed hyperlipidemia     Gastroesophageal reflux disease without esophagitis     Glaucoma (increased eye pressure)     History of PTCA     Secondary adenocarcinoma of skeletal bone     Prostate cancer     Pulmonary fibrosis     Neoplasm related pain     Abnormal ECG     Essential hypertension     AP (angina pectoris)     Immunosuppressed due to chemotherapy     Aortic atherosclerosis     Osteopenia  Here for follow-up on chronic medical conditions and reports that patient has not been using Symbicort lately, has been having persistent productive cough off and on lately for the past couple of months, reports occasional night sweats, denies any weight loss and has been followed by urology regularly for prostate cancer.  Patient secondary to COPD denies any wheezing but has been having persistent cough and has been taking over-the-counter Mucinex with no relief.  Denies any worsening acid reflux symptoms lately    Review of Systems   Constitutional:  Negative for appetite change, chills, fatigue and fever.   Eyes:  Negative for visual disturbance.   Respiratory:  Positive for cough. Negative for shortness of breath and wheezing.    Cardiovascular:  Negative for chest pain, palpitations and leg swelling.   Gastrointestinal:  Negative for abdominal pain, nausea and vomiting.   Musculoskeletal:  Negative for myalgias.   Skin:  Negative for rash.   Neurological:  Negative for headaches.   Psychiatric/Behavioral:  Negative for sleep disturbance.        /66 (BP Location: Right arm, Patient Position: Sitting, BP Method: Medium (Manual))   Pulse 76   Resp 20   Ht 5' 10" (1.778 m)   Wt 68.9 kg (151 lb 14.4 oz)   SpO2 99%   BMI 21.79 kg/m²   Objective:    "   Physical Exam  Constitutional:       Appearance: He is well-developed.   HENT:      Head: Normocephalic and atraumatic.   Cardiovascular:      Rate and Rhythm: Normal rate and regular rhythm.      Heart sounds: Normal heart sounds. No murmur heard.  Pulmonary:      Effort: Pulmonary effort is normal. No respiratory distress.      Breath sounds: Normal breath sounds. No wheezing.   Chest:      Chest wall: No tenderness.   Abdominal:      General: Bowel sounds are normal.      Palpations: Abdomen is soft.      Tenderness: There is no abdominal tenderness.   Skin:     General: Skin is warm and dry.      Findings: No rash.   Neurological:      Mental Status: He is alert and oriented to person, place, and time.   Psychiatric:         Mood and Affect: Mood normal.         Assessment/Plan:   1. Essential hypertension  -     Comprehensive Metabolic Panel; Future; Expected date: 02/27/2023  -     Lipid Panel; Future; Expected date: 02/27/2023  -     TSH; Future; Expected date: 02/27/2023  Blood pressure is stable today currently on metoprolol 50 mg daily    2. Mixed hyperlipidemia  -     Lipid Panel; Future; Expected date: 02/27/2023  Currently taking Lipitor 40 mg daily and Zetia 10 mg daily    3. Coronary artery disease of native artery of native heart with stable angina pectoris  Stable and asymptomatic currently on aspirin statins and metoprolol    4. COPD Other emphysema  -     Ambulatory referral/consult to Pulmonology; Future; Expected date: 03/06/2023  -     budesonide-formoterol 160-4.5 mcg (SYMBICORT) 160-4.5 mcg/actuation HFAA; Inhale 2 puffs into the lungs every 12 (twelve) hours.  Dispense: 10.2 g; Refill: 3  -     fexofenadine (ALLEGRA) 180 MG tablet; Take 1 tablet (180 mg total) by mouth once daily.  Dispense: 30 tablet; Refill: 3  Patient has not been using Symbicort lately  Refill has been given on Symbicort and Allegra prescribed today  Patient to follow-up with pulmonology    5. Gastroesophageal reflux  "disease without esophagitis  Stable on acid reflux medication as needed but not regularly    6. Prostate cancer  Overview:  2/22/17 PSA 22.6  7/3/17 prostate biopsy: adenocarcinoma, tana 4+5  9/29/17 MRI spine : "Bony metastatic lesion in the right aspect of the T8 vertebral body without fracture."  11/27/17 abiraterone+prednisone    Orders:  -     CBC Auto Differential; Future; Expected date: 02/27/2023  7. Secondary adenocarcinoma of skeletal bone  Overview:  MRI 10/02/2017: Bony metastatic lesion in the right aspect of the T8 vertebral body without fracture.  8. Neoplasm related pain  Clinically doing well, followed by urology and oncology      9. Pulmonary fibrosis  10. Immunosuppressed due to chemotherapy  11. Aortic atherosclerosis  Stable and asymptomatic    12. Osteopenia, unspecified location  Encouraged to take calcium with vitamin-D supplements over-the-counter    13. History of PTCA    14. Colon cancer screening  -     Ambulatory referral/consult to Endo Procedure ; Future; Expected date: 02/28/2023    15. Chronic cough  -     Ambulatory referral/consult to Pulmonology; Future; Expected date: 03/06/2023  -     QUANTIFERON GOLD TB; Future; Expected date: 02/27/2023  Will check further labs and will refer to pulmonology  Continue taking medications as prescribed    16. Cough with congestion of paranasal sinus  -     fexofenadine (ALLEGRA) 180 MG tablet; Take 1 tablet (180 mg total) by mouth once daily.  Dispense: 30 tablet; Refill: 3             "

## 2023-02-28 LAB
ALBUMIN SERPL BCP-MCNC: 3.6 G/DL (ref 3.5–5.2)
ALP SERPL-CCNC: 86 U/L (ref 55–135)
ALT SERPL W/O P-5'-P-CCNC: 20 U/L (ref 10–44)
ANION GAP SERPL CALC-SCNC: 11 MMOL/L (ref 8–16)
AST SERPL-CCNC: 21 U/L (ref 10–40)
BASOPHILS # BLD AUTO: 0.05 K/UL (ref 0–0.2)
BASOPHILS NFR BLD: 0.5 % (ref 0–1.9)
BILIRUB SERPL-MCNC: 0.6 MG/DL (ref 0.1–1)
BUN SERPL-MCNC: 18 MG/DL (ref 8–23)
CALCIUM SERPL-MCNC: 9.9 MG/DL (ref 8.7–10.5)
CHLORIDE SERPL-SCNC: 105 MMOL/L (ref 95–110)
CHOLEST SERPL-MCNC: 166 MG/DL (ref 120–199)
CHOLEST/HDLC SERPL: 3.5 {RATIO} (ref 2–5)
CO2 SERPL-SCNC: 25 MMOL/L (ref 23–29)
CREAT SERPL-MCNC: 1.2 MG/DL (ref 0.5–1.4)
DIFFERENTIAL METHOD: ABNORMAL
EOSINOPHIL # BLD AUTO: 0.1 K/UL (ref 0–0.5)
EOSINOPHIL NFR BLD: 0.5 % (ref 0–8)
ERYTHROCYTE [DISTWIDTH] IN BLOOD BY AUTOMATED COUNT: 15.4 % (ref 11.5–14.5)
EST. GFR  (NO RACE VARIABLE): >60 ML/MIN/1.73 M^2
GLUCOSE SERPL-MCNC: 105 MG/DL (ref 70–110)
HCT VFR BLD AUTO: 45.9 % (ref 40–54)
HDLC SERPL-MCNC: 48 MG/DL (ref 40–75)
HDLC SERPL: 28.9 % (ref 20–50)
HGB BLD-MCNC: 13.8 G/DL (ref 14–18)
IMM GRANULOCYTES # BLD AUTO: 0.09 K/UL (ref 0–0.04)
IMM GRANULOCYTES NFR BLD AUTO: 0.9 % (ref 0–0.5)
LDLC SERPL CALC-MCNC: 90.2 MG/DL (ref 63–159)
LYMPHOCYTES # BLD AUTO: 2.8 K/UL (ref 1–4.8)
LYMPHOCYTES NFR BLD: 28.4 % (ref 18–48)
MCH RBC QN AUTO: 29.6 PG (ref 27–31)
MCHC RBC AUTO-ENTMCNC: 30.1 G/DL (ref 32–36)
MCV RBC AUTO: 98 FL (ref 82–98)
MONOCYTES # BLD AUTO: 1 K/UL (ref 0.3–1)
MONOCYTES NFR BLD: 9.9 % (ref 4–15)
NEUTROPHILS # BLD AUTO: 5.8 K/UL (ref 1.8–7.7)
NEUTROPHILS NFR BLD: 59.8 % (ref 38–73)
NONHDLC SERPL-MCNC: 118 MG/DL
NRBC BLD-RTO: 0 /100 WBC
PLATELET # BLD AUTO: 193 K/UL (ref 150–450)
PMV BLD AUTO: 11.8 FL (ref 9.2–12.9)
POTASSIUM SERPL-SCNC: 4 MMOL/L (ref 3.5–5.1)
PROT SERPL-MCNC: 8.2 G/DL (ref 6–8.4)
RBC # BLD AUTO: 4.67 M/UL (ref 4.6–6.2)
SODIUM SERPL-SCNC: 141 MMOL/L (ref 136–145)
TRIGL SERPL-MCNC: 139 MG/DL (ref 30–150)
TSH SERPL DL<=0.005 MIU/L-ACNC: 1.34 UIU/ML (ref 0.4–4)
WBC # BLD AUTO: 9.71 K/UL (ref 3.9–12.7)

## 2023-03-01 ENCOUNTER — OFFICE VISIT (OUTPATIENT)
Dept: UROLOGY | Facility: CLINIC | Age: 68
End: 2023-03-01
Payer: MEDICARE

## 2023-03-01 VITALS
BODY MASS INDEX: 21.48 KG/M2 | DIASTOLIC BLOOD PRESSURE: 81 MMHG | HEART RATE: 85 BPM | WEIGHT: 149.69 LBS | SYSTOLIC BLOOD PRESSURE: 121 MMHG

## 2023-03-01 DIAGNOSIS — C79.51 PROSTATE CANCER METASTATIC TO BONE: Primary | ICD-10-CM

## 2023-03-01 DIAGNOSIS — C61 PROSTATE CANCER METASTATIC TO BONE: Primary | ICD-10-CM

## 2023-03-01 PROCEDURE — 1159F PR MEDICATION LIST DOCUMENTED IN MEDICAL RECORD: ICD-10-PCS | Mod: HCNC,CPTII,S$GLB, | Performed by: UROLOGY

## 2023-03-01 PROCEDURE — 99499 UNLISTED E&M SERVICE: CPT | Mod: HCNC,S$GLB,, | Performed by: UROLOGY

## 2023-03-01 PROCEDURE — 96402 CHEMO HORMON ANTINEOPL SQ/IM: CPT | Mod: HCNC,S$GLB,, | Performed by: UROLOGY

## 2023-03-01 PROCEDURE — 3074F SYST BP LT 130 MM HG: CPT | Mod: HCNC,CPTII,S$GLB, | Performed by: UROLOGY

## 2023-03-01 PROCEDURE — 3079F PR MOST RECENT DIASTOLIC BLOOD PRESSURE 80-89 MM HG: ICD-10-PCS | Mod: HCNC,CPTII,S$GLB, | Performed by: UROLOGY

## 2023-03-01 PROCEDURE — 3008F BODY MASS INDEX DOCD: CPT | Mod: HCNC,CPTII,S$GLB, | Performed by: UROLOGY

## 2023-03-01 PROCEDURE — 3079F DIAST BP 80-89 MM HG: CPT | Mod: HCNC,CPTII,S$GLB, | Performed by: UROLOGY

## 2023-03-01 PROCEDURE — 99999 PR PBB SHADOW E&M-EST. PATIENT-LVL III: CPT | Mod: PBBFAC,HCNC,, | Performed by: UROLOGY

## 2023-03-01 PROCEDURE — 1159F MED LIST DOCD IN RCRD: CPT | Mod: HCNC,CPTII,S$GLB, | Performed by: UROLOGY

## 2023-03-01 PROCEDURE — 1160F PR REVIEW ALL MEDS BY PRESCRIBER/CLIN PHARMACIST DOCUMENTED: ICD-10-PCS | Mod: HCNC,CPTII,S$GLB, | Performed by: UROLOGY

## 2023-03-01 PROCEDURE — 3074F PR MOST RECENT SYSTOLIC BLOOD PRESSURE < 130 MM HG: ICD-10-PCS | Mod: HCNC,CPTII,S$GLB, | Performed by: UROLOGY

## 2023-03-01 PROCEDURE — 96402 PR CHEMOTHER HORMON ANTINEOPL SUB-Q/IM: ICD-10-PCS | Mod: HCNC,S$GLB,, | Performed by: UROLOGY

## 2023-03-01 PROCEDURE — 1160F RVW MEDS BY RX/DR IN RCRD: CPT | Mod: HCNC,CPTII,S$GLB, | Performed by: UROLOGY

## 2023-03-01 PROCEDURE — 1125F PR PAIN SEVERITY QUANTIFIED, PAIN PRESENT: ICD-10-PCS | Mod: HCNC,CPTII,S$GLB, | Performed by: UROLOGY

## 2023-03-01 PROCEDURE — 3008F PR BODY MASS INDEX (BMI) DOCUMENTED: ICD-10-PCS | Mod: HCNC,CPTII,S$GLB, | Performed by: UROLOGY

## 2023-03-01 PROCEDURE — 99999 PR PBB SHADOW E&M-EST. PATIENT-LVL III: ICD-10-PCS | Mod: PBBFAC,HCNC,, | Performed by: UROLOGY

## 2023-03-01 PROCEDURE — 1125F AMNT PAIN NOTED PAIN PRSNT: CPT | Mod: HCNC,CPTII,S$GLB, | Performed by: UROLOGY

## 2023-03-01 PROCEDURE — 99499 NO LOS: ICD-10-PCS | Mod: HCNC,S$GLB,, | Performed by: UROLOGY

## 2023-03-01 NOTE — PROGRESS NOTES
"CC: follow up prostate cancer    History of Present Illness:     3/1/23-Here for lupron. Continues to take zytiga/prednisone. Still having hot flashes, but doing okay. Stream is good. No gross hematuria or dysuria. No bone pain.   22-has been 11 months since his last visit. States that his sister  when he was supposed to follow up. He continues to take Zytiga/prednisone and PSA remains undetectable. Good stream. No stranguria. No incontinence. Sometimes has urgency.   21- Currently managed on Zytiga/prednisone and Lupron. Reports hot flashes. Sometimes he has urinary urgency. No incontinence. No gross hematuria. No stranguria. Having bone density testing done.   3/15/21: Lupron today, PSA low.  Reviewed history in detail.   20: PSA very low.  Lupron today. Reviewed history in detail.   3/11/20: Lupron due today; will switch to 6 mo prep. Reviewed history in detail. Very active with outside work.  19:  Lupron shot today.  Reviewed history in detail. Currently on dual therapy with abariterone.   19: Lupron shot today.  Reviewed history in detail. Currently on dual therapy with abariterone.   19: Lupron shot today.  Reviewed history in detail. Currently on dual therapy with abariterone.  19: Son  in an MVA since last visit. Reviewed history in detail.  No problems from Lupron.  18: No problems from Lupron.  PSA lowest.  18: PSA today and again 3 mo.  Doing fine.  Reviewed history in detail.  18: PSA well down.  No adverse effects from Lupron.    10/18/17: Been on casodex a month back to review and start Lupron.  Dr. Moscoso felt XRT was not an option but perhaps chemotherapy could be beneficial.  17: Bone scan reports "RIGHT supraorbital location and a smaller similarly extremely intense focus of increased uptake posteriorly on the RIGHT at the T9 level.  Etiology is uncertain."  CT scan shows "A few scattered shotty mesenteric and retroperitoneal nodes " "identified.  Similar nodes identified within the pelvis bilaterally."  MRI shows left 2.8 cm prostate mass PIRADS 5, with metastatic pelvic lymphadenopathy (right pelvic sidewall node and left internal iliac chain LN).  No bony mets in pelvis.  7/18/17:  No problems from biopsy.  Gl 4+5 in the left base (30%) and a sig amount of 4+4 in other parts of the gland.  Reviewed treatment options, and imaging needs.  7/3/17: TRUS/Bx today 27 gm.  5/24/17: 63 yo man has problems with perineal/scrotal pain once or twice a month; lasts for an hour or two, some nocturia.  PSA recently elevated.  No abd/pelvic pain and no exac/rel factors.  No hematuria.  No urolithiasis.  No urinary bother.  No  history. CT with contrast earlier this year essentially normal.      Review of Systems   Constitutional:  Negative for appetite change and unexpected weight change.   Respiratory:  Negative for shortness of breath.    Cardiovascular:  Negative for chest pain.   Gastrointestinal:  Negative for abdominal pain.   Musculoskeletal:  Positive for arthralgias and back pain.   All other systems reviewed and are negative.    Current Outpatient Medications   Medication Sig Dispense Refill    abiraterone (ZYTIGA) 250 mg Tab Take 4 tablets (1,000 mg total) by mouth once daily. 120 tablet 11    aspirin (ECOTRIN) 81 MG EC tablet Take by mouth. 1 Tablet, Delayed Release (E.C.) Oral Every day      atorvastatin (LIPITOR) 40 MG tablet Take 1 tablet (40 mg total) by mouth once daily. 90 tablet 4    budesonide-formoterol 160-4.5 mcg (SYMBICORT) 160-4.5 mcg/actuation HFAA Inhale 2 puffs into the lungs every 12 (twelve) hours. 10.2 g 3    calcium-vitamin D 600 mg(1,500mg) -400 unit Tab Take by mouth. 1 Tablet Oral Twice a day      ezetimibe (ZETIA) 10 mg tablet TAKE 1 TABLET (10 MG TOTAL) BY MOUTH ONCE DAILY. 90 tablet 4    fexofenadine (ALLEGRA) 180 MG tablet Take 1 tablet (180 mg total) by mouth once daily. 30 tablet 3    fluticasone (FLONASE) 50 " mcg/actuation nasal spray 1 spray by Each Nare route once daily. (Patient taking differently: 1 spray by Each Nostril route daily as needed.) 1 Bottle 11    HYDROcodone-acetaminophen (NORCO)  mg per tablet Take 1 tablet by mouth every 6 (six) hours as needed for Pain. 90 tablet 0    latanoprost 0.005 % ophthalmic solution   3    metoprolol succinate (TOPROL-XL) 50 MG 24 hr tablet Take 1 tablet (50 mg total) by mouth once daily. 90 tablet 4    nitroGLYCERIN (NITROSTAT) 0.4 MG SL tablet Place 1 tablet (0.4 mg total) under the tongue every 5 (five) minutes as needed for Chest pain. 20 tablet 12    pantoprazole (PROTONIX) 40 MG tablet Take 1 tablet (40 mg total) by mouth once daily. 90 tablet 2    predniSONE (DELTASONE) 5 MG tablet Take 1 tablet (5 mg total) by mouth 2 (two) times daily. 60 tablet 1    travoprost (TRAVATAN Z) 0.004 % Drop Place 1 drop into both eyes every evening. 1 Drops Ophthalmic At bedtime 5 mL 12     Current Facility-Administered Medications   Medication Dose Route Frequency Provider Last Rate Last Admin    leuprolide acetate (6 month) injection 45 mg  45 mg Intramuscular 1 time in Clinic/HOD Lydia Paniagua MD           Review of patient's allergies indicates:   Allergen Reactions    Avelox  [moxifloxacin] Rash    Nsaids (non-steroidal anti-inflammatory drug) Other (See Comments)     dyspepsia       Past medical, family, and social history reviewed as documented in chart with pertinent positive medical, family, and social history detailed in HPI.    Physical Exam  Vitals:    03/01/23 0909   BP: 121/81   Pulse: 85       General: Well-developed, well-nourished, in no acute distress  HEENT: Normocephalic, atraumatic, extraocular eye movements in tact  Neck: supple, trachea midline, no cervical or supraclavicular adenopathy  Respirations: Even and unlabored  Back: Midline spine, no CVA tenderness  Abdomen: soft, Non-tender, Non-distended, no palpable masses, no rebound or  guarding  Extremities: Moves all extremities equally, atraumatic, no clubbing, cyanosis, edema  Skin: warm and dry, no lesions  Psych: normal affect  Neuro: Alert and oriented x 3. Cranial nerves II-XII grossly intact      PSA    2/16: 22.1    2/17: 22.9    1/18: 0.24    7/18: 0.01    1/19, 4/19, 8/19, 11/19, 3/20, 3/21, 9/21, 8/22, 11/22: undetect    Assessment:   1. Prostate cancer metastatic to bone  Prior authorization Order              Plan:  Prostate cancer metastatic to bone  -     Prior authorization Order    Other orders  -     leuprolide acetate (6 month) injection 45 mg      continue Zytiga/prednisone and Heme/onc follow ups.     Follow up in about 6 months (around 9/1/2023) for Lupron.

## 2023-03-10 ENCOUNTER — SPECIALTY PHARMACY (OUTPATIENT)
Dept: PHARMACY | Facility: CLINIC | Age: 68
End: 2023-03-10
Payer: MEDICARE

## 2023-03-10 NOTE — TELEPHONE ENCOUNTER
Specialty Pharmacy - Refill Coordination    Specialty Medication Orders Linked to Encounter      Flowsheet Row Most Recent Value   Medication #1 abiraterone (ZYTIGA) 250 mg Tab (Order#[id number], Rx#[id number])            Refill Questions - Documented Responses      Flowsheet Row Most Recent Value   Patient Availability and HIPAA Verification    Does patient want to proceed with activity? Yes   HIPAA/medical authority confirmed? Yes   Relationship to patient of person spoken to? Self   Refill Screening Questions    Changes to allergies? No   Changes to medications? No   New conditions since last clinic visit? No   Unplanned office visit, urgent care, ED, or hospital admission in the last 4 weeks? No   How does patient/caregiver feel medication is working? Good   Financial problems or insurance changes? No   How many doses of your specialty medications were missed in the last 4 weeks? 0   Would patient like to speak to a pharmacist? No   When does the patient need to receive the medication? 03/14/23   Refill Delivery Questions    How will the patient receive the medication? MEDRx   When does the patient need to receive the medication? 03/14/23   Shipping Address Prescription   Address in [facility] confirmed and updated if neccessary? Yes   Expected Copay ($) 0   Is the patient able to afford the medication copay? Yes   Payment Method zero copay   Days supply of Refill 30   Supplies needed? No supplies needed   Refill activity completed? Yes   Refill activity plan Refill scheduled   Shipment/Pickup Date: 03/10/23            Current Outpatient Medications   Medication Sig    abiraterone (ZYTIGA) 250 mg Tab Take 4 tablets (1,000 mg total) by mouth once daily.    aspirin (ECOTRIN) 81 MG EC tablet Take by mouth. 1 Tablet, Delayed Release (E.C.) Oral Every day    atorvastatin (LIPITOR) 40 MG tablet Take 1 tablet (40 mg total) by mouth once daily.    budesonide-formoterol 160-4.5 mcg (SYMBICORT) 160-4.5  mcg/actuation HFAA Inhale 2 puffs into the lungs every 12 (twelve) hours.    calcium-vitamin D 600 mg(1,500mg) -400 unit Tab Take by mouth. 1 Tablet Oral Twice a day    ezetimibe (ZETIA) 10 mg tablet TAKE 1 TABLET (10 MG TOTAL) BY MOUTH ONCE DAILY.    fexofenadine (ALLEGRA) 180 MG tablet Take 1 tablet (180 mg total) by mouth once daily.    fluticasone (FLONASE) 50 mcg/actuation nasal spray 1 spray by Each Nare route once daily. (Patient taking differently: 1 spray by Each Nostril route daily as needed.)    HYDROcodone-acetaminophen (NORCO)  mg per tablet Take 1 tablet by mouth every 6 (six) hours as needed for Pain.    latanoprost 0.005 % ophthalmic solution     metoprolol succinate (TOPROL-XL) 50 MG 24 hr tablet Take 1 tablet (50 mg total) by mouth once daily.    nitroGLYCERIN (NITROSTAT) 0.4 MG SL tablet Place 1 tablet (0.4 mg total) under the tongue every 5 (five) minutes as needed for Chest pain.    pantoprazole (PROTONIX) 40 MG tablet Take 1 tablet (40 mg total) by mouth once daily.    predniSONE (DELTASONE) 5 MG tablet Take 1 tablet (5 mg total) by mouth 2 (two) times daily.    travoprost (TRAVATAN Z) 0.004 % Drop Place 1 drop into both eyes every evening. 1 Drops Ophthalmic At bedtime   Last reviewed on 3/1/2023  9:36 AM by Katlyn Mckeon LPN    Review of patient's allergies indicates:   Allergen Reactions    Avelox  [moxifloxacin] Rash    Nsaids (non-steroidal anti-inflammatory drug) Other (See Comments)     dyspepsia    Last reviewed on  3/1/2023 9:35 AM by Katlyn Mckeon      Tasks added this encounter   4/6/2023 - Refill Call (Auto Added)   Tasks due within next 3 months   5/21/2023 - Clinical - Follow Up Assesement (180 day)     Flaco Gallegos, PharmD  [facility] - [facility]  [address] [place] [id number]  Phone: [phone]  Fax: [phone]

## 2023-03-16 DIAGNOSIS — G89.3 NEOPLASM RELATED PAIN: ICD-10-CM

## 2023-03-16 DIAGNOSIS — C79.51 SECONDARY ADENOCARCINOMA OF SKELETAL BONE: ICD-10-CM

## 2023-03-16 DIAGNOSIS — C61 PROSTATE CANCER: ICD-10-CM

## 2023-03-16 RX ORDER — HYDROCODONE BITARTRATE AND ACETAMINOPHEN 10; 325 MG/1; MG/1
1 TABLET ORAL EVERY 6 HOURS PRN
Qty: 90 TABLET | Refills: 0 | Status: CANCELLED | OUTPATIENT
Start: 2023-03-16

## 2023-03-17 DIAGNOSIS — C61 PROSTATE CANCER: ICD-10-CM

## 2023-03-17 DIAGNOSIS — C79.51 SECONDARY ADENOCARCINOMA OF SKELETAL BONE: Primary | ICD-10-CM

## 2023-03-17 DIAGNOSIS — G89.3 NEOPLASM RELATED PAIN: ICD-10-CM

## 2023-03-17 RX ORDER — HYDROCODONE BITARTRATE AND ACETAMINOPHEN 10; 325 MG/1; MG/1
1 TABLET ORAL EVERY 6 HOURS PRN
Qty: 90 TABLET | Refills: 0 | Status: SHIPPED | OUTPATIENT
Start: 2023-03-17 | End: 2023-04-20 | Stop reason: SDUPTHER

## 2023-03-17 NOTE — TELEPHONE ENCOUNTER
----- Message from Sánchez Lujan sent at 3/17/2023  9:09 AM CDT -----  Contact: [phone]  Type:  RX Refill Request    Who Called: Ray  Refill or New Rx: Refill  RX Name and Strength: HYDROcodone-acetaminophen (NORCO)  mg per tablet  How is the patient currently taking it? (ex. 1XDay): prn  Is this a 30 day or 90 day RX:  Preferred Pharmacy with phone number:   [facility] [address] [address]  [address] [address] [address]  Phone: [phone] Fax: [phone]  Local or Mail Order:Local  Ordering Provider:Dr. Moise  Would the patient rather a call back or a response via [facility]? Call  Best Call Back Number: [phone]  Additional Information: Patient requesting a call back after it has been sent.

## 2023-04-05 ENCOUNTER — SPECIALTY PHARMACY (OUTPATIENT)
Dept: PHARMACY | Facility: CLINIC | Age: 68
End: 2023-04-05
Payer: MEDICARE

## 2023-04-05 NOTE — TELEPHONE ENCOUNTER
Specialty Pharmacy - Refill Coordination    Specialty Medication Orders Linked to Encounter      Flowsheet Row Most Recent Value   Medication #1 abiraterone (ZYTIGA) 250 mg Tab (Order#[id number], Rx#[id number])            Refill Questions - Documented Responses      Flowsheet Row Most Recent Value   Patient Availability and HIPAA Verification    Does patient want to proceed with activity? Yes   HIPAA/medical authority confirmed? Yes   Relationship to patient of person spoken to? Self   Refill Screening Questions    Changes to allergies? No   Changes to medications? No   New conditions since last clinic visit? No   Unplanned office visit, urgent care, ED, or hospital admission in the last 4 weeks? No   How does patient/caregiver feel medication is working? Good   Financial problems or insurance changes? No   How many doses of your specialty medications were missed in the last 4 weeks? 0   Would patient like to speak to a pharmacist? No   When does the patient need to receive the medication? 04/13/23   Refill Delivery Questions    How will the patient receive the medication? MEDRx   When does the patient need to receive the medication? 04/13/23   Shipping Address Home   Address in [facility] confirmed and updated if neccessary? Yes   Expected Copay ($) 0   Is the patient able to afford the medication copay? Yes   Payment Method zero copay   Days supply of Refill 30   Supplies needed? No supplies needed   Refill activity completed? Yes   Refill activity plan Refill scheduled   Shipment/Pickup Date: 04/11/23            Current Outpatient Medications   Medication Sig    abiraterone (ZYTIGA) 250 mg Tab Take 4 tablets (1,000 mg total) by mouth once daily.    aspirin (ECOTRIN) 81 MG EC tablet Take by mouth. 1 Tablet, Delayed Release (E.C.) Oral Every day    atorvastatin (LIPITOR) 40 MG tablet Take 1 tablet (40 mg total) by mouth once daily.    budesonide-formoterol 160-4.5 mcg (SYMBICORT) 160-4.5 mcg/actuation HFAA  Inhale 2 puffs into the lungs every 12 (twelve) hours.    calcium-vitamin D 600 mg(1,500mg) -400 unit Tab Take by mouth. 1 Tablet Oral Twice a day    ezetimibe (ZETIA) 10 mg tablet TAKE 1 TABLET (10 MG TOTAL) BY MOUTH ONCE DAILY.    fexofenadine (ALLEGRA) 180 MG tablet Take 1 tablet (180 mg total) by mouth once daily.    fluticasone (FLONASE) 50 mcg/actuation nasal spray 1 spray by Each Nare route once daily. (Patient taking differently: 1 spray by Each Nostril route daily as needed.)    HYDROcodone-acetaminophen (NORCO)  mg per tablet Take 1 tablet by mouth every 6 (six) hours as needed for Pain.    latanoprost 0.005 % ophthalmic solution     metoprolol succinate (TOPROL-XL) 50 MG 24 hr tablet Take 1 tablet (50 mg total) by mouth once daily.    nitroGLYCERIN (NITROSTAT) 0.4 MG SL tablet Place 1 tablet (0.4 mg total) under the tongue every 5 (five) minutes as needed for Chest pain.    pantoprazole (PROTONIX) 40 MG tablet Take 1 tablet (40 mg total) by mouth once daily.    predniSONE (DELTASONE) 5 MG tablet Take 1 tablet (5 mg total) by mouth 2 (two) times daily.    travoprost (TRAVATAN Z) 0.004 % Drop Place 1 drop into both eyes every evening. 1 Drops Ophthalmic At bedtime   Last reviewed on 3/1/2023  9:36 AM by Katlyn Mckeon LPN    Review of patient's allergies indicates:   Allergen Reactions    Avelox  [moxifloxacin] Rash    Nsaids (non-steroidal anti-inflammatory drug) Other (See Comments)     dyspepsia    Last reviewed on  3/17/2023 4:46 PM by Suma Palacios      Tasks added this encounter   5/6/2023 - Refill Call (Auto Added)   Tasks due within next 3 months   5/21/2023 - Clinical - Follow Up Assesement (180 day)     Radha Hardwick, Patient Care Assistant  [facility]  [address] [place] [address]  Phone: [phone]  Fax: [phone]

## 2023-04-20 ENCOUNTER — TELEPHONE (OUTPATIENT)
Dept: INTERNAL MEDICINE | Facility: CLINIC | Age: 68
End: 2023-04-20
Payer: MEDICARE

## 2023-04-20 DIAGNOSIS — G89.3 NEOPLASM RELATED PAIN: ICD-10-CM

## 2023-04-20 DIAGNOSIS — C61 PROSTATE CANCER: ICD-10-CM

## 2023-04-20 DIAGNOSIS — C79.51 SECONDARY ADENOCARCINOMA OF SKELETAL BONE: ICD-10-CM

## 2023-04-20 RX ORDER — HYDROCODONE BITARTRATE AND ACETAMINOPHEN 10; 325 MG/1; MG/1
1 TABLET ORAL EVERY 6 HOURS PRN
Qty: 90 TABLET | Refills: 0 | Status: SHIPPED | OUTPATIENT
Start: 2023-04-20 | End: 2023-05-19 | Stop reason: SDUPTHER

## 2023-04-20 RX ORDER — PREDNISONE 5 MG/1
5 TABLET ORAL 2 TIMES DAILY
Qty: 60 TABLET | Refills: 1 | Status: SHIPPED | OUTPATIENT
Start: 2023-04-20 | End: 2023-05-19 | Stop reason: SDUPTHER

## 2023-04-20 NOTE — TELEPHONE ENCOUNTER
----- Message from Cortney Pollo sent at 4/20/2023  9:10 AM CDT -----  .Type:  RX Refill Request    Who Called: .Joey Jacobo   Refill or New Rx:refill  RX Name and Strength:predniSONE (DELTASONE) 5 MG tablet  How is the patient currently taking it? (ex. 1XDay):daily  Is this a 30 day or 90 day RX:90    Preferred Pharmacy with phone number:.  [facility] [address] [address]  [address] [address] [address]  Phone: [phone] Fax: [phone]       Local or Mail Order:local  Ordering Provider:Jaswant  Would the patient rather a call back or a response via [facility]? Call back  Best Call Back Number:.[phone]   Additional Information:

## 2023-05-12 ENCOUNTER — SPECIALTY PHARMACY (OUTPATIENT)
Dept: PHARMACY | Facility: CLINIC | Age: 68
End: 2023-05-12
Payer: MEDICARE

## 2023-05-12 NOTE — TELEPHONE ENCOUNTER
Specialty Pharmacy - Refill Coordination    Specialty Medication Orders Linked to Encounter      Flowsheet Row Most Recent Value   Medication #1 abiraterone (ZYTIGA) 250 mg Tab (Order#[id number], Rx#[id number])            Refill Questions - Documented Responses      Flowsheet Row Most Recent Value   Patient Availability and HIPAA Verification    Does patient want to proceed with activity? Yes   HIPAA/medical authority confirmed? Yes   Relationship to patient of person spoken to? Self   Refill Screening Questions    Changes to allergies? No   Changes to medications? No   New conditions since last clinic visit? No   Unplanned office visit, urgent care, ED, or hospital admission in the last 4 weeks? No   How does patient/caregiver feel medication is working? Very good   Financial problems or insurance changes? No   How many doses of your specialty medications were missed in the last 4 weeks? 0   Would patient like to speak to a pharmacist? No   When does the patient need to receive the medication? 05/17/23   Refill Delivery Questions    How will the patient receive the medication? MEDRx   When does the patient need to receive the medication? 05/17/23   Shipping Address Prescription   Address in [facility] confirmed and updated if neccessary? Yes   Expected Copay ($) 0   Is the patient able to afford the medication copay? Yes   Payment Method zero copay   Days supply of Refill 30   Supplies needed? No supplies needed   Refill activity completed? Yes   Refill activity plan Refill scheduled   Shipment/Pickup Date: 05/15/23            Current Outpatient Medications   Medication Sig    abiraterone (ZYTIGA) 250 mg Tab Take 4 tablets (1,000 mg total) by mouth once daily.    aspirin (ECOTRIN) 81 MG EC tablet Take by mouth. 1 Tablet, Delayed Release (E.C.) Oral Every day    atorvastatin (LIPITOR) 40 MG tablet Take 1 tablet (40 mg total) by mouth once daily.    budesonide-formoterol 160-4.5 mcg (SYMBICORT) 160-4.5  mcg/actuation HFAA Inhale 2 puffs into the lungs every 12 (twelve) hours.    calcium-vitamin D 600 mg(1,500mg) -400 unit Tab Take by mouth. 1 Tablet Oral Twice a day    ezetimibe (ZETIA) 10 mg tablet TAKE 1 TABLET (10 MG TOTAL) BY MOUTH ONCE DAILY.    fexofenadine (ALLEGRA) 180 MG tablet Take 1 tablet (180 mg total) by mouth once daily.    fluticasone (FLONASE) 50 mcg/actuation nasal spray 1 spray by Each Nare route once daily. (Patient taking differently: 1 spray by Each Nostril route daily as needed.)    HYDROcodone-acetaminophen (NORCO)  mg per tablet Take 1 tablet by mouth every 6 (six) hours as needed for Pain.    latanoprost 0.005 % ophthalmic solution     metoprolol succinate (TOPROL-XL) 50 MG 24 hr tablet Take 1 tablet (50 mg total) by mouth once daily.    nitroGLYCERIN (NITROSTAT) 0.4 MG SL tablet Place 1 tablet (0.4 mg total) under the tongue every 5 (five) minutes as needed for Chest pain.    pantoprazole (PROTONIX) 40 MG tablet Take 1 tablet (40 mg total) by mouth once daily.    predniSONE (DELTASONE) 5 MG tablet Take 1 tablet (5 mg total) by mouth 2 (two) times daily.    travoprost (TRAVATAN Z) 0.004 % Drop Place 1 drop into both eyes every evening. 1 Drops Ophthalmic At bedtime   Last reviewed on 3/1/2023  9:36 AM by Katlyn Mckeon LPN    Review of patient's allergies indicates:   Allergen Reactions    Avelox  [moxifloxacin] Rash    Nsaids (non-steroidal anti-inflammatory drug) Other (See Comments)     dyspepsia    Last reviewed on  4/20/2023 10:28 AM by Suma Palacios      Tasks added this encounter   No tasks added.   Tasks due within next 3 months   5/21/2023 - Clinical Assessment (6 month recurrence)  5/12/2023 - Refill Coordination Outreach (1 time occurrence)     Michele Loza, PharmD  [facility]  [address] [place] [address]  Phone: [phone]  Fax: [phone]

## 2023-05-18 DIAGNOSIS — C79.51 SECONDARY ADENOCARCINOMA OF SKELETAL BONE: ICD-10-CM

## 2023-05-18 DIAGNOSIS — G89.3 NEOPLASM RELATED PAIN: ICD-10-CM

## 2023-05-18 DIAGNOSIS — C61 PROSTATE CANCER: ICD-10-CM

## 2023-05-18 DIAGNOSIS — C79.51 SECONDARY ADENOCARCINOMA OF SKELETAL BONE: Primary | ICD-10-CM

## 2023-05-18 RX ORDER — HYDROCODONE BITARTRATE AND ACETAMINOPHEN 10; 325 MG/1; MG/1
1 TABLET ORAL EVERY 6 HOURS PRN
Qty: 90 TABLET | Refills: 0 | OUTPATIENT
Start: 2023-05-18

## 2023-05-18 NOTE — PROGRESS NOTES
Subjective:       Patient ID: Joey Jacobo is a 68 y.o. male.    Chief Complaint:   1. Prostate cancer  Stage IVB (cT3a, cN1, cM1b, PSA: 22.6, Grade Group: 5)      2. Secondary adenocarcinoma of skeletal bone          Current Treatment:  ADT/Lupron via Dr. Dawn; due 9/2023  Zytiga/prednisone started 12/2017     Treatment History:    HPI: This is a 68 year old male with medical history significant for glaucoma, OA lumbar spine, pulmonary fibrosis, angina pectoris, essential HTN, mitral regurgitation, emphysema, COPD, hyperlipidemia, CAD, and chronic bronchitis who is seen in Hem/Onc for metastatic prostate cancer. He was initially seen in 11/2017 by Dr. Roman after being referred by Dr. Roland Dawn with Urology.     He has a family history of father had gastric cancer in his 70s, 2 sisters with breast cancer in their 50s/60s, and no other immediate family members with cancers or bleeding/clotting disorders. He reported a 40+-pack-year smoking history at the time of initial visit and was still smoking 1/4 pack a day at that time.     He was started on Zytiga + prednisone which he continues. He receives ADT and Lupron under the care of Dr. Dawn.     His primary Hematologist/Oncologist was Dr. Cowan.    Interval History: Patient presents for follow up on Zytiga/prednisone. He presents alone and reports a good appetite and normal Bms. He states some days he snacks as opposed to eating meals. He reports adherence to Zytiga and prednisone; he also takes pain medications for back and pelvic pain from bone mets. He states he is out of his medication and requests a refill. He reports receiving Lupron in 3/2023; he is due 9/2023. He reports having left shoulder and arm pain with neuropathy in his little finger sometimes. He admits to injuring his shoulder playing football; he also had bone spurs that were lasered off. He uses a heating pad to relieve this pain. Discussed choosing a new physician; he  would like to see Dr. Kirby. PSA drawn today is pending at time of visit; however, most recent PSA <0.01.     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   Tobacco Use    Smoking status: Former     Packs/day: 0.25     Years: 25.00     Pack years: 6.25     Types: Cigarettes     Quit date: 2018     Years since quittin.7    Smokeless tobacco: Never   Substance and Sexual Activity    Alcohol use: Not Currently     Alcohol/week: 0.0 standard drinks     Comment: occasionally    Drug use: No    Sexual activity: Yes   Social History Narrative    Wife and son     Social Determinants of Health     Financial Resource Strain: Medium Risk    Difficulty of Paying Living Expenses: Somewhat hard   Food Insecurity: No Food Insecurity    Worried About Running Out of Food in the Last Year: Never true    Ran Out of Food in the Last Year: Never true   Transportation Needs: No Transportation Needs    Lack of Transportation (Medical): No    Lack of Transportation (Non-Medical): No   Physical Activity: Sufficiently Active    Days of Exercise per Week: 7 days    Minutes of Exercise per Session: 60 min   Stress: No Stress Concern Present    Feeling of Stress : Only a little   Social Connections: Moderately Integrated    Frequency of Communication with Friends and Family: More than three times a week    Frequency of Social Gatherings with Friends and Family: Three  times a week    Attends Gnosticist Services: More than 4 times per year    Active Member of Clubs or Organizations: No    Attends Club or Organization Meetings: Never    Marital Status:    Housing Stability: Low Risk     Unable to Pay for Housing in the Last Year: No    Number of Places Lived in the Last Year: 1    Unstable Housing in the Last Year: No     Past Medical History:   Diagnosis Date    Angina pectoris     Back pain     Chronic bronchitis     Colon polyp     COPD (chronic obstructive pulmonary disease) 7/30/2013    Coronary artery disease 7/30/2013    Emphysema of lung     Essential hypertension 10/29/2018    Glaucoma (increased eye pressure) 8/27/2013    Headache(784.0)     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    Mitral regurgitation 7/30/2013    Mixed hyperlipidemia 7/30/2013    Neuropathy     Osteoarthritis of spine with radiculopathy, lumbar region 7/21/2015    Other and unspecified hyperlipidemia     Prostate cancer     Pulmonary fibrosis 10/3/2017     Family History   Problem Relation Age of Onset    Cataracts Mother     Kidney disease Mother     Cancer Father         Stomach    Blindness Maternal Grandmother     Diabetes Sister     Hypertension Sister     Diabetes Sister     Hypertension Sister     Diabetes Sister     Hypertension Sister     Hypertension Sister     Hypertension Sister     Colon cancer Brother     Colon cancer Brother      Past Surgical History:   Procedure Laterality Date    ANKLE FRACTURE SURGERY Left     COLONOSCOPY N/A 3/21/2016    Procedure: COLONOSCOPY;  Surgeon: Melvin Pearce MD;  Location: [facility];  Service: Endoscopy;  Laterality: N/A;    CORONARY STENT PLACEMENT      times 2    SHOULDER ARTHROSCOPY Left     SPINE SURGERY      neck fusion x2     Review of Systems   Constitutional:  Negative for appetite change and fatigue.        Hot flashes   HENT:  Negative for mouth sores, rhinorrhea and sore throat.     Eyes: Negative.    Respiratory: Negative.     Cardiovascular: Negative.    Gastrointestinal:  Negative for constipation, diarrhea, nausea and vomiting.   Endocrine: Negative.    Genitourinary: Negative.    Musculoskeletal:  Positive for back pain (9/10 today).        Left arm and shoulder aching; pelvic pain from cancer   Integumentary:  Negative.   Allergic/Immunologic: Negative.    Neurological:  Positive for numbness (little finger left hand). Negative for weakness.   Hematological: Negative.    Psychiatric/Behavioral: Negative.         Medication List with Changes/Refills   Current Medications    ABIRATERONE (ZYTIGA) 250 MG TAB    Take 4 tablets (1,000 mg total) by mouth once daily.    ASPIRIN (ECOTRIN) 81 MG EC TABLET    Take by mouth. 1 Tablet, Delayed Release (E.C.) Oral Every day    ATORVASTATIN (LIPITOR) 40 MG TABLET    Take 1 tablet (40 mg total) by mouth once daily.    BUDESONIDE-FORMOTEROL 160-4.5 MCG (SYMBICORT) 160-4.5 MCG/ACTUATION HFAA    Inhale 2 puffs into the lungs every 12 (twelve) hours.    CALCIUM-VITAMIN D 600 MG(1,500MG) -400 UNIT TAB    Take by mouth. 1 Tablet Oral Twice a day    EZETIMIBE (ZETIA) 10 MG TABLET    TAKE 1 TABLET (10 MG TOTAL) BY MOUTH ONCE DAILY.    FEXOFENADINE (ALLEGRA) 180 MG TABLET    Take 1 tablet (180 mg total) by mouth once daily.    FLUTICASONE (FLONASE) 50 MCG/ACTUATION NASAL SPRAY    1 spray by Each Nare route once daily.    HYDROCODONE-ACETAMINOPHEN (NORCO)  MG PER TABLET    Take 1 tablet by mouth every 6 (six) hours as needed for Pain.    LATANOPROST 0.005 % OPHTHALMIC SOLUTION        LUPRON DEPOT, 6 MONTH, 45 MG SYKT INJECTION        METOPROLOL SUCCINATE (TOPROL-XL) 50 MG 24 HR TABLET    Take 1 tablet (50 mg total) by mouth once daily.    NITROGLYCERIN (NITROSTAT) 0.4 MG SL TABLET    Place 1 tablet (0.4 mg total) under the tongue every 5 (five) minutes as needed for Chest pain.    PANTOPRAZOLE (PROTONIX) 40 MG TABLET    Take 1 tablet (40 mg total) by mouth  once daily.    PREDNISONE (DELTASONE) 5 MG TABLET    Take 1 tablet (5 mg total) by mouth 2 (two) times daily.    TRAVOPROST (TRAVATAN Z) 0.004 % DROP    Place 1 drop into both eyes every evening. 1 Drops Ophthalmic At bedtime     Objective:     Vitals:    05/19/23 0851   BP: (!) 144/83   Pulse: 85   Resp: 16   Temp: 97.9 °F (36.6 °C)     Physical Exam  Vitals reviewed.   Constitutional:       Appearance: Normal appearance.   HENT:      Head: Normocephalic.      Mouth/Throat:      Comments:     Eyes:      Extraocular Movements: Extraocular movements intact.      Pupils: Pupils are equal, round, and reactive to light.   Cardiovascular:      Rate and Rhythm: Normal rate and regular rhythm.      Heart sounds: Normal heart sounds.   Pulmonary:      Effort: Pulmonary effort is normal.      Breath sounds: Normal breath sounds.   Abdominal:      General: Bowel sounds are normal.      Palpations: Abdomen is soft.      Comments: rounded     Genitourinary:     Comments: deferred    Musculoskeletal:         General: Normal range of motion.      Cervical back: Normal range of motion and neck supple.   Skin:     General: Skin is warm and dry.      Comments: Left inner forearm tattoo   Neurological:      Mental Status: He is alert and oriented to person, place, and time.   Psychiatric:         Behavior: Behavior normal.         Thought Content: Thought content normal.        (0) Fully active, able to carry on all predisease performance without restriction  Assessment:     Problem List Items Addressed This Visit          Oncology    Secondary adenocarcinoma of skeletal bone    Prostate cancer - Primary     Metastatic, hormone sensitive prostate cancer on abiraterone+prednisone and q6 month lupron.  No new symptoms.  Chronic back pain and fatigue unchanged. Denies hot flashes. Currently on Zytiga/predisone since 12/2017. Receives ADT/Lupron via Urology.             Plan:     Prostate cancer    Secondary adenocarcinoma of skeletal  bone    Labs reviewed; PSA pending at time of visit.   Continue Zytiga/prednisone as prescribed.   Continue ADT/Lupron via Urology; next Lupron due 9/2023.  Follow up in 4 months with Dr. Kirby with testosterone, CBC, Comprehensive Metabolic Panel, and PSA.    Route Chart for Scheduling    Med Onc Chart Routing      Follow up with physician 4 months. Dr. Kirby to establish care with physician at [facility]   Follow up with MIGU[facility]    Infusion scheduling note    Injection scheduling note    Labs CBC, CMP, PSA and other   Scheduling:  Preferred lab:  Lab interval:  and testosterone; in 3 months at [facility]   Imaging None      Pharmacy appointment No pharmacy appointment needed      Other referrals  No additional referrals needed          I will review assessment/plan with collaborating physician.      SAMANTA Lama

## 2023-05-18 NOTE — TELEPHONE ENCOUNTER
----- Message from Wendy Mandel sent at 5/18/2023  9:51 AM CDT -----  Contact: Joey  Type:  RX Refill Request    Who Called:  Ray  Refill or New Rx: refill   RX Name and Strength: Hydrocodone-acetaminophen (NORCO)  mg per tablet  How is the patient currently taking it? (ex. 1XDay):   Is this a 30 day or 90 day RX:   Preferred Pharmacy with phone number:   [facility] [address] [address] [address]  [address] [address] [address]  Phone: [phone] Fax: [phone]  Local or Mail Order: Local  Ordering Provider: Dr. Moise  Would the patient rather a call back or a response via [facility]? Callback   Best Call Back Number: Please call him at [phone]  Additional Information:

## 2023-05-18 NOTE — ASSESSMENT & PLAN NOTE
Metastatic, hormone sensitive prostate cancer on abiraterone+prednisone and q6 month lupron.  No new symptoms.  Chronic back pain and fatigue unchanged. Denies hot flashes. Currently on Zytiga/predisone since 12/2017. Receives ADT/Lupron via Urology.

## 2023-05-18 NOTE — TELEPHONE ENCOUNTER
----- Message from Aubrie Doyle sent at 5/18/2023 10:13 AM CDT -----  Regarding: RX Refill  Contact: Joey  Type:  RX Refill Request    Who Called:  ray  Refill or New Rx: refill   RX Name and Strength: HYDROcodone-acetaminophen (NORCO)  mg per tablet  How is the patient currently taking it? (ex. 1XDay):Take 1 tablet by mouth every 6 (six) hours as needed for Pain  Is this a 30 day or 90 day RX: 90  Preferred Pharmacy with phone number:   [facility] -   [address] [address] [address]  Phone: [phone] Fax: [phone]         Local or Mail Order: local   Ordering Provider: GILBERT Moise or PAVITHRA Berrios  Would the patient rather a call back or a response via [facility]? call  Best Call Back Number: [phone] (home)    Additional Information:

## 2023-05-19 ENCOUNTER — LAB VISIT (OUTPATIENT)
Dept: LAB | Facility: HOSPITAL | Age: 68
End: 2023-05-19
Attending: NURSE PRACTITIONER
Payer: MEDICARE

## 2023-05-19 ENCOUNTER — OFFICE VISIT (OUTPATIENT)
Dept: HEMATOLOGY/ONCOLOGY | Facility: CLINIC | Age: 68
End: 2023-05-19
Payer: MEDICARE

## 2023-05-19 VITALS
RESPIRATION RATE: 16 BRPM | HEIGHT: 70 IN | DIASTOLIC BLOOD PRESSURE: 83 MMHG | SYSTOLIC BLOOD PRESSURE: 144 MMHG | TEMPERATURE: 98 F | WEIGHT: 151.69 LBS | BODY MASS INDEX: 21.72 KG/M2 | HEART RATE: 85 BPM | OXYGEN SATURATION: 98 %

## 2023-05-19 DIAGNOSIS — C61 PROSTATE CANCER: ICD-10-CM

## 2023-05-19 DIAGNOSIS — C79.51 SECONDARY ADENOCARCINOMA OF SKELETAL BONE: ICD-10-CM

## 2023-05-19 DIAGNOSIS — C61 PROSTATE CANCER: Primary | ICD-10-CM

## 2023-05-19 DIAGNOSIS — G89.3 NEOPLASM RELATED PAIN: ICD-10-CM

## 2023-05-19 LAB
ALBUMIN SERPL BCP-MCNC: 3.5 G/DL (ref 3.5–5.2)
ALP SERPL-CCNC: 76 U/L (ref 55–135)
ALT SERPL W/O P-5'-P-CCNC: 25 U/L (ref 10–44)
ANION GAP SERPL CALC-SCNC: 11 MMOL/L (ref 8–16)
AST SERPL-CCNC: 26 U/L (ref 10–40)
BILIRUB SERPL-MCNC: 0.6 MG/DL (ref 0.1–1)
BUN SERPL-MCNC: 10 MG/DL (ref 8–23)
CALCIUM SERPL-MCNC: 9.3 MG/DL (ref 8.7–10.5)
CHLORIDE SERPL-SCNC: 105 MMOL/L (ref 95–110)
CO2 SERPL-SCNC: 23 MMOL/L (ref 23–29)
COMPLEXED PSA SERPL-MCNC: <0.01 NG/ML (ref 0–4)
CREAT SERPL-MCNC: 1.1 MG/DL (ref 0.5–1.4)
ERYTHROCYTE [DISTWIDTH] IN BLOOD BY AUTOMATED COUNT: 14.9 % (ref 11.5–14.5)
EST. GFR  (NO RACE VARIABLE): >60 ML/MIN/1.73 M^2
GLUCOSE SERPL-MCNC: 120 MG/DL (ref 70–110)
HCT VFR BLD AUTO: 41.9 % (ref 40–54)
HGB BLD-MCNC: 13.6 G/DL (ref 14–18)
IMM GRANULOCYTES # BLD AUTO: 0.09 K/UL (ref 0–0.04)
MCH RBC QN AUTO: 30.8 PG (ref 27–31)
MCHC RBC AUTO-ENTMCNC: 32.5 G/DL (ref 32–36)
MCV RBC AUTO: 95 FL (ref 82–98)
NEUTROPHILS # BLD AUTO: 6.9 K/UL (ref 1.8–7.7)
PLATELET # BLD AUTO: 213 K/UL (ref 150–450)
PMV BLD AUTO: 9.8 FL (ref 9.2–12.9)
POTASSIUM SERPL-SCNC: 3.8 MMOL/L (ref 3.5–5.1)
PROT SERPL-MCNC: 8 G/DL (ref 6–8.4)
RBC # BLD AUTO: 4.41 M/UL (ref 4.6–6.2)
SODIUM SERPL-SCNC: 139 MMOL/L (ref 136–145)
WBC # BLD AUTO: 9.49 K/UL (ref 3.9–12.7)

## 2023-05-19 PROCEDURE — 3288F FALL RISK ASSESSMENT DOCD: CPT | Mod: CPTII,,, | Performed by: NURSE PRACTITIONER

## 2023-05-19 PROCEDURE — 99214 OFFICE O/P EST MOD 30 MIN: CPT | Performed by: NURSE PRACTITIONER

## 2023-05-19 PROCEDURE — 1160F PR REVIEW ALL MEDS BY PRESCRIBER/CLIN PHARMACIST DOCUMENTED: ICD-10-PCS | Mod: CPTII,,, | Performed by: NURSE PRACTITIONER

## 2023-05-19 PROCEDURE — 3077F SYST BP >= 140 MM HG: CPT | Mod: CPTII,,, | Performed by: NURSE PRACTITIONER

## 2023-05-19 PROCEDURE — 3079F PR MOST RECENT DIASTOLIC BLOOD PRESSURE 80-89 MM HG: ICD-10-PCS | Mod: CPTII,,, | Performed by: NURSE PRACTITIONER

## 2023-05-19 PROCEDURE — 1101F PR PT FALLS ASSESS DOC 0-1 FALLS W/OUT INJ PAST YR: ICD-10-PCS | Mod: CPTII,,, | Performed by: NURSE PRACTITIONER

## 2023-05-19 PROCEDURE — 3079F DIAST BP 80-89 MM HG: CPT | Mod: CPTII,,, | Performed by: NURSE PRACTITIONER

## 2023-05-19 PROCEDURE — 3008F BODY MASS INDEX DOCD: CPT | Mod: CPTII,,, | Performed by: NURSE PRACTITIONER

## 2023-05-19 PROCEDURE — 1160F RVW MEDS BY RX/DR IN RCRD: CPT | Mod: CPTII,,, | Performed by: NURSE PRACTITIONER

## 2023-05-19 PROCEDURE — 1159F PR MEDICATION LIST DOCUMENTED IN MEDICAL RECORD: ICD-10-PCS | Mod: CPTII,,, | Performed by: NURSE PRACTITIONER

## 2023-05-19 PROCEDURE — 99999 PR PBB SHADOW E&M-EST. PATIENT-LVL IV: ICD-10-PCS | Mod: PBBFAC,,, | Performed by: NURSE PRACTITIONER

## 2023-05-19 PROCEDURE — 1159F MED LIST DOCD IN RCRD: CPT | Mod: CPTII,,, | Performed by: NURSE PRACTITIONER

## 2023-05-19 PROCEDURE — 1125F PR PAIN SEVERITY QUANTIFIED, PAIN PRESENT: ICD-10-PCS | Mod: CPTII,,, | Performed by: NURSE PRACTITIONER

## 2023-05-19 PROCEDURE — 99214 OFFICE O/P EST MOD 30 MIN: CPT | Mod: ,,, | Performed by: NURSE PRACTITIONER

## 2023-05-19 PROCEDURE — 80053 COMPREHEN METABOLIC PANEL: CPT | Performed by: NURSE PRACTITIONER

## 2023-05-19 PROCEDURE — 36415 COLL VENOUS BLD VENIPUNCTURE: CPT | Performed by: NURSE PRACTITIONER

## 2023-05-19 PROCEDURE — 99214 PR OFFICE/OUTPT VISIT, EST, LEVL IV, 30-39 MIN: ICD-10-PCS | Mod: ,,, | Performed by: NURSE PRACTITIONER

## 2023-05-19 PROCEDURE — 3077F PR MOST RECENT SYSTOLIC BLOOD PRESSURE >= 140 MM HG: ICD-10-PCS | Mod: CPTII,,, | Performed by: NURSE PRACTITIONER

## 2023-05-19 PROCEDURE — 3288F PR FALLS RISK ASSESSMENT DOCUMENTED: ICD-10-PCS | Mod: CPTII,,, | Performed by: NURSE PRACTITIONER

## 2023-05-19 PROCEDURE — 3008F PR BODY MASS INDEX (BMI) DOCUMENTED: ICD-10-PCS | Mod: CPTII,,, | Performed by: NURSE PRACTITIONER

## 2023-05-19 PROCEDURE — 99999 PR PBB SHADOW E&M-EST. PATIENT-LVL IV: CPT | Mod: PBBFAC,,, | Performed by: NURSE PRACTITIONER

## 2023-05-19 PROCEDURE — 85027 COMPLETE CBC AUTOMATED: CPT | Performed by: NURSE PRACTITIONER

## 2023-05-19 PROCEDURE — 1125F AMNT PAIN NOTED PAIN PRSNT: CPT | Mod: CPTII,,, | Performed by: NURSE PRACTITIONER

## 2023-05-19 PROCEDURE — 84153 ASSAY OF PSA TOTAL: CPT | Performed by: NURSE PRACTITIONER

## 2023-05-19 PROCEDURE — 1101F PT FALLS ASSESS-DOCD LE1/YR: CPT | Mod: CPTII,,, | Performed by: NURSE PRACTITIONER

## 2023-05-19 RX ORDER — LEUPROLIDE ACETATE 45 MG
KIT INTRAMUSCULAR
COMMUNITY
Start: 2023-03-01

## 2023-05-19 RX ORDER — PREDNISONE 5 MG/1
5 TABLET ORAL 2 TIMES DAILY
Qty: 60 TABLET | Refills: 3 | Status: SHIPPED | OUTPATIENT
Start: 2023-05-19 | End: 2023-09-22 | Stop reason: SDUPTHER

## 2023-05-19 RX ORDER — HYDROCODONE BITARTRATE AND ACETAMINOPHEN 10; 325 MG/1; MG/1
1 TABLET ORAL EVERY 6 HOURS PRN
Qty: 90 TABLET | Refills: 0 | Status: SHIPPED | OUTPATIENT
Start: 2023-05-19 | End: 2023-06-16 | Stop reason: SDUPTHER

## 2023-05-23 ENCOUNTER — SPECIALTY PHARMACY (OUTPATIENT)
Dept: PHARMACY | Facility: CLINIC | Age: 68
End: 2023-05-23
Payer: MEDICARE

## 2023-05-23 NOTE — TELEPHONE ENCOUNTER
Specialty Pharmacy - Clinical Reassessment    Specialty Medication Orders Linked to Encounter      Flowsheet Row Most Recent Value   Medication #1 abiraterone (ZYTIGA) 250 mg Tab (Order#[id number], Rx#[id number])          Patient Diagnosis   C61 - Prostate cancer    Joey Jacobo is a 68 y.o. male, who is followed by the specialty pharmacy service for management and education of his Zytiga.  He has been on therapy with Zytiga for 5 years.  I have reviewed his electronic medical record and current medication list and determined that specialty medication adjustment Is not needed at this time.    Patient has not experienced adverse events.  He Is adherent reporting 0 missed doses since last review.  Adherence has been encouraged with the following mechanism(s): habit.  He is meeting goals of therapy and will continue treatment.        5/12/2023 4/5/2023 3/10/2023 2/9/2023 1/10/2023 12/14/2022 11/14/2022   Follow Up Review   # of missed doses 0 0 0 0 0 0 0   New Medications? No No No No No No No   New Conditions? No No No No No No No   New Allergies? No No No No No No No   Med Effective? Very good Good Good Good Good Good Good   Urgent Care? No No No No No No No   Requested Pharmacist? No No No No No No No         Therapy is appropriate to continue.    Therapy is effective: Yes  On scale of 1 to 10, how does patient rank quality of life? (10 - Best): Unable to Assess  Recommendations: none at this time.  Review Method: Chart Review    Outgoing call to contact Joey for Zytiga reassessment. No answer. [facility] for call back. Chart reviewed.    If no call back, will follow-up on the following at the next refill:  (1) quality of life  (2) side effects    Tasks added this encounter   No tasks added.   Tasks due within next 3 months   5/21/2023 - Clinical Assessment (6 month recurrence)  6/7/2023 - Refill Coordination Outreach (1 time occurrence)     Watson Monk, PharmD  [facility]  [address] [facility]  [place]  [facility] [address]  Phone: [phone]  Fax: [phone]

## 2023-06-05 ENCOUNTER — SPECIALTY PHARMACY (OUTPATIENT)
Dept: PHARMACY | Facility: CLINIC | Age: 68
End: 2023-06-05
Payer: MEDICARE

## 2023-06-05 NOTE — TELEPHONE ENCOUNTER
Incoming call: Patient returning [facility]'s call back. He stated that he has enough meds on hand and does not need a refill right away. However, the doesn't know his exact on hand. Pt to call [facility] back again with actual  on hand count. Pending follow up accordingly.

## 2023-06-12 NOTE — TELEPHONE ENCOUNTER
Specialty Pharmacy - Refill Coordination    Specialty Medication Orders Linked to Encounter      Flowsheet Row Most Recent Value   Medication #1 abiraterone (ZYTIGA) 250 mg Tab (Order#[id number], Rx#[id number])            Refill Questions - Documented Responses      Flowsheet Row Most Recent Value   Patient Availability and HIPAA Verification    Does patient want to proceed with activity? Yes   HIPAA/medical authority confirmed? Yes   Relationship to patient of person spoken to? Self   Refill Screening Questions    Changes to allergies? No   Changes to medications? No   New conditions since last clinic visit? No   Unplanned office visit, urgent care, ED, or hospital admission in the last 4 weeks? No   How does patient/caregiver feel medication is working? Very good   Financial problems or insurance changes? No   How many doses of your specialty medications were missed in the last 4 weeks? 0   Would patient like to speak to a pharmacist? No   When does the patient need to receive the medication? 06/22/23   Refill Delivery Questions    How will the patient receive the medication? MEDRx   When does the patient need to receive the medication? 06/22/23   Shipping Address Prescription   Address in [facility] confirmed and updated if neccessary? Yes   Expected Copay ($) 0   Is the patient able to afford the medication copay? Yes   Payment Method zero copay   Days supply of Refill 30   Supplies needed? No supplies needed   Refill activity completed? Yes   Refill activity plan Refill scheduled   Shipment/Pickup Date: 06/22/23            Current Outpatient Medications   Medication Sig    abiraterone (ZYTIGA) 250 mg Tab Take 4 tablets (1,000 mg total) by mouth once daily.    aspirin (ECOTRIN) 81 MG EC tablet Take by mouth. 1 Tablet, Delayed Release (E.C.) Oral Every day    atorvastatin (LIPITOR) 40 MG tablet Take 1 tablet (40 mg total) by mouth once daily.    budesonide-formoterol 160-4.5 mcg (SYMBICORT) 160-4.5  mcg/actuation HFAA Inhale 2 puffs into the lungs every 12 (twelve) hours.    calcium-vitamin D 600 mg(1,500mg) -400 unit Tab Take by mouth. 1 Tablet Oral Twice a day    ezetimibe (ZETIA) 10 mg tablet TAKE 1 TABLET (10 MG TOTAL) BY MOUTH ONCE DAILY.    fexofenadine (ALLEGRA) 180 MG tablet Take 1 tablet (180 mg total) by mouth once daily.    fluticasone (FLONASE) 50 mcg/actuation nasal spray 1 spray by Each Nare route once daily. (Patient taking differently: 1 spray by Each Nostril route daily as needed.)    HYDROcodone-acetaminophen (NORCO)  mg per tablet Take 1 tablet by mouth every 6 (six) hours as needed for Pain.    latanoprost 0.005 % ophthalmic solution     LUPRON DEPOT, 6 MONTH, 45 mg SyKt injection     metoprolol succinate (TOPROL-XL) 50 MG 24 hr tablet Take 1 tablet (50 mg total) by mouth once daily.    nitroGLYCERIN (NITROSTAT) 0.4 MG SL tablet Place 1 tablet (0.4 mg total) under the tongue every 5 (five) minutes as needed for Chest pain.    pantoprazole (PROTONIX) 40 MG tablet Take 1 tablet (40 mg total) by mouth once daily.    predniSONE (DELTASONE) 5 MG tablet Take 1 tablet (5 mg total) by mouth 2 (two) times daily.    travoprost (TRAVATAN Z) 0.004 % Drop Place 1 drop into both eyes every evening. 1 Drops Ophthalmic At bedtime   Last reviewed on 5/23/2023 11:18 AM by Watson Monk PharmD    Review of patient's allergies indicates:   Allergen Reactions    Avelox  [moxifloxacin] Rash    Nsaids (non-steroidal anti-inflammatory drug) Other (See Comments)     dyspepsia    Last reviewed on  5/23/2023 11:18 AM by Watson Monk      Tasks added this encounter   No tasks added.   Tasks due within next 3 months   6/6/2023 - Refill Coordination Outreach (1 time occurrence)     Michele Loza, PharmD  [facility]  [address] [place] [address]  Phone: [phone]  Fax: [phone]

## 2023-06-14 DIAGNOSIS — C79.51 SECONDARY ADENOCARCINOMA OF SKELETAL BONE: ICD-10-CM

## 2023-06-14 DIAGNOSIS — G89.3 NEOPLASM RELATED PAIN: ICD-10-CM

## 2023-06-14 DIAGNOSIS — C61 PROSTATE CANCER: ICD-10-CM

## 2023-06-14 RX ORDER — HYDROCODONE BITARTRATE AND ACETAMINOPHEN 10; 325 MG/1; MG/1
1 TABLET ORAL EVERY 6 HOURS PRN
Qty: 90 TABLET | Refills: 0 | Status: CANCELLED | OUTPATIENT
Start: 2023-06-14

## 2023-06-14 NOTE — TELEPHONE ENCOUNTER
----- Message from Milli Huynh sent at 6/14/2023 11:58 AM CDT -----  Pt is requesting a refill for HYDROcodone-acetaminophen (NORCO)  mg per tablet  Call back at [phone]    [facility] - [place] - [address] [address]  [address] [address] [address]  Phone: [phone] Fax: [phone]

## 2023-06-15 RX ORDER — HYDROCODONE BITARTRATE AND ACETAMINOPHEN 10; 325 MG/1; MG/1
1 TABLET ORAL EVERY 6 HOURS PRN
Qty: 90 TABLET | Refills: 0 | Status: CANCELLED | OUTPATIENT
Start: 2023-06-15

## 2023-06-16 ENCOUNTER — TELEPHONE (OUTPATIENT)
Dept: HEMATOLOGY/ONCOLOGY | Facility: CLINIC | Age: 68
End: 2023-06-16
Payer: MEDICARE

## 2023-06-16 DIAGNOSIS — G89.3 NEOPLASM RELATED PAIN: ICD-10-CM

## 2023-06-16 DIAGNOSIS — C79.51 SECONDARY ADENOCARCINOMA OF SKELETAL BONE: ICD-10-CM

## 2023-06-16 DIAGNOSIS — C61 PROSTATE CANCER: ICD-10-CM

## 2023-06-16 RX ORDER — HYDROCODONE BITARTRATE AND ACETAMINOPHEN 10; 325 MG/1; MG/1
1 TABLET ORAL EVERY 6 HOURS PRN
Qty: 90 TABLET | Refills: 0 | Status: SHIPPED | OUTPATIENT
Start: 2023-06-16 | End: 2023-07-10 | Stop reason: SDUPTHER

## 2023-06-16 NOTE — TELEPHONE ENCOUNTER
----- Message from Justyna Nolasco sent at 6/16/2023  8:40 AM CDT -----  Regarding: pt concern  Name of Who is Calling:BLAIRE FLORES [[id number]]          What is the request in detail: pt states he called 2x for his medications     HYDROcodone-acetaminophen (NORCO)  mg per tablet          Can the clinic reply by [facility]: no           [facility]   [facility] - [place] - [address] [address]  [address] [address] [address]  Phone: [phone] Fax: [phone]

## 2023-06-20 NOTE — TELEPHONE ENCOUNTER
----- Message from Geronimo Roman MD sent at 6/25/2018 11:20 AM CDT -----  Contact: pt  I will not refill it early.  It will be refilled 30 days after the last prescription.  He can let me know the name of the nearest pharmacy to the place where he is visiting and I can send it electronically there.  Thank you.  ----- Message -----  From: Beatriz Rashid MA  Sent: 6/25/2018  11:11 AM  To: Geronimo Roman MD    Refill on pain medication  ----- Message -----  From: Aubrie Tolbert  Sent: 6/25/2018  11:06 AM  To: [facility]ton Staff    Pt stated he's calling for refill on medication he will be going out of town, he can be reached at  [phone]      [id number] - [place] - [address]  [address]  [place] [address]  Phone: [phone] Fax: [phone]           Tremfya Counseling: I discussed with the patient the risks of guselkumab including but not limited to immunosuppression, serious infections, and drug reactions.  The patient understands that monitoring is required including a PPD at baseline and must alert us or the primary physician if symptoms of infection or other concerning signs are noted.

## 2023-07-10 DIAGNOSIS — C61 PROSTATE CANCER: ICD-10-CM

## 2023-07-10 DIAGNOSIS — G89.3 NEOPLASM RELATED PAIN: ICD-10-CM

## 2023-07-10 DIAGNOSIS — C79.51 SECONDARY ADENOCARCINOMA OF SKELETAL BONE: ICD-10-CM

## 2023-07-10 RX ORDER — HYDROCODONE BITARTRATE AND ACETAMINOPHEN 10; 325 MG/1; MG/1
1 TABLET ORAL EVERY 6 HOURS PRN
Qty: 90 TABLET | Refills: 0 | Status: SHIPPED | OUTPATIENT
Start: 2023-07-10 | End: 2023-08-08 | Stop reason: SDUPTHER

## 2023-07-10 NOTE — TELEPHONE ENCOUNTER
----- Message from Cecilia Chowdhury sent at 7/10/2023 10:56 AM CDT -----  Contact: [phone]  Patient needs a refill on HYDROcodone-acetaminophen (NORCO)  mg per tablet called into [facility] pharmacy at [phone].  Please call patient at [phone] if you have any questions.        [facility], [facility] - [place]  [address] [facility] [address] [address] [address] [address]  Phone: [phone] Fax: [phone]    Pt is requesting that it be filled before Friday due to going on vacation             Thanks KB

## 2023-07-17 ENCOUNTER — SPECIALTY PHARMACY (OUTPATIENT)
Dept: PHARMACY | Facility: CLINIC | Age: 68
End: 2023-07-17
Payer: MEDICARE

## 2023-07-17 DIAGNOSIS — C61 PROSTATE CANCER: Primary | ICD-10-CM

## 2023-07-17 NOTE — TELEPHONE ENCOUNTER
Specialty Pharmacy - Refill Coordination    Specialty Medication Orders Linked to Encounter      Flowsheet Row Most Recent Value   Medication #1 abiraterone (ZYTIGA) 250 mg Tab (Order#[id number], Rx#[id number])            Refill Questions - Documented Responses      Flowsheet Row Most Recent Value   Refill Screening Questions    Changes to allergies? No   Changes to medications? No   New conditions since last clinic visit? No   Unplanned office visit, urgent care, ED, or hospital admission in the last 4 weeks? No   How does patient/caregiver feel medication is working? Good   Financial problems or insurance changes? No   How many doses of your specialty medications were missed in the last 4 weeks? 0   Would patient like to speak to a pharmacist? No   When does the patient need to receive the medication? 07/21/23   Refill Delivery Questions    How will the patient receive the medication? MEDRx   When does the patient need to receive the medication? 07/21/23   Shipping Address Home   Address in [facility] confirmed and updated if neccessary? Yes   Expected Copay ($) 0   Is the patient able to afford the medication copay? Yes   Payment Method zero copay   Days supply of Refill 30   Supplies needed? No supplies needed   Refill activity completed? Yes   Refill activity plan Refill scheduled   Shipment/Pickup Date: 07/18/23            Current Outpatient Medications   Medication Sig    abiraterone (ZYTIGA) 250 mg Tab Take 4 tablets (1,000 mg total) by mouth once daily.    aspirin (ECOTRIN) 81 MG EC tablet Take by mouth. 1 Tablet, Delayed Release (E.C.) Oral Every day    atorvastatin (LIPITOR) 40 MG tablet Take 1 tablet (40 mg total) by mouth once daily.    budesonide-formoterol 160-4.5 mcg (SYMBICORT) 160-4.5 mcg/actuation HFAA Inhale 2 puffs into the lungs every 12 (twelve) hours.    calcium-vitamin D 600 mg(1,500mg) -400 unit Tab Take by mouth. 1 Tablet Oral Twice a day    ezetimibe (ZETIA) 10 mg tablet TAKE 1  TABLET (10 MG TOTAL) BY MOUTH ONCE DAILY.    fexofenadine (ALLEGRA) 180 MG tablet Take 1 tablet (180 mg total) by mouth once daily.    fluticasone (FLONASE) 50 mcg/actuation nasal spray 1 spray by Each Nare route once daily. (Patient taking differently: 1 spray by Each Nostril route daily as needed.)    HYDROcodone-acetaminophen (NORCO)  mg per tablet Take 1 tablet by mouth every 6 (six) hours as needed for Pain.    latanoprost 0.005 % ophthalmic solution     LUPRON DEPOT, 6 MONTH, 45 mg SyKt injection     metoprolol succinate (TOPROL-XL) 50 MG 24 hr tablet Take 1 tablet (50 mg total) by mouth once daily.    nitroGLYCERIN (NITROSTAT) 0.4 MG SL tablet Place 1 tablet (0.4 mg total) under the tongue every 5 (five) minutes as needed for Chest pain.    pantoprazole (PROTONIX) 40 MG tablet Take 1 tablet (40 mg total) by mouth once daily.    predniSONE (DELTASONE) 5 MG tablet Take 1 tablet (5 mg total) by mouth 2 (two) times daily.    travoprost (TRAVATAN Z) 0.004 % Drop Place 1 drop into both eyes every evening. 1 Drops Ophthalmic At bedtime   Last reviewed on 5/23/2023 11:18 AM by Watson Monk PharmD    Review of patient's allergies indicates:   Allergen Reactions    Avelox  [moxifloxacin] Rash    Nsaids (non-steroidal anti-inflammatory drug) Other (See Comments)     dyspepsia    Last reviewed on  5/23/2023 11:18 AM by Watson Monk      Tasks added this encounter   No tasks added.   Tasks due within next 3 months   No tasks due.     Carlitos Martinez, PharmD  [facility]  [address] [place] [address]  Phone: [phone]  Fax: [phone]

## 2023-08-08 DIAGNOSIS — G89.3 NEOPLASM RELATED PAIN: ICD-10-CM

## 2023-08-08 DIAGNOSIS — C79.51 SECONDARY ADENOCARCINOMA OF SKELETAL BONE: ICD-10-CM

## 2023-08-08 DIAGNOSIS — C61 PROSTATE CANCER: ICD-10-CM

## 2023-08-08 RX ORDER — HYDROCODONE BITARTRATE AND ACETAMINOPHEN 10; 325 MG/1; MG/1
1 TABLET ORAL EVERY 6 HOURS PRN
Qty: 90 TABLET | Refills: 0 | Status: SHIPPED | OUTPATIENT
Start: 2023-08-08 | End: 2023-09-06 | Stop reason: SDUPTHER

## 2023-08-08 NOTE — TELEPHONE ENCOUNTER
----- Message from Mary Barboza sent at 8/8/2023  8:26 AM CDT -----  Contact: Joey  Type:  RX Refill Request    Who Called: Ray  Refill or New Rx:refill  RX Name and Strength:HYDROcodone-acetaminophen (NORCO)  mg per tablet  How is the patient currently taking it? (ex. 1XDay):3-4xday  Is this a 30 day or 90 day RX:30day  Preferred Pharmacy with phone number:  [facility] [place] [id number] [address]  Phone: [phone] Fax: [phone]  Local or Mail Order:local  Ordering Provider:   Would the patient rather a call back or a response via [facility]? Call back  Best Call Back Number:[phone]  Additional Information:   Thanks  Am

## 2023-08-11 ENCOUNTER — SPECIALTY PHARMACY (OUTPATIENT)
Dept: PHARMACY | Facility: CLINIC | Age: 68
End: 2023-08-11
Payer: MEDICARE

## 2023-08-11 NOTE — TELEPHONE ENCOUNTER
Specialty Pharmacy - Refill Coordination    Specialty Medication Orders Linked to Encounter      Flowsheet Row Most Recent Value   Medication #1 abiraterone (ZYTIGA) 250 mg Tab (Order#[id number], Rx#[id number])            Refill Questions - Documented Responses      Flowsheet Row Most Recent Value   Patient Availability and HIPAA Verification    Does patient want to proceed with activity? Yes   HIPAA/medical authority confirmed? Yes   Relationship to patient of person spoken to? Self   Refill Screening Questions    Changes to allergies? No   Changes to medications? No   New conditions since last clinic visit? No   Unplanned office visit, urgent care, ED, or hospital admission in the last 4 weeks? No   How does patient/caregiver feel medication is working? Good   Financial problems or insurance changes? No   How many doses of your specialty medications were missed in the last 4 weeks? 0   Would patient like to speak to a pharmacist? No   When does the patient need to receive the medication? 08/14/23   Refill Delivery Questions    How will the patient receive the medication? MEDRx   When does the patient need to receive the medication? 08/14/23   Shipping Address Home   Address in [facility] confirmed and updated if neccessary? Yes   Expected Copay ($) 0   Is the patient able to afford the medication copay? Yes   Payment Method zero copay   Days supply of Refill 30   Supplies needed? No supplies needed   Refill activity completed? Yes   Refill activity plan Refill scheduled   Shipment/Pickup Date: 08/14/23            Current Outpatient Medications   Medication Sig    abiraterone (ZYTIGA) 250 mg Tab Take 4 tablets (1,000 mg total) by mouth once daily.    aspirin (ECOTRIN) 81 MG EC tablet Take by mouth. 1 Tablet, Delayed Release (E.C.) Oral Every day    atorvastatin (LIPITOR) 40 MG tablet Take 1 tablet (40 mg total) by mouth once daily.    budesonide-formoterol 160-4.5 mcg (SYMBICORT) 160-4.5 mcg/actuation HFAA  Inhale 2 puffs into the lungs every 12 (twelve) hours.    calcium-vitamin D 600 mg(1,500mg) -400 unit Tab Take by mouth. 1 Tablet Oral Twice a day    ezetimibe (ZETIA) 10 mg tablet TAKE 1 TABLET (10 MG TOTAL) BY MOUTH ONCE DAILY.    fexofenadine (ALLEGRA) 180 MG tablet Take 1 tablet (180 mg total) by mouth once daily.    fluticasone (FLONASE) 50 mcg/actuation nasal spray 1 spray by Each Nare route once daily. (Patient taking differently: 1 spray by Each Nostril route daily as needed.)    HYDROcodone-acetaminophen (NORCO)  mg per tablet Take 1 tablet by mouth every 6 (six) hours as needed for Pain.    latanoprost 0.005 % ophthalmic solution     LUPRON DEPOT, 6 MONTH, 45 mg SyKt injection     metoprolol succinate (TOPROL-XL) 50 MG 24 hr tablet Take 1 tablet (50 mg total) by mouth once daily.    nitroGLYCERIN (NITROSTAT) 0.4 MG SL tablet Place 1 tablet (0.4 mg total) under the tongue every 5 (five) minutes as needed for Chest pain.    pantoprazole (PROTONIX) 40 MG tablet Take 1 tablet (40 mg total) by mouth once daily.    predniSONE (DELTASONE) 5 MG tablet Take 1 tablet (5 mg total) by mouth 2 (two) times daily.    travoprost (TRAVATAN Z) 0.004 % Drop Place 1 drop into both eyes every evening. 1 Drops Ophthalmic At bedtime   Last reviewed on 5/23/2023 11:18 AM by Watson Monk, PharmD    Review of patient's allergies indicates:   Allergen Reactions    Avelox  [moxifloxacin] Rash    Nsaids (non-steroidal anti-inflammatory drug) Other (See Comments)     dyspepsia    Last reviewed on  5/23/2023 11:18 AM by Watson Monk      Tasks added this encounter   No tasks added.   Tasks due within next 3 months   11/10/2023 - Clinical Assessment (6 month recurrence)  8/15/2023 - Refill Coordination Outreach (1 time occurrence)     Kathryn Leavitt, PharmD  [facility]  [address] [place] [id number]  Phone: [phone]  Fax: [phone]

## 2023-09-01 ENCOUNTER — OFFICE VISIT (OUTPATIENT)
Dept: UROLOGY | Facility: CLINIC | Age: 68
End: 2023-09-01
Payer: MEDICARE

## 2023-09-01 VITALS
WEIGHT: 146.81 LBS | DIASTOLIC BLOOD PRESSURE: 88 MMHG | SYSTOLIC BLOOD PRESSURE: 145 MMHG | TEMPERATURE: 97 F | HEIGHT: 70 IN | HEART RATE: 98 BPM | RESPIRATION RATE: 18 BRPM | BODY MASS INDEX: 21.02 KG/M2

## 2023-09-01 DIAGNOSIS — C61 PROSTATE CANCER METASTATIC TO BONE: Primary | ICD-10-CM

## 2023-09-01 DIAGNOSIS — C79.51 PROSTATE CANCER METASTATIC TO BONE: Primary | ICD-10-CM

## 2023-09-01 PROCEDURE — 96402 CHEMO HORMON ANTINEOPL SQ/IM: CPT | Mod: HCNC,S$GLB,, | Performed by: UROLOGY

## 2023-09-01 PROCEDURE — 99999 PR PBB SHADOW E&M-EST. PATIENT-LVL IV: CPT | Mod: PBBFAC,HCNC,, | Performed by: UROLOGY

## 2023-09-01 PROCEDURE — 96402 PR CHEMOTHER HORMON ANTINEOPL SUB-Q/IM: ICD-10-PCS | Mod: HCNC,S$GLB,, | Performed by: UROLOGY

## 2023-09-01 PROCEDURE — 99999 PR PBB SHADOW E&M-EST. PATIENT-LVL IV: ICD-10-PCS | Mod: PBBFAC,HCNC,, | Performed by: UROLOGY

## 2023-09-01 PROCEDURE — 99499 NO LOS: ICD-10-PCS | Mod: HCNC,S$GLB,, | Performed by: UROLOGY

## 2023-09-01 PROCEDURE — 99499 UNLISTED E&M SERVICE: CPT | Mod: HCNC,S$GLB,, | Performed by: UROLOGY

## 2023-09-01 NOTE — PROGRESS NOTES
"CC: follow up prostate cancer    History of Present Illness:     23-here for lupron. No bothersome LUTS. No gross hematuria. He does report back and groin pain, which limits his activity. No weight loss. +hot flashes.   3/1/23-Here for lupron. Continues to take zytiga/prednisone. Still having hot flashes, but doing okay. Stream is good. No gross hematuria or dysuria. No bone pain.   22-has been 11 months since his last visit. States that his sister  when he was supposed to follow up. He continues to take Zytiga/prednisone and PSA remains undetectable. Good stream. No stranguria. No incontinence. Sometimes has urgency.   21- Currently managed on Zytiga/prednisone and Lupron. Reports hot flashes. Sometimes he has urinary urgency. No incontinence. No gross hematuria. No stranguria. Having bone density testing done.   3/15/21: Lupron today, PSA low.  Reviewed history in detail.   20: PSA very low.  Lupron today. Reviewed history in detail.   3/11/20: Lupron due today; will switch to 6 mo prep. Reviewed history in detail. Very active with outside work.  19:  Lupron shot today.  Reviewed history in detail. Currently on dual therapy with abariterone.   19: Lupron shot today.  Reviewed history in detail. Currently on dual therapy with abariterone.   19: Lupron shot today.  Reviewed history in detail. Currently on dual therapy with abariterone.  19: Son  in an MVA since last visit. Reviewed history in detail.  No problems from Lupron.  18: No problems from Lupron.  PSA lowest.  18: PSA today and again 3 mo.  Doing fine.  Reviewed history in detail.  18: PSA well down.  No adverse effects from Lupron.    10/18/17: Been on casodex a month back to review and start Lupron.  Dr. Moscoso felt XRT was not an option but perhaps chemotherapy could be beneficial.  17: Bone scan reports "RIGHT supraorbital location and a smaller similarly extremely intense focus of " "increased uptake posteriorly on the RIGHT at the T9 level.  Etiology is uncertain."  CT scan shows "A few scattered shotty mesenteric and retroperitoneal nodes identified.  Similar nodes identified within the pelvis bilaterally."  MRI shows left 2.8 cm prostate mass PIRADS 5, with metastatic pelvic lymphadenopathy (right pelvic sidewall node and left internal iliac chain LN).  No bony mets in pelvis.  7/18/17:  No problems from biopsy.  Gl 4+5 in the left base (30%) and a sig amount of 4+4 in other parts of the gland.  Reviewed treatment options, and imaging needs.  7/3/17: TRUS/Bx today 27 gm.  5/24/17: 61 yo man has problems with perineal/scrotal pain once or twice a month; lasts for an hour or two, some nocturia.  PSA recently elevated.  No abd/pelvic pain and no exac/rel factors.  No hematuria.  No urolithiasis.  No urinary bother.  No  history. CT with contrast earlier this year essentially normal.      Review of Systems   Constitutional:  Negative for appetite change and unexpected weight change.   Respiratory:  Negative for shortness of breath.    Cardiovascular:  Negative for chest pain.   Gastrointestinal:  Negative for abdominal pain.   Musculoskeletal:  Positive for arthralgias and back pain.   All other systems reviewed and are negative.      Current Outpatient Medications   Medication Sig Dispense Refill    abiraterone (ZYTIGA) 250 mg Tab Take 4 tablets (1,000 mg total) by mouth once daily. 120 tablet 11    aspirin (ECOTRIN) 81 MG EC tablet Take by mouth. 1 Tablet, Delayed Release (E.C.) Oral Every day      atorvastatin (LIPITOR) 40 MG tablet Take 1 tablet (40 mg total) by mouth once daily. 90 tablet 4    budesonide-formoterol 160-4.5 mcg (SYMBICORT) 160-4.5 mcg/actuation HFAA Inhale 2 puffs into the lungs every 12 (twelve) hours. 10.2 g 3    calcium-vitamin D 600 mg(1,500mg) -400 unit Tab Take by mouth. 1 Tablet Oral Twice a day      ezetimibe (ZETIA) 10 mg tablet TAKE 1 TABLET (10 MG TOTAL) BY MOUTH " ONCE DAILY. 90 tablet 4    fluticasone (FLONASE) 50 mcg/actuation nasal spray 1 spray by Each Nare route once daily. (Patient taking differently: 1 spray by Each Nostril route daily as needed.) 1 Bottle 11    HYDROcodone-acetaminophen (NORCO)  mg per tablet Take 1 tablet by mouth every 6 (six) hours as needed for Pain. 90 tablet 0    latanoprost 0.005 % ophthalmic solution   3    LUPRON DEPOT, 6 MONTH, 45 mg SyKt injection       metoprolol succinate (TOPROL-XL) 50 MG 24 hr tablet Take 1 tablet (50 mg total) by mouth once daily. 90 tablet 4    nitroGLYCERIN (NITROSTAT) 0.4 MG SL tablet Place 1 tablet (0.4 mg total) under the tongue every 5 (five) minutes as needed for Chest pain. 20 tablet 12    pantoprazole (PROTONIX) 40 MG tablet Take 1 tablet (40 mg total) by mouth once daily. 90 tablet 2    predniSONE (DELTASONE) 5 MG tablet Take 1 tablet (5 mg total) by mouth 2 (two) times daily. 60 tablet 3    travoprost (TRAVATAN Z) 0.004 % Drop Place 1 drop into both eyes every evening. 1 Drops Ophthalmic At bedtime 5 mL 12    fexofenadine (ALLEGRA) 180 MG tablet Take 1 tablet (180 mg total) by mouth once daily. 30 tablet 3     Current Facility-Administered Medications   Medication Dose Route Frequency Provider Last Rate Last Admin    leuprolide acetate (6 month) injection 45 mg  45 mg Intramuscular 1 time in Clinic/HOD Lydia Paniagua MD           Review of patient's allergies indicates:   Allergen Reactions    Avelox  [moxifloxacin] Rash    Nsaids (non-steroidal anti-inflammatory drug) Other (See Comments)     dyspepsia       Past medical, family, and social history reviewed as documented in chart with pertinent positive medical, family, and social history detailed in HPI.    Physical Exam  Vitals:    09/01/23 0956   BP: (!) 145/88   Pulse: 98   Resp: 18   Temp: 97.2 °F (36.2 °C)       General: Well-developed, well-nourished, in no acute distress  HEENT: Normocephalic, atraumatic, extraocular eye movements in  tact  Neck: supple, trachea midline, no cervical or supraclavicular adenopathy  Respirations: Even and unlabored  Extremities: Moves all extremities equally, atraumatic, no clubbing, cyanosis, edema  Skin: warm and dry, no lesions  Psych: normal affect  Neuro: Alert and oriented x 3. Cranial nerves II-XII grossly intact      PSA    2/16: 22.1    2/17: 22.9    1/18: 0.24    7/18: 0.01    1/19, 4/19, 8/19, 11/19, 3/20, 3/21, 9/21, 8/22, 11/22, 5/19/23: undetect    Assessment:   1. Prostate cancer metastatic to bone  leuprolide acetate (6 month) injection 45 mg    Prior authorization Order                Plan:  Prostate cancer metastatic to bone  -     leuprolide acetate (6 month) injection 45 mg  -     Prior authorization Order        continue Zytiga/prednisone and Heme/onc follow ups.     Follow up in about 6 months (around 3/1/2024) for Lupron.

## 2023-09-05 ENCOUNTER — TELEPHONE (OUTPATIENT)
Dept: INTERNAL MEDICINE | Facility: CLINIC | Age: 68
End: 2023-09-05
Payer: MEDICARE

## 2023-09-05 DIAGNOSIS — C61 PROSTATE CANCER: ICD-10-CM

## 2023-09-05 DIAGNOSIS — C79.51 SECONDARY ADENOCARCINOMA OF SKELETAL BONE: ICD-10-CM

## 2023-09-05 DIAGNOSIS — G89.3 NEOPLASM RELATED PAIN: ICD-10-CM

## 2023-09-05 RX ORDER — HYDROCODONE BITARTRATE AND ACETAMINOPHEN 10; 325 MG/1; MG/1
1 TABLET ORAL EVERY 6 HOURS PRN
Qty: 90 TABLET | Refills: 0 | Status: CANCELLED | OUTPATIENT
Start: 2023-09-05

## 2023-09-05 NOTE — TELEPHONE ENCOUNTER
----- Message from Sheba Garcia sent at 9/5/2023  1:22 PM CDT -----  Contact: Joey Mensah is calling in regards to getting an appt for follow up. Pt stated going out town and needs to appt for next week. Please call back at [phone]                                  Thanks  KT

## 2023-09-05 NOTE — TELEPHONE ENCOUNTER
S/w pt to assist with getting rescheduled but pt stated that he will call when he gets back in town./Kmw

## 2023-09-06 DIAGNOSIS — C61 PROSTATE CANCER: ICD-10-CM

## 2023-09-06 DIAGNOSIS — C79.51 SECONDARY ADENOCARCINOMA OF SKELETAL BONE: ICD-10-CM

## 2023-09-06 DIAGNOSIS — G89.3 NEOPLASM RELATED PAIN: ICD-10-CM

## 2023-09-06 RX ORDER — HYDROCODONE BITARTRATE AND ACETAMINOPHEN 10; 325 MG/1; MG/1
1 TABLET ORAL EVERY 6 HOURS PRN
Qty: 90 TABLET | Refills: 0 | Status: SHIPPED | OUTPATIENT
Start: 2023-09-06 | End: 2023-10-05 | Stop reason: SDUPTHER

## 2023-09-06 NOTE — TELEPHONE ENCOUNTER
----- Message from Wendy Mandel sent at 9/6/2023  7:25 AM CDT -----  Contact: Joey  Type:  RX Refill Request    Who Called:  Ray   Refill or New Rx: refill   RX Name and Strength: Hydrocodone-acetaminophen (NORCO)  mg per tablet  How is the patient currently taking it? (ex. 1XDay):   Is this a 30 day or 90 day RX:  [facility] Pharm[facility] with phone number:   [facility] [address] [place] [address]  [address] [address] [address]  Phone: [phone] Fax: [phone]  Local or Mail Order: Local   Ordering Provider: Dr. Berrios   Would the patient rather a call back or a response via [facility]? Call back   Best Call Back Number: Please call him at [phone]  Additional Information:

## 2023-09-07 ENCOUNTER — TELEPHONE (OUTPATIENT)
Dept: HEMATOLOGY/ONCOLOGY | Facility: CLINIC | Age: 68
End: 2023-09-07
Payer: MEDICARE

## 2023-09-07 NOTE — TELEPHONE ENCOUNTER
----- Message from Roxane Shea MA sent at 9/5/2023  4:33 PM CDT -----    ----- Message -----  From: Annel Sanford FNP  Sent: 9/5/2023   4:01 PM CDT  To: [facility] Staff    Good afternoon,     This patient is scheduled to see me in a couple of weeks. He is a cancer patient that has chosen Dr. Kirby as his doctor since Dr. Cowan left. He needs to get established with another oncologist. Can his appointment be rescheduled with either Dr. Kirby or another physician?

## 2023-09-20 ENCOUNTER — LAB VISIT (OUTPATIENT)
Dept: LAB | Facility: HOSPITAL | Age: 68
End: 2023-09-20
Attending: INTERNAL MEDICINE
Payer: MEDICARE

## 2023-09-20 DIAGNOSIS — C61 PROSTATE CANCER: ICD-10-CM

## 2023-09-20 DIAGNOSIS — C79.51 SECONDARY ADENOCARCINOMA OF SKELETAL BONE: ICD-10-CM

## 2023-09-20 LAB
ALBUMIN SERPL BCP-MCNC: 3.3 G/DL (ref 3.5–5.2)
ALP SERPL-CCNC: 76 U/L (ref 55–135)
ALT SERPL W/O P-5'-P-CCNC: 15 U/L (ref 10–44)
ANION GAP SERPL CALC-SCNC: 11 MMOL/L (ref 8–16)
AST SERPL-CCNC: 21 U/L (ref 10–40)
BILIRUB SERPL-MCNC: 0.4 MG/DL (ref 0.1–1)
BUN SERPL-MCNC: 10 MG/DL (ref 8–23)
CALCIUM SERPL-MCNC: 9.2 MG/DL (ref 8.7–10.5)
CHLORIDE SERPL-SCNC: 105 MMOL/L (ref 95–110)
CO2 SERPL-SCNC: 26 MMOL/L (ref 23–29)
CREAT SERPL-MCNC: 1 MG/DL (ref 0.5–1.4)
ERYTHROCYTE [DISTWIDTH] IN BLOOD BY AUTOMATED COUNT: 14.5 % (ref 11.5–14.5)
EST. GFR  (NO RACE VARIABLE): >60 ML/MIN/1.73 M^2
GLUCOSE SERPL-MCNC: 106 MG/DL (ref 70–110)
HCT VFR BLD AUTO: 40.3 % (ref 40–54)
HGB BLD-MCNC: 12.6 G/DL (ref 14–18)
IMM GRANULOCYTES # BLD AUTO: 0.06 K/UL (ref 0–0.04)
MCH RBC QN AUTO: 29.9 PG (ref 27–31)
MCHC RBC AUTO-ENTMCNC: 31.3 G/DL (ref 32–36)
MCV RBC AUTO: 96 FL (ref 82–98)
NEUTROPHILS # BLD AUTO: 5.2 K/UL (ref 1.8–7.7)
PLATELET # BLD AUTO: 220 K/UL (ref 150–450)
PMV BLD AUTO: 11.1 FL (ref 9.2–12.9)
POTASSIUM SERPL-SCNC: 4.4 MMOL/L (ref 3.5–5.1)
PROT SERPL-MCNC: 7.4 G/DL (ref 6–8.4)
RBC # BLD AUTO: 4.21 M/UL (ref 4.6–6.2)
SODIUM SERPL-SCNC: 142 MMOL/L (ref 136–145)
WBC # BLD AUTO: 9.51 K/UL (ref 3.9–12.7)

## 2023-09-20 PROCEDURE — 84153 ASSAY OF PSA TOTAL: CPT | Mod: HCNC | Performed by: NURSE PRACTITIONER

## 2023-09-20 PROCEDURE — 84403 ASSAY OF TOTAL TESTOSTERONE: CPT | Mod: HCNC | Performed by: NURSE PRACTITIONER

## 2023-09-20 PROCEDURE — 80053 COMPREHEN METABOLIC PANEL: CPT | Mod: HCNC | Performed by: NURSE PRACTITIONER

## 2023-09-20 PROCEDURE — 85027 COMPLETE CBC AUTOMATED: CPT | Mod: HCNC | Performed by: NURSE PRACTITIONER

## 2023-09-20 PROCEDURE — 36415 COLL VENOUS BLD VENIPUNCTURE: CPT | Mod: HCNC,PN | Performed by: NURSE PRACTITIONER

## 2023-09-21 LAB
COMPLEXED PSA SERPL-MCNC: <0.01 NG/ML (ref 0–4)
TESTOST SERPL-MCNC: <4 NG/DL (ref 304–1227)

## 2023-09-21 NOTE — PROGRESS NOTES
HEMATOLOGY / ONCOLOGY   CLINIC NOTE     ONCOLOGICAL HISTORY:     Diagnosis:  - Metastatic prostate cancer    Treatment History:  -     Current Treatment:   - Lupron every 6 months with urology   - Zytiga/prednisone started 12/2017     Subjective:       Chief Complaint: Prostate Cancer      HPI    Ray Marco Jacobo  68 y.o.  male with past medical history significant for glaucoma, pulmonary fibrosis, HTN, COPD, CAD who is seen in Hem/Onc for metastatic prostate cancer.     He was initially seen in 11/2017 by Dr. Roman after being referred by Dr. Roland Dawn with Urology.      He has a family history of father had gastric cancer in his 70s, 2 sisters with breast cancer in their 50s/60s, and no other immediate family members with cancers or bleeding/clotting disorders. He reported a 40+-pack-year smoking history at the time of initial visit and was still smoking 1/4 pack a day at that time.      He was started on Zytiga + prednisone which he continues. He receives ADT and Lupron under the care of Dr. Dawn.        Interval History:     Patient presents for follow up on Zytiga/prednisone and reports adherence to Zytiga and prednisone. He also takes pain medications for back and pelvic pain from bone mets. He reports receiving Lupron with urology, given last dose on 9/1/2023.     Denies any fevers, night sweats, weight loss, urine issues, worsening of pain        Oncology History   Prostate cancer   9/29/2017 Cancer Staged    Staging form: Prostate, AJCC 8th Edition  - Clinical stage from 9/29/2017: Stage IVB (cT3a, cN1, cM1b, PSA: 22.6, Grade Group: 5)     10/2/2017 Initial Diagnosis    Prostate cancer         Past Medical History:   Diagnosis Date    Angina pectoris     Back pain     Chronic bronchitis     Colon polyp     COPD (chronic obstructive pulmonary disease) 7/30/2013    Coronary artery disease 7/30/2013    Emphysema of lung     Essential hypertension 10/29/2018    Glaucoma (increased eye  pressure) 2013    Headache(784.0)     Immunosuppressed due to chemotherapy 2021    Mitral regurgitation 2013    Mixed hyperlipidemia 2013    Neuropathy     Osteoarthritis of spine with radiculopathy, lumbar region 2015    Other and unspecified hyperlipidemia     Prostate cancer     Pulmonary fibrosis 10/3/2017      Past Surgical History:   Procedure Laterality Date    ANKLE FRACTURE SURGERY Left     COLONOSCOPY N/A 3/21/2016    Procedure: COLONOSCOPY;  Surgeon: Melvin Pearce MD;  Location: [facility];  Service: Endoscopy;  Laterality: N/A;    CORONARY STENT PLACEMENT      times 2    SHOULDER ARTHROSCOPY Left     SPINE SURGERY      neck fusion x2     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   Tobacco Use    Smoking status: Former     Current packs/day: 0.00     Average packs/day: 0.3 packs/day for 25.0 years (6.3 ttl pk-yrs)     Types: Cigarettes     Start date: 1993     Quit date: 2018     Years since quittin.0    Smokeless tobacco: Never   Substance and Sexual Activity    Alcohol use: Not Currently     Alcohol/week: 0.0 standard drinks of alcohol     Comment: occasionally    Drug use: No    Sexual activity: Yes   Social History Narrative    Wife and son     Social Determinants of Health     Financial Resource Strain: Medium Risk (2023)    Overall Financial Resource Strain (CARDIA)     Difficulty of Paying Living Expenses: Somewhat hard   Food Insecurity: No Food Insecurity (2023)    Hunger Vital Sign     Worried About Running Out of Food in the Last Year: Never true     Ran Out of Food in the Last Year: Never true   Transportation Needs: No Transportation Needs (2023)    PRAPARE - Transportation     Lack of Transportation (Medical): No     Lack of Transportation (Non-Medical): No   Physical Activity: Sufficiently Active (2023)     Exercise Vital Sign     Days of Exercise per Week: 7 days     Minutes of Exercise per Session: 60 min   Stress: No Stress Concern Present (1/6/2023)    [employer] [place] [employer] - Occupational Stress Questionnaire     Feeling of Stress : Only a little   Social Connections: Moderately Integrated (1/6/2023)    Social Connection and Isolation Panel [NHANES]     Frequency of Communication with Friends and Family: More than three times a week     Frequency of Social Gatherings with Friends and Family: Three times a week     Attends Church Services: More than 4 times per year     Active Member of Clubs or Organizations: No     Attends Club or Organization Meetings: Never     Marital Status:    Housing Stability: Low Risk  (1/6/2023)    Housing Stability Vital Sign     Unable to Pay for Housing in the Last Year: No     Number of Places Lived in the Last Year: 1     Unstable Housing in the Last Year: No      Family History   Problem Relation Age of Onset    Cataracts Mother     Kidney disease Mother     Cancer Father         Stomach    Blindness Maternal Grandmother     Diabetes Sister     Hypertension Sister     Diabetes Sister     Hypertension Sister     Diabetes Sister     Hypertension Sister     Hypertension Sister     Hypertension Sister     Colon cancer Brother     Colon cancer Brother           Review of patient's allergies indicates:   Allergen Reactions    Avelox  [moxifloxacin] Rash    Nsaids (non-steroidal anti-inflammatory drug) Other (See Comments)     dyspepsia      Review of Systems   Constitutional: Negative.  Negative for activity change, chills, fatigue and fever.   HENT: Negative.     Eyes: Negative.    Respiratory:  Negative for cough and shortness of breath.    Cardiovascular:  Negative for chest pain and leg swelling.   Gastrointestinal:  Negative for constipation, diarrhea, nausea and vomiting.   Endocrine: Negative.    Genitourinary: Negative.    Musculoskeletal:  Positive  for arthralgias and back pain. Negative for myalgias.   Integumentary:  Negative.   Allergic/Immunologic: Negative.    Neurological:  Negative for light-headedness, numbness and headaches.   Hematological: Negative.    Psychiatric/Behavioral: Negative.           Objective:        Vitals:    09/22/23 1010   BP: 127/86   Pulse: 79   Temp: 97.6 °F (36.4 °C)        Physical Exam  Constitutional:       General: He is not in acute distress.     Appearance: He is well-developed. He is not ill-appearing.   HENT:      Head: Normocephalic and atraumatic.      Mouth/Throat:      Mouth: Mucous membranes are moist.   Eyes:      Extraocular Movements: Extraocular movements intact.      Conjunctiva/sclera: Conjunctivae normal.   Cardiovascular:      Rate and Rhythm: Normal rate and regular rhythm.      Heart sounds: Normal heart sounds.   Pulmonary:      Effort: Pulmonary effort is normal. No respiratory distress.      Breath sounds: Normal breath sounds. No wheezing.   Abdominal:      General: There is no distension.      Palpations: Abdomen is soft.      Tenderness: There is no abdominal tenderness.   Musculoskeletal:         General: Normal range of motion.      Cervical back: Normal range of motion and neck supple.   Skin:     General: Skin is warm.   Neurological:      Mental Status: He is alert and oriented to person, place, and time. Mental status is at baseline.   Psychiatric:         Mood and Affect: Mood normal.           LABS / IMAGING      - Reviewed       Assessment:     ECOG SCORE    0 - Fully active-able to carry on all pre-disease performance without restriction       Prostate cancer  High risk prostate adenocarcinoma on long-term ADT with Zytiga and steroid. Has lupron injection every 6 months. Tolerating well.  Most recent PSA noted at less than 0.01 nanograms/cc. Return back in 3 months with repeat labs or sooner if needed.     Secondary adenocarcinoma of skeletal bone  Chronic low back pain.  Repeated MRI of  spine showed no changes compared to MRI from 2015/2017. Noted degenerative disc disease.  No evidence of metastasis. Followed by bone and spine specialist.          Plan:       - patient got his last dose of Lupron on 9/1/23, continue to follow up with Urology  - labs reviewed, continue with Zytiga and prednisone  - MD / LABS VISIT - 12 WEEKS         Med Onc Chart Routing      Follow up with physician 3 months.   Follow up with [facility]    Infusion scheduling note    Injection scheduling note    Labs CBC, CMP, magnesium and PSA   Scheduling:  Preferred lab:  Lab interval:  Labs 3 days before next visit   Imaging    Pharmacy appointment    Other referrals                    The patient was seen, interviewed and examined. Pertinent lab and radiology studies were reviewed. Pt instructed to call should develop concerning signs/symptoms or have further questions.       Portions of the record may have been created with voice recognition software. Occasional wrong-word or sound-a-like substitutions may have occurred due to the inherent limitations of voice recognition software. Read the chart carefully and recognize, using context, where substitutions have occurred.    Lizy Kirby MD  Hematology / Oncology

## 2023-09-22 ENCOUNTER — OFFICE VISIT (OUTPATIENT)
Dept: HEMATOLOGY/ONCOLOGY | Facility: CLINIC | Age: 68
End: 2023-09-22
Payer: MEDICARE

## 2023-09-22 VITALS
WEIGHT: 148.13 LBS | HEART RATE: 79 BPM | DIASTOLIC BLOOD PRESSURE: 86 MMHG | SYSTOLIC BLOOD PRESSURE: 127 MMHG | TEMPERATURE: 98 F | BODY MASS INDEX: 21.26 KG/M2

## 2023-09-22 DIAGNOSIS — C79.51 SECONDARY ADENOCARCINOMA OF SKELETAL BONE: ICD-10-CM

## 2023-09-22 DIAGNOSIS — G89.3 NEOPLASM RELATED PAIN: ICD-10-CM

## 2023-09-22 PROCEDURE — 3288F FALL RISK ASSESSMENT DOCD: CPT | Mod: HCNC,CPTII,S$GLB, | Performed by: INTERNAL MEDICINE

## 2023-09-22 PROCEDURE — 1160F RVW MEDS BY RX/DR IN RCRD: CPT | Mod: HCNC,CPTII,S$GLB, | Performed by: INTERNAL MEDICINE

## 2023-09-22 PROCEDURE — 1159F MED LIST DOCD IN RCRD: CPT | Mod: HCNC,CPTII,S$GLB, | Performed by: INTERNAL MEDICINE

## 2023-09-22 PROCEDURE — 99214 PR OFFICE/OUTPT VISIT, EST, LEVL IV, 30-39 MIN: ICD-10-PCS | Mod: HCNC,S$GLB,, | Performed by: INTERNAL MEDICINE

## 2023-09-22 PROCEDURE — 3079F PR MOST RECENT DIASTOLIC BLOOD PRESSURE 80-89 MM HG: ICD-10-PCS | Mod: HCNC,CPTII,S$GLB, | Performed by: INTERNAL MEDICINE

## 2023-09-22 PROCEDURE — 3079F DIAST BP 80-89 MM HG: CPT | Mod: HCNC,CPTII,S$GLB, | Performed by: INTERNAL MEDICINE

## 2023-09-22 PROCEDURE — 3074F PR MOST RECENT SYSTOLIC BLOOD PRESSURE < 130 MM HG: ICD-10-PCS | Mod: HCNC,CPTII,S$GLB, | Performed by: INTERNAL MEDICINE

## 2023-09-22 PROCEDURE — 1101F PT FALLS ASSESS-DOCD LE1/YR: CPT | Mod: HCNC,CPTII,S$GLB, | Performed by: INTERNAL MEDICINE

## 2023-09-22 PROCEDURE — 3008F BODY MASS INDEX DOCD: CPT | Mod: HCNC,CPTII,S$GLB, | Performed by: INTERNAL MEDICINE

## 2023-09-22 PROCEDURE — 3008F PR BODY MASS INDEX (BMI) DOCUMENTED: ICD-10-PCS | Mod: HCNC,CPTII,S$GLB, | Performed by: INTERNAL MEDICINE

## 2023-09-22 PROCEDURE — 1126F PR PAIN SEVERITY QUANTIFIED, NO PAIN PRESENT: ICD-10-PCS | Mod: HCNC,CPTII,S$GLB, | Performed by: INTERNAL MEDICINE

## 2023-09-22 PROCEDURE — 99214 OFFICE O/P EST MOD 30 MIN: CPT | Mod: HCNC,S$GLB,, | Performed by: INTERNAL MEDICINE

## 2023-09-22 PROCEDURE — 99999 PR PBB SHADOW E&M-EST. PATIENT-LVL II: CPT | Mod: PBBFAC,HCNC,, | Performed by: INTERNAL MEDICINE

## 2023-09-22 PROCEDURE — 1160F PR REVIEW ALL MEDS BY PRESCRIBER/CLIN PHARMACIST DOCUMENTED: ICD-10-PCS | Mod: HCNC,CPTII,S$GLB, | Performed by: INTERNAL MEDICINE

## 2023-09-22 PROCEDURE — 3074F SYST BP LT 130 MM HG: CPT | Mod: HCNC,CPTII,S$GLB, | Performed by: INTERNAL MEDICINE

## 2023-09-22 PROCEDURE — 3288F PR FALLS RISK ASSESSMENT DOCUMENTED: ICD-10-PCS | Mod: HCNC,CPTII,S$GLB, | Performed by: INTERNAL MEDICINE

## 2023-09-22 PROCEDURE — 99999 PR PBB SHADOW E&M-EST. PATIENT-LVL II: ICD-10-PCS | Mod: PBBFAC,HCNC,, | Performed by: INTERNAL MEDICINE

## 2023-09-22 PROCEDURE — 1126F AMNT PAIN NOTED NONE PRSNT: CPT | Mod: HCNC,CPTII,S$GLB, | Performed by: INTERNAL MEDICINE

## 2023-09-22 PROCEDURE — 1101F PR PT FALLS ASSESS DOC 0-1 FALLS W/OUT INJ PAST YR: ICD-10-PCS | Mod: HCNC,CPTII,S$GLB, | Performed by: INTERNAL MEDICINE

## 2023-09-22 PROCEDURE — 1159F PR MEDICATION LIST DOCUMENTED IN MEDICAL RECORD: ICD-10-PCS | Mod: HCNC,CPTII,S$GLB, | Performed by: INTERNAL MEDICINE

## 2023-09-22 RX ORDER — PREDNISONE 5 MG/1
5 TABLET ORAL 2 TIMES DAILY
Qty: 60 TABLET | Refills: 3 | Status: SHIPPED | OUTPATIENT
Start: 2023-09-22 | End: 2024-01-02 | Stop reason: SDUPTHER

## 2023-10-05 DIAGNOSIS — C61 PROSTATE CANCER: ICD-10-CM

## 2023-10-05 DIAGNOSIS — G89.3 NEOPLASM RELATED PAIN: ICD-10-CM

## 2023-10-05 DIAGNOSIS — C79.51 SECONDARY ADENOCARCINOMA OF SKELETAL BONE: ICD-10-CM

## 2023-10-05 RX ORDER — HYDROCODONE BITARTRATE AND ACETAMINOPHEN 10; 325 MG/1; MG/1
1 TABLET ORAL EVERY 6 HOURS PRN
Qty: 90 TABLET | Refills: 0 | Status: SHIPPED | OUTPATIENT
Start: 2023-10-05 | End: 2023-10-31 | Stop reason: SDUPTHER

## 2023-10-31 DIAGNOSIS — C61 PROSTATE CANCER: ICD-10-CM

## 2023-10-31 DIAGNOSIS — C79.51 SECONDARY ADENOCARCINOMA OF SKELETAL BONE: ICD-10-CM

## 2023-10-31 DIAGNOSIS — G89.3 NEOPLASM RELATED PAIN: ICD-10-CM

## 2023-10-31 RX ORDER — HYDROCODONE BITARTRATE AND ACETAMINOPHEN 10; 325 MG/1; MG/1
1 TABLET ORAL EVERY 6 HOURS PRN
Qty: 90 TABLET | Refills: 0 | Status: SHIPPED | OUTPATIENT
Start: 2023-10-31 | End: 2023-11-28 | Stop reason: SDUPTHER

## 2023-11-15 DIAGNOSIS — I25.10 CORONARY ARTERY DISEASE INVOLVING NATIVE CORONARY ARTERY WITHOUT ANGINA PECTORIS, UNSPECIFIED WHETHER NATIVE OR TRANSPLANTED HEART: ICD-10-CM

## 2023-11-15 DIAGNOSIS — E78.2 MIXED HYPERLIPIDEMIA: Chronic | ICD-10-CM

## 2023-11-15 RX ORDER — EZETIMIBE 10 MG/1
TABLET ORAL
Qty: 90 TABLET | Refills: 1 | Status: SHIPPED | OUTPATIENT
Start: 2023-11-15

## 2023-11-15 RX ORDER — METOPROLOL SUCCINATE 50 MG/1
TABLET, EXTENDED RELEASE ORAL
Qty: 90 TABLET | Refills: 4 | Status: SHIPPED | OUTPATIENT
Start: 2023-11-15

## 2023-11-15 NOTE — TELEPHONE ENCOUNTER
No care due was identified.  [facility] Embedded Care Due Messages. Reference number: [id number].   11/15/2023 10:40:05 AM CST

## 2023-11-15 NOTE — TELEPHONE ENCOUNTER
Refill Decision Note   Joey Jacobo  is requesting a refill authorization.    Brief Assessment and Rationale for Refill:   Approve       Medication Therapy Plan:         Comments:     Note composed:3:09 PM 11/15/2023

## 2023-11-28 DIAGNOSIS — C79.51 SECONDARY ADENOCARCINOMA OF SKELETAL BONE: ICD-10-CM

## 2023-11-28 DIAGNOSIS — G89.3 NEOPLASM RELATED PAIN: ICD-10-CM

## 2023-11-28 DIAGNOSIS — C61 PROSTATE CANCER: ICD-10-CM

## 2023-11-28 NOTE — TELEPHONE ENCOUNTER
----- Message from Yolande roland sent at 11/28/2023  4:23 PM CST -----  Name of Who is Calling:  patient         What is the request in detail:  Calling to get a refill for medication    HYDROcodone-acetaminophen (NORCO)  mg per tablet 90 tablet 0 10/31/2023 - No  Sig - Route: Take 1 tablet by mouth every 6 (six) hours as needed for Pain. - Oral  Sent to pharmacy as: HYDROcodone-acetaminophen (NORCO)  mg per tablet  Class: Normal  Earliest Fill Date: 10/31/2023  Notes to Pharmacy: Quantity prescribed more than 7 day supply? Yes, quantity medically necessary. Fill 12/19/22  Order: [id number]  Date/Time Signed: 10/31/2023 12:52      E-Prescribing Status: Receipt confirmed by pharmacy (10/31/2023  1:01 PM CDT)      [facility], [address] [address]  Phone: [phone] Fax: [phone]          Can the clinic reply by [facility]:no           What Number to Call Back if not in [facility]: [phone]

## 2023-11-29 RX ORDER — HYDROCODONE BITARTRATE AND ACETAMINOPHEN 10; 325 MG/1; MG/1
1 TABLET ORAL EVERY 6 HOURS PRN
Qty: 90 TABLET | Refills: 0 | Status: SHIPPED | OUTPATIENT
Start: 2023-11-29 | End: 2024-01-02 | Stop reason: SDUPTHER

## 2023-12-11 DIAGNOSIS — C61 PROSTATE CANCER: ICD-10-CM

## 2023-12-11 RX ORDER — ABIRATERONE ACETATE 250 MG/1
1000 TABLET ORAL DAILY
Qty: 120 TABLET | Refills: 11 | Status: CANCELLED | OUTPATIENT
Start: 2023-12-11 | End: 2024-12-10

## 2023-12-13 DIAGNOSIS — C61 PROSTATE CANCER: ICD-10-CM

## 2023-12-13 RX ORDER — ABIRATERONE ACETATE 250 MG/1
1000 TABLET ORAL DAILY
Qty: 120 TABLET | Refills: 11 | Status: CANCELLED | OUTPATIENT
Start: 2023-12-11 | End: 2024-12-10

## 2023-12-18 DIAGNOSIS — C61 PROSTATE CANCER: ICD-10-CM

## 2023-12-18 RX ORDER — ABIRATERONE ACETATE 250 MG/1
1000 TABLET ORAL DAILY
Qty: 120 TABLET | Refills: 1 | Status: ACTIVE | OUTPATIENT
Start: 2023-12-18 | End: 2024-01-02 | Stop reason: SDUPTHER

## 2023-12-26 ENCOUNTER — LAB VISIT (OUTPATIENT)
Dept: LAB | Facility: HOSPITAL | Age: 68
End: 2023-12-26
Attending: INTERNAL MEDICINE
Payer: MEDICARE

## 2023-12-26 DIAGNOSIS — C79.51 SECONDARY ADENOCARCINOMA OF SKELETAL BONE: ICD-10-CM

## 2023-12-26 LAB
ALBUMIN SERPL BCP-MCNC: 3.4 G/DL (ref 3.5–5.2)
ALP SERPL-CCNC: 81 U/L (ref 55–135)
ALT SERPL W/O P-5'-P-CCNC: 17 U/L (ref 10–44)
ANION GAP SERPL CALC-SCNC: 10 MMOL/L (ref 8–16)
AST SERPL-CCNC: 23 U/L (ref 10–40)
BASOPHILS # BLD AUTO: 0.05 K/UL (ref 0–0.2)
BASOPHILS NFR BLD: 0.5 % (ref 0–1.9)
BILIRUB SERPL-MCNC: 0.5 MG/DL (ref 0.1–1)
BUN SERPL-MCNC: 19 MG/DL (ref 8–23)
CALCIUM SERPL-MCNC: 9.3 MG/DL (ref 8.7–10.5)
CHLORIDE SERPL-SCNC: 105 MMOL/L (ref 95–110)
CO2 SERPL-SCNC: 26 MMOL/L (ref 23–29)
COMPLEXED PSA SERPL-MCNC: <0.01 NG/ML (ref 0–4)
CREAT SERPL-MCNC: 1.1 MG/DL (ref 0.5–1.4)
DIFFERENTIAL METHOD: ABNORMAL
EOSINOPHIL # BLD AUTO: 0.1 K/UL (ref 0–0.5)
EOSINOPHIL NFR BLD: 0.7 % (ref 0–8)
ERYTHROCYTE [DISTWIDTH] IN BLOOD BY AUTOMATED COUNT: 14 % (ref 11.5–14.5)
EST. GFR  (NO RACE VARIABLE): >60 ML/MIN/1.73 M^2
GLUCOSE SERPL-MCNC: 119 MG/DL (ref 70–110)
HCT VFR BLD AUTO: 40.1 % (ref 40–54)
HGB BLD-MCNC: 12.7 G/DL (ref 14–18)
IMM GRANULOCYTES # BLD AUTO: 0.06 K/UL (ref 0–0.04)
IMM GRANULOCYTES NFR BLD AUTO: 0.7 % (ref 0–0.5)
LDH SERPL L TO P-CCNC: 263 U/L (ref 110–260)
LYMPHOCYTES # BLD AUTO: 2.6 K/UL (ref 1–4.8)
LYMPHOCYTES NFR BLD: 28.4 % (ref 18–48)
MAGNESIUM SERPL-MCNC: 2.1 MG/DL (ref 1.6–2.6)
MCH RBC QN AUTO: 30.4 PG (ref 27–31)
MCHC RBC AUTO-ENTMCNC: 31.7 G/DL (ref 32–36)
MCV RBC AUTO: 96 FL (ref 82–98)
MONOCYTES # BLD AUTO: 1 K/UL (ref 0.3–1)
MONOCYTES NFR BLD: 10.3 % (ref 4–15)
NEUTROPHILS # BLD AUTO: 5.5 K/UL (ref 1.8–7.7)
NEUTROPHILS NFR BLD: 59.4 % (ref 38–73)
NRBC BLD-RTO: 0 /100 WBC
PLATELET # BLD AUTO: 179 K/UL (ref 150–450)
PMV BLD AUTO: 11.4 FL (ref 9.2–12.9)
POTASSIUM SERPL-SCNC: 4.5 MMOL/L (ref 3.5–5.1)
PROT SERPL-MCNC: 7.6 G/DL (ref 6–8.4)
RBC # BLD AUTO: 4.18 M/UL (ref 4.6–6.2)
SODIUM SERPL-SCNC: 141 MMOL/L (ref 136–145)
WBC # BLD AUTO: 9.18 K/UL (ref 3.9–12.7)

## 2023-12-26 PROCEDURE — 36415 COLL VENOUS BLD VENIPUNCTURE: CPT | Mod: HCNC,PN | Performed by: INTERNAL MEDICINE

## 2023-12-26 PROCEDURE — 83735 ASSAY OF MAGNESIUM: CPT | Mod: HCNC | Performed by: INTERNAL MEDICINE

## 2023-12-26 PROCEDURE — 83615 LACTATE (LD) (LDH) ENZYME: CPT | Mod: HCNC | Performed by: INTERNAL MEDICINE

## 2023-12-26 PROCEDURE — 80053 COMPREHEN METABOLIC PANEL: CPT | Mod: HCNC | Performed by: INTERNAL MEDICINE

## 2023-12-26 PROCEDURE — 84153 ASSAY OF PSA TOTAL: CPT | Mod: HCNC | Performed by: INTERNAL MEDICINE

## 2023-12-26 PROCEDURE — 85025 COMPLETE CBC W/AUTO DIFF WBC: CPT | Mod: HCNC | Performed by: INTERNAL MEDICINE

## 2024-01-02 ENCOUNTER — OFFICE VISIT (OUTPATIENT)
Dept: HEMATOLOGY/ONCOLOGY | Facility: CLINIC | Age: 69
End: 2024-01-02
Payer: MEDICARE

## 2024-01-02 VITALS
HEIGHT: 70 IN | SYSTOLIC BLOOD PRESSURE: 125 MMHG | HEART RATE: 95 BPM | DIASTOLIC BLOOD PRESSURE: 75 MMHG | WEIGHT: 149.5 LBS | BODY MASS INDEX: 21.4 KG/M2 | TEMPERATURE: 98 F | RESPIRATION RATE: 20 BRPM | OXYGEN SATURATION: 97 %

## 2024-01-02 DIAGNOSIS — D84.821 IMMUNOSUPPRESSED DUE TO CHEMOTHERAPY: ICD-10-CM

## 2024-01-02 DIAGNOSIS — C61 PROSTATE CANCER: Primary | ICD-10-CM

## 2024-01-02 DIAGNOSIS — G89.3 NEOPLASM RELATED PAIN: ICD-10-CM

## 2024-01-02 DIAGNOSIS — Z79.899 IMMUNOSUPPRESSED DUE TO CHEMOTHERAPY: ICD-10-CM

## 2024-01-02 DIAGNOSIS — C79.51 SECONDARY ADENOCARCINOMA OF SKELETAL BONE: ICD-10-CM

## 2024-01-02 DIAGNOSIS — T45.1X5A IMMUNOSUPPRESSED DUE TO CHEMOTHERAPY: ICD-10-CM

## 2024-01-02 PROCEDURE — 3074F SYST BP LT 130 MM HG: CPT | Mod: HCNC,CPTII,S$GLB, | Performed by: INTERNAL MEDICINE

## 2024-01-02 PROCEDURE — 1159F MED LIST DOCD IN RCRD: CPT | Mod: HCNC,CPTII,S$GLB, | Performed by: INTERNAL MEDICINE

## 2024-01-02 PROCEDURE — 3288F FALL RISK ASSESSMENT DOCD: CPT | Mod: HCNC,CPTII,S$GLB, | Performed by: INTERNAL MEDICINE

## 2024-01-02 PROCEDURE — 1101F PT FALLS ASSESS-DOCD LE1/YR: CPT | Mod: HCNC,CPTII,S$GLB, | Performed by: INTERNAL MEDICINE

## 2024-01-02 PROCEDURE — 1125F AMNT PAIN NOTED PAIN PRSNT: CPT | Mod: HCNC,CPTII,S$GLB, | Performed by: INTERNAL MEDICINE

## 2024-01-02 PROCEDURE — 3008F BODY MASS INDEX DOCD: CPT | Mod: HCNC,CPTII,S$GLB, | Performed by: INTERNAL MEDICINE

## 2024-01-02 PROCEDURE — 1160F RVW MEDS BY RX/DR IN RCRD: CPT | Mod: HCNC,CPTII,S$GLB, | Performed by: INTERNAL MEDICINE

## 2024-01-02 PROCEDURE — 99215 OFFICE O/P EST HI 40 MIN: CPT | Mod: HCNC,S$GLB,, | Performed by: INTERNAL MEDICINE

## 2024-01-02 PROCEDURE — 99999 PR PBB SHADOW E&M-EST. PATIENT-LVL IV: CPT | Mod: PBBFAC,HCNC,, | Performed by: INTERNAL MEDICINE

## 2024-01-02 PROCEDURE — 3078F DIAST BP <80 MM HG: CPT | Mod: HCNC,CPTII,S$GLB, | Performed by: INTERNAL MEDICINE

## 2024-01-02 RX ORDER — ABIRATERONE ACETATE 250 MG/1
1000 TABLET ORAL DAILY
Qty: 120 TABLET | Refills: 1 | Status: ACTIVE | OUTPATIENT
Start: 2024-01-02 | End: 2025-01-01

## 2024-01-02 RX ORDER — PREDNISONE 5 MG/1
5 TABLET ORAL 2 TIMES DAILY
Qty: 60 TABLET | Refills: 3 | Status: ACTIVE | OUTPATIENT
Start: 2024-01-02

## 2024-01-02 RX ORDER — HYDROCODONE BITARTRATE AND ACETAMINOPHEN 10; 325 MG/1; MG/1
1 TABLET ORAL EVERY 6 HOURS PRN
Qty: 90 TABLET | Refills: 0 | Status: SHIPPED | OUTPATIENT
Start: 2024-01-02 | End: 2024-01-29 | Stop reason: SDUPTHER

## 2024-01-02 NOTE — PROGRESS NOTES
Subjective:       Patient ID: Joey Jacobo is a 68 y.o. male.    Chief Complaint: Results, Chemotherapy, and Prostate Cancer    HPI:  68-year-old male history of metastatic prostate carcinoma diagnosed 7 years ago extensive family history of malignancy patient to the best of my knowledge has not undergone germ line testing will do so today ECOG status 1 continuing Zytiga prednisone and Lupron given by Urology ECOG status 1    Past Medical History:   Diagnosis Date    Angina pectoris     Back pain     Chronic bronchitis     Colon polyp     COPD (chronic obstructive pulmonary disease) 2013    Coronary artery disease 2013    Emphysema of lung     Essential hypertension 10/29/2018    Glaucoma (increased eye pressure) 2013    Headache(784.0)     Immunosuppressed due to chemotherapy 2021    Mitral regurgitation 2013    Mixed hyperlipidemia 2013    Neuropathy     Osteoarthritis of spine with radiculopathy, lumbar region 2015    Other and unspecified hyperlipidemia     Prostate cancer     Pulmonary fibrosis 10/3/2017     Family History   Problem Relation Age of Onset    Cataracts Mother     Kidney disease Mother     Cancer Father         Stomach    Blindness Maternal Grandmother     Diabetes Sister     Hypertension Sister     Diabetes Sister     Hypertension Sister     Diabetes Sister     Hypertension Sister     Hypertension Sister     Hypertension Sister     Colon cancer Brother     Colon cancer Brother      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   Tobacco Use    Smoking status: Former     Current packs/day: 0.00     Average packs/day: 0.3 packs/day for 25.0 years (6.3 ttl pk-yrs)     Types: Cigarettes     Start date: 1993     Quit date: 2018     Years since quittin.3    Smokeless tobacco: Never   Substance and Sexual Activity    Alcohol use:  Not Currently     Alcohol/week: 0.0 standard drinks of alcohol     Comment: occasionally    Drug use: No    Sexual activity: Yes   Social History Narrative    Wife and son     Social Determinants of Health     Financial Resource Strain: Medium Risk (1/6/2023)    Overall Financial Resource Strain (CARDIA)     Difficulty of Paying Living Expenses: Somewhat hard   Food Insecurity: No Food Insecurity (1/6/2023)    Hunger Vital Sign     Worried About Running Out of Food in the Last Year: Never true     Ran Out of Food in the Last Year: Never true   Transportation Needs: No Transportation Needs (1/6/2023)    PRAPARE - Transportation     Lack of Transportation (Medical): No     Lack of Transportation (Non-Medical): No   Physical Activity: Sufficiently Active (1/6/2023)    Exercise Vital Sign     Days of Exercise per Week: 7 days     Minutes of Exercise per Session: 60 min   Stress: No Stress Concern Present (1/6/2023)    [employer] [place] [employer] - Occupational Stress Questionnaire     Feeling of Stress : Only a little   Social Connections: Moderately Integrated (1/6/2023)    Social Connection and Isolation Panel [NHANES]     Frequency of Communication with Friends and Family: More than three times a week     Frequency of Social Gatherings with Friends and Family: Three times a week     Attends Zoroastrian Services: More than 4 times per year     Active Member of Clubs or Organizations: No     Attends Club or Organization Meetings: Never     Marital Status:    Housing Stability: Low Risk  (1/6/2023)    Housing Stability Vital Sign     Unable to Pay for Housing in the Last Year: No     Number of Places Lived in the Last Year: 1     Unstable Housing in the Last Year: No     Past Surgical History:   Procedure Laterality Date    ANKLE FRACTURE SURGERY Left     COLONOSCOPY N/A 3/21/2016    Procedure: COLONOSCOPY;  Surgeon: Melvin Pearce MD;  Location: [facility];  Service: Endoscopy;  Laterality: N/A;     CORONARY STENT PLACEMENT      times 2    SHOULDER ARTHROSCOPY Left     SPINE SURGERY      neck fusion x2       Labs:  Lab Results   Component Value Date    WBC 9.18 12/26/2023    HGB 12.7 (L) 12/26/2023    HCT 40.1 12/26/2023    MCV 96 12/26/2023     12/26/2023     BMP  Lab Results   Component Value Date     12/26/2023    K 4.5 12/26/2023     12/26/2023    CO2 26 12/26/2023    BUN 19 12/26/2023    CREATININE 1.1 12/26/2023    CALCIUM 9.3 12/26/2023    ANIONGAP 10 12/26/2023    ESTGFRAFRICA >60 05/10/2022    EGFRNONAA >60 05/10/2022     Lab Results   Component Value Date    ALT 17 12/26/2023    AST 23 12/26/2023    ALKPHOS 81 12/26/2023    BILITOT 0.5 12/26/2023       Lab Results   Component Value Date    IRON 71 09/23/2020    TIBC 493 (H) 09/23/2020    FERRITIN 79 09/23/2020     Lab Results   Component Value Date    LNPQRXIZ29 468 09/23/2020     Lab Results   Component Value Date    FOLATE 4.7 09/23/2020     Lab Results   Component Value Date    TSH 1.336 02/27/2023         Review of Systems   Constitutional:  Negative for activity change, appetite change, chills, diaphoresis, fatigue, fever and unexpected weight change.   HENT:  Positive for dental problem. Negative for congestion, drooling, ear discharge, ear pain, facial swelling, hearing loss, mouth sores, nosebleeds, postnasal drip, rhinorrhea, sinus pressure, sneezing, sore throat, tinnitus, trouble swallowing and voice change.    Eyes:  Negative for photophobia, pain, discharge, redness, itching and visual disturbance.   Respiratory:  Negative for apnea, cough, choking, chest tightness, shortness of breath, wheezing and stridor.    Cardiovascular:  Negative for chest pain, palpitations and leg swelling.   Gastrointestinal:  Negative for abdominal distention, abdominal pain, anal bleeding, blood in stool, constipation, diarrhea, nausea, rectal pain and vomiting.   Endocrine: Negative for cold intolerance, heat intolerance, polydipsia,  polyphagia and polyuria.   Genitourinary:  Negative for decreased urine volume, difficulty urinating, dysuria, enuresis, flank pain, frequency, genital sores, hematuria, penile discharge, penile pain, penile swelling, scrotal swelling, testicular pain and urgency.   Musculoskeletal:  Negative for arthralgias, back pain, gait problem, joint swelling, myalgias, neck pain and neck stiffness.   Skin:  Negative for color change, pallor, rash and wound.   Allergic/Immunologic: Negative for environmental allergies, food allergies and immunocompromised state.   Neurological:  Negative for dizziness, tremors, seizures, syncope, facial asymmetry, speech difficulty, weakness, light-headedness, numbness and headaches.   Hematological:  Negative for adenopathy. Does not bruise/bleed easily.   Psychiatric/Behavioral:  Negative for agitation, behavioral problems, confusion, decreased concentration, dysphoric mood, hallucinations, self-injury, sleep disturbance and suicidal ideas. The patient is not nervous/anxious and is not hyperactive.        Objective:      Physical Exam  Vitals reviewed.   Constitutional:       General: He is not in acute distress.     Appearance: He is well-developed. He is not diaphoretic.   HENT:      Head: Normocephalic.      Right Ear: External ear normal.      Left Ear: External ear normal.      Nose: Nose normal.      Right Sinus: No maxillary sinus tenderness or frontal sinus tenderness.      Left Sinus: No maxillary sinus tenderness or frontal sinus tenderness.      Mouth/Throat:      Pharynx: No oropharyngeal exudate.      Comments: Multiple dental problems  Eyes:      General: Lids are normal. No scleral icterus.        Right eye: No discharge.         Left eye: No discharge.      Extraocular Movements:      Right eye: Normal extraocular motion.      Left eye: Normal extraocular motion.      Conjunctiva/sclera:      Right eye: Right conjunctiva is not injected. No hemorrhage.     Left eye: Left  conjunctiva is not injected. No hemorrhage.     Pupils: Pupils are equal, round, and reactive to light.   Neck:      Thyroid: No thyromegaly.      Vascular: No JVD.      Trachea: No tracheal deviation.   Cardiovascular:      Rate and Rhythm: Normal rate.   Pulmonary:      Effort: Pulmonary effort is normal. No respiratory distress.      Breath sounds: No stridor.   Abdominal:      General: Bowel sounds are normal.      Palpations: Abdomen is soft. There is no hepatomegaly, splenomegaly or mass.      Tenderness: There is no abdominal tenderness.   Musculoskeletal:         General: No tenderness. Normal range of motion.      Cervical back: Normal range of motion and neck supple.   Lymphadenopathy:      Head:      Right side of head: No posterior auricular or occipital adenopathy.      Left side of head: No posterior auricular or occipital adenopathy.      Cervical: No cervical adenopathy.      Right cervical: No superficial, deep or posterior cervical adenopathy.     Left cervical: No superficial, deep or posterior cervical adenopathy.      Upper Body:      Right upper body: No supraclavicular adenopathy.      Left upper body: No supraclavicular adenopathy.   Skin:     General: Skin is dry.      Findings: No erythema or rash.      Nails: There is no clubbing.   Neurological:      Mental Status: He is alert and oriented to person, place, and time.      Cranial Nerves: No cranial nerve deficit.      Coordination: Coordination normal.   Psychiatric:         Behavior: Behavior normal.         Thought Content: Thought content normal.         Judgment: Judgment normal.             Assessment:      1. Prostate cancer    2. Secondary adenocarcinoma of skeletal bone    3. Neoplasm related pain    4. Immunosuppressed due to chemotherapy           Med Onc Chart Routing      Follow up with physician 6 months. Return in 6 months CBC CMP PSA testosterone prior   Follow up with [facility] . Return to clinic in 3 months CBC CMP TSH and  testosterone prior can be seen by [facility]   Infusion scheduling note    Injection scheduling note Lupron scheduled through Urology   Labs   Scheduling:  Preferred lab:  Lab interval:  Standing labs CBC CMP testosterone and PSA to be done prior to visits   Imaging    Pharmacy appointment    Other referrals                   Plan:     History of metastatic prostate carcinoma continue with current plan course of action refill prescriptions [facility] specialty pharmacy.  Continue to be monitored on three-month basis is not able to be given Zometa because of multiple abnormalities in dental hygiene.  At this time will proceed with follow-up [facility] 3 months and myself in 6 months.        Laurent Berrios Jr, MD FACP

## 2024-01-08 ENCOUNTER — TELEPHONE (OUTPATIENT)
Dept: HEMATOLOGY/ONCOLOGY | Facility: CLINIC | Age: 69
End: 2024-01-08
Payer: MEDICARE

## 2024-01-08 NOTE — TELEPHONE ENCOUNTER
----- Message from Anny Caceres sent at 1/8/2024 12:23 PM CST -----  Contact: Pt  [phone]  Calling to get test results.     Name of test (lab, x-ray, etc.):   labs    Date of test:  01/02/2024    Would they like to receive a phone call or a response via [facility]?: call back      Additional information:  Pt would like a call back to discuss results

## 2024-01-08 NOTE — TELEPHONE ENCOUNTER
Returned patient phone call. Informed him that we have no received results yet and once received we will call or mail the results is they are normal. Patient verbalized understanding and had no other issues at this time.

## 2024-01-11 DIAGNOSIS — R94.6 ABNORMAL RESULTS OF THYROID FUNCTION STUDIES: ICD-10-CM

## 2024-01-11 DIAGNOSIS — C61 PROSTATE CANCER: Primary | ICD-10-CM

## 2024-01-29 DIAGNOSIS — C61 PROSTATE CANCER: ICD-10-CM

## 2024-01-29 DIAGNOSIS — G89.3 NEOPLASM RELATED PAIN: ICD-10-CM

## 2024-01-29 DIAGNOSIS — C79.51 SECONDARY ADENOCARCINOMA OF SKELETAL BONE: ICD-10-CM

## 2024-01-29 RX ORDER — HYDROCODONE BITARTRATE AND ACETAMINOPHEN 10; 325 MG/1; MG/1
1 TABLET ORAL EVERY 6 HOURS PRN
Qty: 90 TABLET | Refills: 0 | Status: SHIPPED | OUTPATIENT
Start: 2024-01-29 | End: 2024-02-21 | Stop reason: SDUPTHER

## 2024-01-29 NOTE — TELEPHONE ENCOUNTER
----- Message from Ivy Bearden sent at 1/29/2024 11:03 AM CST -----  Contact: Patient  Type:  RX Refill Request    Who Called: Joey Jacobo   Refill or New Rx: refill   RX Name and Strength: HYDROcodone-acetaminophen (NORCO)  mg   How is the patient currently taking it? (ex. 1XDay): as needed   Is this a 30 day or 90 day RX: 30 day   Preferred Pharmacy with phone number:   [facility] [address] [place] [address]  [address] [address] [address]  Phone: [phone] Fax: [phone]  Local or Mail Order:Local   Ordering Provider: Dr. Berrios   Would the patient rather a call back or a response via [facility]?  Call back   Best Call Back Number: [phone]  Additional Information:         Follow-up with immediate dental clinic tomorrow or you have L dental clinic as planned on Monday.  Return to the emergency department there is fever, chills, difficulty swallowing or speaking, shortness of breath, worse in any way at all.

## 2024-02-01 ENCOUNTER — TELEPHONE (OUTPATIENT)
Dept: HEMATOLOGY/ONCOLOGY | Facility: CLINIC | Age: 69
End: 2024-02-01
Payer: MEDICARE

## 2024-02-01 NOTE — TELEPHONE ENCOUNTER
SS was consulted to send SS fax to pt. SW intern contact pt's cell phone and left message with SS fax. SW intern will remain available.

## 2024-02-02 ENCOUNTER — DOCUMENTATION ONLY (OUTPATIENT)
Dept: HEMATOLOGY/ONCOLOGY | Facility: CLINIC | Age: 69
End: 2024-02-02
Payer: MEDICARE

## 2024-02-02 NOTE — PROGRESS NOTES
SUREKHA called Ashleya on today at [phone] to fax forms to [facility] fax. [facility] received fax and provided to provider for signature.

## 2024-02-09 ENCOUNTER — OFFICE VISIT (OUTPATIENT)
Dept: URGENT CARE | Facility: CLINIC | Age: 69
End: 2024-02-09
Payer: MEDICARE

## 2024-02-09 VITALS
TEMPERATURE: 98 F | SYSTOLIC BLOOD PRESSURE: 141 MMHG | BODY MASS INDEX: 21.33 KG/M2 | RESPIRATION RATE: 18 BRPM | HEART RATE: 73 BPM | DIASTOLIC BLOOD PRESSURE: 63 MMHG | HEIGHT: 70 IN | WEIGHT: 149 LBS | OXYGEN SATURATION: 99 %

## 2024-02-09 DIAGNOSIS — R52 BODY ACHES: ICD-10-CM

## 2024-02-09 DIAGNOSIS — R05.9 COUGH, UNSPECIFIED TYPE: Primary | ICD-10-CM

## 2024-02-09 LAB
CTP QC/QA: YES
CTP QC/QA: YES
POC MOLECULAR INFLUENZA A AGN: NEGATIVE
POC MOLECULAR INFLUENZA B AGN: NEGATIVE
SARS-COV-2 AG RESP QL IA.RAPID: NEGATIVE

## 2024-02-09 PROCEDURE — 99214 OFFICE O/P EST MOD 30 MIN: CPT | Mod: S$GLB,,, | Performed by: PHYSICIAN ASSISTANT

## 2024-02-09 PROCEDURE — 87502 INFLUENZA DNA AMP PROBE: CPT | Mod: QW,S$GLB,, | Performed by: PHYSICIAN ASSISTANT

## 2024-02-09 PROCEDURE — 87811 SARS-COV-2 COVID19 W/OPTIC: CPT | Mod: QW,S$GLB,, | Performed by: PHYSICIAN ASSISTANT

## 2024-02-09 RX ORDER — BROMPHENIRAMINE MALEATE, PSEUDOEPHEDRINE HYDROCHLORIDE, AND DEXTROMETHORPHAN HYDROBROMIDE 2; 30; 10 MG/5ML; MG/5ML; MG/5ML
10 SYRUP ORAL EVERY 6 HOURS PRN
Qty: 118 ML | Refills: 0 | Status: SHIPPED | OUTPATIENT
Start: 2024-02-09 | End: 2024-02-20

## 2024-02-09 NOTE — PROGRESS NOTES
"Subjective:      Patient ID: Joey Jacobo is a 68 y.o. male.    Vitals:  height is 5' 10" (1.778 m) and weight is 67.6 kg (149 lb). His oral temperature is 98.1 °F (36.7 °C). His blood pressure is 141/63 (abnormal) and his pulse is 73. His respiration is 18 and oxygen saturation is 99%.     Chief Complaint: No chief complaint on file.    68-year-old male presents today complaining of a cough and body aches that began 4 days ago.  He is tried OTC cough medication with no relief.  He describes the cough as nonproductive.  He denies fever, chills, shortness a breath, sore throat, and/or any other symptoms associated with this complaint.      Cough  This is a new problem. The current episode started in the past 7 days. The problem has been gradually worsening. The cough is Non-productive. Pertinent negatives include no chest pain, chills, ear congestion, ear pain, fever, headaches, heartburn, hemoptysis, myalgias, nasal congestion, postnasal drip, rash, rhinorrhea, sore throat, shortness of breath, sweats, weight loss or wheezing. He has tried OTC cough suppressant for the symptoms. The treatment provided no relief. His past medical history is significant for emphysema. There is no history of asthma, bronchiectasis, bronchitis, COPD, environmental allergies or pneumonia.       Constitution: Negative for chills and fever.   HENT:  Negative for ear pain, postnasal drip and sore throat.    Cardiovascular:  Negative for chest pain.   Respiratory:  Positive for cough. Negative for bloody sputum, shortness of breath and wheezing.    Gastrointestinal:  Negative for heartburn.   Musculoskeletal:  Negative for muscle ache.   Skin:  Negative for rash.   Allergic/Immunologic: Negative for environmental allergies.   Neurological:  Negative for headaches.      Objective:     Physical Exam   Constitutional: He is oriented to person, place, and time. He appears well-developed. He is cooperative.   HENT:   Head: Normocephalic and " atraumatic.   Ears:   Right Ear: Hearing and external ear normal.   Left Ear: Hearing and external ear normal.   Nose: Nose normal. No mucosal edema or nasal deformity. No epistaxis. Right sinus exhibits no maxillary sinus tenderness and no frontal sinus tenderness. Left sinus exhibits no maxillary sinus tenderness and no frontal sinus tenderness.   Mouth/Throat: Uvula is midline, oropharynx is clear and moist and mucous membranes are normal. No trismus in the jaw. Normal dentition. No uvula swelling.   Eyes: Conjunctivae and lids are normal.   Neck: Trachea normal and phonation normal. Neck supple.   Cardiovascular: Normal rate, regular rhythm, normal heart sounds and normal pulses.   Pulmonary/Chest: Effort normal and breath sounds normal. No respiratory distress. He has no wheezes. He has no rhonchi.   Abdominal: Normal appearance and bowel sounds are normal. Soft.   Musculoskeletal: Normal range of motion.         General: Normal range of motion.   Neurological: He is alert and oriented to person, place, and time. He exhibits normal muscle tone.   Skin: Skin is warm, dry and intact.   Psychiatric: His speech is normal and behavior is normal. Judgment and thought content normal.   Nursing note and vitals reviewed.      Assessment:     1. Cough, unspecified type    2. Body aches      Results for orders placed or performed in visit on 02/09/24   SARS Coronavirus 2 Antigen, POCT Manual Read   Result Value Ref Range    SARS Coronavirus 2 Antigen Negative Negative     Acceptable Yes    POCT Influenza A/B MOLECULAR   Result Value Ref Range    POC Molecular Influenza A Ag Negative Negative, Not Reported    POC Molecular Influenza B Ag Negative Negative, Not Reported     Acceptable Yes      *Note: Due to a large number of results and/or encounters for the requested time period, some results have not been displayed. A complete set of results can be found in Results Review.       Plan:   VSS.  Patient non-toxic appearing. Discussed medication being prescribed.  Advised patient to follow up with PCP as needed.  Patient verbalized understanding, agrees with the plan, and is comfortable with discharge.      Cough, unspecified type  -     POCT Influenza A/B MOLECULAR  -     brompheniramine-pseudoeph-DM (BROMFED DM) 2-30-10 mg/5 mL Syrp; Take 10 mLs by mouth every 6 (six) hours as needed.  Dispense: 118 mL; Refill: 0    Body aches  -     SARS Coronavirus 2 Antigen, POCT Manual Read      Medical Decision Making:   Clinical Tests:   Lab Tests: Ordered and Reviewed       <> Summary of Lab: COVID negative  Flu negative

## 2024-02-20 ENCOUNTER — OFFICE VISIT (OUTPATIENT)
Dept: INTERNAL MEDICINE | Facility: CLINIC | Age: 69
End: 2024-02-20
Payer: MEDICARE

## 2024-02-20 VITALS
WEIGHT: 152.75 LBS | OXYGEN SATURATION: 100 % | TEMPERATURE: 96 F | BODY MASS INDEX: 21.87 KG/M2 | SYSTOLIC BLOOD PRESSURE: 134 MMHG | DIASTOLIC BLOOD PRESSURE: 78 MMHG | HEIGHT: 70 IN | HEART RATE: 88 BPM

## 2024-02-20 DIAGNOSIS — I25.118 CORONARY ARTERY DISEASE OF NATIVE ARTERY OF NATIVE HEART WITH STABLE ANGINA PECTORIS: Chronic | ICD-10-CM

## 2024-02-20 DIAGNOSIS — D84.821 IMMUNOSUPPRESSED DUE TO CHEMOTHERAPY: ICD-10-CM

## 2024-02-20 DIAGNOSIS — Z79.899 IMMUNOSUPPRESSED DUE TO CHEMOTHERAPY: ICD-10-CM

## 2024-02-20 DIAGNOSIS — Z98.61 HISTORY OF PTCA: ICD-10-CM

## 2024-02-20 DIAGNOSIS — Z13.6 SCREENING FOR AAA (AORTIC ABDOMINAL ANEURYSM): ICD-10-CM

## 2024-02-20 DIAGNOSIS — E78.2 MIXED HYPERLIPIDEMIA: Chronic | ICD-10-CM

## 2024-02-20 DIAGNOSIS — J43.8 OTHER EMPHYSEMA: ICD-10-CM

## 2024-02-20 DIAGNOSIS — Z12.11 COLON CANCER SCREENING: ICD-10-CM

## 2024-02-20 DIAGNOSIS — J30.1 SEASONAL ALLERGIC RHINITIS DUE TO POLLEN: ICD-10-CM

## 2024-02-20 DIAGNOSIS — C61 PROSTATE CANCER: ICD-10-CM

## 2024-02-20 DIAGNOSIS — I10 ESSENTIAL HYPERTENSION: ICD-10-CM

## 2024-02-20 DIAGNOSIS — I70.0 AORTIC ATHEROSCLEROSIS: ICD-10-CM

## 2024-02-20 DIAGNOSIS — K21.9 GASTROESOPHAGEAL REFLUX DISEASE WITHOUT ESOPHAGITIS: Chronic | ICD-10-CM

## 2024-02-20 DIAGNOSIS — M85.80 OSTEOPENIA, UNSPECIFIED LOCATION: ICD-10-CM

## 2024-02-20 DIAGNOSIS — Z79.899 OTHER LONG TERM (CURRENT) DRUG THERAPY: ICD-10-CM

## 2024-02-20 DIAGNOSIS — C79.51 SECONDARY ADENOCARCINOMA OF SKELETAL BONE: ICD-10-CM

## 2024-02-20 DIAGNOSIS — Z00.00 ROUTINE GENERAL MEDICAL EXAMINATION AT A HEALTH CARE FACILITY: Primary | ICD-10-CM

## 2024-02-20 DIAGNOSIS — T45.1X5A IMMUNOSUPPRESSED DUE TO CHEMOTHERAPY: ICD-10-CM

## 2024-02-20 DIAGNOSIS — J84.10 PULMONARY FIBROSIS: ICD-10-CM

## 2024-02-20 PROCEDURE — 3078F DIAST BP <80 MM HG: CPT | Mod: CPTII,S$GLB,, | Performed by: FAMILY MEDICINE

## 2024-02-20 PROCEDURE — 1101F PT FALLS ASSESS-DOCD LE1/YR: CPT | Mod: CPTII,S$GLB,, | Performed by: FAMILY MEDICINE

## 2024-02-20 PROCEDURE — 3008F BODY MASS INDEX DOCD: CPT | Mod: CPTII,S$GLB,, | Performed by: FAMILY MEDICINE

## 2024-02-20 PROCEDURE — 3075F SYST BP GE 130 - 139MM HG: CPT | Mod: CPTII,S$GLB,, | Performed by: FAMILY MEDICINE

## 2024-02-20 PROCEDURE — 3288F FALL RISK ASSESSMENT DOCD: CPT | Mod: CPTII,S$GLB,, | Performed by: FAMILY MEDICINE

## 2024-02-20 PROCEDURE — 99999 PR PBB SHADOW E&M-EST. PATIENT-LVL IV: CPT | Mod: PBBFAC,,, | Performed by: FAMILY MEDICINE

## 2024-02-20 PROCEDURE — 1125F AMNT PAIN NOTED PAIN PRSNT: CPT | Mod: CPTII,S$GLB,, | Performed by: FAMILY MEDICINE

## 2024-02-20 PROCEDURE — 1159F MED LIST DOCD IN RCRD: CPT | Mod: CPTII,S$GLB,, | Performed by: FAMILY MEDICINE

## 2024-02-20 PROCEDURE — 1160F RVW MEDS BY RX/DR IN RCRD: CPT | Mod: CPTII,S$GLB,, | Performed by: FAMILY MEDICINE

## 2024-02-20 PROCEDURE — 99397 PER PM REEVAL EST PAT 65+ YR: CPT | Mod: S$GLB,,, | Performed by: FAMILY MEDICINE

## 2024-02-20 RX ORDER — PANTOPRAZOLE SODIUM 40 MG/1
40 TABLET, DELAYED RELEASE ORAL DAILY
Qty: 90 TABLET | Refills: 1 | Status: SHIPPED | OUTPATIENT
Start: 2024-02-20 | End: 2024-04-30

## 2024-02-20 RX ORDER — FLUTICASONE PROPIONATE 50 MCG
1 SPRAY, SUSPENSION (ML) NASAL DAILY
Qty: 16 G | Refills: 3 | Status: SHIPPED | OUTPATIENT
Start: 2024-02-20

## 2024-02-20 NOTE — PROGRESS NOTES
"Subjective:       Patient ID: Joey Jacobo is a 68 y.o. male.    Chief Complaint: Follow-up    68-year-old  male patient with Patient Active Problem List:     Coronary artery disease     Mitral regurgitation     COPD Other emphysema     Mixed hyperlipidemia     Gastroesophageal reflux disease without esophagitis     Glaucoma (increased eye pressure)     History of PTCA     Secondary adenocarcinoma of skeletal bone     Prostate cancer     Pulmonary fibrosis     Neoplasm related pain     Abnormal ECG     Essential hypertension     AP (angina pectoris)     Immunosuppressed due to chemotherapy     Aortic atherosclerosis     Osteopenia  Here for routine annual physicals  Patient has been taking his medications regularly and has been followed by Dr. Berrios  Denies any extreme fatigue but continues to have nocturia and will discuss further with urologist  Has been having minimal cough recently for which patient used cough medication and has been doing well and has been using his inhalers regularly        Review of Systems   Constitutional:  Negative for appetite change and fatigue.   Eyes:  Negative for visual disturbance.   Respiratory:  Negative for shortness of breath.    Cardiovascular:  Negative for chest pain, palpitations and leg swelling.   Gastrointestinal:  Negative for abdominal pain, nausea and vomiting.   Genitourinary:         Positive for nocturia   Musculoskeletal:  Negative for myalgias.   Skin:  Negative for rash.   Neurological:  Negative for headaches.   Psychiatric/Behavioral:  Negative for sleep disturbance.          /78 (BP Location: Right arm, Patient Position: Sitting, BP Method: Large (Manual))   Pulse 88   Temp 96.1 °F (35.6 °C) (Tympanic)   Ht 5' 10" (1.778 m)   Wt 69.3 kg (152 lb 12.5 oz)   SpO2 100%   BMI 21.92 kg/m²   Objective:      Physical Exam  Constitutional:       Appearance: He is well-developed.   HENT:      Head: Normocephalic and atraumatic. " "  Cardiovascular:      Rate and Rhythm: Normal rate and regular rhythm.      Heart sounds: Normal heart sounds. No murmur heard.  Pulmonary:      Effort: Pulmonary effort is normal. No respiratory distress.      Breath sounds: Normal breath sounds. No wheezing.   Abdominal:      General: Bowel sounds are normal.      Palpations: Abdomen is soft.      Tenderness: There is no abdominal tenderness.   Skin:     General: Skin is warm and dry.      Findings: No rash.   Neurological:      Mental Status: He is alert and oriented to person, place, and time.   Psychiatric:         Mood and Affect: Mood normal.           Assessment/Plan:   1. Routine general medical examination at a health care facility  -     Lipid Panel; Future; Expected date: 02/20/2024  -     TSH; Future; Expected date: 02/20/2024  -     Hemoglobin A1C; Future; Expected date: 02/20/2024  -     CBC Auto Differential; Future; Expected date: 02/20/2024  -     Comprehensive Metabolic Panel; Future; Expected date: 02/20/2024  -     Urinalysis, Reflex to Urine Culture Urine, Clean Catch; Future; Expected date: 02/20/2024  Vital signs stable today.  Clinical exam stable  Continue lifestyle modifications with low-fat and low-cholesterol diet and exercise 30 minutes daily  Due for shingles RSV flu and COVID booster    2. Essential hypertension  -     Lipid Panel; Future; Expected date: 02/20/2024  -     TSH; Future; Expected date: 02/20/2024  Blood pressure is stable currently on metoprolol 50 mg    3. Mixed hyperlipidemia  -     Lipid Panel; Future; Expected date: 02/20/2024  Currently taking Lipitor 40 mg    4. Coronary artery disease of native artery of native heart with stable angina pectoris  5. History of PTCA  6. Pulmonary fibrosis  Followed by Cardiology clinically doing well on aspirin Lipitor and nitroglycerin    7. Prostate cancer  Overview:  2/22/17 PSA 22.6  7/3/17 prostate biopsy: adenocarcinoma, tana 4+5  9/29/17 MRI spine : "Bony metastatic " "lesion in the right aspect of the T8 vertebral body without fracture."  11/27/17 abiraterone+prednisone    Orders:  -     CBC Auto Differential; Future; Expected date: 02/20/2024  8. Secondary adenocarcinoma of skeletal bone  Overview:  MRI 10/02/2017: Bony metastatic lesion in the right aspect of the T8 vertebral body without fracture.  9. Immunosuppressed due to chemotherapy  Followed by Dr. Berrios clinically doing well    10. Osteopenia, unspecified location  Continue calcium with vitamin-D supplements    11. Aortic atherosclerosis  Stable on aspirin and statins    12. Gastroesophageal reflux disease without esophagitis  -     pantoprazole (PROTONIX) 40 MG tablet; Take 1 tablet (40 mg total) by mouth once daily.  Dispense: 90 tablet; Refill: 1  Refill given on pantoprazole 40 mg daily    13. COPD Other emphysema  Clinically doing well on Symbicort and albuterol inhaler as needed    14. Screening for AAA (aortic abdominal aneurysm)  -      AAA Screening; Future; Expected date: 02/20/2024    15. COPD (chronic obstructive pulmonary disease)  -     fluticasone propionate (FLONASE) 50 mcg/actuation nasal spray; 1 spray (50 mcg total) by Each Nostril route once daily.  Dispense: 16 g; Refill: 3    16. Allergic rhinitis  -     fluticasone propionate (FLONASE) 50 mcg/actuation nasal spray; 1 spray (50 mcg total) by Each Nostril route once daily.  Dispense: 16 g; Refill: 3  Refill given on Flonase as requested    17. Other long term (current) drug therapy  -     Hemoglobin A1C; Future; Expected date: 02/20/2024    18. Colon cancer screening  -     Cologuard Screening (Multitarget Stool DNA); Future; Expected date: 02/20/2024  Refuses colonoscopy  Will place Cologuard             "

## 2024-02-21 DIAGNOSIS — C61 PROSTATE CANCER: ICD-10-CM

## 2024-02-21 DIAGNOSIS — G89.3 NEOPLASM RELATED PAIN: ICD-10-CM

## 2024-02-21 DIAGNOSIS — C79.51 SECONDARY ADENOCARCINOMA OF SKELETAL BONE: ICD-10-CM

## 2024-02-21 RX ORDER — HYDROCODONE BITARTRATE AND ACETAMINOPHEN 10; 325 MG/1; MG/1
1 TABLET ORAL EVERY 6 HOURS PRN
Qty: 90 TABLET | Refills: 0 | Status: SHIPPED | OUTPATIENT
Start: 2024-02-21 | End: 2024-03-11 | Stop reason: SDUPTHER

## 2024-02-21 NOTE — TELEPHONE ENCOUNTER
----- Message from Cecilia Chowdhury sent at 2/21/2024 11:03 AM CST -----  Contact: [phone]  Patient needs a refill on HYDROcodone-acetaminophen (NORCO)  mg per tablet called into [facility] at [phone] .  Please call patient at [phone],  if you have any questions.         [facility], [facility] - [place]ime  [id number] [facility] [address] [address] [address] [address]  Phone: [phone] Fax: [phone]        Thanks KB

## 2024-02-22 ENCOUNTER — LAB VISIT (OUTPATIENT)
Dept: LAB | Facility: HOSPITAL | Age: 69
End: 2024-02-22
Attending: FAMILY MEDICINE
Payer: MEDICARE

## 2024-02-22 DIAGNOSIS — Z00.00 ROUTINE GENERAL MEDICAL EXAMINATION AT A HEALTH CARE FACILITY: ICD-10-CM

## 2024-02-22 LAB
BILIRUB UR QL STRIP: NEGATIVE
CLARITY UR REFRACT.AUTO: CLEAR
COLOR UR AUTO: YELLOW
GLUCOSE UR QL STRIP: NEGATIVE
HGB UR QL STRIP: NEGATIVE
KETONES UR QL STRIP: NEGATIVE
LEUKOCYTE ESTERASE UR QL STRIP: NEGATIVE
NITRITE UR QL STRIP: NEGATIVE
PH UR STRIP: 6 [PH] (ref 5–8)
PROT UR QL STRIP: NEGATIVE
SP GR UR STRIP: 1.01 (ref 1–1.03)
URN SPEC COLLECT METH UR: NORMAL

## 2024-02-22 PROCEDURE — 81003 URINALYSIS AUTO W/O SCOPE: CPT | Performed by: FAMILY MEDICINE

## 2024-02-23 ENCOUNTER — TELEPHONE (OUTPATIENT)
Dept: DIABETES | Facility: CLINIC | Age: 69
End: 2024-02-23

## 2024-02-23 DIAGNOSIS — E11.9 TYPE 2 DIABETES MELLITUS WITHOUT COMPLICATION, WITHOUT LONG-TERM CURRENT USE OF INSULIN: Primary | ICD-10-CM

## 2024-02-23 DIAGNOSIS — R05.9 COUGH, UNSPECIFIED TYPE: ICD-10-CM

## 2024-02-23 RX ORDER — DOXYCYCLINE 100 MG/1
100 CAPSULE ORAL 2 TIMES DAILY
Qty: 14 CAPSULE | Refills: 0 | Status: SHIPPED | OUTPATIENT
Start: 2024-02-23 | End: 2024-03-01

## 2024-02-23 RX ORDER — METFORMIN HYDROCHLORIDE 500 MG/1
500 TABLET ORAL 2 TIMES DAILY WITH MEALS
Qty: 180 TABLET | Refills: 1 | Status: SHIPPED | OUTPATIENT
Start: 2024-02-23 | End: 2025-02-22

## 2024-03-05 ENCOUNTER — OFFICE VISIT (OUTPATIENT)
Dept: UROLOGY | Facility: CLINIC | Age: 69
End: 2024-03-05
Payer: MEDICARE

## 2024-03-05 VITALS
SYSTOLIC BLOOD PRESSURE: 162 MMHG | RESPIRATION RATE: 18 BRPM | WEIGHT: 151.88 LBS | HEIGHT: 70 IN | DIASTOLIC BLOOD PRESSURE: 82 MMHG | TEMPERATURE: 98 F | BODY MASS INDEX: 21.74 KG/M2 | HEART RATE: 81 BPM

## 2024-03-05 DIAGNOSIS — C61 PROSTATE CANCER METASTATIC TO BONE: Primary | ICD-10-CM

## 2024-03-05 DIAGNOSIS — C79.51 PROSTATE CANCER METASTATIC TO BONE: Primary | ICD-10-CM

## 2024-03-05 PROCEDURE — 99999 PR PBB SHADOW E&M-EST. PATIENT-LVL IV: CPT | Mod: PBBFAC,HCNC,, | Performed by: UROLOGY

## 2024-03-05 PROCEDURE — 99499 UNLISTED E&M SERVICE: CPT | Mod: HCNC,S$GLB,, | Performed by: UROLOGY

## 2024-03-05 PROCEDURE — 96402 CHEMO HORMON ANTINEOPL SQ/IM: CPT | Mod: HCNC,S$GLB,, | Performed by: UROLOGY

## 2024-03-05 NOTE — PROGRESS NOTES
.Lupron 45 mg injection ordered by Dr. Paniagua, After donning PPE, confirming patient's allergies and medication, Lupron 45mg injection was administered IM to right ventrogluteal using aseptic technique. Pt tolerated procedure well. Patient agreed to wait 15 minutes after injection in the clinic and to report any adverse reactions.     Rashad Dior RN

## 2024-03-05 NOTE — PROGRESS NOTES
CC: follow up prostate cancer    History of Present Illness:     3/5/24-here for lupron. He continues on zytiga/prednisone. Good stream. No gross hematuria.   23-here for lupron. No bothersome LUTS. No gross hematuria. He does report back and groin pain, which limits his activity. No weight loss. +hot flashes.   3/1/23-Here for lupron. Continues to take zytiga/prednisone. Still having hot flashes, but doing okay. Stream is good. No gross hematuria or dysuria. No bone pain.   22-has been 11 months since his last visit. States that his sister  when he was supposed to follow up. He continues to take Zytiga/prednisone and PSA remains undetectable. Good stream. No stranguria. No incontinence. Sometimes has urgency.   21- Currently managed on Zytiga/prednisone and Lupron. Reports hot flashes. Sometimes he has urinary urgency. No incontinence. No gross hematuria. No stranguria. Having bone density testing done.   3/15/21: Lupron today, PSA low.  Reviewed history in detail.   20: PSA very low.  Lupron today. Reviewed history in detail.   3/11/20: Lupron due today; will switch to 6 mo prep. Reviewed history in detail. Very active with outside work.  19:  Lupron shot today.  Reviewed history in detail. Currently on dual therapy with abariterone.   19: Lupron shot today.  Reviewed history in detail. Currently on dual therapy with abariterone.   19: Lupron shot today.  Reviewed history in detail. Currently on dual therapy with abariterone.  19: Son  in an MVA since last visit. Reviewed history in detail.  No problems from Lupron.  18: No problems from Lupron.  PSA lowest.  18: PSA today and again 3 mo.  Doing fine.  Reviewed history in detail.  18: PSA well down.  No adverse effects from Lupron.    10/18/17: Been on casodex a month back to review and start Lupron.  Dr. Moscoso felt XRT was not an option but perhaps chemotherapy could be beneficial.  17: Bone  "scan reports "RIGHT supraorbital location and a smaller similarly extremely intense focus of increased uptake posteriorly on the RIGHT at the T9 level.  Etiology is uncertain."  CT scan shows "A few scattered shotty mesenteric and retroperitoneal nodes identified.  Similar nodes identified within the pelvis bilaterally."  MRI shows left 2.8 cm prostate mass PIRADS 5, with metastatic pelvic lymphadenopathy (right pelvic sidewall node and left internal iliac chain LN).  No bony mets in pelvis.  7/18/17:  No problems from biopsy.  Gl 4+5 in the left base (30%) and a sig amount of 4+4 in other parts of the gland.  Reviewed treatment options, and imaging needs.  7/3/17: TRUS/Bx today 27 gm.  5/24/17: 61 yo man has problems with perineal/scrotal pain once or twice a month; lasts for an hour or two, some nocturia.  PSA recently elevated.  No abd/pelvic pain and no exac/rel factors.  No hematuria.  No urolithiasis.  No urinary bother.  No  history. CT with contrast earlier this year essentially normal.      Review of Systems   Constitutional:  Negative for appetite change and unexpected weight change.   Respiratory:  Negative for shortness of breath.    Cardiovascular:  Negative for chest pain.   Gastrointestinal:  Negative for abdominal pain.   Musculoskeletal:  Positive for arthralgias and back pain.   All other systems reviewed and are negative.      Current Outpatient Medications   Medication Sig Dispense Refill    abiraterone (ZYTIGA) 250 mg Tab Take 4 tablets (1,000 mg total) by mouth once daily. 120 tablet 1    aspirin (ECOTRIN) 81 MG EC tablet Take by mouth. 1 Tablet, Delayed Release (E.C.) Oral Every day      atorvastatin (LIPITOR) 40 MG tablet Take 1 tablet (40 mg total) by mouth once daily. 90 tablet 4    budesonide-formoterol 160-4.5 mcg (SYMBICORT) 160-4.5 mcg/actuation HFAA Inhale 2 puffs into the lungs every 12 (twelve) hours. 10.2 g 3    calcium-vitamin D 600 mg(1,500mg) -400 unit Tab Take by mouth. 1 " Tablet Oral Twice a day      ezetimibe (ZETIA) 10 mg tablet FOR CHOLESTEROL TAKE 1 TABLET BY MOUTH ONCE A DAY 90 tablet 1    fluticasone propionate (FLONASE) 50 mcg/actuation nasal spray 1 spray (50 mcg total) by Each Nostril route once daily. 16 g 3    HYDROcodone-acetaminophen (NORCO)  mg per tablet Take 1 tablet by mouth every 6 (six) hours as needed for Pain. 90 tablet 0    latanoprost 0.005 % ophthalmic solution   3    LUPRON DEPOT, 6 MONTH, 45 mg SyKt injection       metFORMIN (GLUCOPHAGE) 500 MG tablet Take 1 tablet (500 mg total) by mouth 2 (two) times daily with meals. 180 tablet 1    metoprolol succinate (TOPROL-XL) 50 MG 24 hr tablet (FOR BLOOD PRESSURE) TAKE 1 TABLET BY MOUTH DAILY 90 tablet 4    nitroGLYCERIN (NITROSTAT) 0.4 MG SL tablet Place 1 tablet (0.4 mg total) under the tongue every 5 (five) minutes as needed for Chest pain. 20 tablet 12    pantoprazole (PROTONIX) 40 MG tablet Take 1 tablet (40 mg total) by mouth once daily. 90 tablet 1    predniSONE (DELTASONE) 5 MG tablet Take 1 tablet (5 mg total) by mouth 2 (two) times daily. 60 tablet 3    travoprost (TRAVATAN Z) 0.004 % Drop Place 1 drop into both eyes every evening. 1 Drops Ophthalmic At bedtime 5 mL 12     Current Facility-Administered Medications   Medication Dose Route Frequency Provider Last Rate Last Admin    leuprolide acetate (6 month) injection 45 mg  45 mg Intramuscular 1 time in Clinic/HOD Lydia Paniagua MD           Review of patient's allergies indicates:   Allergen Reactions    Avelox  [moxifloxacin] Rash    Nsaids (non-steroidal anti-inflammatory drug) Other (See Comments)     dyspepsia       Past medical, family, and social history reviewed as documented in chart with pertinent positive medical, family, and social history detailed in HPI.    Physical Exam  Vitals:    03/05/24 0930   BP: (!) 162/82   Pulse: 81   Resp: 18   Temp: 97.6 °F (36.4 °C)       General: Well-developed, well-nourished, in no acute  distress  HEENT: Normocephalic, atraumatic, extraocular eye movements in tact  Neck: supple, trachea midline, no cervical or supraclavicular adenopathy  Respirations: Even and unlabored  Extremities: Moves all extremities equally, atraumatic, no clubbing, cyanosis, edema  Skin: warm and dry, no lesions  Psych: normal affect  Neuro: Alert and oriented x 3. Cranial nerves II-XII grossly intact      PSA    2/16: 22.1    2/17: 22.9    1/18: 0.24    7/18: 0.01    1/19, 4/19, 8/19, 11/19, 3/20, 3/21, 9/21, 8/22, 11/22, 5/19/23, 12/26/23: undetect    Assessment:   1. Prostate cancer metastatic to bone  leuprolide acetate (6 month) injection 45 mg    Prior authorization Order                  Plan:  Prostate cancer metastatic to bone  -     leuprolide acetate (6 month) injection 45 mg  -     Prior authorization Order          continue Zytiga/prednisone and Heme/onc follow ups.     Follow up in about 6 months (around 9/5/2024) for Lupron.

## 2024-03-11 DIAGNOSIS — C79.51 SECONDARY ADENOCARCINOMA OF SKELETAL BONE: ICD-10-CM

## 2024-03-11 DIAGNOSIS — C61 PROSTATE CANCER: ICD-10-CM

## 2024-03-11 DIAGNOSIS — G89.3 NEOPLASM RELATED PAIN: ICD-10-CM

## 2024-03-11 RX ORDER — HYDROCODONE BITARTRATE AND ACETAMINOPHEN 10; 325 MG/1; MG/1
1 TABLET ORAL EVERY 6 HOURS PRN
Qty: 90 TABLET | Refills: 0 | Status: SHIPPED | OUTPATIENT
Start: 2024-03-11 | End: 2024-04-05 | Stop reason: SDUPTHER

## 2024-03-11 NOTE — TELEPHONE ENCOUNTER
----- Message from Cortney Pollo sent at 3/11/2024  1:26 PM CDT -----  .Type:  RX Refill Request    Who Called: .Joey Jacobo   Refill or New Rx:refill  RX Name and Strength:HYDROcodone-acetaminophen (NORCO)  mg per tablet  How is the patient currently taking it? (ex. 1XDay):daily  Is this a 30 day or 90 day RX:    Preferred Pharmacy with phone number:.  [facility] [address] [address]  [address] [address] [address]  Phone: [phone] Fax: [phone]     Local or Mail Order:local  Ordering Provider:  Would the patient rather a call back or a response via [facility]? Call back  Best Call Back Number:.[phone]   Additional Information:

## 2024-03-13 DIAGNOSIS — C61 PROSTATE CANCER: ICD-10-CM

## 2024-03-13 RX ORDER — ABIRATERONE ACETATE 250 MG/1
1000 TABLET ORAL DAILY
Qty: 120 TABLET | Refills: 1 | Status: ACTIVE | OUTPATIENT
Start: 2024-03-13 | End: 2024-05-06 | Stop reason: SDUPTHER

## 2024-04-01 ENCOUNTER — LAB VISIT (OUTPATIENT)
Dept: LAB | Facility: HOSPITAL | Age: 69
End: 2024-04-01
Attending: INTERNAL MEDICINE
Payer: MEDICARE

## 2024-04-01 DIAGNOSIS — C61 PROSTATE CANCER: ICD-10-CM

## 2024-04-01 DIAGNOSIS — D84.821 IMMUNOSUPPRESSED DUE TO CHEMOTHERAPY: ICD-10-CM

## 2024-04-01 DIAGNOSIS — C79.51 SECONDARY ADENOCARCINOMA OF SKELETAL BONE: ICD-10-CM

## 2024-04-01 DIAGNOSIS — Z79.899 IMMUNOSUPPRESSED DUE TO CHEMOTHERAPY: ICD-10-CM

## 2024-04-01 DIAGNOSIS — T45.1X5A IMMUNOSUPPRESSED DUE TO CHEMOTHERAPY: ICD-10-CM

## 2024-04-01 DIAGNOSIS — G89.3 NEOPLASM RELATED PAIN: ICD-10-CM

## 2024-04-01 LAB
ALBUMIN SERPL BCP-MCNC: 3.2 G/DL (ref 3.5–5.2)
ALP SERPL-CCNC: 79 U/L (ref 55–135)
ALT SERPL W/O P-5'-P-CCNC: 17 U/L (ref 10–44)
ANION GAP SERPL CALC-SCNC: 10 MMOL/L (ref 8–16)
AST SERPL-CCNC: 20 U/L (ref 10–40)
BASOPHILS # BLD AUTO: 0.04 K/UL (ref 0–0.2)
BASOPHILS NFR BLD: 0.4 % (ref 0–1.9)
BILIRUB SERPL-MCNC: 0.3 MG/DL (ref 0.1–1)
BUN SERPL-MCNC: 14 MG/DL (ref 8–23)
CALCIUM SERPL-MCNC: 9.5 MG/DL (ref 8.7–10.5)
CHLORIDE SERPL-SCNC: 109 MMOL/L (ref 95–110)
CO2 SERPL-SCNC: 24 MMOL/L (ref 23–29)
CREAT SERPL-MCNC: 1 MG/DL (ref 0.5–1.4)
DIFFERENTIAL METHOD BLD: ABNORMAL
EOSINOPHIL # BLD AUTO: 0.1 K/UL (ref 0–0.5)
EOSINOPHIL NFR BLD: 0.6 % (ref 0–8)
ERYTHROCYTE [DISTWIDTH] IN BLOOD BY AUTOMATED COUNT: 15 % (ref 11.5–14.5)
EST. GFR  (NO RACE VARIABLE): >60 ML/MIN/1.73 M^2
GLUCOSE SERPL-MCNC: 95 MG/DL (ref 70–110)
HCT VFR BLD AUTO: 36.3 % (ref 40–54)
HGB BLD-MCNC: 11.3 G/DL (ref 14–18)
IMM GRANULOCYTES # BLD AUTO: 0.11 K/UL (ref 0–0.04)
IMM GRANULOCYTES NFR BLD AUTO: 1.1 % (ref 0–0.5)
LYMPHOCYTES # BLD AUTO: 2.9 K/UL (ref 1–4.8)
LYMPHOCYTES NFR BLD: 28.1 % (ref 18–48)
MCH RBC QN AUTO: 29.8 PG (ref 27–31)
MCHC RBC AUTO-ENTMCNC: 31.1 G/DL (ref 32–36)
MCV RBC AUTO: 96 FL (ref 82–98)
MONOCYTES # BLD AUTO: 1.1 K/UL (ref 0.3–1)
MONOCYTES NFR BLD: 10.6 % (ref 4–15)
NEUTROPHILS # BLD AUTO: 6.1 K/UL (ref 1.8–7.7)
NEUTROPHILS NFR BLD: 59.2 % (ref 38–73)
NRBC BLD-RTO: 0 /100 WBC
PLATELET # BLD AUTO: 185 K/UL (ref 150–450)
PMV BLD AUTO: 11.6 FL (ref 9.2–12.9)
POTASSIUM SERPL-SCNC: 3.8 MMOL/L (ref 3.5–5.1)
PROT SERPL-MCNC: 7.1 G/DL (ref 6–8.4)
RBC # BLD AUTO: 3.79 M/UL (ref 4.6–6.2)
SODIUM SERPL-SCNC: 143 MMOL/L (ref 136–145)
WBC # BLD AUTO: 10.32 K/UL (ref 3.9–12.7)

## 2024-04-01 PROCEDURE — 84403 ASSAY OF TOTAL TESTOSTERONE: CPT | Mod: HCNC | Performed by: INTERNAL MEDICINE

## 2024-04-01 PROCEDURE — 85025 COMPLETE CBC W/AUTO DIFF WBC: CPT | Mod: HCNC | Performed by: INTERNAL MEDICINE

## 2024-04-01 PROCEDURE — 36415 COLL VENOUS BLD VENIPUNCTURE: CPT | Mod: HCNC,PN | Performed by: INTERNAL MEDICINE

## 2024-04-01 PROCEDURE — 80053 COMPREHEN METABOLIC PANEL: CPT | Mod: HCNC | Performed by: INTERNAL MEDICINE

## 2024-04-02 ENCOUNTER — OFFICE VISIT (OUTPATIENT)
Dept: HEMATOLOGY/ONCOLOGY | Facility: CLINIC | Age: 69
End: 2024-04-02
Payer: MEDICARE

## 2024-04-02 VITALS
OXYGEN SATURATION: 97 % | HEART RATE: 92 BPM | TEMPERATURE: 97 F | BODY MASS INDEX: 21.84 KG/M2 | SYSTOLIC BLOOD PRESSURE: 133 MMHG | WEIGHT: 152.56 LBS | DIASTOLIC BLOOD PRESSURE: 83 MMHG | HEIGHT: 70 IN

## 2024-04-02 DIAGNOSIS — D53.9 NUTRITIONAL ANEMIA, UNSPECIFIED: ICD-10-CM

## 2024-04-02 DIAGNOSIS — T45.1X5A IMMUNOSUPPRESSED DUE TO CHEMOTHERAPY: ICD-10-CM

## 2024-04-02 DIAGNOSIS — C61 PROSTATE CANCER: Primary | ICD-10-CM

## 2024-04-02 DIAGNOSIS — Z79.899 IMMUNOSUPPRESSED DUE TO CHEMOTHERAPY: ICD-10-CM

## 2024-04-02 DIAGNOSIS — C79.51 SECONDARY ADENOCARCINOMA OF SKELETAL BONE: ICD-10-CM

## 2024-04-02 DIAGNOSIS — D84.821 IMMUNOSUPPRESSED DUE TO CHEMOTHERAPY: ICD-10-CM

## 2024-04-02 LAB — TESTOST SERPL-MCNC: <4 NG/DL (ref 304–1227)

## 2024-04-02 PROCEDURE — 3008F BODY MASS INDEX DOCD: CPT | Mod: HCNC,CPTII,S$GLB,

## 2024-04-02 PROCEDURE — 1101F PT FALLS ASSESS-DOCD LE1/YR: CPT | Mod: HCNC,CPTII,S$GLB,

## 2024-04-02 PROCEDURE — 3075F SYST BP GE 130 - 139MM HG: CPT | Mod: HCNC,CPTII,S$GLB,

## 2024-04-02 PROCEDURE — 99215 OFFICE O/P EST HI 40 MIN: CPT | Mod: HCNC,S$GLB,,

## 2024-04-02 PROCEDURE — 1159F MED LIST DOCD IN RCRD: CPT | Mod: HCNC,CPTII,S$GLB,

## 2024-04-02 PROCEDURE — 99999 PR PBB SHADOW E&M-EST. PATIENT-LVL IV: CPT | Mod: PBBFAC,HCNC,,

## 2024-04-02 PROCEDURE — 3288F FALL RISK ASSESSMENT DOCD: CPT | Mod: HCNC,CPTII,S$GLB,

## 2024-04-02 PROCEDURE — 3079F DIAST BP 80-89 MM HG: CPT | Mod: HCNC,CPTII,S$GLB,

## 2024-04-02 PROCEDURE — 3051F HG A1C>EQUAL 7.0%<8.0%: CPT | Mod: HCNC,CPTII,S$GLB,

## 2024-04-02 PROCEDURE — 1125F AMNT PAIN NOTED PAIN PRSNT: CPT | Mod: HCNC,CPTII,S$GLB,

## 2024-04-02 NOTE — PROGRESS NOTES
Subjective:       Patient ID: Joey Jacobo is a 69 y.o. male.    Chief Complaint: Chemotherapy and Prostate Cancer    Primary Oncologist/Hematologist: Dr. Berrios    HPI: Mr. Jacobo is a 69 year old male who is following up for his metastatic prostate carcinoma. He continues with zytiga daily + prednisone daily. He is also getting lupron q 6 months through urology.   Cx Hx: dx 7 years ago.     Today: He states he has been well. He went on a cruise for his birthday. He states his appetite is good and he is staying hydrated. He denies any n/v/d/c, bleeding, fevers, illnesses. He continues to take prednisone and zytiga.     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   Tobacco Use    Smoking status: Former     Current packs/day: 0.00     Average packs/day: 0.3 packs/day for 25.0 years (6.3 ttl pk-yrs)     Types: Cigarettes     Start date: 1993     Quit date: 2018     Years since quittin.6    Smokeless tobacco: Never   Substance and Sexual Activity    Alcohol use: Not Currently     Alcohol/week: 0.0 standard drinks of alcohol     Comment: occasionally    Drug use: No    Sexual activity: Yes   Social History Narrative    Wife and son     Social Determinants of Health     Financial Resource Strain: Medium Risk (2023)    Overall Financial Resource Strain (CARDIA)     Difficulty of Paying Living Expenses: Somewhat hard   Food Insecurity: No Food Insecurity (2023)    Hunger Vital Sign     Worried About Running Out of Food in the Last Year: Never true     Ran Out of Food in the Last Year: Never true   Transportation Needs: No Transportation Needs (2023)    PRAPARE - Transportation     Lack of Transportation (Medical): No     Lack of Transportation (Non-Medical): No   Physical Activity: Sufficiently Active (2023)    Exercise Vital Sign     Days of Exercise per Week: 7 days      Minutes of Exercise per Session: 60 min   Stress: No Stress Concern Present (1/6/2023)    [place] [employer] - Occupational Stress Questionnaire     Feeling of Stress : Only a little   Social Connections: Moderately Integrated (1/6/2023)    Social Connection and Isolation Panel [NHANES]     Frequency of Communication with Friends and Family: More than three times a week     Frequency of Social Gatherings with Friends and Family: Three times a week     Attends Sabianism Services: More than 4 times per year     Active Member of Clubs or Organizations: No     Attends Club or Organization Meetings: Never     Marital Status:    Housing Stability: Low Risk  (1/6/2023)    Housing Stability Vital Sign     Unable to Pay for Housing in the Last Year: No     Number of Places Lived in the Last Year: 1     Unstable Housing in the Last Year: No       Past Medical History:   Diagnosis Date    Angina pectoris     Back pain     Chronic bronchitis     Colon polyp     COPD (chronic obstructive pulmonary disease) 7/30/2013    Coronary artery disease 7/30/2013    Emphysema of lung     Essential hypertension 10/29/2018    Glaucoma (increased eye pressure) 8/27/2013    Headache(784.0)     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    Mitral regurgitation 7/30/2013    Mixed hyperlipidemia 7/30/2013    Neuropathy     Osteoarthritis of spine with radiculopathy, lumbar region 7/21/2015    Other and unspecified hyperlipidemia     Prostate cancer     Pulmonary fibrosis 10/3/2017       Family History   Problem Relation Age of Onset    Cataracts Mother     Kidney disease Mother     Cancer Father         Stomach    Blindness Maternal Grandmother     Diabetes Sister     Hypertension Sister     Diabetes Sister     Hypertension Sister     Diabetes Sister     Hypertension Sister     Hypertension Sister     Hypertension Sister     Colon cancer Brother     Colon cancer Brother        Past Surgical History:    Procedure Laterality Date    ANKLE FRACTURE SURGERY Left     COLONOSCOPY N/A 3/21/2016    Procedure: COLONOSCOPY;  Surgeon: Melvin Pearce MD;  Location: [facility];  Service: Endoscopy;  Laterality: N/A;    CORONARY STENT PLACEMENT      times 2    SHOULDER ARTHROSCOPY Left     SPINE SURGERY      neck fusion x2       Review of Systems   Constitutional:  Negative for activity change, appetite change, chills, diaphoresis, fatigue, fever and unexpected weight change.   HENT:  Negative for congestion, nosebleeds and rhinorrhea.    Respiratory:  Negative for cough and shortness of breath.    Cardiovascular:  Negative for chest pain and leg swelling.   Gastrointestinal:  Negative for abdominal pain, anal bleeding, blood in stool, constipation, diarrhea, nausea and vomiting.   Musculoskeletal:  Positive for arthralgias.   Skin:  Negative for color change and pallor.   Neurological:  Negative for dizziness and numbness.         Medication List with Changes/Refills   Current Medications    ABIRATERONE (ZYTIGA) 250 MG TAB    Take 4 tablets (1,000 mg total) by mouth once daily.    ASPIRIN (ECOTRIN) 81 MG EC TABLET    Take by mouth. 1 Tablet, Delayed Release (E.C.) Oral Every day    ATORVASTATIN (LIPITOR) 40 MG TABLET    Take 1 tablet (40 mg total) by mouth once daily.    BUDESONIDE-FORMOTEROL 160-4.5 MCG (SYMBICORT) 160-4.5 MCG/ACTUATION HFAA    Inhale 2 puffs into the lungs every 12 (twelve) hours.    CALCIUM-VITAMIN D 600 MG(1,500MG) -400 UNIT TAB    Take by mouth. 1 Tablet Oral Twice a day    EZETIMIBE (ZETIA) 10 MG TABLET    FOR CHOLESTEROL TAKE 1 TABLET BY MOUTH ONCE A DAY    FLUTICASONE PROPIONATE (FLONASE) 50 MCG/ACTUATION NASAL SPRAY    1 spray (50 mcg total) by Each Nostril route once daily.    HYDROCODONE-ACETAMINOPHEN (NORCO)  MG PER TABLET    Take 1 tablet by mouth every 6 (six) hours as needed for Pain.    LATANOPROST 0.005 % OPHTHALMIC SOLUTION        LUPRON DEPOT, 6 MONTH, 45 MG SYKT INJECTION         METFORMIN (GLUCOPHAGE) 500 MG TABLET    Take 1 tablet (500 mg total) by mouth 2 (two) times daily with meals.    METOPROLOL SUCCINATE (TOPROL-XL) 50 MG 24 HR TABLET    (FOR BLOOD PRESSURE) TAKE 1 TABLET BY MOUTH DAILY    NITROGLYCERIN (NITROSTAT) 0.4 MG SL TABLET    Place 1 tablet (0.4 mg total) under the tongue every 5 (five) minutes as needed for Chest pain.    PANTOPRAZOLE (PROTONIX) 40 MG TABLET    Take 1 tablet (40 mg total) by mouth once daily.    PREDNISONE (DELTASONE) 5 MG TABLET    Take 1 tablet (5 mg total) by mouth 2 (two) times daily.    TRAVOPROST (TRAVATAN Z) 0.004 % DROP    Place 1 drop into both eyes every evening. 1 Drops Ophthalmic At bedtime     Objective:     Vitals:    04/02/24 1313   BP: 133/83   Pulse: 92   Temp: 97.4 °F (36.3 °C)       Physical Exam  Vitals reviewed.   Constitutional:       General: He is not in acute distress.     Appearance: He is not ill-appearing, toxic-appearing or diaphoretic.   HENT:      Head: Normocephalic and atraumatic.   Cardiovascular:      Rate and Rhythm: Normal rate.   Musculoskeletal:      Right lower leg: No edema.      Left lower leg: No edema.   Skin:     General: Skin is warm.      Coloration: Skin is not jaundiced or pale.      Findings: No bruising, erythema, lesion or rash.   Neurological:      Mental Status: He is alert.      Motor: No weakness.      Gait: Gait normal.   Psychiatric:         Mood and Affect: Mood normal.         Behavior: Behavior normal.         Thought Content: Thought content normal.          Labs/Results:  Lab Results   Component Value Date    WBC 10.32 04/01/2024    RBC 3.79 (L) 04/01/2024    HGB 11.3 (L) 04/01/2024    HCT 36.3 (L) 04/01/2024    MCV 96 04/01/2024    MCH 29.8 04/01/2024    MCHC 31.1 (L) 04/01/2024    RDW 15.0 (H) 04/01/2024     04/01/2024    MPV 11.6 04/01/2024    GRAN 6.1 04/01/2024    GRAN 59.2 04/01/2024    LYMPH 2.9 04/01/2024    LYMPH 28.1 04/01/2024    MONO 1.1 (H) 04/01/2024    MONO 10.6 04/01/2024     EOS 0.1 04/01/2024    BASO 0.04 04/01/2024    EOSINOPHIL 0.6 04/01/2024    BASOPHIL 0.4 04/01/2024     CMP  Sodium   Date Value Ref Range Status   04/01/2024 143 136 - 145 mmol/L Final     Potassium   Date Value Ref Range Status   04/01/2024 3.8 3.5 - 5.1 mmol/L Final     Chloride   Date Value Ref Range Status   04/01/2024 109 95 - 110 mmol/L Final     CO2   Date Value Ref Range Status   04/01/2024 24 23 - 29 mmol/L Final     Glucose   Date Value Ref Range Status   04/01/2024 95 70 - 110 mg/dL Final     BUN   Date Value Ref Range Status   04/01/2024 14 8 - 23 mg/dL Final     Creatinine   Date Value Ref Range Status   04/01/2024 1.0 0.5 - 1.4 mg/dL Final     Calcium   Date Value Ref Range Status   04/01/2024 9.5 8.7 - 10.5 mg/dL Final     Total Protein   Date Value Ref Range Status   04/01/2024 7.1 6.0 - 8.4 g/dL Final     Albumin   Date Value Ref Range Status   04/01/2024 3.2 (L) 3.5 - 5.2 g/dL Final     Total Bilirubin   Date Value Ref Range Status   04/01/2024 0.3 0.1 - 1.0 mg/dL Final     Comment:     For infants and newborns, interpretation of results should be based  on gestational age, weight and in agreement with clinical  observations.    Premature Infant recommended reference ranges:  Up to 24 hours.............<8.0 mg/dL  Up to 48 hours............<12.0 mg/dL  3-5 days..................<15.0 mg/dL  6-29 days.................<15.0 mg/dL       Alkaline Phosphatase   Date Value Ref Range Status   04/01/2024 79 55 - 135 U/L Final     AST   Date Value Ref Range Status   04/01/2024 20 10 - 40 U/L Final     ALT   Date Value Ref Range Status   04/01/2024 17 10 - 44 U/L Final     Anion Gap   Date Value Ref Range Status   04/01/2024 10 8 - 16 mmol/L Final     eGFR   Date Value Ref Range Status   04/01/2024 >60 >60 mL/min/1.73 m^2 Final      Latest Reference Range & Units 04/01/24 09:50   Testosterone, Total 304 - 1227 ng/dL <4 (L)     Dexa scan 10/2021  Impression:  Osteopenia    Assessment:     Problem List  Items Addressed This Visit          Immunology/Multi System    Immunosuppressed due to chemotherapy    Relevant Orders    Iron and TIBC    Vitamin B12    Ferritin    Folate       Oncology    Secondary adenocarcinoma of skeletal bone    Prostate cancer - Primary     Other Visit Diagnoses       Nutritional anemia, unspecified        Relevant Orders    Iron and TIBC    Ferritin          Plan:     Prostate cancer, Secondary adenocarcinoma of skeletal bone  --continue with abiraterone (zytiga) daily  --continue with prednisone daily  --continue with lupron q 6 months through urology, last dose 3/5/24  --unable to give zometa due to dental abnormalities    Follow-Up: 3 months with cbc cmp psa and testosterone vitamin b12 iron/tibc ferritin folate prior with primary oncologist-standing orders in    Susana Gardner PA-C  Hematology Oncology    Route Chart for Scheduling    Med Onc Chart Routing      Follow up with physician . 3 months with cbc cmp psa test vitamin b12 iron/tibc ferritin folate prior   Follow up with MIGUEL    Infusion scheduling note    Injection scheduling note    Labs CMP, CBC, PSA, iron and TIBC, ferritin and other   Scheduling:  Preferred lab:  Lab interval:     Imaging    Pharmacy appointment    Other referrals

## 2024-04-05 DIAGNOSIS — G89.3 NEOPLASM RELATED PAIN: ICD-10-CM

## 2024-04-05 DIAGNOSIS — C79.51 SECONDARY ADENOCARCINOMA OF SKELETAL BONE: ICD-10-CM

## 2024-04-05 DIAGNOSIS — C61 PROSTATE CANCER: ICD-10-CM

## 2024-04-05 NOTE — TELEPHONE ENCOUNTER
----- Message from Shawna Islas sent at 4/5/2024  2:31 PM CDT -----  Contact: [phone]  Patient called in requesting a refill on HYDROcodone-acetaminophen (NORCO)  mg per tablet, he said he is in pain , please call back  [phone]

## 2024-04-06 RX ORDER — HYDROCODONE BITARTRATE AND ACETAMINOPHEN 10; 325 MG/1; MG/1
1 TABLET ORAL EVERY 6 HOURS PRN
Qty: 90 TABLET | Refills: 0 | Status: SHIPPED | OUTPATIENT
Start: 2024-04-06 | End: 2024-04-08 | Stop reason: SDUPTHER

## 2024-04-08 DIAGNOSIS — C61 PROSTATE CANCER: ICD-10-CM

## 2024-04-08 DIAGNOSIS — C79.51 SECONDARY ADENOCARCINOMA OF SKELETAL BONE: ICD-10-CM

## 2024-04-08 DIAGNOSIS — G89.3 NEOPLASM RELATED PAIN: ICD-10-CM

## 2024-04-08 RX ORDER — HYDROCODONE BITARTRATE AND ACETAMINOPHEN 10; 325 MG/1; MG/1
1 TABLET ORAL EVERY 6 HOURS PRN
Qty: 90 TABLET | Refills: 0 | Status: SHIPPED | OUTPATIENT
Start: 2024-04-08 | End: 2024-05-17 | Stop reason: SDUPTHER

## 2024-04-29 DIAGNOSIS — K21.9 GASTROESOPHAGEAL REFLUX DISEASE WITHOUT ESOPHAGITIS: Chronic | ICD-10-CM

## 2024-04-29 NOTE — TELEPHONE ENCOUNTER
No care due was identified.  Health [facility] Embedded Care Due Messages. Reference number: [id number].   4/29/2024 8:32:01 AM CDT

## 2024-04-30 RX ORDER — PANTOPRAZOLE SODIUM 40 MG/1
TABLET, DELAYED RELEASE ORAL
Qty: 90 TABLET | Refills: 3 | Status: SHIPPED | OUTPATIENT
Start: 2024-04-30

## 2024-04-30 NOTE — TELEPHONE ENCOUNTER
Refill Decision Note   Joey Jacobo  is requesting a refill authorization.  Brief Assessment and Rationale for Refill:  Approve     Medication Therapy Plan:         Comments:     Note composed:8:12 AM 04/30/2024

## 2024-05-06 DIAGNOSIS — C61 PROSTATE CANCER: ICD-10-CM

## 2024-05-07 RX ORDER — ABIRATERONE ACETATE 250 MG/1
1000 TABLET ORAL DAILY
Qty: 120 TABLET | Refills: 1 | Status: ACTIVE | OUTPATIENT
Start: 2024-05-07 | End: 2024-06-07 | Stop reason: SDUPTHER

## 2024-05-17 DIAGNOSIS — C61 PROSTATE CANCER: ICD-10-CM

## 2024-05-17 DIAGNOSIS — G89.3 NEOPLASM RELATED PAIN: ICD-10-CM

## 2024-05-17 DIAGNOSIS — C79.51 SECONDARY ADENOCARCINOMA OF SKELETAL BONE: ICD-10-CM

## 2024-05-17 RX ORDER — HYDROCODONE BITARTRATE AND ACETAMINOPHEN 10; 325 MG/1; MG/1
1 TABLET ORAL EVERY 6 HOURS PRN
Qty: 90 TABLET | Refills: 0 | Status: CANCELLED | OUTPATIENT
Start: 2024-05-17

## 2024-05-17 RX ORDER — HYDROCODONE BITARTRATE AND ACETAMINOPHEN 10; 325 MG/1; MG/1
1 TABLET ORAL EVERY 6 HOURS PRN
Qty: 90 TABLET | Refills: 0 | Status: SHIPPED | OUTPATIENT
Start: 2024-05-17 | End: 2024-06-07 | Stop reason: SDUPTHER

## 2024-05-17 NOTE — TELEPHONE ENCOUNTER
----- Message from Cecilia Chowdhury sent at 5/17/2024  9:55 AM CDT -----  Contact: [phone]  Patient needs a refill on HYDROcodone-acetaminophen (NORCO)  mg per tablet called into [facility] at [phone].  Please call patient at [phone], if you have any questions.         [facility], [facility] - [place]  [address] [facility] [address] [address] [address] [address]  Phone: [phone] Fax: [phone]          Thanks KB

## 2024-06-07 ENCOUNTER — TELEPHONE (OUTPATIENT)
Dept: INTERNAL MEDICINE | Facility: CLINIC | Age: 69
End: 2024-06-07
Payer: MEDICARE

## 2024-06-07 DIAGNOSIS — C79.51 SECONDARY ADENOCARCINOMA OF SKELETAL BONE: ICD-10-CM

## 2024-06-07 DIAGNOSIS — C61 PROSTATE CANCER: ICD-10-CM

## 2024-06-07 DIAGNOSIS — E78.2 MIXED HYPERLIPIDEMIA: Chronic | ICD-10-CM

## 2024-06-07 DIAGNOSIS — G89.3 NEOPLASM RELATED PAIN: ICD-10-CM

## 2024-06-07 DIAGNOSIS — E11.9 TYPE 2 DIABETES MELLITUS WITHOUT COMPLICATION, WITHOUT LONG-TERM CURRENT USE OF INSULIN: Primary | ICD-10-CM

## 2024-06-07 RX ORDER — HYDROCODONE BITARTRATE AND ACETAMINOPHEN 10; 325 MG/1; MG/1
1 TABLET ORAL EVERY 6 HOURS PRN
Qty: 90 TABLET | Refills: 0 | Status: SHIPPED | OUTPATIENT
Start: 2024-06-07

## 2024-06-07 RX ORDER — EZETIMIBE 10 MG/1
10 TABLET ORAL DAILY
Qty: 90 TABLET | Refills: 1 | Status: SHIPPED | OUTPATIENT
Start: 2024-06-07

## 2024-06-07 RX ORDER — INSULIN PUMP SYRINGE, 3 ML
EACH MISCELLANEOUS
Qty: 1 EACH | Refills: 0 | Status: SHIPPED | OUTPATIENT
Start: 2024-06-07 | End: 2025-06-07

## 2024-06-07 RX ORDER — LANCETS
EACH MISCELLANEOUS
Qty: 50 EACH | Refills: 11 | Status: SHIPPED | OUTPATIENT
Start: 2024-06-07

## 2024-06-07 NOTE — TELEPHONE ENCOUNTER
----- Message from Daniele Watts MA sent at 6/7/2024 10:25 AM CDT -----  Regarding: RX Request  Pt requesting glucometer, lancets, and test strips sent to pharmacy on file. Please Advise.  ----- Message -----  From: Chelsie Abdullahi MA  Sent: 6/7/2024   9:11 AM CDT  To: [facility] Staff    Type:  Needs Medical Advice/Symptom-based Call    Who Called:  Pt   Symptoms (please be specific):  Arm pain , cramping . Sleeplessness , has been constantly sweating . Issues with appetite   How long has patient had these symptoms:      Pharmacy:  [facility], [facility] [address] [place] [address]  [phone]  Would the patient rather a call back or a response via [facility]?    Best Call Back Number:    Additional Information:   Also pt needs a meter to test glucose , and test strips

## 2024-06-07 NOTE — TELEPHONE ENCOUNTER
----- Message from Chelsie Abdullahi MA sent at 6/7/2024  9:08 AM CDT -----  Type: RX Refill Request    Who Called:  Pt   Have you contacted your pharmacy:    Refill    RX Name and Strength:  ezetimibe (ZETIA) 10 mg tablet  Preferred Pharmacy with phone number:  [place] [address]    [phone]  Local or Mail Order:    Would the patient rather a call back or a response via [facility]?    Best Call Back Number:  [phone]  Additional Information:    Thank you.

## 2024-06-07 NOTE — TELEPHONE ENCOUNTER
----- Message from Chelsie Abdullahi MA sent at 6/7/2024  9:09 AM CDT -----  Type:  Needs Medical Advice/Symptom-based Call    Who Called:  Pt   Symptoms (please be specific):  Arm pain , cramping . Sleeplessness , has been constantly sweating . Issues with appetite   How long has patient had these symptoms:      Pharmacy:  [facility], [place] [address]  [phone]  Would the patient rather a call back or a response via [facility]?    Best Call Back Number:    Additional Information:   Also pt needs a meter to test glucose , and test strips

## 2024-06-07 NOTE — TELEPHONE ENCOUNTER
----- Message from Chelsie Abdullahi MA sent at 6/7/2024  9:09 AM CDT -----  Type:  Needs Medical Advice/Symptom-based Call    Who Called:  Pt   Symptoms (please be specific):  Arm pain , cramping . Sleeplessness , has been constantly sweating . Issues with appetite   How long has patient had these symptoms:      Pharmacy:  [facility], [place] [address]  [phone]  Would the patient rather a call back or a response via [facility]?    Best Call Back Number:    Additional Information:   Also pt needs a meter to test glucose , and test strips

## 2024-06-07 NOTE — TELEPHONE ENCOUNTER
Care Due:                  Date            Visit Type   Department     Provider  --------------------------------------------------------------------------------                                EP -                              PRIMARY      [facility] INTERNAL  Cassandra Tommyampati  Last Visit: 02-      CARE ([facility])   MEDICINE       Sunday  Next Visit: None Scheduled  None         None Found                                                            Last  Test          Frequency    Reason                     Performed    Due Date  --------------------------------------------------------------------------------    HBA1C.......  6 months...  metFORMIN................  02- 08-    Health [facility] Embedded Care Due Messages. Reference number: [id number].   6/07/2024 10:16:56 AM CDT

## 2024-06-07 NOTE — TELEPHONE ENCOUNTER
----- Message from Chelsie Abdullahi MA sent at 6/7/2024  9:05 AM CDT -----  Type: RX Refill Request    Who Called:  Pt   Have you contacted your pharmacy:  No   Refill    RX Name and Strength:  HYDROcodone-acetaminophen (NORCO)  mg per tablet  Preferred Pharmacy with phone number:  [place] [address]    [phone]  Local or Mail Order:    Would the patient rather a call back or a response via [facility]?    Best Call Back Number:  [phone]  Additional Information:  Completely out , requesting refill today . Pt says he's in pain   Thank you.

## 2024-06-10 DIAGNOSIS — C61 PROSTATE CANCER: ICD-10-CM

## 2024-06-10 RX ORDER — ABIRATERONE ACETATE 250 MG/1
1000 TABLET ORAL DAILY
Qty: 120 TABLET | Refills: 1 | Status: SHIPPED | OUTPATIENT
Start: 2024-06-10 | End: 2024-06-10 | Stop reason: SDUPTHER

## 2024-06-11 RX ORDER — ABIRATERONE ACETATE 250 MG/1
1000 TABLET ORAL DAILY
Qty: 120 TABLET | Refills: 1 | Status: ACTIVE | OUTPATIENT
Start: 2024-06-11 | End: 2025-06-11

## 2024-06-17 DIAGNOSIS — E78.2 MIXED HYPERLIPIDEMIA: Chronic | ICD-10-CM

## 2024-06-17 RX ORDER — ATORVASTATIN CALCIUM 40 MG/1
40 TABLET, FILM COATED ORAL DAILY
Qty: 90 TABLET | Refills: 1 | Status: SHIPPED | OUTPATIENT
Start: 2024-06-17

## 2024-06-17 NOTE — TELEPHONE ENCOUNTER
No care due was identified.  [facility] Embedded Care Due Messages. Reference number: [id number].   6/17/2024 7:37:54 AM CDT   Pt is scheduled pt verbalized understanding

## 2024-06-17 NOTE — TELEPHONE ENCOUNTER
No care due was identified.  Health [facility] Embedded Care Due Messages. Reference number: [id number].   6/17/2024 9:57:32 AM CDT

## 2024-06-17 NOTE — TELEPHONE ENCOUNTER
----- Message from Miriam Tran sent at 6/17/2024  8:51 AM CDT -----  Contact: pt  Type:  RX Refill Request    Who Called:  pt  Refill or New Rx: refill  RX Name and Strength: atorvastatin (LIPITOR) 40 MG tablet  How is the patient currently taking it? (ex. 1XDay):  Is this a 30 day or 90 day RX:  Preferred Pharmacy with phone number: [phone]  Local or Mail Order: local  Ordering Provider: [place]  Would the patient rather a call back or a response via [facility]?   Best Call Back Number:  Additional Information:       [facility], [facility] [address] [place] [address]  [address] [address] [address]  Phone: [phone] Fax: [phone]

## 2024-07-01 ENCOUNTER — LAB VISIT (OUTPATIENT)
Dept: LAB | Facility: HOSPITAL | Age: 69
End: 2024-07-01
Attending: INTERNAL MEDICINE
Payer: MEDICARE

## 2024-07-01 DIAGNOSIS — C61 PROSTATE CANCER: ICD-10-CM

## 2024-07-01 DIAGNOSIS — C79.51 SECONDARY ADENOCARCINOMA OF SKELETAL BONE: ICD-10-CM

## 2024-07-01 DIAGNOSIS — T45.1X5A IMMUNOSUPPRESSED DUE TO CHEMOTHERAPY: ICD-10-CM

## 2024-07-01 DIAGNOSIS — D84.821 IMMUNOSUPPRESSED DUE TO CHEMOTHERAPY: ICD-10-CM

## 2024-07-01 DIAGNOSIS — D53.9 NUTRITIONAL ANEMIA, UNSPECIFIED: ICD-10-CM

## 2024-07-01 DIAGNOSIS — E11.9 TYPE 2 DIABETES MELLITUS WITHOUT COMPLICATION, WITHOUT LONG-TERM CURRENT USE OF INSULIN: ICD-10-CM

## 2024-07-01 DIAGNOSIS — G89.3 NEOPLASM RELATED PAIN: ICD-10-CM

## 2024-07-01 DIAGNOSIS — Z79.899 IMMUNOSUPPRESSED DUE TO CHEMOTHERAPY: ICD-10-CM

## 2024-07-01 LAB
ALBUMIN SERPL BCP-MCNC: 3.4 G/DL (ref 3.5–5.2)
ALP SERPL-CCNC: 80 U/L (ref 55–135)
ALT SERPL W/O P-5'-P-CCNC: 23 U/L (ref 10–44)
ANION GAP SERPL CALC-SCNC: 11 MMOL/L (ref 8–16)
AST SERPL-CCNC: 26 U/L (ref 10–40)
BASOPHILS # BLD AUTO: 0.03 K/UL (ref 0–0.2)
BASOPHILS NFR BLD: 0.3 % (ref 0–1.9)
BILIRUB SERPL-MCNC: 0.5 MG/DL (ref 0.1–1)
BUN SERPL-MCNC: 16 MG/DL (ref 8–23)
CALCIUM SERPL-MCNC: 9.5 MG/DL (ref 8.7–10.5)
CHLORIDE SERPL-SCNC: 106 MMOL/L (ref 95–110)
CO2 SERPL-SCNC: 22 MMOL/L (ref 23–29)
COMPLEXED PSA SERPL-MCNC: <0.01 NG/ML (ref 0–4)
CREAT SERPL-MCNC: 1.2 MG/DL (ref 0.5–1.4)
DIFFERENTIAL METHOD BLD: ABNORMAL
EOSINOPHIL # BLD AUTO: 0.1 K/UL (ref 0–0.5)
EOSINOPHIL NFR BLD: 0.6 % (ref 0–8)
ERYTHROCYTE [DISTWIDTH] IN BLOOD BY AUTOMATED COUNT: 14.5 % (ref 11.5–14.5)
EST. GFR  (NO RACE VARIABLE): >60 ML/MIN/1.73 M^2
FERRITIN SERPL-MCNC: 140 NG/ML (ref 20–300)
FOLATE SERPL-MCNC: 9 NG/ML (ref 4–24)
GLUCOSE SERPL-MCNC: 131 MG/DL (ref 70–110)
HCT VFR BLD AUTO: 40.3 % (ref 40–54)
HGB BLD-MCNC: 12.6 G/DL (ref 14–18)
IMM GRANULOCYTES # BLD AUTO: 0.09 K/UL (ref 0–0.04)
IMM GRANULOCYTES NFR BLD AUTO: 0.9 % (ref 0–0.5)
IRON SERPL-MCNC: 68 UG/DL (ref 45–160)
LYMPHOCYTES # BLD AUTO: 2.8 K/UL (ref 1–4.8)
LYMPHOCYTES NFR BLD: 29.6 % (ref 18–48)
MCH RBC QN AUTO: 29.9 PG (ref 27–31)
MCHC RBC AUTO-ENTMCNC: 31.3 G/DL (ref 32–36)
MCV RBC AUTO: 96 FL (ref 82–98)
MONOCYTES # BLD AUTO: 1 K/UL (ref 0.3–1)
MONOCYTES NFR BLD: 10.2 % (ref 4–15)
NEUTROPHILS # BLD AUTO: 5.6 K/UL (ref 1.8–7.7)
NEUTROPHILS NFR BLD: 58.4 % (ref 38–73)
NRBC BLD-RTO: 0 /100 WBC
PLATELET # BLD AUTO: 195 K/UL (ref 150–450)
PMV BLD AUTO: 11.7 FL (ref 9.2–12.9)
POTASSIUM SERPL-SCNC: 3.9 MMOL/L (ref 3.5–5.1)
PROT SERPL-MCNC: 7.6 G/DL (ref 6–8.4)
RBC # BLD AUTO: 4.21 M/UL (ref 4.6–6.2)
SATURATED IRON: 16 % (ref 20–50)
SODIUM SERPL-SCNC: 139 MMOL/L (ref 136–145)
TOTAL IRON BINDING CAPACITY: 417 UG/DL (ref 250–450)
TRANSFERRIN SERPL-MCNC: 282 MG/DL (ref 200–375)
VIT B12 SERPL-MCNC: 544 PG/ML (ref 210–950)
WBC # BLD AUTO: 9.6 K/UL (ref 3.9–12.7)

## 2024-07-01 PROCEDURE — 82728 ASSAY OF FERRITIN: CPT | Mod: HCNC

## 2024-07-01 PROCEDURE — 82746 ASSAY OF FOLIC ACID SERUM: CPT | Mod: HCNC

## 2024-07-01 PROCEDURE — 80053 COMPREHEN METABOLIC PANEL: CPT | Mod: HCNC,PN | Performed by: INTERNAL MEDICINE

## 2024-07-01 PROCEDURE — 82607 VITAMIN B-12: CPT | Mod: HCNC

## 2024-07-01 PROCEDURE — 83540 ASSAY OF IRON: CPT | Mod: HCNC

## 2024-07-01 PROCEDURE — 84153 ASSAY OF PSA TOTAL: CPT | Mod: HCNC | Performed by: INTERNAL MEDICINE

## 2024-07-01 PROCEDURE — 85025 COMPLETE CBC W/AUTO DIFF WBC: CPT | Mod: HCNC,PN | Performed by: INTERNAL MEDICINE

## 2024-07-01 PROCEDURE — 36415 COLL VENOUS BLD VENIPUNCTURE: CPT | Mod: HCNC,PN | Performed by: INTERNAL MEDICINE

## 2024-07-01 NOTE — TELEPHONE ENCOUNTER
----- Message from Eileen Crews sent at 7/1/2024  9:25 AM CDT -----  Who Called: Pt    What is the request in detail: Requesting call back to discuss New Rx for test strips, pt needs them before 07/19, he test 3 times a day. Please advise    [facility], [facility] [place] - [address] [facility] [address] [address] [address]  Phone: [phone] Fax: [phone]    Can the clinic reply by [facility]?     Best Call Back Number: [phone]      Additional Information:

## 2024-07-01 NOTE — TELEPHONE ENCOUNTER
No care due was identified.  [facility] Embedded Care Due Messages. Reference number: [id number].   7/01/2024 11:13:25 AM CDT

## 2024-07-03 DIAGNOSIS — C79.51 SECONDARY ADENOCARCINOMA OF SKELETAL BONE: ICD-10-CM

## 2024-07-03 DIAGNOSIS — C61 PROSTATE CANCER: ICD-10-CM

## 2024-07-03 DIAGNOSIS — G89.3 NEOPLASM RELATED PAIN: ICD-10-CM

## 2024-07-03 RX ORDER — HYDROCODONE BITARTRATE AND ACETAMINOPHEN 10; 325 MG/1; MG/1
1 TABLET ORAL EVERY 6 HOURS PRN
Qty: 90 TABLET | Refills: 0 | Status: SHIPPED | OUTPATIENT
Start: 2024-07-03

## 2024-07-03 NOTE — TELEPHONE ENCOUNTER
Spoke to patient and informed him that a refill request was sent to Dr. Berrios and he can F/U with his pharmacy later today.    Pt verbalized understanding.

## 2024-07-08 NOTE — PROGRESS NOTES
Subjective:       Patient ID: Joey Jacobo is a 69 y.o. male.    Chief Complaint: Prostate Cancer    Primary Oncologist/Hematologist: Dr. Berrios    HPI: Mr. Jacobo is a 69 year old male who is following up for his metastatic prostate carcinoma. He continues with zytiga daily + prednisone daily. He is also getting lupron q 6 months through urology, last on 3/5/24.  Cx Hx: dx 7 years ago.     Today: He states he has been well. He states his appetite is good and he is staying hydrated. He denies any n/v/d/c, bleeding, fevers, illnesses. He continues to take prednisone and zytiga. He has never taken oral iron previously. He states his pain is the same but his tolerance is getting higher for pain medication. His pain is usually lower back and lower abdomen.     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   Tobacco Use    Smoking status: Former     Current packs/day: 0.00     Average packs/day: 0.3 packs/day for 25.0 years (6.3 ttl pk-yrs)     Types: Cigarettes     Start date: 1993     Quit date: 2018     Years since quittin.8    Smokeless tobacco: Never   Substance and Sexual Activity    Alcohol use: Not Currently     Alcohol/week: 0.0 standard drinks of alcohol     Comment: occasionally    Drug use: No    Sexual activity: Yes   Social History Narrative    Wife and son     Social Determinants of Health     Financial Resource Strain: Medium Risk (2023)    Overall Financial Resource Strain (CARDIA)     Difficulty of Paying Living Expenses: Somewhat hard   Food Insecurity: No Food Insecurity (2023)    Hunger Vital Sign     Worried About Running Out of Food in the Last Year: Never true     Ran Out of Food in the Last Year: Never true   Transportation Needs: No Transportation Needs (2023)    PRAPARE - Transportation     Lack of Transportation (Medical): No     Lack of  Transportation (Non-Medical): No   Physical Activity: Sufficiently Active (1/6/2023)    Exercise Vital Sign     Days of Exercise per Week: 7 days     Minutes of Exercise per Session: 60 min   Stress: No Stress Concern Present (1/6/2023)    [employer] - Occupational Stress Questionnaire     Feeling of Stress : Only a little   Housing Stability: Low Risk  (1/6/2023)    Housing Stability Vital Sign     Unable to Pay for Housing in the Last Year: No     Number of Places Lived in the Last Year: 1     Unstable Housing in the Last Year: No       Past Medical History:   Diagnosis Date    Angina pectoris     Back pain     Chronic bronchitis     Colon polyp     COPD (chronic obstructive pulmonary disease) 7/30/2013    Coronary artery disease 7/30/2013    Emphysema of lung     Essential hypertension 10/29/2018    Glaucoma (increased eye pressure) 8/27/2013    Headache(784.0)     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    Mitral regurgitation 7/30/2013    Mixed hyperlipidemia 7/30/2013    Neuropathy     Osteoarthritis of spine with radiculopathy, lumbar region 7/21/2015    Other and unspecified hyperlipidemia     Prostate cancer     Pulmonary fibrosis 10/3/2017    Type 2 diabetes mellitus without complication, without long-term current use of insulin 6/7/2024       Family History   Problem Relation Name Age of Onset    Cataracts Mother      Kidney disease Mother      Cancer Father          Stomach    Blindness Maternal Grandmother      Diabetes Sister      Hypertension Sister      Diabetes Sister      Hypertension Sister      Diabetes Sister      Hypertension Sister      Hypertension Sister      Hypertension Sister      Colon cancer Brother      Colon cancer Brother         Past Surgical History:   Procedure Laterality Date    ANKLE FRACTURE SURGERY Left     COLONOSCOPY N/A 3/21/2016    Procedure: COLONOSCOPY;  Surgeon: Melvin Pearce MD;  Location: [facility];  Service: Endoscopy;   Laterality: N/A;    CORONARY STENT PLACEMENT      times 2    SHOULDER ARTHROSCOPY Left     SPINE SURGERY      neck fusion x2       Review of Systems   Constitutional:  Negative for activity change, appetite change, chills, diaphoresis, fatigue, fever and unexpected weight change.   HENT:  Negative for congestion, nosebleeds and rhinorrhea.    Respiratory:  Negative for cough and shortness of breath.    Cardiovascular:  Negative for chest pain and leg swelling.   Gastrointestinal:  Positive for abdominal pain. Negative for anal bleeding, blood in stool, constipation, diarrhea, nausea and vomiting.   Musculoskeletal:  Positive for arthralgias.   Skin:  Negative for color change and pallor.   Neurological:  Negative for dizziness and numbness.         Medication List with Changes/Refills   Current Medications    ABIRATERONE (ZYTIGA) 250 MG TAB    Take 4 tablets (1,000 mg total) by mouth once daily.    ABIRATERONE (ZYTIGA) 250 MG TAB    Take 4 tablets (1,000 mg total) by mouth once daily.    ASPIRIN (ECOTRIN) 81 MG EC TABLET    Take by mouth. 1 Tablet, Delayed Release (E.C.) Oral Every day    ATORVASTATIN (LIPITOR) 40 MG TABLET    Take 1 tablet (40 mg total) by mouth once daily.    BLOOD SUGAR DIAGNOSTIC STRP    To check BG one times daily, to use with insurance preferred meter    BLOOD-GLUCOSE METER KIT    To check BG one  times daily, to use with insurance preferred meter    BUDESONIDE-FORMOTEROL 160-4.5 MCG (SYMBICORT) 160-4.5 MCG/ACTUATION HFAA    Inhale 2 puffs into the lungs every 12 (twelve) hours.    CALCIUM-VITAMIN D 600 MG(1,500MG) -400 UNIT TAB    Take by mouth. 1 Tablet Oral Twice a day    EZETIMIBE (ZETIA) 10 MG TABLET    Take 1 tablet (10 mg total) by mouth once daily.    FLUTICASONE PROPIONATE (FLONASE) 50 MCG/ACTUATION NASAL SPRAY    1 spray (50 mcg total) by Each Nostril route once daily.    HYDROCODONE-ACETAMINOPHEN (NORCO)  MG PER TABLET    Take 1 tablet by mouth every 6 (six) hours as needed  for Pain.    LANCETS MISC    To check BG one times daily, to use with insurance preferred meter    LATANOPROST 0.005 % OPHTHALMIC SOLUTION        LUPRON DEPOT, 6 MONTH, 45 MG SYKT INJECTION        METFORMIN (GLUCOPHAGE) 500 MG TABLET    Take 1 tablet (500 mg total) by mouth 2 (two) times daily with meals.    METOPROLOL SUCCINATE (TOPROL-XL) 50 MG 24 HR TABLET    (FOR BLOOD PRESSURE) TAKE 1 TABLET BY MOUTH DAILY    NITROGLYCERIN (NITROSTAT) 0.4 MG SL TABLET    Place 1 tablet (0.4 mg total) under the tongue every 5 (five) minutes as needed for Chest pain.    PANTOPRAZOLE (PROTONIX) 40 MG TABLET    acid reflux TAKE 1 TABLET BY MOUTH ONCE A DAY    PREDNISONE (DELTASONE) 5 MG TABLET    Take 1 tablet (5 mg total) by mouth 2 (two) times daily.    TRAVOPROST (TRAVATAN Z) 0.004 % DROP    Place 1 drop into both eyes every evening. 1 Drops Ophthalmic At bedtime     Objective:     Vitals:    07/09/24 1301   BP: 129/78   Pulse: 86   Temp: 99.3 °F (37.4 °C)     Physical Exam  Vitals reviewed.   Constitutional:       General: He is not in acute distress.     Appearance: He is not ill-appearing, toxic-appearing or diaphoretic.   HENT:      Head: Normocephalic and atraumatic.   Cardiovascular:      Rate and Rhythm: Normal rate.   Musculoskeletal:      Right lower leg: No edema.      Left lower leg: No edema.   Skin:     General: Skin is warm.      Coloration: Skin is not jaundiced or pale.      Findings: No bruising, erythema, lesion or rash.   Neurological:      Mental Status: He is alert.      Motor: No weakness.      Gait: Gait normal.   Psychiatric:         Mood and Affect: Mood normal.         Behavior: Behavior normal.         Thought Content: Thought content normal.          Labs/Results:  Lab Results   Component Value Date    WBC 9.60 07/01/2024    RBC 4.21 (L) 07/01/2024    HGB 12.6 (L) 07/01/2024    HCT 40.3 07/01/2024    MCV 96 07/01/2024    MCH 29.9 07/01/2024    MCHC 31.3 (L) 07/01/2024    RDW 14.5 07/01/2024      07/01/2024    MPV 11.7 07/01/2024    GRAN 5.6 07/01/2024    GRAN 58.4 07/01/2024    LYMPH 2.8 07/01/2024    LYMPH 29.6 07/01/2024    MONO 1.0 07/01/2024    MONO 10.2 07/01/2024    EOS 0.1 07/01/2024    BASO 0.03 07/01/2024    EOSINOPHIL 0.6 07/01/2024    BASOPHIL 0.3 07/01/2024     CMP  Sodium   Date Value Ref Range Status   07/01/2024 139 136 - 145 mmol/L Final     Potassium   Date Value Ref Range Status   07/01/2024 3.9 3.5 - 5.1 mmol/L Final     Chloride   Date Value Ref Range Status   07/01/2024 106 95 - 110 mmol/L Final     CO2   Date Value Ref Range Status   07/01/2024 22 (L) 23 - 29 mmol/L Final     Glucose   Date Value Ref Range Status   07/01/2024 131 (H) 70 - 110 mg/dL Final     BUN   Date Value Ref Range Status   07/01/2024 16 8 - 23 mg/dL Final     Creatinine   Date Value Ref Range Status   07/01/2024 1.2 0.5 - 1.4 mg/dL Final     Calcium   Date Value Ref Range Status   07/01/2024 9.5 8.7 - 10.5 mg/dL Final     Total Protein   Date Value Ref Range Status   07/01/2024 7.6 6.0 - 8.4 g/dL Final     Albumin   Date Value Ref Range Status   07/01/2024 3.4 (L) 3.5 - 5.2 g/dL Final     Total Bilirubin   Date Value Ref Range Status   07/01/2024 0.5 0.1 - 1.0 mg/dL Final     Comment:     For infants and newborns, interpretation of results should be based  on gestational age, weight and in agreement with clinical  observations.    Premature Infant recommended reference ranges:  Up to 24 hours.............<8.0 mg/dL  Up to 48 hours............<12.0 mg/dL  3-5 days..................<15.0 mg/dL  6-29 days.................<15.0 mg/dL       Alkaline Phosphatase   Date Value Ref Range Status   07/01/2024 80 55 - 135 U/L Final     AST   Date Value Ref Range Status   07/01/2024 26 10 - 40 U/L Final     ALT   Date Value Ref Range Status   07/01/2024 23 10 - 44 U/L Final     Anion Gap   Date Value Ref Range Status   07/01/2024 11 8 - 16 mmol/L Final     eGFR   Date Value Ref Range Status   07/01/2024 >60 >60 mL/min/1.73 m^2  Final      Latest Reference Range & Units 07/01/24 10:33   Iron 45 - 160 ug/dL 68   TIBC 250 - 450 ug/dL 417   Saturated Iron 20 - 50 % 16 (L)   Transferrin 200 - 375 mg/dL 282   Ferritin 20.0 - 300.0 ng/mL 140   Folate 4.0 - 24.0 ng/mL 9.0   Vitamin B12 210 - 950 pg/mL 544      Latest Reference Range & Units 07/01/24 10:33   PSA Diagnostic 0.00 - 4.00 ng/mL <0.01      Latest Reference Range & Units 04/01/24 09:50   Testosterone, Total 304 - 1227 ng/dL <4 (L)     Dexa scan 10/2021  Impression:  Osteopenia    Assessment:     Problem List Items Addressed This Visit          Immunology/Multi System    Immunosuppressed due to chemotherapy       Oncology    Secondary adenocarcinoma of skeletal bone - Primary    Prostate cancer    Neoplasm related pain     Plan:     Prostate cancer, Secondary adenocarcinoma of skeletal bone  --continue with abiraterone (zytiga) daily  --continue with prednisone daily  --continue with lupron q 6 months through urology, last dose 3/5/24  --unable to give zometa due to dental abnormalities    Anemia  --vitamin b12: 544-WNL  --folate: 9.0-WNL  --iron: 68, sat: 16%, ferritin: 140. Slight iron depletion  --encouraged to incorporate iron rich food in diet  --start oral iron once daily, as tolerated    Follow-Up: 4 months with cbc cmp psa and testosterone prior with primary oncologist-standing orders in    Susana Gardner PA-C  Hematology Oncology    Route Chart for Scheduling    Med Onc Chart Routing      Follow up with physician . 4 months with cbc cmp psa testosterone prior   Follow up with MIGUEL    Infusion scheduling note    Injection scheduling note    Labs CMP, CBC, other and PSA   Scheduling:  Preferred lab:  Lab interval:  standing orders in   Imaging    Pharmacy appointment    Other referrals

## 2024-07-09 ENCOUNTER — OFFICE VISIT (OUTPATIENT)
Dept: HEMATOLOGY/ONCOLOGY | Facility: CLINIC | Age: 69
End: 2024-07-09
Payer: MEDICARE

## 2024-07-09 VITALS
HEIGHT: 70 IN | TEMPERATURE: 99 F | OXYGEN SATURATION: 99 % | BODY MASS INDEX: 21.47 KG/M2 | DIASTOLIC BLOOD PRESSURE: 78 MMHG | SYSTOLIC BLOOD PRESSURE: 129 MMHG | HEART RATE: 86 BPM | WEIGHT: 149.94 LBS

## 2024-07-09 DIAGNOSIS — D50.0 ANEMIA DUE TO CHRONIC BLOOD LOSS: ICD-10-CM

## 2024-07-09 DIAGNOSIS — C79.51 SECONDARY ADENOCARCINOMA OF SKELETAL BONE: Primary | ICD-10-CM

## 2024-07-09 DIAGNOSIS — T45.1X5A IMMUNOSUPPRESSED DUE TO CHEMOTHERAPY: ICD-10-CM

## 2024-07-09 DIAGNOSIS — G89.3 NEOPLASM RELATED PAIN: ICD-10-CM

## 2024-07-09 DIAGNOSIS — D84.821 IMMUNOSUPPRESSED DUE TO CHEMOTHERAPY: ICD-10-CM

## 2024-07-09 DIAGNOSIS — C61 PROSTATE CANCER: ICD-10-CM

## 2024-07-09 DIAGNOSIS — Z79.899 IMMUNOSUPPRESSED DUE TO CHEMOTHERAPY: ICD-10-CM

## 2024-07-09 PROCEDURE — 99999 PR PBB SHADOW E&M-EST. PATIENT-LVL IV: CPT | Mod: PBBFAC,HCNC,,

## 2024-07-09 PROCEDURE — 3078F DIAST BP <80 MM HG: CPT | Mod: HCNC,CPTII,S$GLB,

## 2024-07-09 PROCEDURE — 1101F PT FALLS ASSESS-DOCD LE1/YR: CPT | Mod: HCNC,CPTII,S$GLB,

## 2024-07-09 PROCEDURE — 3051F HG A1C>EQUAL 7.0%<8.0%: CPT | Mod: HCNC,CPTII,S$GLB,

## 2024-07-09 PROCEDURE — 3288F FALL RISK ASSESSMENT DOCD: CPT | Mod: HCNC,CPTII,S$GLB,

## 2024-07-09 PROCEDURE — 3008F BODY MASS INDEX DOCD: CPT | Mod: HCNC,CPTII,S$GLB,

## 2024-07-09 PROCEDURE — 1159F MED LIST DOCD IN RCRD: CPT | Mod: HCNC,CPTII,S$GLB,

## 2024-07-09 PROCEDURE — 1126F AMNT PAIN NOTED NONE PRSNT: CPT | Mod: HCNC,CPTII,S$GLB,

## 2024-07-09 PROCEDURE — 3074F SYST BP LT 130 MM HG: CPT | Mod: HCNC,CPTII,S$GLB,

## 2024-07-09 PROCEDURE — 99214 OFFICE O/P EST MOD 30 MIN: CPT | Mod: HCNC,S$GLB,,

## 2024-07-09 RX ORDER — FERROUS SULFATE 325(65) MG
325 TABLET, DELAYED RELEASE (ENTERIC COATED) ORAL DAILY
Qty: 90 TABLET | Refills: 0 | Status: SHIPPED | OUTPATIENT
Start: 2024-07-09

## 2024-07-12 DIAGNOSIS — C79.51 SECONDARY ADENOCARCINOMA OF SKELETAL BONE: ICD-10-CM

## 2024-07-12 DIAGNOSIS — G89.3 NEOPLASM RELATED PAIN: ICD-10-CM

## 2024-07-12 RX ORDER — PREDNISONE 5 MG/1
5 TABLET ORAL 2 TIMES DAILY
Qty: 60 TABLET | Refills: 3 | Status: SHIPPED | OUTPATIENT
Start: 2024-07-12 | End: 2024-07-12 | Stop reason: SDUPTHER

## 2024-07-12 RX ORDER — PREDNISONE 5 MG/1
5 TABLET ORAL 2 TIMES DAILY
Qty: 60 TABLET | Refills: 3 | Status: CANCELLED | OUTPATIENT
Start: 2024-07-12

## 2024-07-12 RX ORDER — PREDNISONE 5 MG/1
5 TABLET ORAL 2 TIMES DAILY
Qty: 60 TABLET | Refills: 3 | Status: SHIPPED | OUTPATIENT
Start: 2024-07-12

## 2024-07-12 NOTE — TELEPHONE ENCOUNTER
----- Message from Aileen Clifton sent at 7/12/2024  2:17 PM CDT -----  .Type: RX Refill Request    Who Called: Self    Have you contacted your pharmacy: No     Refill or New Rx: New Rx     RX Name and Strength:predniSONE (DELTASONE) 5 MG tablet    Preferred Pharmacy with phone number:.  [facility] [address] [address]  [address] [address] [address]  Phone: [phone] Fax: [phone]    Local or Mail Order: Local    Ordering Provider: Yash    Would the patient rather a call back or a response via [facility]? Call Back    Best Call Back Number:.[phone] ([place])       Additional Information:

## 2024-07-29 DIAGNOSIS — C61 PROSTATE CANCER: ICD-10-CM

## 2024-07-29 DIAGNOSIS — G89.3 NEOPLASM RELATED PAIN: ICD-10-CM

## 2024-07-29 DIAGNOSIS — C79.51 SECONDARY ADENOCARCINOMA OF SKELETAL BONE: ICD-10-CM

## 2024-07-29 RX ORDER — HYDROCODONE BITARTRATE AND ACETAMINOPHEN 10; 325 MG/1; MG/1
1 TABLET ORAL EVERY 6 HOURS PRN
Qty: 90 TABLET | Refills: 0 | Status: SHIPPED | OUTPATIENT
Start: 2024-07-29

## 2024-07-29 NOTE — TELEPHONE ENCOUNTER
----- Message from Cortney Pollo sent at 7/29/2024  7:09 AM CDT -----  .Type:  RX Refill Request    Who Called: .Joey Jacobo   Refill or New Rx:refill  RX Name and Strength:HYDROcodone-acetaminophen (NORCO)  mg per tablet  How is the patient currently taking it? (ex. 1XDay):daily  Is this a 30 day or 90 day RX:    Preferred Pharmacy with phone number:.  [place] [facility] [address] [facility]  [address] [address]  Phone: [phone] Fax: [phone]    Local or Mail Order:local  Ordering Provider:Kelle  Would the patient rather a call back or a response via [facility]? Call back  Best Call Back Number:.[phone]     Additional Information:

## 2024-08-12 ENCOUNTER — TELEPHONE (OUTPATIENT)
Dept: CARDIOLOGY | Facility: CLINIC | Age: 69
End: 2024-08-12
Payer: MEDICARE

## 2024-08-12 DIAGNOSIS — I25.10 CORONARY ARTERY DISEASE, UNSPECIFIED VESSEL OR LESION TYPE, UNSPECIFIED WHETHER ANGINA PRESENT, UNSPECIFIED WHETHER NATIVE OR TRANSPLANTED HEART: Primary | ICD-10-CM

## 2024-08-19 DIAGNOSIS — C79.51 SECONDARY ADENOCARCINOMA OF SKELETAL BONE: ICD-10-CM

## 2024-08-19 DIAGNOSIS — G89.3 NEOPLASM RELATED PAIN: ICD-10-CM

## 2024-08-19 DIAGNOSIS — C61 PROSTATE CANCER: ICD-10-CM

## 2024-08-19 DIAGNOSIS — E11.9 TYPE 2 DIABETES MELLITUS WITHOUT COMPLICATION, WITHOUT LONG-TERM CURRENT USE OF INSULIN: ICD-10-CM

## 2024-08-19 RX ORDER — METFORMIN HYDROCHLORIDE 500 MG/1
TABLET ORAL
Qty: 180 TABLET | Refills: 0 | Status: SHIPPED | OUTPATIENT
Start: 2024-08-19

## 2024-08-19 RX ORDER — HYDROCODONE BITARTRATE AND ACETAMINOPHEN 10; 325 MG/1; MG/1
1 TABLET ORAL EVERY 6 HOURS PRN
Qty: 90 TABLET | Refills: 0 | Status: SHIPPED | OUTPATIENT
Start: 2024-08-19

## 2024-08-19 NOTE — TELEPHONE ENCOUNTER
Provider Staff:  Action required for this patient    Requires labs      Please see care gap opportunities below in Care Due Message.    Thanks!  [facility]     Appointments      Date Provider   Last Visit   2/20/2024 Cassandra Brand MD   Next Visit   Visit date not found Cassandra Brand MD     Refill Decision Note   Joey Jacobo  is requesting a refill authorization.  Brief Assessment and Rationale for Refill:  Approve     Medication Therapy Plan:        Comments:     Note composed:8:48 AM 08/19/2024

## 2024-08-19 NOTE — TELEPHONE ENCOUNTER
Care Due:                  Date            Visit Type   Department     Provider  --------------------------------------------------------------------------------                                EP -                              PRIMARY      [facility] INTER[facility]  Cassandra Tommyampsawyer  Last Visit: 02-      CARE ([facility])   MEDICINE       Sunday  Next Visit: None Scheduled  None         None Found                                                            Last  Test          Frequency    Reason                     Performed    Due Date  --------------------------------------------------------------------------------    HBA1C.......  6 months...  metFORMIN................  02- 08-    [facility] Embedded Care Due Messages. Reference number: [id number].   8/19/2024 8:04:50 AM CDT

## 2024-08-26 ENCOUNTER — HOSPITAL ENCOUNTER (OUTPATIENT)
Dept: CARDIOLOGY | Facility: HOSPITAL | Age: 69
Discharge: HOME | End: 2024-08-26
Attending: INTERNAL MEDICINE
Payer: MEDICARE

## 2024-08-26 ENCOUNTER — OFFICE VISIT (OUTPATIENT)
Dept: CARDIOLOGY | Facility: CLINIC | Age: 69
End: 2024-08-26
Payer: MEDICARE

## 2024-08-26 VITALS
SYSTOLIC BLOOD PRESSURE: 120 MMHG | WEIGHT: 148.38 LBS | BODY MASS INDEX: 21.24 KG/M2 | OXYGEN SATURATION: 98 % | HEART RATE: 80 BPM | DIASTOLIC BLOOD PRESSURE: 70 MMHG | HEIGHT: 70 IN

## 2024-08-26 DIAGNOSIS — I20.9 AP (ANGINA PECTORIS): ICD-10-CM

## 2024-08-26 DIAGNOSIS — E11.9 TYPE 2 DIABETES MELLITUS WITHOUT COMPLICATION, WITHOUT LONG-TERM CURRENT USE OF INSULIN: ICD-10-CM

## 2024-08-26 DIAGNOSIS — Z98.61 HISTORY OF PTCA: ICD-10-CM

## 2024-08-26 DIAGNOSIS — J43.8 OTHER EMPHYSEMA: ICD-10-CM

## 2024-08-26 DIAGNOSIS — I70.0 AORTIC ATHEROSCLEROSIS: ICD-10-CM

## 2024-08-26 DIAGNOSIS — E78.2 MIXED HYPERLIPIDEMIA: Chronic | ICD-10-CM

## 2024-08-26 DIAGNOSIS — R07.89 ATYPICAL CHEST PAIN: ICD-10-CM

## 2024-08-26 DIAGNOSIS — I34.0 NONRHEUMATIC MITRAL VALVE REGURGITATION: Chronic | ICD-10-CM

## 2024-08-26 DIAGNOSIS — R94.31 ABNORMAL ECG: ICD-10-CM

## 2024-08-26 DIAGNOSIS — I25.118 CORONARY ARTERY DISEASE OF NATIVE ARTERY OF NATIVE HEART WITH STABLE ANGINA PECTORIS: Primary | Chronic | ICD-10-CM

## 2024-08-26 DIAGNOSIS — I10 ESSENTIAL HYPERTENSION: ICD-10-CM

## 2024-08-26 DIAGNOSIS — C61 PROSTATE CANCER: ICD-10-CM

## 2024-08-26 DIAGNOSIS — K21.9 GASTROESOPHAGEAL REFLUX DISEASE WITHOUT ESOPHAGITIS: Chronic | ICD-10-CM

## 2024-08-26 DIAGNOSIS — I25.10 CORONARY ARTERY DISEASE, UNSPECIFIED VESSEL OR LESION TYPE, UNSPECIFIED WHETHER ANGINA PRESENT, UNSPECIFIED WHETHER NATIVE OR TRANSPLANTED HEART: ICD-10-CM

## 2024-08-26 DIAGNOSIS — Z87.891 FORMER SMOKER: ICD-10-CM

## 2024-08-26 PROCEDURE — 3074F SYST BP LT 130 MM HG: CPT | Mod: HCNC,CPTII,S$GLB, | Performed by: INTERNAL MEDICINE

## 2024-08-26 PROCEDURE — 99999 PR PBB SHADOW E&M-EST. PATIENT-LVL III: CPT | Mod: PBBFAC,HCNC,, | Performed by: INTERNAL MEDICINE

## 2024-08-26 PROCEDURE — 1126F AMNT PAIN NOTED NONE PRSNT: CPT | Mod: HCNC,CPTII,S$GLB, | Performed by: INTERNAL MEDICINE

## 2024-08-26 PROCEDURE — 99205 OFFICE O/P NEW HI 60 MIN: CPT | Mod: HCNC,S$GLB,, | Performed by: INTERNAL MEDICINE

## 2024-08-26 PROCEDURE — 1160F RVW MEDS BY RX/DR IN RCRD: CPT | Mod: HCNC,CPTII,S$GLB, | Performed by: INTERNAL MEDICINE

## 2024-08-26 PROCEDURE — 3008F BODY MASS INDEX DOCD: CPT | Mod: HCNC,CPTII,S$GLB, | Performed by: INTERNAL MEDICINE

## 2024-08-26 PROCEDURE — 93010 ELECTROCARDIOGRAM REPORT: CPT | Mod: HCNC,,, | Performed by: INTERNAL MEDICINE

## 2024-08-26 PROCEDURE — 3051F HG A1C>EQUAL 7.0%<8.0%: CPT | Mod: HCNC,CPTII,S$GLB, | Performed by: INTERNAL MEDICINE

## 2024-08-26 PROCEDURE — 93005 ELECTROCARDIOGRAM TRACING: CPT | Mod: HCNC

## 2024-08-26 PROCEDURE — 3078F DIAST BP <80 MM HG: CPT | Mod: HCNC,CPTII,S$GLB, | Performed by: INTERNAL MEDICINE

## 2024-08-26 PROCEDURE — 1159F MED LIST DOCD IN RCRD: CPT | Mod: HCNC,CPTII,S$GLB, | Performed by: INTERNAL MEDICINE

## 2024-08-26 NOTE — PROGRESS NOTES
Subjective:    Patient ID:  Joey Jacobo is a 69 y.o. male who presents for evaluation of Coronary Artery Disease          HPI Pt presents for eval.   Pt last seen June 2021.  His current medical conditions include CAD (h/o LCX PCI 2007), GERD, prostate cancer, HTN, MVP/MR, dyslipidemia, COPD.   dx with metastatic prostate cancer, incurable per chart.  Former smoker (quit 2018).  Past hx pertinent for following:  S/p LHC 2010, nonobstructive LCX disease. JOSÉ 2010 showed MVP/severe MR.   Echo 2/15 showed mod MR, normal LV function.    Echo 4/18 normal LVEF.  Stress MPI 4/18 no ischemia, normal EF.   H/o abnl ecg.  Now here.  Last seen > 3 years ago; did not f/u.  Has metastatic prostate cancer, f/u by heme/onc.  Some chest pain sxs, picking up something heavy mainly.  Short lasting.  Seems to start in his left arm and then goes back to chest.  No exertional angina.  No CHF sxs.  Ecg today 8/26/24 personally reviewed: NSR, normal ecg.  BP is controlled.  No syncope.  Chart/labs reviewed.  HGAIC controlled.  Lipids well controlled on Zetia/statin tx.            Current Outpatient Medications:     abiraterone (ZYTIGA) 250 mg Tab, Take 4 tablets (1,000 mg total) by mouth once daily., Disp: 120 tablet, Rfl: 1    aspirin (ECOTRIN) 81 MG EC tablet, Take by mouth. 1 Tablet, Delayed Release (E.C.) Oral Every day, Disp: , Rfl:     atorvastatin (LIPITOR) 40 MG tablet, Take 1 tablet (40 mg total) by mouth once daily., Disp: 90 tablet, Rfl: 1    blood sugar diagnostic Strp, To check BG one times daily, to use with insurance preferred meter, Disp: 50 each, Rfl: 11    blood-glucose meter kit, To check BG one  times daily, to use with insurance preferred meter, Disp: 1 each, Rfl: 0    budesonide-formoterol 160-4.5 mcg (SYMBICORT) 160-4.5 mcg/actuation HFAA, Inhale 2 puffs into the lungs every 12 (twelve) hours., Disp: 10.2 g, Rfl: 3    calcium-vitamin D 600 mg(1,500mg) -400 unit Tab, Take by mouth. 1 Tablet Oral Twice a day,  Disp: , Rfl:     ezetimibe (ZETIA) 10 mg tablet, Take 1 tablet (10 mg total) by mouth once daily., Disp: 90 tablet, Rfl: 1    ferrous sulfate 325 (65 FE) MG EC tablet, Take 1 tablet (325 mg total) by mouth once daily., Disp: 90 tablet, Rfl: 0    fluticasone propionate (FLONASE) 50 mcg/actuation nasal spray, 1 spray (50 mcg total) by Each Nostril route once daily., Disp: 16 g, Rfl: 3    HYDROcodone-acetaminophen (NORCO)  mg per tablet, Take 1 tablet by mouth every 6 (six) hours as needed for Pain., Disp: 90 tablet, Rfl: 0    lancets Misc, To check BG one times daily, to use with insurance preferred meter, Disp: 50 each, Rfl: 11    latanoprost 0.005 % ophthalmic solution, , Disp: , Rfl: 3    LUPRON DEPOT, 6 MONTH, 45 mg SyKt injection, , Disp: , Rfl:     metFORMIN (GLUCOPHAGE) 500 MG tablet, (FOR SUGAR CONTROL) TAKE 1 TABLET BY MOUTH TWICE DAILY with meals, Disp: 180 tablet, Rfl: 0    metoprolol succinate (TOPROL-XL) 50 MG 24 hr tablet, (FOR BLOOD PRESSURE) TAKE 1 TABLET BY MOUTH DAILY, Disp: 90 tablet, Rfl: 4    nitroGLYCERIN (NITROSTAT) 0.4 MG SL tablet, Place 1 tablet (0.4 mg total) under the tongue every 5 (five) minutes as needed for Chest pain., Disp: 20 tablet, Rfl: 12    pantoprazole (PROTONIX) 40 MG tablet, acid reflux TAKE 1 TABLET BY MOUTH ONCE A DAY, Disp: 90 tablet, Rfl: 3    predniSONE (DELTASONE) 5 MG tablet, Take 1 tablet (5 mg total) by mouth 2 (two) times daily., Disp: 60 tablet, Rfl: 3    travoprost (TRAVATAN Z) 0.004 % Drop, Place 1 drop into both eyes every evening. 1 Drops Ophthalmic At bedtime, Disp: 5 mL, Rfl: 12      Review of Systems   Constitutional: Positive for weight loss.   HENT: Negative.     Eyes: Negative.    Cardiovascular:  Positive for chest pain.   Respiratory: Negative.     Endocrine: Negative.    Hematologic/Lymphatic: Negative.    Skin: Negative.    Musculoskeletal:  Positive for arthritis, joint pain and stiffness.   Gastrointestinal: Negative.    Genitourinary:  "Negative.    Neurological: Negative.    Psychiatric/Behavioral: Negative.     Allergic/Immunologic: Negative.        /70 (BP Location: Left arm, Patient Position: Sitting, BP Method: Large (Manual))   Pulse 80   Ht 5' 10" (1.778 m)   Wt 67.3 kg (148 lb 5.9 oz)   SpO2 98%   BMI 21.29 kg/m²     Wt Readings from Last 3 Encounters:   08/26/24 67.3 kg (148 lb 5.9 oz)   07/09/24 68 kg (149 lb 14.6 oz)   04/02/24 69.2 kg (152 lb 8.9 oz)     Temp Readings from Last 3 Encounters:   07/09/24 99.3 °F (37.4 °C) (Temporal)   04/02/24 97.4 °F (36.3 °C) (Temporal)   03/05/24 97.6 °F (36.4 °C) (Oral)     BP Readings from Last 3 Encounters:   08/26/24 120/70   07/09/24 129/78   04/02/24 133/83     Pulse Readings from Last 3 Encounters:   08/26/24 80   07/09/24 86   04/02/24 92          Objective:    Physical Exam  Vitals and nursing note reviewed.   Constitutional:       Appearance: He is well-developed.   HENT:      Head: Normocephalic.   Neck:      Thyroid: No thyromegaly.      Vascular: No carotid bruit or JVD.   Cardiovascular:      Rate and Rhythm: Normal rate and regular rhythm.      Pulses:           Radial pulses are 2+ on the right side and 2+ on the left side.      Heart sounds: S1 normal and S2 normal. Heart sounds not distant. No midsystolic click and no opening snap. Murmur heard.      Medium-pitched midsystolic murmur is present with a grade of 1/6 at the lower left sternal border and apex.      No friction rub. No S3 or S4 sounds.   Pulmonary:      Effort: Pulmonary effort is normal.      Breath sounds: Normal breath sounds. No wheezing or rales.   Abdominal:      General: Bowel sounds are normal. There is no distension or abdominal bruit.      Palpations: Abdomen is soft.      Tenderness: There is no abdominal tenderness.   Musculoskeletal:      Cervical back: Neck supple.   Skin:     General: Skin is warm.   Neurological:      Mental Status: He is alert and oriented to person, place, and time. "   Psychiatric:         Behavior: Behavior normal.         I have reviewed all pertinent labs and cardiac studies.        Chemistry        Component Value Date/Time     07/01/2024 1033    K 3.9 07/01/2024 1033     07/01/2024 1033    CO2 22 (L) 07/01/2024 1033    BUN 16 07/01/2024 1033    CREATININE 1.2 07/01/2024 1033     (H) 07/01/2024 1033        Component Value Date/Time    CALCIUM 9.5 07/01/2024 1033    ALKPHOS 80 07/01/2024 1033    AST 26 07/01/2024 1033    ALT 23 07/01/2024 1033    BILITOT 0.5 07/01/2024 1033    ESTGFRAFRICA >60 05/10/2022 1100    EGFRNONAA >60 05/10/2022 1100        Lab Results   Component Value Date    WBC 9.60 07/01/2024    HGB 12.6 (L) 07/01/2024    HCT 40.3 07/01/2024    MCV 96 07/01/2024     07/01/2024       Lab Results   Component Value Date    HGBA1C 7.0 (H) 02/22/2024     Lab Results   Component Value Date    CHOL 124 02/22/2024    CHOL 166 02/27/2023    CHOL 135 11/04/2021     Lab Results   Component Value Date    HDL 46 02/22/2024    HDL 48 02/27/2023    HDL 49 11/04/2021     Lab Results   Component Value Date    LDLCALC 63.0 02/22/2024    LDLCALC 90.2 02/27/2023    LDLCALC 73.0 11/04/2021     Lab Results   Component Value Date    TRIG 75 02/22/2024    TRIG 139 02/27/2023    TRIG 65 11/04/2021     Lab Results   Component Value Date    CHOLHDL 37.1 02/22/2024    CHOLHDL 28.9 02/27/2023    CHOLHDL 36.3 11/04/2021           Assessment:       1. Coronary artery disease of native artery of native heart with stable angina pectoris    2. Abnormal ECG    3. AP (angina pectoris)    4. Atypical chest pain    5. Aortic atherosclerosis    6. Gastroesophageal reflux disease without esophagitis    7. Essential hypertension    8. Mixed hyperlipidemia    9. History of PTCA    10. Nonrheumatic mitral valve regurgitation    11. Prostate cancer    12. Type 2 diabetes mellitus without complication, without long-term current use of insulin    13. COPD Other emphysema    14.  Former smoker           Plan:             Chest pain sxs, atypical overall, and seems more MSK type pain from left arm w heavy lifting.  No ischemia eval in > 6 years.  Nuclear stress test advised.  Echocardiogram.  Consider LHC if stress MPI/echo shows coronary ischemia/CHF.  Risks/benefits of LHC/PCI discussed.  Continue medical tx for CAD.  Continue current HTN meds.  Lipids: continue Zetia/Statin tx.  COPD: Stable.  F/u w Pulmonary for COPD.  Reviewed all tests and above medical conditions with patient in detail and formulated treatment plan.  Continue optimal medical treatment for cardiovascular conditions.  Cardiac low salt diet discussed.  Daily exercise encouraged, goal 30 +  minutes aerobic exercise as tolerated.  Maintaining healthy weight and weight loss goals (if needed) were discussed in clinic.  Continue to refrain from smoking.  Prostate cancer: f/u w heme/onc as advised.  DM: Optimal HGAIC control advised.  GERD: PPI tx.  HTN: goal < 130/80.  Continue current HTN meds.    Phone review for test results.      F/u in 1 year if stress test/echo looks ok.        I have reviewed all pertinent labs and cardiac studies independently. Plans and recommendations have been formulated under my direct supervision. All questions answered and patient voiced understanding.

## 2024-08-27 LAB
OHS QRS DURATION: 84 MS
OHS QTC CALCULATION: 415 MS

## 2024-09-09 DIAGNOSIS — G89.3 NEOPLASM RELATED PAIN: ICD-10-CM

## 2024-09-09 DIAGNOSIS — C61 PROSTATE CANCER: ICD-10-CM

## 2024-09-09 DIAGNOSIS — C79.51 SECONDARY ADENOCARCINOMA OF SKELETAL BONE: ICD-10-CM

## 2024-09-09 RX ORDER — HYDROCODONE BITARTRATE AND ACETAMINOPHEN 10; 325 MG/1; MG/1
1 TABLET ORAL EVERY 6 HOURS PRN
Qty: 90 TABLET | Refills: 0 | Status: SHIPPED | OUTPATIENT
Start: 2024-09-09

## 2024-09-10 ENCOUNTER — TELEPHONE (OUTPATIENT)
Dept: UROLOGY | Facility: CLINIC | Age: 69
End: 2024-09-10
Payer: MEDICARE

## 2024-09-10 NOTE — TELEPHONE ENCOUNTER
.Outgoing call placed to patient, patient verified name and date of birth, patient wanted to make sure clinic was open and we had his injection for tomorrow. Patient verbalized he would like to come in earlier, notified that was ok he can come early.      ----- Message from Martha Morin sent at 9/10/2024  4:02 PM CDT -----  Contact: [phone]  Would like to receive medical advice.    Pt have questions about appt tomorrow.     Would they like a call back or a response via [facility]:  call    Additional information:  Please call to advise

## 2024-09-16 ENCOUNTER — TELEPHONE (OUTPATIENT)
Dept: UROLOGY | Facility: CLINIC | Age: 69
End: 2024-09-16
Payer: MEDICARE

## 2024-09-16 NOTE — TELEPHONE ENCOUNTER
.Outgoing call placed to patient, patient verified name and date of birth, patient wanted to reschedule his appt and injection, appt scheduled as requested.       ----- Message from Wendy Mandel sent at 9/16/2024 11:24 AM CDT -----  Contact: Joey Cook needs a call back at [phone], Regards to getting an appointment to have his injection done.    Thanks  Td

## 2024-09-17 ENCOUNTER — OFFICE VISIT (OUTPATIENT)
Dept: UROLOGY | Facility: CLINIC | Age: 69
End: 2024-09-17
Payer: MEDICARE

## 2024-09-17 VITALS
BODY MASS INDEX: 20.83 KG/M2 | TEMPERATURE: 98 F | DIASTOLIC BLOOD PRESSURE: 81 MMHG | HEIGHT: 70 IN | SYSTOLIC BLOOD PRESSURE: 147 MMHG | HEART RATE: 82 BPM | WEIGHT: 145.5 LBS | RESPIRATION RATE: 18 BRPM

## 2024-09-17 DIAGNOSIS — C79.51 PROSTATE CANCER METASTATIC TO BONE: Primary | ICD-10-CM

## 2024-09-17 DIAGNOSIS — C61 PROSTATE CANCER METASTATIC TO BONE: Primary | ICD-10-CM

## 2024-09-17 PROCEDURE — 99499 UNLISTED E&M SERVICE: CPT | Mod: HCNC,S$GLB,, | Performed by: UROLOGY

## 2024-09-17 PROCEDURE — 99999 PR PBB SHADOW E&M-EST. PATIENT-LVL IV: CPT | Mod: PBBFAC,HCNC,, | Performed by: UROLOGY

## 2024-09-17 PROCEDURE — 96402 CHEMO HORMON ANTINEOPL SQ/IM: CPT | Mod: HCNC,S$GLB,, | Performed by: UROLOGY

## 2024-09-17 NOTE — PROGRESS NOTES
.Lupron 45 mg injection ordered by Dr. Paniagua, After donning PPE, confirming patient's allergies and medication, Lupron 45mg injection was administered IM to left dorsalgluteal using aseptic technique. Pt tolerated procedure well. Patient agreed to wait 15 minutes after injection in the clinic and to report any adverse reactions.     Rashad Dior RN

## 2024-09-17 NOTE — PROGRESS NOTES
CC: follow up prostate cancer    History of Present Illness:     24-Here for Lupron. He continues with zytiga and prednisone daily. Urinating without difficulty. No gross hematuria.   3/5/24-here for lupron. He continues on zytiga/prednisone. Good stream. No gross hematuria.   23-here for lupron. No bothersome LUTS. No gross hematuria. He does report back and groin pain, which limits his activity. No weight loss. +hot flashes.   3/1/23-Here for lupron. Continues to take zytiga/prednisone. Still having hot flashes, but doing okay. Stream is good. No gross hematuria or dysuria. No bone pain.   22-has been 11 months since his last visit. States that his sister  when he was supposed to follow up. He continues to take Zytiga/prednisone and PSA remains undetectable. Good stream. No stranguria. No incontinence. Sometimes has urgency.   21- Currently managed on Zytiga/prednisone and Lupron. Reports hot flashes. Sometimes he has urinary urgency. No incontinence. No gross hematuria. No stranguria. Having bone density testing done.   3/15/21: Lupron today, PSA low.  Reviewed history in detail.   20: PSA very low.  Lupron today. Reviewed history in detail.   3/11/20: Lupron due today; will switch to 6 mo prep. Reviewed history in detail. Very active with outside work.  19:  Lupron shot today.  Reviewed history in detail. Currently on dual therapy with abariterone.   19: Lupron shot today.  Reviewed history in detail. Currently on dual therapy with abariterone.   19: Lupron shot today.  Reviewed history in detail. Currently on dual therapy with abariterone.  19: Son  in an MVA since last visit. Reviewed history in detail.  No problems from Lupron.  18: No problems from Lupron.  PSA lowest.  18: PSA today and again 3 mo.  Doing fine.  Reviewed history in detail.  18: PSA well down.  No adverse effects from Lupron.    10/18/17: Been on casodex a month back to  "review and start Lupron.  Dr. Moscoso felt XRT was not an option but perhaps chemotherapy could be beneficial.  9/18/17: Bone scan reports "RIGHT supraorbital location and a smaller similarly extremely intense focus of increased uptake posteriorly on the RIGHT at the T9 level.  Etiology is uncertain."  CT scan shows "A few scattered shotty mesenteric and retroperitoneal nodes identified.  Similar nodes identified within the pelvis bilaterally."  MRI shows left 2.8 cm prostate mass PIRADS 5, with metastatic pelvic lymphadenopathy (right pelvic sidewall node and left internal iliac chain LN).  No bony mets in pelvis.  7/18/17:  No problems from biopsy.  Gl 4+5 in the left base (30%) and a sig amount of 4+4 in other parts of the gland.  Reviewed treatment options, and imaging needs.  7/3/17: TRUS/Bx today 27 gm.  5/24/17: 63 yo man has problems with perineal/scrotal pain once or twice a month; lasts for an hour or two, some nocturia.  PSA recently elevated.  No abd/pelvic pain and no exac/rel factors.  No hematuria.  No urolithiasis.  No urinary bother.  No  history. CT with contrast earlier this year essentially normal.      Review of Systems   Constitutional:  Negative for appetite change and unexpected weight change.   Respiratory:  Negative for shortness of breath.    Cardiovascular:  Negative for chest pain.   Gastrointestinal:  Negative for abdominal pain.   Musculoskeletal:  Positive for arthralgias and back pain.   All other systems reviewed and are negative.      Current Outpatient Medications   Medication Sig Dispense Refill    abiraterone (ZYTIGA) 250 mg Tab Take 4 tablets (1,000 mg total) by mouth once daily. 120 tablet 1    aspirin (ECOTRIN) 81 MG EC tablet Take by mouth. 1 Tablet, Delayed Release (E.C.) Oral Every day      atorvastatin (LIPITOR) 40 MG tablet Take 1 tablet (40 mg total) by mouth once daily. 90 tablet 1    blood sugar diagnostic Strp To check BG one times daily, to use with insurance " preferred meter 50 each 11    blood-glucose meter kit To check BG one  times daily, to use with insurance preferred meter 1 each 0    budesonide-formoterol 160-4.5 mcg (SYMBICORT) 160-4.5 mcg/actuation HFAA Inhale 2 puffs into the lungs every 12 (twelve) hours. 10.2 g 3    calcium-vitamin D 600 mg(1,500mg) -400 unit Tab Take by mouth. 1 Tablet Oral Twice a day      ezetimibe (ZETIA) 10 mg tablet Take 1 tablet (10 mg total) by mouth once daily. 90 tablet 1    ferrous sulfate 325 (65 FE) MG EC tablet Take 1 tablet (325 mg total) by mouth once daily. 90 tablet 0    fluticasone propionate (FLONASE) 50 mcg/actuation nasal spray 1 spray (50 mcg total) by Each Nostril route once daily. 16 g 3    HYDROcodone-acetaminophen (NORCO)  mg per tablet Take 1 tablet by mouth every 6 (six) hours as needed for Pain. 90 tablet 0    lancets Misc To check BG one times daily, to use with insurance preferred meter 50 each 11    latanoprost 0.005 % ophthalmic solution   3    LUPRON DEPOT, 6 MONTH, 45 mg SyKt injection       metFORMIN (GLUCOPHAGE) 500 MG tablet (FOR SUGAR CONTROL) TAKE 1 TABLET BY MOUTH TWICE DAILY with meals 180 tablet 0    metoprolol succinate (TOPROL-XL) 50 MG 24 hr tablet (FOR BLOOD PRESSURE) TAKE 1 TABLET BY MOUTH DAILY 90 tablet 4    nitroGLYCERIN (NITROSTAT) 0.4 MG SL tablet Place 1 tablet (0.4 mg total) under the tongue every 5 (five) minutes as needed for Chest pain. 20 tablet 12    pantoprazole (PROTONIX) 40 MG tablet acid reflux TAKE 1 TABLET BY MOUTH ONCE A DAY 90 tablet 3    predniSONE (DELTASONE) 5 MG tablet Take 1 tablet (5 mg total) by mouth 2 (two) times daily. 60 tablet 3    travoprost (TRAVATAN Z) 0.004 % Drop Place 1 drop into both eyes every evening. 1 Drops Ophthalmic At bedtime 5 mL 12     Current Facility-Administered Medications   Medication Dose Route Frequency Provider Last Rate Last Admin    leuprolide acetate (6 month) injection 45 mg  45 mg Intramuscular 1 time in Clinic/HOD             Review of patient's allergies indicates:   Allergen Reactions    Avelox  [moxifloxacin] Rash    Nsaids (non-steroidal anti-inflammatory drug) Other (See Comments)     dyspepsia       Past medical, family, and social history reviewed as documented in chart with pertinent positive medical, family, and social history detailed in HPI.    Physical Exam  Vitals:    09/17/24 1023   BP: (!) 147/81   Pulse: 82   Resp: 18   Temp: 97.5 °F (36.4 °C)       General: Well-developed, well-nourished, in no acute distress  HEENT: Normocephalic, atraumatic, extraocular eye movements in tact  Neck: supple, trachea midline, no cervical or supraclavicular adenopathy  Respirations: Even and unlabored  Extremities: Moves all extremities equally, atraumatic, no clubbing, cyanosis, edema  Skin: warm and dry, no lesions  Psych: normal affect  Neuro: Alert and oriented x 3. Cranial nerves II-XII grossly intact      PSA    2/16: 22.1    2/17: 22.9    1/18: 0.24    7/18: 0.01    1/19, 4/19, 8/19, 11/19, 3/20, 3/21, 9/21, 8/22, 11/22, 5/19/23, 12/26/23, 7/1/24: undetect    Assessment:   1. Prostate cancer metastatic to bone  leuprolide acetate (6 month) injection 45 mg    Prior authorization Order            Plan:  Prostate cancer metastatic to bone  -     leuprolide acetate (6 month) injection 45 mg  -     Prior authorization Order      continue Zytiga/prednisone and Heme/onc follow ups.     Follow up in about 6 months (around 3/17/2025) for Lupron.

## 2024-10-02 DIAGNOSIS — C79.51 SECONDARY ADENOCARCINOMA OF SKELETAL BONE: ICD-10-CM

## 2024-10-02 DIAGNOSIS — C61 PROSTATE CANCER: ICD-10-CM

## 2024-10-02 DIAGNOSIS — G89.3 NEOPLASM RELATED PAIN: ICD-10-CM

## 2024-10-02 RX ORDER — HYDROCODONE BITARTRATE AND ACETAMINOPHEN 10; 325 MG/1; MG/1
1 TABLET ORAL EVERY 6 HOURS PRN
Qty: 90 TABLET | Refills: 0 | Status: SHIPPED | OUTPATIENT
Start: 2024-10-02

## 2024-10-02 NOTE — TELEPHONE ENCOUNTER
----- Message from Yoli sent at 10/2/2024  3:13 PM CDT -----  Contact: BLAIRE FLORES [[id number]]  .Type:  RX Refill Request    Who Called:  BLAIRE FLORES [[id number]]  Refill or New Rx: refill  RX Name and Strength: HYDROcodone-acetaminophen (NORCO)  mg per tablet  How is the patient currently taking it? (ex. 1XDay):   Is this a 30 day or 90 day RX:   Preferred Pharmacy with phone number: .  [facility] [address] [address] [place] [address]  [address] [address] [address]  Phone: [phone] Fax: [phone]  Local or Mail Order: local   Ordering Provider: Yash   Would the patient rather a call back or a response via [facility]? call  Best Call Back Number: .[phone] (home)   Additional Information:

## 2024-10-11 DIAGNOSIS — C61 PROSTATE CANCER: ICD-10-CM

## 2024-10-12 RX ORDER — ABIRATERONE ACETATE 250 MG/1
1000 TABLET ORAL DAILY
Qty: 120 TABLET | Refills: 1 | Status: ACTIVE | OUTPATIENT
Start: 2024-10-12 | End: 2025-10-12

## 2024-10-28 DIAGNOSIS — C61 PROSTATE CANCER: ICD-10-CM

## 2024-10-28 DIAGNOSIS — G89.3 NEOPLASM RELATED PAIN: ICD-10-CM

## 2024-10-28 DIAGNOSIS — C79.51 SECONDARY ADENOCARCINOMA OF SKELETAL BONE: ICD-10-CM

## 2024-10-28 RX ORDER — HYDROCODONE BITARTRATE AND ACETAMINOPHEN 10; 325 MG/1; MG/1
1 TABLET ORAL EVERY 6 HOURS PRN
Qty: 90 TABLET | Refills: 0 | OUTPATIENT
Start: 2024-10-28

## 2024-10-30 ENCOUNTER — TELEPHONE (OUTPATIENT)
Dept: HEMATOLOGY/ONCOLOGY | Facility: CLINIC | Age: 69
End: 2024-10-30
Payer: MEDICARE

## 2024-11-12 ENCOUNTER — LAB VISIT (OUTPATIENT)
Dept: LAB | Facility: HOSPITAL | Age: 69
End: 2024-11-12
Attending: INTERNAL MEDICINE
Payer: MEDICARE

## 2024-11-12 DIAGNOSIS — G89.3 NEOPLASM RELATED PAIN: ICD-10-CM

## 2024-11-12 DIAGNOSIS — C61 PROSTATE CANCER: ICD-10-CM

## 2024-11-12 DIAGNOSIS — Z79.899 IMMUNOSUPPRESSED DUE TO CHEMOTHERAPY: ICD-10-CM

## 2024-11-12 DIAGNOSIS — D84.821 IMMUNOSUPPRESSED DUE TO CHEMOTHERAPY: ICD-10-CM

## 2024-11-12 DIAGNOSIS — C79.51 SECONDARY ADENOCARCINOMA OF SKELETAL BONE: ICD-10-CM

## 2024-11-12 DIAGNOSIS — T45.1X5A IMMUNOSUPPRESSED DUE TO CHEMOTHERAPY: ICD-10-CM

## 2024-11-12 DIAGNOSIS — R94.6 ABNORMAL RESULTS OF THYROID FUNCTION STUDIES: ICD-10-CM

## 2024-11-12 LAB
ALBUMIN SERPL BCP-MCNC: 3.3 G/DL (ref 3.5–5.2)
ALP SERPL-CCNC: 91 U/L (ref 40–150)
ALT SERPL W/O P-5'-P-CCNC: 20 U/L (ref 10–44)
ANION GAP SERPL CALC-SCNC: 9 MMOL/L (ref 8–16)
AST SERPL-CCNC: 21 U/L (ref 10–40)
BASOPHILS # BLD AUTO: 0.04 K/UL (ref 0–0.2)
BASOPHILS NFR BLD: 0.4 % (ref 0–1.9)
BILIRUB SERPL-MCNC: 0.4 MG/DL (ref 0.1–1)
BUN SERPL-MCNC: 18 MG/DL (ref 8–23)
CALCIUM SERPL-MCNC: 8.8 MG/DL (ref 8.7–10.5)
CHLORIDE SERPL-SCNC: 105 MMOL/L (ref 95–110)
CO2 SERPL-SCNC: 27 MMOL/L (ref 23–29)
COMPLEXED PSA SERPL-MCNC: <0.01 NG/ML (ref 0–4)
CREAT SERPL-MCNC: 1.1 MG/DL (ref 0.5–1.4)
DIFFERENTIAL METHOD BLD: ABNORMAL
EOSINOPHIL # BLD AUTO: 0 K/UL (ref 0–0.5)
EOSINOPHIL NFR BLD: 0.4 % (ref 0–8)
ERYTHROCYTE [DISTWIDTH] IN BLOOD BY AUTOMATED COUNT: 14.6 % (ref 11.5–14.5)
EST. GFR  (NO RACE VARIABLE): >60 ML/MIN/1.73 M^2
GLUCOSE SERPL-MCNC: 125 MG/DL (ref 70–110)
HCT VFR BLD AUTO: 39.6 % (ref 40–54)
HGB BLD-MCNC: 12.6 G/DL (ref 14–18)
IMM GRANULOCYTES # BLD AUTO: 0.1 K/UL (ref 0–0.04)
IMM GRANULOCYTES NFR BLD AUTO: 1 % (ref 0–0.5)
LYMPHOCYTES # BLD AUTO: 2.7 K/UL (ref 1–4.8)
LYMPHOCYTES NFR BLD: 26.5 % (ref 18–48)
MCH RBC QN AUTO: 30.2 PG (ref 27–31)
MCHC RBC AUTO-ENTMCNC: 31.8 G/DL (ref 32–36)
MCV RBC AUTO: 95 FL (ref 82–98)
MONOCYTES # BLD AUTO: 1 K/UL (ref 0.3–1)
MONOCYTES NFR BLD: 9.6 % (ref 4–15)
NEUTROPHILS # BLD AUTO: 6.3 K/UL (ref 1.8–7.7)
NEUTROPHILS NFR BLD: 62.1 % (ref 38–73)
NRBC BLD-RTO: 0 /100 WBC
PLATELET # BLD AUTO: 194 K/UL (ref 150–450)
PMV BLD AUTO: 10.8 FL (ref 9.2–12.9)
POTASSIUM SERPL-SCNC: 4.1 MMOL/L (ref 3.5–5.1)
PROT SERPL-MCNC: 7.3 G/DL (ref 6–8.4)
RBC # BLD AUTO: 4.17 M/UL (ref 4.6–6.2)
SODIUM SERPL-SCNC: 141 MMOL/L (ref 136–145)
TESTOST SERPL-MCNC: <4 NG/DL (ref 304–1227)
TSH SERPL DL<=0.005 MIU/L-ACNC: 0.85 UIU/ML (ref 0.4–4)
WBC # BLD AUTO: 10.09 K/UL (ref 3.9–12.7)

## 2024-11-12 PROCEDURE — 84403 ASSAY OF TOTAL TESTOSTERONE: CPT | Mod: HCNC | Performed by: INTERNAL MEDICINE

## 2024-11-12 PROCEDURE — 80053 COMPREHEN METABOLIC PANEL: CPT | Mod: HCNC | Performed by: INTERNAL MEDICINE

## 2024-11-12 PROCEDURE — 84443 ASSAY THYROID STIM HORMONE: CPT | Mod: HCNC | Performed by: INTERNAL MEDICINE

## 2024-11-12 PROCEDURE — 85025 COMPLETE CBC W/AUTO DIFF WBC: CPT | Mod: HCNC | Performed by: INTERNAL MEDICINE

## 2024-11-12 PROCEDURE — 36415 COLL VENOUS BLD VENIPUNCTURE: CPT | Mod: HCNC,PN | Performed by: INTERNAL MEDICINE

## 2024-11-12 PROCEDURE — 84153 ASSAY OF PSA TOTAL: CPT | Mod: HCNC | Performed by: INTERNAL MEDICINE

## 2024-11-13 ENCOUNTER — TELEPHONE (OUTPATIENT)
Dept: INTERNAL MEDICINE | Facility: CLINIC | Age: 69
End: 2024-11-13
Payer: MEDICARE

## 2024-11-13 DIAGNOSIS — K21.9 GASTROESOPHAGEAL REFLUX DISEASE WITHOUT ESOPHAGITIS: Chronic | ICD-10-CM

## 2024-11-13 RX ORDER — PANTOPRAZOLE SODIUM 40 MG/1
40 TABLET, DELAYED RELEASE ORAL DAILY
Qty: 90 TABLET | Refills: 3 | Status: SHIPPED | OUTPATIENT
Start: 2024-11-13

## 2024-11-13 NOTE — TELEPHONE ENCOUNTER
Attempted to call patient to notify him that a request for refill has been sent to Dr. Berrios for Protonix and to be sent to [facility].  No answer. LVM for patient  to check in with his pharmacy later this afternoon.

## 2024-11-13 NOTE — TELEPHONE ENCOUNTER
Returned call to patient: No answer. Lvm for patient that a request was sent to Dr. Berrios for review and refill of Protonix. Please check with pharmacy later today.

## 2024-11-13 NOTE — TELEPHONE ENCOUNTER
----- Message from Leonard sent at 11/13/2024  8:43 AM CST -----  Contact: [facility]   Requesting an RX refill or new RX.    Is this a refill or new RX: refill    RX name and strength (copy/paste from chart):metFORMIN (GLUCOPHAGE) 500 MG tablet      Is this a 30 day or 90 day RX: 90    Pharmacy name and phone # (copy/paste from chart):  [place] [address]   Phone: [phone]  Fax: [phone]          The doctors have asked that we provide their patients with the following 2 reminders -- prescription refills can take up to 72 hours, and a friendly reminder that in the future you can use your [facility] account to request refills: yes

## 2024-11-15 ENCOUNTER — TELEPHONE (OUTPATIENT)
Dept: INTERNAL MEDICINE | Facility: CLINIC | Age: 69
End: 2024-11-15
Payer: MEDICARE

## 2024-11-15 DIAGNOSIS — E11.9 TYPE 2 DIABETES MELLITUS WITHOUT COMPLICATION, WITHOUT LONG-TERM CURRENT USE OF INSULIN: ICD-10-CM

## 2024-11-15 NOTE — TELEPHONE ENCOUNTER
Spoke to patient: who requested that Dr. Berrios refill hydrocodone  before his visit with one of our providers. Patient is scheduled to see Sandeep MARISA Sanford on 11/18 at the [facility].

## 2024-11-15 NOTE — TELEPHONE ENCOUNTER
Care Due:                  Date            Visit Type   Department     Provider  --------------------------------------------------------------------------------                                EP -                              PRIMARY      [facility] INTERNAL  Cassandra Tommyampati  Last Visit: 02-      CARE ([facility])   MEDICINE       Sunday  Next Visit: None Scheduled  None         None Found                                                            Last  Test          Frequency    Reason                     Performed    Due Date  --------------------------------------------------------------------------------    HBA1C.......  6 months...  metFORMIN................  02- 08-    [facility] Embedded Care Due Messages. Reference number: [id number].   11/15/2024 3:01:17 PM CST

## 2024-11-15 NOTE — TELEPHONE ENCOUNTER
----- Message from [employer] sent at 11/15/2024 10:32 AM CST -----  Contact: self  .Type:  RX Refill Request    Who Called: .nam  Refill or New Rx:refill  RX Name and Strength:HYDROcodone-acetaminophen (NORCO)  mg per tablet  How is the patient currently taking it? (ex. 1XDay):as needed  Is this a 30 day or 90 day RX:30  Preferred Pharmacy with phone number .  [facility]  [address] [address], [place] - [address] [facility]  [address] [address] [place] [address]  Phone: [phone] Fax: [phone]  Local or Mail Order:local  Ordering Provider:Sunday  Would the patient rather a call back or a response via [facility]? Call back  Best Call Back Number:.[phone]  Additional Information: pt states he needs a refill on pain meds.

## 2024-11-15 NOTE — TELEPHONE ENCOUNTER
Tried calling the patient with no answer. A message was left for a return call to Dr Brand's office. The patient is requesting a refill on pain medication NORCO. Medication is filled through Dr Berrios office.

## 2024-11-16 RX ORDER — METFORMIN HYDROCHLORIDE 500 MG/1
TABLET ORAL
Qty: 180 TABLET | Refills: 0 | Status: SHIPPED | OUTPATIENT
Start: 2024-11-16

## 2024-11-16 NOTE — TELEPHONE ENCOUNTER
Refill Routing Note   Medication(s) are not appropriate for processing by [facility] for the following reason(s):        Required labs outdated    [facility] action(s):  Defer   Requires labs : Yes             Appointments  past 12m or future 3m with PCP    Date Provider   Last Visit   2/20/2024 Cassandra Brand MD   Next Visit   Visit date not found Cassandra Brand MD   ED visits in past 90 days: 0        Note composed:11:55 PM 11/15/2024

## 2024-11-19 NOTE — PROGRESS NOTES
Subjective:       Patient ID: Joey Jacobo is a 69 y.o. male.    Chief Complaint:   1. Prostate cancer  Stage IVB (cT3a, cN1, cM1b, PSA: 22.6, Grade Group: 5)      2. Secondary adenocarcinoma of skeletal bone          Current Treatment:  ADT/Lupron every 6 months via Dr. Dawn        Zytiga/prednisone started 12/2017     Treatment History:    HPI: This is a 69 year old  male with glaucoma, OA lumbar spine, pulmonary fibrosis, angina pectoris, essential HTN, mitral regurgitation, emphysema, COPD, hyperlipidemia, CAD, and chronic bronchitis who is seen in Hem/Onc for metastatic prostate cancer. He was initially seen in 11/2017 by Dr. Roman after being referred by Dr. Roland Dawn with Urology. In 5/2017, he presented to Urology with complaints of perineal/scrotal pain once or twice a month, lasting an hour or two and some nocturia. His PSA was also elevated at 22. Biopsy proved prostate adenocarcinoma in the right apex, right base, left apex, left mid, and left base. Bone scan noted mets.     He has a family history of father had gastric cancer in his 70s, 2 sisters with breast cancer in their 50s/60s, and no other immediate family members with cancers or bleeding/clotting disorders. He reported a 40+-pack-year smoking history at the time of initial visit and was still smoking 1/4 pack a day at that time.     He was started on Zytiga + prednisone which he continues. He receives ADT and Lupron under the care of Dr. Dawn. He is unable to receive Zometa due to dental abnormalities.     His primary Hematologist/Oncologist is Dr. Berrios.    Interval History: Patient presents for follow up on Zytiga/prednisone. He presents alone and has no complaints today. He reports a good appetite and normal Bms. He reports adherence to Zytiga/prednisone but states he has been out of prednisone for a month because he needs a refill. He is also requesting a refill of pain meds. WBC, plts, ANC WNL. Anemia  stable. CMP unremarkable. He still has back pain 9/10 and left shoulder and arm pain that gets worse with cold weather. PSA <0.01, testosterone <4.     Reviewed labs with patient:   CBC:   Recent Labs   Lab 24  0925   WBC 10.09   RBC 4.17 L   Hemoglobin 12.6 L   Hematocrit 39.6 L   Platelets 194   MCV 95   MCH 30.2   MCHC 31.8 L     CMP:  Recent Labs   Lab 24  0925   Glucose 125 H   Calcium 8.8   Albumin 3.3 L   Total Protein 7.3   Sodium 141   Potassium 4.1   CO2 27   Chloride 105   BUN 18   Creatinine 1.1   Alkaline Phosphatase 91   ALT 20   AST 21   Total Bilirubin 0.4     Lab Results   Component Value Date    TSH 0.849 2024     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   Tobacco Use    Smoking status: Former     Current packs/day: 0.00     Average packs/day: 0.3 packs/day for 25.0 years (6.3 ttl pk-yrs)     Types: Cigarettes     Start date: 1993     Quit date: 2018     Years since quittin.2    Smokeless tobacco: Never   Substance and Sexual Activity    Alcohol use: Not Currently     Alcohol/week: 0.0 standard drinks of alcohol     Comment: occasionally    Drug use: No    Sexual activity: Yes   Social History Narrative    Wife and son     Social Drivers of Health     Financial Resource Strain: Medium Risk (2023)    Overall Financial Resource Strain (CARDIA)     Difficulty of Paying Living Expenses: Somewhat hard   Food Insecurity: No Food Insecurity (2023)    Hunger Vital Sign     Worried About Running Out of Food in the Last Year: Never true     Ran Out of Food in the Last Year: Never true   Transportation Needs: No Transportation Needs (2023)    PRAPARE - Transportation     Lack of Transportation (Medical): No     Lack of Transportation (Non-Medical): No   Physical Activity: Sufficiently Active (2023)    Exercise Vital Sign     Days of  Exercise per Week: 7 days     Minutes of Exercise per Session: 60 min   Stress: No Stress Concern Present (1/6/2023)    [place] [employer] - Occupational Stress Questionnaire     Feeling of Stress : Only a little   Housing Stability: Low Risk  (1/6/2023)    Housing Stability Vital Sign     Unable to Pay for Housing in the Last Year: No     Number of Places Lived in the Last Year: 1     Unstable Housing in the Last Year: No     Past Medical History:   Diagnosis Date    Angina pectoris     Back pain     Chronic bronchitis     Colon polyp     COPD (chronic obstructive pulmonary disease) 7/30/2013    Coronary artery disease 7/30/2013    Emphysema of lung     Essential hypertension 10/29/2018    Glaucoma (increased eye pressure) 8/27/2013    Headache(784.0)     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    Mitral regurgitation 7/30/2013    Mixed hyperlipidemia 7/30/2013    Neuropathy     Osteoarthritis of spine with radiculopathy, lumbar region 7/21/2015    Other and unspecified hyperlipidemia     Prostate cancer     Pulmonary fibrosis 10/3/2017    Type 2 diabetes mellitus without complication, without long-term current use of insulin 6/7/2024     Family History   Problem Relation Name Age of Onset    Cataracts Mother      Kidney disease Mother      Cancer Father          Stomach    Blindness Maternal Grandmother      Diabetes Sister      Hypertension Sister      Diabetes Sister      Hypertension Sister      Diabetes Sister      Hypertension Sister      Hypertension Sister      Hypertension Sister      Colon cancer Brother      Colon cancer Brother       Past Surgical History:   Procedure Laterality Date    ANKLE FRACTURE SURGERY Left     COLONOSCOPY N/A 3/21/2016    Procedure: COLONOSCOPY;  Surgeon: Melvin Pearce MD;  Location: [facility];  Service: Endoscopy;  Laterality: N/A;    CORONARY STENT PLACEMENT      times 2    SHOULDER ARTHROSCOPY Left     SPINE SURGERY      neck fusion x2      Review of Systems   Constitutional:  Negative for appetite change and fatigue.        Hot flashes   HENT:  Negative for mouth sores, rhinorrhea and sore throat.    Eyes: Negative.    Respiratory: Negative.     Cardiovascular: Negative.    Gastrointestinal:  Negative for constipation, diarrhea, nausea and vomiting.   Endocrine: Negative.    Genitourinary: Negative.    Musculoskeletal:  Positive for back pain (9/10 today around to the stomach).        Left arm and shoulder aching; pelvic pain from cancer   Integumentary:  Negative.   Allergic/Immunologic: Negative.    Neurological:  Positive for numbness (little finger left hand). Negative for weakness.   Hematological: Negative.    Psychiatric/Behavioral: Negative.         Medication List with Changes/Refills   Current Medications    ABIRATERONE (ZYTIGA) 250 MG TAB    Take 4 tablets (1,000 mg total) by mouth once daily.    ASPIRIN (ECOTRIN) 81 MG EC TABLET    Take by mouth. 1 Tablet, Delayed Release (E.C.) Oral Every day    ATORVASTATIN (LIPITOR) 40 MG TABLET    Take 1 tablet (40 mg total) by mouth once daily.    BLOOD SUGAR DIAGNOSTIC STRP    To check BG one times daily, to use with insurance preferred meter    BLOOD-GLUCOSE METER KIT    To check BG one  times daily, to use with insurance preferred meter    BUDESONIDE-FORMOTEROL 160-4.5 MCG (SYMBICORT) 160-4.5 MCG/ACTUATION HFAA    Inhale 2 puffs into the lungs every 12 (twelve) hours.    CALCIUM-VITAMIN D 600 MG(1,500MG) -400 UNIT TAB    Take by mouth. 1 Tablet Oral Twice a day    EZETIMIBE (ZETIA) 10 MG TABLET    Take 1 tablet (10 mg total) by mouth once daily.    FERROUS SULFATE 325 (65 FE) MG EC TABLET    Take 1 tablet (325 mg total) by mouth once daily.    FLUTICASONE PROPIONATE (FLONASE) 50 MCG/ACTUATION NASAL SPRAY    1 spray (50 mcg total) by Each Nostril route once daily.    HYDROCODONE-ACETAMINOPHEN (NORCO)  MG PER TABLET    Take 1 tablet by mouth every 6 (six) hours as needed for Pain.     LANCETS MISC    To check BG one times daily, to use with insurance preferred meter    LATANOPROST 0.005 % OPHTHALMIC SOLUTION        LUPRON DEPOT, 6 MONTH, 45 MG SYKT INJECTION        METFORMIN (GLUCOPHAGE) 500 MG TABLET    (FOR SUGAR CONTROL) TAKE 1 TABLET BY MOUTH TWICE DAILY with meals    METOPROLOL SUCCINATE (TOPROL-XL) 50 MG 24 HR TABLET    (FOR BLOOD PRESSURE) TAKE 1 TABLET BY MOUTH DAILY    NITROGLYCERIN (NITROSTAT) 0.4 MG SL TABLET    Place 1 tablet (0.4 mg total) under the tongue every 5 (five) minutes as needed for Chest pain.    PANTOPRAZOLE (PROTONIX) 40 MG TABLET    Take 1 tablet (40 mg total) by mouth once daily.    PREDNISONE (DELTASONE) 5 MG TABLET    Take 1 tablet (5 mg total) by mouth 2 (two) times daily.    TRAVOPROST (TRAVATAN Z) 0.004 % DROP    Place 1 drop into both eyes every evening. 1 Drops Ophthalmic At bedtime     Objective:     Vitals:    11/20/24 0724   BP: (!) 140/81   Pulse: 84   Resp: 20   Temp: 97.4 °F (36.3 °C)     Physical Exam  Vitals reviewed.   Constitutional:       Appearance: Normal appearance.   HENT:      Head: Normocephalic.      Mouth/Throat:      Comments:     Eyes:      Extraocular Movements: Extraocular movements intact.      Pupils: Pupils are equal, round, and reactive to light.   Cardiovascular:      Rate and Rhythm: Normal rate and regular rhythm.      Heart sounds: Normal heart sounds.   Pulmonary:      Effort: Pulmonary effort is normal.      Breath sounds: Normal breath sounds.   Abdominal:      General: Bowel sounds are normal.      Palpations: Abdomen is soft.      Comments: rounded     Genitourinary:     Comments: deferred    Musculoskeletal:         General: Normal range of motion.      Cervical back: Normal range of motion and neck supple.   Skin:     General: Skin is warm and dry.      Comments: Left inner forearm tattoo   Neurological:      Mental Status: He is alert and oriented to person, place, and time.   Psychiatric:         Behavior: Behavior normal.          Thought Content: Thought content normal.          (0) Fully active, able to carry on all predisease performance without restriction  Assessment:     Problem List Items Addressed This Visit          Oncology    Secondary adenocarcinoma of skeletal bone    Prostate cancer - Primary     Metastatic, hormone sensitive prostate cancer on abiraterone+prednisone and q6 month lupron.  No new symptoms.  Chronic back pain and fatigue unchanged. Denies hot flashes. Currently on Zytiga/predisone since 12/2017. Receives ADT/Lupron via Urology.          Neoplasm related pain     Plan:     Prostate cancer    Secondary adenocarcinoma of skeletal bone    Neoplasm related pain    Labs reviewed.   Continue Zytiga/prednisone as prescribed; prednisone refilled to patient's pharmacy.   [facility] refilled per patient's request.   Continue ADT/Lupron via Urology; next Lupron due 3/2025.  Follow up in 4 months with Dr. Berrios with testosterone, CBC, Comprehensive Metabolic Panel, and PSA.    Route Chart for Scheduling    Med Onc Chart Routing      Follow up with physician    Follow up with MIGUEL 4 months.   Infusion scheduling note    Injection scheduling note    Labs CBC, CMP, PSA and other   Scheduling:  Preferred lab:  Lab interval:  and testosterone in 4 months   Imaging None      Pharmacy appointment No pharmacy appointment needed      Other referrals       No additional referrals needed              I will review assessment/plan with collaborating physician.      SAMANTA Lama

## 2024-11-19 NOTE — ASSESSMENT & PLAN NOTE
Metastatic, hormone sensitive prostate cancer on abiraterone+prednisone and q6 month lupron.  No new symptoms.  Chronic back pain and fatigue unchanged. Denies hot flashes. Currently on Zytiga/predisone since 12/2017. Receives ADT/Lupron via Urology.   
Statement Selected

## 2024-11-20 ENCOUNTER — OFFICE VISIT (OUTPATIENT)
Dept: HEMATOLOGY/ONCOLOGY | Facility: CLINIC | Age: 69
End: 2024-11-20
Payer: MEDICARE

## 2024-11-20 VITALS
RESPIRATION RATE: 20 BRPM | HEIGHT: 70 IN | WEIGHT: 152.75 LBS | DIASTOLIC BLOOD PRESSURE: 81 MMHG | BODY MASS INDEX: 21.87 KG/M2 | SYSTOLIC BLOOD PRESSURE: 140 MMHG | TEMPERATURE: 97 F | OXYGEN SATURATION: 97 % | HEART RATE: 84 BPM

## 2024-11-20 DIAGNOSIS — C61 PROSTATE CANCER: Primary | ICD-10-CM

## 2024-11-20 DIAGNOSIS — G89.3 NEOPLASM RELATED PAIN: ICD-10-CM

## 2024-11-20 DIAGNOSIS — C79.51 SECONDARY ADENOCARCINOMA OF SKELETAL BONE: ICD-10-CM

## 2024-11-20 PROCEDURE — 3008F BODY MASS INDEX DOCD: CPT | Mod: HCNC,CPTII,S$GLB, | Performed by: NURSE PRACTITIONER

## 2024-11-20 PROCEDURE — 3077F SYST BP >= 140 MM HG: CPT | Mod: HCNC,CPTII,S$GLB, | Performed by: NURSE PRACTITIONER

## 2024-11-20 PROCEDURE — 1101F PT FALLS ASSESS-DOCD LE1/YR: CPT | Mod: HCNC,CPTII,S$GLB, | Performed by: NURSE PRACTITIONER

## 2024-11-20 PROCEDURE — 1159F MED LIST DOCD IN RCRD: CPT | Mod: HCNC,CPTII,S$GLB, | Performed by: NURSE PRACTITIONER

## 2024-11-20 PROCEDURE — 99215 OFFICE O/P EST HI 40 MIN: CPT | Mod: HCNC,S$GLB,, | Performed by: NURSE PRACTITIONER

## 2024-11-20 PROCEDURE — 99999 PR PBB SHADOW E&M-EST. PATIENT-LVL IV: CPT | Mod: PBBFAC,HCNC,, | Performed by: NURSE PRACTITIONER

## 2024-11-20 PROCEDURE — 3051F HG A1C>EQUAL 7.0%<8.0%: CPT | Mod: HCNC,CPTII,S$GLB, | Performed by: NURSE PRACTITIONER

## 2024-11-20 PROCEDURE — 3288F FALL RISK ASSESSMENT DOCD: CPT | Mod: HCNC,CPTII,S$GLB, | Performed by: NURSE PRACTITIONER

## 2024-11-20 PROCEDURE — 1160F RVW MEDS BY RX/DR IN RCRD: CPT | Mod: HCNC,CPTII,S$GLB, | Performed by: NURSE PRACTITIONER

## 2024-11-20 PROCEDURE — 3079F DIAST BP 80-89 MM HG: CPT | Mod: HCNC,CPTII,S$GLB, | Performed by: NURSE PRACTITIONER

## 2024-11-20 PROCEDURE — 1125F AMNT PAIN NOTED PAIN PRSNT: CPT | Mod: HCNC,CPTII,S$GLB, | Performed by: NURSE PRACTITIONER

## 2024-11-20 RX ORDER — PREDNISONE 5 MG/1
5 TABLET ORAL 2 TIMES DAILY
Qty: 60 TABLET | Refills: 3 | Status: SHIPPED | OUTPATIENT
Start: 2024-11-20

## 2024-11-20 RX ORDER — HYDROCODONE BITARTRATE AND ACETAMINOPHEN 10; 325 MG/1; MG/1
1 TABLET ORAL EVERY 6 HOURS PRN
Qty: 90 TABLET | Refills: 0 | Status: SHIPPED | OUTPATIENT
Start: 2024-11-20

## 2024-11-27 DIAGNOSIS — E78.2 MIXED HYPERLIPIDEMIA: Chronic | ICD-10-CM

## 2024-11-27 RX ORDER — ATORVASTATIN CALCIUM 40 MG/1
TABLET, FILM COATED ORAL
Qty: 90 TABLET | Refills: 0 | Status: SHIPPED | OUTPATIENT
Start: 2024-11-27

## 2024-11-27 RX ORDER — EZETIMIBE 10 MG/1
10 TABLET ORAL
Qty: 90 TABLET | Refills: 0 | Status: SHIPPED | OUTPATIENT
Start: 2024-11-27

## 2024-11-27 NOTE — TELEPHONE ENCOUNTER
Care Due:                  Date            Visit Type   Department     Provider  --------------------------------------------------------------------------------                                EP -                              PRIMARY      [facility]  Cassandra Mainampati  Last Visit: 02-      CARE ([facility])   MEDICINE       Sunday  Next Visit: None Scheduled  None         None Found                                                            Last  Test          Frequency    Reason                     Performed    Due Date  --------------------------------------------------------------------------------    Lipid Panel.  12 months..  atorvastatin, ezetimibe..  02- 02-    [facility] Embedded Care Due Messages. Reference number: [id number].   11/27/2024 8:04:12 AM CST

## 2024-11-27 NOTE — TELEPHONE ENCOUNTER
Provider Staff:  Action required for this patient    Requires labs      Please see care gap opportunities below in Care Due Message.    Thanks!  [facility]     Appointments      Date Provider   Last Visit   2/20/2024 Cassandra Brand MD   Next Visit   Visit date not found Cassandra Brand MD     Refill Decision Note   Joey Jacobo  is requesting a refill authorization.    Brief Assessment and Rationale for Refill:  Approve       Medication Therapy Plan:         Comments:     Note composed:11:08 AM 11/27/2024

## 2024-12-05 ENCOUNTER — TELEPHONE (OUTPATIENT)
Dept: HEMATOLOGY/ONCOLOGY | Facility: CLINIC | Age: 69
End: 2024-12-05
Payer: MEDICARE

## 2024-12-05 DIAGNOSIS — C79.51 SECONDARY ADENOCARCINOMA OF SKELETAL BONE: ICD-10-CM

## 2024-12-05 DIAGNOSIS — C61 PROSTATE CANCER: ICD-10-CM

## 2024-12-05 DIAGNOSIS — G89.3 NEOPLASM RELATED PAIN: ICD-10-CM

## 2024-12-05 RX ORDER — HYDROCODONE BITARTRATE AND ACETAMINOPHEN 10; 325 MG/1; MG/1
1 TABLET ORAL EVERY 6 HOURS PRN
Qty: 90 TABLET | Refills: 0 | Status: SHIPPED | OUTPATIENT
Start: 2024-12-05

## 2024-12-05 NOTE — TELEPHONE ENCOUNTER
----- Message from Med Assistant Tovar sent at 12/5/2024 10:27 AM CST -----  Contact: mayo@ [phone]  Prescription refill request.pt called    RX name and strength (copy/paste from chart):   HYDROcodone-acetaminophen (NORCO)  mg per tablet    Directions (copy/paste from chart):   Take 1 tablet by mouth every 6 (six) hours as needed for Pain.    Is this a 30 day or 90 day RX:  30 day    Local pharmacy or mail order pharmacy:  local    Pharmacy name and phone # (copy/paste from chart):   [facility], [facility] [address] [address]  [address] [address] [address]  Phone: [phone] Fax: [phone]  Hours: Not open 24 hours        Additional information:   pt will need medication by today only due to a death in family and will be leaving to go out of town [facility].

## 2024-12-05 NOTE — TELEPHONE ENCOUNTER
Spoke with patient and informed him that Dr. Berrios sent the medication to the pharmacy. Understanding verbalized

## 2024-12-16 DIAGNOSIS — C61 PROSTATE CANCER: ICD-10-CM

## 2024-12-16 RX ORDER — ABIRATERONE ACETATE 250 MG/1
1000 TABLET ORAL DAILY
Qty: 120 TABLET | Refills: 1 | Status: ACTIVE | OUTPATIENT
Start: 2024-12-16 | End: 2025-12-16

## 2024-12-30 DIAGNOSIS — C61 PROSTATE CANCER: ICD-10-CM

## 2024-12-30 DIAGNOSIS — G89.3 NEOPLASM RELATED PAIN: ICD-10-CM

## 2024-12-30 DIAGNOSIS — C79.51 SECONDARY ADENOCARCINOMA OF SKELETAL BONE: ICD-10-CM

## 2024-12-30 RX ORDER — HYDROCODONE BITARTRATE AND ACETAMINOPHEN 10; 325 MG/1; MG/1
1 TABLET ORAL EVERY 6 HOURS PRN
Qty: 90 TABLET | Refills: 0 | Status: SHIPPED | OUTPATIENT
Start: 2024-12-30

## 2024-12-30 NOTE — TELEPHONE ENCOUNTER
----- Message from Yolande sent at 12/30/2024  9:17 AM CST -----  Type:  RX Refill Request    Who Called: pt  Refill or New Rx:refill  RX Name and Strength:HYDROcodone-acetaminophen (NORCO)  mg per [id number]/5/2024-NoSig - Route: Take 1 tablet by mouth every 6 (six) hours as needed for Pain. - OralSent to pharmacy as: HYDROcodone-acetaminophen (NORCO)  mg per tabletClass: NormalEarliest Fill Date: 12/5/2024Notes to Pharmacy: Quantity prescribed more than 7 day supply? Yes, quantity medically necessary. Fill 12/19/22Order: [id number]/Time Signed: 12/5/2024 11:16E-Prescribing Status: Receipt confirmed by pharmacy (12/5/2024 11:23 AM CST)   Preferred Pharmacy with phone number:  [facility] [address] [address]  [id number] [place] [address]  Phone: [phone] Fax: [phone]  Local or Mail Order:local  Ordering Provider:moises  Would the patient rather a call back or a response via [facility]? call  Best Call Back Number:[phone]  Additional Information: requesting refill

## 2024-12-30 NOTE — TELEPHONE ENCOUNTER
Returned call to patient: No answer. LVM to inform patient that a refill request was sent  into Dr. Berrios for review and refill

## 2025-01-13 ENCOUNTER — TELEPHONE (OUTPATIENT)
Dept: CARDIOLOGY | Facility: CLINIC | Age: 70
End: 2025-01-13
Payer: MEDICARE

## 2025-01-13 DIAGNOSIS — Z00.00 ENCOUNTER FOR MEDICARE ANNUAL WELLNESS EXAM: ICD-10-CM

## 2025-01-13 NOTE — TELEPHONE ENCOUNTER
Rescheduled      ----- Message from Aimee sent at 1/13/2025  1:22 PM CST -----  Contact: Joey  Type:  Sooner Apoointment Request    Caller is requesting a sooner appointment. Caller will not accept being placed on the waitlist and is requesting a message be sent to doctor.  Name of Caller:Ray  When is the first available appointment?scheduled 1/15/25  Symptoms:NM MPI/Stress test/Echocardiogram  Would the patient rather a call back or a response via [facility]? Call  Best Call Back Number:[phone]   Additional Information: Patient reports having the flu and feeling unwell and request to cancel Stress test and Echocardiogram visits scheduled on 1/15/25 and reschedule for a later date. Please give patient a call back to assist.  Thank you,  [facility]

## 2025-01-18 ENCOUNTER — OFFICE VISIT (OUTPATIENT)
Dept: URGENT CARE | Facility: CLINIC | Age: 70
End: 2025-01-18
Payer: MEDICARE

## 2025-01-18 VITALS
HEART RATE: 102 BPM | HEIGHT: 70 IN | WEIGHT: 152 LBS | TEMPERATURE: 99 F | RESPIRATION RATE: 18 BRPM | DIASTOLIC BLOOD PRESSURE: 66 MMHG | BODY MASS INDEX: 21.76 KG/M2 | OXYGEN SATURATION: 96 % | SYSTOLIC BLOOD PRESSURE: 125 MMHG

## 2025-01-18 DIAGNOSIS — U07.1 COVID-19: Primary | ICD-10-CM

## 2025-01-18 DIAGNOSIS — U07.1 COVID-19 VIRUS DETECTED: ICD-10-CM

## 2025-01-18 DIAGNOSIS — R10.9 ABDOMINAL DISCOMFORT: ICD-10-CM

## 2025-01-18 DIAGNOSIS — R05.9 COUGH IN ADULT: ICD-10-CM

## 2025-01-18 DIAGNOSIS — R09.81 NASAL CONGESTION: ICD-10-CM

## 2025-01-18 LAB
CTP QC/QA: YES
SARS-COV-2 RDRP RESP QL NAA+PROBE: POSITIVE

## 2025-01-18 PROCEDURE — 87635 SARS-COV-2 COVID-19 AMP PRB: CPT | Mod: QW,S$GLB,, | Performed by: PHYSICIAN ASSISTANT

## 2025-01-18 PROCEDURE — 99214 OFFICE O/P EST MOD 30 MIN: CPT | Mod: S$GLB,,, | Performed by: PHYSICIAN ASSISTANT

## 2025-01-18 RX ORDER — BENZONATATE 200 MG/1
200 CAPSULE ORAL 3 TIMES DAILY PRN
Qty: 30 CAPSULE | Refills: 0 | Status: SHIPPED | OUTPATIENT
Start: 2025-01-18 | End: 2025-01-28

## 2025-01-18 NOTE — PROGRESS NOTES
"Subjective:      Patient ID: Joey Jacobo is a 69 y.o. male.    Vitals:  height is 5' 10" (1.778 m) and weight is 68.9 kg (152 lb). His tympanic temperature is 99.2 °F (37.3 °C). His blood pressure is 125/66 and his pulse is 102. His respiration is 18 and oxygen saturation is 96%.     Chief Complaint: Cough and Otalgia    70 y/o male here for bilat ear pain, headache, and abdominal pain x 1 week with regular bowel movements. States that he has felt feverish but has not taken his temp. Pt has only tried taking his Hydrocodone for his chronic back pains without relief.     Cough  This is a new problem. The current episode started in the past 7 days. The problem has been unchanged. The cough is Non-productive. Associated symptoms include ear pain, a fever and headaches. He has tried nothing for the symptoms.   Otalgia   There is pain in both ears. This is a new problem. The current episode started in the past 7 days. The problem occurs constantly. The problem has been unchanged. The pain is moderate. Associated symptoms include abdominal pain, coughing and headaches. Pertinent negatives include no diarrhea. He has tried nothing for the symptoms.       Constitution: Positive for fever.   HENT:  Positive for ear pain.    Respiratory:  Positive for cough.    Gastrointestinal:  Positive for abdominal pain. Negative for nausea, constipation and diarrhea.   Neurological:  Positive for headaches.      Objective:     Vitals:    01/18/25 0954   BP: 125/66   BP Location: Left arm   Patient Position: Sitting   Pulse: 102   Resp: 18   Temp: 99.2 °F (37.3 °C)   TempSrc: Tympanic   SpO2: 96%   Weight: 68.9 kg (152 lb)   Height: 5' 10" (1.778 m)       Physical Exam   Constitutional: He is oriented to person, place, and time. He appears well-developed. He is cooperative.   HENT:   Head: Normocephalic and atraumatic.   Ears:   Right Ear: Hearing, tympanic membrane, external ear and ear canal normal.   Left Ear: Hearing, tympanic " membrane, external ear and ear canal normal.   Nose: Congestion present. No mucosal edema or nasal deformity. No epistaxis. Right sinus exhibits no maxillary sinus tenderness and no frontal sinus tenderness. Left sinus exhibits no maxillary sinus tenderness and no frontal sinus tenderness.   Mouth/Throat: Uvula is midline, oropharynx is clear and moist and mucous membranes are normal. Mucous membranes are moist. No trismus in the jaw. Normal dentition. No uvula swelling. Oropharynx is clear.      Comments: Postnasal drip  Eyes: Conjunctivae and lids are normal.      Comments: Watery eyes bilateral   Neck: Trachea normal and phonation normal. Neck supple.   Cardiovascular: Normal rate, regular rhythm, normal heart sounds and normal pulses.   Pulmonary/Chest: Effort normal and breath sounds normal.   Abdominal: Normal appearance and bowel sounds are normal. Soft.   Musculoskeletal: Normal range of motion.         General: Normal range of motion.   Neurological: He is alert and oriented to person, place, and time. He exhibits normal muscle tone.   Skin: Skin is warm, dry and intact.   Psychiatric: His speech is normal and behavior is normal. Judgment and thought content normal.   Nursing note and vitals reviewed.      Assessment:     1. COVID-19    2. Cough in adult    3. Nasal congestion    4. Abdominal discomfort      Results for orders placed or performed in visit on 01/18/25   POCT COVID-19 Rapid Screening    Collection Time: 01/18/25 10:13 AM   Result Value Ref Range    POC Rapid COVID Positive (A) Negative     Acceptable Yes      *Note: Due to a large number of results and/or encounters for the requested time period, some results have not been displayed. A complete set of results can be found in Results Review.       Plan:       COVID-19  -     molnupiravir 200 mg capsule (EUA); Take 4 capsules (800 mg total) by mouth every 12 (twelve) hours. for 5 days  Dispense: 40 capsule; Refill: 0    Cough in  adult  -     POCT COVID-19 Rapid Screening  -     benzonatate (TESSALON) 200 MG capsule; Take 1 capsule (200 mg total) by mouth 3 (three) times daily as needed for Cough.  Dispense: 30 capsule; Refill: 0    Nasal congestion  -     POCT COVID-19 Rapid Screening    Abdominal discomfort  -     Cancel: POCT Urinalysis(Instrument)          Medical Decision Making:   Initial Assessment:   VSS    here with wife who is also COVID positive      Patient unable to tolerate Paxlovid secondary to atorvastatin; alternative given.  Clinical Tests:   Lab Tests: Ordered and Reviewed       <> Summary of Lab: Covid score 6  DUE TO ELEVATED COVID RISK SCORE,  ELIGIBLE FOR COVID ANTIVIRAL THERAPY.          Urgent Care Management:  See entire note for further details    Discussed with pt all pertinent UC information and results. Discussed pt dx and plan of tx.   Gave pt all f/u and return to the UC instructions. All questions and concerns were addressed at this time.   Pt expresses understanding of information and instructions, and is comfortable with plan to discharge.   Pt is stable for discharge.     I discussed with patient and/or family/caretaker that evaluation in the UC does not suggest any emergent or life threatening medical conditions requiring immediate intervention beyond what was provided in the UC, and I believe patient is safe for discharge.  Regardless, an unremarkable evaluation in the UC does not preclude the development or presence of a serious or life threatening condition. As such, patient was instructed to GO to ER immediately for any worsening or change in current symptoms.             Patient Instructions   When you test positive for COVID-19 you may return to normal activities when, for at least 24 hours, both are true:     Your symptoms are getting better overall, and:  You have not had a fever AND are not using fever reducing medication     The CDC also recommends added precautions in the 5 days after return to  normal activity including frequent hand washing, mask wearing, physical distancing.      They also recommend should the patient develop a fever or starts to feel worse after they have returned to normal activities, they should return home and away from others for at least another 24 hours. The link below is a direct link to the CDC with all this information.     https://www.cdc.gov/respiratory-viruses/prevention/precautions-when-sick.html        please take the antiviral as directed after speaking to your pharmacist ensuring that there are no drug to drug interactions!!!        COVID is similar to and now treated like VIRAL URI    HERE ARE SOME OVER THE COUNTER RECOMMENDATIONS/SUGGESTIONS--if needed      SORE THROAT:    You may gargle with hot salt water 4 times a day for the next 2 days once to twice daily to alleviate some of your throat discomfort AND/OR post nasal drip.  Drink plenty of fluids; recommend warm tea with honey.     YOU MAY USE OVER-THE-COUNTER CEPACOL FOR SOOTHING OF YOUR THROAT.  You may wish to avoid spicy food, citrus fruits, and red sauces- as this may irritate the throat more.    You can also take a daily anti-histamine such as Zyrtec, Claritin, Xyzal, OR Allegra-IN DAYTIME; NON DROWSY) AND/OR Benadryl- AT NIGHT; DROWSY) to help with runny nose/sneezing/sore throat/cough. Remember to switch antihistamines every 3 months, if taken daily.     COUGH:      Make sure you are getting rest and drinking lots of fluids.    You can use cough drops (recommend ricola lemon mint honey) or Cepacol to soothe your sore throat.     You can also take Elderberry and/or Emergen-C (vitamin C) to help boost your immune system.    You have been prescribed cough medications.  Tessalon perles can be taken up to three times daily as needed.       Honey is a natural cough suppressant that can be used.    If your symptoms do not improve, you should return to this clinic. If your symptoms worsen, go to the emergency room.          CONGESTION:  Make sure to stay well hydrated.    Use Nasal Saline to mechanically move any post nasal drip from your eustachian tube or from the back of your throat.        FLONASE OR ASTELIN: - to help with fluid behind ears/congestion/post nasal drip (one spray each nostril twice daily OR two sprays each nostril once daily.    How do you use a Nasal Spray?     Make sure you understand your dosing instructions. Spray only the number of prescribed sprays in each nostril. Read the package instructions before using your spray the first time.    Most sprays suggest the following steps:    Wash your hands well.    Gently blow your nose to clear the passageway.    Shake the container several times.    Tilt your head slightly downward.  Use the opposite hand from the nostril you will be spraying to hold the spray bottle.    Block one nostril with your finger.  Insert the nasal applicator into the other nostril.    Aim the spray toward the outer wall of the nostril.  Inhale slowly through the nose and press the .    Breathe out and repeat to apply the prescribed number of sprays.  Repeat these steps for the other nostril.     Avoid sneezing or blowing your nose right after spraying.             PAIN/DISCOMFORT:  Tylenol up to 4,000 mg a day is safe for short periods and can be used for headache, body aches, pain, and fever. However in high doses and prolonged use it can cause liver irritation.    Ibuprofen is a non-steroidal anti-inflammatory that can be used for headache, body aches, pain, and fever. However it can also cause stomach irritation if over used.      If you have been discharged from the clinic prior to your point of care test results being completed, please make sure to check your [facility] account.  If there is a change in treatment, we will communicate with you through here.  If your test is positive, and medications are ordered, these will be sent to your preferred pharmacy.   If your  test is negative, no further steps needed. If you do not hear from us or have questions, please call the clinic.      - You must understand that you have received an Urgent Care treatment only and that you may be released before all of your medical problems are known or treated.   - You, the patient, will arrange for follow up care as instructed with your primary care provider or recommended specialist.   - If your condition worsens or fails to improve we recommend that you receive another evaluation at the ER immediately or contact your PCP to discuss your concerns, or return here.   - Please do not drive or make any important decisions for 24 hours if you have received any pain medications, sedatives or mood altering drugs during your visit.    Disclaimer: This document was drafted with the use of a voice recognition device and is likely to have sound alike errors.

## 2025-01-18 NOTE — PATIENT INSTRUCTIONS
When you test positive for COVID-19 you may return to normal activities when, for at least 24 hours, both are true:     Your symptoms are getting better overall, and:  You have not had a fever AND are not using fever reducing medication     The CDC also recommends added precautions in the 5 days after return to normal activity including frequent hand washing, mask wearing, physical distancing.      They also recommend should the patient develop a fever or starts to feel worse after they have returned to normal activities, they should return home and away from others for at least another 24 hours. The link below is a direct link to the CDC with all this information.     https://www.cdc.gov/respiratory-viruses/prevention/precautions-when-sick.html        please take the antiviral as directed after speaking to your pharmacist ensuring that there are no drug to drug interactions!!!        COVID is similar to and now treated like VIRAL URI    HERE ARE SOME OVER THE COUNTER RECOMMENDATIONS/SUGGESTIONS--if needed      SORE THROAT:    You may gargle with hot salt water 4 times a day for the next 2 days once to twice daily to alleviate some of your throat discomfort AND/OR post nasal drip.  Drink plenty of fluids; recommend warm tea with honey.     YOU MAY USE OVER-THE-COUNTER CEPACOL FOR SOOTHING OF YOUR THROAT.  You may wish to avoid spicy food, citrus fruits, and red sauces- as this may irritate the throat more.    You can also take a daily anti-histamine such as Zyrtec, Claritin, Xyzal, OR Allegra-IN DAYTIME; NON DROWSY) AND/OR Benadryl- AT NIGHT; DROWSY) to help with runny nose/sneezing/sore throat/cough. Remember to switch antihistamines every 3 months, if taken daily.     COUGH:      Make sure you are getting rest and drinking lots of fluids.    You can use cough drops (recommend ricola lemon mint honey) or Cepacol to soothe your sore throat.     You can also take Elderberry and/or Emergen-C (vitamin C) to help boost  your immune system.    You have been prescribed cough medications.  Tessalon perles can be taken up to three times daily as needed.       Honey is a natural cough suppressant that can be used.    If your symptoms do not improve, you should return to this clinic. If your symptoms worsen, go to the emergency room.         CONGESTION:  Make sure to stay well hydrated.    Use Nasal Saline to mechanically move any post nasal drip from your eustachian tube or from the back of your throat.        FLONASE OR ASTELIN: - to help with fluid behind ears/congestion/post nasal drip (one spray each nostril twice daily OR two sprays each nostril once daily.    How do you use a Nasal Spray?     Make sure you understand your dosing instructions. Spray only the number of prescribed sprays in each nostril. Read the package instructions before using your spray the first time.    Most sprays suggest the following steps:    Wash your hands well.    Gently blow your nose to clear the passageway.    Shake the container several times.    Tilt your head slightly downward.  Use the opposite hand from the nostril you will be spraying to hold the spray bottle.    Block one nostril with your finger.  Insert the nasal applicator into the other nostril.    Aim the spray toward the outer wall of the nostril.  Inhale slowly through the nose and press the .    Breathe out and repeat to apply the prescribed number of sprays.  Repeat these steps for the other nostril.     Avoid sneezing or blowing your nose right after spraying.             PAIN/DISCOMFORT:  Tylenol up to 4,000 mg a day is safe for short periods and can be used for headache, body aches, pain, and fever. However in high doses and prolonged use it can cause liver irritation.    Ibuprofen is a non-steroidal anti-inflammatory that can be used for headache, body aches, pain, and fever. However it can also cause stomach irritation if over used.      If you have been discharged  from the clinic prior to your point of care test results being completed, please make sure to check your [facility] account.  If there is a change in treatment, we will communicate with you through here.  If your test is positive, and medications are ordered, these will be sent to your preferred pharmacy.   If your test is negative, no further steps needed. If you do not hear from us or have questions, please call the clinic.      - You must understand that you have received an Urgent Care treatment only and that you may be released before all of your medical problems are known or treated.   - You, the patient, will arrange for follow up care as instructed with your primary care provider or recommended specialist.   - If your condition worsens or fails to improve we recommend that you receive another evaluation at the ER immediately or contact your PCP to discuss your concerns, or return here.   - Please do not drive or make any important decisions for 24 hours if you have received any pain medications, sedatives or mood altering drugs during your visit.    Disclaimer: This document was drafted with the use of a voice recognition device and is likely to have sound alike errors.

## 2025-01-22 DIAGNOSIS — C61 PROSTATE CANCER: ICD-10-CM

## 2025-01-22 DIAGNOSIS — G89.3 NEOPLASM RELATED PAIN: ICD-10-CM

## 2025-01-22 DIAGNOSIS — C79.51 SECONDARY ADENOCARCINOMA OF SKELETAL BONE: ICD-10-CM

## 2025-01-22 RX ORDER — HYDROCODONE BITARTRATE AND ACETAMINOPHEN 10; 325 MG/1; MG/1
1 TABLET ORAL EVERY 6 HOURS PRN
Qty: 90 TABLET | Refills: 0 | Status: SHIPPED | OUTPATIENT
Start: 2025-01-22

## 2025-01-31 DIAGNOSIS — I25.10 CORONARY ARTERY DISEASE INVOLVING NATIVE CORONARY ARTERY WITHOUT ANGINA PECTORIS, UNSPECIFIED WHETHER NATIVE OR TRANSPLANTED HEART: ICD-10-CM

## 2025-01-31 RX ORDER — METOPROLOL SUCCINATE 50 MG/1
TABLET, EXTENDED RELEASE ORAL
Qty: 90 TABLET | Refills: 4 | Status: SHIPPED | OUTPATIENT
Start: 2025-01-31

## 2025-02-05 ENCOUNTER — TELEPHONE (OUTPATIENT)
Dept: INTERNAL MEDICINE | Facility: CLINIC | Age: 70
End: 2025-02-05
Payer: MEDICARE

## 2025-02-05 DIAGNOSIS — I10 ESSENTIAL HYPERTENSION: ICD-10-CM

## 2025-02-05 DIAGNOSIS — E11.9 TYPE 2 DIABETES MELLITUS WITHOUT COMPLICATION, WITHOUT LONG-TERM CURRENT USE OF INSULIN: ICD-10-CM

## 2025-02-05 DIAGNOSIS — Z12.5 ENCOUNTER FOR SCREENING FOR MALIGNANT NEOPLASM OF PROSTATE: ICD-10-CM

## 2025-02-05 DIAGNOSIS — Z29.9 PREVENTIVE MEASURE: Primary | ICD-10-CM

## 2025-02-05 NOTE — TELEPHONE ENCOUNTER
Patient overdue for his physicals, please have him schedule an appointment with me for routine annual physicals and labs have been ordered

## 2025-02-05 NOTE — TELEPHONE ENCOUNTER
----- Message from Ferny Sanabria sent at 2/5/2025 11:31 AM CST -----  Regarding: Lab order  Contact: Joey  Pt would like to have labs for his A1C done. Please Advise.  ----- Message -----  From: Ana Castelan MA  Sent: 2/5/2025   9:29 AM CST  To: [facility] Staff    Type:  Needing Return Call    Who Called: Joey  Needs Call Back For: Would like to have labs for his A1C  Would the patient rather a call back or a response via [facility]?  Call  Best Call Back Number:[phone]  Additional Information:

## 2025-02-10 DIAGNOSIS — C61 PROSTATE CANCER: ICD-10-CM

## 2025-02-10 RX ORDER — ABIRATERONE ACETATE 250 MG/1
1000 TABLET ORAL DAILY
Qty: 120 TABLET | Refills: 1 | Status: ACTIVE | OUTPATIENT
Start: 2025-02-10 | End: 2026-02-10

## 2025-02-11 DIAGNOSIS — G89.3 NEOPLASM RELATED PAIN: ICD-10-CM

## 2025-02-11 DIAGNOSIS — C79.51 SECONDARY ADENOCARCINOMA OF SKELETAL BONE: ICD-10-CM

## 2025-02-11 DIAGNOSIS — C61 PROSTATE CANCER: ICD-10-CM

## 2025-02-11 RX ORDER — HYDROCODONE BITARTRATE AND ACETAMINOPHEN 10; 325 MG/1; MG/1
1 TABLET ORAL EVERY 6 HOURS PRN
Qty: 90 TABLET | Refills: 0 | Status: SHIPPED | OUTPATIENT
Start: 2025-02-11

## 2025-02-11 NOTE — TELEPHONE ENCOUNTER
----- Message from Le[address] Friend's Lane sent at 2/11/2025 11:57 AM CST -----  Contact: self  Type:  RX Refill Request    Who Called: Joey Jacobo  Refill or New Rx:refill  RX Name and Strength:HYDROcodone-acetaminophen (NORCO)  mg per tablet  How is the patient currently taking it? (ex. 1XDay):as needed  Is this a 30 day or 90 day RX:30 day  Preferred Pharmacy with phone number:  [facility] [address] [facility]  [address]  [facility] [address]  Phone: [phone] Fax: [phone]    Local or Mail Order:local  Ordering Provider:Yash  Would the patient rather a call back or a response via [facility]? Call back  Best Call Back Number:[phone]  Additional Information: the patient is calling for the refill earlier due to him going out of town. Ever since the patient got covid last week he has been in pain.

## 2025-02-14 DIAGNOSIS — E11.9 TYPE 2 DIABETES MELLITUS WITHOUT COMPLICATION, WITHOUT LONG-TERM CURRENT USE OF INSULIN: ICD-10-CM

## 2025-02-14 DIAGNOSIS — E78.2 MIXED HYPERLIPIDEMIA: Chronic | ICD-10-CM

## 2025-02-14 RX ORDER — ATORVASTATIN CALCIUM 40 MG/1
TABLET, FILM COATED ORAL
Qty: 90 TABLET | Refills: 0 | Status: SHIPPED | OUTPATIENT
Start: 2025-02-14

## 2025-02-14 RX ORDER — EZETIMIBE 10 MG/1
10 TABLET ORAL
Qty: 90 TABLET | Refills: 0 | Status: SHIPPED | OUTPATIENT
Start: 2025-02-14

## 2025-02-14 NOTE — TELEPHONE ENCOUNTER
Care Due:                  Date            Visit Type   Department     Provider  --------------------------------------------------------------------------------                                EP -                              PRIMARY      [facility]  Cassandra Mainampati  Last Visit: 02-      CARE ([facility])   MEDICINE       Brand                              EP -                              PRIMARY      [facility]  Cassandra Mainampati  Next Visit: 02-      CARE ([facility])   MEDICINE       Brand                                                            Last  Test          Frequency    Reason                     Performed    Due Date  --------------------------------------------------------------------------------    HBA1C.......  6 months...  metFORMIN................  02- 08-    Lipid Panel.  12 months..  atorvastatin, ezetimibe..  02- 02-    [facility] Embedded Care Due Messages. Reference number: [id number].   2/14/2025 8:00:53 AM CST

## 2025-02-15 RX ORDER — METFORMIN HYDROCHLORIDE 500 MG/1
TABLET ORAL
Qty: 180 TABLET | Refills: 0 | Status: SHIPPED | OUTPATIENT
Start: 2025-02-15

## 2025-02-15 NOTE — TELEPHONE ENCOUNTER
Refill Routing Note   Medication(s) are not appropriate for processing by [facility] for the following reason(s):        Required labs outdated    [facility] action(s):  Defer  Approve     Requires labs : Yes             Appointments  past 12m or future 3m with PCP    Date Provider   Last Visit   2/20/2024 Cassandra Brand MD   Next Visit   2/17/2025 Cassandra Brand MD   ED visits in past 90 days: 0        Note composed:9:41 PM 02/14/2025

## 2025-02-21 ENCOUNTER — TELEPHONE (OUTPATIENT)
Dept: HEMATOLOGY/ONCOLOGY | Facility: CLINIC | Age: 70
End: 2025-02-21
Payer: MEDICARE

## 2025-02-21 NOTE — TELEPHONE ENCOUNTER
----- Message from Aimee sent at 2/21/2025  7:43 AM CST -----  Contact: Ray  Type: Orders RequestWhat orders/ testing are being requested? CDT orderIs there a future appointment scheduled for the patient with PCP? UnknownWhen? UnknownWould you prefer a response via [employer]? Call preferredComments: Patient request to schedule a lab appointment for today at the [place] location. Please give patient a call back at [phone] to assist. Thank you,GH

## 2025-02-26 ENCOUNTER — OFFICE VISIT (OUTPATIENT)
Dept: INTERNAL MEDICINE | Facility: CLINIC | Age: 70
End: 2025-02-26
Payer: MEDICARE

## 2025-02-26 VITALS
DIASTOLIC BLOOD PRESSURE: 66 MMHG | BODY MASS INDEX: 21.64 KG/M2 | HEART RATE: 88 BPM | WEIGHT: 150.81 LBS | OXYGEN SATURATION: 99 % | SYSTOLIC BLOOD PRESSURE: 122 MMHG | TEMPERATURE: 98 F

## 2025-02-26 DIAGNOSIS — M85.80 OSTEOPENIA, UNSPECIFIED LOCATION: ICD-10-CM

## 2025-02-26 DIAGNOSIS — E11.59 HYPERTENSION ASSOCIATED WITH DIABETES: ICD-10-CM

## 2025-02-26 DIAGNOSIS — J43.8 OTHER EMPHYSEMA: ICD-10-CM

## 2025-02-26 DIAGNOSIS — Z79.899 IMMUNOSUPPRESSED DUE TO CHEMOTHERAPY: ICD-10-CM

## 2025-02-26 DIAGNOSIS — Z12.11 COLON CANCER SCREENING: ICD-10-CM

## 2025-02-26 DIAGNOSIS — T45.1X5A IMMUNOSUPPRESSED DUE TO CHEMOTHERAPY: ICD-10-CM

## 2025-02-26 DIAGNOSIS — E11.9 TYPE 2 DIABETES MELLITUS WITHOUT COMPLICATION, WITHOUT LONG-TERM CURRENT USE OF INSULIN: ICD-10-CM

## 2025-02-26 DIAGNOSIS — I70.0 AORTIC ATHEROSCLEROSIS: ICD-10-CM

## 2025-02-26 DIAGNOSIS — Z87.891 FORMER SMOKER: ICD-10-CM

## 2025-02-26 DIAGNOSIS — I15.2 HYPERTENSION ASSOCIATED WITH DIABETES: ICD-10-CM

## 2025-02-26 DIAGNOSIS — Z13.6 SCREENING FOR AAA (AORTIC ABDOMINAL ANEURYSM): ICD-10-CM

## 2025-02-26 DIAGNOSIS — E78.5 HYPERLIPIDEMIA ASSOCIATED WITH TYPE 2 DIABETES MELLITUS: ICD-10-CM

## 2025-02-26 DIAGNOSIS — Z98.61 HISTORY OF PTCA: ICD-10-CM

## 2025-02-26 DIAGNOSIS — Z00.00 ROUTINE GENERAL MEDICAL EXAMINATION AT A HEALTH CARE FACILITY: Primary | ICD-10-CM

## 2025-02-26 DIAGNOSIS — J84.10 PULMONARY FIBROSIS: ICD-10-CM

## 2025-02-26 DIAGNOSIS — K21.9 GASTROESOPHAGEAL REFLUX DISEASE WITHOUT ESOPHAGITIS: Chronic | ICD-10-CM

## 2025-02-26 DIAGNOSIS — D84.821 IMMUNOSUPPRESSED DUE TO CHEMOTHERAPY: ICD-10-CM

## 2025-02-26 DIAGNOSIS — I25.118 CORONARY ARTERY DISEASE OF NATIVE ARTERY OF NATIVE HEART WITH STABLE ANGINA PECTORIS: Chronic | ICD-10-CM

## 2025-02-26 DIAGNOSIS — C79.51 SECONDARY ADENOCARCINOMA OF SKELETAL BONE: ICD-10-CM

## 2025-02-26 DIAGNOSIS — E11.69 HYPERLIPIDEMIA ASSOCIATED WITH TYPE 2 DIABETES MELLITUS: ICD-10-CM

## 2025-02-26 DIAGNOSIS — C61 PROSTATE CANCER: ICD-10-CM

## 2025-02-26 PROBLEM — R94.31 ABNORMAL ECG: Status: RESOLVED | Noted: 2018-10-29 | Resolved: 2025-02-26

## 2025-02-26 PROCEDURE — 1101F PT FALLS ASSESS-DOCD LE1/YR: CPT | Mod: HCNC,CPTII,S$GLB, | Performed by: FAMILY MEDICINE

## 2025-02-26 PROCEDURE — 3078F DIAST BP <80 MM HG: CPT | Mod: HCNC,CPTII,S$GLB, | Performed by: FAMILY MEDICINE

## 2025-02-26 PROCEDURE — 3008F BODY MASS INDEX DOCD: CPT | Mod: HCNC,CPTII,S$GLB, | Performed by: FAMILY MEDICINE

## 2025-02-26 PROCEDURE — 99397 PER PM REEVAL EST PAT 65+ YR: CPT | Mod: HCNC,S$GLB,, | Performed by: FAMILY MEDICINE

## 2025-02-26 PROCEDURE — 3074F SYST BP LT 130 MM HG: CPT | Mod: HCNC,CPTII,S$GLB, | Performed by: FAMILY MEDICINE

## 2025-02-26 PROCEDURE — 3288F FALL RISK ASSESSMENT DOCD: CPT | Mod: HCNC,CPTII,S$GLB, | Performed by: FAMILY MEDICINE

## 2025-02-26 PROCEDURE — 1159F MED LIST DOCD IN RCRD: CPT | Mod: HCNC,CPTII,S$GLB, | Performed by: FAMILY MEDICINE

## 2025-02-26 PROCEDURE — 99999 PR PBB SHADOW E&M-EST. PATIENT-LVL V: CPT | Mod: PBBFAC,HCNC,, | Performed by: FAMILY MEDICINE

## 2025-02-26 NOTE — PROGRESS NOTES
Subjective:       Patient ID: Joey Jacobo is a 69 y.o. male presents with Problem List[1]     Chief Complaint: Annual Exam    History of Present Illness    Joey presents today for routine annual physicals and follow up He experiences caregiver stress as the primary caregiver for his wife, sharing responsibilities with his daughter while his son lives far away. His wife only accepts his assistance with bathing, declining help from their daughters. 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 He has a history of prostate cancer and mild chronic anemia without current symptoms. He also has a history of smoking. He takes metoprolol daily for blood pressure management, prednisone twice daily, two cholesterol medications, prostate cancer medication, diabetes medication, and pain medication. He denies taking iron tablets.      ROS:  General: -fever, -chills, -fatigue, -weight gain, -weight loss, -loss of appetite  Eyes: -vision changes, -blurry vision, -eye pain, -eye discharge  ENT: -ear pain, -hearing loss, -tinnitus, -nasal congestion, -sore throat  Cardiovascular: -chest pain, -palpitations, -lower extremity edema  Respiratory: -cough, -shortness of breath, -wheezing, -sputum production  Endocrine: -polyuria, -polydipsia, -heat intolerance, -cold intolerance  Gastrointestinal: -abdominal pain, -heartburn, -nausea, -vomiting, -diarrhea, -constipation, -blood in stool  Genitourinary: -dysuria, -urgency, -frequency, -hematuria, -nocturia, -incontinence  Heme & Lymphatic: -easy or excessive bleeding, -easy bruising, -swollen lymph nodes  Musculoskeletal: -muscle pain, -back pain, -joint pain, -joint swelling  Skin: -rash, -lesion, -itching, -skin texture changes, -skin color changes  Neurological: -headache, -dizziness, -numbness, -tingling, -seizure activity, -speech difficulty, -memory loss,  -confusion  Psychiatric: -anxiety, -depression, +sleep difficulty                /66 (BP Location: Right arm, Patient Position: Sitting)   Pulse 88   Temp 97.7 °F (36.5 °C) (Temporal)   Wt 68.4 kg (150 lb 12.7 oz)   SpO2 99%   BMI 21.64 kg/m²   Objective:      Physical Exam  Constitutional:       Appearance: He is well-developed.   HENT:      Head: Normocephalic and atraumatic.   Cardiovascular:      Rate and Rhythm: Normal rate and regular rhythm.      Heart sounds: Normal heart sounds. No murmur heard.  Pulmonary:      Effort: Pulmonary effort is normal.      Breath sounds: Normal breath sounds. No wheezing.   Abdominal:      General: Bowel sounds are normal.      Palpations: Abdomen is soft.      Tenderness: There is no abdominal tenderness.   Feet:      Right foot:      Protective Sensation: 10 sites tested.  10 sites sensed.      Left foot:      Protective Sensation: 10 sites tested.  10 sites sensed.   Skin:     General: Skin is warm and dry.      Findings: No rash.   Neurological:      Mental Status: He is alert and oriented to person, place, and time.   Psychiatric:         Mood and Affect: Mood normal.           Assessment/Plan:   1. Routine general medical examination at a health care facility  -     Urinalysis, Reflex to Urine Culture Urine, Clean Catch; Future; Expected date: 02/26/2025  -     Hemoglobin A1C; Future; Expected date: 02/26/2025  -     TSH; Future; Expected date: 02/26/2025  -     Lipid Panel; Future; Expected date: 02/26/2025  -     Comprehensive Metabolic Panel; Future; Expected date: 02/26/2025  -     CBC Auto Differential; Future; Expected date: 02/26/2025  -     PROSTATE SPECIFIC ANTIGEN, DIAGNOSTIC; Future; Expected date: 02/26/2025  Vital signs stable today.  Clinical exam stable  Continue lifestyle modifications with low-fat and low-cholesterol diet and exercise 30 minutes daily    2. Hypertension associated with diabetes  -     Urinalysis, Reflex to Urine Culture Urine,  "Clean Catch; Future; Expected date: 02/26/2025  -     TSH; Future; Expected date: 02/26/2025  -     Lipid Panel; Future; Expected date: 02/26/2025  -     Comprehensive Metabolic Panel; Future; Expected date: 02/26/2025  Blood pressure is stable currently on metoprolol 50 mg daily    3. Hyperlipidemia associated with type 2 diabetes mellitus  -     Lipid Panel; Future; Expected date: 02/26/2025  Currently on Lipitor 40 mg daily and Zetia 10 mg daily    4. Type 2 diabetes mellitus without complication, without long-term current use of insulin  -     Hemoglobin A1C; Future; Expected date: 02/26/2025  -     Microalbumin/Creatinine Ratio, Urine; Future; Expected date: 02/26/2025  Stable on metformin 500 mg twice daily  Strict lifestyle changes recommended with 1800 ADA low-fat and low-cholesterol diet and exercise 30 minutes daily  Due for eye exam  Stable diabetic foot exam    5. Coronary artery disease of native artery of native heart with stable angina pectoris  6. History of PTCA  Stable and asymptomatic continue metoprolol aspirin and Lipitor    7. Gastroesophageal reflux disease without esophagitis  Clinically doing well on pantoprazole 40 mg daily    8. COPD Other emphysema  Stable and asymptomatic on Symbicort and albuterol inhaler as needed    9. 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  11. Immunosuppressed due to chemotherapy  Followed by urology and oncology    12. Osteopenia, unspecified " location  Continue calcium with vitamin-D supplements    13. Aortic atherosclerosis  Continue aspirin and statins    14. Former smoker  -     US Abdominal Aorta; Future; Expected date: 02/26/2025  Encouraged to quit smoking    15. Pulmonary fibrosis  Stable and asymptomatic, continue inhalers    16. Screening for AAA (aortic abdominal aneurysm)  -     US Abdominal Aorta; Future; Expected date: 02/26/2025  Will get ultrasound of the abdominal aorta for aneurysm screening being a former smoker  Patient clinically asymptomatic    17. Colon cancer screening  -     Ambulatory referral/consult to Endo Procedure ; Future; Expected date: 02/27/2025  Due for colonoscopy    Patient was advised to try melatonin over-the-counter for sleep disturbances           This note was generated with the assistance of ambient listening technology. Verbal consent was obtained by the patient and accompanying visitor(s) for the recording of patient appointment to facilitate this note. I attest to having reviewed and edited the generated note for accuracy, though some syntax or spelling errors may persist. Please contact the author of this note for any clarification.            [1]   Patient Active Problem List  Diagnosis    Coronary artery disease    Mitral regurgitation    COPD Other emphysema    Hyperlipidemia associated with type 2 diabetes mellitus    Gastroesophageal reflux disease without esophagitis    Glaucoma (increased eye pressure)    History of PTCA    Secondary adenocarcinoma of skeletal bone    Prostate cancer    Pulmonary fibrosis    Neoplasm related pain    Hypertension associated with diabetes    AP (angina pectoris)    Former smoker    Immunosuppressed due to chemotherapy    Aortic atherosclerosis    Osteopenia    Type 2 diabetes mellitus without complication, without long-term current use of insulin

## 2025-02-28 ENCOUNTER — LAB VISIT (OUTPATIENT)
Dept: LAB | Facility: HOSPITAL | Age: 70
End: 2025-02-28
Attending: FAMILY MEDICINE
Payer: MEDICARE

## 2025-02-28 DIAGNOSIS — E11.9 TYPE 2 DIABETES MELLITUS WITHOUT COMPLICATION, WITHOUT LONG-TERM CURRENT USE OF INSULIN: ICD-10-CM

## 2025-02-28 DIAGNOSIS — E78.5 HYPERLIPIDEMIA ASSOCIATED WITH TYPE 2 DIABETES MELLITUS: ICD-10-CM

## 2025-02-28 DIAGNOSIS — E11.59 HYPERTENSION ASSOCIATED WITH DIABETES: ICD-10-CM

## 2025-02-28 DIAGNOSIS — Z00.00 ROUTINE GENERAL MEDICAL EXAMINATION AT A HEALTH CARE FACILITY: ICD-10-CM

## 2025-02-28 DIAGNOSIS — C61 PROSTATE CANCER: ICD-10-CM

## 2025-02-28 DIAGNOSIS — I15.2 HYPERTENSION ASSOCIATED WITH DIABETES: ICD-10-CM

## 2025-02-28 DIAGNOSIS — E11.69 HYPERLIPIDEMIA ASSOCIATED WITH TYPE 2 DIABETES MELLITUS: ICD-10-CM

## 2025-02-28 LAB
ALBUMIN SERPL BCP-MCNC: 3.2 G/DL (ref 3.5–5.2)
ALBUMIN/CREAT UR: 29.8 UG/MG (ref 0–30)
ALP SERPL-CCNC: 74 U/L (ref 40–150)
ALT SERPL W/O P-5'-P-CCNC: 12 U/L (ref 10–44)
ANION GAP SERPL CALC-SCNC: 12 MMOL/L (ref 8–16)
AST SERPL-CCNC: 22 U/L (ref 10–40)
BASOPHILS # BLD AUTO: 0.07 K/UL (ref 0–0.2)
BASOPHILS NFR BLD: 0.6 % (ref 0–1.9)
BILIRUB SERPL-MCNC: 0.5 MG/DL (ref 0.1–1)
BILIRUB UR QL STRIP: NEGATIVE
BUN SERPL-MCNC: 17 MG/DL (ref 8–23)
CALCIUM SERPL-MCNC: 9.3 MG/DL (ref 8.7–10.5)
CHLORIDE SERPL-SCNC: 109 MMOL/L (ref 95–110)
CHOLEST SERPL-MCNC: 128 MG/DL (ref 120–199)
CHOLEST/HDLC SERPL: 2.7 {RATIO} (ref 2–5)
CLARITY UR REFRACT.AUTO: CLEAR
CO2 SERPL-SCNC: 22 MMOL/L (ref 23–29)
COLOR UR AUTO: YELLOW
COMPLEXED PSA SERPL-MCNC: <0.01 NG/ML (ref 0–4)
CREAT SERPL-MCNC: 1 MG/DL (ref 0.5–1.4)
CREAT UR-MCNC: 151 MG/DL (ref 23–375)
DIFFERENTIAL METHOD BLD: ABNORMAL
EOSINOPHIL # BLD AUTO: 0.1 K/UL (ref 0–0.5)
EOSINOPHIL NFR BLD: 0.6 % (ref 0–8)
ERYTHROCYTE [DISTWIDTH] IN BLOOD BY AUTOMATED COUNT: 15.1 % (ref 11.5–14.5)
EST. GFR  (NO RACE VARIABLE): >60 ML/MIN/1.73 M^2
ESTIMATED AVG GLUCOSE: 163 MG/DL (ref 68–131)
GLUCOSE SERPL-MCNC: 99 MG/DL (ref 70–110)
GLUCOSE UR QL STRIP: NEGATIVE
HBA1C MFR BLD: 7.3 % (ref 4–5.6)
HCT VFR BLD AUTO: 38.7 % (ref 40–54)
HDLC SERPL-MCNC: 47 MG/DL (ref 40–75)
HDLC SERPL: 36.7 % (ref 20–50)
HGB BLD-MCNC: 12.3 G/DL (ref 14–18)
HGB UR QL STRIP: NEGATIVE
IMM GRANULOCYTES # BLD AUTO: 0.09 K/UL (ref 0–0.04)
IMM GRANULOCYTES NFR BLD AUTO: 0.8 % (ref 0–0.5)
KETONES UR QL STRIP: NEGATIVE
LDLC SERPL CALC-MCNC: 64.4 MG/DL (ref 63–159)
LEUKOCYTE ESTERASE UR QL STRIP: NEGATIVE
LYMPHOCYTES # BLD AUTO: 3.1 K/UL (ref 1–4.8)
LYMPHOCYTES NFR BLD: 26.1 % (ref 18–48)
MCH RBC QN AUTO: 30.2 PG (ref 27–31)
MCHC RBC AUTO-ENTMCNC: 31.8 G/DL (ref 32–36)
MCV RBC AUTO: 95 FL (ref 82–98)
MICROALBUMIN UR DL<=1MG/L-MCNC: 45 UG/ML
MONOCYTES # BLD AUTO: 1.2 K/UL (ref 0.3–1)
MONOCYTES NFR BLD: 10.5 % (ref 4–15)
NEUTROPHILS # BLD AUTO: 7.2 K/UL (ref 1.8–7.7)
NEUTROPHILS NFR BLD: 61.4 % (ref 38–73)
NITRITE UR QL STRIP: NEGATIVE
NONHDLC SERPL-MCNC: 81 MG/DL
NRBC BLD-RTO: 0 /100 WBC
PH UR STRIP: 7 [PH] (ref 5–8)
PLATELET # BLD AUTO: 214 K/UL (ref 150–450)
PMV BLD AUTO: 13.6 FL (ref 9.2–12.9)
POTASSIUM SERPL-SCNC: 3.9 MMOL/L (ref 3.5–5.1)
PROT SERPL-MCNC: 7.7 G/DL (ref 6–8.4)
PROT UR QL STRIP: ABNORMAL
RBC # BLD AUTO: 4.07 M/UL (ref 4.6–6.2)
SODIUM SERPL-SCNC: 143 MMOL/L (ref 136–145)
SP GR UR STRIP: 1.02 (ref 1–1.03)
TRIGL SERPL-MCNC: 83 MG/DL (ref 30–150)
TSH SERPL DL<=0.005 MIU/L-ACNC: 1.5 UIU/ML (ref 0.4–4)
URN SPEC COLLECT METH UR: ABNORMAL
WBC # BLD AUTO: 11.78 K/UL (ref 3.9–12.7)

## 2025-02-28 PROCEDURE — 81003 URINALYSIS AUTO W/O SCOPE: CPT | Mod: HCNC | Performed by: FAMILY MEDICINE

## 2025-02-28 PROCEDURE — 83036 HEMOGLOBIN GLYCOSYLATED A1C: CPT | Mod: HCNC | Performed by: FAMILY MEDICINE

## 2025-02-28 PROCEDURE — 85025 COMPLETE CBC W/AUTO DIFF WBC: CPT | Mod: HCNC | Performed by: FAMILY MEDICINE

## 2025-02-28 PROCEDURE — 84153 ASSAY OF PSA TOTAL: CPT | Mod: HCNC | Performed by: FAMILY MEDICINE

## 2025-02-28 PROCEDURE — 36415 COLL VENOUS BLD VENIPUNCTURE: CPT | Mod: HCNC,PN | Performed by: FAMILY MEDICINE

## 2025-02-28 PROCEDURE — 82043 UR ALBUMIN QUANTITATIVE: CPT | Mod: HCNC | Performed by: FAMILY MEDICINE

## 2025-02-28 PROCEDURE — 80053 COMPREHEN METABOLIC PANEL: CPT | Mod: HCNC | Performed by: FAMILY MEDICINE

## 2025-02-28 PROCEDURE — 84443 ASSAY THYROID STIM HORMONE: CPT | Mod: HCNC | Performed by: FAMILY MEDICINE

## 2025-02-28 PROCEDURE — 80061 LIPID PANEL: CPT | Mod: HCNC | Performed by: FAMILY MEDICINE

## 2025-03-02 ENCOUNTER — RESULTS FOLLOW-UP (OUTPATIENT)
Dept: INTERNAL MEDICINE | Facility: CLINIC | Age: 70
End: 2025-03-02

## 2025-03-04 ENCOUNTER — TELEPHONE (OUTPATIENT)
Dept: INFUSION THERAPY | Facility: HOSPITAL | Age: 70
End: 2025-03-04
Payer: MEDICARE

## 2025-03-04 NOTE — TELEPHONE ENCOUNTER
Spoke with patient who wants to move his lab and provider up due to upcoming surgery. Appts rescheduled per his preferences. Call ended well.

## 2025-03-05 ENCOUNTER — HOSPITAL ENCOUNTER (OUTPATIENT)
Dept: RADIOLOGY | Facility: HOSPITAL | Age: 70
Discharge: HOME | End: 2025-03-05
Attending: FAMILY MEDICINE
Payer: MEDICARE

## 2025-03-05 DIAGNOSIS — Z13.6 SCREENING FOR AAA (AORTIC ABDOMINAL ANEURYSM): ICD-10-CM

## 2025-03-05 DIAGNOSIS — Z87.891 FORMER SMOKER: ICD-10-CM

## 2025-03-05 PROCEDURE — 76775 US EXAM ABDO BACK WALL LIM: CPT | Mod: TC,HCNC

## 2025-03-12 NOTE — PROGRESS NOTES
Subjective:       Patient ID: Joey Jacobo is a 69 y.o. male.    Chief Complaint:   1. Prostate cancer  Stage IVB (cT3a, cN1, cM1b, PSA: 22.6, Grade Group: 5)      2. Secondary adenocarcinoma of skeletal bone          Current Treatment:  ADT/Lupron every 6 months via Dr. Dawn        Zytiga/prednisone started 12/2017     Treatment History:    HPI: This is a 69 year old  male with glaucoma, OA lumbar spine, pulmonary fibrosis, angina pectoris, essential HTN, mitral regurgitation, emphysema, COPD, hyperlipidemia, CAD, and chronic bronchitis who is seen in Hem/Onc for metastatic prostate cancer. He was initially seen in 11/2017 by Dr. Roman after being referred by Dr. Roland Dawn with Urology. In 5/2017, he presented to Urology with complaints of perineal/scrotal pain once or twice a month, lasting an hour or two and some nocturia. His PSA was also elevated at 22. Biopsy proved prostate adenocarcinoma in the right apex, right base, left apex, left mid, and left base. Bone scan noted mets.     He has a family history of father had gastric cancer in his 70s, 2 sisters with breast cancer in their 50s/60s, and no other immediate family members with cancers or bleeding/clotting disorders. He reported a 40+-pack-year smoking history at the time of initial visit and was still smoking 1/4 pack a day at that time.     He was started on Zytiga + prednisone which he continues. He receives ADT and Lupron under the care of Dr. Dawn. He is unable to receive Zometa due to dental abnormalities.     His primary Hematologist/Oncologist is Dr. Berrios.    Interval History: Patient presents for follow up on Zytiga/prednisone.  He presents alone and reports adherence to Zytiga/prednisone with no issues. He reports a good appetite and normal Bms. WBC, ANC, plts WNL. Anemia mild and stable. CMP stable with normal kidney function and LFTs. PSA <0.01, testosterone <4. He receives Lupron via Urology and is  scheduled for injection next week. Advised him to continue Zytiga/prednisone as directed.     Reviewed labs with patient:   CBC:   Recent Labs   Lab 25  1007   WBC 10.34   RBC 4.08 L   Hemoglobin 12.2 L   Hematocrit 39.3 L   Platelets 221   MCV 96   MCH 29.9   MCHC 31.0 L     CMP:  Recent Labs   Lab 25  1007   Glucose 114 H   Calcium 9.7   Albumin 3.3 L   Total Protein 7.8   Sodium 141   Potassium 4.2   CO2 23   Chloride 107   BUN 17   Creatinine 1.1   Alkaline Phosphatase 75   ALT 15   AST 15   Total Bilirubin 0.4     Lab Results   Component Value Date    TSH 1.498 2025     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   Tobacco Use    Smoking status: Former     Current packs/day: 0.00     Average packs/day: 0.3 packs/day for 25.0 years (6.3 ttl pk-yrs)     Types: Cigarettes     Start date: 1993     Quit date: 2018     Years since quittin.5     Passive exposure: Never    Smokeless tobacco: Never   Substance and Sexual Activity    Alcohol use: Not Currently     Alcohol/week: 0.0 standard drinks of alcohol     Comment: occasionally    Drug use: No    Sexual activity: Yes   Social History Narrative    Wife and son     Social Drivers of Health     Financial Resource Strain: Medium Risk (2023)    Overall Financial Resource Strain (CARDIA)     Difficulty of Paying Living Expenses: Somewhat hard   Food Insecurity: No Food Insecurity (2023)    Hunger Vital Sign     Worried About Running Out of Food in the Last Year: Never true     Ran Out of Food in the Last Year: Never true   Transportation Needs: No Transportation Needs (2023)    PRAPARE - Transportation     Lack of Transportation (Medical): No     Lack of Transportation (Non-Medical): No   Physical Activity: Sufficiently Active (2023)    Exercise Vital Sign     Days of Exercise per Week: 7 days      Minutes of Exercise per Session: 60 min   Stress: No Stress Concern Present (1/6/2023)    [employer] - Occupational Stress Questionnaire     Feeling of Stress : Only a little   Housing Stability: Low Risk  (1/6/2023)    Housing Stability Vital Sign     Unable to Pay for Housing in the Last Year: No     Number of Places Lived in the Last Year: 1     Unstable Housing in the Last Year: No     Past Medical History:   Diagnosis Date    Angina pectoris     Back pain     Chronic bronchitis     Colon polyp     COPD (chronic obstructive pulmonary disease) 7/30/2013    Coronary artery disease 7/30/2013    Emphysema of lung     Essential hypertension 10/29/2018    Glaucoma (increased eye pressure) 8/27/2013    Headache(784.0)     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    Mitral regurgitation 7/30/2013    Mixed hyperlipidemia 7/30/2013    Neuropathy     Osteoarthritis of spine with radiculopathy, lumbar region 7/21/2015    Other and unspecified hyperlipidemia     Prostate cancer     Pulmonary fibrosis 10/3/2017    Type 2 diabetes mellitus without complication, without long-term current use of insulin 6/7/2024     Family History   Problem Relation Name Age of Onset    Cataracts Mother      Kidney disease Mother      Cancer Father          Stomach    Blindness Maternal Grandmother      Diabetes Sister      Hypertension Sister      Diabetes Sister      Hypertension Sister      Diabetes Sister      Hypertension Sister      Hypertension Sister      Hypertension Sister      Colon cancer Brother      Colon cancer Brother       Past Surgical History:   Procedure Laterality Date    ANKLE FRACTURE SURGERY Left     COLONOSCOPY N/A 3/21/2016    Procedure: COLONOSCOPY;  Surgeon: Melvin Pearce MD;  Location: [facility];  Service: Endoscopy;  Laterality: N/A;    CORONARY STENT PLACEMENT      times 2    SHOULDER ARTHROSCOPY Left     SPINE SURGERY      neck fusion x2     Review of Systems    Constitutional:  Negative for appetite change and fatigue.        Hot flashes   HENT:  Negative for mouth sores, rhinorrhea and sore throat.    Eyes: Negative.    Respiratory: Negative.     Cardiovascular: Negative.    Gastrointestinal:  Negative for constipation, diarrhea, nausea and vomiting.   Endocrine: Negative.    Genitourinary: Negative.    Musculoskeletal:  Positive for back pain (9/10 today around to the stomach).        Left arm and shoulder aching; pelvic pain from cancer   Integumentary:  Negative.   Allergic/Immunologic: Negative.    Neurological:  Positive for numbness (little finger left hand). Negative for weakness.   Hematological: Negative.    Psychiatric/Behavioral: Negative.         Medication List with Changes/Refills   Current Medications    ASPIRIN (ECOTRIN) 81 MG EC TABLET    Take by mouth. 1 Tablet, Delayed Release (E.C.) Oral Every day    ATORVASTATIN (LIPITOR) 40 MG TABLET    (FOR CHOLESTEROL) TAKE 1 TABLET BY MOUTH ONCE A DAY    BLOOD SUGAR DIAGNOSTIC STRP    To check BG one times daily, to use with insurance preferred meter    BLOOD-GLUCOSE METER KIT    To check BG one  times daily, to use with insurance preferred meter    BUDESONIDE-FORMOTEROL 160-4.5 MCG (SYMBICORT) 160-4.5 MCG/ACTUATION HFAA    Inhale 2 puffs into the lungs every 12 (twelve) hours.    CALCIUM-VITAMIN D 600 MG(1,500MG) -400 UNIT TAB    Take by mouth. 1 Tablet Oral Twice a day    EZETIMIBE (ZETIA) 10 MG TABLET    TAKE 1 TABLET BY MOUTH ONCE A DAY    FLUTICASONE PROPIONATE (FLONASE) 50 MCG/ACTUATION NASAL SPRAY    1 spray (50 mcg total) by Each Nostril route once daily.    LANCETS MISC    To check BG one times daily, to use with insurance preferred meter    LATANOPROST 0.005 % OPHTHALMIC SOLUTION        LUPRON DEPOT, 6 MONTH, 45 MG SYKT INJECTION        METFORMIN (GLUCOPHAGE) 500 MG TABLET    (FOR SUGAR CONTROL) TAKE 1 TABLET BY MOUTH TWICE DAILY with meals    METOPROLOL SUCCINATE (TOPROL-XL) 50 MG 24 HR TABLET    (FOR  BLOOD PRESSURE) TAKE 1 TABLET BY MOUTH DAILY    NITROGLYCERIN (NITROSTAT) 0.4 MG SL TABLET    Place 1 tablet (0.4 mg total) under the tongue every 5 (five) minutes as needed for Chest pain.    PANTOPRAZOLE (PROTONIX) 40 MG TABLET    Take 1 tablet (40 mg total) by mouth once daily.    TRAVOPROST (TRAVATAN Z) 0.004 % DROP    Place 1 drop into both eyes every evening. 1 Drops Ophthalmic At bedtime   Changed and/or Refilled Medications    Modified Medication Previous Medication    ABIRATERONE (ZYTIGA) 250 MG TAB abiraterone (ZYTIGA) 250 mg Tab       Take 4 tablets (1,000 mg total) by mouth once daily.    Take 4 tablets (1,000 mg total) by mouth once daily.    HYDROCODONE-ACETAMINOPHEN (NORCO)  MG PER TABLET HYDROcodone-acetaminophen (NORCO)  mg per tablet       Take 1 tablet by mouth every 6 (six) hours as needed for Pain.    Take 1 tablet by mouth every 6 (six) hours as needed for Pain.    PREDNISONE (DELTASONE) 5 MG TABLET predniSONE (DELTASONE) 5 MG tablet       Take 1 tablet (5 mg total) by mouth 2 (two) times daily.    Take 1 tablet (5 mg total) by mouth 2 (two) times daily.     Objective:     Vitals:    03/13/25 0815   BP: 138/80   Pulse: 78   Resp: 18   Temp: 97.9 °F (36.6 °C)     Physical Exam  Vitals reviewed.   Constitutional:       Appearance: Normal appearance.   HENT:      Head: Normocephalic.      Mouth/Throat:      Comments:     Eyes:      Extraocular Movements: Extraocular movements intact.      Pupils: Pupils are equal, round, and reactive to light.   Cardiovascular:      Rate and Rhythm: Normal rate and regular rhythm.      Heart sounds: Normal heart sounds.   Pulmonary:      Effort: Pulmonary effort is normal.      Breath sounds: Normal breath sounds.   Abdominal:      General: Bowel sounds are normal.      Palpations: Abdomen is soft.      Comments: rounded     Genitourinary:     Comments: deferred    Musculoskeletal:         General: Normal range of motion.      Cervical back: Normal  range of motion and neck supple.   Skin:     General: Skin is warm and dry.      Comments: Left inner forearm tattoo   Neurological:      Mental Status: He is alert and oriented to person, place, and time.   Psychiatric:         Behavior: Behavior normal.         Thought Content: Thought content normal.          (0) Fully active, able to carry on all predisease performance without restriction  Assessment:     Problem List Items Addressed This Visit          Oncology    Secondary adenocarcinoma of skeletal bone    Relevant Orders    Comprehensive Metabolic Panel    Prostate Specific Antigen, Diagnostic    Testosterone    CBC Auto Differential    Prostate cancer - Primary    Metastatic, hormone sensitive prostate cancer on abiraterone+prednisone and q6 month lupron.  No new symptoms.  Chronic back pain and fatigue unchanged. Denies hot flashes. Currently on Zytiga/predisone since 12/2017. Receives ADT/Lupron via Urology.          Relevant Medications    abiraterone (ZYTIGA) 250 mg Tab    Other Relevant Orders    Comprehensive Metabolic Panel    Prostate Specific Antigen, Diagnostic    Testosterone    CBC Auto Differential     Plan:     Prostate cancer  -     Comprehensive Metabolic Panel; Future; Expected date: 07/12/2025  -     Prostate Specific Antigen, Diagnostic; Future; Expected date: 07/12/2025  -     Testosterone; Future; Expected date: 07/12/2025  -     CBC Auto Differential; Future; Expected date: 07/12/2025  -     Discontinue: abiraterone (ZYTIGA) 250 mg Tab; Take 4 tablets (1,000 mg total) by mouth once daily.  Dispense: 120 tablet; Refill: 1  -     abiraterone (ZYTIGA) 250 mg Tab; Take 4 tablets (1,000 mg total) by mouth once daily.  Dispense: 120 tablet; Refill: 1    Secondary adenocarcinoma of skeletal bone  -     Comprehensive Metabolic Panel; Future; Expected date: 07/12/2025  -     Prostate Specific Antigen, Diagnostic; Future; Expected date: 07/12/2025  -     Testosterone; Future; Expected date:  07/12/2025  -     CBC Auto Differential; Future; Expected date: 07/12/2025    Labs reviewed.   Continue Zytiga/prednisone as prescribed; refilled to patient's pharmacy.   Continue ADT/Lupron via Urology; next Lupron due 3/2025; scheduled for 3/18/2025.  Follow up in 4 months with Dr. Berrios with testosterone, CBC, Comprehensive Metabolic Panel, and PSA.    Route Chart for Scheduling    Med Onc Chart Routing      Follow up with physician 4 months. Dr. Berrios   Follow up with MIGUEL    Infusion scheduling note    Injection scheduling note    Labs CBC, CMP, PSA and other   Scheduling:  Preferred lab:  Lab interval:  and testosterone in 4 months   Imaging None      Pharmacy appointment No pharmacy appointment needed      Other referrals       No additional referrals needed              I will review assessment/plan with collaborating physician.      SAMANTA Lama

## 2025-03-13 ENCOUNTER — OFFICE VISIT (OUTPATIENT)
Dept: HEMATOLOGY/ONCOLOGY | Facility: CLINIC | Age: 70
End: 2025-03-13
Payer: MEDICARE

## 2025-03-13 VITALS
OXYGEN SATURATION: 98 % | SYSTOLIC BLOOD PRESSURE: 138 MMHG | BODY MASS INDEX: 21.3 KG/M2 | DIASTOLIC BLOOD PRESSURE: 80 MMHG | HEIGHT: 70 IN | WEIGHT: 148.81 LBS | RESPIRATION RATE: 18 BRPM | HEART RATE: 78 BPM | TEMPERATURE: 98 F

## 2025-03-13 DIAGNOSIS — C79.51 SECONDARY ADENOCARCINOMA OF SKELETAL BONE: ICD-10-CM

## 2025-03-13 DIAGNOSIS — G89.3 NEOPLASM RELATED PAIN: ICD-10-CM

## 2025-03-13 DIAGNOSIS — C61 PROSTATE CANCER: Primary | ICD-10-CM

## 2025-03-13 DIAGNOSIS — C61 PROSTATE CANCER: ICD-10-CM

## 2025-03-13 PROCEDURE — 1101F PT FALLS ASSESS-DOCD LE1/YR: CPT | Mod: HCNC,CPTII,S$GLB, | Performed by: NURSE PRACTITIONER

## 2025-03-13 PROCEDURE — 3008F BODY MASS INDEX DOCD: CPT | Mod: HCNC,CPTII,S$GLB, | Performed by: NURSE PRACTITIONER

## 2025-03-13 PROCEDURE — 3066F NEPHROPATHY DOC TX: CPT | Mod: HCNC,CPTII,S$GLB, | Performed by: NURSE PRACTITIONER

## 2025-03-13 PROCEDURE — 99999 PR PBB SHADOW E&M-EST. PATIENT-LVL IV: CPT | Mod: PBBFAC,HCNC,, | Performed by: NURSE PRACTITIONER

## 2025-03-13 PROCEDURE — 3051F HG A1C>EQUAL 7.0%<8.0%: CPT | Mod: HCNC,CPTII,S$GLB, | Performed by: NURSE PRACTITIONER

## 2025-03-13 PROCEDURE — 1160F RVW MEDS BY RX/DR IN RCRD: CPT | Mod: HCNC,CPTII,S$GLB, | Performed by: NURSE PRACTITIONER

## 2025-03-13 PROCEDURE — 3075F SYST BP GE 130 - 139MM HG: CPT | Mod: HCNC,CPTII,S$GLB, | Performed by: NURSE PRACTITIONER

## 2025-03-13 PROCEDURE — 1159F MED LIST DOCD IN RCRD: CPT | Mod: HCNC,CPTII,S$GLB, | Performed by: NURSE PRACTITIONER

## 2025-03-13 PROCEDURE — 3061F NEG MICROALBUMINURIA REV: CPT | Mod: HCNC,CPTII,S$GLB, | Performed by: NURSE PRACTITIONER

## 2025-03-13 PROCEDURE — 3288F FALL RISK ASSESSMENT DOCD: CPT | Mod: HCNC,CPTII,S$GLB, | Performed by: NURSE PRACTITIONER

## 2025-03-13 PROCEDURE — 99215 OFFICE O/P EST HI 40 MIN: CPT | Mod: HCNC,S$GLB,, | Performed by: NURSE PRACTITIONER

## 2025-03-13 PROCEDURE — 1125F AMNT PAIN NOTED PAIN PRSNT: CPT | Mod: HCNC,CPTII,S$GLB, | Performed by: NURSE PRACTITIONER

## 2025-03-13 PROCEDURE — 3079F DIAST BP 80-89 MM HG: CPT | Mod: HCNC,CPTII,S$GLB, | Performed by: NURSE PRACTITIONER

## 2025-03-13 RX ORDER — ABIRATERONE ACETATE 250 MG/1
1000 TABLET ORAL DAILY
Qty: 120 TABLET | Refills: 1 | Status: ACTIVE | OUTPATIENT
Start: 2025-03-13 | End: 2026-03-13

## 2025-03-13 RX ORDER — PREDNISONE 5 MG/1
5 TABLET ORAL 2 TIMES DAILY
Qty: 60 TABLET | Refills: 3 | Status: SHIPPED | OUTPATIENT
Start: 2025-03-13

## 2025-03-13 RX ORDER — ABIRATERONE ACETATE 250 MG/1
1000 TABLET ORAL DAILY
Qty: 120 TABLET | Refills: 1 | Status: SHIPPED | OUTPATIENT
Start: 2025-03-13 | End: 2025-03-13 | Stop reason: CLARIF

## 2025-03-13 RX ORDER — HYDROCODONE BITARTRATE AND ACETAMINOPHEN 10; 325 MG/1; MG/1
1 TABLET ORAL EVERY 6 HOURS PRN
Qty: 90 TABLET | Refills: 0 | Status: SHIPPED | OUTPATIENT
Start: 2025-03-13

## 2025-03-18 ENCOUNTER — OFFICE VISIT (OUTPATIENT)
Dept: UROLOGY | Facility: CLINIC | Age: 70
End: 2025-03-18
Payer: MEDICARE

## 2025-03-18 VITALS
WEIGHT: 148.13 LBS | HEIGHT: 70 IN | SYSTOLIC BLOOD PRESSURE: 150 MMHG | DIASTOLIC BLOOD PRESSURE: 84 MMHG | HEART RATE: 100 BPM | BODY MASS INDEX: 21.21 KG/M2 | RESPIRATION RATE: 18 BRPM

## 2025-03-18 DIAGNOSIS — C61 PROSTATE CANCER METASTATIC TO BONE: Primary | ICD-10-CM

## 2025-03-18 DIAGNOSIS — C79.51 PROSTATE CANCER METASTATIC TO BONE: Primary | ICD-10-CM

## 2025-03-18 PROCEDURE — 99499 UNLISTED E&M SERVICE: CPT | Mod: HCNC,S$GLB,, | Performed by: UROLOGY

## 2025-03-18 PROCEDURE — 99999 PR PBB SHADOW E&M-EST. PATIENT-LVL IV: CPT | Mod: PBBFAC,HCNC,, | Performed by: UROLOGY

## 2025-03-18 PROCEDURE — 96402 CHEMO HORMON ANTINEOPL SQ/IM: CPT | Mod: HCNC,S$GLB,, | Performed by: UROLOGY

## 2025-03-18 NOTE — PROGRESS NOTES
..Patient came in for ordered Lupron 45 mg, after confirming patient's allergies and donning proper PPE, Lupron 45 mg was administered IM to right dorsalgluteal using aseptic technique. Pt tolerated procedure well. Patient agreed to wait 15 minutes after injection in the clinic and to report any adverse reactions.

## 2025-03-18 NOTE — PROGRESS NOTES
CC: follow up prostate cancer    History of Present Illness:     3/18/25-Lupron today. Saw Heme/onc last week and still on Zytiga/prednisone. Having hot flashes and has to change clothes/sheets. Used to it by now. No dysuria, gross hematuria or weak stream.     24-Here for Lupron. He continues with zytiga and prednisone daily. Urinating without difficulty. No gross hematuria.   3/5/24-here for lupron. He continues on zytiga/prednisone. Good stream. No gross hematuria.   23-here for lupron. No bothersome LUTS. No gross hematuria. He does report back and groin pain, which limits his activity. No weight loss. +hot flashes.   3/1/23-Here for lupron. Continues to take zytiga/prednisone. Still having hot flashes, but doing okay. Stream is good. No gross hematuria or dysuria. No bone pain.   22-has been 11 months since his last visit. States that his sister  when he was supposed to follow up. He continues to take Zytiga/prednisone and PSA remains undetectable. Good stream. No stranguria. No incontinence. Sometimes has urgency.   21- Currently managed on Zytiga/prednisone and Lupron. Reports hot flashes. Sometimes he has urinary urgency. No incontinence. No gross hematuria. No stranguria. Having bone density testing done.   3/15/21: Lupron today, PSA low.  Reviewed history in detail.   20: PSA very low.  Lupron today. Reviewed history in detail.   3/11/20: Lupron due today; will switch to 6 mo prep. Reviewed history in detail. Very active with outside work.  19:  Lupron shot today.  Reviewed history in detail. Currently on dual therapy with abariterone.   19: Lupron shot today.  Reviewed history in detail. Currently on dual therapy with abariterone.   19: Lupron shot today.  Reviewed history in detail. Currently on dual therapy with abariterone.  19: Son  in an MVA since last visit. Reviewed history in detail.  No problems from Lupron.  18: No problems from Lupron.   "PSA lowest.  4/23/18: PSA today and again 3 mo.  Doing fine.  Reviewed history in detail.  1/22/18: PSA well down.  No adverse effects from Lupron.    10/18/17: Been on casodex a month back to review and start Lupron.  Dr. Moscoso felt XRT was not an option but perhaps chemotherapy could be beneficial.  9/18/17: Bone scan reports "RIGHT supraorbital location and a smaller similarly extremely intense focus of increased uptake posteriorly on the RIGHT at the T9 level.  Etiology is uncertain."  CT scan shows "A few scattered shotty mesenteric and retroperitoneal nodes identified.  Similar nodes identified within the pelvis bilaterally."  MRI shows left 2.8 cm prostate mass PIRADS 5, with metastatic pelvic lymphadenopathy (right pelvic sidewall node and left internal iliac chain LN).  No bony mets in pelvis.  7/18/17:  No problems from biopsy.  Gl 4+5 in the left base (30%) and a sig amount of 4+4 in other parts of the gland.  Reviewed treatment options, and imaging needs.  7/3/17: TRUS/Bx today 27 gm.  5/24/17: 61 yo man has problems with perineal/scrotal pain once or twice a month; lasts for an hour or two, some nocturia.  PSA recently elevated.  No abd/pelvic pain and no exac/rel factors.  No hematuria.  No urolithiasis.  No urinary bother.  No  history. CT with contrast earlier this year essentially normal.      Review of Systems   Constitutional:  Negative for appetite change and unexpected weight change.   Respiratory:  Negative for shortness of breath.    Cardiovascular:  Negative for chest pain.   Gastrointestinal:  Negative for abdominal pain.   Musculoskeletal:  Positive for arthralgias and back pain.   All other systems reviewed and are negative.      Current Outpatient Medications   Medication Sig Dispense Refill    abiraterone (ZYTIGA) 250 mg Tab Take 4 tablets (1,000 mg total) by mouth once daily. 120 tablet 1    aspirin (ECOTRIN) 81 MG EC tablet Take by mouth. 1 Tablet, Delayed Release (E.C.) Oral " Every day      atorvastatin (LIPITOR) 40 MG tablet (FOR CHOLESTEROL) TAKE 1 TABLET BY MOUTH ONCE A DAY 90 tablet 0    blood sugar diagnostic Strp To check BG one times daily, to use with insurance preferred meter 50 each 11    blood-glucose meter kit To check BG one  times daily, to use with insurance preferred meter 1 each 0    budesonide-formoterol 160-4.5 mcg (SYMBICORT) 160-4.5 mcg/actuation HFAA Inhale 2 puffs into the lungs every 12 (twelve) hours. 10.2 g 3    calcium-vitamin D 600 mg(1,500mg) -400 unit Tab Take by mouth. 1 Tablet Oral Twice a day      ezetimibe (ZETIA) 10 mg tablet TAKE 1 TABLET BY MOUTH ONCE A DAY 90 tablet 0    fluticasone propionate (FLONASE) 50 mcg/actuation nasal spray 1 spray (50 mcg total) by Each Nostril route once daily. 16 g 3    HYDROcodone-acetaminophen (NORCO)  mg per tablet Take 1 tablet by mouth every 6 (six) hours as needed for Pain. 90 tablet 0    lancets Misc To check BG one times daily, to use with insurance preferred meter 50 each 11    latanoprost 0.005 % ophthalmic solution   3    LUPRON DEPOT, 6 MONTH, 45 mg SyKt injection       metFORMIN (GLUCOPHAGE) 500 MG tablet (FOR SUGAR CONTROL) TAKE 1 TABLET BY MOUTH TWICE DAILY with meals 180 tablet 0    metoprolol succinate (TOPROL-XL) 50 MG 24 hr tablet (FOR BLOOD PRESSURE) TAKE 1 TABLET BY MOUTH DAILY 90 tablet 4    nitroGLYCERIN (NITROSTAT) 0.4 MG SL tablet Place 1 tablet (0.4 mg total) under the tongue every 5 (five) minutes as needed for Chest pain. 20 tablet 12    pantoprazole (PROTONIX) 40 MG tablet Take 1 tablet (40 mg total) by mouth once daily. 90 tablet 3    predniSONE (DELTASONE) 5 MG tablet Take 1 tablet (5 mg total) by mouth 2 (two) times daily. 60 tablet 3    travoprost (TRAVATAN Z) 0.004 % Drop Place 1 drop into both eyes every evening. 1 Drops Ophthalmic At bedtime 5 mL 12     No current facility-administered medications for this visit.       Review of patient's allergies indicates:   Allergen Reactions     Avelox  [moxifloxacin] Rash    Nsaids (non-steroidal anti-inflammatory drug) Other (See Comments)     dyspepsia       Past medical, family, and social history reviewed as documented in chart with pertinent positive medical, family, and social history detailed in HPI.    Physical Exam  Vitals:    03/18/25 1549   BP: (!) 150/84   Pulse: 100   Resp: 18       General: Well-developed, well-nourished, in no acute distress  HEENT: Normocephalic, atraumatic, extraocular eye movements in tact  Neck: supple, trachea midline, no cervical or supraclavicular adenopathy  Respirations: Even and unlabored  Extremities: Moves all extremities equally, atraumatic, no clubbing, cyanosis, edema  Skin: warm and dry, no lesions  Psych: normal affect  Neuro: Alert and oriented x 3. Cranial nerves II-XII grossly intact      PSA    2/16: 22.1    2/17: 22.9    1/18: 0.24    7/18: 0.01    1/19, 4/19, 8/19, 11/19, 3/20, 3/21, 9/21, 8/22, 11/22, 5/19/23, 12/26/23, 7/1/24, 3/5/25: undetect    Assessment:   1. Prostate cancer metastatic to bone  leuprolide acetate (6 month) injection 45 mg    Prior authorization Order              Plan:  Prostate cancer metastatic to bone  -     leuprolide acetate (6 month) injection 45 mg  -     Prior authorization Order      continue Zytiga/prednisone and Heme/onc follow ups.     Follow up in about 6 months (around 9/18/2025) for Lupron.

## 2025-03-26 ENCOUNTER — TELEPHONE (OUTPATIENT)
Dept: INTERNAL MEDICINE | Facility: CLINIC | Age: 70
End: 2025-03-26
Payer: MEDICARE

## 2025-03-26 NOTE — TELEPHONE ENCOUNTER
..Left message on patient voicemail to return call to clinic regarding pre-op clearance appt./satish

## 2025-03-26 NOTE — TELEPHONE ENCOUNTER
----- [facility] sent at 3/26/2025 12:06 PM CDT -----  .Type: Patient Call BackWho called:patientWhat is the request in detail:   calling concerning needing clearance for patient to have eye surgeryCan the clinic reply by [facility]?   Would the patient rather a call back or a response via [facility] call back number:  .[phone]

## 2025-04-07 DIAGNOSIS — C61 PROSTATE CANCER: ICD-10-CM

## 2025-04-07 DIAGNOSIS — C79.51 SECONDARY ADENOCARCINOMA OF SKELETAL BONE: ICD-10-CM

## 2025-04-07 DIAGNOSIS — G89.3 NEOPLASM RELATED PAIN: ICD-10-CM

## 2025-04-07 RX ORDER — HYDROCODONE BITARTRATE AND ACETAMINOPHEN 10; 325 MG/1; MG/1
1 TABLET ORAL EVERY 6 HOURS PRN
Qty: 90 TABLET | Refills: 0 | Status: SHIPPED | OUTPATIENT
Start: 2025-04-07

## 2025-04-07 NOTE — TELEPHONE ENCOUNTER
----- Message from Debbie sent at 4/7/2025  7:05 AM CDT -----  Contact: Joey  .Type:  RX Refill RequestWho Called: Joey Refill or New Rx:refill RX Name and Strength:HYDROcodone-acetaminophen (NORCO)  mg per tablet How is the patient currently taking it? (ex. 1XDay):as prescribed Is this a 30 day or 90 day RX:30 days Preferred Pharmacy with phone number:.[facility], [facility] [address] [facility] [id number] [place] [address]Cylgl: [phone] Fax: [phone] or Mail Order:local Ordering Provider:Dr. Berrios Would the patient rather a call back or a response via [facility]? [facility] Call Back Number:.[phone] Additional Information: Pt requesting a refill

## 2025-04-11 ENCOUNTER — PATIENT OUTREACH (OUTPATIENT)
Dept: ADMINISTRATIVE | Facility: HOSPITAL | Age: 70
End: 2025-04-11
Payer: MEDICARE

## 2025-04-11 NOTE — PROGRESS NOTES
Working Telephonic Eye Exam 2025    Remind me sent for the end of April to request records for diabetic eye exam from [facility]

## 2025-04-17 ENCOUNTER — OFFICE VISIT (OUTPATIENT)
Dept: INTERNAL MEDICINE | Facility: CLINIC | Age: 70
End: 2025-04-17
Payer: MEDICARE

## 2025-04-17 VITALS
WEIGHT: 148.81 LBS | BODY MASS INDEX: 21.3 KG/M2 | OXYGEN SATURATION: 97 % | DIASTOLIC BLOOD PRESSURE: 74 MMHG | SYSTOLIC BLOOD PRESSURE: 130 MMHG | HEART RATE: 85 BPM | RESPIRATION RATE: 18 BRPM | HEIGHT: 70 IN

## 2025-04-17 DIAGNOSIS — Z98.61 HISTORY OF PTCA: ICD-10-CM

## 2025-04-17 DIAGNOSIS — H25.9 AGE-RELATED CATARACT OF BOTH EYES, UNSPECIFIED AGE-RELATED CATARACT TYPE: Primary | ICD-10-CM

## 2025-04-17 DIAGNOSIS — E11.9 TYPE 2 DIABETES MELLITUS WITHOUT COMPLICATION, WITHOUT LONG-TERM CURRENT USE OF INSULIN: ICD-10-CM

## 2025-04-17 DIAGNOSIS — E11.59 HYPERTENSION ASSOCIATED WITH DIABETES: ICD-10-CM

## 2025-04-17 DIAGNOSIS — J84.10 PULMONARY FIBROSIS: ICD-10-CM

## 2025-04-17 DIAGNOSIS — I25.118 CORONARY ARTERY DISEASE OF NATIVE ARTERY OF NATIVE HEART WITH STABLE ANGINA PECTORIS: Chronic | ICD-10-CM

## 2025-04-17 DIAGNOSIS — Z79.69 IMMUNOSUPPRESSED DUE TO CHEMOTHERAPY: ICD-10-CM

## 2025-04-17 DIAGNOSIS — C79.51 SECONDARY ADENOCARCINOMA OF SKELETAL BONE: ICD-10-CM

## 2025-04-17 DIAGNOSIS — H40.1132 PRIMARY OPEN ANGLE GLAUCOMA (POAG) OF BOTH EYES, MODERATE STAGE: ICD-10-CM

## 2025-04-17 DIAGNOSIS — E78.5 HYPERLIPIDEMIA ASSOCIATED WITH TYPE 2 DIABETES MELLITUS: ICD-10-CM

## 2025-04-17 DIAGNOSIS — T45.1X5A IMMUNOSUPPRESSED DUE TO CHEMOTHERAPY: ICD-10-CM

## 2025-04-17 DIAGNOSIS — E11.69 HYPERLIPIDEMIA ASSOCIATED WITH TYPE 2 DIABETES MELLITUS: ICD-10-CM

## 2025-04-17 DIAGNOSIS — C61 PROSTATE CANCER: ICD-10-CM

## 2025-04-17 DIAGNOSIS — Z01.818 PRE-OPERATIVE CLEARANCE: ICD-10-CM

## 2025-04-17 DIAGNOSIS — K21.9 GASTROESOPHAGEAL REFLUX DISEASE WITHOUT ESOPHAGITIS: Chronic | ICD-10-CM

## 2025-04-17 DIAGNOSIS — J43.8 OTHER EMPHYSEMA: ICD-10-CM

## 2025-04-17 DIAGNOSIS — M85.80 OSTEOPENIA, UNSPECIFIED LOCATION: ICD-10-CM

## 2025-04-17 DIAGNOSIS — D84.821 IMMUNOSUPPRESSED DUE TO CHEMOTHERAPY: ICD-10-CM

## 2025-04-17 DIAGNOSIS — I15.2 HYPERTENSION ASSOCIATED WITH DIABETES: ICD-10-CM

## 2025-04-17 LAB
ANION GAP (OHS): 11 MMOL/L (ref 8–16)
BUN SERPL-MCNC: 13 MG/DL (ref 8–23)
CALCIUM SERPL-MCNC: 9.4 MG/DL (ref 8.7–10.5)
CHLORIDE SERPL-SCNC: 105 MMOL/L (ref 95–110)
CO2 SERPL-SCNC: 26 MMOL/L (ref 23–29)
CREAT SERPL-MCNC: 1 MG/DL (ref 0.5–1.4)
GFR SERPLBLD CREATININE-BSD FMLA CKD-EPI: >60 ML/MIN/1.73/M2
GLUCOSE SERPL-MCNC: 93 MG/DL (ref 70–110)
POTASSIUM SERPL-SCNC: 3.8 MMOL/L (ref 3.5–5.1)
SODIUM SERPL-SCNC: 142 MMOL/L (ref 136–145)

## 2025-04-17 PROCEDURE — 80048 BASIC METABOLIC PNL TOTAL CA: CPT | Mod: HCNC | Performed by: FAMILY MEDICINE

## 2025-04-17 PROCEDURE — 85025 COMPLETE CBC W/AUTO DIFF WBC: CPT | Mod: HCNC | Performed by: FAMILY MEDICINE

## 2025-04-17 PROCEDURE — 36415 COLL VENOUS BLD VENIPUNCTURE: CPT | Mod: HCNC | Performed by: FAMILY MEDICINE

## 2025-04-17 PROCEDURE — 99999 PR PBB SHADOW E&M-EST. PATIENT-LVL V: CPT | Mod: PBBFAC,HCNC,, | Performed by: FAMILY MEDICINE

## 2025-04-17 NOTE — PROGRESS NOTES
"Subjective:       Patient ID: Joey Jacobo is a 70 y.o. male presents with Problem List[1]     Chief Complaint: Pre-op Exam    70-year-old  male patient with Patient Active Problem List:     Coronary artery disease     Mitral regurgitation     COPD Other emphysema     Hyperlipidemia associated with type 2 diabetes mellitus     Gastroesophageal reflux disease without esophagitis     Glaucoma (increased eye pressure)     History of PTCA     Secondary adenocarcinoma of skeletal bone     Prostate cancer     Pulmonary fibrosis     Neoplasm related pain     Hypertension associated with diabetes     AP (angina pectoris)     Former smoker     Immunosuppressed due to chemotherapy     Aortic atherosclerosis     Osteopenia     Type 2 diabetes mellitus without complication, without long-term current use of insulin  Here for preop clearance for bilateral cataract scheduled by Dr. Andrews's, right eye surgery on 04/23/2025 left eye on 04/30/2025  Patient reports that he is also getting glaucoma surgery with his cataract  Will schedule colonoscopy after cataract repair  Denies of any chest tightness or difficulty breathing with palpitations, nausea vomiting or abdominal discomfort  Blood glucose levels has been stable      Review of Systems   Constitutional:  Negative for appetite change, chills, fatigue and fever.   Eyes:  Positive for visual disturbance.   Respiratory:  Negative for cough, chest tightness and shortness of breath.    Cardiovascular:  Negative for chest pain, palpitations and leg swelling.   Gastrointestinal:  Negative for abdominal pain, nausea and vomiting.   Musculoskeletal:  Negative for myalgias.   Skin:  Negative for rash.   Neurological:  Negative for headaches.   Psychiatric/Behavioral:  Negative for sleep disturbance.          /74 (BP Location: Right arm, Patient Position: Sitting)   Pulse 85   Resp 18   Ht 5' 10" (1.778 m)   Wt 67.5 kg (148 lb 13 oz)   SpO2 97%   BMI " "21.35 kg/m²   Objective:      Physical Exam  Constitutional:       Appearance: He is well-developed.   HENT:      Head: Normocephalic and atraumatic.   Cardiovascular:      Rate and Rhythm: Normal rate and regular rhythm.      Heart sounds: Normal heart sounds. No murmur heard.  Pulmonary:      Effort: Pulmonary effort is normal.      Breath sounds: Normal breath sounds. No wheezing.   Abdominal:      General: Bowel sounds are normal.      Palpations: Abdomen is soft.      Tenderness: There is no abdominal tenderness.   Skin:     General: Skin is warm and dry.      Findings: No rash.   Neurological:      Mental Status: He is alert and oriented to person, place, and time.   Psychiatric:         Mood and Affect: Mood normal.           Assessment/Plan:   1. Age-related cataract of both eyes, unspecified age-related cataract type  -     CBC Auto Differential  -     Basic Metabolic Panel  2. Pre-operative clearance  -     CBC Auto Differential  -     Basic Metabolic Panel  3. Primary open angle glaucoma (POAG) of both eyes, moderate stage    Will check further labs and will send the preop clearance after reviewing test results  Patient clinically doing well    4. Prostate cancer  Overview:  2/22/17 PSA 22.6  7/3/17 prostate biopsy: adenocarcinoma, tana 4+5  9/29/17 MRI spine : "Bony metastatic lesion in the right aspect of the T8 vertebral body without fracture."  11/27/17 abiraterone+prednisone  5. Secondary adenocarcinoma of skeletal bone  7. Immunosuppressed due to chemotherapy  Overview:  MRI 10/02/2017: Bony metastatic lesion in the right aspect of the T8 vertebral body without fracture.    Followed by Hematology/Oncology and urology, clinically doing well-currently taking Zytiga and Lupron injections      6. Hypertension associated with diabetes  Blood pressure is stable currently on metoprolol 50 mg daily    8. Type 2 diabetes mellitus without complication, without long-term current use of insulin  -     " Ambulatory referral/consult to Podiatry; Future; Expected date: 04/24/2025  Stable on metformin 500 mg twice daily  Strict lifestyle changes recommended with 1800 ADA low-fat and low-cholesterol diet and exercise 30 minutes daily      9. Hyperlipidemia associated with type 2 diabetes mellitus  Currently taking Lipitor 40 mg and Zetia 10 mg daily    10. Gastroesophageal reflux disease without esophagitis  Clinically doing well on pantoprazole 40 mg daily    11. Coronary artery disease of native artery of native heart with stable angina pectoris  13. History of PTCA  Stable and asymptomatic followed by Cardiology    12. COPD Other emphysema  14. Pulmonary fibrosis  Clinically doing well on Symbicort and albuterol inhaler as needed      15. Osteopenia, unspecified location  Continue calcium with vitamin-D supplements       Visit today included increased complexity associated with the care of the episodic problem  addressed and managing the longitudinal care of the patient due to the serious and/or complex managed problem(s) .          [1]   Patient Active Problem List  Diagnosis    Coronary artery disease    Mitral regurgitation    COPD Other emphysema    Hyperlipidemia associated with type 2 diabetes mellitus    Gastroesophageal reflux disease without esophagitis    Glaucoma (increased eye pressure)    History of PTCA    Secondary adenocarcinoma of skeletal bone    Prostate cancer    Pulmonary fibrosis    Neoplasm related pain    Hypertension associated with diabetes    AP (angina pectoris)    Former smoker    Immunosuppressed due to chemotherapy    Aortic atherosclerosis    Osteopenia    Type 2 diabetes mellitus without complication, without long-term current use of insulin

## 2025-04-18 LAB
ABSOLUTE EOSINOPHIL (OHS): 0.1 K/UL
ABSOLUTE MONOCYTE (OHS): 1.15 K/UL (ref 0.3–1)
ABSOLUTE NEUTROPHIL COUNT (OHS): 6.96 K/UL (ref 1.8–7.7)
BASOPHILS # BLD AUTO: 0.05 K/UL
BASOPHILS NFR BLD AUTO: 0.5 %
ERYTHROCYTE [DISTWIDTH] IN BLOOD BY AUTOMATED COUNT: 14.6 % (ref 11.5–14.5)
HCT VFR BLD AUTO: 42.1 % (ref 40–54)
HGB BLD-MCNC: 12.5 GM/DL (ref 14–18)
IMM GRANULOCYTES # BLD AUTO: 0.12 K/UL (ref 0–0.04)
IMM GRANULOCYTES NFR BLD AUTO: 1.1 % (ref 0–0.5)
LYMPHOCYTES # BLD AUTO: 2.71 K/UL (ref 1–4.8)
MCH RBC QN AUTO: 28.7 PG (ref 27–31)
MCHC RBC AUTO-ENTMCNC: 29.7 G/DL (ref 32–36)
MCV RBC AUTO: 97 FL (ref 82–98)
NUCLEATED RBC (/100WBC) (OHS): 0 /100 WBC
PLATELET # BLD AUTO: 226 K/UL (ref 150–450)
PMV BLD AUTO: 12.4 FL (ref 9.2–12.9)
RBC # BLD AUTO: 4.35 M/UL (ref 4.6–6.2)
RELATIVE EOSINOPHIL (OHS): 0.9 %
RELATIVE LYMPHOCYTE (OHS): 24.4 % (ref 18–48)
RELATIVE MONOCYTE (OHS): 10.4 % (ref 4–15)
RELATIVE NEUTROPHIL (OHS): 62.7 % (ref 38–73)
WBC # BLD AUTO: 11.09 K/UL (ref 3.9–12.7)

## 2025-04-21 ENCOUNTER — RESULTS FOLLOW-UP (OUTPATIENT)
Dept: INTERNAL MEDICINE | Facility: CLINIC | Age: 70
End: 2025-04-21
Payer: MEDICARE

## 2025-05-01 DIAGNOSIS — C79.51 SECONDARY ADENOCARCINOMA OF SKELETAL BONE: ICD-10-CM

## 2025-05-01 DIAGNOSIS — G89.3 NEOPLASM RELATED PAIN: ICD-10-CM

## 2025-05-01 DIAGNOSIS — C61 PROSTATE CANCER: ICD-10-CM

## 2025-05-01 RX ORDER — HYDROCODONE BITARTRATE AND ACETAMINOPHEN 10; 325 MG/1; MG/1
1 TABLET ORAL EVERY 6 HOURS PRN
Qty: 90 TABLET | Refills: 0 | Status: SHIPPED | OUTPATIENT
Start: 2025-05-01

## 2025-05-01 NOTE — TELEPHONE ENCOUNTER
----- Message from Staci Keller sent at 5/1/2025  9:51 AM CDT -----  Contact: Joey  .Type:  RX Refill RequestWho Called:  RayRefill or New Rx: RefillRX Name and Strength: HYDROcodone-acetaminophen (NORCO)  mg per tabletHow is the patient currently taking it? (ex. 1XDay): Is this a 30 day or 90 day RX: [facility] [address] [facility] [id number] [place] [address]Aoaid: [phone] Fax: [phone] or Mail Order:LocalOrdering Provider: Seda the patient rather a call back or a response via [facility]?  Call Alfredo Call Back Number: .[phone] (home) Additional Information:  Patient request refill

## 2025-05-12 DIAGNOSIS — E78.2 MIXED HYPERLIPIDEMIA: Chronic | ICD-10-CM

## 2025-05-12 DIAGNOSIS — E11.9 TYPE 2 DIABETES MELLITUS WITHOUT COMPLICATION, WITHOUT LONG-TERM CURRENT USE OF INSULIN: ICD-10-CM

## 2025-05-12 RX ORDER — METFORMIN HYDROCHLORIDE 500 MG/1
TABLET ORAL
Qty: 180 TABLET | Refills: 1 | Status: SHIPPED | OUTPATIENT
Start: 2025-05-12

## 2025-05-12 RX ORDER — EZETIMIBE 10 MG/1
10 TABLET ORAL
Qty: 90 TABLET | Refills: 3 | Status: SHIPPED | OUTPATIENT
Start: 2025-05-12

## 2025-05-12 NOTE — TELEPHONE ENCOUNTER
No care due was identified.  [facility] Embedded Care Due Messages. Reference number: [id number].   5/12/2025 7:35:25 AM CDT

## 2025-05-13 DIAGNOSIS — E78.2 MIXED HYPERLIPIDEMIA: Chronic | ICD-10-CM

## 2025-05-13 RX ORDER — ATORVASTATIN CALCIUM 40 MG/1
TABLET, FILM COATED ORAL
Qty: 90 TABLET | Refills: 3 | Status: SHIPPED | OUTPATIENT
Start: 2025-05-13

## 2025-05-13 NOTE — TELEPHONE ENCOUNTER
No care due was identified.  [facility] Embedded Care Due Messages. Reference number: [id number].   5/13/2025 8:04:03 AM CDT

## 2025-05-13 NOTE — TELEPHONE ENCOUNTER
Refill Decision Note   Joey Jacobo  is requesting a refill authorization.  Brief Assessment and Rationale for Refill:  Approve     Medication Therapy Plan:         Comments:     Note composed:9:42 AM 05/13/2025

## 2025-05-15 DIAGNOSIS — C61 PROSTATE CANCER: ICD-10-CM

## 2025-05-15 RX ORDER — ABIRATERONE ACETATE 250 MG/1
1000 TABLET ORAL DAILY
Qty: 120 TABLET | Refills: 1 | Status: ACTIVE | OUTPATIENT
Start: 2025-05-15 | End: 2026-05-15

## 2025-05-22 DIAGNOSIS — C79.51 SECONDARY ADENOCARCINOMA OF SKELETAL BONE: ICD-10-CM

## 2025-05-22 DIAGNOSIS — C61 PROSTATE CANCER: ICD-10-CM

## 2025-05-22 DIAGNOSIS — G89.3 NEOPLASM RELATED PAIN: ICD-10-CM

## 2025-05-22 RX ORDER — HYDROCODONE BITARTRATE AND ACETAMINOPHEN 10; 325 MG/1; MG/1
1 TABLET ORAL EVERY 6 HOURS PRN
Qty: 90 TABLET | Refills: 0 | Status: SHIPPED | OUTPATIENT
Start: 2025-05-22

## 2025-05-22 NOTE — TELEPHONE ENCOUNTER
----- Message from Chandrika sent at 5/22/2025  1:43 PM CDT -----  Type: RX Refill Request Who Called: Jazmin you contacted your pharmacy: Refill RX Name and Strength: HYDROcodone-acetaminophen (NORCO)  mg per tabletPreferred Pharmacy with phone number: [facility] [address] [address] [id number] [place] [address]Jvisa: [phone] Fax: [phone] or Mail Order:local Would the patient rather a call back or a response via [facility]? Call backBest Call Back Number: Telephone Information:Mobile          [phone] Information: Thank you.

## 2025-07-14 ENCOUNTER — LAB VISIT (OUTPATIENT)
Dept: LAB | Facility: HOSPITAL | Age: 70
End: 2025-07-14
Attending: NURSE PRACTITIONER
Payer: MEDICARE

## 2025-07-14 DIAGNOSIS — C79.51 SECONDARY ADENOCARCINOMA OF SKELETAL BONE: ICD-10-CM

## 2025-07-14 DIAGNOSIS — C61 PROSTATE CANCER: ICD-10-CM

## 2025-07-14 LAB
ABSOLUTE EOSINOPHIL (OHS): 0.05 K/UL
ABSOLUTE MONOCYTE (OHS): 1.17 K/UL (ref 0.3–1)
ABSOLUTE NEUTROPHIL COUNT (OHS): 8.32 K/UL (ref 1.8–7.7)
ALBUMIN SERPL BCP-MCNC: 3.2 G/DL (ref 3.5–5.2)
ALP SERPL-CCNC: 89 UNIT/L (ref 40–150)
ALT SERPL W/O P-5'-P-CCNC: 12 UNIT/L (ref 10–44)
ANION GAP (OHS): 10 MMOL/L (ref 8–16)
AST SERPL-CCNC: 17 UNIT/L (ref 11–45)
BASOPHILS # BLD AUTO: 0.04 K/UL
BASOPHILS NFR BLD AUTO: 0.3 %
BILIRUB SERPL-MCNC: 0.5 MG/DL (ref 0.1–1)
BUN SERPL-MCNC: 18 MG/DL (ref 8–23)
CALCIUM SERPL-MCNC: 9.9 MG/DL (ref 8.7–10.5)
CHLORIDE SERPL-SCNC: 102 MMOL/L (ref 95–110)
CO2 SERPL-SCNC: 25 MMOL/L (ref 23–29)
CREAT SERPL-MCNC: 1.2 MG/DL (ref 0.5–1.4)
ERYTHROCYTE [DISTWIDTH] IN BLOOD BY AUTOMATED COUNT: 14.4 % (ref 11.5–14.5)
GFR SERPLBLD CREATININE-BSD FMLA CKD-EPI: >60 ML/MIN/1.73/M2
GLUCOSE SERPL-MCNC: 124 MG/DL (ref 70–110)
HCT VFR BLD AUTO: 40.5 % (ref 40–54)
HGB BLD-MCNC: 12.9 GM/DL (ref 14–18)
IMM GRANULOCYTES # BLD AUTO: 0.14 K/UL (ref 0–0.04)
IMM GRANULOCYTES NFR BLD AUTO: 1.1 % (ref 0–0.5)
LYMPHOCYTES # BLD AUTO: 2.92 K/UL (ref 1–4.8)
MCH RBC QN AUTO: 28.9 PG (ref 27–31)
MCHC RBC AUTO-ENTMCNC: 31.9 G/DL (ref 32–36)
MCV RBC AUTO: 91 FL (ref 82–98)
NUCLEATED RBC (/100WBC) (OHS): 0 /100 WBC
PLATELET # BLD AUTO: 218 K/UL (ref 150–450)
PMV BLD AUTO: 10.2 FL (ref 9.2–12.9)
POTASSIUM SERPL-SCNC: 4.4 MMOL/L (ref 3.5–5.1)
PROT SERPL-MCNC: 8.6 GM/DL (ref 6–8.4)
PSA SERPL-MCNC: <0.01 NG/ML
RBC # BLD AUTO: 4.46 M/UL (ref 4.6–6.2)
RELATIVE EOSINOPHIL (OHS): 0.4 %
RELATIVE LYMPHOCYTE (OHS): 23.1 % (ref 18–48)
RELATIVE MONOCYTE (OHS): 9.3 % (ref 4–15)
RELATIVE NEUTROPHIL (OHS): 65.8 % (ref 38–73)
SODIUM SERPL-SCNC: 137 MMOL/L (ref 136–145)
TESTOST SERPL-MCNC: <4 NG/DL (ref 304–1227)
WBC # BLD AUTO: 12.64 K/UL (ref 3.9–12.7)

## 2025-07-14 PROCEDURE — 36415 COLL VENOUS BLD VENIPUNCTURE: CPT | Mod: HCNC,PN

## 2025-07-14 PROCEDURE — 80053 COMPREHEN METABOLIC PANEL: CPT | Mod: HCNC

## 2025-07-14 PROCEDURE — 84153 ASSAY OF PSA TOTAL: CPT | Mod: HCNC

## 2025-07-14 PROCEDURE — 84403 ASSAY OF TOTAL TESTOSTERONE: CPT | Mod: HCNC

## 2025-07-14 PROCEDURE — 85025 COMPLETE CBC W/AUTO DIFF WBC: CPT | Mod: HCNC

## 2025-07-14 RX ORDER — ABIRATERONE ACETATE 250 MG/1
1000 TABLET ORAL DAILY
Qty: 120 TABLET | Refills: 1 | Status: ACTIVE | OUTPATIENT
Start: 2025-07-14 | End: 2025-07-15 | Stop reason: SDUPTHER

## 2025-07-15 ENCOUNTER — OFFICE VISIT (OUTPATIENT)
Dept: HEMATOLOGY/ONCOLOGY | Facility: CLINIC | Age: 70
End: 2025-07-15
Payer: MEDICARE

## 2025-07-15 ENCOUNTER — OFFICE VISIT (OUTPATIENT)
Dept: INTERNAL MEDICINE | Facility: CLINIC | Age: 70
End: 2025-07-15
Payer: MEDICARE

## 2025-07-15 VITALS
HEART RATE: 80 BPM | HEIGHT: 70 IN | BODY MASS INDEX: 20.83 KG/M2 | TEMPERATURE: 97 F | WEIGHT: 145.5 LBS | RESPIRATION RATE: 22 BRPM | HEIGHT: 70 IN | OXYGEN SATURATION: 95 % | WEIGHT: 145.5 LBS | BODY MASS INDEX: 20.83 KG/M2 | DIASTOLIC BLOOD PRESSURE: 90 MMHG | SYSTOLIC BLOOD PRESSURE: 142 MMHG | RESPIRATION RATE: 20 BRPM

## 2025-07-15 DIAGNOSIS — D84.821 IMMUNOSUPPRESSED DUE TO CHEMOTHERAPY: ICD-10-CM

## 2025-07-15 DIAGNOSIS — Z98.61 HISTORY OF PTCA: ICD-10-CM

## 2025-07-15 DIAGNOSIS — H42 DIABETIC GLAUCOMA: ICD-10-CM

## 2025-07-15 DIAGNOSIS — I34.0 MITRAL VALVE INSUFFICIENCY, UNSPECIFIED ETIOLOGY: Chronic | ICD-10-CM

## 2025-07-15 DIAGNOSIS — E78.5 HYPERLIPIDEMIA ASSOCIATED WITH TYPE 2 DIABETES MELLITUS: ICD-10-CM

## 2025-07-15 DIAGNOSIS — E11.69 HYPERLIPIDEMIA ASSOCIATED WITH TYPE 2 DIABETES MELLITUS: ICD-10-CM

## 2025-07-15 DIAGNOSIS — G89.3 NEOPLASM RELATED PAIN: ICD-10-CM

## 2025-07-15 DIAGNOSIS — I25.118 CORONARY ARTERY DISEASE OF NATIVE ARTERY OF NATIVE HEART WITH STABLE ANGINA PECTORIS: ICD-10-CM

## 2025-07-15 DIAGNOSIS — C79.51 SECONDARY ADENOCARCINOMA OF SKELETAL BONE: ICD-10-CM

## 2025-07-15 DIAGNOSIS — Z00.00 ENCOUNTER FOR MEDICARE ANNUAL WELLNESS EXAM: Primary | ICD-10-CM

## 2025-07-15 DIAGNOSIS — E11.8 DIABETES MELLITUS TYPE 2 WITH COMPLICATIONS: ICD-10-CM

## 2025-07-15 DIAGNOSIS — R53.83 FATIGUE, UNSPECIFIED TYPE: ICD-10-CM

## 2025-07-15 DIAGNOSIS — Z87.891 FORMER SMOKER: ICD-10-CM

## 2025-07-15 DIAGNOSIS — R94.6 ABNORMAL RESULTS OF THYROID FUNCTION STUDIES: Primary | ICD-10-CM

## 2025-07-15 DIAGNOSIS — I70.0 AORTIC ATHEROSCLEROSIS: ICD-10-CM

## 2025-07-15 DIAGNOSIS — T45.1X5A IMMUNOSUPPRESSED DUE TO CHEMOTHERAPY: ICD-10-CM

## 2025-07-15 DIAGNOSIS — C61 PROSTATE CANCER: ICD-10-CM

## 2025-07-15 DIAGNOSIS — H40.9 GLAUCOMA, UNSPECIFIED GLAUCOMA TYPE, UNSPECIFIED LATERALITY: ICD-10-CM

## 2025-07-15 DIAGNOSIS — I15.2 HYPERTENSION ASSOCIATED WITH DIABETES: ICD-10-CM

## 2025-07-15 DIAGNOSIS — Z79.69 IMMUNOSUPPRESSED DUE TO CHEMOTHERAPY: ICD-10-CM

## 2025-07-15 DIAGNOSIS — E11.59 HYPERTENSION ASSOCIATED WITH DIABETES: ICD-10-CM

## 2025-07-15 DIAGNOSIS — M85.80 OSTEOPENIA, UNSPECIFIED LOCATION: ICD-10-CM

## 2025-07-15 DIAGNOSIS — E11.39 DIABETIC GLAUCOMA: ICD-10-CM

## 2025-07-15 DIAGNOSIS — K21.9 GASTROESOPHAGEAL REFLUX DISEASE WITHOUT ESOPHAGITIS: Chronic | ICD-10-CM

## 2025-07-15 DIAGNOSIS — J84.10 PULMONARY FIBROSIS: ICD-10-CM

## 2025-07-15 DIAGNOSIS — R26.9 ABNORMALITY OF GAIT AND MOBILITY: ICD-10-CM

## 2025-07-15 DIAGNOSIS — I20.9 AP (ANGINA PECTORIS): ICD-10-CM

## 2025-07-15 DIAGNOSIS — Z74.09 OTHER REDUCED MOBILITY: ICD-10-CM

## 2025-07-15 DIAGNOSIS — E78.2 MIXED HYPERLIPIDEMIA: Chronic | ICD-10-CM

## 2025-07-15 DIAGNOSIS — C61 PROSTATE CANCER: Primary | ICD-10-CM

## 2025-07-15 DIAGNOSIS — J43.8 OTHER EMPHYSEMA: ICD-10-CM

## 2025-07-15 DIAGNOSIS — Z95.5 HISTORY OF CORONARY ARTERY STENT PLACEMENT: ICD-10-CM

## 2025-07-15 PROCEDURE — 99999 PR PBB SHADOW E&M-EST. PATIENT-LVL IV: CPT | Mod: PBBFAC,HCNC,, | Performed by: INTERNAL MEDICINE

## 2025-07-15 PROCEDURE — 3066F NEPHROPATHY DOC TX: CPT | Mod: CPTII,HCNC,S$GLB, | Performed by: INTERNAL MEDICINE

## 2025-07-15 PROCEDURE — 1170F FXNL STATUS ASSESSED: CPT | Mod: CPTII,HCNC,S$GLB, | Performed by: NURSE PRACTITIONER

## 2025-07-15 PROCEDURE — 3008F BODY MASS INDEX DOCD: CPT | Mod: CPTII,HCNC,S$GLB, | Performed by: INTERNAL MEDICINE

## 2025-07-15 PROCEDURE — 3051F HG A1C>EQUAL 7.0%<8.0%: CPT | Mod: CPTII,HCNC,S$GLB, | Performed by: INTERNAL MEDICINE

## 2025-07-15 PROCEDURE — G9919 SCRN ND POS ND PROV OF REC: HCPCS | Mod: CPTII,HCNC,S$GLB, | Performed by: NURSE PRACTITIONER

## 2025-07-15 PROCEDURE — 3061F NEG MICROALBUMINURIA REV: CPT | Mod: CPTII,HCNC,S$GLB, | Performed by: NURSE PRACTITIONER

## 2025-07-15 PROCEDURE — 1159F MED LIST DOCD IN RCRD: CPT | Mod: CPTII,HCNC,S$GLB, | Performed by: INTERNAL MEDICINE

## 2025-07-15 PROCEDURE — 1125F AMNT PAIN NOTED PAIN PRSNT: CPT | Mod: CPTII,HCNC,S$GLB, | Performed by: NURSE PRACTITIONER

## 2025-07-15 PROCEDURE — 3080F DIAST BP >= 90 MM HG: CPT | Mod: CPTII,HCNC,S$GLB, | Performed by: INTERNAL MEDICINE

## 2025-07-15 PROCEDURE — 3051F HG A1C>EQUAL 7.0%<8.0%: CPT | Mod: CPTII,HCNC,S$GLB, | Performed by: NURSE PRACTITIONER

## 2025-07-15 PROCEDURE — 1101F PT FALLS ASSESS-DOCD LE1/YR: CPT | Mod: CPTII,HCNC,S$GLB, | Performed by: INTERNAL MEDICINE

## 2025-07-15 PROCEDURE — 1158F ADVNC CARE PLAN TLK DOCD: CPT | Mod: CPTII,HCNC,S$GLB, | Performed by: NURSE PRACTITIONER

## 2025-07-15 PROCEDURE — 3288F FALL RISK ASSESSMENT DOCD: CPT | Mod: CPTII,HCNC,S$GLB, | Performed by: INTERNAL MEDICINE

## 2025-07-15 PROCEDURE — 1101F PT FALLS ASSESS-DOCD LE1/YR: CPT | Mod: CPTII,HCNC,S$GLB, | Performed by: NURSE PRACTITIONER

## 2025-07-15 PROCEDURE — G0439 PPPS, SUBSEQ VISIT: HCPCS | Mod: HCNC,S$GLB,, | Performed by: NURSE PRACTITIONER

## 2025-07-15 PROCEDURE — 99214 OFFICE O/P EST MOD 30 MIN: CPT | Mod: HCNC,S$GLB,, | Performed by: INTERNAL MEDICINE

## 2025-07-15 PROCEDURE — 99999 PR PBB SHADOW E&M-EST. PATIENT-LVL V: CPT | Mod: PBBFAC,HCNC,, | Performed by: NURSE PRACTITIONER

## 2025-07-15 PROCEDURE — 1125F AMNT PAIN NOTED PAIN PRSNT: CPT | Mod: CPTII,HCNC,S$GLB, | Performed by: INTERNAL MEDICINE

## 2025-07-15 PROCEDURE — 3061F NEG MICROALBUMINURIA REV: CPT | Mod: CPTII,HCNC,S$GLB, | Performed by: INTERNAL MEDICINE

## 2025-07-15 PROCEDURE — 3066F NEPHROPATHY DOC TX: CPT | Mod: CPTII,HCNC,S$GLB, | Performed by: NURSE PRACTITIONER

## 2025-07-15 PROCEDURE — 3288F FALL RISK ASSESSMENT DOCD: CPT | Mod: CPTII,HCNC,S$GLB, | Performed by: NURSE PRACTITIONER

## 2025-07-15 PROCEDURE — 1158F ADVNC CARE PLAN TLK DOCD: CPT | Mod: CPTII,HCNC,S$GLB, | Performed by: INTERNAL MEDICINE

## 2025-07-15 PROCEDURE — 3077F SYST BP >= 140 MM HG: CPT | Mod: CPTII,HCNC,S$GLB, | Performed by: INTERNAL MEDICINE

## 2025-07-15 PROCEDURE — 1160F RVW MEDS BY RX/DR IN RCRD: CPT | Mod: CPTII,HCNC,S$GLB, | Performed by: INTERNAL MEDICINE

## 2025-07-15 RX ORDER — PREDNISONE 5 MG/1
5 TABLET ORAL 2 TIMES DAILY
Qty: 60 TABLET | Refills: 3 | Status: SHIPPED | OUTPATIENT
Start: 2025-07-15 | End: 2025-07-15 | Stop reason: SDUPTHER

## 2025-07-15 RX ORDER — ABIRATERONE ACETATE 250 MG/1
1000 TABLET ORAL DAILY
Qty: 120 TABLET | Refills: 1 | Status: ACTIVE | OUTPATIENT
Start: 2025-07-15 | End: 2026-07-15

## 2025-07-15 RX ORDER — PREDNISONE 5 MG/1
5 TABLET ORAL 2 TIMES DAILY
Qty: 180 TABLET | Refills: 3 | Status: SHIPPED | OUTPATIENT
Start: 2025-07-15

## 2025-07-15 RX ORDER — ABIRATERONE ACETATE 250 MG/1
1000 TABLET ORAL DAILY
Qty: 120 TABLET | Refills: 1 | Status: SHIPPED | OUTPATIENT
Start: 2025-07-15 | End: 2025-07-15 | Stop reason: SDUPTHER

## 2025-07-15 NOTE — PROGRESS NOTES
Joey Jacobo presented for a  Medicare AWV and comprehensive Health Risk Assessment today. The following components were reviewed and updated:    Medical history  Family History  Social history  Allergies and Current Medications  Health Risk Assessment  Health Maintenance  Care Team     Patient screened moderate and/or high risk for one or more social determinants of health (SDOH). Patient connected to community resources through the ED Navigator.      ** See Completed Assessments for Annual Wellness Visit within the encounter summary.**         The following assessments were completed:  Living Situation  CAGE  Depression Screening  Timed Get Up and Go  Whisper Test  Cognitive Function Screening  Nutrition Screening  ADL Screening  PAQ Screening      Opioid documentation:      Patient does have a current opioid prescription.      Patient accepted further discussion regarding opioid medication use.      Patient is currently taking hydrocodone narcotic for back and neck pain.        Pain level today is  1/5    In addition to narcotic pain medications, patient is also using repositioning for pain control.       Patient is followed by a specialist currently for their pain and will not be referred today.       Patient's opioid risk potential based on ORT-OUD tool:       David each box that applies   No   Yes     Family history of substance abuse   Alcohol [x] []   Illegal drugs [x] []   Rx drugs [x] []     Personal history of substance abuse   Alcohol [x] []   Illegal drugs [x] []   Rx drugs [x] []     Age between 16-45 years   [x]   []     Patient with ADD, OCD, Bipolar disorder, schizoprenia   [x]   []     Patient with depression   [x]   []                         Scoring total                                    0                             Non-opioid treatment options have been discussed today and added to the patient's after visit summary.        Vitals:    07/15/25 1008   Resp: 20   Weight: 66 kg (145 lb 8.1 oz)  "  Height: 5' 10" (1.778 m)     Body mass index is 20.88 kg/m².  Physical Exam  Constitutional:       General: He is not in acute distress.     Appearance: He is not ill-appearing or diaphoretic.   HENT:      Mouth/Throat:      Mouth: Mucous membranes are moist.   Pulmonary:      Effort: Pulmonary effort is normal. No respiratory distress.   Skin:     General: Skin is warm and dry.   Neurological:      Mental Status: He is alert and oriented to person, place, and time. Mental status is at baseline.   Psychiatric:         Mood and Affect: Mood normal.         Behavior: Behavior normal.         Thought Content: Thought content normal.         Judgment: Judgment normal.           Diagnoses and health risks identified today and associated recommendations/orders:    Please note patient did not bring in home medications      1. Encounter for Medicare annual wellness exam    Patient states he drinks 1-2 beer a month or less    I offered to discuss advanced care planning, including how to pick a person who would make decisions for you if you were unable to make them for yourself, called a health care power of , and what kind of decisions you might make such as use of life sustaining treatments such as ventilators and tube feeding when faced with a life limiting illness recorded on a living will that they will need to know. (How you want to be cared for as you near the end of your natural life)     X Patient is interested in learning more about how to make advanced directives.  I provided them paperwork and offered to discuss this with them.  - Ambulatory Referral/Consult to Enhanced Annual Wellness Visit (eAWV)  - Ambulatory referral/consult to Outpatient Case Management- Life Alert button or equivalent    2. Diabetes mellitus type 2 with complications  Monitor  Follow up with PCP  Continue home metformin  Lab Results   Component Value Date    HGBA1C 7.3 (H) 02/28/2025     3. Secondary adenocarcinoma of skeletal " bone, Prostate cancer, Immunosuppressed due to chemotherapy, Neoplasm related pain, Abnormality of gait and mobility, Other reduced mobility, Fatigue, unspecified type  Monitor  Follow up with Urology and Oncology as directed  Continue home zytiga, norco, lupron    4. Hypertension associated with diabetes  Monitor  Stable  Continue home toprol xl    5. Osteopenia, unspecified location  Monitor  Follow up with PCP  Continue home calcium with vitamin D, MVI    6.  Aortic atherosclerosis, Coronary artery disease of native artery of native heart with stable angina pectoris,  AP (angina pectoris), History of PTCA, History of coronary artery stent placement, Mitral valve insufficiency, unspecified etiology, Hyperlipidemia associated with type 2 diabetes mellitus  Monitor  Follow up with Cardiology as directed   Continue home asa, lipitor, zetia, prn Ntg    7. Gastroesophageal reflux disease without esophagitis  Monitor  Stable  Continue home protonix    8. COPD Other emphysema, Pulmonary fibrosis, Former smoker  Monitor  Follow up as needed, directed   Continue home symbicort, flonase, prednisone    9. Diabetic glaucoma, Glaucoma, unspecified glaucoma type, unspecified laterality  Monitor  Follow up with Ophtho as directed  Continue home latanoprost, travatan z      Patient states he is currently taking po Abx for a sinus infection and is feeling better. No fever.                                     Provided Ray with a 5-10 year written screening schedule and personal prevention plan. Recommendations were developed using the USPSTF age appropriate recommendations. Education, counseling, and referrals were provided as needed. After Visit Summary printed and given to patient which includes a list of additional screenings\tests needed.    Follow up in about 1 year (around 7/15/2026) for Annual wellness visit.    SAMANTA Cunha

## 2025-07-15 NOTE — PATIENT INSTRUCTIONS
Counseling and Referral of Other Preventative  (Italic type indicates deductible and co-insurance are waived)    Patient Name: Joey Jacobo  Today's Date: 7/15/2025    Health Maintenance       Date Due Completion Date    Foot Exam Never done ---    Shingles Vaccine (1 of 2) Never done ---    RSV Vaccine (Age 60+ and Pregnant patients) (1 - Risk 60-74 years 1-dose series) Never done ---    Colorectal Cancer Screening 03/21/2021 3/21/2016    Override on 11/6/2006: Done ([facility]- polyps)    COVID-19 Vaccine (4 - 2024-25 season) 09/01/2024 12/8/2021    Hemoglobin A1c 08/28/2025 2/28/2025    Influenza Vaccine (1) 09/01/2025 1/6/2023    Override on 3/11/2020: Declined    Pneumococcal Vaccines (Age 50+) (4 of 4 - PCV20 or PCV21) 11/23/2025 11/23/2020    Diabetic Eye Exam 02/05/2026 2/5/2025    Diabetes Urine Screening 02/28/2026 2/28/2025    Lipid Panel 02/28/2026 2/28/2025    High Dose Statin 07/15/2026 7/15/2025    Aspirin/Antiplatelet Therapy 07/15/2026 7/15/2025    DEXA Scan 10/21/2026 10/21/2021    Override on 8/18/2008: Done (REPEAT 1-2 YRS)    Override on 8/16/2007: Done    TETANUS VACCINE 04/28/2030 4/28/2020        Orders Placed This Encounter   Procedures    Ambulatory referral/consult to Outpatient Case Management       The following information is provided to all patients.  This information is to help you find resources for any of the problems found today that may be affecting your health:                  Living healthy guide: www.[facility].gov      Understanding Diabetes: www.diabetes.org      Eating healthy: www.cdc.gov/healthyweight      CDC home safety checklist: www.cdc.gov/steadi/patient.html      Agency on Aging: www.goea.[place].gov      Alcoholics anonymous (AA): www.aa.org      Physical Activity: www.lexi.nih.gov/xl1lzuy      Tobacco use: www.quitwithusla.org

## 2025-07-15 NOTE — PROGRESS NOTES
"  Joey Jacobo presented for a  Medicare AWV and comprehensive Health Risk Assessment today. The following components were reviewed and updated:    Medical history  Family History  Social history  Allergies and Current Medications  Health Risk Assessment  Health Maintenance  Care Team     Patient screened moderate and/or high risk for one or more social determinants of health (SDOH). Patient connected to community resources through the ED Navigator.      ** See Completed Assessments for Annual Wellness Visit within the encounter summary.**         The following assessments were completed:  Living Situation  CAGE  Depression Screening  Timed Get Up and Go  Whisper Test  Cognitive Function Screening  Nutrition Screening  ADL Screening  PAQ Screening      Opioid documentation:      Patient {does/does not have a current opioid prescription:94384}     Vitals:    07/15/25 1008   Resp: 20   Weight: 66 kg (145 lb 8.1 oz)   Height: 5' 10" (1.778 m)     Body mass index is 20.88 kg/m².  Physical Exam          Diagnoses and health risks identified today and associated recommendations/orders:    1. Encounter for Medicare annual wellness exam  ***  - Ambulatory Referral/Consult to Enhanced Annual Wellness Visit (eAWV)    2. Diabetes mellitus type 2 with complications  ***    3. Secondary adenocarcinoma of skeletal bone  ***    4. Hypertension associated with diabetes  ***    5. Immunosuppressed due to chemotherapy  ***    6. Coronary artery disease of native artery of native heart with stable angina pectoris  ***    7. Aortic atherosclerosis  ***    8. COPD Other emphysema  ***    9. Pulmonary fibrosis  ***    10. Hyperlipidemia associated with type 2 diabetes mellitus  ***    11. AP (angina pectoris)  ***    12. Prostate cancer  ***  - Ambulatory referral/consult to Outpatient Case Management    13. Neoplasm related pain  ***  - Ambulatory referral/consult to Outpatient Case Management    14. Glaucoma, unspecified glaucoma type, " unspecified laterality  ***    15. Mitral valve insufficiency, unspecified etiology  ***    16. History of PTCA  ***    17. Gastroesophageal reflux disease without esophagitis  ***    18. Osteopenia, unspecified location  ***    19. Former smoker  ***    20. Fatigue, unspecified type  ***  - Ambulatory referral/consult to Outpatient Case Management    21. History of coronary artery stent placement  ***    22. Abnormality of gait and mobility  ***    23. Other reduced mobility  ***      Provided Ray with a 5-10 year written screening schedule and personal prevention plan. Recommendations were developed using the USPSTF age appropriate recommendations. Education, counseling, and referrals were provided as needed. After Visit Summary printed and given to patient which includes a list of additional screenings\tests needed.    Follow up in about 1 year (around 7/15/2026) for Annual wellness visit.    SAMANTA Cunha

## 2025-07-15 NOTE — PROGRESS NOTES
Subjective:       Patient ID: Joey Jacobo is a 70 y.o. male.    Chief Complaint: Chemotherapy and Prostate Cancer    HPI:  70-year-old male history of metastatic prostate cancer patient continues on Lupron Q 6 months through Urology next dosing in September 2025.  Continues with Zytiga and prednisone patient is tolerating therapy well unable to receive bisphosphonates because of dental abnormalities ECOG status 1 germ line testing negative    Past Medical History:   Diagnosis Date    Angina pectoris     Back pain     Chronic bronchitis     Colon polyp     COPD (chronic obstructive pulmonary disease) 7/30/2013    Coronary artery disease 7/30/2013    Diabetes mellitus type 2 with complications 6/7/2024    Emphysema of lung     Essential hypertension 10/29/2018    Glaucoma (increased eye pressure) 8/27/2013    Headache(784.0)     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    Mitral regurgitation 7/30/2013    Mixed hyperlipidemia 7/30/2013    Neuropathy     Osteoarthritis of spine with radiculopathy, lumbar region 7/21/2015    Other and unspecified hyperlipidemia     Prostate cancer     Pulmonary fibrosis 10/3/2017    Type 2 diabetes mellitus without complication, without long-term current use of insulin 6/7/2024     Family History   Problem Relation Name Age of Onset    Cataracts Mother      Kidney disease Mother      Cancer Father          Stomach    Blindness Maternal Grandmother      Diabetes Sister      Hypertension Sister      Diabetes Sister      Hypertension Sister      Diabetes Sister      Hypertension Sister      Hypertension Sister      Hypertension Sister      Colon cancer Brother      Colon cancer Brother       Social History[1]  Past Surgical History:   Procedure Laterality Date    ANKLE FRACTURE SURGERY Left     COLONOSCOPY N/A 3/21/2016    Procedure: COLONOSCOPY;  Surgeon: Melvin Pearce MD;  Location: [facility];  Service: Endoscopy;  Laterality: N/A;    CORONARY STENT PLACEMENT      times  2    SHOULDER ARTHROSCOPY Left     SPINE SURGERY      neck fusion x2       Labs:  Lab Results   Component Value Date    WBC 12.64 07/14/2025    HGB 12.9 (L) 07/14/2025    HCT 40.5 07/14/2025    MCV 91 07/14/2025     07/14/2025     BMP  Lab Results   Component Value Date     07/14/2025    K 4.4 07/14/2025     07/14/2025    CO2 25 07/14/2025    BUN 18 07/14/2025    CREATININE 1.2 07/14/2025    CALCIUM 9.9 07/14/2025    ANIONGAP 10 07/14/2025    ESTGFRAFRICA >60 05/10/2022    EGFRNONAA >60 05/10/2022     Lab Results   Component Value Date    ALT 12 07/14/2025    AST 17 07/14/2025    ALKPHOS 89 07/14/2025    BILITOT 0.5 07/14/2025       Lab Results   Component Value Date    IRON 68 07/01/2024    TIBC 417 07/01/2024    FERRITIN 140 07/01/2024     Lab Results   Component Value Date    PUBRGORS50 544 07/01/2024     Lab Results   Component Value Date    FOLATE 9.0 07/01/2024     Lab Results   Component Value Date    TSH 1.498 02/28/2025         Review of Systems   Constitutional:  Negative for activity change, appetite change, chills, diaphoresis, fatigue, fever and unexpected weight change.   HENT:  Positive for dental problem. Negative for congestion, drooling, ear discharge, ear pain, facial swelling, hearing loss, mouth sores, nosebleeds, postnasal drip, rhinorrhea, sinus pressure, sneezing, sore throat, tinnitus, trouble swallowing and voice change.    Eyes:  Negative for photophobia, pain, discharge, redness, itching and visual disturbance.   Respiratory:  Negative for apnea, cough, choking, chest tightness, shortness of breath, wheezing and stridor.    Cardiovascular:  Negative for chest pain, palpitations and leg swelling.   Gastrointestinal:  Negative for abdominal distention, abdominal pain, anal bleeding, blood in stool, constipation, diarrhea, nausea, rectal pain and vomiting.   Endocrine: Negative for cold intolerance, heat intolerance, polydipsia, polyphagia and polyuria.   Genitourinary:   Negative for decreased urine volume, difficulty urinating, dysuria, enuresis, flank pain, frequency, genital sores, hematuria, penile discharge, penile pain, penile swelling, scrotal swelling, testicular pain and urgency.   Musculoskeletal:  Negative for arthralgias, back pain, gait problem, joint swelling, myalgias, neck pain and neck stiffness.   Skin:  Negative for color change, pallor, rash and wound.   Allergic/Immunologic: Negative for environmental allergies, food allergies and immunocompromised state.   Neurological:  Negative for dizziness, tremors, seizures, syncope, facial asymmetry, speech difficulty, weakness, light-headedness, numbness and headaches.   Hematological:  Negative for adenopathy. Does not bruise/bleed easily.   Psychiatric/Behavioral:  Negative for agitation, behavioral problems, confusion, decreased concentration, dysphoric mood, hallucinations, self-injury, sleep disturbance and suicidal ideas. The patient is not nervous/anxious and is not hyperactive.        Objective:      Physical Exam  Vitals reviewed.   Constitutional:       General: He is not in acute distress.     Appearance: He is well-developed. He is not diaphoretic.   HENT:      Head: Normocephalic.      Right Ear: External ear normal.      Left Ear: External ear normal.      Nose: Nose normal.      Right Sinus: No maxillary sinus tenderness or frontal sinus tenderness.      Left Sinus: No maxillary sinus tenderness or frontal sinus tenderness.      Mouth/Throat:      Pharynx: No oropharyngeal exudate.   Eyes:      General: Lids are normal. No scleral icterus.        Right eye: No discharge.         Left eye: No discharge.      Extraocular Movements:      Right eye: Normal extraocular motion.      Left eye: Normal extraocular motion.      Conjunctiva/sclera:      Right eye: Right conjunctiva is not injected. No hemorrhage.     Left eye: Left conjunctiva is not injected. No hemorrhage.     Pupils: Pupils are equal, round, and  reactive to light.   Neck:      Thyroid: No thyromegaly.      Vascular: No JVD.      Trachea: No tracheal deviation.   Cardiovascular:      Rate and Rhythm: Normal rate.   Pulmonary:      Effort: Pulmonary effort is normal. No respiratory distress.      Breath sounds: No stridor.   Abdominal:      General: Bowel sounds are normal.      Palpations: Abdomen is soft. There is no hepatomegaly, splenomegaly or mass.      Tenderness: There is no abdominal tenderness.   Musculoskeletal:         General: No tenderness. Normal range of motion.      Cervical back: Normal range of motion and neck supple.   Lymphadenopathy:      Head:      Right side of head: No posterior auricular or occipital adenopathy.      Left side of head: No posterior auricular or occipital adenopathy.      Cervical: No cervical adenopathy.      Right cervical: No superficial, deep or posterior cervical adenopathy.     Left cervical: No superficial, deep or posterior cervical adenopathy.      Upper Body:      Right upper body: No supraclavicular adenopathy.      Left upper body: No supraclavicular adenopathy.   Skin:     General: Skin is dry.      Findings: No erythema or rash.      Nails: There is no clubbing.   Neurological:      Mental Status: He is alert and oriented to person, place, and time.      Cranial Nerves: No cranial nerve deficit.      Coordination: Coordination normal.   Psychiatric:         Behavior: Behavior normal.         Thought Content: Thought content normal.         Judgment: Judgment normal.             Assessment:      1. Prostate cancer    2. Secondary adenocarcinoma of skeletal bone    3. Neoplasm related pain    4. Mixed hyperlipidemia           Med Onc Chart Routing      Follow up with physician . Return in 3 months can be seen by APAP CBC CMP testosterone and PSA returns 6 months to see me with CBC CMP testosterone TSH   Follow up with MIGUEL    Infusion scheduling note    Injection scheduling note    Labs    Imaging     Pharmacy appointment    Other referrals                 Plan:     Continue with Lupron Q 6 months through Urology Zytiga prednisone.  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   Tobacco Use    Smoking status: Former     Current packs/day: 0.00     Average packs/day: 0.3 packs/day for 25.0 years (6.3 ttl pk-yrs)     Types: Cigarettes     Start date: 1993     Quit date: 2018     Years since quittin.8     Passive exposure: Never    Smokeless tobacco: Never   Substance and Sexual Activity    Alcohol use: Not Currently     Alcohol/week: 0.0 standard drinks of alcohol     Comment: occasionally    Drug use: No    Sexual activity: Yes   Social History Narrative    Wife and son     Social Drivers of Health     Financial Resource Strain: Medium Risk (7/15/2025)    Overall Financial Resource Strain (CARDIA)     Difficulty of Paying Living Expenses: Somewhat hard   Food Insecurity: No Food Insecurity (7/15/2025)    Hunger Vital Sign     Worried About Running Out of Food in the Last Year: Never true     Ran Out of Food in the Last Year: Never true   Transportation Needs: No Transportation Needs (7/15/2025)    PRAPARE - Transportation     Lack of Transportation (Medical): No     Lack of Transportation (Non-Medical): No   Physical Activity: Sufficiently Active (7/15/2025)    Exercise Vital Sign     Days of Exercise per Week: 7 days     Minutes of Exercise per Session: 40 min   Stress: No Stress Concern  Present (7/15/2025)    [facility] [employer] - Occupational Stress Questionnaire     Feeling of Stress : Only a little   Housing Stability: Low Risk  (7/15/2025)    Housing Stability Vital Sign     Unable to Pay for Housing in the Last Year: No     Homeless in the Last Year: No

## 2025-07-16 ENCOUNTER — TELEPHONE (OUTPATIENT)
Dept: HEMATOLOGY/ONCOLOGY | Facility: CLINIC | Age: 70
End: 2025-07-16
Payer: MEDICARE

## 2025-07-16 DIAGNOSIS — G89.3 NEOPLASM RELATED PAIN: ICD-10-CM

## 2025-07-16 DIAGNOSIS — C79.51 SECONDARY ADENOCARCINOMA OF SKELETAL BONE: ICD-10-CM

## 2025-07-16 DIAGNOSIS — C61 PROSTATE CANCER: ICD-10-CM

## 2025-07-16 RX ORDER — HYDROCODONE BITARTRATE AND ACETAMINOPHEN 10; 325 MG/1; MG/1
1 TABLET ORAL EVERY 6 HOURS PRN
Qty: 90 TABLET | Refills: 0 | Status: SHIPPED | OUTPATIENT
Start: 2025-07-16

## 2025-07-16 NOTE — TELEPHONE ENCOUNTER
Medication Ordered On: 07/16/2025   Pharmacy: [facility], [facility] [id number] [id number]

## 2025-07-29 ENCOUNTER — TELEPHONE (OUTPATIENT)
Dept: PULMONOLOGY | Facility: CLINIC | Age: 70
End: 2025-07-29
Payer: MEDICARE

## 2025-07-29 DIAGNOSIS — E11.9 TYPE 2 DIABETES MELLITUS WITHOUT COMPLICATION, WITHOUT LONG-TERM CURRENT USE OF INSULIN: ICD-10-CM

## 2025-07-29 NOTE — TELEPHONE ENCOUNTER
Care Due:                  Date            Visit Type   Department     Provider  --------------------------------------------------------------------------------                                             [facility] INTER[facility]  Last Visit: 04-      PRE-OP       MEDICINE       [facility]                              EP -                              PRIMARY      [facility]  Next Visit: 08-      CARE ([facility])   MEDICINE       [facility]                                                            Last  Test          Frequency    Reason                     Performed    Due Date  --------------------------------------------------------------------------------    HBA1C.......  6 months...  metFORMIN................  03- 08-    [facility] Embedded Care Due Messages. Reference number: [id number].   7/29/2025 9:08:03 AM CDT

## 2025-07-29 NOTE — TELEPHONE ENCOUNTER
Copied from CRM #[id number]. Topic: Appointments - Appointment Scheduling  >> Jul 29, 2025 12:48 PM Iniki wrote:  .Type:  Sooner Apoointment Request    Caller is requesting a sooner appointment.  Caller declined first available appointment listed below.  Caller will not accept being placed on the waitlist and is requesting a message be sent to doctor.  Name of Caller:patient  When is the first available appointment?aug 29,   Symptoms:hospital follow up, mass on found  Would the patient rather a call back or a response via [facility]? call  Best Call Back Number:[phone]    Additional Information:

## 2025-07-30 NOTE — TELEPHONE ENCOUNTER
Provider Staff:  Action required for this patient    Requires labs      Please see care gap opportunities below in Care Due Message.    Thanks!  [facility]     Appointments      Date Provider   Last Visit   4/17/2025 Cassandra Brand MD   Next Visit   8/26/2025 Cassandra Brand MD     Refill Decision Note   Joey Jacobo  is requesting a refill authorization.  Brief Assessment and Rationale for Refill:  Approve     Medication Therapy Plan:         Comments:     Note composed:8:33 PM 07/29/2025

## 2025-08-04 DIAGNOSIS — G89.3 NEOPLASM RELATED PAIN: ICD-10-CM

## 2025-08-04 DIAGNOSIS — C79.51 SECONDARY ADENOCARCINOMA OF SKELETAL BONE: ICD-10-CM

## 2025-08-04 DIAGNOSIS — C61 PROSTATE CANCER: ICD-10-CM

## 2025-08-04 RX ORDER — HYDROCODONE BITARTRATE AND ACETAMINOPHEN 10; 325 MG/1; MG/1
1 TABLET ORAL EVERY 6 HOURS PRN
Qty: 90 TABLET | Refills: 0 | Status: SHIPPED | OUTPATIENT
Start: 2025-08-04

## 2025-08-07 ENCOUNTER — OFFICE VISIT (OUTPATIENT)
Dept: PULMONOLOGY | Facility: CLINIC | Age: 70
End: 2025-08-07
Payer: MEDICARE

## 2025-08-07 ENCOUNTER — PATIENT OUTREACH (OUTPATIENT)
Dept: ADMINISTRATIVE | Facility: OTHER | Age: 70
End: 2025-08-07
Payer: MEDICARE

## 2025-08-07 VITALS
SYSTOLIC BLOOD PRESSURE: 127 MMHG | RESPIRATION RATE: 15 BRPM | DIASTOLIC BLOOD PRESSURE: 66 MMHG | HEART RATE: 91 BPM | OXYGEN SATURATION: 98 % | WEIGHT: 115.75 LBS | BODY MASS INDEX: 16.57 KG/M2 | HEIGHT: 70 IN

## 2025-08-07 DIAGNOSIS — C34.90 MALIGNANT NEOPLASM OF UNSPECIFIED PART OF UNSPECIFIED BRONCHUS OR LUNG: Primary | ICD-10-CM

## 2025-08-07 DIAGNOSIS — C79.51 SECONDARY ADENOCARCINOMA OF SKELETAL BONE: ICD-10-CM

## 2025-08-07 DIAGNOSIS — C34.32 MALIGNANT NEOPLASM OF LOWER LOBE OF LEFT LUNG: ICD-10-CM

## 2025-08-07 DIAGNOSIS — C34.12 PRIMARY MALIGNANT NEOPLASM OF LEFT UPPER LOBE OF LUNG: Primary | Chronic | ICD-10-CM

## 2025-08-07 PROCEDURE — 3288F FALL RISK ASSESSMENT DOCD: CPT | Mod: CPTII,HCNC,S$GLB, | Performed by: INTERNAL MEDICINE

## 2025-08-07 PROCEDURE — 3074F SYST BP LT 130 MM HG: CPT | Mod: CPTII,HCNC,S$GLB, | Performed by: INTERNAL MEDICINE

## 2025-08-07 PROCEDURE — 1101F PT FALLS ASSESS-DOCD LE1/YR: CPT | Mod: CPTII,HCNC,S$GLB, | Performed by: INTERNAL MEDICINE

## 2025-08-07 PROCEDURE — 1126F AMNT PAIN NOTED NONE PRSNT: CPT | Mod: CPTII,HCNC,S$GLB, | Performed by: INTERNAL MEDICINE

## 2025-08-07 PROCEDURE — 1160F RVW MEDS BY RX/DR IN RCRD: CPT | Mod: CPTII,HCNC,S$GLB, | Performed by: INTERNAL MEDICINE

## 2025-08-07 PROCEDURE — 3051F HG A1C>EQUAL 7.0%<8.0%: CPT | Mod: CPTII,HCNC,S$GLB, | Performed by: INTERNAL MEDICINE

## 2025-08-07 PROCEDURE — 3078F DIAST BP <80 MM HG: CPT | Mod: CPTII,HCNC,S$GLB, | Performed by: INTERNAL MEDICINE

## 2025-08-07 PROCEDURE — 3008F BODY MASS INDEX DOCD: CPT | Mod: CPTII,HCNC,S$GLB, | Performed by: INTERNAL MEDICINE

## 2025-08-07 PROCEDURE — 1159F MED LIST DOCD IN RCRD: CPT | Mod: CPTII,HCNC,S$GLB, | Performed by: INTERNAL MEDICINE

## 2025-08-07 PROCEDURE — 3061F NEG MICROALBUMINURIA REV: CPT | Mod: CPTII,HCNC,S$GLB, | Performed by: INTERNAL MEDICINE

## 2025-08-07 PROCEDURE — 3066F NEPHROPATHY DOC TX: CPT | Mod: CPTII,HCNC,S$GLB, | Performed by: INTERNAL MEDICINE

## 2025-08-07 PROCEDURE — 99999 PR PBB SHADOW E&M-EST. PATIENT-LVL IV: CPT | Mod: PBBFAC,HCNC,, | Performed by: INTERNAL MEDICINE

## 2025-08-07 PROCEDURE — 99204 OFFICE O/P NEW MOD 45 MIN: CPT | Mod: HCNC,S$GLB,, | Performed by: INTERNAL MEDICINE

## 2025-08-07 NOTE — PROGRESS NOTES
CHW - Case Closure    This Community Health Worker spoke to patient via telephone today.   Pt/Caregiver reported: Spoke with the patient, I advised him on what to do in order to get his Medical Alert. I let him know to give his insurance a call.   Pt/Caregiver denied any additional needs at this time and agrees with episode closure at this time.  Provided patient with Community Health Worker's contact information and encouraged him/her to contact this Community Health Worker if additional needs arise.

## 2025-08-07 NOTE — PROGRESS NOTES
Subjective:      Patient ID: Joey Jacobo is a 70 y.o. male.    Chief Complaint: NP Lung Mass      HPI  70-year-old male former heavy smoker who was followed by Dr. Berrios for metastatic prostate carcinoma was [facility] Department with complaints of hoarseness, cough and chest pain.  Chest radiograph and CT chest confirmed a large left upper lobe pulmonary mass as well as bulky mediastinal adenopathy.  He is here today for that reason.  He is with his wife.  Cough, chills and other constitutional symptoms have resolved but still has a very hoarse voice.  Denies chest pain, hemoptysis.  He has his images on a disc which I have reviewed    Problem List[1]    Past Surgical History:   Procedure Laterality Date    ANKLE FRACTURE SURGERY Left     COLONOSCOPY N/A 3/21/2016    Procedure: COLONOSCOPY;  Surgeon: Melvin Pearce MD;  Location: [facility];  Service: Endoscopy;  Laterality: N/A;    CORONARY STENT PLACEMENT      times 2    SHOULDER ARTHROSCOPY Left     SPINE SURGERY      neck fusion x2     Social History[2]    Family History   Problem Relation Name Age of Onset    Cataracts Mother      Kidney disease Mother      Cancer Father          Stomach    Blindness Maternal Grandmother      Diabetes Sister      Hypertension Sister      Diabetes Sister      Hypertension Sister      Diabetes Sister      Hypertension Sister      Hypertension Sister      Hypertension Sister      Colon cancer Brother      Colon cancer Brother           Review of Systems as per HPI otherwise negative    Objective:     Physical Exam   Constitutional: He is oriented to person, place, and time. He appears well-developed. No distress.   HENT:   Hoarse voice   Cardiovascular: Normal rate and regular rhythm.   Pulmonary/Chest: Normal expansion, effort normal and breath sounds normal. He has no wheezes.   Neurological: He is alert and oriented to person, place, and time.   Psychiatric: He has a normal mood  "and affect.   Nursing note and vitals reviewed.            8/7/2025     2:25 PM 7/15/2025    10:43 AM 7/15/2025    10:08 AM 4/17/2025    10:48 AM 3/18/2025     3:49 PM 3/13/2025     8:15 AM 2/26/2025     2:00 PM   Pulmonary Function Tests   SpO2 98 % 95 %  97 %  98 % 99 %   Height 5' 10" (1.778 m) 5' 10" (1.778 m) 5' 10" (1.778 m) 5' 10" (1.778 m) 5' 10" (1.778 m) 5' 10" (1.778 m)    Weight 52.5 kg (115 lb 11.9 oz) 66 kg (145 lb 8.1 oz) 66 kg (145 lb 8.1 oz) 67.5 kg (148 lb 13 oz) 67.2 kg (148 lb 2.4 oz) 67.5 kg (148 lb 13 oz) 68.4 kg (150 lb 12.7 oz)   BMI (Calculated) 16.6 20.9 20.9 21.4 21.3 21.4         Assessment:     1. Primary malignant neoplasm of left upper lobe of lung      I personally reviewed CT chest images on a disc provided by the patient.  My interpretation is:    Diffuse moderate emphysema  Large 5 x 7 cm left upper lobe lobulated mass  Bulky mediastinal adenopathy with tracheal displacement  Diffuse fibrotic changes with some basilar ground-glass densities      Plan:     Likely malignant left upper lobe primary lung carcinoma, favor small-cell given degree of adenopathy  Hoarseness is likely due to recurrent laryngeal compression given location of tumor  Recommended bronchoscopy with biopsy we will schedule for Monday the 11th at 9:00 a.m.  Patient is already followed by Dr. Berrios, we will forward my note to him so he can schedule appropriate follow-up following tissue confirmation  I discussed the risks, benefits and alternatives of bronchoscopy with a biopsy with the patient in his wife and they voiced understanding and agreement with this plan  Preprocedure instructions and directions given  Further plans based on results of biopsy  I personally reviewed the above with the patient and/or family including test and radiology results. They voiced understanding and agreement with the above. Questions were answered to their apparent satisfaction.          [1]   Patient Active Problem " List  Diagnosis    Coronary artery disease of native artery of native heart with stable angina pectoris    Mitral regurgitation    COPD Other emphysema    Hyperlipidemia associated with type 2 diabetes mellitus    Gastroesophageal reflux disease without esophagitis    Glaucoma (increased eye pressure)    History of PTCA    Secondary adenocarcinoma of skeletal bone    Prostate cancer    Pulmonary fibrosis    Neoplasm related pain    Hypertension associated with diabetes    AP (angina pectoris)    Former smoker    Immunosuppressed due to chemotherapy    Aortic atherosclerosis    Osteopenia    Diabetes mellitus type 2 with complications    Fatigue    History of coronary artery stent placement    Abnormality of gait and mobility    Other reduced mobility    Diabetic glaucoma   [2]   Social History  Tobacco Use    Smoking status: Former     Current packs/day: 0.00     Average packs/day: 0.3 packs/day for 25.0 years (6.3 ttl pk-yrs)     Types: Cigarettes     Start date: 1993     Quit date: 2018     Years since quittin.9     Passive exposure: Never    Smokeless tobacco: Never   Substance Use Topics    Alcohol use: Not Currently     Alcohol/week: 0.0 standard drinks of alcohol     Comment: occasionally    Drug use: No

## 2025-08-08 ENCOUNTER — TELEPHONE (OUTPATIENT)
Dept: HEMATOLOGY/ONCOLOGY | Facility: CLINIC | Age: 70
End: 2025-08-08
Payer: MEDICARE

## 2025-08-08 NOTE — TELEPHONE ENCOUNTER
Call placed and discussed with pt new orders per Dr. Berrios for PET and MRI brain. Reviewed scheduled appointments and instructions for each with pt who VU. He would like to wait to have follow up with Dr. Berrios after scans and Pathology available. Pt denies any further needs/concerns at this time.

## 2025-08-11 ENCOUNTER — ANESTHESIA EVENT (OUTPATIENT)
Dept: ENDOSCOPY | Facility: HOSPITAL | Age: 70
End: 2025-08-11
Payer: MEDICARE

## 2025-08-11 ENCOUNTER — HOSPITAL ENCOUNTER (OUTPATIENT)
Dept: ENDOSCOPY | Facility: HOSPITAL | Age: 70
Discharge: HOME | End: 2025-08-11
Attending: INTERNAL MEDICINE
Payer: MEDICARE

## 2025-08-11 ENCOUNTER — ANESTHESIA (OUTPATIENT)
Dept: ENDOSCOPY | Facility: HOSPITAL | Age: 70
End: 2025-08-11
Payer: MEDICARE

## 2025-08-11 DIAGNOSIS — C34.12 PRIMARY MALIGNANT NEOPLASM OF LEFT UPPER LOBE OF LUNG: ICD-10-CM

## 2025-08-11 LAB — POCT GLUCOSE: 128 MG/DL (ref 70–110)

## 2025-08-11 PROCEDURE — 27202059 HC NEEDLE, FNA (ANY): Mod: HCNC | Performed by: INTERNAL MEDICINE

## 2025-08-11 PROCEDURE — 37000008 HC ANESTHESIA 1ST 15 MINUTES: Mod: HCNC

## 2025-08-11 PROCEDURE — 25000003 PHARM REV CODE 250: Mod: HCNC | Performed by: NURSE ANESTHETIST, CERTIFIED REGISTERED

## 2025-08-11 PROCEDURE — 37000009 HC ANESTHESIA EA ADD 15 MINS: Mod: HCNC

## 2025-08-11 PROCEDURE — 63600175 PHARM REV CODE 636 W HCPCS: Mod: HCNC | Performed by: NURSE ANESTHETIST, CERTIFIED REGISTERED

## 2025-08-11 RX ORDER — DEXAMETHASONE SODIUM PHOSPHATE 4 MG/ML
INJECTION, SOLUTION INTRA-ARTICULAR; INTRALESIONAL; INTRAMUSCULAR; INTRAVENOUS; SOFT TISSUE
Status: DISCONTINUED | OUTPATIENT
Start: 2025-08-11 | End: 2025-08-11

## 2025-08-11 RX ORDER — LIDOCAINE HYDROCHLORIDE 10 MG/ML
INJECTION, SOLUTION EPIDURAL; INFILTRATION; INTRACAUDAL; PERINEURAL
Status: DISCONTINUED | OUTPATIENT
Start: 2025-08-11 | End: 2025-08-11

## 2025-08-11 RX ORDER — ROCURONIUM BROMIDE 10 MG/ML
INJECTION, SOLUTION INTRAVENOUS
Status: DISCONTINUED | OUTPATIENT
Start: 2025-08-11 | End: 2025-08-11

## 2025-08-11 RX ORDER — ONDANSETRON HYDROCHLORIDE 2 MG/ML
INJECTION, SOLUTION INTRAVENOUS
Status: DISCONTINUED | OUTPATIENT
Start: 2025-08-11 | End: 2025-08-11

## 2025-08-11 RX ORDER — PROPOFOL 10 MG/ML
VIAL (ML) INTRAVENOUS
Status: DISCONTINUED | OUTPATIENT
Start: 2025-08-11 | End: 2025-08-11

## 2025-08-11 RX ADMIN — DEXAMETHASONE SODIUM PHOSPHATE 8 MG: 4 INJECTION, SOLUTION INTRA-ARTICULAR; INTRALESIONAL; INTRAMUSCULAR; INTRAVENOUS; SOFT TISSUE at 08:08

## 2025-08-11 RX ADMIN — ROCURONIUM BROMIDE 50 MG: 10 SOLUTION INTRAVENOUS at 08:08

## 2025-08-11 RX ADMIN — SODIUM CHLORIDE, POTASSIUM CHLORIDE, SODIUM LACTATE AND CALCIUM CHLORIDE: 600; 310; 30; 20 INJECTION, SOLUTION INTRAVENOUS at 08:08

## 2025-08-11 RX ADMIN — ONDANSETRON 4 MG: 2 INJECTION INTRAMUSCULAR; INTRAVENOUS at 08:08

## 2025-08-11 RX ADMIN — PROPOFOL 120 MG: 10 INJECTION, EMULSION INTRAVENOUS at 08:08

## 2025-08-11 RX ADMIN — SUGAMMADEX 200 MG: 100 INJECTION, SOLUTION INTRAVENOUS at 08:08

## 2025-08-11 RX ADMIN — LIDOCAINE HYDROCHLORIDE 100 MG: 10 INJECTION, SOLUTION EPIDURAL; INFILTRATION; INTRACAUDAL; PERINEURAL at 08:08

## 2025-08-12 VITALS
SYSTOLIC BLOOD PRESSURE: 138 MMHG | RESPIRATION RATE: 18 BRPM | TEMPERATURE: 98 F | HEART RATE: 84 BPM | OXYGEN SATURATION: 94 % | DIASTOLIC BLOOD PRESSURE: 80 MMHG

## 2025-08-13 ENCOUNTER — HOSPITAL ENCOUNTER (OUTPATIENT)
Dept: RADIOLOGY | Facility: HOSPITAL | Age: 70
Discharge: HOME | End: 2025-08-13
Attending: INTERNAL MEDICINE
Payer: MEDICARE

## 2025-08-13 DIAGNOSIS — C34.90 MALIGNANT NEOPLASM OF UNSPECIFIED PART OF UNSPECIFIED BRONCHUS OR LUNG: Primary | ICD-10-CM

## 2025-08-13 DIAGNOSIS — C34.90 MALIGNANT NEOPLASM OF UNSPECIFIED PART OF UNSPECIFIED BRONCHUS OR LUNG: ICD-10-CM

## 2025-08-13 DIAGNOSIS — C34.32 MALIGNANT NEOPLASM OF LOWER LOBE OF LEFT LUNG: ICD-10-CM

## 2025-08-13 PROCEDURE — 78815 PET IMAGE W/CT SKULL-THIGH: CPT | Mod: 26,HCNC,PI, | Performed by: RADIOLOGY

## 2025-08-13 PROCEDURE — 78815 PET IMAGE W/CT SKULL-THIGH: CPT | Mod: TC,HCNC

## 2025-08-13 PROCEDURE — A9552 F18 FDG: HCPCS | Mod: HCNC | Performed by: INTERNAL MEDICINE

## 2025-08-13 RX ORDER — DIAZEPAM 10 MG/1
10 TABLET ORAL ONCE
Qty: 1 TABLET | Refills: 0 | Status: SHIPPED | OUTPATIENT
Start: 2025-08-13 | End: 2025-08-13

## 2025-08-13 RX ORDER — FLUDEOXYGLUCOSE F18 500 MCI/ML
13.35 INJECTION INTRAVENOUS
Status: COMPLETED | OUTPATIENT
Start: 2025-08-13 | End: 2025-08-13

## 2025-08-13 RX ADMIN — FLUDEOXYGLUCOSE F-18 13.35 MILLICURIE: 500 INJECTION INTRAVENOUS at 10:08

## 2025-08-18 ENCOUNTER — HOSPITAL ENCOUNTER (OUTPATIENT)
Dept: RADIOLOGY | Facility: HOSPITAL | Age: 70
Discharge: HOME | End: 2025-08-18
Attending: INTERNAL MEDICINE
Payer: MEDICARE

## 2025-08-18 DIAGNOSIS — C34.32 MALIGNANT NEOPLASM OF LOWER LOBE OF LEFT LUNG: Primary | ICD-10-CM

## 2025-08-18 DIAGNOSIS — C34.90 MALIGNANT NEOPLASM OF UNSPECIFIED PART OF UNSPECIFIED BRONCHUS OR LUNG: ICD-10-CM

## 2025-08-18 DIAGNOSIS — C79.51 SECONDARY ADENOCARCINOMA OF SKELETAL BONE: ICD-10-CM

## 2025-08-18 PROCEDURE — A9585 GADOBUTROL INJECTION: HCPCS | Mod: HCNC,PN | Performed by: INTERNAL MEDICINE

## 2025-08-18 PROCEDURE — 25500020 PHARM REV CODE 255: Mod: HCNC,PN | Performed by: INTERNAL MEDICINE

## 2025-08-18 PROCEDURE — 70553 MRI BRAIN STEM W/O & W/DYE: CPT | Mod: 26,HCNC,, | Performed by: STUDENT IN AN ORGANIZED HEALTH CARE EDUCATION/TRAINING PROGRAM

## 2025-08-18 PROCEDURE — 70553 MRI BRAIN STEM W/O & W/DYE: CPT | Mod: TC,HCNC,PN

## 2025-08-18 RX ORDER — GADOBUTROL 604.72 MG/ML
5 INJECTION INTRAVENOUS
Status: COMPLETED | OUTPATIENT
Start: 2025-08-18 | End: 2025-08-18

## 2025-08-18 RX ADMIN — GADOBUTROL 5 ML: 604.72 INJECTION INTRAVENOUS at 08:08

## 2025-08-20 ENCOUNTER — TELEPHONE (OUTPATIENT)
Dept: HEMATOLOGY/ONCOLOGY | Facility: CLINIC | Age: 70
End: 2025-08-20

## 2025-08-20 ENCOUNTER — OFFICE VISIT (OUTPATIENT)
Dept: HEMATOLOGY/ONCOLOGY | Facility: CLINIC | Age: 70
End: 2025-08-20
Payer: MEDICARE

## 2025-08-20 ENCOUNTER — LAB VISIT (OUTPATIENT)
Dept: LAB | Facility: HOSPITAL | Age: 70
End: 2025-08-20
Attending: INTERNAL MEDICINE
Payer: MEDICARE

## 2025-08-20 VITALS
DIASTOLIC BLOOD PRESSURE: 69 MMHG | TEMPERATURE: 97 F | BODY MASS INDEX: 20.66 KG/M2 | WEIGHT: 144.31 LBS | HEART RATE: 77 BPM | HEIGHT: 70 IN | SYSTOLIC BLOOD PRESSURE: 121 MMHG | OXYGEN SATURATION: 97 %

## 2025-08-20 DIAGNOSIS — C61 PROSTATE CANCER: ICD-10-CM

## 2025-08-20 DIAGNOSIS — C34.90 PRIMARY MALIGNANT NEOPLASM OF LUNG METASTATIC TO OTHER SITE, UNSPECIFIED LATERALITY: Primary | ICD-10-CM

## 2025-08-20 DIAGNOSIS — E78.2 MIXED HYPERLIPIDEMIA: ICD-10-CM

## 2025-08-20 DIAGNOSIS — C34.32 MALIGNANT NEOPLASM OF LOWER LOBE OF LEFT LUNG: ICD-10-CM

## 2025-08-20 DIAGNOSIS — C79.51 SECONDARY MALIGNANT NEOPLASM OF BONE: ICD-10-CM

## 2025-08-20 DIAGNOSIS — C79.51 SECONDARY ADENOCARCINOMA OF SKELETAL BONE: ICD-10-CM

## 2025-08-20 DIAGNOSIS — G89.3 NEOPLASM RELATED PAIN: ICD-10-CM

## 2025-08-20 DIAGNOSIS — C34.32 MALIGNANT NEOPLASM OF LOWER LOBE OF LEFT LUNG: Primary | ICD-10-CM

## 2025-08-20 DIAGNOSIS — C34.90 MALIGNANT NEOPLASM OF UNSPECIFIED PART OF UNSPECIFIED BRONCHUS OR LUNG: ICD-10-CM

## 2025-08-20 DIAGNOSIS — C79.31 SECONDARY CANCER OF BRAIN: ICD-10-CM

## 2025-08-20 DIAGNOSIS — R94.6 ABNORMAL RESULTS OF THYROID FUNCTION STUDIES: ICD-10-CM

## 2025-08-20 DIAGNOSIS — R79.1 ABNORMAL COAGULATION PROFILE: ICD-10-CM

## 2025-08-20 PROBLEM — C34.12 PRIMARY MALIGNANT NEOPLASM OF LEFT UPPER LOBE OF LUNG: Chronic | Status: RESOLVED | Noted: 2025-08-07 | Resolved: 2025-08-20

## 2025-08-20 LAB
ABSOLUTE EOSINOPHIL (OHS): 0.06 K/UL
ABSOLUTE MONOCYTE (OHS): 1.23 K/UL (ref 0.3–1)
ABSOLUTE NEUTROPHIL COUNT (OHS): 6.54 K/UL (ref 1.8–7.7)
ALBUMIN SERPL BCP-MCNC: 3.3 G/DL (ref 3.5–5.2)
ALP SERPL-CCNC: 99 UNIT/L (ref 40–150)
ALT SERPL W/O P-5'-P-CCNC: 9 UNIT/L (ref 10–44)
ANION GAP (OHS): 11 MMOL/L (ref 8–16)
AST SERPL-CCNC: 19 UNIT/L (ref 11–45)
BASOPHILS # BLD AUTO: 0.05 K/UL
BASOPHILS NFR BLD AUTO: 0.5 %
BILIRUB SERPL-MCNC: 0.4 MG/DL (ref 0.1–1)
BUN SERPL-MCNC: 14 MG/DL (ref 8–23)
CALCIUM SERPL-MCNC: 10.2 MG/DL (ref 8.7–10.5)
CHLORIDE SERPL-SCNC: 104 MMOL/L (ref 95–110)
CO2 SERPL-SCNC: 26 MMOL/L (ref 23–29)
CREAT SERPL-MCNC: 1 MG/DL (ref 0.5–1.4)
ERYTHROCYTE [DISTWIDTH] IN BLOOD BY AUTOMATED COUNT: 15.2 % (ref 11.5–14.5)
GFR SERPLBLD CREATININE-BSD FMLA CKD-EPI: >60 ML/MIN/1.73/M2
GLUCOSE SERPL-MCNC: 116 MG/DL (ref 70–110)
HCT VFR BLD AUTO: 38.2 % (ref 40–54)
HGB BLD-MCNC: 12 GM/DL (ref 14–18)
IMM GRANULOCYTES # BLD AUTO: 0.21 K/UL (ref 0–0.04)
IMM GRANULOCYTES NFR BLD AUTO: 2 % (ref 0–0.5)
LYMPHOCYTES # BLD AUTO: 2.64 K/UL (ref 1–4.8)
MCH RBC QN AUTO: 29.1 PG (ref 27–31)
MCHC RBC AUTO-ENTMCNC: 31.4 G/DL (ref 32–36)
MCV RBC AUTO: 93 FL (ref 82–98)
NUCLEATED RBC (/100WBC) (OHS): 0 /100 WBC
PLATELET # BLD AUTO: 263 K/UL (ref 150–450)
PMV BLD AUTO: 10 FL (ref 9.2–12.9)
POTASSIUM SERPL-SCNC: 3.8 MMOL/L (ref 3.5–5.1)
PROT SERPL-MCNC: 8.3 GM/DL (ref 6–8.4)
PSA SERPL-MCNC: <0.01 NG/ML
RBC # BLD AUTO: 4.12 M/UL (ref 4.6–6.2)
RELATIVE EOSINOPHIL (OHS): 0.6 %
RELATIVE LYMPHOCYTE (OHS): 24.6 % (ref 18–48)
RELATIVE MONOCYTE (OHS): 11.5 % (ref 4–15)
RELATIVE NEUTROPHIL (OHS): 60.8 % (ref 38–73)
SODIUM SERPL-SCNC: 141 MMOL/L (ref 136–145)
TSH SERPL-ACNC: 1.18 UIU/ML (ref 0.4–4)
WBC # BLD AUTO: 10.73 K/UL (ref 3.9–12.7)

## 2025-08-20 PROCEDURE — 99215 OFFICE O/P EST HI 40 MIN: CPT | Mod: HCNC,S$GLB,, | Performed by: INTERNAL MEDICINE

## 2025-08-20 PROCEDURE — 1125F AMNT PAIN NOTED PAIN PRSNT: CPT | Mod: CPTII,HCNC,S$GLB, | Performed by: INTERNAL MEDICINE

## 2025-08-20 PROCEDURE — 3051F HG A1C>EQUAL 7.0%<8.0%: CPT | Mod: CPTII,HCNC,S$GLB, | Performed by: INTERNAL MEDICINE

## 2025-08-20 PROCEDURE — 84403 ASSAY OF TOTAL TESTOSTERONE: CPT | Mod: HCNC

## 2025-08-20 PROCEDURE — 3066F NEPHROPATHY DOC TX: CPT | Mod: CPTII,HCNC,S$GLB, | Performed by: INTERNAL MEDICINE

## 2025-08-20 PROCEDURE — 84075 ASSAY ALKALINE PHOSPHATASE: CPT | Mod: HCNC

## 2025-08-20 PROCEDURE — 3008F BODY MASS INDEX DOCD: CPT | Mod: CPTII,HCNC,S$GLB, | Performed by: INTERNAL MEDICINE

## 2025-08-20 PROCEDURE — 99999 PR PBB SHADOW E&M-EST. PATIENT-LVL III: CPT | Mod: PBBFAC,HCNC,, | Performed by: INTERNAL MEDICINE

## 2025-08-20 PROCEDURE — 85025 COMPLETE CBC W/AUTO DIFF WBC: CPT | Mod: HCNC

## 2025-08-20 PROCEDURE — 36415 COLL VENOUS BLD VENIPUNCTURE: CPT | Mod: HCNC

## 2025-08-20 PROCEDURE — 3074F SYST BP LT 130 MM HG: CPT | Mod: CPTII,HCNC,S$GLB, | Performed by: INTERNAL MEDICINE

## 2025-08-20 PROCEDURE — 3078F DIAST BP <80 MM HG: CPT | Mod: CPTII,HCNC,S$GLB, | Performed by: INTERNAL MEDICINE

## 2025-08-20 PROCEDURE — 84153 ASSAY OF PSA TOTAL: CPT | Mod: HCNC

## 2025-08-20 PROCEDURE — 84443 ASSAY THYROID STIM HORMONE: CPT | Mod: HCNC

## 2025-08-20 PROCEDURE — 1101F PT FALLS ASSESS-DOCD LE1/YR: CPT | Mod: CPTII,HCNC,S$GLB, | Performed by: INTERNAL MEDICINE

## 2025-08-20 PROCEDURE — 3288F FALL RISK ASSESSMENT DOCD: CPT | Mod: CPTII,HCNC,S$GLB, | Performed by: INTERNAL MEDICINE

## 2025-08-20 PROCEDURE — 3061F NEG MICROALBUMINURIA REV: CPT | Mod: CPTII,HCNC,S$GLB, | Performed by: INTERNAL MEDICINE

## 2025-08-20 RX ORDER — HYDROCODONE BITARTRATE AND ACETAMINOPHEN 10; 325 MG/1; MG/1
1 TABLET ORAL EVERY 6 HOURS PRN
Qty: 90 TABLET | Refills: 0 | Status: SHIPPED | OUTPATIENT
Start: 2025-08-20

## 2025-08-21 ENCOUNTER — TELEPHONE (OUTPATIENT)
Dept: HEMATOLOGY/ONCOLOGY | Facility: CLINIC | Age: 70
End: 2025-08-21
Payer: MEDICARE

## 2025-08-21 LAB — TESTOST SERPL-MCNC: <4 NG/DL (ref 304–1227)

## 2025-08-21 RX ORDER — PROCHLORPERAZINE MALEATE 5 MG
5 TABLET ORAL EVERY 6 HOURS PRN
Qty: 20 TABLET | Refills: 5 | Status: SHIPPED | OUTPATIENT
Start: 2025-08-21

## 2025-08-21 RX ORDER — DEXAMETHASONE 4 MG/1
8 TABLET ORAL DAILY
Qty: 6 TABLET | Refills: 3 | Status: SHIPPED | OUTPATIENT
Start: 2025-08-21

## 2025-08-21 RX ORDER — OLANZAPINE 5 MG/1
TABLET, FILM COATED ORAL
Qty: 4 TABLET | Refills: 3 | Status: SHIPPED | OUTPATIENT
Start: 2025-08-21

## 2025-08-21 RX ORDER — ALLOPURINOL 300 MG/1
300 TABLET ORAL DAILY
Qty: 30 TABLET | Refills: 0 | Status: SHIPPED | OUTPATIENT
Start: 2025-08-21

## 2025-08-26 ENCOUNTER — LAB VISIT (OUTPATIENT)
Dept: LAB | Facility: HOSPITAL | Age: 70
End: 2025-08-26
Attending: INTERNAL MEDICINE
Payer: MEDICARE

## 2025-08-26 ENCOUNTER — OFFICE VISIT (OUTPATIENT)
Dept: INTERNAL MEDICINE | Facility: CLINIC | Age: 70
End: 2025-08-26
Payer: MEDICARE

## 2025-08-26 VITALS
HEIGHT: 70 IN | RESPIRATION RATE: 20 BRPM | SYSTOLIC BLOOD PRESSURE: 120 MMHG | TEMPERATURE: 95 F | WEIGHT: 142.19 LBS | OXYGEN SATURATION: 100 % | HEART RATE: 96 BPM | DIASTOLIC BLOOD PRESSURE: 84 MMHG | BODY MASS INDEX: 20.36 KG/M2

## 2025-08-26 DIAGNOSIS — C34.32 MALIGNANT NEOPLASM OF LOWER LOBE OF LEFT LUNG: ICD-10-CM

## 2025-08-26 DIAGNOSIS — Z79.69 IMMUNOSUPPRESSED DUE TO CHEMOTHERAPY: ICD-10-CM

## 2025-08-26 DIAGNOSIS — T45.1X5A IMMUNOSUPPRESSED DUE TO CHEMOTHERAPY: ICD-10-CM

## 2025-08-26 DIAGNOSIS — M85.80 OSTEOPENIA, UNSPECIFIED LOCATION: ICD-10-CM

## 2025-08-26 DIAGNOSIS — E11.69 HYPERLIPIDEMIA ASSOCIATED WITH TYPE 2 DIABETES MELLITUS: ICD-10-CM

## 2025-08-26 DIAGNOSIS — I15.2 HYPERTENSION ASSOCIATED WITH DIABETES: Primary | ICD-10-CM

## 2025-08-26 DIAGNOSIS — I70.0 AORTIC ATHEROSCLEROSIS: ICD-10-CM

## 2025-08-26 DIAGNOSIS — E11.59 HYPERTENSION ASSOCIATED WITH DIABETES: Primary | ICD-10-CM

## 2025-08-26 DIAGNOSIS — J43.8 OTHER EMPHYSEMA: ICD-10-CM

## 2025-08-26 DIAGNOSIS — E78.5 HYPERLIPIDEMIA ASSOCIATED WITH TYPE 2 DIABETES MELLITUS: ICD-10-CM

## 2025-08-26 DIAGNOSIS — D84.821 IMMUNOSUPPRESSED DUE TO CHEMOTHERAPY: ICD-10-CM

## 2025-08-26 DIAGNOSIS — C61 PROSTATE CANCER: ICD-10-CM

## 2025-08-26 DIAGNOSIS — K21.9 GASTROESOPHAGEAL REFLUX DISEASE WITHOUT ESOPHAGITIS: Chronic | ICD-10-CM

## 2025-08-26 DIAGNOSIS — G89.3 NEOPLASM RELATED PAIN: ICD-10-CM

## 2025-08-26 DIAGNOSIS — C79.31 SECONDARY CANCER OF BRAIN: ICD-10-CM

## 2025-08-26 DIAGNOSIS — Z95.5 HISTORY OF CORONARY ARTERY STENT PLACEMENT: ICD-10-CM

## 2025-08-26 DIAGNOSIS — Z98.61 HISTORY OF PTCA: ICD-10-CM

## 2025-08-26 DIAGNOSIS — J84.10 PULMONARY FIBROSIS: ICD-10-CM

## 2025-08-26 DIAGNOSIS — Z87.891 FORMER SMOKER: ICD-10-CM

## 2025-08-26 DIAGNOSIS — C79.51 SECONDARY MALIGNANT NEOPLASM OF BONE: ICD-10-CM

## 2025-08-26 DIAGNOSIS — I25.118 CORONARY ARTERY DISEASE OF NATIVE ARTERY OF NATIVE HEART WITH STABLE ANGINA PECTORIS: ICD-10-CM

## 2025-08-26 DIAGNOSIS — E11.9 TYPE 2 DIABETES MELLITUS WITHOUT COMPLICATION, WITHOUT LONG-TERM CURRENT USE OF INSULIN: ICD-10-CM

## 2025-08-26 PROCEDURE — 99999 PR PBB SHADOW E&M-EST. PATIENT-LVL V: CPT | Mod: PBBFAC,HCNC,, | Performed by: FAMILY MEDICINE

## 2025-08-26 RX ORDER — FLUTICASONE PROPIONATE AND SALMETEROL XINAFOATE 115; 21 UG/1; UG/1
2 AEROSOL, METERED RESPIRATORY (INHALATION) EVERY 12 HOURS
COMMUNITY

## 2025-08-27 ENCOUNTER — HOSPITAL ENCOUNTER (OUTPATIENT)
Dept: CARDIOLOGY | Facility: HOSPITAL | Age: 70
Discharge: HOME | End: 2025-08-27
Attending: INTERNAL MEDICINE
Payer: MEDICARE

## 2025-08-27 ENCOUNTER — TELEPHONE (OUTPATIENT)
Dept: NUTRITION | Facility: CLINIC | Age: 70
End: 2025-08-27
Payer: MEDICARE

## 2025-08-27 ENCOUNTER — DOCUMENTATION ONLY (OUTPATIENT)
Dept: HEMATOLOGY/ONCOLOGY | Facility: CLINIC | Age: 70
End: 2025-08-27

## 2025-08-27 ENCOUNTER — OFFICE VISIT (OUTPATIENT)
Dept: HEMATOLOGY/ONCOLOGY | Facility: CLINIC | Age: 70
End: 2025-08-27
Payer: MEDICARE

## 2025-08-27 ENCOUNTER — CLINICAL SUPPORT (OUTPATIENT)
Dept: HEMATOLOGY/ONCOLOGY | Facility: CLINIC | Age: 70
End: 2025-08-27
Payer: MEDICARE

## 2025-08-27 ENCOUNTER — INFUSION (OUTPATIENT)
Dept: INFUSION THERAPY | Facility: HOSPITAL | Age: 70
End: 2025-08-27
Attending: INTERNAL MEDICINE
Payer: MEDICARE

## 2025-08-27 VITALS
WEIGHT: 145.06 LBS | HEIGHT: 70 IN | DIASTOLIC BLOOD PRESSURE: 72 MMHG | HEART RATE: 111 BPM | RESPIRATION RATE: 17 BRPM | OXYGEN SATURATION: 95 % | TEMPERATURE: 97 F | SYSTOLIC BLOOD PRESSURE: 125 MMHG | BODY MASS INDEX: 20.77 KG/M2

## 2025-08-27 DIAGNOSIS — C79.51 SECONDARY MALIGNANT NEOPLASM OF BONE: ICD-10-CM

## 2025-08-27 DIAGNOSIS — D84.821 IMMUNOSUPPRESSED DUE TO CHEMOTHERAPY: ICD-10-CM

## 2025-08-27 DIAGNOSIS — R07.1 CHEST PAIN ON BREATHING: ICD-10-CM

## 2025-08-27 DIAGNOSIS — C34.90 PRIMARY MALIGNANT NEOPLASM OF LUNG METASTATIC TO OTHER SITE, UNSPECIFIED LATERALITY: Primary | ICD-10-CM

## 2025-08-27 DIAGNOSIS — C34.32 MALIGNANT NEOPLASM OF LOWER LOBE OF LEFT LUNG: Primary | ICD-10-CM

## 2025-08-27 DIAGNOSIS — R30.0 DYSURIA: Primary | ICD-10-CM

## 2025-08-27 DIAGNOSIS — Z79.69 IMMUNOSUPPRESSED DUE TO CHEMOTHERAPY: ICD-10-CM

## 2025-08-27 DIAGNOSIS — C34.32 MALIGNANT NEOPLASM OF LOWER LOBE OF LEFT LUNG: ICD-10-CM

## 2025-08-27 DIAGNOSIS — C79.31 SECONDARY CANCER OF BRAIN: ICD-10-CM

## 2025-08-27 DIAGNOSIS — T45.1X5A IMMUNOSUPPRESSED DUE TO CHEMOTHERAPY: ICD-10-CM

## 2025-08-27 DIAGNOSIS — N30.01 ACUTE CYSTITIS WITH HEMATURIA: Primary | ICD-10-CM

## 2025-08-27 LAB
OHS QRS DURATION: 62 MS
OHS QTC CALCULATION: 401 MS

## 2025-08-27 PROCEDURE — 96375 TX/PRO/DX INJ NEW DRUG ADDON: CPT | Mod: HCNC

## 2025-08-27 PROCEDURE — 96413 CHEMO IV INFUSION 1 HR: CPT | Mod: HCNC

## 2025-08-27 PROCEDURE — 93005 ELECTROCARDIOGRAM TRACING: CPT | Mod: HCNC

## 2025-08-27 PROCEDURE — 3061F NEG MICROALBUMINURIA REV: CPT | Mod: CPTII,HCNC,S$GLB, | Performed by: INTERNAL MEDICINE

## 2025-08-27 PROCEDURE — 96417 CHEMO IV INFUS EACH ADDL SEQ: CPT | Mod: HCNC

## 2025-08-27 PROCEDURE — 25000003 PHARM REV CODE 250: Mod: HCNC | Performed by: INTERNAL MEDICINE

## 2025-08-27 PROCEDURE — 99999 PR PBB SHADOW E&M-EST. PATIENT-LVL III: CPT | Mod: PBBFAC,HCNC,,

## 2025-08-27 PROCEDURE — 3066F NEPHROPATHY DOC TX: CPT | Mod: CPTII,HCNC,S$GLB, | Performed by: INTERNAL MEDICINE

## 2025-08-27 PROCEDURE — 3051F HG A1C>EQUAL 7.0%<8.0%: CPT | Mod: CPTII,HCNC,S$GLB, | Performed by: INTERNAL MEDICINE

## 2025-08-27 PROCEDURE — 93010 ELECTROCARDIOGRAM REPORT: CPT | Mod: HCNC,,, | Performed by: INTERNAL MEDICINE

## 2025-08-27 PROCEDURE — 63600175 PHARM REV CODE 636 W HCPCS: Mod: HCNC | Performed by: INTERNAL MEDICINE

## 2025-08-27 PROCEDURE — 99215 OFFICE O/P EST HI 40 MIN: CPT | Mod: 25,HCNC,S$GLB, | Performed by: INTERNAL MEDICINE

## 2025-08-27 PROCEDURE — 96367 TX/PROPH/DG ADDL SEQ IV INF: CPT | Mod: HCNC

## 2025-08-27 RX ORDER — PROCHLORPERAZINE EDISYLATE 5 MG/ML
5 INJECTION INTRAMUSCULAR; INTRAVENOUS ONCE AS NEEDED
Status: DISCONTINUED | OUTPATIENT
Start: 2025-08-27 | End: 2025-08-27 | Stop reason: HOSPADM

## 2025-08-27 RX ORDER — AMOXICILLIN AND CLAVULANATE POTASSIUM 500; 125 MG/1; MG/1
1 TABLET, FILM COATED ORAL 2 TIMES DAILY
Qty: 20 TABLET | Refills: 0 | Status: SHIPPED | OUTPATIENT
Start: 2025-08-27 | End: 2025-09-06

## 2025-08-27 RX ORDER — HEPARIN 100 UNIT/ML
500 SYRINGE INTRAVENOUS
Status: CANCELLED | OUTPATIENT
Start: 2025-08-27

## 2025-08-27 RX ORDER — PROCHLORPERAZINE EDISYLATE 5 MG/ML
5 INJECTION INTRAMUSCULAR; INTRAVENOUS ONCE AS NEEDED
Status: CANCELLED | OUTPATIENT
Start: 2025-08-27

## 2025-08-27 RX ORDER — SODIUM CHLORIDE 0.9 % (FLUSH) 0.9 %
10 SYRINGE (ML) INJECTION
Status: CANCELLED | OUTPATIENT
Start: 2025-08-27

## 2025-08-27 RX ORDER — EPINEPHRINE 0.3 MG/.3ML
0.3 INJECTION SUBCUTANEOUS ONCE AS NEEDED
Status: DISCONTINUED | OUTPATIENT
Start: 2025-08-27 | End: 2025-08-27 | Stop reason: HOSPADM

## 2025-08-27 RX ORDER — DIPHENHYDRAMINE HYDROCHLORIDE 50 MG/ML
50 INJECTION, SOLUTION INTRAMUSCULAR; INTRAVENOUS ONCE AS NEEDED
Status: DISCONTINUED | OUTPATIENT
Start: 2025-08-27 | End: 2025-08-27 | Stop reason: HOSPADM

## 2025-08-27 RX ORDER — DIPHENHYDRAMINE HYDROCHLORIDE 50 MG/ML
50 INJECTION, SOLUTION INTRAMUSCULAR; INTRAVENOUS ONCE AS NEEDED
Status: CANCELLED | OUTPATIENT
Start: 2025-08-27 | End: 2037-01-22

## 2025-08-27 RX ORDER — EPINEPHRINE 0.3 MG/.3ML
0.3 INJECTION SUBCUTANEOUS ONCE AS NEEDED
Status: CANCELLED | OUTPATIENT
Start: 2025-08-27 | End: 2037-01-22

## 2025-08-27 RX ADMIN — ATEZOLIZUMAB 1200 MG: 1200 INJECTION, SOLUTION INTRAVENOUS at 01:08

## 2025-08-27 RX ADMIN — APREPITANT 130 MG: 130 INJECTION, EMULSION INTRAVENOUS at 02:08

## 2025-08-27 RX ADMIN — ETOPOSIDE 180 MG: 20 INJECTION INTRAVENOUS at 04:08

## 2025-08-27 RX ADMIN — DEXAMETHASONE SODIUM PHOSPHATE 0.25 MG: 4 INJECTION, SOLUTION INTRA-ARTICULAR; INTRALESIONAL; INTRAMUSCULAR; INTRAVENOUS; SOFT TISSUE at 02:08

## 2025-08-27 RX ADMIN — CARBOPLATIN 490 MG: 10 INJECTION, SOLUTION INTRAVENOUS at 03:08

## 2025-08-28 ENCOUNTER — INFUSION (OUTPATIENT)
Dept: INFUSION THERAPY | Facility: HOSPITAL | Age: 70
End: 2025-08-28
Attending: INTERNAL MEDICINE
Payer: MEDICARE

## 2025-08-28 VITALS
SYSTOLIC BLOOD PRESSURE: 121 MMHG | HEART RATE: 92 BPM | TEMPERATURE: 98 F | RESPIRATION RATE: 18 BRPM | DIASTOLIC BLOOD PRESSURE: 73 MMHG | OXYGEN SATURATION: 98 %

## 2025-08-28 DIAGNOSIS — C34.32 MALIGNANT NEOPLASM OF LOWER LOBE OF LEFT LUNG: Primary | ICD-10-CM

## 2025-08-28 PROCEDURE — 96413 CHEMO IV INFUSION 1 HR: CPT | Mod: HCNC

## 2025-08-28 PROCEDURE — 63600175 PHARM REV CODE 636 W HCPCS: Mod: HCNC | Performed by: INTERNAL MEDICINE

## 2025-08-28 PROCEDURE — 25000003 PHARM REV CODE 250: Mod: HCNC | Performed by: INTERNAL MEDICINE

## 2025-08-28 RX ADMIN — SODIUM CHLORIDE 180 MG: 9 INJECTION, SOLUTION INTRAVENOUS at 01:08

## 2025-08-29 ENCOUNTER — INFUSION (OUTPATIENT)
Dept: INFUSION THERAPY | Facility: HOSPITAL | Age: 70
End: 2025-08-29
Attending: INTERNAL MEDICINE
Payer: MEDICARE

## 2025-08-29 VITALS
SYSTOLIC BLOOD PRESSURE: 119 MMHG | HEIGHT: 70 IN | WEIGHT: 142.19 LBS | TEMPERATURE: 98 F | DIASTOLIC BLOOD PRESSURE: 70 MMHG | BODY MASS INDEX: 20.36 KG/M2 | HEART RATE: 72 BPM | RESPIRATION RATE: 18 BRPM | OXYGEN SATURATION: 98 %

## 2025-08-29 DIAGNOSIS — C34.32 MALIGNANT NEOPLASM OF LOWER LOBE OF LEFT LUNG: Primary | ICD-10-CM

## 2025-08-29 PROCEDURE — 25000003 PHARM REV CODE 250: Mod: HCNC | Performed by: INTERNAL MEDICINE

## 2025-08-29 PROCEDURE — 63600175 PHARM REV CODE 636 W HCPCS: Mod: HCNC | Performed by: INTERNAL MEDICINE

## 2025-08-29 PROCEDURE — 96413 CHEMO IV INFUSION 1 HR: CPT | Mod: HCNC

## 2025-08-29 RX ADMIN — SODIUM CHLORIDE: 9 INJECTION, SOLUTION INTRAVENOUS at 01:08

## 2025-08-29 RX ADMIN — ETOPOSIDE 180 MG: 20 INJECTION INTRAVENOUS at 01:08

## 2025-09-01 ENCOUNTER — NURSE TRIAGE (OUTPATIENT)
Dept: ADMINISTRATIVE | Facility: CLINIC | Age: 70
End: 2025-09-01
Payer: MEDICARE

## 2025-09-01 PROBLEM — E11.9 TYPE 2 DIABETES MELLITUS WITHOUT COMPLICATION, WITHOUT LONG-TERM CURRENT USE OF INSULIN: Status: RESOLVED | Noted: 2024-06-07 | Resolved: 2025-09-01

## 2025-09-01 PROBLEM — R53.83 FATIGUE: Status: RESOLVED | Noted: 2025-07-15 | Resolved: 2025-09-01

## 2025-09-02 ENCOUNTER — TELEPHONE (OUTPATIENT)
Dept: RADIOLOGY | Facility: HOSPITAL | Age: 70
End: 2025-09-02
Payer: MEDICARE

## 2025-09-02 ENCOUNTER — TELEPHONE (OUTPATIENT)
Dept: HEMATOLOGY/ONCOLOGY | Facility: CLINIC | Age: 70
End: 2025-09-02
Payer: MEDICARE

## 2025-09-02 DIAGNOSIS — N30.01 ACUTE CYSTITIS WITH HEMATURIA: ICD-10-CM

## 2025-09-02 RX ORDER — NITROFURANTOIN 25; 75 MG/1; MG/1
100 CAPSULE ORAL 2 TIMES DAILY
Qty: 14 CAPSULE | Refills: 0 | Status: SHIPPED | OUTPATIENT
Start: 2025-09-02 | End: 2025-09-09

## 2025-09-03 ENCOUNTER — TELEPHONE (OUTPATIENT)
Dept: HEMATOLOGY/ONCOLOGY | Facility: CLINIC | Age: 70
End: 2025-09-03
Payer: MEDICARE